# Patient Record
Sex: FEMALE | Race: BLACK OR AFRICAN AMERICAN | NOT HISPANIC OR LATINO
[De-identification: names, ages, dates, MRNs, and addresses within clinical notes are randomized per-mention and may not be internally consistent; named-entity substitution may affect disease eponyms.]

---

## 2016-12-15 NOTE — H&P ADULT. - PROBLEM SELECTOR PLAN 2
Likely sec to sepsis, hypotension.  s/p 2.5 liter in ED. hold BP meds/diuretic until HD stable.  Cont with fluid for another 10 hours and reassess for fluid status. (EF of 26%) Likely sec to sepsis, hypotension.  s/p 2.5 liter in ED. hold BP meds/diuretic until HD stable.  Cont with fluid for another 10 hours and reassess for fluid status. (EF of 26%)  Strict I+Os,

## 2016-12-15 NOTE — H&P ADULT. - ASSESSMENT
55 yo f with multiple medical issues s/p recent hospitalizations with fever, gout, strep bacteremia, decub presented from rehab today with sepsis, hypotension, ILANA without clear source.

## 2016-12-15 NOTE — ED ADULT NURSE NOTE - PSH
History of Cholecystectomy    History of Tubal Ligation    Hx of aorta-iliac-femoral bypass    s/p AAA (Abdominal Aortic Aneurysm) Repair  2009 2010  S/P Dilatation and Curettage    S/P Partial Hysterectomy    TOP (Termination of Pregnancy)

## 2016-12-15 NOTE — ED PROVIDER NOTE - OBJECTIVE STATEMENT
56 y.o. female multiple medical problems bibems from nursing home pw fever, productive cough, nausea and abdominal pain x 2 days. Given tylenol prior to presentation. Abdominal pain is diffuse, +flatulence, +BM normal this AM. No chest pain or shortness of breath.     ROS: denies HA, weakness, dizziness, chest pain, SOB, diaphoresis, back/neck pain, dysuria/hematuria, or rash

## 2016-12-15 NOTE — ED ADULT NURSE NOTE - OBJECTIVE STATEMENT
Pt BIBA from Rehab center, Pt presents w/complaints of N/V/Fever. Pt Lungs b/l crackles at bases, productive cough, green/brown emesis. normal s1s2, abd tender on palpation, +bs 4 quads Skin warm and dry. PT hx of PNA, in rehab since hospitalization. Pt BIBA from Rehab center, Pt presents w/complaints of N/V/Fever. Pt Lungs b/l crackles at bases, productive cough, green/brown emesis. normal s1s2, abd tender on palpation, +bs 4 quads Skin warm and dry. PT hx of PNA, in rehab since hospitalization. pt has indwelling cath placed, changed 2 weeks ago Pt BIBA from Rehab center, Pt presents w/complaints of N/V/Fever. Pt Lungs b/l crackles at bases, productive cough, green/brown emesis. normal s1s2, abd tender on palpation, +bs 4 quads Skin warm and dry. PT hx of PNA, in rehab since hospitalization. pt has indwelling cath placed, changed 2 weeks ago. AIDC. Stage  ulcer on Rt buttock

## 2016-12-15 NOTE — H&P ADULT. - PROBLEM SELECTOR PLAN 1
Similar to previous presentations no obvious source at this time.   UA + with chronic fox at risk for UTI but given h/o bacteremia concerning for another bacteremia.   Decub wound does not look infected.   Will start on broad spectrum abx on vanco and zosyn   Vanco 1 gm x1 order and will follow up trough. (CrCl:22)  Follow up urine and blood cultures Similar to previous presentations no obvious source at this time.   UA + with chronic fox at risk for UTI but given h/o bacteremia concerning for another bacteremia.  CT A/p and CXR unremarkable .   Decub wound does not look infected.   Will start on broad spectrum abx on vanco and zosyn   Vanco 1 gm x1 order and will follow up trough. (CrCl:22)  Follow up urine and blood cultures RVP neg

## 2016-12-15 NOTE — ED PROVIDER NOTE - SHIFT CHANGE DETAILS
pending ct, cxr - ua - may need to broaden abx - admit for febrile illnes and acute on chronic renal failure

## 2016-12-15 NOTE — H&P ADULT. - PROBLEM SELECTOR PLAN 5
Decrease allopurinol to 200mg daily given CrCl  recent gout flare in sept s/p steroid taper  currently no evidence of acute flare

## 2016-12-15 NOTE — ED PROVIDER NOTE - PHYSICAL EXAMINATION
Gen: Well appearing, NAD, AOx3  Head: NCAT  HEENT:  MM dry, normal conjunctiva  Lung: rales at bases, no wheezes or rhonchi  CV: S1/S2, no murmurs, rubs or gallops  Abd: soft, diffuse tenderness, no rebound or guarding  MSK: No CVA tenderness.   Neuro: No focal neurologic deficits.  Skin: Warm and dry, no evidence of rash  Psych: normal mood and affect

## 2016-12-15 NOTE — ED ADULT NURSE NOTE - PMH
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

## 2016-12-15 NOTE — H&P ADULT. - HISTORY OF PRESENT ILLNESS
55 yo f with h/o obesity, chronic sacral decub, chronic pericardial effusion s/p tap 8/2016, chronic systolic HF (EF26%), AICD, h/o LLE DVT no longer on AC, aortoiliac stent, graft, CKD 3-4, gout, HIT, HTN, chronic fox sec to retention presented from rehab with fever and vomiting. Symptoms started yesteraday when she felt nauseous suddenly and threw up few times followed by fever and chills. patient denies any other symptoms. no headache, neck stiffness, photophobia, CP, SOB, cough, palpitations, abd pain, diarrhea, urinary symptoms. patient was hospitalized few time this year with similar complaint aug, sept 2016. Aug admission she was found to have strep bacteremia/?source , decub. Subsequently in Sept with similar presentation but abx was stopped after 3 days. As per rehab records patient was put on levaquin for 10 days since 11/29 for pneumonia.     In the ED, patient is seen having chills. febrile to 100.3 (Per patient she had fevers of 102-103 at rehab). SBP on arrival was 80s s/p 2.5 liters improved to 130s now. Patient was given 1 dose of Ceftriaxone.

## 2016-12-15 NOTE — ED PROVIDER NOTE - MEDICAL DECISION MAKING DETAILS
56 y.o. female pw fever, cough, abd pain, nausea/vomiting. Hypotensive and borderline tachy here, febrile here. Otherwise well appearing. Will obtain sepsis workup, ct ab/pel, xray chest, ekg, likely admit. 56 y.o. female pw fever, cough, abd pain, nausea/vomiting. Hypotensive and borderline tachy here, febrile here. Otherwise well appearing. Will obtain sepsis workup, ct ab/pel, xray chest, ekg, likely admit.  manpreet - 56 multiple med problem chronic indwelling cath - fever cough vomiting - r/o pna - r/o urosepsis - iv abx iv fluids lactate - admit - consider ct r/o abd path -

## 2016-12-15 NOTE — ED PROVIDER NOTE - CARE PLAN
Principal Discharge DX:	Febrile illness, acute Principal Discharge DX:	Febrile illness, acute  Secondary Diagnosis:	Renal failure (ARF), acute on chronic

## 2016-12-15 NOTE — ED ADULT NURSE NOTE - EENT WDL
Eyes with no visual disturbances.  Ears clean and dry and no hearing difficulties. Nose with pink mucosa and no drainage.  Mouth mucous membranes moist and pink.  No tenderness or swelling to throat or neck.

## 2016-12-15 NOTE — H&P ADULT. - PROBLEM SELECTOR PLAN 4
Hgb8.4. baseline 10s  Suspect sepsis related. no report of bleeding.   CT a/p showing previous vasc intervention but no acute finding ie bleed.  Monitor serial CBC if H/H cont to downtrending call GI/Vasc

## 2016-12-15 NOTE — ED PROVIDER NOTE - ATTENDING CONTRIBUTION TO CARE
I performed a history and physical exam of the patient and discussed their management with the resident. I reviewed the resident's note and agree with the documented findings and plan of care. My medical decison making and observations are found above.

## 2016-12-16 NOTE — DIETITIAN INITIAL EVALUATION ADULT. - OTHER INFO
nutrition consult for skin breakdown, sacral decubiti st III. pt admtted fto rehab with fever. Recent admission in August for similar condition. Pt states she dislikes ensure refuses supplements. Pt  completes her own menu and "decides" what she wants to eat" Weight is stable. Pt is bedbound with chronic sacral ulcer a noted. pt was receiving MVI supplements in rehab PTA.  Pt agreed to take zen 2x/day for increased protein.

## 2016-12-16 NOTE — DIETITIAN INITIAL EVALUATION ADULT. - PROBLEM SELECTOR PLAN 2
Likely sec to sepsis, hypotension.  s/p 2.5 liter in ED. hold BP meds/diuretic until HD stable.  Cont with fluid for another 10 hours and reassess for fluid status. (EF of 26%)  Strict I+Os,

## 2016-12-16 NOTE — DIETITIAN INITIAL EVALUATION ADULT. - PROBLEM SELECTOR PLAN 1
Similar to previous presentations no obvious source at this time.   UA + with chronic fox at risk for UTI but given h/o bacteremia concerning for another bacteremia.  CT A/p and CXR unremarkable .   Decub wound does not look infected.   Will start on broad spectrum abx on vanco and zosyn   Vanco 1 gm x1 order and will follow up trough. (CrCl:22)  Follow up urine and blood cultures RVP neg

## 2016-12-16 NOTE — DIETITIAN INITIAL EVALUATION ADULT. - ENERGY NEEDS
IBW=  130 lbs +/- 10%  upper range used for protein estimation  Chart review: pt admitted with fever, ILANA on CKD noted without clear  source. Recent admission in September for bacteremia.

## 2016-12-17 NOTE — PHYSICAL THERAPY INITIAL EVALUATION ADULT - ADDITIONAL COMMENTS
Pt states she has been In and out of hospitals for many months, prior to rehab pt lived at home with daughter, +stairs with hand rail, ambulated independently and performed ADL's independently at home, however at rehab pt only standing performing therex activity.

## 2016-12-17 NOTE — PHYSICAL THERAPY INITIAL EVALUATION ADULT - PERTINENT HX OF CURRENT PROBLEM, REHAB EVAL
Pt is a 56 year old female admitted to [facility] on 12/15/16 for fever and vomiting, Hospital Course: 12/17/16 WBC 13.55, hemoglobin 7.7, hematocrit 24.2, Pt with chronic sacral decub, Chronic pericardial effusion, chronic systolic HF (EF 25%), AICD h/o L LE DVT

## 2016-12-17 NOTE — PHYSICAL THERAPY INITIAL EVALUATION ADULT - BED MOBILITY LIMITATIONS, REHAB EVAL
decreased ability to use arms for pushing/pulling/decreased ability to use legs for bridging/pushing/impaired ability to control trunk for mobility

## 2016-12-17 NOTE — PHYSICAL THERAPY INITIAL EVALUATION ADULT - PLANNED THERAPY INTERVENTIONS, PT EVAL
balance training/PT wound care 1-2xaweek/bed mobility training/gait training/transfer training/strengthening

## 2016-12-19 NOTE — DISCHARGE NOTE ADULT - PLAN OF CARE
Weigh yourself daily.  If you gain 3lbs in 3 days, or 5lbs in a week call your Health Care Provider.  Do not eat or drink foods containing more than 2000mg of salt (sodium) in your diet every day.  Call your Health Care Provider if you have any swelling or increased swelling in your feet, ankles, and/or stomach.  Take all of your medication as directed.  If you become dizzy call your Health Care Provider. Take all antibiotics as ordered.  Call you Health care provider upon arrival home to make a one week follow up appointment.  If you develop fever, chills, malaise, or change in mental status call your Health Care Provider or go to the Emergency Department.  Nutrition is important, eat small frequent meals to help ensure you get adequate calories.  Do not stay in bed all day!  Increase your activity daily as tolerated. resolution of symptoms Low salt diet  Activity as tolerated.  Take all medication as prescribed.  Follow up with your medical doctor for routine blood pressure monitoring at your next visit.  Notify your doctor if you have any of the following symptoms:   Dizziness, Lightheadedness, Blurry vision, Headache, Chest pain, Shortness of breath Avoid taking (NSAIDs) - (ex: Ibuprofen, Advil, Celebrex, Naprosyn)  Avoid taking any nephrotoxic agents (can harm kidneys) - Intravenous contrast for diagnostic testing, combination cold medications.  Have all medications adjusted for your renal function by your Health Care Provider.  Blood pressure control is important.  Take all medication as prescribed. Contact your PCP if you have;   •You have blood in your urine, bowel movement, or vomit.  •You have severe abdominal pain.  •You are confused or feel dizzy or faint.  •You have numbness or weakness of your face or limbs, or have trouble seeing or speaking.  •You have a seizure.  •You have new, sudden vision changes.  •You have trouble breathing.  •You have chest pain, pressure, or discomfort that may spread to your arms, jaw, or back.  •Your leg feels warm, tender, and painful. It may look swollen and red.  •You suddenly feel lightheaded and short of breath.  •You have chest pain when you take a deep breath or cough.  •You cough up blood. No need for further Antibiotics  Call you Health care provider upon arrival home to make a one week follow up appointment.  If you develop fever, chills, malaise, or change in mental status call your Health Care Provider or go to the Emergency Department.  Nutrition is important, eat small frequent meals to help ensure you get adequate calories.  Do not stay in bed all day!  Increase your activity daily as tolerated. ****please take lasix 40mg daily for 2 days for hyperkalemia (elevated K) as per Renal then lasix 20mg every other day as per Dr. Bennett.  You will discharge with Hook catheter at this time.  Please f/u with Dr. Bennett in 2 wks.  Avoid taking (NSAIDs) - (ex: Ibuprofen, Advil, Celebrex, Naprosyn)  Avoid taking any nephrotoxic agents (can harm kidneys) - Intravenous contrast for diagnostic testing, combination cold medications.  Have all medications adjusted for your renal function by your Health Care Provider.  Blood pressure control is important.  Take all medication as prescribed. Please make an appointment with Dr.Goldberg (Rheumatology) within 1 ~2 wks after discharge.  Continue prednisone 30mg daily until you see Dr. Goldberg  Contact your PCP if you have;   •You have blood in your urine, bowel movement, or vomit.  •You have severe abdominal pain.  •You are confused or feel dizzy or faint.  •You have numbness or weakness of your face or limbs, or have trouble seeing or speaking.  •You have a seizure.  •You have new, sudden vision changes.  •You have trouble breathing.  •You have chest pain, pressure, or discomfort that may spread to your arms, jaw, or back.  •Your leg feels warm, tender, and painful. It may look swollen and red.  •You suddenly feel lightheaded and short of breath.  •You have chest pain when you take a deep breath or cough.  •You cough up blood.

## 2016-12-19 NOTE — DISCHARGE NOTE ADULT - CARE PLAN
Principal Discharge DX:	Sepsis due to Escherichia coli with acute renal failure  Instructions for follow-up, activity and diet:	Take all antibiotics as ordered.  Call you Health care provider upon arrival home to make a one week follow up appointment.  If you develop fever, chills, malaise, or change in mental status call your Health Care Provider or go to the Emergency Department.  Nutrition is important, eat small frequent meals to help ensure you get adequate calories.  Do not stay in bed all day!  Increase your activity daily as tolerated.  Secondary Diagnosis:	Chronic systolic congestive heart failure  Instructions for follow-up, activity and diet:	Weigh yourself daily.  If you gain 3lbs in 3 days, or 5lbs in a week call your Health Care Provider.  Do not eat or drink foods containing more than 2000mg of salt (sodium) in your diet every day.  Call your Health Care Provider if you have any swelling or increased swelling in your feet, ankles, and/or stomach.  Take all of your medication as directed.  If you become dizzy call your Health Care Provider.  Secondary Diagnosis:	Anemia, unspecified type Principal Discharge DX:	Sepsis due to Escherichia coli with acute renal failure  Goal:	resolution of symptoms  Instructions for follow-up, activity and diet:	No need for further Antibiotics  Call you Health care provider upon arrival home to make a one week follow up appointment.  If you develop fever, chills, malaise, or change in mental status call your Health Care Provider or go to the Emergency Department.  Nutrition is important, eat small frequent meals to help ensure you get adequate calories.  Do not stay in bed all day!  Increase your activity daily as tolerated.  Secondary Diagnosis:	Chronic systolic congestive heart failure  Instructions for follow-up, activity and diet:	Weigh yourself daily.  If you gain 3lbs in 3 days, or 5lbs in a week call your Health Care Provider.  Do not eat or drink foods containing more than 2000mg of salt (sodium) in your diet every day.  Call your Health Care Provider if you have any swelling or increased swelling in your feet, ankles, and/or stomach.  Take all of your medication as directed.  If you become dizzy call your Health Care Provider.  Secondary Diagnosis:	Hypertension secondary to other renal disorders  Instructions for follow-up, activity and diet:	Low salt diet  Activity as tolerated.  Take all medication as prescribed.  Follow up with your medical doctor for routine blood pressure monitoring at your next visit.  Notify your doctor if you have any of the following symptoms:   Dizziness, Lightheadedness, Blurry vision, Headache, Chest pain, Shortness of breath  Secondary Diagnosis:	Stage 3 chronic kidney disease  Instructions for follow-up, activity and diet:	Avoid taking (NSAIDs) - (ex: Ibuprofen, Advil, Celebrex, Naprosyn)  Avoid taking any nephrotoxic agents (can harm kidneys) - Intravenous contrast for diagnostic testing, combination cold medications.  Have all medications adjusted for your renal function by your Health Care Provider.  Blood pressure control is important.  Take all medication as prescribed.  Secondary Diagnosis:	Systemic lupus erythematosus with lung involvement, unspecified SLE type  Instructions for follow-up, activity and diet:	Contact your PCP if you have;   •You have blood in your urine, bowel movement, or vomit.  •You have severe abdominal pain.  •You are confused or feel dizzy or faint.  •You have numbness or weakness of your face or limbs, or have trouble seeing or speaking.  •You have a seizure.  •You have new, sudden vision changes.  •You have trouble breathing.  •You have chest pain, pressure, or discomfort that may spread to your arms, jaw, or back.  •Your leg feels warm, tender, and painful. It may look swollen and red.  •You suddenly feel lightheaded and short of breath.  •You have chest pain when you take a deep breath or cough.  •You cough up blood. Principal Discharge DX:	Sepsis due to Escherichia coli with acute renal failure  Goal:	resolution of symptoms  Instructions for follow-up, activity and diet:	No need for further Antibiotics  Call you Health care provider upon arrival home to make a one week follow up appointment.  If you develop fever, chills, malaise, or change in mental status call your Health Care Provider or go to the Emergency Department.  Nutrition is important, eat small frequent meals to help ensure you get adequate calories.  Do not stay in bed all day!  Increase your activity daily as tolerated.  Secondary Diagnosis:	Chronic systolic congestive heart failure  Instructions for follow-up, activity and diet:	Weigh yourself daily.  If you gain 3lbs in 3 days, or 5lbs in a week call your Health Care Provider.  Do not eat or drink foods containing more than 2000mg of salt (sodium) in your diet every day.  Call your Health Care Provider if you have any swelling or increased swelling in your feet, ankles, and/or stomach.  Take all of your medication as directed.  If you become dizzy call your Health Care Provider.  Secondary Diagnosis:	Hypertension secondary to other renal disorders  Instructions for follow-up, activity and diet:	Low salt diet  Activity as tolerated.  Take all medication as prescribed.  Follow up with your medical doctor for routine blood pressure monitoring at your next visit.  Notify your doctor if you have any of the following symptoms:   Dizziness, Lightheadedness, Blurry vision, Headache, Chest pain, Shortness of breath  Secondary Diagnosis:	Stage 3 chronic kidney disease  Instructions for follow-up, activity and diet:	****please take lasix 40mg daily for 2 days for hyperkalemia (elevated K) as per Renal then lasix 20mg every other day as per Dr. Bennett.  You will discharge with Hook catheter at this time.  Please f/u with Dr. Bennett in 2 wks.  Avoid taking (NSAIDs) - (ex: Ibuprofen, Advil, Celebrex, Naprosyn)  Avoid taking any nephrotoxic agents (can harm kidneys) - Intravenous contrast for diagnostic testing, combination cold medications.  Have all medications adjusted for your renal function by your Health Care Provider.  Blood pressure control is important.  Take all medication as prescribed.  Secondary Diagnosis:	Systemic lupus erythematosus with lung involvement, unspecified SLE type  Instructions for follow-up, activity and diet:	Please make an appointment with Dr.Goldberg (Rheumatology) within 1 ~2 wks after discharge.  Continue prednisone 30mg daily until you see Dr. Goldberg  Contact your PCP if you have;   •You have blood in your urine, bowel movement, or vomit.  •You have severe abdominal pain.  •You are confused or feel dizzy or faint.  •You have numbness or weakness of your face or limbs, or have trouble seeing or speaking.  •You have a seizure.  •You have new, sudden vision changes.  •You have trouble breathing.  •You have chest pain, pressure, or discomfort that may spread to your arms, jaw, or back.  •Your leg feels warm, tender, and painful. It may look swollen and red.  •You suddenly feel lightheaded and short of breath.  •You have chest pain when you take a deep breath or cough.  •You cough up blood.

## 2016-12-19 NOTE — PROVIDER CONTACT NOTE (CRITICAL VALUE NOTIFICATION) - TEST AND RESULT REPORTED:
Hgb 6.6, Hct 21.5
Prelim result from Blood culture taken 12/15 gram negative rods in aerobic bottle
hemoglobin= 6.9
Abnormal blood cultures collected on 12/15/16

## 2016-12-19 NOTE — DISCHARGE NOTE ADULT - MEDICATION SUMMARY - MEDICATIONS TO TAKE
I will START or STAY ON the medications listed below when I get home from the hospital:    bedside-commode  -- Indication: For device    predniSONE 10 mg oral tablet  -- 3 tab(s) by mouth once a day  -- Indication: For Systemic lupus erythematosus with lung involvement, unspecified SLE type    aspirin 81 mg oral delayed release tablet  -- 1 tab(s) by mouth once a day  -- Indication: For Chronic systolic congestive heart failure    acetaminophen 325 mg oral tablet  -- 2 tab(s) by mouth every 6 hours, As needed, Mild Pain (1 - 3)  -- Indication: For Pain management    febuxostat 40 mg oral tablet  -- 1 tab(s) by mouth once a day  -- Indication: For Chronic gout of knee, unspecified cause, unspecified laterality    carvedilol 25 mg oral tablet  -- 1 tab(s) by mouth every 12 hours  -- Indication: For CAD    labetalol 200 mg oral tablet  -- 2 tab(s) by mouth 3 times a day  -- Indication: For Hypertension secondary to other renal disorders    amLODIPine 10 mg oral tablet  -- 1 tab(s) by mouth once a day  -- Indication: For Hypertension secondary to other renal disorders    nystatin 100,000 units/g topical powder  -- 1 application on skin 2 times a day  -- Indication: For Rash    furosemide 20 mg oral tablet  -- 1 tab(s) by mouth every other day  -- Indication: For Chf    Zantac 150 oral tablet  -- 1 tab(s) by mouth once a day  -- Indication: For gerd    ferrous sulfate 325 mg (65 mg elemental iron) oral tablet  -- 1 tab(s) by mouth once a day  -- Indication: For Anemia, unspecified type    Carafate 1 g/10 mL oral suspension  -- 10 milliliter(s) by mouth once a day  -- Indication: For gastritis    simethicone 80 mg oral tablet, chewable  -- 1 tab(s) by mouth every 8 hours, As Needed  -- Indication: For indigestion    Multi-Day Plus Minerals oral tablet  -- 1 tab(s) by mouth once a day  -- Indication: For Supplement    ascorbic acid 500 mg oral tablet  -- 1 tab(s) by mouth once a day  -- Indication: For Supplement

## 2016-12-19 NOTE — DISCHARGE NOTE ADULT - HOME CARE AGENCY
visiting nurse [facility]   [phone] Visiting Nurse Service of [phone] RN for daily wound care, PT, SW, RN to assess for HHA. Start of care confirmed for Sat 1/28 with CONSTANZA SCHAEFER.

## 2016-12-19 NOTE — DISCHARGE NOTE ADULT - MEDICATION SUMMARY - MEDICATIONS TO CHANGE
I will SWITCH the dose or number of times a day I take the medications listed below when I get home from the hospital:    furosemide 40 mg oral tablet  -- 1 tab(s) by mouth once a day

## 2016-12-19 NOTE — DISCHARGE NOTE ADULT - CARE PROVIDERS DIRECT ADDRESSES
,DirectAddress_Unknown,DirectAddress_Unknown,DirectAddress_Unknown ,DirectAddress_Unknown,DirectAddress_Unknown,DirectAddress_Unknown,DirectAddress_Unknown

## 2016-12-19 NOTE — DISCHARGE NOTE ADULT - HOSPITAL COURSE
md 55 yo f with h/o obesity, chronic sacral decub, chronic pericardial effusion s/p tap 8/2016, chronic systolic HF (EF26%), AICD, h/o LLE DVT no longer on AC, aortoiliac stent, graft, CKD 3-4, gout, HIT, HTN, chronic fox sec to retention presented from rehab with fever and vomiting.  Dx:  acute on chronic CKD 3         SLE on prednisone         CHF: Lasix on hold         pericardian effusion s/p drain.     1/17: CT chest ordered as per Dr. Yoder to evaluate pericardial drain.   1/18: Cards: labetolol increased to 400mg TID.           IR called by MD for pericardial drain removal. Repeat echo show decreased effusion.   1/18- night NP 14b WCT, asymptomatic, normotensive-continue mgt  1/19 pericardial effusion s/p removal of drain           elevated BP's persist. Labetalol increased yesterday. Cardio Dr Yoder added Norvasc  1/20 BP improved after adding Norvasc 10 mg , /78          discharge planning  1/22	Renal: add lasix 20mg QOD for now and tatrate labetalol   1/24; MD aware no IASHA days available to pt.Plan d/c to home with services  1/25; CM 2dsu aware d/c plan. Per Dr. AMBER Chicas keep Fox in place(stress incontinence &healing wound)

## 2016-12-19 NOTE — DISCHARGE NOTE ADULT - PATIENT PORTAL LINK FT
“You can access the [facility] Patient Portal, offered by [facility], by registering with the following website: [email or web address]”

## 2016-12-19 NOTE — DISCHARGE NOTE ADULT - NS MD DC FALL RISK RISK
For information on Fall & Injury Prevention, visit [email or web address]

## 2016-12-19 NOTE — PROVIDER CONTACT NOTE (CRITICAL VALUE NOTIFICATION) - SITUATION
Pt admit with fever; Abnormal blood cultures collected on 12/15/16; final: growth in aerobic bottle, e coli multi drug resistant
prelim results from core lab show gram negative rods in aerobic bottle
History of anemia. Occult sample negative

## 2016-12-19 NOTE — DISCHARGE NOTE ADULT - ADDITIONAL INSTRUCTIONS
please make an appointment with your PCP within 1 wk after discharge.  Please make an appointment with Cardiologist within 1~ 2 wks after discharge.  Please make an appointment with nephrologist within 2 ~4 wks after discharge. please make an appointment with your PCP within 1 wk after discharge.  Please make an appointment with Cardiologist within 1~ 2 wks after discharge.  Please make an appointment with nephrologist within 2  wks after discharge.  Please make an appointment with Rheumatologist within 2 ~4 wks after discharge.

## 2016-12-19 NOTE — DISCHARGE NOTE ADULT - MEDICATION SUMMARY - MEDICATIONS TO STOP TAKING
I will STOP taking the medications listed below when I get home from the hospital:    allopurinol 300 mg oral tablet  -- 1 tab(s) by mouth once a day    hydrALAZINE 25 mg oral tablet  -- 1 tab(s) by mouth every 8 hours

## 2016-12-19 NOTE — DISCHARGE NOTE ADULT - PROVIDER TOKENS
TOKEN:'[id number]:'[id number]' TOKEN:'[id number]:'[id number]'

## 2016-12-19 NOTE — DISCHARGE NOTE ADULT - SECONDARY DIAGNOSIS.
Chronic systolic congestive heart failure Anemia, unspecified type Stage 3 chronic kidney disease Systemic lupus erythematosus with lung involvement, unspecified SLE type Hypertension secondary to other renal disorders

## 2017-01-05 NOTE — PROVIDER CONTACT NOTE (CHANGE IN STATUS NOTIFICATION) - SITUATION
PT hypotensive with BP of 96/59 with HR 87. PT stated not "feeling right" & stated "feel chest tight or pressure"

## 2017-01-07 NOTE — PROVIDER CONTACT NOTE (OTHER) - ASSESSMENT
VSS, except for temperature. Pt denies any chills, or pain or discomfort feeling. Pt Tylenol treatment at this time. Pt states she will take tylenol later if fever persists.

## 2017-01-07 NOTE — PROVIDER CONTACT NOTE (OTHER) - BACKGROUND
Pt admitted for recurrent fevers, UTI, and PNA. Pt hx CHF, DVT, AAA, HTN, and chronic pericardial effusion. Pt has chronic sacral decub.

## 2017-01-10 ENCOUNTER — RESULT REVIEW (OUTPATIENT)
Age: 57
End: 2017-01-10

## 2017-01-14 NOTE — PROVIDER CONTACT NOTE (OTHER) - ASSESSMENT
Patient is A/Ox4 /84, HR83, 97.9, rr 16, 97% on RA. Patient stated she had a conversation with her doctor last night about discontinuing her hydralazine related to lupus. Educated the patient on the importance of her BP medication. Patient still uncomfortable with taking.

## 2017-01-18 NOTE — PROVIDER CONTACT NOTE (OTHER) - SITUATION
RN asking for fox renewal order
RN asking for fox renewal order
RN attempted to do RVP at this time--pt refusing. pt verbalized " my nose is too sensitive". RN explained need for RVP testing--pt continues to refuse.
pt c/o sore throat and bilat ear pain.
Diet orders.
Hydralazine ordered 50mg PO now, /64
Patient has c/o bilat ear and throat discomfort
Patient is refusing Hydralazine
Pt had 14 beats WCT
Pt temp 100
Pt temperature 100.4 F.
Pt. /93 Right upper are, electronic  Assessed again manually 150/80

## 2017-01-18 NOTE — PROVIDER CONTACT NOTE (OTHER) - NAME OF MD/NP/PA/DO NOTIFIED:
KRISTINE Andrea
KRISTINE Andrea
KRISTINE Woo
KRISTINE Woo
DEVEN Katz
KRISTINE Ibanez
KRISTINE Spencer
KRISTINE Woo
Miguelito Carbone NP
Nick Katz, NP
Ricardo ,NP
Madyson Guzman NP

## 2017-01-18 NOTE — PROVIDER CONTACT NOTE (OTHER) - ACTION/TREATMENT ORDERED:
KRISTINE Andrea aware of above, to renew ruddy
KRISTINE Andrea aware of above, to renew ruddy
KRISTINE Woo aware of above, to evaluate pt.
KRISTINE Woo aware of above, to speak with pt
I Spoke to and notified DEVEN Katz of patients complaint.Deven Katz will pass information on to his day team
Continue to monitor
Hold dose now, NP will reevaluate dose and enter parameters for future doses. Continue to monitor.
No further orderd given.Will continue to monitor.
NP aware. Check for bowel sounds, if present pt may eat. Will continue to monitor pt and maintain pt safety.
NP said to recheck blood pressure within an hour, if out of parameter.
OK to do rectal temp. Will order labs, blood cultures, Urine analysis. Will also order STAT cardiac enzyme labs.  Np came to assess and speak with pt. OK to give Tylenol w repeat temperature in 2hrs.
Patient has the right to refuse, educated on the medication importance. Will follow up in the morning with the primary team.

## 2017-01-18 NOTE — PROVIDER CONTACT NOTE (OTHER) - DATE AND TIME:
07-Jan-2017 20:59
02-Jan-2017 08:44
03-Jan-2017 09:15
04-Jan-2017 12:20
04-Jan-2017 13:50
03-Jan-2017 06:21
05-Jan-2017 05:55
06-Jan-2017 13:19
11-Jan-2017 21:33
13-Jan-2017 23:00
18-Jan-2017 00:35
31-Dec-2016 06:25

## 2017-01-18 NOTE — PROVIDER CONTACT NOTE (OTHER) - REASON
Diet orders.
Hydralazine ordered 50mg PO now, /64
Patient is refusing Hydralazine
Pt BP parameter
Pt had 14 beats WCT
Temp
pt with c/o bilat ear and throat pain.
Pt febrile 100.4F

## 2017-06-27 ENCOUNTER — APPOINTMENT (OUTPATIENT)
Dept: VASCULAR SURGERY | Facility: CLINIC | Age: 57
End: 2017-06-27

## 2017-06-27 VITALS
SYSTOLIC BLOOD PRESSURE: 156 MMHG | HEART RATE: 81 BPM | DIASTOLIC BLOOD PRESSURE: 78 MMHG | HEIGHT: 65 IN | TEMPERATURE: 98.2 F

## 2017-07-01 ENCOUNTER — TRANSCRIPTION ENCOUNTER (OUTPATIENT)
Age: 57
End: 2017-07-01

## 2017-10-11 ENCOUNTER — INPATIENT (INPATIENT)
Facility: HOSPITAL | Age: 57
LOS: 37 days | Discharge: HOME | DRG: 314 | End: 2017-11-18
Attending: INTERNAL MEDICINE | Admitting: INTERNAL MEDICINE
Payer: MEDICARE

## 2017-10-11 VITALS
SYSTOLIC BLOOD PRESSURE: 148 MMHG | RESPIRATION RATE: 20 BRPM | OXYGEN SATURATION: 96 % | HEART RATE: 80 BPM | TEMPERATURE: 98 F | DIASTOLIC BLOOD PRESSURE: 77 MMHG

## 2017-10-11 DIAGNOSIS — R07.89 OTHER CHEST PAIN: ICD-10-CM

## 2017-10-11 DIAGNOSIS — N17.9 ACUTE KIDNEY FAILURE, UNSPECIFIED: ICD-10-CM

## 2017-10-11 DIAGNOSIS — M32.19 OTHER ORGAN OR SYSTEM INVOLVEMENT IN SYSTEMIC LUPUS ERYTHEMATOSUS: ICD-10-CM

## 2017-10-11 DIAGNOSIS — Z29.9 ENCOUNTER FOR PROPHYLACTIC MEASURES, UNSPECIFIED: ICD-10-CM

## 2017-10-11 DIAGNOSIS — L98.429 NON-PRESSURE CHRONIC ULCER OF BACK WITH UNSPECIFIED SEVERITY: ICD-10-CM

## 2017-10-11 DIAGNOSIS — I50.22 CHRONIC SYSTOLIC (CONGESTIVE) HEART FAILURE: ICD-10-CM

## 2017-10-11 DIAGNOSIS — M79.89 OTHER SPECIFIED SOFT TISSUE DISORDERS: ICD-10-CM

## 2017-10-11 DIAGNOSIS — D64.9 ANEMIA, UNSPECIFIED: ICD-10-CM

## 2017-10-11 DIAGNOSIS — I30.9 ACUTE PERICARDITIS, UNSPECIFIED: ICD-10-CM

## 2017-10-11 DIAGNOSIS — R06.02 SHORTNESS OF BREATH: ICD-10-CM

## 2017-10-11 LAB
ALBUMIN SERPL ELPH-MCNC: 4 G/DL — SIGNIFICANT CHANGE UP (ref 3.3–5)
ALP SERPL-CCNC: 65 U/L — SIGNIFICANT CHANGE UP (ref 40–120)
ALT FLD-CCNC: 10 U/L RC — SIGNIFICANT CHANGE UP (ref 10–45)
ANION GAP SERPL CALC-SCNC: 14 MMOL/L — SIGNIFICANT CHANGE UP (ref 5–17)
APPEARANCE UR: ABNORMAL
APTT BLD: 30.6 SEC — SIGNIFICANT CHANGE UP (ref 27.5–37.4)
AST SERPL-CCNC: 16 U/L — SIGNIFICANT CHANGE UP (ref 10–40)
BASOPHILS # BLD AUTO: 0 K/UL — SIGNIFICANT CHANGE UP (ref 0–0.2)
BASOPHILS NFR BLD AUTO: 0.1 % — SIGNIFICANT CHANGE UP (ref 0–2)
BILIRUB SERPL-MCNC: 0.3 MG/DL — SIGNIFICANT CHANGE UP (ref 0.2–1.2)
BILIRUB UR-MCNC: NEGATIVE — SIGNIFICANT CHANGE UP
BUN SERPL-MCNC: 59 MG/DL — HIGH (ref 7–23)
CALCIUM SERPL-MCNC: 9.1 MG/DL — SIGNIFICANT CHANGE UP (ref 8.4–10.5)
CHLORIDE SERPL-SCNC: 106 MMOL/L — SIGNIFICANT CHANGE UP (ref 96–108)
CK MB CFR SERPL CALC: 4.8 NG/ML — HIGH (ref 0–3.8)
CO2 SERPL-SCNC: 26 MMOL/L — SIGNIFICANT CHANGE UP (ref 22–31)
COLOR SPEC: YELLOW — SIGNIFICANT CHANGE UP
CREAT SERPL-MCNC: 2.83 MG/DL — HIGH (ref 0.5–1.3)
DIFF PNL FLD: NEGATIVE — SIGNIFICANT CHANGE UP
EOSINOPHIL # BLD AUTO: 0.1 K/UL — SIGNIFICANT CHANGE UP (ref 0–0.5)
EOSINOPHIL NFR BLD AUTO: 1.5 % — SIGNIFICANT CHANGE UP (ref 0–6)
GLUCOSE SERPL-MCNC: 88 MG/DL — SIGNIFICANT CHANGE UP (ref 70–99)
GLUCOSE UR QL: NEGATIVE — SIGNIFICANT CHANGE UP
HCT VFR BLD CALC: 30.2 % — LOW (ref 34.5–45)
HGB BLD-MCNC: 9.8 G/DL — LOW (ref 11.5–15.5)
INR BLD: 1.08 RATIO — SIGNIFICANT CHANGE UP (ref 0.88–1.16)
KETONES UR-MCNC: NEGATIVE — SIGNIFICANT CHANGE UP
LEUKOCYTE ESTERASE UR-ACNC: ABNORMAL
LYMPHOCYTES # BLD AUTO: 0.6 K/UL — LOW (ref 1–3.3)
LYMPHOCYTES # BLD AUTO: 10 % — LOW (ref 13–44)
MCHC RBC-ENTMCNC: 29.1 PG — SIGNIFICANT CHANGE UP (ref 27–34)
MCHC RBC-ENTMCNC: 32.3 GM/DL — SIGNIFICANT CHANGE UP (ref 32–36)
MCV RBC AUTO: 90.1 FL — SIGNIFICANT CHANGE UP (ref 80–100)
MONOCYTES # BLD AUTO: 0.6 K/UL — SIGNIFICANT CHANGE UP (ref 0–0.9)
MONOCYTES NFR BLD AUTO: 9.7 % — SIGNIFICANT CHANGE UP (ref 2–14)
NEUTROPHILS # BLD AUTO: 4.6 K/UL — SIGNIFICANT CHANGE UP (ref 1.8–7.4)
NEUTROPHILS NFR BLD AUTO: 78.7 % — HIGH (ref 43–77)
NITRITE UR-MCNC: NEGATIVE — SIGNIFICANT CHANGE UP
NT-PROBNP SERPL-SCNC: 8654 PG/ML — HIGH (ref 0–300)
PH UR: 8 — SIGNIFICANT CHANGE UP (ref 5–8)
PLATELET # BLD AUTO: 131 K/UL — LOW (ref 150–400)
POTASSIUM SERPL-MCNC: 4.4 MMOL/L — SIGNIFICANT CHANGE UP (ref 3.5–5.3)
POTASSIUM SERPL-SCNC: 4.4 MMOL/L — SIGNIFICANT CHANGE UP (ref 3.5–5.3)
PROT SERPL-MCNC: 6.9 G/DL — SIGNIFICANT CHANGE UP (ref 6–8.3)
PROT UR-MCNC: >600 MG/DL
PROTHROM AB SERPL-ACNC: 11.7 SEC — SIGNIFICANT CHANGE UP (ref 9.8–12.7)
RBC # BLD: 3.36 M/UL — LOW (ref 3.8–5.2)
RBC # FLD: 13.4 % — SIGNIFICANT CHANGE UP (ref 10.3–14.5)
SODIUM SERPL-SCNC: 146 MMOL/L — HIGH (ref 135–145)
SP GR SPEC: 1.01 — SIGNIFICANT CHANGE UP (ref 1.01–1.02)
UROBILINOGEN FLD QL: NEGATIVE — SIGNIFICANT CHANGE UP
WBC # BLD: 5.8 K/UL — SIGNIFICANT CHANGE UP (ref 3.8–10.5)
WBC # FLD AUTO: 5.8 K/UL — SIGNIFICANT CHANGE UP (ref 3.8–10.5)
WBC UR QL: ABNORMAL /HPF (ref 0–5)

## 2017-10-11 PROCEDURE — 71010: CPT | Mod: 26

## 2017-10-11 PROCEDURE — 71250 CT THORAX DX C-: CPT | Mod: 26

## 2017-10-11 PROCEDURE — 93010 ELECTROCARDIOGRAM REPORT: CPT

## 2017-10-11 PROCEDURE — 93308 TTE F-UP OR LMTD: CPT | Mod: 26

## 2017-10-11 PROCEDURE — 99291 CRITICAL CARE FIRST HOUR: CPT

## 2017-10-11 PROCEDURE — 99285 EMERGENCY DEPT VISIT HI MDM: CPT | Mod: 25,GC

## 2017-10-11 RX ORDER — SODIUM CHLORIDE 0.65 %
1 AEROSOL, SPRAY (ML) NASAL
Qty: 0 | Refills: 0 | COMMUNITY

## 2017-10-11 RX ORDER — FERROUS SULFATE 325(65) MG
325 TABLET ORAL DAILY
Qty: 0 | Refills: 0 | Status: DISCONTINUED | OUTPATIENT
Start: 2017-10-11 | End: 2017-11-18

## 2017-10-11 RX ORDER — FERROUS SULFATE 325(65) MG
1 TABLET ORAL
Qty: 0 | Refills: 0 | COMMUNITY

## 2017-10-11 RX ORDER — AMLODIPINE BESYLATE 2.5 MG/1
10 TABLET ORAL DAILY
Qty: 0 | Refills: 0 | Status: DISCONTINUED | OUTPATIENT
Start: 2017-10-12 | End: 2017-11-18

## 2017-10-11 RX ORDER — HYDRALAZINE HCL 50 MG
25 TABLET ORAL EVERY 8 HOURS
Qty: 0 | Refills: 0 | Status: DISCONTINUED | OUTPATIENT
Start: 2017-10-12 | End: 2017-11-11

## 2017-10-11 RX ORDER — CARVEDILOL PHOSPHATE 80 MG/1
1 CAPSULE, EXTENDED RELEASE ORAL
Qty: 0 | Refills: 0 | COMMUNITY

## 2017-10-11 RX ORDER — CARVEDILOL PHOSPHATE 80 MG/1
25 CAPSULE, EXTENDED RELEASE ORAL EVERY 12 HOURS
Qty: 0 | Refills: 0 | Status: DISCONTINUED | OUTPATIENT
Start: 2017-10-11 | End: 2017-11-17

## 2017-10-11 RX ORDER — HYDRALAZINE HCL 50 MG
1 TABLET ORAL
Qty: 0 | Refills: 0 | COMMUNITY

## 2017-10-11 RX ORDER — AMLODIPINE BESYLATE 2.5 MG/1
1 TABLET ORAL
Qty: 0 | Refills: 0 | COMMUNITY

## 2017-10-11 RX ORDER — LABETALOL HCL 100 MG
2 TABLET ORAL
Qty: 0 | Refills: 0 | COMMUNITY

## 2017-10-11 RX ORDER — LABETALOL HCL 100 MG
200 TABLET ORAL EVERY 8 HOURS
Qty: 0 | Refills: 0 | Status: DISCONTINUED | OUTPATIENT
Start: 2017-10-11 | End: 2017-11-13

## 2017-10-11 RX ORDER — FEBUXOSTAT 40 MG/1
1 TABLET ORAL
Qty: 0 | Refills: 0 | COMMUNITY

## 2017-10-11 RX ORDER — ASCORBIC ACID 60 MG
500 TABLET,CHEWABLE ORAL DAILY
Qty: 0 | Refills: 0 | Status: DISCONTINUED | OUTPATIENT
Start: 2017-10-11 | End: 2017-11-18

## 2017-10-11 RX ORDER — FUROSEMIDE 40 MG
1 TABLET ORAL
Qty: 0 | Refills: 0 | COMMUNITY

## 2017-10-11 NOTE — ED PROVIDER NOTE - PROGRESS NOTE DETAILS
informed from lab pt trop 0.2 c/w baseline x1 year, ekg non-concerning for acs , on review, at baseline, pt also has cr 1.5-3.5, pt admitted, will ctm. v/q scan ordered secondary to elevated cr.  Nick Rios MD  PGY-1 Emergency Medicine

## 2017-10-11 NOTE — ED ADULT NURSE NOTE - OBJECTIVE STATEMENT
57 y.o. Female BIBA from home c/o SOB and possible infection as per home visiting MD/RN. Hx CHF, lupus, GERD, gout, AAA - "repaired." Pt reports feeling SOB x1 episode last week when it was "humid." Pt states as per "testing" at home from blood and urine, pt should come to ED for further evaluation d/t "infection." Pt arrived with fox, draining - placed in January - last changed within this month. Stage 4 on R buttock - covered in dressing; clean/dry. L lower extremity swelling +3. Pt reports the leg swelling has been swollen x1 month. Pt reports having L "drop foot" - no movement of foot; +sensation of b/l lower extremities. +ROM and +pulse of R foot. Denies CP, N/V/D, urinary/bowel complications, fever/chills. A&Ox3. Pt is in no current distress. Comfort and safety provided. Awaiting MD consult. Will continue to monitor.

## 2017-10-11 NOTE — H&P ADULT - PROBLEM SELECTOR PLAN 9
Avoid all heparin based products given prior hx of HIT. This includes avoiding heparin flushes. Discussed w/ RN.

## 2017-10-11 NOTE — ED PROVIDER NOTE - ATTENDING CONTRIBUTION TO CARE
Patient with chief complaint of shortness of breath, worsening and worsening lle swelling, moderate. Patient has worsening shortness of breath lying flat and at night, waking up shortness of breath as well.   lle with lymphedema, ncat, right nare with scaring and narrowing, mmm, trachea midline, patient shortness of breath with reclining of stretcher, +5 lle edema, soft, ntnd, no warmth to touch, NAD  will get iv, labs, screen CE, v/q scan/us lle as inpatient (inpatient team to follow) and admit to Dr. Chicas.

## 2017-10-11 NOTE — H&P ADULT - PROBLEM SELECTOR PLAN 4
-Patient w./ notable normocytic anemia. She denies GI bleeding and suspect this could be lupus related versus hemolysis versus occult blood loss. Will check type and screen, check reticulocyte/haptoglobin/direct coomb testing. Less likely hemolysis given normal bilirubin. -Patient w./ notable normocytic anemia. She denies GI bleeding and suspect this could be lupus related versus hemolysis versus occult blood loss. Will check type and screen, check reticulocyte/haptoglobin/direct coomb testing. Less likely hemolysis given normal bilirubin. Monitor H&H. Check FOBT -Patient w./ notable normocytic anemia. She denies GI bleeding and suspect this could be lupus related versus hemolysis versus occult blood loss versus anemia of chronic disease. Will check type and screen, check reticulocyte/haptoglobin/direct coomb testing. Less likely hemolysis given normal bilirubin. Monitor H&H. Check FOBT

## 2017-10-11 NOTE — H&P ADULT - PROBLEM SELECTOR PLAN 8
-Given pericardial effusion and renal failure would recommend rheumatology evaluation in AM. Complements and dsDNA ordered.   -May require renal bx; check spot protein/crt urine ratio

## 2017-10-11 NOTE — H&P ADULT - PROBLEM SELECTOR PLAN 1
-Patient presenting with progressive shortness of breath. She is hemodynamically stable. I asked ED provider to perform bedside FAST exam which revealed a 2.5-cm large pericardial effusion. No pulses paradoxus on my exam. CT surgery consulted by me and Cardiologist Dr. Yoedr was notified. Given the anemia and large effusion suspect this could be hemopercardium and patient has had multiple recurrent pericardial effusion requiring drainage. Will get CT chest as this was also protocoled by me. Will get type and screen and monitor hemodynamics carefully. -Patient presenting with progressive shortness of breath. She is hemodynamically stable. I asked ED provider to perform bedside FAST exam which revealed a 2.5-cm large pericardial effusion. No pulses paradoxus on my exam. CT surgery consulted by me and Cardiologist Dr. Yoder was notified. CT surgery recommends that patient gets IR drainage in the morning. Given the anemia and large effusion suspect this could be hemopercardium and patient has had multiple recurrent pericardial effusion requiring drainage. Will get CT chest as this was also protocoled by me. Will get type and screen and monitor hemodynamics carefully.

## 2017-10-11 NOTE — H&P ADULT - NSHPPHYSICALEXAM_GEN_ALL_CORE
PHYSICAL EXAM:  Vital Signs Last 24 Hrs  T(C): 36.7 (10-11-17 @ 20:40)  T(F): 98.1 (10-11-17 @ 20:40), Max: 98.1 (10-11-17 @ 20:40)  HR: 78 (10-11-17 @ 20:40) (73 - 80)  BP: 156/84 (10-11-17 @ 20:40)  BP(mean): --  RR: 20 (10-11-17 @ 20:40) (20 - 20)  SpO2: 95% (10-11-17 @ 20:40) (95% - 97%)  Wt(kg): --    Constitutional: NAD, awake and alert  EYES: EOMI  ENT:  Normal Hearing, no tonsillar exudates   Neck: Soft and supple  Respiratory: Breath sounds are clear bilaterally, No wheezing, rales or rhonchi  Cardiovascular: S1 and S2, regular rate and rhythm, no Murmurs, gallops or rubs; heart sounds are distant, +JVD, and no pericardial knock on my exam. Left leg swelling >>>right leg swelling  Gastrointestinal: Bowel Sounds present, soft, nontender, nondistended, no guarding, no rebound  Extremities: No cyanosis or clubbing; warm to touch  Vascular: 2+ peripheral pulses in right lower extremity. Left lower extremity pulses present on doppler.   Neurological: CN II-XII intact bilaterally, sensation to light touch intact in all extremities. no pronator drift.   Musculoskeletal: 4/5 strength b/l upper and 3/5 right lower extremity and 2/5 left lower extremity  Skin: sacral ulcer stage3-4; no signs of infection   Psych: no depression or anhedonia; AAOx3  HEME: no bruises, no nose bleeds

## 2017-10-11 NOTE — H&P ADULT - NSHPLABSRESULTS_GEN_ALL_CORE
9.8    5.8   )-----------( 131      ( 11 Oct 2017 18:05 )             30.2       10-11    146<H>  |  106  |  59<H>  ----------------------------<  88  4.4   |  26  |  2.83<H>    Ca    9.1      11 Oct 2017 18:05    TPro  6.9  /  Alb  4.0  /  TBili  0.3  /  DBili  x   /  AST  16  /  ALT  10  /  AlkPhos  65  10-11            PT/INR - ( 11 Oct 2017 18:09 )   PT: 11.7 sec;   INR: 1.08 ratio         PTT - ( 11 Oct 2017 18:09 )  PTT:30.6 sec    CARDIAC MARKERS ( 11 Oct 2017 18:05 )  x     / 0.20 ng/mL / x     / x     / 4.8 ng/mL      Urinalysis Basic - ( 11 Oct 2017 18:44 )    Color: Yellow / Appearance: SL Turbid / S.015 / pH: x  Gluc: x / Ketone: Negative  / Bili: Negative / Urobili: Negative   Blood: x / Protein: >600 mg/dL / Nitrite: Negative   Leuk Esterase: Large / RBC: x / WBC 5-10 /HPF   Sq Epi: x / Non Sq Epi: x / Bacteria: x    I personally reviewed & interpreted the lab findings above; normocytic anemia and thrombocytopenia with ILANA on CKD +prBNP and +CE  I personally reviewed & interpreted the radiographic findings; CXR shows remarkable cardiomegaly   I personally reviewed & interpreted the EKG findings; Saragossa criteria negative for MI; LBBB--old finding. No electrical alternans

## 2017-10-11 NOTE — H&P ADULT - PROBLEM SELECTOR PLAN 5
-Baseline creatinine is about 1.5 but now 2.83. Suspect this could be related to decreased forward-flow due to large pericardial effusion versus vascular disease in setting of prior AAA repair. Will first attempt to correct pericardial effusion and if ILANA worsening will get further studies to evaluate vascular status of aortic, renal, iliac artery. However, would recommend renal consultation for further management.  -Will send urine studies and random protein/crt ratio given significant proteinuria on UA. This could also be due to SLE flare and may require renal biopsy. Will check SLE labs including dsDNA and complements.  -Monitor urine output and electrolytes closely  -Renally dose all medications  -Avoid nephrotoxins.

## 2017-10-11 NOTE — H&P ADULT - ASSESSMENT
58 yo F w/ hx of SLE, systolic CHF (EF 32%) s/p AICD, HTN, CKD III- IV, PE / left leg DVT dx 2009, HIT, CAD (no stents), stage IV sacral ulcer, chronic fox, hx of recurrent pericardial effusion, AAA ~14cm in 12/2016 s/p repair in 2010 w/ aortoiliac stent c/b post-operatively with development of left lower extremity compartment syndrome presents with 1-wk hx of progressive shortness of breath 2/2 to large pericardial effusion.

## 2017-10-11 NOTE — H&P ADULT - HISTORY OF PRESENT ILLNESS
56 yo F w/ hx of SLE, systolic CHF (EF 32%) s/p AICD, AAA, HTN, PE dx 2009, 58 yo F w/ hx of SLE, systolic CHF (EF 32%) s/p AICD, AAA, HTN, PE dx 2009, HIT, CAD (no stents), recurrent pericardial effusion. 58 yo F w/ hx of SLE, systolic CHF (EF 32%) s/p AICD, HTN, PE / left leg DVT dx 2009, HIT, CAD (no stents), stage IV sacral ulcer, chronic fox, hx of recurrent pericardial effusion, AAA ~14cm in 12/2016 s/p repair in 2010 w/ aortoiliac stent c/b post-operatively with development of left lower extremity compartment syndrome presents with 1-day history of sudden onset of shortness of breath.    Patient was sleep this morning when she woke up with sudden onset of dyspnea. She had similar episode about 1-wk ago which resolved on its own. This time she felt more clammy and diaphoretic. She also had epigastric pain that was 6/10, non-radiating, described as burning quality, with no relief w/ positional change. She called her PCP who instructed her to come to ED. Patient denies any URI symptoms, fever, pleurisy, chills, coughs, nausea, vomiting. She reports a remote history of DVT/PE in 2009 and was treated initially w/ heparin and developed HIT and warfarin for anticoagulation. The warfarin was discontinued because of what patient reports was life threatening epistaxis. Unclear how long she was on anticoagulation. She also reports this morning her daughter noticed her left leg was much more swollen. Patient at baseline has chronic left leg edema in setting of her prior fasciotomy for compartment syndrome but this time it is more swollen.     Patient had hospital admission in December 2016 for pericardial effusion requiring drainage/pericardiocentesis. She had another similar admission in 1/2017 and at that time also had pericardial drain placed. During that time she was diagnosed with SLE. She been taking prednisone 5mg once daily and is not sure if this is similar to her SLE flare. She denies visual changes, aphthous ulcers, joint pains, and rashes.     Patient reports her last AICD interrogation was about 2-mo ago and reportedly normal. She denies any dysuria and urinary symptoms but has chronic fox. She normally uses wheelchair for transferring and has had no syncope or falls. She has chronic sacral ulcer stage IV but denies any drainage from that site.     In ED: VS: T98s, HR 70-80, -150/ 70-80, 16, 97% 2L NC. 58 yo F w/ hx of SLE, systolic CHF (EF 32%) s/p AICD, HTN, CKD III- IV, PE / left leg DVT dx 2009, HIT, CAD (no stents), stage IV sacral ulcer, chronic fox, hx of recurrent pericardial effusion, AAA ~14cm in 12/2016 s/p repair in 2010 w/ aortoiliac stent c/b post-operatively with development of left lower extremity compartment syndrome presents with 1-wk history of progressive shortness of breath.    Patient was sleep this morning when she woke up with sudden onset of dyspnea. She had similar episode about 1-wk ago which resolved on its own. This time she felt more clammy and diaphoretic. She also had epigastric pain that was 6/10, non-radiating, described as burning quality, with no relief w/ positional change. She called her PCP who instructed her to come to ED. Patient denies any URI symptoms, fever, pleurisy, chills, coughs, nausea, vomiting. She reports a remote history of DVT/PE in 2009 and was treated initially w/ heparin and developed HIT and warfarin for anticoagulation. The warfarin was discontinued because of what patient reports was life threatening epistaxis. Unclear how long she was on anticoagulation. She also reports this morning her daughter noticed her left leg was much more swollen. Patient at baseline has chronic left leg edema in setting of her prior fasciotomy for compartment syndrome but this time it is more swollen.     Patient had hospital admission in December 2016 for pericardial effusion requiring drainage/pericardiocentesis. She had another similar admission in 1/2017 and at that time also had pericardial drain placed. During that time she was diagnosed with SLE. She been taking prednisone 5mg once daily and is not sure if this is similar to her SLE flare. She denies visual changes, aphthous ulcers, joint pains, and rashes.     Patient reports her last AICD interrogation was about 2-mo ago and reportedly normal. She denies any dysuria and urinary symptoms but has chronic fox. She normally uses wheelchair for transferring and has had no syncope or falls. She has chronic sacral ulcer stage IV but denies any drainage from that site.     Of note patient had hemoglobin of 12 in 1/2017 and now is 9.8. She reports no melena or hematochezia. She reports taking no NSAIDs and reports no vaginal bleeding. She takes iron therapy. She does not follow up with primary prevention screening.     In ED: VS: T98s, HR 70-80, -150/ 70-80, 16, 97% 2L NC. 58 yo F w/ hx of SLE, systolic CHF (EF 32%) s/p AICD, HTN, CKD III- IV, PE / left leg DVT dx 2009, HIT, CAD (no stents), stage IV sacral ulcer, chronic fox, hx of recurrent pericardial effusion, AAA ~14cm in 12/2016 s/p repair in 2010 w/ aortoiliac stent c/b post-operatively with development of left lower extremity compartment syndrome presents with 1-wk history of progressive shortness of breath.    Patient was sleep this morning when she woke up with sudden onset of dyspnea. She had similar episode about 1-wk ago which resolved on its own. This time she felt more clammy and diaphoretic. She also had epigastric pain that was 6/10, non-radiating, described as burning quality, with no relief w/ positional change. She called her PCP who instructed her to come to ED. Patient denies any URI symptoms, fever, pleurisy, chills, coughs, nausea, vomiting. She reports a remote history of DVT/PE in 2009 and was treated initially w/ heparin and developed HIT and warfarin for anticoagulation. The warfarin was discontinued because of what patient reports was life threatening epistaxis. Unclear how long she was on anticoagulation. She also reports this morning her daughter noticed her left leg was much more swollen. Patient at baseline has chronic left leg edema in setting of her prior fasciotomy for compartment syndrome but this time it is more swollen.     Patient had hospital admission in December 2016 for pericardial effusion requiring drainage/pericardiocentesis. She had another similar admission in 1/2017 and at that time also had pericardial drain placed. During that time she was diagnosed with SLE. She been taking prednisone 5mg once daily and is not sure if this is similar to her SLE flare. She denies visual changes, aphthous ulcers, joint pains, and rashes.     Patient reports her last AICD interrogation was about 2-mo ago and reportedly normal. She denies any dysuria and urinary symptoms but has chronic fox. She normally uses wheelchair for transferring and has had no syncope or falls. She has chronic sacral ulcer stage IV but denies any drainage from that site.     Of note patient had hemoglobin of 12 in 1/2017 and now is 9.8. She reports no melena or hematochezia. She reports taking no NSAIDs and reports no vaginal bleeding. She takes iron therapy. She does not follow up with primary prevention screening. In terms of the AAA patient reports she last had visit with her vascular surgeon about 3mo ago and was told everything was stable. She does not know size of AAA but as of 12/2016 imaging it was 14cm    In ED: VS: T98s, HR 70-80, -150/ 70-80, 16, 97% 2L NC.

## 2017-10-11 NOTE — H&P ADULT - NSHPSOCIALHISTORY_GEN_ALL_CORE
Pt reports no prior or current use of tobacco products, illicit drugs, or alcohol abuse. Drinks 1-2 glass of wine every 5 days.

## 2017-10-11 NOTE — H&P ADULT - PROBLEM SELECTOR PLAN 3
-Patient presents with atypical chest pain that has resolved but given severe heart failure and AICD would recommend to trend x3 sets of cardiac enzymes. Initial EKG in ED did not meet Saragossa criteria. Suspect the positive cardio biomarkers likely related to stretch physiology from large pericardial effusion and ILANA on CKD.

## 2017-10-11 NOTE — ED PROVIDER NOTE - MEDICAL DECISION MAKING DETAILS
Pt is a 57F w/ a PMH of SLE, CHF, hx of PE/DVT presenting with a cc of SOB, cough worsening LLE swelling x2 days, c/f PE vs CHF exacerbation vs worsening of AAA causing impaired venous return, neruovasc intact, discussed with pmd, will admit for dieresis, will get LE duplex CTA c/f DVT/PE, cxr, labs, urine.

## 2017-10-11 NOTE — ED PROCEDURE NOTE - ATTENDING CONTRIBUTION TO CARE
***Gerry Snell MD*** Attending physician was available for the key components of the procedure, the patient tolerated well. There were no complications with the procedure.

## 2017-10-11 NOTE — ED PROCEDURE NOTE - PROCEDURE ADDITIONAL DETAILS
POCUS: Emergency Department Focused Ultrasound performed at patient's bedside.  The complete report will be available in PACS.    Study cancelled secondary to pt discomfort, body habitus. Follow up confirm study will be formal US Doppler LLE

## 2017-10-11 NOTE — H&P ADULT - PROBLEM SELECTOR PLAN 6
-Patient does not appear to be in overt pulmonary edema despite the elevated proBNP. Will continue w/ home cardiac medications and hold lasix for now. Cardio recommendations appreciated.

## 2017-10-11 NOTE — H&P ADULT - ATTENDING COMMENTS
I personally provided 70 minutes of critical care time for progressive shortness of breath which was evaluated with bedside ultrasound and CT chest scan which revealed large pericardial effusion managed largely with consultation services. Primary team in AM to arrange for IR guided drainage of large pericardial effusion and send workup for pericardial effusion including autoimmune causes and cytology. Would recommend renal and vascular consultation in AM. See HPI above for details.

## 2017-10-11 NOTE — H&P ADULT - NSHPREVIEWOFSYSTEMS_GEN_ALL_CORE
REVIEW OF SYSTEMS:    CONSTITUTIONAL: No fevers or chills  ENMT:  No ear pain, No vertigo or throat pain  EYES: No visual changes  or photophobia  NECK: No pain or stiffness  RESPIRATORY: see HPI; denies hemoptysis.   CARDIOVASCULAR: See HPI  GASTROINTESTINAL: No abdominal or epigastric pain. No nausea, vomiting, or hematemesis; No diarrhea or constipation. No melena or hematochezia.  MUSCULOSKELETAL: No joint swelling  or warmth.  GENITOURINARY: No dysuria, frequency or hematuria  NEUROLOGICAL: No numbness or syncope.  PSYCHIATRIC: No depression or anhedonia.  ENDOCRINE: No sx hypoglycemic episodes.  SKIN: No itching, burning, rashes, or lesions

## 2017-10-11 NOTE — ED PROCEDURE NOTE - NS ED ATTENDING STATEMENT MOD
Attending Only
I have personally seen and examined this patient.  I have fully participated in the care of this patient. I have reviewed all pertinent clinical information, including history, physical exam, plan and the Resident’s note and agree except as noted.

## 2017-10-11 NOTE — ED ADULT NURSE NOTE - PMH
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

## 2017-10-11 NOTE — ED PROVIDER NOTE - OBJECTIVE STATEMENT
Pt is a 57F with a PMH of SLE, CHF, AAA, HTN, PE presenting with a cc of SOB, cough x1 week, cough non productive, worse with exertion, also notes increased LLE swelling x1 week a/w SOB, reports reduction in ability to ambulate secondary to increased swelling, reports spoke to PMD today, was told to go to ED. Denies n/v/f/c/cp Denies headache, syncope, lightheadedness, dizziness. Denies chest palpitations, abdominal pain. Denies dysuria, hematuria, hematochezia, BRBPR, tarry stools, diarrhea, constipation.

## 2017-10-11 NOTE — H&P ADULT - PROBLEM SELECTOR PLAN 2
-Patient w/ progressive left leg swelling suspicious for vascular disease including DVT versus arterial insufficiency given prior aoroiliac stent and AAA repair. For now will check doppler to evaluate for DVT. If doppler is negative would recommend vascular consultation for further management to evaluate the patency of arterial flow.

## 2017-10-12 DIAGNOSIS — I31.3 PERICARDIAL EFFUSION (NONINFLAMMATORY): ICD-10-CM

## 2017-10-12 LAB
ANION GAP SERPL CALC-SCNC: 15 MMOL/L — SIGNIFICANT CHANGE UP (ref 5–17)
BLD GP AB SCN SERPL QL: NEGATIVE — SIGNIFICANT CHANGE UP
BUN SERPL-MCNC: 57 MG/DL — HIGH (ref 7–23)
C3 SERPL-MCNC: 101 MG/DL — SIGNIFICANT CHANGE UP (ref 80–180)
C4 SERPL-MCNC: 27 MG/DL — SIGNIFICANT CHANGE UP (ref 10–45)
CALCIUM SERPL-MCNC: 9 MG/DL — SIGNIFICANT CHANGE UP (ref 8.4–10.5)
CHLORIDE SERPL-SCNC: 107 MMOL/L — SIGNIFICANT CHANGE UP (ref 96–108)
CK MB BLD-MCNC: 3.3 % — SIGNIFICANT CHANGE UP (ref 0–3.5)
CK MB BLD-MCNC: 3.6 % — HIGH (ref 0–3.5)
CK MB CFR SERPL CALC: 4 NG/ML — HIGH (ref 0–3.8)
CK MB CFR SERPL CALC: 4.5 NG/ML — HIGH (ref 0–3.8)
CK SERPL-CCNC: 121 U/L — SIGNIFICANT CHANGE UP (ref 25–170)
CK SERPL-CCNC: 124 U/L — SIGNIFICANT CHANGE UP (ref 25–170)
CO2 SERPL-SCNC: 24 MMOL/L — SIGNIFICANT CHANGE UP (ref 22–31)
CREAT ?TM UR-MCNC: 58 MG/DL — SIGNIFICANT CHANGE UP
CREAT SERPL-MCNC: 2.64 MG/DL — HIGH (ref 0.5–1.3)
DAT POLY-SP REAG RBC QL: NEGATIVE — SIGNIFICANT CHANGE UP
DSDNA AB SER-ACNC: 52 IU/ML — HIGH
FERRITIN SERPL-MCNC: 847 NG/ML — HIGH (ref 15–150)
GLUCOSE SERPL-MCNC: 88 MG/DL — SIGNIFICANT CHANGE UP (ref 70–99)
HAPTOGLOB SERPL-MCNC: 114 MG/DL — SIGNIFICANT CHANGE UP (ref 34–200)
HCT VFR BLD CALC: 29.4 % — LOW (ref 34.5–45)
HGB BLD-MCNC: 9.5 G/DL — LOW (ref 11.5–15.5)
IRON SATN MFR SERPL: 48 UG/DL — SIGNIFICANT CHANGE UP (ref 30–160)
LDH SERPL L TO P-CCNC: 284 U/L — HIGH (ref 50–242)
MAGNESIUM SERPL-MCNC: 2.2 MG/DL — SIGNIFICANT CHANGE UP (ref 1.6–2.6)
MCHC RBC-ENTMCNC: 29.2 PG — SIGNIFICANT CHANGE UP (ref 27–34)
MCHC RBC-ENTMCNC: 32.4 GM/DL — SIGNIFICANT CHANGE UP (ref 32–36)
MCV RBC AUTO: 90 FL — SIGNIFICANT CHANGE UP (ref 80–100)
OSMOLALITY UR: 468 MOS/KG — SIGNIFICANT CHANGE UP (ref 300–900)
PHOSPHATE SERPL-MCNC: 4.3 MG/DL — SIGNIFICANT CHANGE UP (ref 2.5–4.5)
PLATELET # BLD AUTO: 122 K/UL — LOW (ref 150–400)
POTASSIUM SERPL-MCNC: 4.4 MMOL/L — SIGNIFICANT CHANGE UP (ref 3.5–5.3)
POTASSIUM SERPL-SCNC: 4.4 MMOL/L — SIGNIFICANT CHANGE UP (ref 3.5–5.3)
PROT ?TM UR-MCNC: 204 MG/DL — HIGH (ref 0–12)
PROT/CREAT UR-RTO: 3.5 RATIO — HIGH (ref 0–0.2)
RBC # BLD: 3.27 M/UL — LOW (ref 3.8–5.2)
RBC # BLD: 3.27 M/UL — LOW (ref 3.8–5.2)
RBC # FLD: 13.5 % — SIGNIFICANT CHANGE UP (ref 10.3–14.5)
RETICS #: 122 K/UL — SIGNIFICANT CHANGE UP (ref 25–125)
RETICS/RBC NFR: 3.7 % — HIGH (ref 0.5–2.5)
RH IG SCN BLD-IMP: POSITIVE — SIGNIFICANT CHANGE UP
SODIUM SERPL-SCNC: 146 MMOL/L — HIGH (ref 135–145)
SODIUM UR-SCNC: 84 MMOL/L — SIGNIFICANT CHANGE UP
TROPONIN T SERPL-MCNC: 0.16 NG/ML — HIGH (ref 0–0.06)
TROPONIN T SERPL-MCNC: 0.17 NG/ML — HIGH (ref 0–0.06)
UUN UR-MCNC: 583 MG/DL — SIGNIFICANT CHANGE UP
WBC # BLD: 5.2 K/UL — SIGNIFICANT CHANGE UP (ref 3.8–10.5)
WBC # FLD AUTO: 5.2 K/UL — SIGNIFICANT CHANGE UP (ref 3.8–10.5)

## 2017-10-12 PROCEDURE — 99223 1ST HOSP IP/OBS HIGH 75: CPT

## 2017-10-12 PROCEDURE — 93971 EXTREMITY STUDY: CPT | Mod: 26

## 2017-10-12 PROCEDURE — 93306 TTE W/DOPPLER COMPLETE: CPT | Mod: 26

## 2017-10-12 PROCEDURE — 93010 ELECTROCARDIOGRAM REPORT: CPT

## 2017-10-12 RX ORDER — PANTOPRAZOLE SODIUM 20 MG/1
40 TABLET, DELAYED RELEASE ORAL
Qty: 0 | Refills: 0 | Status: DISCONTINUED | OUTPATIENT
Start: 2017-10-12 | End: 2017-11-18

## 2017-10-12 RX ORDER — INFLUENZA VIRUS VACCINE 15; 15; 15; 15 UG/.5ML; UG/.5ML; UG/.5ML; UG/.5ML
0.5 SUSPENSION INTRAMUSCULAR ONCE
Qty: 0 | Refills: 0 | Status: DISCONTINUED | OUTPATIENT
Start: 2017-10-12 | End: 2017-11-18

## 2017-10-12 RX ADMIN — Medication 200 MILLIGRAM(S): at 21:38

## 2017-10-12 RX ADMIN — Medication 25 MILLIGRAM(S): at 06:12

## 2017-10-12 RX ADMIN — Medication 25 MILLIGRAM(S): at 21:38

## 2017-10-12 RX ADMIN — PANTOPRAZOLE SODIUM 40 MILLIGRAM(S): 20 TABLET, DELAYED RELEASE ORAL at 06:12

## 2017-10-12 RX ADMIN — CARVEDILOL PHOSPHATE 25 MILLIGRAM(S): 80 CAPSULE, EXTENDED RELEASE ORAL at 06:13

## 2017-10-12 NOTE — PHYSICAL THERAPY INITIAL EVALUATION ADULT - PERTINENT HX OF CURRENT PROBLEM, REHAB EVAL
56 yo F w/ hx of SLE, systolic CHF (EF 32%) s/p AICD, HTN, CKD III- IV, PE / left leg DVT dx 2009, HIT, CAD (no stents), stage IV Rt ischial pressure ulcer, chronic fox, hx of recurrent pericardial effusion, AAA ~14cm in 12/2016 s/p repair in 2010 w/ aortoiliac stent c/b post-operatively with development of left lower extremity compartment syndrome presents with 1-wk history of progressive shortness of breath. Hosp Course: CXR +LLL PNA vs atelectasis; TTE +pericardial effusion without tamponad

## 2017-10-12 NOTE — CONSULT NOTE ADULT - PROBLEM SELECTOR RECOMMENDATION 9
Case discussed with Dr. Sandoval. No surgical intervention is warranted at this time. Recommend to continue monitoring of patient's hemodynamics, CBC and to arrange for IR drainage of pericardial effusion.

## 2017-10-12 NOTE — CONSULT NOTE ADULT - ASSESSMENT
57F PMH CHF EF 32% s/p AICD, SLE, AAA 14cm as of 12/2016 s/p aortoiliac stent, DVT, PE (not currently anticoagulated 2/2 h/o HIT), h/o LLE compartment syndrome, multiple pericardial effusions s/p drainage most recently in Jan 2017 now presents with dyspnea x 1 day, lethargy x 1 week, increased LLE edema x 1 week.    1. Large pericardial effusion and left pleural effusion without tamponade.  2. CT surgery recommended no surgical intervention and drainage by interventional means  3. Venous LE Doppler positive for DVT. Pt would need A/C for this except for the contraindication with pericardial effusion and possible conversion to hemorrhage. Therefore, she likely will need IVC filter in addition to pericardial drainage. Will discuss with Interventional Cariology and Vascular Dr. EKVIN Rizvi.    Keep pt NPO until immediate plans for intervention are elucidated further.    Thank you for the courtesy of this consult. Will follow-up closely with you.

## 2017-10-12 NOTE — PROGRESS NOTE ADULT - SUBJECTIVE AND OBJECTIVE BOX
MEDICINE, PROGRESS NOTE [phone]    COSTEN, SANDRA 57y [id number]    Patient seen and examined.  Patient is a 57y old  Female who presents with a chief complaint of shortness of breath (11 Oct 2017 22:01)  pt feels a little better.    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)  Calf DVT (Deep Venous Thrombosis) (ICD9 453.42)  Adenomatous Goiter (ICD9 241.9)  Chronic Gout (ICD9 274.02)  LBBB (Left Bundle Branch Block) (ICD9 426.3)  CHF (Congestive Heart Failure) (ICD9 428.0)  Hx of aorta-iliac-femoral bypass  TOP (Termination of Pregnancy)  S/P Dilatation and Curettage  History of Tubal Ligation  s/p AAA (Abdominal Aortic Aneurysm) Repair: 2009  History of Cholecystectomy  S/P Partial Hysterectomy    MEDICATIONS  (STANDING):  amLODIPine   Tablet 10 milliGRAM(s) Oral daily  ascorbic acid 500 milliGRAM(s) Oral daily  carvedilol 25 milliGRAM(s) Oral every 12 hours  ferrous    sulfate 325 milliGRAM(s) Oral daily  hydrALAZINE 25 milliGRAM(s) Oral every 8 hours  influenza   Vaccine 0.5 milliLiter(s) IntraMuscular once  labetalol 200 milliGRAM(s) Oral every 8 hours  pantoprazole    Tablet 40 milliGRAM(s) Oral before breakfast  predniSONE   Tablet 5 milliGRAM(s) Oral daily    MEDICATIONS  (PRN):    Allergies    heparin (Unknown)  losartan (Anaphylaxis)    Intolerances        PHYSICAL EXAM:  Constitutional: NAD  HEENT: Normocephalic, EOMI  Neck:  No JVD  Respiratory: CTA B/L, No wheezes  Cardiovascular: S1, S2, RRR, + systolic murmur  Gastrointestinal: BS+, soft, NT/ND  Extremities: + peripheral edema right le  Neurological: AAOX3, no focal deficits  Psychiatric: Normal mood, normal affect  : No Hook    Vital Signs Last 24 Hrs  T(C): 36.4 (12 Oct 2017 20:39), Max: 36.8 (11 Oct 2017 23:23)  T(F): 97.6 (12 Oct 2017 20:39), Max: 98.3 (12 Oct 2017 13:56)  HR: 82 (12 Oct 2017 20:39) (82 - 88)  BP: 149/76 (12 Oct 2017 20:39) (143/83 - 166/79)  BP(mean): --  RR: 19 (12 Oct 2017 20:39) (19 - 20)  SpO2: 96% (12 Oct 2017 20:39) (93% - 96%)  I&O's Summary    11 Oct 2017 07:01  -  12 Oct 2017 07:00  --------------------------------------------------------  IN: 140 mL / OUT: 900 mL / NET: -760 mL    12 Oct 2017 07:01  -  12 Oct 2017 23:18  --------------------------------------------------------  IN: 300 mL / OUT: 1250 mL / NET: -950 mL        LABS:                        9.5    5.2   )-----------( 122      ( 12 Oct 2017 05:54 )             29.4     10-12    146<H>  |  107  |  57<H>  ----------------------------<  88  4.4   |  24  |  2.64<H>    Ca    9.0      12 Oct 2017 05:54  Phos  4.3     10-12  Mg     2.2     10-12    TPro  6.9  /  Alb  4.0  /  TBili  0.3  /  DBili  x   /  AST  16  /  ALT  10  /  AlkPhos  65  10-11    CARDIAC MARKERS ( 12 Oct 2017 05:54 )  x     / 0.16 ng/mL / 121 U/L / x     / 4.0 ng/mL  CARDIAC MARKERS ( 12 Oct 2017 00:51 )  x     / 0.17 ng/mL / 124 U/L / x     / 4.5 ng/mL  CARDIAC MARKERS ( 11 Oct 2017 18:05 )  x     / 0.20 ng/mL / x     / x     / 4.8 ng/mL      Magnesium, Serum: 2.2 mg/dL (10-12 @ 05:54)    Urinalysis Basic - ( 11 Oct 2017 18:44 )    Color: Yellow / Appearance: SL Turbid / S.015 / pH: x  Gluc: x / Ketone: Negative  / Bili: Negative / Urobili: Negative   Blood: x / Protein: >600 mg/dL / Nitrite: Negative   Leuk Esterase: Large / RBC: x / WBC 5-10 /HPF   Sq Epi: x / Non Sq Epi: x / Bacteria: x

## 2017-10-12 NOTE — PHYSICAL THERAPY INITIAL EVALUATION ADULT - IMPAIRMENTS FOUND, PT EVAL
gait, locomotion, and balance/anthropometric characteristics/integumentary integrity/muscle strength

## 2017-10-12 NOTE — PHYSICAL THERAPY INITIAL EVALUATION ADULT - PLANNED THERAPY INTERVENTIONS, PT EVAL
local wound care/bed mobility training/gait training/strengthening/transfer training/balance training

## 2017-10-12 NOTE — CONSULT NOTE ADULT - SUBJECTIVE AND OBJECTIVE BOX
57F PMH CHF EF 32% s/p AICD, SLE, AAA 14cm as of 12/2016 s/p aortoiliac stent, DVT, PE, HIT, LLE compartment syndrome, multiple pericardial effusions s/p drainage most recently in Jan 2017 now presents with dyspnea x 1 day, lethargy x 1 week, increased LLE edema x 1 week. Denies chest pain, fevers, chills, n/v/d, orthopnea, PND, syncope. Reports she spoke to her PCP on the phone who told her to go to the ER.    Objective:  General: In NAD, sitting comfortably upright in bed  Neuro: A&Ox3, no focal deficits  Card: + S1, S2. Distant heart sounds.  Pulm: CTA b/l  Abd: soft, nontender. + BS. + BM today.  Ext: LLE much larger in circumference than RLE. Tender to touch, warm, well perfused. + b/l DP pulses. 57F PMH CHF EF 32% s/p AICD, SLE, AAA 14cm as of 12/2016 s/p aortoiliac stent, DVT, PE, HIT, LLE compartment syndrome, multiple pericardial effusions s/p drainage most recently in Jan 2017 by IR now presents with dyspnea x 1 day, lethargy x 1 week, increased LLE edema x 1 week. Denies chest pain, fevers, chills, n/v/d, orthopnea, PND, syncope. Reports she spoke to her PCP on the phone who told her to go to the ER.    Objective:  General: In NAD, sitting comfortably upright in bed  Neuro: A&Ox3, no focal deficits  Card: + S1, S2. Distant heart sounds.  Pulm: CTA b/l  Abd: soft, nontender. + BS. + BM today.  Ext: LLE much larger in circumference than RLE. Tender to touch, warm, well perfused. + b/l DP pulses.

## 2017-10-12 NOTE — PROGRESS NOTE ADULT - ASSESSMENT
pericardial effusion  left leg edema - chronic  left le dvt    vasc eval  appreciate cardio input  renal eval  attempt diuresis

## 2017-10-12 NOTE — CONSULT NOTE ADULT - ASSESSMENT
57F PMH CHF EF 32% s/p AICD, SLE, AAA 14cm as of 12/2016 s/p aortoiliac stent, DVT, PE (not currently anticoagulated 2/2 h/o HIT), LLE compartment syndrome, multiple pericardial effusions s/p drainage most recently in Jan 2017 now presents with dyspnea x 1 day, lethargy x 1 week, increased LLE edema x 1 week. ED staff performed bedside sono which revealed ~2.5cm pericardial effusion with associated decrease in Hgb 14 (baseline) --> 9.8, increase in Cr from 1.5 (baseline) --> 2.8. Hemodynamically stable at this time.

## 2017-10-12 NOTE — CONSULT NOTE ADULT - SUBJECTIVE AND OBJECTIVE BOX
Pt seen and examined in [facility].  Full consult note to follow. CHIEF COMPLAINT: SOB/dyspnea    HISTORY OF PRESENT ILLNESS:   58 yo F w/ hx of SLE, systolic CHF (EF 32%) s/p AICD, HTN, CKD III- IV, PE / left leg DVT dx , HIT, CAD (no stents), stage IV sacral ulcer, chronic fox, hx of recurrent pericardial effusion, AAA ~14cm in 2016 s/p repair in  w/ aortoiliac stent c/b post-operatively with development of left lower extremity compartment syndrome presents with 1-wk history of progressive shortness of breath.    Patient was sleep this morning when she woke up with sudden onset of dyspnea. She had similar episode about 1-wk ago which resolved on its own. This time she felt more clammy and diaphoretic. She also had epigastric pain that was 6/10, non-radiating, described as burning quality, with no relief w/ positional change. She called her PCP who instructed her to come to ED.     She also reports this morning her daughter noticed her left leg was much more swollen. Patient at baseline has chronic left leg edema in setting of her prior fasciotomy for compartment syndrome but is more swollen.     Patient had hospital admission in 2016 for pericardial effusion requiring drainage/pericardiocentesis. She had another similar admission in 2017 and at that time also had pericardial drain placed. During that time she was diagnosed with SLE. She been taking prednisone 5mg once daily    TTE (limited in ED) recognized pericardial effusion of moderate size, at least. Findings confirmed by TTE in echo lab.  CT surgery called for evaluation, recommended IR drainage of stable ffusion (pt not clinically in tamponade) - I discussed this with surgeon Dr. Sandoval    Of note, pt returned from TTE and LE venous Doppler test with preliminary report of LLE DVT.    Pt denies significant SOB or dyspnea at rest, but has been occurring intermittently at home with certain position changes.    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)  Calf DVT (Deep Venous Thrombosis) (ICD9 453.42)  Adenomatous Goiter (ICD9 241.9)  Chronic Gout (ICD9 274.02)  LBBB (Left Bundle Branch Block) (ICD9 426.3)  CHF (Congestive Heart Failure) (ICD9 428.0)  Hx of aorta-iliac-femoral bypass  TOP (Termination of Pregnancy)  S/P Dilatation and Curettage  History of Tubal Ligation  s/p AAA (Abdominal Aortic Aneurysm) Repair: 2009  History of Cholecystectomy  S/P Partial Hysterectomy          MEDICATIONS:  amLODIPine   Tablet 10 milliGRAM(s) Oral daily  carvedilol 25 milliGRAM(s) Oral every 12 hours  hydrALAZINE 25 milliGRAM(s) Oral every 8 hours  labetalol 200 milliGRAM(s) Oral every 8 hours          pantoprazole    Tablet 40 milliGRAM(s) Oral before breakfast    predniSONE   Tablet 5 milliGRAM(s) Oral daily    ascorbic acid 500 milliGRAM(s) Oral daily  ferrous    sulfate 325 milliGRAM(s) Oral daily  influenza   Vaccine 0.5 milliLiter(s) IntraMuscular once      FAMILY HISTORY:  No pertinent family history in first degree relatives      SOCIAL HISTORY:    [ x ] Non-smoker  [ ] Smoker  [ ] Alcohol    Allergies    heparin (Unknown)  losartan (Anaphylaxis)    Intolerances    	    REVIEW OF SYSTEMS:  CONSTITUTIONAL: No fever, weight loss, +fatigue  EYES: No eye pain, visual disturbances, or discharge  ENMT:  No difficulty hearing, tinnitus, vertigo; No sinus or throat pain  NECK: No pain or stiffness  RESPIRATORY: No cough, wheezing, chills or hemoptysis; + Shortness of Breath  CARDIOVASCULAR: No chest pain, palpitations, passing out, dizziness, + leg swelling (L)  GASTROINTESTINAL: No abdominal or epigastric pain. No nausea, vomiting, or hematemesis; No diarrhea or constipation. No melena or hematochezia.  GENITOURINARY: No dysuria, frequency, hematuria, or incontinence  NEUROLOGICAL: No headaches, memory loss, loss of strength, numbness, or tremors  SKIN: No itching, burning, rashes, or lesions   LYMPH Nodes: No enlarged glands  ENDOCRINE: No heat or cold intolerance; No hair loss  MUSCULOSKELETAL: No joint pain or swelling; No muscle, back, or extremity pain  PSYCHIATRIC: No depression, anxiety, mood swings, or difficulty sleeping  HEME/LYMPH: No easy bruising, or bleeding gums  ALLERGY AND IMMUNOLOGIC: No hives or eczema	    [ ] All others negative	  [ ] Unable to obtain    PHYSICAL EXAM:  T(F): 98.3 (10-12-17 @ 13:56), Max: 98.3 (10-12-17 @ 13:56)  HR: 87 (10-12-17 @ 13:56) (73 - 88)  BP: 166/79 (10-12-17 @ 13:56) (143/83 - 166/79)  RR: 20 (10-12-17 @ 13:56) (20 - 20)  SpO2: 93% (10-12-17 @ 13:56) (93% - 97%)  I&O's Summary    11 Oct 2017 07:  -  12 Oct 2017 07:00  --------------------------------------------------------  IN: 140 mL / OUT: 900 mL / NET: -760 mL    12 Oct 2017 07:  -  12 Oct 2017 14:19  --------------------------------------------------------  IN: 0 mL / OUT: 0 mL / NET: 0 mL        Appearance: Obese female in NAD and in good spirits	  HEENT:   Normal oral mucosa, PERRL, EOMI	  Lymphatic: No lymphadenopathy  Cardiovascular: Normal S1 S2, No JVD, No murmurs, + edema (LLE)  Respiratory: Lungs clear to auscultation	  Psychiatry: A & O x 3, Mood & affect appropriate  Gastrointestinal:  Soft, Non-tender, + BS	  Skin: No rashes, No ecchymoses, No cyanosis	  Neurologic: Non-focal  Vascular: Peripheral pulses palpable 2+ bilaterally    TELEMETRY: SR, occ V-pace, 60-90, no ectopy	    EC Lead ECG (10.11.17 @ 17:57) >  ELECTRONIC VENTRICULAR PACEMAKER    RADIOLOGY:  OTHER:   Transthoracic Echocardiogram (10.12.17 @ 12:00) >    EF (Visual Estimate): 25 %    Conclusions:  1. Tethered mitral valve leaflets with normal opening.  Moderate mitral regurgitation.  2. Severe left ventricular systolic dysfunction.  Paradoxical septal motion noted.  3. Normal right ventricular size and function. TAPSE 1.8cm  4. Normal tricuspid valve. Mild-moderate tricuspid  regurgitation.  5. Large pericardial effusion seen posterior to the LV and  at RA. The effusion is moderate anterior to the RV free  wall  1cm. The IVC is collapsible. HR 70 /89. No  tamponade seen.  6. Left pleural effusion.  *** Compared with echocardiogram of 2017, the  pericardial effusion is larger.  	  	  LABS:	 	                            9.5    5.2   )-----------( 122      ( 12 Oct 2017 05:54 )             29.4     10-    146<H>  |  107  |  57<H>  ----------------------------<  88  4.4   |  24  |  2.64<H>    Ca    9.0      12 Oct 2017 05:54  Phos  4.3     10-  Mg     2.2     10-    TPro  6.9  /  Alb  4.0  /  TBili  0.3  /  DBili  x   /  AST  16  /  ALT  10  /  AlkPhos  65  10-11    proBNP: Serum Pro-Brain Natriuretic Peptide: 8654 pg/mL (10-11 @ 18:05)

## 2017-10-12 NOTE — CONSULT NOTE ADULT - SUBJECTIVE AND OBJECTIVE BOX
PAGER:  [phone]               [facility] [id number]              EMAIL [email or web address]   OFFICE [phone]                              ********VASCULAR MEDICINE & CARDIOLOGY CONSULT NOTE********                            CC:    HISTORY OF PRESENT ILLNESS:  HPI:  56 yo F w/ hx of SLE, systolic CHF (EF 32%) s/p AICD, HTN, CKD III- IV, PE / left leg DVT dx 2009, HIT, CAD (no stents), stage IV sacral ulcer, chronic fox, hx of recurrent pericardial effusion, AAA ~14cm in 12/2016 s/p repair in 2010 w/ aortoiliac bypass and fem-fem bypass c/b post-operatively with development of left lower extremity compartment syndrome presents with 1-wk history of progressive shortness of breath.    Patient was sleep this morning when she woke up with sudden onset of dyspnea. She had similar episode about 1-wk ago which resolved on its own. This time she felt more clammy and diaphoretic. She also had epigastric pain that was 6/10, non-radiating, described as burning quality, with no relief w/ positional change. She called her PCP who instructed her to come to ED. Patient denies any URI symptoms, fever, pleurisy, chills, coughs, nausea, vomiting. She reports a remote history of DVT/PE in 2009 and was treated initially w/ heparin and developed HIT and warfarin for anticoagulation. The warfarin was discontinued because of what patient reports was life threatening epistaxis. Unclear how long she was on anticoagulation. She also reports this morning her daughter noticed her left leg was much more swollen. Patient at baseline has chronic left leg edema in setting of her prior fasciotomy for compartment syndrome but this time it is more swollen.     Patient had hospital admission in December 2016 for pericardial effusion requiring drainage/pericardiocentesis. She had another similar admission in 1/2017 and at that time also had pericardial drain placed. During that time she was diagnosed with SLE. She been taking prednisone 5mg once daily and is not sure if this is similar to her SLE flare. She denies visual changes, aphthous ulcers, joint pains, and rashes.     Patient reports her last AICD interrogation was about 2-mo ago and reportedly normal. She denies any dysuria and urinary symptoms but has chronic fox. She normally uses wheelchair for transferring and has had no syncope or falls. She has chronic sacral ulcer stage IV but denies any drainage from that site.     Of note patient had hemoglobin of 12 in 1/2017 and now is 9.8. She reports no melena or hematochezia. She reports taking no NSAIDs and reports no vaginal bleeding. She takes iron therapy. She does not follow up with primary prevention screening. In terms of the AAA patient reports she last had visit with her vascular surgeon about 3mo ago and was told everything was stable. She does not know size of AAA but as of 12/2016 imaging it was 14cm. Pt found to have left common femoral and femoral DVT. The femoral vein at the mid and distal thigh levels as well as the popliteal vein and calf veins were not visualized due to limb size and swelling.      In ED: VS: T98s, HR 70-80, -150/ 70-80, 16, 97% 2L NC. (11 Oct 2017 22:01)          Allergies    heparin (Unknown)  losartan (Anaphylaxis)    Intolerances    	    MEDICATIONS:  amLODIPine   Tablet 10 milliGRAM(s) Oral daily  carvedilol 25 milliGRAM(s) Oral every 12 hours  hydrALAZINE 25 milliGRAM(s) Oral every 8 hours  labetalol 200 milliGRAM(s) Oral every 8 hours          pantoprazole    Tablet 40 milliGRAM(s) Oral before breakfast    predniSONE   Tablet 5 milliGRAM(s) Oral daily    ascorbic acid 500 milliGRAM(s) Oral daily  ferrous    sulfate 325 milliGRAM(s) Oral daily  influenza   Vaccine 0.5 milliLiter(s) IntraMuscular once      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)  Calf DVT (Deep Venous Thrombosis) (ICD9 453.42)  Adenomatous Goiter (ICD9 241.9)  Chronic Gout (ICD9 274.02)  LBBB (Left Bundle Branch Block) (ICD9 426.3)  CHF (Congestive Heart Failure) (ICD9 428.0)  Hx of aorta-iliac-femoral bypass  TOP (Termination of Pregnancy)  S/P Dilatation and Curettage  History of Tubal Ligation  s/p AAA (Abdominal Aortic Aneurysm) Repair: 2009 2010  History of Cholecystectomy  S/P Partial Hysterectomy      FAMILY HISTORY:  No pertinent family history in first degree relatives      SOCIAL HISTORY:  unchanged    REVIEW OF SYSTEMS:  CONSTITUTIONAL: No fever, weight loss, or fatigue  EYES: No eye pain, visual disturbances, or discharge  ENMT:  No difficulty hearing, tinnitus, vertigo; No sinus or throat pain  NECK: No pain or stiffness  RESPIRATORY: No cough, wheezing, chills or hemoptysis; No Shortness of Breath  CARDIOVASCULAR: No chest pain, palpitations, passing out, dizziness, or leg swelling  GASTROINTESTINAL: No abdominal or epigastric pain. No nausea, vomiting, or hematemesis; No diarrhea or constipation. No melena or hematochezia.  GENITOURINARY: No dysuria, frequency, hematuria, or incontinence  NEUROLOGICAL: No headaches, memory loss, loss of strength, numbness, or tremors  SKIN: No itching, burning, rashes, or lesions   LYMPH Nodes: No enlarged glands  ENDOCRINE: No heat or cold intolerance; No hair loss  MUSCULOSKELETAL: No joint pain or swelling; No muscle, back, or extremity pain  PSYCHIATRIC: No depression, anxiety, mood swings, or difficulty sleeping  HEME/LYMPH: No easy bruising, or bleeding gums  ALLERY AND IMMUNOLOGIC: No hives or eczema	    [ ] All others negative	  [ ] Unable to obtain    PHYSICAL EXAM:  T(C): 36.8 (10-12-17 @ 13:56), Max: 36.8 (10-11-17 @ 23:23)  HR: 87 (10-12-17 @ 13:56) (78 - 88)  BP: 166/79 (10-12-17 @ 13:56) (143/83 - 166/79)  RR: 20 (10-12-17 @ 13:56) (20 - 20)  SpO2: 93% (10-12-17 @ 13:56) (93% - 96%)  Wt(kg): --  I&O's Summary    11 Oct 2017 07:01  -  12 Oct 2017 07:00  --------------------------------------------------------  IN: 140 mL / OUT: 900 mL / NET: -760 mL    12 Oct 2017 07:01  -  12 Oct 2017 18:02  --------------------------------------------------------  IN: 0 mL / OUT: 650 mL / NET: -650 mL        Appearance: Normal, NAD	  HEENT:   Normal oral mucosa, PERRL, EOMI	  Cardiovascular: Normal S1 S2, JVP of 10cm H2O No murmurs, LLE enlargement approximately twice the size of RLE with fasciotomy scar  Respiratory: Lungs clear to auscultation	  Psychiatry: A & O x 3, Mood & affect appropriate  Gastrointestinal:  Soft, Non-tender, + BS	  Skin: abdominal surgical scar from open aortic repait, No ecchymoses, No cyanosis	  Neurologic: Non-focal  Extremities: Normal range of motion, No clubbing, cyanosis or edema  Vascular: Peripheral pulses palpable 2+ bilaterally      LABS:	 	    CBC Full  -  ( 12 Oct 2017 05:54 )  WBC Count : 5.2 K/uL  Hemoglobin : 9.5 g/dL  Hematocrit : 29.4 %  Platelet Count - Automated : 122 K/uL  Mean Cell Volume : 90.0 fl  Mean Cell Hemoglobin : 29.2 pg  Mean Cell Hemoglobin Concentration : 32.4 gm/dL  Auto Neutrophil # : x  Auto Lymphocyte # : x  Auto Monocyte # : x  Auto Eosinophil # : x  Auto Basophil # : x  Auto Neutrophil % : x  Auto Lymphocyte % : x  Auto Monocyte % : x  Auto Eosinophil % : x  Auto Basophil % : x    10-12    146<H>  |  107  |  57<H>  ----------------------------<  88  4.4   |  24  |  2.64<H>  10-11    146<H>  |  106  |  59<H>  ----------------------------<  88  4.4   |  26  |  2.83<H>    Ca    9.0      12 Oct 2017 05:54  Ca    9.1      11 Oct 2017 18:05  Phos  4.3     10-12  Mg     2.2     10-12    TPro  6.9  /  Alb  4.0  /  TBili  0.3  /  DBili  x   /  AST  16  /  ALT  10  /  AlkPhos  65  10-11      56 yo F w/ hx of SLE, systolic CHF (EF 32%) s/p AICD, HTN, CKD III- IV, PE / left leg DVT dx 2009, HIT, CAD (no stents), stage IV sacral ulcer, chronic fox, hx of recurrent pericardial effusion, AAA ~14cm in 12/2016 s/p repair in 2010 w/ aortoiliac stent c/b post-operatively with development of left lower extremity compartment syndrome presents with 1-wk hx of progressive shortness of breath 2/2 to large pericardial effusion found to have LLE common femoral DVT. Pt with history of HIT requiring argatroban with recurrent pericardial effusions with multiple intra-abdominal aortic surgeries causing likely venous compression as contributing to causing DVT    1. LE DVT  would not offer IVC given complex anatomy and Jose Manuel-Cava of 4cm IVC size which is larger than upper limit of IVC filter which could result in distal embolization of device.   agree w CTS consult for possible pericardial window  -t/c V/Q to assess for PE since ILANA  would hold a/c while awaiting pericardial effusion plan, but if needed would use argatroban    Nick Landa MD PAGER:  [phone]               [facility] [id number]              EMAIL [email or web address]   OFFICE [phone]                              ********VASCULAR MEDICINE & CARDIOLOGY CONSULT NOTE********                            CC:    HISTORY OF PRESENT ILLNESS:  HPI:  56 yo F w/ hx of SLE, systolic CHF (EF 32%) s/p AICD, HTN, CKD III- IV, PE / left leg DVT dx 2009, HIT, CAD (no stents), stage IV sacral ulcer, chronic fox, hx of recurrent pericardial effusion, AAA ~14cm in 12/2016 s/p repair in 2010 w/ aortoiliac bypass and fem-fem bypass c/b post-operatively with development of left lower extremity compartment syndrome presents with 1-wk history of progressive shortness of breath.    Patient was sleep this morning when she woke up with sudden onset of dyspnea. She had similar episode about 1-wk ago which resolved on its own. This time she felt more clammy and diaphoretic. She also had epigastric pain that was 6/10, non-radiating, described as burning quality, with no relief w/ positional change. She called her PCP who instructed her to come to ED. Patient denies any URI symptoms, fever, pleurisy, chills, coughs, nausea, vomiting. She reports a remote history of DVT/PE in 2009 and was treated initially w/ heparin and developed HIT and warfarin for anticoagulation. The warfarin was discontinued because of what patient reports was life threatening epistaxis. Unclear how long she was on anticoagulation. She also reports this morning her daughter noticed her left leg was much more swollen. Patient at baseline has chronic left leg edema in setting of her prior fasciotomy for compartment syndrome but this time it is more swollen.     Patient had hospital admission in December 2016 for pericardial effusion requiring drainage/pericardiocentesis. She had another similar admission in 1/2017 and at that time also had pericardial drain placed. During that time she was diagnosed with SLE. She been taking prednisone 5mg once daily and is not sure if this is similar to her SLE flare. She denies visual changes, aphthous ulcers, joint pains, and rashes.     Patient reports her last AICD interrogation was about 2-mo ago and reportedly normal. She denies any dysuria and urinary symptoms but has chronic fox. She normally uses wheelchair for transferring and has had no syncope or falls. She has chronic sacral ulcer stage IV but denies any drainage from that site.     Of note patient had hemoglobin of 12 in 1/2017 and now is 9.8. She reports no melena or hematochezia. She reports taking no NSAIDs and reports no vaginal bleeding. She takes iron therapy. She does not follow up with primary prevention screening. In terms of the AAA patient reports she last had visit with her vascular surgeon about 3mo ago and was told everything was stable. She does not know size of AAA but as of 12/2016 imaging it was 14cm. Pt found to have left common femoral and femoral DVT. The femoral vein at the mid and distal thigh levels as well as the popliteal vein and calf veins were not visualized due to limb size and swelling.      In ED: VS: T98s, HR 70-80, -150/ 70-80, 16, 97% 2L NC. (11 Oct 2017 22:01)          Allergies    heparin (Unknown)  losartan (Anaphylaxis)    Intolerances    	    MEDICATIONS:  amLODIPine   Tablet 10 milliGRAM(s) Oral daily  carvedilol 25 milliGRAM(s) Oral every 12 hours  hydrALAZINE 25 milliGRAM(s) Oral every 8 hours  labetalol 200 milliGRAM(s) Oral every 8 hours          pantoprazole    Tablet 40 milliGRAM(s) Oral before breakfast    predniSONE   Tablet 5 milliGRAM(s) Oral daily    ascorbic acid 500 milliGRAM(s) Oral daily  ferrous    sulfate 325 milliGRAM(s) Oral daily  influenza   Vaccine 0.5 milliLiter(s) IntraMuscular once      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)  Calf DVT (Deep Venous Thrombosis) (ICD9 453.42)  Adenomatous Goiter (ICD9 241.9)  Chronic Gout (ICD9 274.02)  LBBB (Left Bundle Branch Block) (ICD9 426.3)  CHF (Congestive Heart Failure) (ICD9 428.0)  Hx of aorta-iliac-femoral bypass  TOP (Termination of Pregnancy)  S/P Dilatation and Curettage  History of Tubal Ligation  s/p AAA (Abdominal Aortic Aneurysm) Repair: 2009 2010  History of Cholecystectomy  S/P Partial Hysterectomy      FAMILY HISTORY:  No pertinent family history in first degree relatives      SOCIAL HISTORY:  unchanged    REVIEW OF SYSTEMS:  CONSTITUTIONAL: No fever, weight loss, or fatigue  EYES: No eye pain, visual disturbances, or discharge  ENMT:  No difficulty hearing, tinnitus, vertigo; No sinus or throat pain  NECK: No pain or stiffness  RESPIRATORY: No cough, wheezing, chills or hemoptysis;SOB  CARDIOVASCULAR: No chest pain, palpitations, passing out, dizziness, or leg swelling  GASTROINTESTINAL: No abdominal or epigastric pain. No nausea, vomiting, or hematemesis; No diarrhea or constipation. No melena or hematochezia.  GENITOURINARY: No dysuria, frequency, hematuria, or incontinence  NEUROLOGICAL: No headaches, memory loss, loss of strength, numbness, or tremors  SKIN: No itching, burning, rashes, or lesions   ENDOCRINE: No heat or cold intolerance; No hair loss  MUSCULOSKELETAL: LLE SWELLING  PSYCHIATRIC: No depression, anxiety, mood swings, or difficulty sleeping  HEME/LYMPH: No easy bruising, or bleeding gums  ALLERY AND IMMUNOLOGIC: No hives or eczema	    [x ] All others negative	  [ ] Unable to obtain    PHYSICAL EXAM:  T(C): 36.8 (10-12-17 @ 13:56), Max: 36.8 (10-11-17 @ 23:23)  HR: 87 (10-12-17 @ 13:56) (78 - 88)  BP: 166/79 (10-12-17 @ 13:56) (143/83 - 166/79)  RR: 20 (10-12-17 @ 13:56) (20 - 20)  SpO2: 93% (10-12-17 @ 13:56) (93% - 96%)  Wt(kg): --  I&O's Summary    11 Oct 2017 07:01  -  12 Oct 2017 07:00  --------------------------------------------------------  IN: 140 mL / OUT: 900 mL / NET: -760 mL    12 Oct 2017 07:01  -  12 Oct 2017 18:02  --------------------------------------------------------  IN: 0 mL / OUT: 650 mL / NET: -650 mL        Appearance: Normal, NAD	  HEENT:   Normal oral mucosa, PERRL, EOMI	  Cardiovascular: Normal S1 S2, JVP of 10cm H2O No murmurs, LLE enlargement approximately twice the size of RLE with fasciotomy scar  Respiratory: Lungs clear to auscultation	  Psychiatry: A & O x 3, Mood & affect appropriate  Gastrointestinal:  Soft, Non-tender, + BS	  Skin: abdominal surgical scar from open aortic repait, No ecchymoses, No cyanosis	  Neurologic: Non-focal  Extremities: LLE + stemmers sign.       LABS:	 	    CBC Full  -  ( 12 Oct 2017 05:54 )  WBC Count : 5.2 K/uL  Hemoglobin : 9.5 g/dL  Hematocrit : 29.4 %  Platelet Count - Automated : 122 K/uL  Mean Cell Volume : 90.0 fl  Mean Cell Hemoglobin : 29.2 pg  Mean Cell Hemoglobin Concentration : 32.4 gm/dL  Auto Neutrophil # : x  Auto Lymphocyte # : x  Auto Monocyte # : x  Auto Eosinophil # : x  Auto Basophil # : x  Auto Neutrophil % : x  Auto Lymphocyte % : x  Auto Monocyte % : x  Auto Eosinophil % : x  Auto Basophil % : x    10-12    146<H>  |  107  |  57<H>  ----------------------------<  88  4.4   |  24  |  2.64<H>  10-11    146<H>  |  106  |  59<H>  ----------------------------<  88  4.4   |  26  |  2.83<H>    Ca    9.0      12 Oct 2017 05:54  Ca    9.1      11 Oct 2017 18:05  Phos  4.3     10-12  Mg     2.2     10-12    TPro  6.9  /  Alb  4.0  /  TBili  0.3  /  DBili  x   /  AST  16  /  ALT  10  /  AlkPhos  65  10-11      56 yo F w/ hx of SLE, systolic CHF (EF 32%) s/p AICD, HTN, CKD III- IV, PE / left leg DVT dx 2009, HIT, CAD (no stents), stage IV sacral ulcer, chronic fox, hx of recurrent pericardial effusion, AAA ~14cm in 12/2016 s/p repair in 2010 w/ aortoiliac stent c/b post-operatively with development of left lower extremity compartment syndrome presents with 1-wk hx of progressive shortness of breath 2/2 to large pericardial effusion found to have LLE common femoral DVT. Pt with history of HIT requiring argatroban with recurrent pericardial effusions with multiple intra-abdominal aortic surgeries causing likely venous compression as contributing to causing DVT    1. LE DVT  would not offer IVC given complex anatomy and Jose Manuel-Cava of 4cm IVC size which is larger than upper limit of IVC filter which could result in distal embolization of device.   agree w CTS consult for possible pericardial window  -t/c V/Q to assess for PE since ILANA  would hold a/c while awaiting pericardial effusion plan, but if needed would use argatroban    Nick Landa MD

## 2017-10-12 NOTE — PHYSICAL THERAPY INITIAL EVALUATION ADULT - ADDITIONAL COMMENTS
Lives in private home with daughter, +stairs with hand rail, ambulated short household distances and performed ADL's independently at home

## 2017-10-13 LAB
-  AMIKACIN: SIGNIFICANT CHANGE UP
-  AMIKACIN: SIGNIFICANT CHANGE UP
-  AMPICILLIN/SULBACTAM: SIGNIFICANT CHANGE UP
-  AMPICILLIN/SULBACTAM: SIGNIFICANT CHANGE UP
-  AMPICILLIN: SIGNIFICANT CHANGE UP
-  AMPICILLIN: SIGNIFICANT CHANGE UP
-  AZTREONAM: SIGNIFICANT CHANGE UP
-  AZTREONAM: SIGNIFICANT CHANGE UP
-  CEFAZOLIN: SIGNIFICANT CHANGE UP
-  CEFAZOLIN: SIGNIFICANT CHANGE UP
-  CEFEPIME: SIGNIFICANT CHANGE UP
-  CEFEPIME: SIGNIFICANT CHANGE UP
-  CEFOXITIN: SIGNIFICANT CHANGE UP
-  CEFOXITIN: SIGNIFICANT CHANGE UP
-  CEFTAZIDIME: SIGNIFICANT CHANGE UP
-  CEFTAZIDIME: SIGNIFICANT CHANGE UP
-  CEFTRIAXONE: SIGNIFICANT CHANGE UP
-  CEFTRIAXONE: SIGNIFICANT CHANGE UP
-  CIPROFLOXACIN: SIGNIFICANT CHANGE UP
-  CIPROFLOXACIN: SIGNIFICANT CHANGE UP
-  ERTAPENEM: SIGNIFICANT CHANGE UP
-  ERTAPENEM: SIGNIFICANT CHANGE UP
-  GENTAMICIN: SIGNIFICANT CHANGE UP
-  GENTAMICIN: SIGNIFICANT CHANGE UP
-  IMIPENEM: SIGNIFICANT CHANGE UP
-  LEVOFLOXACIN: SIGNIFICANT CHANGE UP
-  LEVOFLOXACIN: SIGNIFICANT CHANGE UP
-  MEROPENEM: SIGNIFICANT CHANGE UP
-  MEROPENEM: SIGNIFICANT CHANGE UP
-  NITROFURANTOIN: SIGNIFICANT CHANGE UP
-  NITROFURANTOIN: SIGNIFICANT CHANGE UP
-  PIPERACILLIN/TAZOBACTAM: SIGNIFICANT CHANGE UP
-  PIPERACILLIN/TAZOBACTAM: SIGNIFICANT CHANGE UP
-  TOBRAMYCIN: SIGNIFICANT CHANGE UP
-  TOBRAMYCIN: SIGNIFICANT CHANGE UP
-  TRIMETHOPRIM/SULFAMETHOXAZOLE: SIGNIFICANT CHANGE UP
-  TRIMETHOPRIM/SULFAMETHOXAZOLE: SIGNIFICANT CHANGE UP
ANION GAP SERPL CALC-SCNC: 16 MMOL/L — SIGNIFICANT CHANGE UP (ref 5–17)
APTT BLD: 32.1 SEC — SIGNIFICANT CHANGE UP (ref 27.5–37.4)
BUN SERPL-MCNC: 54 MG/DL — HIGH (ref 7–23)
CALCIUM SERPL-MCNC: 8.9 MG/DL — SIGNIFICANT CHANGE UP (ref 8.4–10.5)
CHLORIDE SERPL-SCNC: 107 MMOL/L — SIGNIFICANT CHANGE UP (ref 96–108)
CO2 SERPL-SCNC: 23 MMOL/L — SIGNIFICANT CHANGE UP (ref 22–31)
CREAT SERPL-MCNC: 2.72 MG/DL — HIGH (ref 0.5–1.3)
CULTURE RESULTS: SIGNIFICANT CHANGE UP
GLUCOSE SERPL-MCNC: 99 MG/DL — SIGNIFICANT CHANGE UP (ref 70–99)
HCT VFR BLD CALC: 28.9 % — LOW (ref 34.5–45)
HGB BLD-MCNC: 9.8 G/DL — LOW (ref 11.5–15.5)
INR BLD: 1.09 RATIO — SIGNIFICANT CHANGE UP (ref 0.88–1.16)
MCHC RBC-ENTMCNC: 30.2 PG — SIGNIFICANT CHANGE UP (ref 27–34)
MCHC RBC-ENTMCNC: 33.8 GM/DL — SIGNIFICANT CHANGE UP (ref 32–36)
MCV RBC AUTO: 89.4 FL — SIGNIFICANT CHANGE UP (ref 80–100)
METHOD TYPE: SIGNIFICANT CHANGE UP
METHOD TYPE: SIGNIFICANT CHANGE UP
ORGANISM # SPEC MICROSCOPIC CNT: SIGNIFICANT CHANGE UP
PLATELET # BLD AUTO: 127 K/UL — LOW (ref 150–400)
POTASSIUM SERPL-MCNC: 4.1 MMOL/L — SIGNIFICANT CHANGE UP (ref 3.5–5.3)
POTASSIUM SERPL-SCNC: 4.1 MMOL/L — SIGNIFICANT CHANGE UP (ref 3.5–5.3)
PROTHROM AB SERPL-ACNC: 11.9 SEC — SIGNIFICANT CHANGE UP (ref 9.8–12.7)
RBC # BLD: 3.24 M/UL — LOW (ref 3.8–5.2)
RBC # FLD: 13.5 % — SIGNIFICANT CHANGE UP (ref 10.3–14.5)
SODIUM SERPL-SCNC: 146 MMOL/L — HIGH (ref 135–145)
SPECIMEN SOURCE: SIGNIFICANT CHANGE UP
WBC # BLD: 5.8 K/UL — SIGNIFICANT CHANGE UP (ref 3.8–10.5)
WBC # FLD AUTO: 5.8 K/UL — SIGNIFICANT CHANGE UP (ref 3.8–10.5)

## 2017-10-13 RX ADMIN — Medication 25 MILLIGRAM(S): at 05:22

## 2017-10-13 RX ADMIN — Medication 325 MILLIGRAM(S): at 11:47

## 2017-10-13 RX ADMIN — Medication 200 MILLIGRAM(S): at 17:09

## 2017-10-13 RX ADMIN — Medication 5 MILLIGRAM(S): at 11:47

## 2017-10-13 RX ADMIN — Medication 500 MILLIGRAM(S): at 11:47

## 2017-10-13 RX ADMIN — AMLODIPINE BESYLATE 10 MILLIGRAM(S): 2.5 TABLET ORAL at 11:47

## 2017-10-13 RX ADMIN — CARVEDILOL PHOSPHATE 25 MILLIGRAM(S): 80 CAPSULE, EXTENDED RELEASE ORAL at 17:09

## 2017-10-13 RX ADMIN — PANTOPRAZOLE SODIUM 40 MILLIGRAM(S): 20 TABLET, DELAYED RELEASE ORAL at 05:22

## 2017-10-13 RX ADMIN — CARVEDILOL PHOSPHATE 25 MILLIGRAM(S): 80 CAPSULE, EXTENDED RELEASE ORAL at 05:22

## 2017-10-13 RX ADMIN — Medication 25 MILLIGRAM(S): at 14:21

## 2017-10-13 RX ADMIN — Medication 25 MILLIGRAM(S): at 22:44

## 2017-10-13 RX ADMIN — Medication 200 MILLIGRAM(S): at 23:55

## 2017-10-13 RX ADMIN — Medication 200 MILLIGRAM(S): at 11:47

## 2017-10-13 NOTE — PROGRESS NOTE ADULT - SUBJECTIVE AND OBJECTIVE BOX
Subjective: Patient seen and examined. No new events except as noted.      intermittent SOB with movement, not worse than her presenting symptoms    MEDICATIONS:  MEDICATIONS  (STANDING):  amLODIPine   Tablet 10 milliGRAM(s) Oral daily  ascorbic acid 500 milliGRAM(s) Oral daily  carvedilol 25 milliGRAM(s) Oral every 12 hours  ferrous    sulfate 325 milliGRAM(s) Oral daily  hydrALAZINE 25 milliGRAM(s) Oral every 8 hours  influenza   Vaccine 0.5 milliLiter(s) IntraMuscular once  labetalol 200 milliGRAM(s) Oral every 8 hours  pantoprazole    Tablet 40 milliGRAM(s) Oral before breakfast  predniSONE   Tablet 5 milliGRAM(s) Oral daily      PHYSICAL EXAM:  T(F): 98.7 (10-13-17 @ 15:24), Max: 98.7 (10-13-17 @ 15:24)  HR: 84 (10-13-17 @ 15:24) (82 - 92)  BP: 155/81 (10-13-17 @ 15:24) (149/76 - 172/88)  RR: 16 (10-13-17 @ 15:24) (16 - 19)  SpO2: 94% (10-13-17 @ 15:24) (94% - 96%)  I&O's Summary    12 Oct 2017 07:01  -  13 Oct 2017 07:00  --------------------------------------------------------  IN: 440 mL / OUT: 1650 mL / NET: -1210 mL    13 Oct 2017 07:01  -  13 Oct 2017 16:17  --------------------------------------------------------  IN: 560 mL / OUT: 800 mL / NET: -240 mL        TELEMETRY: NSR/, 	    Appearance: Obese female in NAD and in good spirits	  HEENT:   Normal oral mucosa, PERRL, EOMI	  Lymphatic: No lymphadenopathy  Cardiovascular: Normal S1 S2, No JVD, No murmurs, + edema (LLE) - unchanged  Respiratory: Lungs clear to auscultation	  Psychiatry: A & O x 3, Mood & affect appropriate  Gastrointestinal:  Soft, Non-tender, + BS	  Skin: No rashes, No ecchymoses, No cyanosis	  Neurologic: Non-focal  Vascular: Peripheral pulses palpable 2+ bilaterally    LABS:    CARDIAC MARKERS:  CARDIAC MARKERS ( 12 Oct 2017 05:54 )  x     / 0.16 ng/mL / 121 U/L / x     / 4.0 ng/mL  CARDIAC MARKERS ( 12 Oct 2017 00:51 )  x     / 0.17 ng/mL / 124 U/L / x     / 4.5 ng/mL  CARDIAC MARKERS ( 11 Oct 2017 18:05 )  x     / 0.20 ng/mL / x     / x     / 4.8 ng/mL                                9.8    5.8   )-----------( 127      ( 13 Oct 2017 05:26 )             28.9     10-13    146<H>  |  107  |  54<H>  ----------------------------<  99  4.1   |  23  |  2.72<H>    Ca    8.9      13 Oct 2017 05:26  Phos  4.3     10-12  Mg     2.2     10-12    TPro  6.9  /  Alb  4.0  /  TBili  0.3  /  DBili  x   /  AST  16  /  ALT  10  /  AlkPhos  65  10-11 Subjective: Patient seen and examined. No new events except as noted.      intermittent SOB with movement, not worse than her presenting symptoms    MEDICATIONS:  MEDICATIONS  (STANDING):  amLODIPine   Tablet 10 milliGRAM(s) Oral daily  ascorbic acid 500 milliGRAM(s) Oral daily  carvedilol 25 milliGRAM(s) Oral every 12 hours  ferrous    sulfate 325 milliGRAM(s) Oral daily  hydrALAZINE 25 milliGRAM(s) Oral every 8 hours  influenza   Vaccine 0.5 milliLiter(s) IntraMuscular once  labetalol 200 milliGRAM(s) Oral every 8 hours  pantoprazole    Tablet 40 milliGRAM(s) Oral before breakfast  predniSONE   Tablet 5 milliGRAM(s) Oral daily      PHYSICAL EXAM:  T(F): 98.7 (10-13-17 @ 15:24), Max: 98.7 (10-13-17 @ 15:24)  HR: 84 (10-13-17 @ 15:24) (82 - 92)  BP: 155/81 (10-13-17 @ 15:24) (149/76 - 172/88)  RR: 16 (10-13-17 @ 15:24) (16 - 19)  SpO2: 94% (10-13-17 @ 15:24) (94% - 96%)  I&O's Summary    12 Oct 2017 07:01  -  13 Oct 2017 07:00  --------------------------------------------------------  IN: 440 mL / OUT: 1650 mL / NET: -1210 mL    13 Oct 2017 07:01  -  13 Oct 2017 16:17  --------------------------------------------------------  IN: 560 mL / OUT: 800 mL / NET: -240 mL        TELEMETRY: NSR/, 	    Appearance: Obese female in NAD and in good spirits	  HEENT:   Normal oral mucosa, PERRL, EOMI	  Lymphatic: No lymphadenopathy  Cardiovascular: Normal S1 S2, No JVD, No murmurs, + edema (LLE) - unchanged  Respiratory: Lungs clear to auscultation	  Psychiatry: A & O x 3, Mood & affect appropriate  Gastrointestinal:  Soft, Non-tender, + BS	  Skin: No rashes, No ecchymoses, No cyanosis	  Neurologic: Non-focal  Vascular: Peripheral pulses palpable 2+ bilaterally    LABS:    CARDIAC MARKERS:  CARDIAC MARKERS ( 12 Oct 2017 05:54 )  x     / 0.16 ng/mL / 121 U/L / x     / 4.0 ng/mL  CARDIAC MARKERS ( 12 Oct 2017 00:51 )  x     / 0.17 ng/mL / 124 U/L / x     / 4.5 ng/mL  CARDIAC MARKERS ( 11 Oct 2017 18:05 )  x     / 0.20 ng/mL / x     / x     / 4.8 ng/mL                                9.8    5.8   )-----------( 127      ( 13 Oct 2017 05:26 )             28.9     10-13    146<H>  |  107  |  54<H>  ----------------------------<  99  4.1   |  23  |  2.72<H>    Ca    8.9      13 Oct 2017 05:26  Phos  4.3     10-12  Mg     2.2     10-12    TPro  6.9  /  Alb  4.0  /  TBili  0.3  /  DBili  x   /  AST  16  /  ALT  10  /  AlkPhos  65  10-11    Transthoracic Echocardiogram (10.12.17 @ 12:00) >  EF (Visual Estimate): 25 %  Conclusions:  1. Tethered mitral valve leaflets with normal opening.  Moderate mitral regurgitation.  2. Severe left ventricular systolic dysfunction.  Paradoxical septal motion noted.  3. Normal right ventricular size and function. TAPSE 1.8cm  4. Normal tricuspid valve. Mild-moderate tricuspid  regurgitation.  5. Large pericardial effusion seen posterior to the LV and  at RA. The effusion is moderate anterior to the RV free  wall  1cm. The IVC is collapsible. HR 70 /89. No  tamponade seen.  6. Left pleural effusion.  *** Compared with echocardiogram of 1/17/2017, the  pericardial effusion is larger.       VA Duplex Lower Ext Vein Scan, Left (10.12.17 @ 13:29) >    Impression: Limited study. Acute, above the knee DVT in the left common   femoral and femoral veins. The femoral vein at the mid and distal thigh   levels as well as the popliteal vein and calf veins were not visualized   due to limb size and swelling.    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 Nurse   practitioner Murali was informed of the findings following the study on   10/12/2017.

## 2017-10-13 NOTE — CONSULT NOTE ADULT - SUBJECTIVE AND OBJECTIVE BOX
VASCULAR SURGERY CONSULT NOTE  --------------------------------------------------------------------------------------------    Patient is a 57y old  Female who presents with a chief complaint of shortness of breath (11 Oct 2017 22:01)      HPI: Pt presented to [facility] ED on 10/11/2017 c/o worsening SoB. She was found to have a large pericardial effusion w/o evidence of tamponade. Pt has a known h/o prior pericardial effusions. She has a surgical history significant for open AAA repair , Aorto-Fem-Fem bypass, L iliac aneurysm repair  w/ AVF to L iliac vein and IVC. Pt has an extremely complicated medical history w/ multiple comorbidities. She endorses chronic BLE edema w/ L>R. Pt states that her LLE has become significantly more swollen over the past month. She denies numbness, tingling, or coolness of LLE.    ROS: 10-system review is otherwise negative except HPI above.      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)  Calf DVT (Deep Venous Thrombosis) (ICD9 453.42)  Adenomatous Goiter (ICD9 241.9)  Chronic Gout (ICD9 274.02)  LBBB (Left Bundle Branch Block) (ICD9 426.3)  CHF (Congestive Heart Failure) (ICD9 428.0)  Hx of aorta-iliac-femoral bypass  TOP (Termination of Pregnancy)  S/P Dilatation and Curettage  History of Tubal Ligation  s/p AAA (Abdominal Aortic Aneurysm) Repair: 2009  History of Cholecystectomy  S/P Partial Hysterectomy    FAMILY HISTORY:  No pertinent family history in first degree relatives    ALLERGIES: heparin (Unknown)  losartan (Anaphylaxis)      CURRENT MEDICATIONS  MEDICATIONS (STANDING): amLODIPine   Tablet 10 milliGRAM(s) Oral daily  ascorbic acid 500 milliGRAM(s) Oral daily  carvedilol 25 milliGRAM(s) Oral every 12 hours  ferrous    sulfate 325 milliGRAM(s) Oral daily  hydrALAZINE 25 milliGRAM(s) Oral every 8 hours  influenza   Vaccine 0.5 milliLiter(s) IntraMuscular once  labetalol 200 milliGRAM(s) Oral every 8 hours  pantoprazole    Tablet 40 milliGRAM(s) Oral before breakfast  predniSONE   Tablet 5 milliGRAM(s) Oral daily    MEDICATIONS (PRN):  --------------------------------------------------------------------------------------------    Vitals:   T(C): 37.1 (10-13-17 @ 15:24), Max: 37.1 (10-13-17 @ 15:24)  HR: 84 (10-13-17 @ 15:24) (82 - 92)  BP: 155/81 (10-13-17 @ 15:24) (149/76 - 172/88)  BP(mean): --  RR: 16 (10-13-17 @ 15:24) (16 - 19)  SpO2: 94% (10-13-17 @ 15:24) (94% - 96%)  Wt(kg): --  CAPILLARY BLOOD GLUCOSE        CAPILLARY BLOOD GLUCOSE          10-12 @ 07:01  -  10-13 @ 07:00  --------------------------------------------------------  IN:    Oral Fluid: 440 mL  Total IN: 440 mL    OUT:    Indwelling Catheter - Urethral: 1650 mL  Total OUT: 1650 mL    Total NET: -1210 mL      10-13 @ 07:01  -  10-13 @ 17:22  --------------------------------------------------------  IN:    Oral Fluid: 560 mL  Total IN: 560 mL    OUT:    Indwelling Catheter - Urethral: 800 mL  Total OUT: 800 mL    Total NET: -240 mL        Height (cm): 167.64 (10-11 @ 23:23)  Weight (kg): 126.6 (10-11 @ 23:23)  BMI (kg/m2): 45 (10-11 @ 23:)  BSA (m2): 2.3 (10-11 @ 23:)    PHYSICAL EXAM:  General: NAD, Lying in bed comfortably  Neuro: A+Ox3  HEENT: NC/AT, EOMI  Cardio: RRR  Resp: no increased WoB  GI/Abd: Soft, Obese, NT, no rebound/guarding, no masses palpated  Vascular: 3+ pitting edema to mid thigh of LLE, DP signal  --------------------------------------------------------------------------------------------    LABS  CBC (10-13 @ 05:26)                              9.8<L>                         5.8     )----------------(  127<L>     --    % Neutrophils, --    % Lymphocytes, ANC: --                                  28.9<L>  CBC (10-12 @ 05:54)                              9.5<L>                         5.2     )----------------(  122<L>     --    % Neutrophils, --    % Lymphocytes, ANC: --                                  29.4<L>    BMP (10-13 @ 05:26)             146<H>  |  107     |  54<H> 		Ca++ --      Ca 8.9                ---------------------------------( 99    		Mg --                 4.1     |  23      |  2.72<H>			Ph --      BMP (10-12 @ 05:54)             146<H>  |  107     |  57<H> 		Ca++ --      Ca 9.0                ---------------------------------( 88    		Mg 2.2                4.4     |  24      |  2.64<H>			Ph 4.3         Coags (10-13 @ 05:26)  aPTT 32.1 / INR 1.09 / PT 11.9    Cardiac Markers (10-12 @ 05:54)     Trop: 0.16 -- / CKMB: -- / CK: 121        --------------------------------------------------------------------------------------------    MICROBIOLOGY  Urinalysis (10-11 @ 18:44):     Color: Yellow / Appearance: SL Turbid<!> / S.015 / pH: 8.0 / Gluc: Negative / Ketones: Negative / Bili: Negative / Urobili: Negative / Protein :>600<!> / Nitrites: Negative / Leuk.Est: Large<!> / RBC:  / WBC: 5-10<!> / Sq Epi:  / Non Sq Epi:  / Bacteria        -> .Urine Catheterized Culture (10-11 @ 21:09)     NG    NG    10,000 - 49,000 CFU/mL Proteus mirabilis      --------------------------------------------------------------------------------------------    IMAGING  < from: [facility] Duplex Lower Ext Vein Scan, Left (10.12.17 @ 13:29) >  Impression: Limited study. Acute, above the knee DVT in the left common   femoral and femoral veins. The femoral vein at the mid and distal thigh   levels as well as the popliteal vein and calf veins were not visualized   due to limb size and swelling.    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 Nurse   practitioner Murali was informed of the findings following the study on   10/12/2017.    < end of copied text >        ASSESSMENT: Patient is a 57y old f with ****    PLAN:   -   -   -   -   - Patient seen/examined or Plan Discussed with Fellow,    - Plan to be discussed with Attending,  VASCULAR SURGERY CONSULT NOTE  --------------------------------------------------------------------------------------------    Patient is a 57y old  Female who presents with a chief complaint of shortness of breath (11 Oct 2017 22:01)      HPI: Pt presented to [facility] ED on 10/11/2017 c/o worsening SoB. She was found to have a large pericardial effusion w/o evidence of tamponade. Pt has a known h/o prior pericardial effusions. She has a surgical history significant for open AAA repair , Aorto-Fem-Fem bypass, L iliac aneurysm repair  w/ AVF to L iliac vein and IVC. Pt has an extremely complicated medical history w/ multiple comorbidities. She endorses chronic BLE edema w/ L>R. Pt states that her LLE has become significantly more swollen over the past month. She denies numbness, tingling, or coolness of LLE.    ROS: 10-system review is otherwise negative except HPI above.      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)  Calf DVT (Deep Venous Thrombosis) (ICD9 453.42)  Adenomatous Goiter (ICD9 241.9)  Chronic Gout (ICD9 274.02)  LBBB (Left Bundle Branch Block) (ICD9 426.3)  CHF (Congestive Heart Failure) (ICD9 428.0)  Hx of aorta-iliac-femoral bypass  TOP (Termination of Pregnancy)  S/P Dilatation and Curettage  History of Tubal Ligation  s/p AAA (Abdominal Aortic Aneurysm) Repair: 2009  History of Cholecystectomy  S/P Partial Hysterectomy    FAMILY HISTORY:  No pertinent family history in first degree relatives    ALLERGIES: heparin (Unknown)  losartan (Anaphylaxis)      CURRENT MEDICATIONS  MEDICATIONS (STANDING): amLODIPine   Tablet 10 milliGRAM(s) Oral daily  ascorbic acid 500 milliGRAM(s) Oral daily  carvedilol 25 milliGRAM(s) Oral every 12 hours  ferrous    sulfate 325 milliGRAM(s) Oral daily  hydrALAZINE 25 milliGRAM(s) Oral every 8 hours  influenza   Vaccine 0.5 milliLiter(s) IntraMuscular once  labetalol 200 milliGRAM(s) Oral every 8 hours  pantoprazole    Tablet 40 milliGRAM(s) Oral before breakfast  predniSONE   Tablet 5 milliGRAM(s) Oral daily    MEDICATIONS (PRN):  --------------------------------------------------------------------------------------------    Vitals:   T(C): 37.1 (10-13-17 @ 15:24), Max: 37.1 (10-13-17 @ 15:24)  HR: 84 (10-13-17 @ 15:24) (82 - 92)  BP: 155/81 (10-13-17 @ 15:24) (149/76 - 172/88)  BP(mean): --  RR: 16 (10-13-17 @ 15:24) (16 - 19)  SpO2: 94% (10-13-17 @ 15:24) (94% - 96%)  Wt(kg): --  CAPILLARY BLOOD GLUCOSE        CAPILLARY BLOOD GLUCOSE          10-12 @ 07:01  -  10-13 @ 07:00  --------------------------------------------------------  IN:    Oral Fluid: 440 mL  Total IN: 440 mL    OUT:    Indwelling Catheter - Urethral: 1650 mL  Total OUT: 1650 mL    Total NET: -1210 mL      10-13 @ 07:01  -  10-13 @ 17:22  --------------------------------------------------------  IN:    Oral Fluid: 560 mL  Total IN: 560 mL    OUT:    Indwelling Catheter - Urethral: 800 mL  Total OUT: 800 mL    Total NET: -240 mL        Height (cm): 167.64 (10-11 @ 23:23)  Weight (kg): 126.6 (10-11 @ 23:23)  BMI (kg/m2): 45 (10-11 @ 23:)  BSA (m2): 2.3 (10-11 @ 23:)    PHYSICAL EXAM:  General: NAD, Lying in bed comfortably  Neuro: A+Ox3  HEENT: NC/AT, EOMI  Cardio: RRR  Resp: no increased WoB  GI/Abd: Soft, Obese, NT, no rebound/guarding, no masses palpated  Vascular: 3+ pitting edema to mid thigh of LLE, DP signal  --------------------------------------------------------------------------------------------    LABS  CBC (10-13 @ 05:26)                              9.8<L>                         5.8     )----------------(  127<L>     --    % Neutrophils, --    % Lymphocytes, ANC: --                                  28.9<L>  CBC (10-12 @ 05:54)                              9.5<L>                         5.2     )----------------(  122<L>     --    % Neutrophils, --    % Lymphocytes, ANC: --                                  29.4<L>    BMP (10-13 @ 05:26)             146<H>  |  107     |  54<H> 		Ca++ --      Ca 8.9                ---------------------------------( 99    		Mg --                 4.1     |  23      |  2.72<H>			Ph --      BMP (10-12 @ 05:54)             146<H>  |  107     |  57<H> 		Ca++ --      Ca 9.0                ---------------------------------( 88    		Mg 2.2                4.4     |  24      |  2.64<H>			Ph 4.3         Coags (10-13 @ 05:26)  aPTT 32.1 / INR 1.09 / PT 11.9    Cardiac Markers (10-12 @ 05:54)     Trop: 0.16 -- / CKMB: -- / CK: 121        --------------------------------------------------------------------------------------------    MICROBIOLOGY  Urinalysis (10-11 @ 18:44):     Color: Yellow / Appearance: SL Turbid<!> / S.015 / pH: 8.0 / Gluc: Negative / Ketones: Negative / Bili: Negative / Urobili: Negative / Protein :>600<!> / Nitrites: Negative / Leuk.Est: Large<!> / RBC:  / WBC: 5-10<!> / Sq Epi:  / Non Sq Epi:  / Bacteria        -> .Urine Catheterized Culture (10-11 @ 21:09)     NG    NG    10,000 - 49,000 CFU/mL Proteus mirabilis      --------------------------------------------------------------------------------------------    IMAGING  < from: [facility] Duplex Lower Ext Vein Scan, Left (10.12.17 @ 13:29) >  Impression: Limited study. Acute, above the knee DVT in the left common   femoral and femoral veins. The femoral vein at the mid and distal thigh   levels as well as the popliteal vein and calf veins were not visualized   due to limb size and swelling.    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 Nurse   practitioner Murali was informed of the findings following the study on   10/12/2017.    < end of copied text >

## 2017-10-13 NOTE — DIETITIAN INITIAL EVALUATION ADULT. - ADHERENCE
Pt reports following a generally healthful diet low in salt. Pt states daughter does the cooking and food shopping, but when they eat out she tries to make smart choices when eating out.

## 2017-10-13 NOTE — DIETITIAN INITIAL EVALUATION ADULT. - NS AS NUTRI INTERV ED CONTENT
Reviewed/reinforced heart healthy diet guidelines./Purpose of the nutrition education/Nutrition relationship to health/disease

## 2017-10-13 NOTE — DIETITIAN INITIAL EVALUATION ADULT. - ENERGY NEEDS
Ht:5ft6in, Wt:279.1pounds (adm),   BMI:45.2- likely inaccurate as pt with edema.  IBW:130pounds (+/-10%), 214%IBW  Pt with 2+right ankle/leg edema and 4+ left leg/ankle/foot edema; stage IV pressure ulcer to right ischium.  Pertinent Information: Pt admitted with progressive SOB secondary to large pericardial effusion- no surgical intervention at this time, LLE DVT.

## 2017-10-13 NOTE — PROGRESS NOTE ADULT - ASSESSMENT
58 y/o female PMHx CHF EF=32% s/p AICD, SLE, AAA approx 14cm as of 12/2016 s/p aortoiliac stent, DVT/PE (not currently anticoagulated 2/2 HIT), h/o LLE compartment syndrome, multiple pericardial effusions s/p drainage most recently in Jan 2017 now presents with dyspnea x 1 day, lethargy x 1 week, increased LLE edema x 1 week and found to have recurrent large pericardial effusion     1. Large pericardial effusion and left pleural effusion without tamponade.  	Likely chronic effusion, h/o SLE              Rheumatology consult pending  2. CT surgery recommended no surgical intervention and drainage by interventional means (Interventional Cardiology recommends radiologic guidance given the nature of the effusion - appears loculated)  3. Venous LE Doppler positive for DVT. Pt would need A/C for this except for the contraindication with pericardial effusion and possible conversion to hemorrhage. Therefore, she likely will need IVC filter in addition to pericardial drainage. Will discuss with Interventional Cariology and Vascular Dr. KEVIN Rizvi. 58 y/o female PMHx CHF EF=32% s/p AICD, SLE, AAA approx 14cm as of 12/2016 s/p aortoiliac stent, DVT/PE (not currently anticoagulated 2/2 HIT), h/o LLE compartment syndrome, multiple pericardial effusions s/p drainage most recently in Jan 2017 now presents with dyspnea x 1 day, lethargy x 1 week, increased LLE edema x 1 week and found to have recurrent large pericardial effusion     1. Large pericardial effusion and left pleural effusion without tamponade.  	Likely chronic effusion, h/o SLE              Rheumatology consult pending    2. CT surgery recommended no surgical intervention and drainage by interventional means (Interventional Cardiology recommends radiologic guidance given the nature of the effusion - appears loculated)    3. Venous LE Doppler positive for DVT but likely of chronic nature. Pt would need A/C for this except for the contraindication with pericardial effusion and possible conversion to hemorrhage. In addition, an IVC filter is contraindicated in this patient due to the anatomy and dilatation of the IVC (per my discussion with Vasc. Surg. Dr. Tavares)     Therefore, at this point, a less invasive approach will be taken, possibly medically treating her without intervention (at least not in the short-term).  A bed will be arranged for her on the medical/telemetry unit and she will be observed prior to making any determinations as to the appropriate approach to this effusion, if at all.

## 2017-10-13 NOTE — CONSULT NOTE ADULT - ASSESSMENT
57yF w/ L Fem DVT and chronic L popliteal pseudoaneurysm    - Recommend anticoagulation for DVT  - Compression of LLE w/ ACE wrap  - No acfute surgical intervention  - Will continue to follow  - Plan discussed w/ Fellow, Dr. Cox  - Please page [phone] w/ questions 57yF w/ L Fem DVT and known L popliteal aneurysm    - Recommend anticoagulation for DVT  - Compression of LLE w/ ACE wrap  - No acfute surgical intervention  - Will continue to follow  - Plan discussed w/ Fellow, Dr. Cox  - Please page [phone] w/ questions 57yF w/ L Fem DVT and known L popliteal aneurysm    - Recommend anticoagulation for DVT  - Compression of LLE w/ ACE wrap  - No acute surgical intervention  - Will continue to follow  - Plan discussed w/ Fellow, Dr. Cox  - Please page [phone] w/ questions

## 2017-10-13 NOTE — DIETITIAN INITIAL EVALUATION ADULT. - PROBLEM SELECTOR PLAN 4
-Patient w./ notable normocytic anemia. She denies GI bleeding and suspect this could be lupus related versus hemolysis versus occult blood loss versus anemia of chronic disease. Will check type and screen, check reticulocyte/haptoglobin/direct coomb testing. Less likely hemolysis given normal bilirubin. Monitor H&H. Check FOBT

## 2017-10-13 NOTE — PROGRESS NOTE ADULT - SUBJECTIVE AND OBJECTIVE BOX
MEDICINE, PROGRESS NOTE [phone]    COSTEN, SANDRA 57y [id number]    Patient seen and examined.  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)  Calf DVT (Deep Venous Thrombosis) (ICD9 453.42)  Adenomatous Goiter (ICD9 241.9)  Chronic Gout (ICD9 274.02)  LBBB (Left Bundle Branch Block) (ICD9 426.3)  CHF (Congestive Heart Failure) (ICD9 428.0)  Hx of aorta-iliac-femoral bypass  TOP (Termination of Pregnancy)  S/P Dilatation and Curettage  History of Tubal Ligation  s/p AAA (Abdominal Aortic Aneurysm) Repair: 2009 2010  History of Cholecystectomy  S/P Partial Hysterectomy    MEDICATIONS  (STANDING):  amLODIPine   Tablet 10 milliGRAM(s) Oral daily  ascorbic acid 500 milliGRAM(s) Oral daily  carvedilol 25 milliGRAM(s) Oral every 12 hours  ferrous    sulfate 325 milliGRAM(s) Oral daily  hydrALAZINE 25 milliGRAM(s) Oral every 8 hours  influenza   Vaccine 0.5 milliLiter(s) IntraMuscular once  labetalol 200 milliGRAM(s) Oral every 8 hours  pantoprazole    Tablet 40 milliGRAM(s) Oral before breakfast  predniSONE   Tablet 5 milliGRAM(s) Oral daily    MEDICATIONS  (PRN):    Allergies    heparin (Unknown)  losartan (Anaphylaxis)    Intolerances        PHYSICAL EXAM:  Constitutional: NAD  HEENT: Normocephalic, EOMI  Neck:  No JVD  Respiratory: CTA B/L, No wheezes  Cardiovascular: S1, S2, RRR, + systolic murmur  Gastrointestinal: BS+, soft, NT/ND  Extremities: No peripheral edema  Neurological: AAOX3, no focal deficits  Psychiatric: Normal mood, normal affect  : No Hook    Vital Signs Last 24 Hrs  T(C): 37.1 (13 Oct 2017 15:24), Max: 37.1 (13 Oct 2017 15:24)  T(F): 98.7 (13 Oct 2017 15:24), Max: 98.7 (13 Oct 2017 15:24)  HR: 84 (13 Oct 2017 15:24) (82 - 92)  BP: 155/81 (13 Oct 2017 15:24) (149/76 - 172/88)  BP(mean): --  RR: 16 (13 Oct 2017 15:24) (16 - 19)  SpO2: 94% (13 Oct 2017 15:24) (94% - 96%)  I&O's Summary    12 Oct 2017 07:01  -  13 Oct 2017 07:00  --------------------------------------------------------  IN: 440 mL / OUT: 1650 mL / NET: -1210 mL    13 Oct 2017 07:01  -  13 Oct 2017 19:20  --------------------------------------------------------  IN: 560 mL / OUT: 800 mL / NET: -240 mL        LABS:                        9.8    5.8   )-----------( 127      ( 13 Oct 2017 05:26 )             28.9     10-13    146<H>  |  107  |  54<H>  ----------------------------<  99  4.1   |  23  |  2.72<H>    Ca    8.9      13 Oct 2017 05:26  Phos  4.3     10-12  Mg     2.2     10-12      CARDIAC MARKERS ( 12 Oct 2017 05:54 )  x     / 0.16 ng/mL / 121 U/L / x     / 4.0 ng/mL  CARDIAC MARKERS ( 12 Oct 2017 00:51 )  x     / 0.17 ng/mL / 124 U/L / x     / 4.5 ng/mL            REVIEW OF SYSTEMS:      RADIOLOGY & ADDITIONAL STUDIES:      ASSESSMENT/PLAN: MEDICINE, PROGRESS NOTE [phone]    COSTEN, SANDRA 57y [id number]    Patient seen and examined.  Patient is a 57y old  Female who presents with a chief complaint of shortness of breath (11 Oct 2017 22:01)  Pt still getting sob with activity.    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)  Calf DVT (Deep Venous Thrombosis) (ICD9 453.42)  Adenomatous Goiter (ICD9 241.9)  Chronic Gout (ICD9 274.02)  LBBB (Left Bundle Branch Block) (ICD9 426.3)  CHF (Congestive Heart Failure) (ICD9 428.0)  Hx of aorta-iliac-femoral bypass  TOP (Termination of Pregnancy)  S/P Dilatation and Curettage  History of Tubal Ligation  s/p AAA (Abdominal Aortic Aneurysm) Repair: 2009 2010  History of Cholecystectomy  S/P Partial Hysterectomy    MEDICATIONS  (STANDING):  amLODIPine   Tablet 10 milliGRAM(s) Oral daily  ascorbic acid 500 milliGRAM(s) Oral daily  carvedilol 25 milliGRAM(s) Oral every 12 hours  ferrous    sulfate 325 milliGRAM(s) Oral daily  hydrALAZINE 25 milliGRAM(s) Oral every 8 hours  influenza   Vaccine 0.5 milliLiter(s) IntraMuscular once  labetalol 200 milliGRAM(s) Oral every 8 hours  pantoprazole    Tablet 40 milliGRAM(s) Oral before breakfast  predniSONE   Tablet 5 milliGRAM(s) Oral daily    MEDICATIONS  (PRN):    Allergies    heparin (Unknown)  losartan (Anaphylaxis)    Intolerances        PHYSICAL EXAM:  Constitutional: NAD  HEENT: Normocephalic, EOMI  Neck:  No JVD  Respiratory: CTA B/L, No wheezes  Cardiovascular: S1, S2, RRR, + systolic murmur  Gastrointestinal: BS+, soft, NT/ND  Extremities: + peripheral edema le b/l  L>R  Neurological: AAOX3, no focal deficits  Psychiatric: Normal mood, normal affect  : No Hook    Vital Signs Last 24 Hrs  T(C): 37.1 (13 Oct 2017 15:24), Max: 37.1 (13 Oct 2017 15:24)  T(F): 98.7 (13 Oct 2017 15:24), Max: 98.7 (13 Oct 2017 15:24)  HR: 84 (13 Oct 2017 15:24) (82 - 92)  BP: 155/81 (13 Oct 2017 15:24) (149/76 - 172/88)  BP(mean): --  RR: 16 (13 Oct 2017 15:24) (16 - 19)  SpO2: 94% (13 Oct 2017 15:24) (94% - 96%)  I&O's Summary    12 Oct 2017 07:01  -  13 Oct 2017 07:00  --------------------------------------------------------  IN: 440 mL / OUT: 1650 mL / NET: -1210 mL    13 Oct 2017 07:01  -  13 Oct 2017 19:20  --------------------------------------------------------  IN: 560 mL / OUT: 800 mL / NET: -240 mL        LABS:                        9.8    5.8   )-----------( 127      ( 13 Oct 2017 05:26 )             28.9     10-13    146<H>  |  107  |  54<H>  ----------------------------<  99  4.1   |  23  |  2.72<H>    Ca    8.9      13 Oct 2017 05:26  Phos  4.3     10-12  Mg     2.2     10-12      CARDIAC MARKERS ( 12 Oct 2017 05:54 )  x     / 0.16 ng/mL / 121 U/L / x     / 4.0 ng/mL  CARDIAC MARKERS ( 12 Oct 2017 00:51 )  x     / 0.17 ng/mL / 124 U/L / x     / 4.5 ng/mL

## 2017-10-13 NOTE — DIETITIAN INITIAL EVALUATION ADULT. - NUTRITION INTERVENTION
Medical Food Supplements/Meals and Snack/Nutrition Education/Collaboration and Referral of Nutrition Care

## 2017-10-13 NOTE — PROGRESS NOTE ADULT - ASSESSMENT
HERNANDEZ  pericardial effsuion  small left pleuaral effusion  acute dvt Left common and fem veins  compensated syst hf  anemia  thrombocytopenia  hypernatremia  ckd 3    vasc consult seen, will wait to hear from attending  pt hesitant to start anticoagulation at this time  will hold off on wrap until i can discuss with vasc attending  monitor labs closely  appreciate cardio input - await cts input  await rheum and renal input

## 2017-10-13 NOTE — DIETITIAN INITIAL EVALUATION ADULT. - NS AS NUTRI INTERV MEALS SNACK
Encourage PO intake with all meals. Encourage intake of high biological value proteins. Provide food preferences within therapeutic diet when requested.

## 2017-10-14 RX ADMIN — Medication 200 MILLIGRAM(S): at 23:32

## 2017-10-14 RX ADMIN — Medication 25 MILLIGRAM(S): at 05:42

## 2017-10-14 RX ADMIN — Medication 500 MILLIGRAM(S): at 11:01

## 2017-10-14 RX ADMIN — CARVEDILOL PHOSPHATE 25 MILLIGRAM(S): 80 CAPSULE, EXTENDED RELEASE ORAL at 17:17

## 2017-10-14 RX ADMIN — CARVEDILOL PHOSPHATE 25 MILLIGRAM(S): 80 CAPSULE, EXTENDED RELEASE ORAL at 05:42

## 2017-10-14 RX ADMIN — Medication 200 MILLIGRAM(S): at 18:36

## 2017-10-14 RX ADMIN — AMLODIPINE BESYLATE 10 MILLIGRAM(S): 2.5 TABLET ORAL at 11:01

## 2017-10-14 RX ADMIN — Medication 25 MILLIGRAM(S): at 13:09

## 2017-10-14 RX ADMIN — Medication 5 MILLIGRAM(S): at 11:01

## 2017-10-14 RX ADMIN — PANTOPRAZOLE SODIUM 40 MILLIGRAM(S): 20 TABLET, DELAYED RELEASE ORAL at 05:43

## 2017-10-14 RX ADMIN — Medication 200 MILLIGRAM(S): at 11:01

## 2017-10-14 RX ADMIN — Medication 25 MILLIGRAM(S): at 21:06

## 2017-10-14 RX ADMIN — Medication 325 MILLIGRAM(S): at 11:01

## 2017-10-14 NOTE — PROGRESS NOTE ADULT - ASSESSMENT
58 y/o female PMHx chronic systolic CHF (last known EF=32%) s/p AICD, SLE, AAA approx 14cm as of 12/2016 s/p aortoiliac stent, DVT/PE (not currently anticoagulated 2/2 HIT), h/o LLE compartment syndrome, multiple pericardial effusions s/p drainage most recently in Jan 2017 now presents with recurrent large pericardial effusion     1. Large pericardial effusion without tamponade.  	Likely chronic effusion, h/o SLE              Rheumatology consult pending    2. CT surgery recommended no surgical intervention and drainage by interventional means   Interventional Cardiology  and Vascular recommending radiologic guidance given the nature of the effusion - appears loculated)    3. Venous LE Doppler positive for DVT but likely of chronic nature. Pt would need A/C for this except for the contraindication with pericardial effusion and possible conversion to hemorrhage. In addition, an IVC filter is contraindicated in this patient due to the anatomy and dilatation of the IVC (per my discussion with Vasc. Surg. Dr. Tavares)     Therefore, at this point, a less invasive approach will be taken, possibly medically treating her without intervention (at least not in the short-term).  She will continue to be observed prior to making any determinations as to the appropriate approach to the pericardial effusion, if at all.    CT surgery re-consulted by primary, Dr. Chicas.

## 2017-10-14 NOTE — PROGRESS NOTE ADULT - ASSESSMENT
HERNANDEZ  pericardial effsuion  small left pleuaral effusion  acute dvt Left common and fem veins  compensated syst hf  anemia  thrombocytopenia  hypernatremia  ckd 3    discussed with dr connors  continue current care  pt refusing anticoagulation.

## 2017-10-14 NOTE — PROGRESS NOTE ADULT - SUBJECTIVE AND OBJECTIVE BOX
Subjective: Patient seen and examined. No new events except as noted.     Pt reported feeling SOB when trying to stand from seated position or changing position in chair (unusual for her)    MEDICATIONS:  MEDICATIONS  (STANDING):  amLODIPine   Tablet 10 milliGRAM(s) Oral daily  ascorbic acid 500 milliGRAM(s) Oral daily  carvedilol 25 milliGRAM(s) Oral every 12 hours  ferrous    sulfate 325 milliGRAM(s) Oral daily  hydrALAZINE 25 milliGRAM(s) Oral every 8 hours  influenza   Vaccine 0.5 milliLiter(s) IntraMuscular once  labetalol 200 milliGRAM(s) Oral every 8 hours  pantoprazole    Tablet 40 milliGRAM(s) Oral before breakfast  predniSONE   Tablet 5 milliGRAM(s) Oral daily      PHYSICAL EXAM:  T(F): 98.1 (10-14-17 @ 12:33), Max: 99 (10-14-17 @ 10:59)  HR: 82 (10-14-17 @ 17:12) (82 - 89)  BP: 145/82 (10-14-17 @ 17:12) (136/81 - 167/90)  RR: 17 (10-14-17 @ 12:33) (16 - 18)  SpO2: 95% (10-14-17 @ 12:33) (92% - 95%)  I&O's Summary    13 Oct 2017 07:01  -  14 Oct 2017 07:00  --------------------------------------------------------  IN: 560 mL / OUT: 1525 mL / NET: -965 mL    14 Oct 2017 07:01  -  14 Oct 2017 17:57  --------------------------------------------------------  IN: 600 mL / OUT: 450 mL / NET: 150 mL      TELEMETRY: NSR/, 70-80	    Appearance: Obese female in NAD and in good spirits	  HEENT:   Normal oral mucosa, PERRL, EOMI	  Lymphatic: No lymphadenopathy  Cardiovascular: Normal S1 S2, No JVD, No murmurs, + edema (LLE) - unchanged  Respiratory: Lungs clear to auscultation	  Psychiatry: A & O x 3, Mood & affect appropriate  Gastrointestinal:  Soft, Non-tender, + BS	  Skin: No rashes, No ecchymoses, No cyanosis	  Neurologic: Non-focal  Vascular: Peripheral pulses palpable 2+ bilaterally      LABS:    CARDIAC MARKERS:  CARDIAC MARKERS ( 12 Oct 2017 05:54 )  x     / 0.16 ng/mL / 121 U/L / x     / 4.0 ng/mL  CARDIAC MARKERS ( 12 Oct 2017 00:51 )  x     / 0.17 ng/mL / 124 U/L / x     / 4.5 ng/mL  CARDIAC MARKERS ( 11 Oct 2017 18:05 )  x     / 0.20 ng/mL / x     / x     / 4.8 ng/mL                                9.8    5.8   )-----------( 127      ( 13 Oct 2017 05:26 )             28.9     10-13    146<H>  |  107  |  54<H>  ----------------------------<  99  4.1   |  23  |  2.72<H>    Ca    8.9      13 Oct 2017 05:26

## 2017-10-14 NOTE — PROGRESS NOTE ADULT - SUBJECTIVE AND OBJECTIVE BOX
MEDICINE, PROGRESS NOTE [phone]    COSTEN, SANDRA 57y [id number]    Patient seen and examined.  Patient is a 57y old  Female who presents with a chief complaint of shortness of breath (11 Oct 2017 22:01)  Pt feels breathing is better.    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)  Calf DVT (Deep Venous Thrombosis) (ICD9 453.42)  Adenomatous Goiter (ICD9 241.9)  Chronic Gout (ICD9 274.02)  LBBB (Left Bundle Branch Block) (ICD9 426.3)  CHF (Congestive Heart Failure) (ICD9 428.0)  Hx of aorta-iliac-femoral bypass  TOP (Termination of Pregnancy)  S/P Dilatation and Curettage  History of Tubal Ligation  s/p AAA (Abdominal Aortic Aneurysm) Repair: 2009 2010  History of Cholecystectomy  S/P Partial Hysterectomy    MEDICATIONS  (STANDING):  amLODIPine   Tablet 10 milliGRAM(s) Oral daily  ascorbic acid 500 milliGRAM(s) Oral daily  carvedilol 25 milliGRAM(s) Oral every 12 hours  ferrous    sulfate 325 milliGRAM(s) Oral daily  hydrALAZINE 25 milliGRAM(s) Oral every 8 hours  influenza   Vaccine 0.5 milliLiter(s) IntraMuscular once  labetalol 200 milliGRAM(s) Oral every 8 hours  pantoprazole    Tablet 40 milliGRAM(s) Oral before breakfast  predniSONE   Tablet 5 milliGRAM(s) Oral daily    MEDICATIONS  (PRN):    Allergies    heparin (Unknown)  losartan (Anaphylaxis)    Intolerances        PHYSICAL EXAM:  Constitutional: NAD  HEENT: Normocephalic, EOMI  Neck:  No JVD  Respiratory: CTA B/L, No wheezes  Cardiovascular: S1, S2, RRR, + systolic murmur  Gastrointestinal: BS+, soft, NT/ND  Extremities: + left lower extrem peripheral edema  Neurological: AAOX3, no focal deficits  Psychiatric: Normal mood, normal affect  : + Hook    Vital Signs Last 24 Hrs  T(C): 36.7 (14 Oct 2017 12:33), Max: 37.2 (14 Oct 2017 10:59)  T(F): 98.1 (14 Oct 2017 12:33), Max: 99 (14 Oct 2017 10:59)  HR: 82 (14 Oct 2017 12:33) (82 - 89)  BP: 164/80 (14 Oct 2017 12:33) (136/81 - 167/90)  BP(mean): --  RR: 17 (14 Oct 2017 12:33) (16 - 18)  SpO2: 95% (14 Oct 2017 12:33) (92% - 95%)  I&O's Summary    13 Oct 2017 07:01  -  14 Oct 2017 07:00  --------------------------------------------------------  IN: 560 mL / OUT: 1525 mL / NET: -965 mL    14 Oct 2017 07:01  -  14 Oct 2017 17:02  --------------------------------------------------------  IN: 600 mL / OUT: 450 mL / NET: 150 mL        LABS:                        9.8    5.8   )-----------( 127      ( 13 Oct 2017 05:26 )             28.9     10-13    146<H>  |  107  |  54<H>  ----------------------------<  99  4.1   |  23  |  2.72<H>    Ca    8.9      13 Oct 2017 05:26

## 2017-10-15 LAB
ANION GAP SERPL CALC-SCNC: 13 MMOL/L — SIGNIFICANT CHANGE UP (ref 5–17)
BUN SERPL-MCNC: 56 MG/DL — HIGH (ref 7–23)
CALCIUM SERPL-MCNC: 8.9 MG/DL — SIGNIFICANT CHANGE UP (ref 8.4–10.5)
CHLORIDE SERPL-SCNC: 108 MMOL/L — SIGNIFICANT CHANGE UP (ref 96–108)
CO2 SERPL-SCNC: 23 MMOL/L — SIGNIFICANT CHANGE UP (ref 22–31)
CREAT SERPL-MCNC: 2.76 MG/DL — HIGH (ref 0.5–1.3)
GLUCOSE SERPL-MCNC: 91 MG/DL — SIGNIFICANT CHANGE UP (ref 70–99)
HCT VFR BLD CALC: 29.2 % — LOW (ref 34.5–45)
HGB BLD-MCNC: 9.6 G/DL — LOW (ref 11.5–15.5)
MCHC RBC-ENTMCNC: 29.5 PG — SIGNIFICANT CHANGE UP (ref 27–34)
MCHC RBC-ENTMCNC: 32.8 GM/DL — SIGNIFICANT CHANGE UP (ref 32–36)
MCV RBC AUTO: 89.9 FL — SIGNIFICANT CHANGE UP (ref 80–100)
PLATELET # BLD AUTO: 122 K/UL — LOW (ref 150–400)
POTASSIUM SERPL-MCNC: 4.2 MMOL/L — SIGNIFICANT CHANGE UP (ref 3.5–5.3)
POTASSIUM SERPL-SCNC: 4.2 MMOL/L — SIGNIFICANT CHANGE UP (ref 3.5–5.3)
RBC # BLD: 3.25 M/UL — LOW (ref 3.8–5.2)
RBC # FLD: 13.6 % — SIGNIFICANT CHANGE UP (ref 10.3–14.5)
SODIUM SERPL-SCNC: 144 MMOL/L — SIGNIFICANT CHANGE UP (ref 135–145)
WBC # BLD: 6.2 K/UL — SIGNIFICANT CHANGE UP (ref 3.8–10.5)
WBC # FLD AUTO: 6.2 K/UL — SIGNIFICANT CHANGE UP (ref 3.8–10.5)

## 2017-10-15 RX ORDER — CALCIUM CARBONATE 500(1250)
1 TABLET ORAL ONCE
Qty: 0 | Refills: 0 | Status: COMPLETED | OUTPATIENT
Start: 2017-10-15 | End: 2017-10-15

## 2017-10-15 RX ADMIN — Medication 1 TABLET(S): at 23:46

## 2017-10-15 RX ADMIN — Medication 200 MILLIGRAM(S): at 23:22

## 2017-10-15 RX ADMIN — PANTOPRAZOLE SODIUM 40 MILLIGRAM(S): 20 TABLET, DELAYED RELEASE ORAL at 05:45

## 2017-10-15 RX ADMIN — Medication 325 MILLIGRAM(S): at 09:27

## 2017-10-15 RX ADMIN — CARVEDILOL PHOSPHATE 25 MILLIGRAM(S): 80 CAPSULE, EXTENDED RELEASE ORAL at 05:45

## 2017-10-15 RX ADMIN — Medication 200 MILLIGRAM(S): at 17:37

## 2017-10-15 RX ADMIN — Medication 25 MILLIGRAM(S): at 05:45

## 2017-10-15 RX ADMIN — Medication 500 MILLIGRAM(S): at 09:27

## 2017-10-15 RX ADMIN — AMLODIPINE BESYLATE 10 MILLIGRAM(S): 2.5 TABLET ORAL at 09:27

## 2017-10-15 RX ADMIN — Medication 25 MILLIGRAM(S): at 21:34

## 2017-10-15 RX ADMIN — Medication 5 MILLIGRAM(S): at 09:27

## 2017-10-15 RX ADMIN — CARVEDILOL PHOSPHATE 25 MILLIGRAM(S): 80 CAPSULE, EXTENDED RELEASE ORAL at 21:34

## 2017-10-15 RX ADMIN — Medication 200 MILLIGRAM(S): at 09:27

## 2017-10-15 RX ADMIN — Medication 25 MILLIGRAM(S): at 13:34

## 2017-10-15 NOTE — PROGRESS NOTE ADULT - SUBJECTIVE AND OBJECTIVE BOX
Subjective: Patient seen and examined. No new events except as noted.   Feeling ok  no cp or sob     REVIEW OF SYSTEMS:    CONSTITUTIONAL: + weakness, no fevers or chills  EYES/ENT: No visual changes;  No vertigo or throat pain   NECK: No pain or stiffness  RESPIRATORY: No cough, wheezing, hemoptysis; No shortness of breath  CARDIOVASCULAR: No chest pain or palpitations  GASTROINTESTINAL: No abdominal or epigastric pain. No nausea, vomiting, or hematemesis; No diarrhea or constipation. No melena or hematochezia.  GENITOURINARY: No dysuria, frequency or hematuria  NEUROLOGICAL: No numbness or weakness  SKIN: No itching, burning, rashes, or lesions   All other review of systems is negative unless indicated above.    MEDICATIONS:  MEDICATIONS  (STANDING):  amLODIPine   Tablet 10 milliGRAM(s) Oral daily  ascorbic acid 500 milliGRAM(s) Oral daily  carvedilol 25 milliGRAM(s) Oral every 12 hours  ferrous    sulfate 325 milliGRAM(s) Oral daily  hydrALAZINE 25 milliGRAM(s) Oral every 8 hours  influenza   Vaccine 0.5 milliLiter(s) IntraMuscular once  labetalol 200 milliGRAM(s) Oral every 8 hours  pantoprazole    Tablet 40 milliGRAM(s) Oral before breakfast  predniSONE   Tablet 5 milliGRAM(s) Oral daily      PHYSICAL EXAM:  T(C): 36.6 (10-15-17 @ 04:15), Max: 37.2 (10-14-17 @ 10:59)  HR: 85 (10-15-17 @ 09:29) (82 - 90)  BP: 154/84 (10-15-17 @ 09:29) (136/81 - 164/80)  RR: 18 (10-15-17 @ 04:15) (16 - 18)  SpO2: 96% (10-15-17 @ 04:15) (95% - 96%)  Wt(kg): --  I&O's Summary    14 Oct 2017 07:01  -  15 Oct 2017 07:00  --------------------------------------------------------  IN: 800 mL / OUT: 1275 mL / NET: -475 mL          Appearance: Normal	  HEENT:   Normal oral mucosa, PERRL, EOMI	  Lymphatic: No lymphadenopathy , no edema  Cardiovascular: Normal S1 S2, No JVD, No murmurs , Peripheral pulses palpable 2+ bilaterally  Respiratory: Lungs clear to auscultation, normal effort 	  Gastrointestinal:  Soft, Non-tender, + BS	  Skin: No rashes, No ecchymoses, No cyanosis, warm to touch, +sacral decubitus ulcer   Musculoskeletal: decreased range of motion, decreased strength  Psychiatry:  Mood & affect appropriate  Ext: chronic left lower extremity edema/lymphedema       LABS:    CARDIAC MARKERS:                                9.6    6.2   )-----------( 122      ( 15 Oct 2017 06:38 )             29.2     10-15    144  |  108  |  56<H>  ----------------------------<  91  4.2   |  23  |  2.76<H>    Ca    8.9      15 Oct 2017 06:38      proBNP:   Lipid Profile:   HgA1c:   TSH:             TELEMETRY: Sr/Vpaced 	    ECG:  	  RADIOLOGY:   DIAGNOSTIC TESTING:  [ ] Echocardiogram:  [ ]  Catheterization:  [ ] Stress Test:    OTHER:

## 2017-10-15 NOTE — PROGRESS NOTE ADULT - SUBJECTIVE AND OBJECTIVE BOX
MEDICINE, PROGRESS NOTE [phone]    COSTEN, SANDRA 57y [id number]    Patient seen and examined.  Patient is a 57y old  Female who presents with a chief complaint of shortness of breath (11 Oct 2017 22:01)  Pt has no new complaints.    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)  Calf DVT (Deep Venous Thrombosis) (ICD9 453.42)  Adenomatous Goiter (ICD9 241.9)  Chronic Gout (ICD9 274.02)  LBBB (Left Bundle Branch Block) (ICD9 426.3)  CHF (Congestive Heart Failure) (ICD9 428.0)  Hx of aorta-iliac-femoral bypass  TOP (Termination of Pregnancy)  S/P Dilatation and Curettage  History of Tubal Ligation  s/p AAA (Abdominal Aortic Aneurysm) Repair: 2009 2010  History of Cholecystectomy  S/P Partial Hysterectomy    MEDICATIONS  (STANDING):  amLODIPine   Tablet 10 milliGRAM(s) Oral daily  ascorbic acid 500 milliGRAM(s) Oral daily  carvedilol 25 milliGRAM(s) Oral every 12 hours  ferrous    sulfate 325 milliGRAM(s) Oral daily  hydrALAZINE 25 milliGRAM(s) Oral every 8 hours  influenza   Vaccine 0.5 milliLiter(s) IntraMuscular once  labetalol 200 milliGRAM(s) Oral every 8 hours  pantoprazole    Tablet 40 milliGRAM(s) Oral before breakfast  predniSONE   Tablet 5 milliGRAM(s) Oral daily    MEDICATIONS  (PRN):    Allergies    heparin (Unknown)  losartan (Anaphylaxis)    Intolerances        PHYSICAL EXAM:  Constitutional: NAD  HEENT: Normocephalic, EOMI  Neck:  No JVD  Respiratory: CTA B/L, No wheezes  Cardiovascular: S1, S2, RRR, + systolic murmur  Gastrointestinal: BS+, soft, NT/ND  Extremities: No peripheral edema  Neurological: AAOX3, no focal deficits  Psychiatric: Normal mood, normal affect  : No Hook    Vital Signs Last 24 Hrs  T(C): 37.1 (15 Oct 2017 13:09), Max: 37.1 (15 Oct 2017 13:09)  T(F): 98.7 (15 Oct 2017 13:09), Max: 98.7 (15 Oct 2017 13:09)  HR: 78 (15 Oct 2017 13:09) (78 - 90)  BP: 137/78 (15 Oct 2017 13:09) (137/78 - 154/84)  BP(mean): --  RR: 18 (15 Oct 2017 13:09) (18 - 18)  SpO2: 94% (15 Oct 2017 13:09) (94% - 96%)  I&O's Summary    14 Oct 2017 07:01  -  15 Oct 2017 07:00  --------------------------------------------------------  IN: 800 mL / OUT: 1275 mL / NET: -475 mL    15 Oct 2017 07:01  -  15 Oct 2017 18:23  --------------------------------------------------------  IN: 720 mL / OUT: 400 mL / NET: 320 mL        LABS:                        9.6    6.2   )-----------( 122      ( 15 Oct 2017 06:38 )             29.2     10-15    144  |  108  |  56<H>  ----------------------------<  91  4.2   |  23  |  2.76<H>    Ca    8.9      15 Oct 2017 06:38                REVIEW OF SYSTEMS:      RADIOLOGY & ADDITIONAL STUDIES:      ASSESSMENT/PLAN:

## 2017-10-15 NOTE — PROVIDER CONTACT NOTE (OTHER) - ACTION/TREATMENT ORDERED:
NP aware. One time order for TUMS. Continue to monitor. NP aware. Will place One time order for TUMS. Continue to monitor.

## 2017-10-15 NOTE — PROGRESS NOTE ADULT - ASSESSMENT
56 y/o female PMHx chronic systolic CHF (last known EF=32%) s/p AICD, SLE, AAA approx 14cm as of 12/2016 s/p aortoiliac stent, DVT/PE (not currently anticoagulated 2/2 HIT), h/o LLE compartment syndrome, multiple pericardial effusions s/p drainage most recently in Jan 2017 now presents with recurrent large pericardial effusion     1. Large pericardial effusion without tamponade.  	Likely chronic effusion, h/o SLE              Rheumatology consult pending    2. CT surgery recommended no surgical intervention and drainage by interventional means   Interventional Cardiology  and Vascular recommending radiologic guidance given the nature of the effusion - appears loculated)    3. Venous LE Doppler positive for DVT but likely of chronic nature. Pt would need A/C for this except for the contraindication with pericardial effusion and possible conversion to hemorrhage. In addition, an IVC filter is contraindicated in this patient due to the anatomy and dilatation of the IVC (per my discussion with Vasc. Surg. Dr. Tavares)     Therefore, at this point, a less invasive approach will be taken, possibly medically treating her without intervention (at least not in the short-term).  She will continue to be observed prior to making any determinations as to the appropriate approach to the pericardial effusion, if at all.    CT surgery re-consulted by primary, Dr. Chicas.

## 2017-10-15 NOTE — PROGRESS NOTE ADULT - ASSESSMENT
HERNANDEZ  pericardial effusion  chronic left leg edema worse  L leg dvt    pt wishes to discuss a/c with her hcp, at this time she wishes to hold off  continue current care  appreciate cardio input  await rheum input

## 2017-10-16 LAB
ANION GAP SERPL CALC-SCNC: 13 MMOL/L — SIGNIFICANT CHANGE UP (ref 5–17)
BUN SERPL-MCNC: 55 MG/DL — HIGH (ref 7–23)
CALCIUM SERPL-MCNC: 8.9 MG/DL — SIGNIFICANT CHANGE UP (ref 8.4–10.5)
CHLORIDE SERPL-SCNC: 108 MMOL/L — SIGNIFICANT CHANGE UP (ref 96–108)
CO2 SERPL-SCNC: 24 MMOL/L — SIGNIFICANT CHANGE UP (ref 22–31)
CREAT SERPL-MCNC: 2.78 MG/DL — HIGH (ref 0.5–1.3)
GLUCOSE SERPL-MCNC: 81 MG/DL — SIGNIFICANT CHANGE UP (ref 70–99)
HCT VFR BLD CALC: 29 % — LOW (ref 34.5–45)
HGB BLD-MCNC: 9 G/DL — LOW (ref 11.5–15.5)
MCHC RBC-ENTMCNC: 27.9 PG — SIGNIFICANT CHANGE UP (ref 27–34)
MCHC RBC-ENTMCNC: 30.9 GM/DL — LOW (ref 32–36)
MCV RBC AUTO: 90.4 FL — SIGNIFICANT CHANGE UP (ref 80–100)
PLATELET # BLD AUTO: 152 K/UL — SIGNIFICANT CHANGE UP (ref 150–400)
POTASSIUM SERPL-MCNC: 4.2 MMOL/L — SIGNIFICANT CHANGE UP (ref 3.5–5.3)
POTASSIUM SERPL-SCNC: 4.2 MMOL/L — SIGNIFICANT CHANGE UP (ref 3.5–5.3)
RBC # BLD: 3.21 M/UL — LOW (ref 3.8–5.2)
RBC # FLD: 13.5 % — SIGNIFICANT CHANGE UP (ref 10.3–14.5)
SODIUM SERPL-SCNC: 145 MMOL/L — SIGNIFICANT CHANGE UP (ref 135–145)
WBC # BLD: 6.1 K/UL — SIGNIFICANT CHANGE UP (ref 3.8–10.5)
WBC # FLD AUTO: 6.1 K/UL — SIGNIFICANT CHANGE UP (ref 3.8–10.5)

## 2017-10-16 PROCEDURE — 99222 1ST HOSP IP/OBS MODERATE 55: CPT

## 2017-10-16 RX ADMIN — CARVEDILOL PHOSPHATE 25 MILLIGRAM(S): 80 CAPSULE, EXTENDED RELEASE ORAL at 05:24

## 2017-10-16 RX ADMIN — PANTOPRAZOLE SODIUM 40 MILLIGRAM(S): 20 TABLET, DELAYED RELEASE ORAL at 05:24

## 2017-10-16 RX ADMIN — Medication 500 MILLIGRAM(S): at 12:07

## 2017-10-16 RX ADMIN — Medication 200 MILLIGRAM(S): at 07:59

## 2017-10-16 RX ADMIN — Medication 25 MILLIGRAM(S): at 13:31

## 2017-10-16 RX ADMIN — Medication 25 MILLIGRAM(S): at 21:27

## 2017-10-16 RX ADMIN — Medication 5 MILLIGRAM(S): at 07:59

## 2017-10-16 RX ADMIN — CARVEDILOL PHOSPHATE 25 MILLIGRAM(S): 80 CAPSULE, EXTENDED RELEASE ORAL at 18:17

## 2017-10-16 RX ADMIN — Medication 25 MILLIGRAM(S): at 05:24

## 2017-10-16 RX ADMIN — Medication 325 MILLIGRAM(S): at 12:07

## 2017-10-16 RX ADMIN — Medication 200 MILLIGRAM(S): at 23:12

## 2017-10-16 RX ADMIN — AMLODIPINE BESYLATE 10 MILLIGRAM(S): 2.5 TABLET ORAL at 07:59

## 2017-10-16 RX ADMIN — Medication 200 MILLIGRAM(S): at 17:08

## 2017-10-16 NOTE — CONSULT NOTE ADULT - SUBJECTIVE AND OBJECTIVE BOX
[facility]  [phone]  NEPHROLOGY      INITIAL CONSULT NOTE  --------------------------------------------------------------------------------  HPI:    58 yo F w/ hx of SLE, systolic CHF (EF 32%) s/p AICD, HTN, CKD III- IV, PE / left leg DVT dx 2009, HIT, CAD (no stents), stage IV sacral ulcer, chronic fox, hx of recurrent pericardial effusion, AAA ~14cm in 12/2016 s/p repair in 2010 w/ aortoiliac stent c/b post-operatively with development of left lower extremity   lymphedema December 2016  pericardial effusion requiring drainage/pericardiocentesis. She had another similar admission in 1/2017 and at that time also had pericardial drain placed. diagnosed with SLE. AAA as of 12/2016 was 14cm. has been following vascular surgeon. she was noticed with not feeling well   pt called and had sob. sent to er.       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)  Calf DVT (Deep Venous Thrombosis) (ICD9 453.42)  Adenomatous Goiter (ICD9 241.9)  Chronic Gout (ICD9 274.02)  LBBB (Left Bundle Branch Block) (ICD9 426.3)  CHF (Congestive Heart Failure) (ICD9 428.0)  Hx of aorta-iliac-femoral bypass  TOP (Termination of Pregnancy)  S/P Dilatation and Curettage  History of Tubal Ligation  s/p AAA (Abdominal Aortic Aneurysm) Repair: 2009 2010  History of Cholecystectomy  S/P Partial Hysterectomy    FAMILY HISTORY:  sister autoimmune     PAST SOCIAL HISTORY: no tobacco     ALLERGIES & MEDICATIONS  --------------------------------------------------------------------------------  Allergies    heparin (Unknown)  losartan (Anaphylaxis)    Intolerances      Standing Inpatient Medications  amLODIPine   Tablet 10 milliGRAM(s) Oral daily  ascorbic acid 500 milliGRAM(s) Oral daily  carvedilol 25 milliGRAM(s) Oral every 12 hours  ferrous    sulfate 325 milliGRAM(s) Oral daily  hydrALAZINE 25 milliGRAM(s) Oral every 8 hours  influenza   Vaccine 0.5 milliLiter(s) IntraMuscular once  labetalol 200 milliGRAM(s) Oral every 8 hours  pantoprazole    Tablet 40 milliGRAM(s) Oral before breakfast  predniSONE   Tablet 5 milliGRAM(s) Oral daily    PRN Inpatient Medications      REVIEW OF SYSTEMS  --------------------------------------------------------------------------------  Gen: + increase  weight changes,+ /chills,   Skin: No rashes  Head/Eyes/Ears/Mouth: No headache; Normal hearing;No sinus pain/discomfort, sore throat  Respiratory: + dyspnea, +cough,- wheezing, -hemoptysis  CV: No chest pain, or palp   GI: No abdominal pain, diarrhea, constipation, nausea, vomiting,   : No increased frequency, dysuria, hematuria, nocturia [ has fox]  MSK: left leg pain left leg edema  Neuro: No dizziness/lightheadedness,  All other systems were reviewed and are negative, except as noted.    VITALS/PHYSICAL EXAM  --------------------------------------------------------------------------------  T(C): 36.8 (10-16-17 @ 12:00), Max: 37.4 (10-15-17 @ 21:29)  HR: 80 (10-16-17 @ 17:05) (68 - 93)  BP: 166/83 (10-16-17 @ 17:05) (148/92 - 168/80)  RR: 18 (10-16-17 @ 12:00) (16 - 20)  SpO2: 96% (10-16-17 @ 12:00) (93% - 96%)  Wt(kg): --        10-15-17 @ 07:01  -  10-16-17 @ 07:00  --------------------------------------------------------  IN: 1320 mL / OUT: 1150 mL / NET: 170 mL    10-16-17 @ 07:01  -  10-16-17 @ 19:31  --------------------------------------------------------  IN: 520 mL / OUT: 450 mL / NET: 70 mL      Physical Exam:  	Gen: alert and oriented. speech coherent   	no jvd supple neck,   	Pulm: decrease bs no rales or ronchi  	CV: RRR, S1S2; no rub  	Abd: +BS, soft, nontender/nondistended  	: No suprapubic tenderness  	LE: Warm, FROM, no clubbing, LLE edema 4+ lymphedema   	    LABS/STUDIES  --------------------------------------------------------------------------------              9.0    6.1   >-----------<  152      [10-16-17 @ 06:13]              29.0     145  |  108  |  55  ----------------------------<  81      [10-16-17 @ 06:13]  4.2   |  24  |  2.78        Ca     8.9     [10-16-17 @ 06:13]      Creatinine Trend:  SCr 2.78 [10-16 @ 06:13]  SCr 2.76 [10-15 @ 06:38]  SCr 2.72 [10-13 @ 05:26]  SCr 2.64 [10-12 @ 05:54]  SCr 2.83 [10-11 @ 18:05]    Urinalysis - [10-11-17 @ 18:44]      Color Yellow / Appearance SL Turbid / SG 1.015 / pH 8.0      Gluc Negative / Ketone Negative  / Bili Negative / Urobili Negative       Blood Negative / Protein >600 / Leuk Est Large / Nitrite Negative      RBC  / WBC 5-10 / Hyaline  / Gran  / Sq Epi  / Non Sq Epi  / Bacteria     Urine Creatinine 58      [10-12-17 @ 07:19]  Urine Protein 204      [10-12-17 @ 07:19]  Urine Sodium 84      [10-12-17 @ 06:31]  Urine Urea Nitrogen 583      [10-12-17 @ 07:19]  Urine Osmolality 468      [10-12-17 @ 06:31]    Iron 48, TIBC --, %sat --      [10-12-17 @ 07:14]  Ferritin 847.0      [10-12-17 @ 07:14]  HbA1c 4.5      [08-09-16 @ 06:49]      KRISTOPHER: titer 1:320, pattern Homogeneous      [01-10-17 @ 12:14]  dsDNA 52      [10-12-17 @ 05:10]  C3 Complement 101      [10-12-17 @ 05:10]  C4 Complement 27      [10-12-17 @ 05:10]

## 2017-10-16 NOTE — CONSULT NOTE ADULT - ASSESSMENT
Impression:  58 yo female with h/o SLE and multiple medical issues as above.  Admitted with SOB and found to have new DVT.  Also with large pericardial effusion.  She is so far refusing anticoagulation.    Rec:   Check CRP  She should probably be on a higher dose of steroids, though it seems the current episode might be related to DVT and not a lupus flare.    I would follow her progress over the next few days, and depending partly on her decision regarding anti coagulation, would consider increasing the dose of steroids.

## 2017-10-16 NOTE — PROGRESS NOTE ADULT - SUBJECTIVE AND OBJECTIVE BOX
MEDICINE, PROGRESS NOTE [phone]    COSTEN, SANDRA 57y [id number]    Patient seen and examined.  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)  Calf DVT (Deep Venous Thrombosis) (ICD9 453.42)  Adenomatous Goiter (ICD9 241.9)  Chronic Gout (ICD9 274.02)  LBBB (Left Bundle Branch Block) (ICD9 426.3)  CHF (Congestive Heart Failure) (ICD9 428.0)  Hx of aorta-iliac-femoral bypass  TOP (Termination of Pregnancy)  S/P Dilatation and Curettage  History of Tubal Ligation  s/p AAA (Abdominal Aortic Aneurysm) Repair: 2009 2010  History of Cholecystectomy  S/P Partial Hysterectomy    MEDICATIONS  (STANDING):  amLODIPine   Tablet 10 milliGRAM(s) Oral daily  ascorbic acid 500 milliGRAM(s) Oral daily  carvedilol 25 milliGRAM(s) Oral every 12 hours  ferrous    sulfate 325 milliGRAM(s) Oral daily  hydrALAZINE 25 milliGRAM(s) Oral every 8 hours  influenza   Vaccine 0.5 milliLiter(s) IntraMuscular once  labetalol 200 milliGRAM(s) Oral every 8 hours  pantoprazole    Tablet 40 milliGRAM(s) Oral before breakfast  predniSONE   Tablet 5 milliGRAM(s) Oral daily    MEDICATIONS  (PRN):    Allergies    heparin (Unknown)  losartan (Anaphylaxis)    Intolerances        PHYSICAL EXAM:  Constitutional: NAD  HEENT: Normocephalic, EOMI  Neck:  No JVD  Respiratory: CTA B/L, No wheezes  Cardiovascular: S1, S2, RRR, + systolic murmur  Gastrointestinal: BS+, soft, NT/ND  Extremities: + left le edema  Neurological: AAOX3, no focal deficits  Psychiatric: Normal mood, normal affect  : No Hook    Vital Signs Last 24 Hrs  T(C): 36.8 (16 Oct 2017 12:00), Max: 37.4 (15 Oct 2017 21:29)  T(F): 98.2 (16 Oct 2017 12:00), Max: 99.3 (15 Oct 2017 21:29)  HR: 93 (16 Oct 2017 12:00) (68 - 93)  BP: 164/85 (16 Oct 2017 12:00) (148/92 - 168/80)  BP(mean): --  RR: 18 (16 Oct 2017 12:00) (16 - 20)  SpO2: 96% (16 Oct 2017 12:00) (93% - 96%)  I&O's Summary    15 Oct 2017 07:01  -  16 Oct 2017 07:00  --------------------------------------------------------  IN: 1320 mL / OUT: 1150 mL / NET: 170 mL    16 Oct 2017 07:01  -  16 Oct 2017 15:02  --------------------------------------------------------  IN: 320 mL / OUT: 450 mL / NET: -130 mL        LABS:                        9.0    6.1   )-----------( 152      ( 16 Oct 2017 06:13 )             29.0     10-16    145  |  108  |  55<H>  ----------------------------<  81  4.2   |  24  |  2.78<H>    Ca    8.9      16 Oct 2017 06:13                REVIEW OF SYSTEMS:      RADIOLOGY & ADDITIONAL STUDIES:      ASSESSMENT/PLAN:

## 2017-10-16 NOTE — CONSULT NOTE ADULT - SUBJECTIVE AND OBJECTIVE BOX
HISTORY OF PRESENT ILLNESS:  58 yo female with a h/o SLE-+dsDNA and serositis have been main manifestations.  She also has multiple medical issues: CHF, Lymphedema of L leg, CKD, DVT/PE.  She presents after developing SOB acutely one week prior to admission; the event resolved somewhat, but recurred the following week and pt came to ED.  She also notes additional leg swelling on L.  +joint pains; wrist and knees.   She has been taking prednisone 5mg/day in the last few months.      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)  Calf DVT (Deep Venous Thrombosis) (ICD9 453.42)  Adenomatous Goiter (ICD9 241.9)  Chronic Gout (ICD9 274.02)  LBBB (Left Bundle Branch Block) (ICD9 426.3)  CHF (Congestive Heart Failure) (ICD9 428.0)  Hx of aorta-iliac-femoral bypass  TOP (Termination of Pregnancy)  S/P Dilatation and Curettage  History of Tubal Ligation  s/p AAA (Abdominal Aortic Aneurysm) Repair: 2009  History of Cholecystectomy  S/P Partial Hysterectomy        MEDICATIONS  (STANDING):  amLODIPine   Tablet 10 milliGRAM(s) Oral daily  ascorbic acid 500 milliGRAM(s) Oral daily  carvedilol 25 milliGRAM(s) Oral every 12 hours  ferrous    sulfate 325 milliGRAM(s) Oral daily  hydrALAZINE 25 milliGRAM(s) Oral every 8 hours  influenza   Vaccine 0.5 milliLiter(s) IntraMuscular once  labetalol 200 milliGRAM(s) Oral every 8 hours  pantoprazole    Tablet 40 milliGRAM(s) Oral before breakfast  predniSONE   Tablet 5 milliGRAM(s) Oral daily    MEDICATIONS  (PRN):      Allergies    heparin (Unknown)  losartan (Anaphylaxis)    Intolerances        PERTINENT MEDICATION HISTORY:    SOCIAL HISTORY:    FAMILY HISTORY:  No pertinent family history in first degree relatives      Vital Signs Last 24 Hrs  T(C): 36.8 (16 Oct 2017 12:00), Max: 37.4 (15 Oct 2017 21:29)  T(F): 98.2 (16 Oct 2017 12:00), Max: 99.3 (15 Oct 2017 21:29)  HR: 80 (16 Oct 2017 17:05) (68 - 93)  BP: 166/83 (16 Oct 2017 17:05) (148/92 - 168/80)  BP(mean): --  RR: 18 (16 Oct 2017 12:00) (16 - 20)  SpO2: 96% (16 Oct 2017 12:00) (93% - 96%)    Daily     Daily Weight in k.5 (16 Oct 2017 04:16)    PHYSICAL EXAM:      Constitutional:  Fair Appearing    Eyes:  EOMI, no redness    ENMT:  Throat clear, no oral ulcers    Neck:  supple, no THU, no masses    Breasts:    Back:    Respiratory:    Cardiovascular:    Gastrointestinal:  Abd soft nt, nd, no masses    Genitourinary:    Rectal:    Extremities:  +severe LLE edema, pitting; t/o the leg    Vascular:    Neurological:  grossly intact    Skin:    Lymph Nodes:    Musculoskeletal:  Decreased ROm of R shoulder, some pain with R knee ROm, no active synovitis    Psychiatric:  appropriate      LABS:                        9.0    6.1   )-----------( 152      ( 16 Oct 2017 06:13 )             29.0     10-16    145  |  108  |  55<H>  ----------------------------<  81  4.2   |  24  |  2.78<H>    Ca    8.9      16 Oct 2017 06:13    dsDNA 52  c3 c4; 101, 27 (normal)          RADIOLOGY & ADDITIONAL STUDIES:    < from: [facility] Duplex Lower Ext Vein Scan, Left (10.12.17 @ 13:29) >    Impression: Limited study. Acute, above the knee DVT in the left common   femoral and femoral veins. The femoral vein at the mid and distal thigh   levels as well as the popliteal vein and calf veins were not visualized   due to limb size and swelling.    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 Nurse   practitioner Murali was informed of the findings following the study on   10/12/2017.    < from: Transthoracic Echocardiogram (10.12.17 @ 12:00) >  Conclusions:  1. Tethered mitral valve leaflets with normal opening.  Moderate mitral regurgitation.  2. Severe left ventricular systolic dysfunction.  Paradoxical septal motion noted.  3. Normal right ventricular size and function. TAPSE 1.8cm  4. Normal tricuspid valve. Mild-moderate tricuspid  regurgitation.  5. Large pericardial effusion seen posterior to the LV and  at RA. The effusion is moderate anterior to the RV free  wall  1cm. The IVC is collapsible. HR 70 /89. No  tamponade seen.  6. Left pleural effusion.  *** Compared with echocardiogram of 2017, the  pericardial effusion is larger.    < end of copied text >    < end of copied text >

## 2017-10-16 NOTE — CONSULT NOTE ADULT - ASSESSMENT
56 yo F w/ hx of SLE, systolic CHF (EF 32%) s/p AICD, HTN, CKD III- IV, PE / left leg DVT dx 2009, HIT, CAD (no stents), stage IV sacral ulcer, chronic fox, hx of recurrent pericardial effusion, AAA ~14cm in 12/2016 s/p repair in 2010 w/ aortoiliac stent c/b post-operatively with development of left lower extremity   lymphedema December 2016;  pericardial effusion pericardial drain/SLE. now with ilana on ckd with baseline creatinine approx 1.8-2.     1- ILANA on ckd III  2- chf hx  3- cardiomyopathy  4- HTN   5- SLE  6- proteinuria    creatinine over time slowly rising.   ? can we dc PPI as they have been implicated in ckd as well however, likely cause is her pericardial effusion   change coreg to increase doses of labetalol perhaps 600mg tid  hold lasix temporarily  to have urine protein/creatinine ratio done.   certainly lupus nephritis can also play a role here however, urinalysis for rbc may be falsely + due to chronic fox.   to have c3/c4 and dsdna

## 2017-10-16 NOTE — PROGRESS NOTE ADULT - SUBJECTIVE AND OBJECTIVE BOX
Subjective: Patient seen and examined. No new events except as noted.   no cp or sob     REVIEW OF SYSTEMS:    CONSTITUTIONAL: + weakness, no fevers or chills  EYES/ENT: No visual changes;  No vertigo or throat pain   NECK: No pain or stiffness  RESPIRATORY: No cough, wheezing, hemoptysis; No shortness of breath  CARDIOVASCULAR: No chest pain or palpitations  GASTROINTESTINAL: No abdominal or epigastric pain. No nausea, vomiting, or hematemesis; No diarrhea or constipation. No melena or hematochezia.  GENITOURINARY: No dysuria, frequency or hematuria  NEUROLOGICAL: No numbness or weakness  SKIN: No itching, burning, rashes, or lesions   All other review of systems is negative unless indicated above.    MEDICATIONS:  MEDICATIONS  (STANDING):  amLODIPine   Tablet 10 milliGRAM(s) Oral daily  ascorbic acid 500 milliGRAM(s) Oral daily  carvedilol 25 milliGRAM(s) Oral every 12 hours  ferrous    sulfate 325 milliGRAM(s) Oral daily  hydrALAZINE 25 milliGRAM(s) Oral every 8 hours  influenza   Vaccine 0.5 milliLiter(s) IntraMuscular once  labetalol 200 milliGRAM(s) Oral every 8 hours  pantoprazole    Tablet 40 milliGRAM(s) Oral before breakfast  predniSONE   Tablet 5 milliGRAM(s) Oral daily      PHYSICAL EXAM:  T(C): 36.7 (10-16-17 @ 04:16), Max: 37.4 (10-15-17 @ 21:29)  HR: 80 (10-16-17 @ 07:55) (68 - 91)  BP: 163/82 (10-16-17 @ 07:55) (137/78 - 168/80)  RR: 16 (10-16-17 @ 07:55) (16 - 20)  SpO2: 93% (10-16-17 @ 07:55) (93% - 95%)  Wt(kg): --  I&O's Summary    15 Oct 2017 07:01  -  16 Oct 2017 07:00  --------------------------------------------------------  IN: 1320 mL / OUT: 1150 mL / NET: 170 mL    16 Oct 2017 07:01  -  16 Oct 2017 12:17  --------------------------------------------------------  IN: 120 mL / OUT: 0 mL / NET: 120 mL          Appearance: Normal	  HEENT:   Normal oral mucosa, PERRL, EOMI	  Lymphatic: No lymphadenopathy , no edema  Cardiovascular: Normal S1 S2, No JVD, No murmurs , Peripheral pulses palpable 2+ bilaterally  Respiratory: Lungs clear to auscultation, normal effort 	  Gastrointestinal:  Soft, Non-tender, + BS	  Skin: No rashes, No ecchymoses, No cyanosis, warm to touch  Musculoskeletal: Normal range of motion, normal strength  Psychiatry:  Mood & affect appropriate  Ext: chronic LLE edema  +fox   +sacral pressure ulcer   LABS:    CARDIAC MARKERS:                                9.0    6.1   )-----------( 152      ( 16 Oct 2017 06:13 )             29.0     10-16    145  |  108  |  55<H>  ----------------------------<  81  4.2   |  24  |  2.78<H>    Ca    8.9      16 Oct 2017 06:13      proBNP:   Lipid Profile:   HgA1c:   TSH:             TELEMETRY: Vpaced 	    ECG:  	  RADIOLOGY:   DIAGNOSTIC TESTING:  [ ] Echocardiogram:  [ ]  Catheterization:  [ ] Stress Test:    OTHER:
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
## 2017-10-16 NOTE — CONSULT NOTE ADULT - ASSESSMENT
A/P:  Rt Ischial Healing chronic stage iv pressure ucler- Aquacel packing, may add prizma if brought from home  Lt buttock possible resolving hematoma= Consider US and warm compresses prn    con't Nutrition (as tolerated)  con't Offloading   con't Pericare  Care as per medicine will follow w/ you  Upon discharge f/[facility] as [facility] at [facility] [address] [place] [address] [phone]  Seen w/ Medical attng and D/w team attng  Thank you for this consult  Tanisha Weathers PA-C [facility] [id number]

## 2017-10-16 NOTE — PROGRESS NOTE ADULT - ASSESSMENT
acute dvt in Left LE  pericardial effusion  sle  rios    continue current care  pt doesn't wish for anticoagulation at present  discussed with Dr. Sandoval, no surgical intervention at this time.  discussed with IR effusion would be difficult for them to drain  await rheum input  await renal input  check us of left gluteus to assess for possible hematoma

## 2017-10-17 LAB
ANION GAP SERPL CALC-SCNC: 15 MMOL/L — SIGNIFICANT CHANGE UP (ref 5–17)
BUN SERPL-MCNC: 56 MG/DL — HIGH (ref 7–23)
C3 SERPL-MCNC: 105 MG/DL — SIGNIFICANT CHANGE UP (ref 80–180)
C4 SERPL-MCNC: 31 MG/DL — SIGNIFICANT CHANGE UP (ref 10–45)
CALCIUM SERPL-MCNC: 8.5 MG/DL — SIGNIFICANT CHANGE UP (ref 8.4–10.5)
CHLORIDE SERPL-SCNC: 106 MMOL/L — SIGNIFICANT CHANGE UP (ref 96–108)
CO2 SERPL-SCNC: 22 MMOL/L — SIGNIFICANT CHANGE UP (ref 22–31)
CREAT SERPL-MCNC: 2.76 MG/DL — HIGH (ref 0.5–1.3)
GLUCOSE SERPL-MCNC: 79 MG/DL — SIGNIFICANT CHANGE UP (ref 70–99)
HCT VFR BLD CALC: 27.4 % — LOW (ref 34.5–45)
HGB BLD-MCNC: 9.1 G/DL — LOW (ref 11.5–15.5)
MAGNESIUM SERPL-MCNC: 2.1 MG/DL — SIGNIFICANT CHANGE UP (ref 1.6–2.6)
MCHC RBC-ENTMCNC: 29.7 PG — SIGNIFICANT CHANGE UP (ref 27–34)
MCHC RBC-ENTMCNC: 33.1 GM/DL — SIGNIFICANT CHANGE UP (ref 32–36)
MCV RBC AUTO: 89.7 FL — SIGNIFICANT CHANGE UP (ref 80–100)
PHOSPHATE SERPL-MCNC: 3.4 MG/DL — SIGNIFICANT CHANGE UP (ref 2.5–4.5)
PLATELET # BLD AUTO: 142 K/UL — LOW (ref 150–400)
POTASSIUM SERPL-MCNC: 5.1 MMOL/L — SIGNIFICANT CHANGE UP (ref 3.5–5.3)
POTASSIUM SERPL-SCNC: 5.1 MMOL/L — SIGNIFICANT CHANGE UP (ref 3.5–5.3)
RBC # BLD: 3.06 M/UL — LOW (ref 3.8–5.2)
RBC # FLD: 13.5 % — SIGNIFICANT CHANGE UP (ref 10.3–14.5)
SODIUM SERPL-SCNC: 143 MMOL/L — SIGNIFICANT CHANGE UP (ref 135–145)
WBC # BLD: 5.8 K/UL — SIGNIFICANT CHANGE UP (ref 3.8–10.5)
WBC # FLD AUTO: 5.8 K/UL — SIGNIFICANT CHANGE UP (ref 3.8–10.5)

## 2017-10-17 RX ADMIN — CARVEDILOL PHOSPHATE 25 MILLIGRAM(S): 80 CAPSULE, EXTENDED RELEASE ORAL at 05:32

## 2017-10-17 RX ADMIN — Medication 25 MILLIGRAM(S): at 05:32

## 2017-10-17 RX ADMIN — Medication 200 MILLIGRAM(S): at 10:24

## 2017-10-17 RX ADMIN — Medication 25 MILLIGRAM(S): at 21:54

## 2017-10-17 RX ADMIN — PANTOPRAZOLE SODIUM 40 MILLIGRAM(S): 20 TABLET, DELAYED RELEASE ORAL at 05:32

## 2017-10-17 RX ADMIN — Medication 5 MILLIGRAM(S): at 10:24

## 2017-10-17 RX ADMIN — Medication 200 MILLIGRAM(S): at 18:42

## 2017-10-17 RX ADMIN — Medication 325 MILLIGRAM(S): at 12:36

## 2017-10-17 RX ADMIN — Medication 500 MILLIGRAM(S): at 12:36

## 2017-10-17 RX ADMIN — CARVEDILOL PHOSPHATE 25 MILLIGRAM(S): 80 CAPSULE, EXTENDED RELEASE ORAL at 19:48

## 2017-10-17 RX ADMIN — Medication 25 MILLIGRAM(S): at 13:17

## 2017-10-17 RX ADMIN — AMLODIPINE BESYLATE 10 MILLIGRAM(S): 2.5 TABLET ORAL at 10:24

## 2017-10-17 NOTE — PROGRESS NOTE ADULT - SUBJECTIVE AND OBJECTIVE BOX
[facility]   [phone]  RENAL FOLLOW UP NOTE  --------------------------------------------------------------------------------  Chief Complaint:    24 hour events/subjective:    c/o cramps and inability to chronically move LLE due to edema     PAST HISTORY  --------------------------------------------------------------------------------  No significant changes to PMH, PSH, FHx, SHx, unless otherwise noted    ALLERGIES & MEDICATIONS  --------------------------------------------------------------------------------  Allergies    heparin (Unknown)  losartan (Anaphylaxis)    Intolerances      Standing Inpatient Medications  amLODIPine   Tablet 10 milliGRAM(s) Oral daily  ascorbic acid 500 milliGRAM(s) Oral daily  carvedilol 25 milliGRAM(s) Oral every 12 hours  ferrous    sulfate 325 milliGRAM(s) Oral daily  hydrALAZINE 25 milliGRAM(s) Oral every 8 hours  influenza   Vaccine 0.5 milliLiter(s) IntraMuscular once  labetalol 200 milliGRAM(s) Oral every 8 hours  pantoprazole    Tablet 40 milliGRAM(s) Oral before breakfast  predniSONE   Tablet 5 milliGRAM(s) Oral daily    PRN Inpatient Medications      REVIEW OF SYSTEMS  --------------------------------------------------------------------------------    Gen: denies  fevers/chills,  CVS: denies chest pain/palpitations  Resp: denies SOB/Cough  GI: Denies N/V/Abd pain but has abd cramps  : Denies dysuria  All other systems were reviewed and are negative, except as noted.    VITALS/PHYSICAL EXAM  --------------------------------------------------------------------------------  T(C): 36.7 (10-17-17 @ 13:07), Max: 36.8 (10-16-17 @ 21:25)  HR: 98 (10-17-17 @ 18:41) (76 - 98)  BP: 153/79 (10-17-17 @ 18:41) (134/80 - 166/93)  RR: 18 (10-17-17 @ 13:07) (18 - 20)  SpO2: 97% (10-17-17 @ 13:07) (94% - 97%)  Wt(kg): --        10-16-17 @ 07:01  -  10-17-17 @ 07:00  --------------------------------------------------------  IN: 810 mL / OUT: 1500 mL / NET: -690 mL    10-17-17 @ 07:01  -  10-17-17 @ 20:25  --------------------------------------------------------  IN: 480 mL / OUT: 400 mL / NET: 80 mL        Physical Exam:  	Gen: alert oriented place person and date   	Pulm: Decreased breath sounds b/l bases. no rales or ronchi or wheezing  	CV: RRR, S1/S2. no rub  	Back: No CVA tenderness; no sacral edema  	Abd: +BS, soft, nontender/nondistended  	: No suprapubic tenderness.               Extremity: No cyanosis, LLE 4+ edema no clubbing  	    LABS/STUDIES  --------------------------------------------------------------------------------              9.1    5.8   >-----------<  142      [10-17-17 @ 06:16]              27.4     143  |  106  |  56  ----------------------------<  79      [10-17-17 @ 06:16]  5.1   |  22  |  2.76        Ca     8.5     [10-17-17 @ 06:16]      Mg     2.1     [10-17-17 @ 06:16]      Phos  3.4     [10-17-17 @ 06:16]            Creatinine Trend:  SCr 2.76 [10-17 @ 06:16]  SCr 2.78 [10-16 @ 06:13]  SCr 2.76 [10-15 @ 06:38]  SCr 2.72 [10-13 @ 05:26]  SCr 2.64 [10-12 @ 05:54]              Urinalysis - [10-11-17 @ 18:44]      Color Yellow / Appearance SL Turbid / SG 1.015 / pH 8.0      Gluc Negative / Ketone Negative  / Bili Negative / Urobili Negative       Blood Negative / Protein >600 / Leuk Est Large / Nitrite Negative      RBC  / WBC 5-10 / Hyaline  / Gran  / Sq Epi  / Non Sq Epi  / Bacteria     Urine Creatinine 58      [10-12-17 @ 07:19]  Urine Protein 204      [10-12-17 @ 07:19]  Urine Sodium 84      [10-12-17 @ 06:31]  Urine Urea Nitrogen 583      [10-12-17 @ 07:19]  Urine Osmolality 468      [10-12-17 @ 06:31]    Iron 48, TIBC --, %sat --      [10-12-17 @ 07:14]  Ferritin 847.0      [10-12-17 @ 07:14]  HbA1c 4.5      [08-09-16 @ 06:49]    dsDNA 52      [10-12-17 @ 05:10]  C3 Complement 105      [10-17-17 @ 08:26]  C4 Complement 31      [10-17-17 @ 08:26]

## 2017-10-17 NOTE — PROGRESS NOTE ADULT - ASSESSMENT
acute dvt in Left LE  pericardial effusion  sle  rios      await us gluteal region  pt refusing a/c still  continue current care

## 2017-10-17 NOTE — PROGRESS NOTE ADULT - ASSESSMENT
56 yo F w/ hx of SLE, systolic CHF (EF 32%) s/p AICD, HTN, CKD III- IV, PE / left leg DVT dx 2009, HIT, CAD (no stents), stage IV sacral ulcer, chronic fox, hx of recurrent pericardial effusion, AAA ~14cm in 12/2016 s/p repair in 2010 w/ aortoiliac stent c/b post-operatively with development of left lower extremity   lymphedema December 2016;  pericardial effusion pericardial drain/SLE. now with ilana on ckd with baseline creatinine approx 1.8-2.     1- ILANA on ckd III  2- chf hx  3- cardiomyopathy  4- HTN   5- SLE  6- proteinuria    creatinine still elevated. has pericardial effusion as well.   cont with B-B   hyrdalazine to cont.   prednisone 5mg daily   nephrotic syndrome. pt has not tolerated ARB in past will attempt again if pt agrees.

## 2017-10-17 NOTE — PROGRESS NOTE ADULT - SUBJECTIVE AND OBJECTIVE BOX
MEDICINE, PROGRESS NOTE [phone]    COSTEN, SANDRA 57y [id number]    Patient seen and examined.  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)  Calf DVT (Deep Venous Thrombosis) (ICD9 453.42)  Adenomatous Goiter (ICD9 241.9)  Chronic Gout (ICD9 274.02)  LBBB (Left Bundle Branch Block) (ICD9 426.3)  CHF (Congestive Heart Failure) (ICD9 428.0)  Hx of aorta-iliac-femoral bypass  TOP (Termination of Pregnancy)  S/P Dilatation and Curettage  History of Tubal Ligation  s/p AAA (Abdominal Aortic Aneurysm) Repair: 2009 2010  History of Cholecystectomy  S/P Partial Hysterectomy    MEDICATIONS  (STANDING):  amLODIPine   Tablet 10 milliGRAM(s) Oral daily  ascorbic acid 500 milliGRAM(s) Oral daily  carvedilol 25 milliGRAM(s) Oral every 12 hours  ferrous    sulfate 325 milliGRAM(s) Oral daily  hydrALAZINE 25 milliGRAM(s) Oral every 8 hours  influenza   Vaccine 0.5 milliLiter(s) IntraMuscular once  labetalol 200 milliGRAM(s) Oral every 8 hours  pantoprazole    Tablet 40 milliGRAM(s) Oral before breakfast  predniSONE   Tablet 5 milliGRAM(s) Oral daily    MEDICATIONS  (PRN):    Allergies    heparin (Unknown)  losartan (Anaphylaxis)    Intolerances        PHYSICAL EXAM:  Constitutional: NAD  HEENT: Normocephalic, EOMI  Neck:  No JVD  Respiratory: CTA B/L, No wheezes  Cardiovascular: S1, S2, RRR, + systolic murmur  Gastrointestinal: BS+, soft, NT/ND  Extremities: + peripheral edema b/l le  Neurological: AAOX3, no focal deficits  Psychiatric: Normal mood, normal affect  : No Hook    Vital Signs Last 24 Hrs  T(C): 36.7 (17 Oct 2017 13:07), Max: 36.8 (16 Oct 2017 21:25)  T(F): 98 (17 Oct 2017 13:07), Max: 98.2 (16 Oct 2017 21:25)  HR: 98 (17 Oct 2017 18:41) (76 - 98)  BP: 153/79 (17 Oct 2017 18:41) (134/80 - 166/93)  BP(mean): --  RR: 18 (17 Oct 2017 13:07) (18 - 20)  SpO2: 97% (17 Oct 2017 13:07) (94% - 97%)  I&O's Summary    16 Oct 2017 07:01  -  17 Oct 2017 07:00  --------------------------------------------------------  IN: 810 mL / OUT: 1500 mL / NET: -690 mL    17 Oct 2017 07:01  -  17 Oct 2017 21:12  --------------------------------------------------------  IN: 480 mL / OUT: 400 mL / NET: 80 mL        LABS:                        9.1    5.8   )-----------( 142      ( 17 Oct 2017 06:16 )             27.4     10-17    143  |  106  |  56<H>  ----------------------------<  79  5.1   |  22  |  2.76<H>    Ca    8.5      17 Oct 2017 06:16  Phos  3.4     10-17  Mg     2.1     10-17          Magnesium, Serum: 2.1 mg/dL (10-17 @ 06:16)

## 2017-10-17 NOTE — CHART NOTE - NSCHARTNOTEFT_GEN_A_CORE
COSTEN, SANDRA    Notified by RN patient with refused ultrasound to pelvic area to rule out hematoma   patient informed the important of ultrasound and still refused     58 yo F w/ hx of SLE, systolic CHF (EF 32%) s/p AICD, HTN, CKD III- IV, PE / left leg DVT dx 2009, HIT, CAD (no stents), stage IV sacral ulcer, chronic fox, hx of recurrent pericardial effusion, AAA ~14cm in 12/2016 s/p repair in 2010 w/ aortoiliac stent c/b post-operatively with development of left lower extremity compartment syndrome presents with 1-wk history of progressive shortness of breath.  Patient was sleep this morning when she woke up with sudden onset of dyspnea. She had similar episode about 1-wk ago which resolved on its own. This time she felt more clammy and diaphoretic. She also had epigastric pain that was 6/10, non-radiating, described as burning quality, with no relief w/ positional change. She called her PCP who instructed her to come to ED. Patient denies any URI symptoms, fever, pleurisy, chills, coughs, nausea, vomiting. She reports a remote history of DVT/PE in 2009 and was treated initially w/ heparin and developed HIT and warfarin for anticoagulation. The warfarin was discontinued because of what patient reports was life threatening epistaxis. Unclear how long she was on anticoagulation. She also reports this morning her daughter noticed her left leg was much more swollen. Patient at baseline has chronic left leg edema in setting of her prior fasciotomy for compartment syndrome but this time it is more swollen.   Patient had hospital admission in December 2016 for pericardial effusion requiring drainage/pericardiocentesis. She had another similar admission in 1/2017 and at that time also had pericardial drain placed. During that time she was diagnosed with SLE. She been taking prednisone 5mg once daily and is not sure if this is similar to her SLE flare. She denies visual changes, aphthous ulcers, joint pains, and rashes.   Patient reports her last AICD interrogation was about 2-mo ago and reportedly normal. She denies any dysuria and urinary symptoms but has chronic fox. She normally uses wheelchair for transferring and has had no syncope or falls. She has chronic sacral ulcer stage IV but denies any drainage from that site.   Of note patient had hemoglobin of 12 in 1/2017 and now is 9.8. She reports no melena or hematochezia. She reports taking no NSAIDs and reports no vaginal bleeding. She takes iron therapy. She does not follow up with primary prevention screening. In terms of the AAA patient reports she last had visit with her vascular surgeon about 3mo ago and was told everything was stable. She does not know size of AAA but as of 12/2016 imaging it was 14cm    In ED: VS: T98s, HR 70-80, -150/ 70-80, 16, 97% 2L NC. (11 Oct 2017 22:01)  Vital Signs Last 24 Hrs  T(C): 36.7 (10-17-17 @ 13:07), Max: 36.8 (10-16-17 @ 21:25)  T(F): 98 (10-17-17 @ 13:07), Max: 98.2 (10-16-17 @ 21:25)  HR: 77 (10-17-17 @ 13:07) (76 - 93)  BP: 134/80 (10-17-17 @ 13:07) (134/80 - 166/93)  BP(mean): --  RR: 18 (10-17-17 @ 13:07) (18 - 20)  SpO2: 97% (10-17-17 @ 13:07) (94% - 97%)                        9.1    5.8   )-----------( 142      ( 17 Oct 2017 06:16 )             27.4   10-17    143  |  106  |  56<H>  ----------------------------<  79  5.1   |  22  |  2.76<H>    Ca    8.5      17 Oct 2017 06:16  Phos  3.4     10-17  Mg     2.1     10-17        Interventions taken   Will Reschedule test   discussed with Dr Chicas   will continue to monitor                       Kristen QUINTERO

## 2017-10-17 NOTE — PROGRESS NOTE ADULT - SUBJECTIVE AND OBJECTIVE BOX
Subjective: Patient seen and examined. No new events except as noted.   sat in chair yesterday  no cp or sob       REVIEW OF SYSTEMS:    CONSTITUTIONAL: + weakness, no fevers or chills  EYES/ENT: No visual changes;  No vertigo or throat pain   NECK: No pain or stiffness  RESPIRATORY: No cough, wheezing, hemoptysis; No shortness of breath  CARDIOVASCULAR: No chest pain or palpitations  GASTROINTESTINAL: No abdominal or epigastric pain. No nausea, vomiting, or hematemesis; No diarrhea or constipation. No melena or hematochezia.  GENITOURINARY: No dysuria, frequency or hematuria  NEUROLOGICAL: No numbness or weakness  SKIN: No itching, burning, rashes, + sacral pressure ulcer  All other review of systems is negative unless indicated above.    MEDICATIONS:  MEDICATIONS  (STANDING):  amLODIPine   Tablet 10 milliGRAM(s) Oral daily  ascorbic acid 500 milliGRAM(s) Oral daily  carvedilol 25 milliGRAM(s) Oral every 12 hours  ferrous    sulfate 325 milliGRAM(s) Oral daily  hydrALAZINE 25 milliGRAM(s) Oral every 8 hours  influenza   Vaccine 0.5 milliLiter(s) IntraMuscular once  labetalol 200 milliGRAM(s) Oral every 8 hours  pantoprazole    Tablet 40 milliGRAM(s) Oral before breakfast  predniSONE   Tablet 5 milliGRAM(s) Oral daily      PHYSICAL EXAM:  T(C): 36.7 (10-17-17 @ 05:07), Max: 36.8 (10-16-17 @ 12:00)  HR: 81 (10-17-17 @ 09:03) (76 - 93)  BP: 152/83 (10-17-17 @ 09:03) (152/83 - 166/93)  RR: 18 (10-17-17 @ 05:07) (18 - 20)  SpO2: 94% (10-17-17 @ 05:07) (94% - 96%)  Wt(kg): --  I&O's Summary    16 Oct 2017 07:01  -  17 Oct 2017 07:00  --------------------------------------------------------  IN: 810 mL / OUT: 1500 mL / NET: -690 mL          Appearance: Normal	  HEENT:   Normal oral mucosa, PERRL, EOMI	  Lymphatic: No lymphadenopathy , no edema  Cardiovascular: Normal S1 S2, No JVD, No murmurs  Respiratory: Lungs clear to auscultation, normal effort 	  Gastrointestinal:  Soft, Non-tender, + BS	  Skin: +sacral pressure ulcer  Musculoskeletal: decreased range of motion, decreased  strength  Psychiatry:  Mood & affect appropriate  Ext: chronic LLE edema  +fox     LABS:    CARDIAC MARKERS:                                9.1    5.8   )-----------( 142      ( 17 Oct 2017 06:16 )             27.4     10-17    143  |  106  |  56<H>  ----------------------------<  79  5.1   |  22  |  2.76<H>    Ca    8.5      17 Oct 2017 06:16  Phos  3.4     10-17  Mg     2.1     10-17      proBNP:   Lipid Profile:   HgA1c:   TSH:             TELEMETRY: 	Vpaced     ECG:  	  RADIOLOGY:   DIAGNOSTIC TESTING:  [ ] Echocardiogram:  [ ]  Catheterization:  [ ] Stress Test:    OTHER:

## 2017-10-17 NOTE — PROGRESS NOTE ADULT - ASSESSMENT
58 y/o female PMHx chronic systolic CHF (last known EF=32%) s/p AICD, SLE, AAA approx 14cm as of 12/2016 s/p aortoiliac stent, DVT/PE (not currently anticoagulated 2/2 HIT), h/o LLE compartment syndrome, multiple pericardial effusions s/p drainage most recently in Jan 2017 now presents with recurrent large pericardial effusion     1. Large pericardial effusion without tamponade.  	Likely chronic effusion, h/o SLE              Rheumatology consult reviewed    2. CT surgery recommended no surgical intervention and drainage by interventional means   Interventional Cardiology  and Vascular recommending radiologic guidance given the nature of the effusion - appears loculated). Will again discuss the possibility and feasibility of interventional approach with interventional radiolology    3. Venous LE Doppler positive for DVT but likely of chronic nature. Pt would need A/C for this except for the contraindication with pericardial effusion and possible conversion to hemorrhage. In addition, an IVC filter is contraindicated in this patient due to the anatomy and dilatation of the IVC (per my discussion with Vasc. Surg. Dr. Tavares)     Therefore, at this point, a less invasive approach will be taken, possibly medically treating her without intervention (at least not in the short-term).  She will continue to be observed prior to making any determinations as to the appropriate approach to the pericardial effusion, if at all.

## 2017-10-18 LAB
ANION GAP SERPL CALC-SCNC: 12 MMOL/L — SIGNIFICANT CHANGE UP (ref 5–17)
BUN SERPL-MCNC: 59 MG/DL — HIGH (ref 7–23)
CALCIUM SERPL-MCNC: 8.8 MG/DL — SIGNIFICANT CHANGE UP (ref 8.4–10.5)
CHLORIDE SERPL-SCNC: 107 MMOL/L — SIGNIFICANT CHANGE UP (ref 96–108)
CO2 SERPL-SCNC: 25 MMOL/L — SIGNIFICANT CHANGE UP (ref 22–31)
CREAT ?TM UR-MCNC: 81 MG/DL — SIGNIFICANT CHANGE UP
CREAT SERPL-MCNC: 2.81 MG/DL — HIGH (ref 0.5–1.3)
DSDNA AB SER-ACNC: 44 IU/ML — HIGH
GLUCOSE SERPL-MCNC: 82 MG/DL — SIGNIFICANT CHANGE UP (ref 70–99)
HCT VFR BLD CALC: 28.1 % — LOW (ref 34.5–45)
HGB BLD-MCNC: 9.4 G/DL — LOW (ref 11.5–15.5)
MCHC RBC-ENTMCNC: 30 PG — SIGNIFICANT CHANGE UP (ref 27–34)
MCHC RBC-ENTMCNC: 33.3 GM/DL — SIGNIFICANT CHANGE UP (ref 32–36)
MCV RBC AUTO: 89.9 FL — SIGNIFICANT CHANGE UP (ref 80–100)
PLATELET # BLD AUTO: 150 K/UL — SIGNIFICANT CHANGE UP (ref 150–400)
POTASSIUM SERPL-MCNC: 4.5 MMOL/L — SIGNIFICANT CHANGE UP (ref 3.5–5.3)
POTASSIUM SERPL-SCNC: 4.5 MMOL/L — SIGNIFICANT CHANGE UP (ref 3.5–5.3)
PROT ?TM UR-MCNC: 254 MG/DL — HIGH (ref 0–12)
PROT/CREAT UR-RTO: 3.1 RATIO — HIGH (ref 0–0.2)
RBC # BLD: 3.13 M/UL — LOW (ref 3.8–5.2)
RBC # FLD: 13.6 % — SIGNIFICANT CHANGE UP (ref 10.3–14.5)
SODIUM SERPL-SCNC: 144 MMOL/L — SIGNIFICANT CHANGE UP (ref 135–145)
WBC # BLD: 5.6 K/UL — SIGNIFICANT CHANGE UP (ref 3.8–10.5)
WBC # FLD AUTO: 5.6 K/UL — SIGNIFICANT CHANGE UP (ref 3.8–10.5)

## 2017-10-18 PROCEDURE — 71250 CT THORAX DX C-: CPT | Mod: 26

## 2017-10-18 PROCEDURE — 76881 US COMPL JOINT R-T W/IMG: CPT | Mod: 26,LT

## 2017-10-18 RX ADMIN — CARVEDILOL PHOSPHATE 25 MILLIGRAM(S): 80 CAPSULE, EXTENDED RELEASE ORAL at 09:57

## 2017-10-18 RX ADMIN — CARVEDILOL PHOSPHATE 25 MILLIGRAM(S): 80 CAPSULE, EXTENDED RELEASE ORAL at 20:03

## 2017-10-18 RX ADMIN — Medication 200 MILLIGRAM(S): at 10:44

## 2017-10-18 RX ADMIN — Medication 500 MILLIGRAM(S): at 12:31

## 2017-10-18 RX ADMIN — Medication 25 MILLIGRAM(S): at 14:58

## 2017-10-18 RX ADMIN — Medication 10 MILLIGRAM(S): at 09:57

## 2017-10-18 RX ADMIN — Medication 325 MILLIGRAM(S): at 12:31

## 2017-10-18 RX ADMIN — PANTOPRAZOLE SODIUM 40 MILLIGRAM(S): 20 TABLET, DELAYED RELEASE ORAL at 06:15

## 2017-10-18 RX ADMIN — Medication 25 MILLIGRAM(S): at 06:16

## 2017-10-18 RX ADMIN — Medication 200 MILLIGRAM(S): at 00:35

## 2017-10-18 RX ADMIN — Medication 200 MILLIGRAM(S): at 17:07

## 2017-10-18 RX ADMIN — AMLODIPINE BESYLATE 10 MILLIGRAM(S): 2.5 TABLET ORAL at 09:57

## 2017-10-18 RX ADMIN — Medication 25 MILLIGRAM(S): at 22:03

## 2017-10-18 RX ADMIN — Medication 10 MILLIGRAM(S): at 17:07

## 2017-10-18 RX ADMIN — Medication 200 MILLIGRAM(S): at 23:29

## 2017-10-18 NOTE — CHART NOTE - NSCHARTNOTEFT_GEN_A_CORE
Notify by RN patient with 2 beats of Wide complex   Seen and Examined patient Asymptomatic  Denies chest pain, Sob, palpitation, dizziness and nausea     Vital Signs Last 24 Hrs  T(C): 36.7 (10-18-17 @ 12:20), Max: 36.9 (10-18-17 @ 09:30)  T(F): 98.1 (10-18-17 @ 12:20), Max: 98.4 (10-18-17 @ 09:30)  HR: 82 (10-18-17 @ 16:59) (80 - 98)  BP: 135/73 (10-18-17 @ 16:59) (135/73 - 160/76)  BP(mean): --  RR: 18 (10-18-17 @ 14:56) (18 - 18)  SpO2: 96% (10-18-17 @ 14:56) (94% - 97%)                          9.4    5.6   )-----------( 150      ( 18 Oct 2017 06:57 )             28.1   10-18    144  |  107  |  59<H>  ----------------------------<  82  4.5   |  25  |  2.81<H>    Ca    8.8      18 Oct 2017 06:57  Phos  3.4     10-17  Mg     2.1     10-17    Intervention  Continue to monitor magnesium, phosphate, Bmp   monitor Vital sign   continue to monitor

## 2017-10-18 NOTE — CHART NOTE - NSCHARTNOTEFT_GEN_A_CORE
56 Y/O female large pericardial effusion, left leg edema chronic, ILANA on CKD, volume overload, SLE, with new DVT.      Source: Patient [X]    Family [ ]     other [X]: RN, NP, Medical Chart    Diet : DASH/TLC       Patient reports [ ] nausea  [ ] vomiting [ ] diarrhea [ ] constipation  [ ]chewing problems [ ] swallowing issues  [ ] other: Pt denies GI distress at this time. Last BM 10/18 per pt.     PO intake:  < 50% [ ] 50-75% [ ]   % [ ]  other :Pt with 100% po intake of meals. RD observed 100% of lunched consumed.     Source for PO intake [X] Patient [ ] family [ ] chart [ ] staff [ ] other     Enteral /Parenteral Nutrition: N/A      Current Weight: Weight (kg): (10/11) 126.6kg, (10/17) 123.2kg; pt noted with 1+ edema right leg and right food, 2+ edema left leg and left arm; monitor wt trends  % Weight Change    Pertinent Medications: MEDICATIONS  (STANDING):  amLODIPine   Tablet 10 milliGRAM(s) Oral daily  ascorbic acid 500 milliGRAM(s) Oral daily  carvedilol 25 milliGRAM(s) Oral every 12 hours  ferrous    sulfate 325 milliGRAM(s) Oral daily  hydrALAZINE 25 milliGRAM(s) Oral every 8 hours  influenza   Vaccine 0.5 milliLiter(s) IntraMuscular once  labetalol 200 milliGRAM(s) Oral every 8 hours  pantoprazole    Tablet 40 milliGRAM(s) Oral before breakfast  predniSONE   Tablet 10 milliGRAM(s) Oral two times a day    MEDICATIONS  (PRN):    Pertinent Labs:  10-18 Na144 mmol/L Glu 82 mg/dL K+ 4.5 mmol/L Cr  2.81 mg/dL<H> BUN 59 mg/dL<H> Phos n/a   Alb n/a   PAB n/a         Skin: stage 4 pressure injury of ischium    Estimated Needs:   [X] no change since previous assessment  [ ] recalculated:       Previous Nutrition Diagnosis:    [ ] Inadequate Energy Intake [ ]Inadequate Oral Intake [ ] Excessive Energy Intake     [ ] Underweight [X] Increased Nutrient Needs (protein) [ ] Overweight/Obesity     [ ] Altered GI Function [ ] Unintended Weight Loss [ ] Food & Nutrition Related Knowledge Deficit [ ] Malnutrition          Nutrition Diagnosis is [X] ongoing          New Nutrition Diagnosis: [X] not applicable    [ ] Inadequate Protein Energy Intake [ ]Inadequate Oral Intake [ ] Excessive Energy Intake     [ ] Underweight [ ] Increased Nutrient Needs [ ] Overweight/Obesity     [ ] Altered GI Function [ ] Unintended Weight Loss [ ] Food & Nutrition Related Knowledge Deficit[ ] Limited Adherence to nutrition related recommendations [ ] Malnutrition  [ ] other: Free text       Related to:      As evidenced by:      Interventions: Discussed with pt the importance of po intake, consumption of meals to meet increased nutrient needs, optimization of intake during periods of increased appetite, and consuming small frequent healthy balanced meals.     Recommend:     [X] Change Diet To: Continue DASH/TLC. Discussed with NP.    [ ] Nutrition Supplement    [ ] Nutrition Support    [ ] Other:        Monitoring and Evaluation:     [X] PO intake [X] Tolerance to diet prescription [X] weights [X] follow up per protocol    [X] other: RD remains available as needed and per follow-up protocol. Kristen Lora MS, RDN, CDN Pager # [phone]

## 2017-10-18 NOTE — PROGRESS NOTE ADULT - ASSESSMENT
Imp: 56 yo female with h/o SLE; serositis and +serology.  A/W SOB and found to have new DVT.  She has persistent pericardial effusion and now c/o joint pains.  Probably some degree of SLE activity.    Rec:  Increase prednisone to 10mg bid; monitor response and titrate dose depending on response.  Discussed a/c with pt; she is very hesitant due to h/o nose bleeds and other bleeding issues in the past.

## 2017-10-18 NOTE — PROGRESS NOTE ADULT - SUBJECTIVE AND OBJECTIVE BOX
MEDICINE, PROGRESS NOTE [phone]    COSTEN, SANDRA 57y [id number]    Patient seen and examined.  Patient is a 57y old  Female who presents with a chief complaint of shortness of breath (11 Oct 2017 22:01)  Pt has no new complaints. Her leg is still causing pain.    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)  Calf DVT (Deep Venous Thrombosis) (ICD9 453.42)  Adenomatous Goiter (ICD9 241.9)  Chronic Gout (ICD9 274.02)  LBBB (Left Bundle Branch Block) (ICD9 426.3)  CHF (Congestive Heart Failure) (ICD9 428.0)  Hx of aorta-iliac-femoral bypass  TOP (Termination of Pregnancy)  S/P Dilatation and Curettage  History of Tubal Ligation  s/p AAA (Abdominal Aortic Aneurysm) Repair: 2009 2010  History of Cholecystectomy  S/P Partial Hysterectomy    MEDICATIONS  (STANDING):  amLODIPine   Tablet 10 milliGRAM(s) Oral daily  ascorbic acid 500 milliGRAM(s) Oral daily  carvedilol 25 milliGRAM(s) Oral every 12 hours  ferrous    sulfate 325 milliGRAM(s) Oral daily  hydrALAZINE 25 milliGRAM(s) Oral every 8 hours  influenza   Vaccine 0.5 milliLiter(s) IntraMuscular once  labetalol 200 milliGRAM(s) Oral every 8 hours  pantoprazole    Tablet 40 milliGRAM(s) Oral before breakfast  predniSONE   Tablet 10 milliGRAM(s) Oral two times a day    MEDICATIONS  (PRN):    Allergies    heparin (Unknown)  losartan (Anaphylaxis)    Intolerances        PHYSICAL EXAM:  Constitutional: NAD  HEENT: Normocephalic, EOMI  Neck:  No JVD  Respiratory: CTA B/L, No wheezes  Cardiovascular: S1, S2, RRR, + systolic murmur  Gastrointestinal: BS+, soft, NT/ND  Extremities: + peripheral edema le b/l Left significantly larger than right  Neurological: AAOX3, no focal deficits  Psychiatric: Normal mood, normal affect  :  Hook    Vital Signs Last 24 Hrs  T(C): 36.7 (18 Oct 2017 12:20), Max: 36.9 (18 Oct 2017 09:30)  T(F): 98.1 (18 Oct 2017 12:20), Max: 98.4 (18 Oct 2017 09:30)  HR: 85 (18 Oct 2017 12:20) (81 - 98)  BP: 153/82 (18 Oct 2017 12:20) (142/88 - 159/85)  BP(mean): --  RR: 18 (18 Oct 2017 12:20) (18 - 18)  SpO2: 94% (18 Oct 2017 12:20) (94% - 97%)  I&O's Summary    17 Oct 2017 07:01  -  18 Oct 2017 07:00  --------------------------------------------------------  IN: 850 mL / OUT: 400 mL / NET: 450 mL    18 Oct 2017 07:01  -  18 Oct 2017 14:39  --------------------------------------------------------  IN: 300 mL / OUT: 850 mL / NET: -550 mL        LABS:                        9.4    5.6   )-----------( 150      ( 18 Oct 2017 06:57 )             28.1     10-18    144  |  107  |  59<H>  ----------------------------<  82  4.5   |  25  |  2.81<H>    Ca    8.8      18 Oct 2017 06:57  Phos  3.4     10-17  Mg     2.1     10-17

## 2017-10-18 NOTE — PROGRESS NOTE ADULT - ASSESSMENT
Large pericardial effusion  LEFT leg edema chronic but now with dvt  abbi on ckd3  volume overload  sle    appreciate rheum/cardio/renal input  discussed with IR will recheck ct scan of chest to assess effusion  pt is refusing anticoagulation still given traumatic events ion past on a/c  pt not a candidate for ivcf per vascular  titrate up steroids per rheum  monitor labs and h/h  pts overall prognosis appears poor given all her comorbid conditions, pt doesn't want to speak with palliative care at this time

## 2017-10-18 NOTE — PROGRESS NOTE ADULT - SUBJECTIVE AND OBJECTIVE BOX
Subjective: Patient seen and examined. No new events except as noted.   lethargic and sleepy   REVIEW OF SYSTEMS:    CONSTITUTIONAL: + weakness, fevers or chills  EYES/ENT: No visual changes;  No vertigo or throat pain   NECK: No pain or stiffness  RESPIRATORY: No cough, wheezing, hemoptysis; No shortness of breath  CARDIOVASCULAR: No chest pain or palpitations  GASTROINTESTINAL: No abdominal or epigastric pain. No nausea, vomiting, or hematemesis; No diarrhea or constipation. No melena or hematochezia.  GENITOURINARY: No dysuria, frequency or hematuria  NEUROLOGICAL: No numbness or weakness  SKIN: +sacral pressure ulcer   All other review of systems is negative unless indicated above.    MEDICATIONS:  MEDICATIONS  (STANDING):  amLODIPine   Tablet 10 milliGRAM(s) Oral daily  ascorbic acid 500 milliGRAM(s) Oral daily  carvedilol 25 milliGRAM(s) Oral every 12 hours  ferrous    sulfate 325 milliGRAM(s) Oral daily  hydrALAZINE 25 milliGRAM(s) Oral every 8 hours  influenza   Vaccine 0.5 milliLiter(s) IntraMuscular once  labetalol 200 milliGRAM(s) Oral every 8 hours  pantoprazole    Tablet 40 milliGRAM(s) Oral before breakfast  predniSONE   Tablet 5 milliGRAM(s) Oral daily      PHYSICAL EXAM:  T(C): 36.8 (10-18-17 @ 00:06), Max: 36.8 (10-17-17 @ 21:39)  HR: 81 (10-18-17 @ 00:06) (77 - 98)  BP: 150/85 (10-18-17 @ 00:06) (134/80 - 159/82)  RR: 18 (10-18-17 @ 00:06) (18 - 18)  SpO2: 96% (10-18-17 @ 00:06) (96% - 97%)  Wt(kg): --  I&O's Summary    16 Oct 2017 07:01  -  17 Oct 2017 07:00  --------------------------------------------------------  IN: 810 mL / OUT: 1500 mL / NET: -690 mL    17 Oct 2017 07:01  -  18 Oct 2017 05:47  --------------------------------------------------------  IN: 700 mL / OUT: 400 mL / NET: 300 mL          Appearance: Normal	  HEENT:   Normal oral mucosa, PERRL, EOMI	  Lymphatic: No lymphadenopathy , no edema  Cardiovascular: Normal S1 S2, No JVD, No murmurs ,   Respiratory: Lungs clear to auscultation, normal effort 	  Gastrointestinal:  Soft, Non-tender, + BS	  Skin+ sacral pressure ulcer, +gluteal fluctuant mass  Musculoskeletal: Normal range of motion, normal strength  Psychiatry:  Mood & affect appropriate  Ext: chronic LLE edema      LABS:    CARDIAC MARKERS:                                9.1    5.8   )-----------( 142      ( 17 Oct 2017 06:16 )             27.4     10-17    143  |  106  |  56<H>  ----------------------------<  79  5.1   |  22  |  2.76<H>    Ca    8.5      17 Oct 2017 06:16  Phos  3.4     10-17  Mg     2.1     10-17      proBNP:   Lipid Profile:   HgA1c:   TSH:             TELEMETRY: Vpaced    ECG:  	  RADIOLOGY:   DIAGNOSTIC TESTING:  [ ] Echocardiogram:  [ ]  Catheterization:  [ ] Stress Test:    OTHER: 	    < from: [facility] Duplex Lower Ext Vein Scan, Left (10.12.17 @ 13:29) >    INTERPRETATION:  Clinical information: 57-year-old with left lower   extremity swelling and pain for one week with shortness of breath.   History of pulmonary embolism. Assess for DVT.    Extremely limited examination of the left lower extremity was obtained   due to large body habitus and severe leg swelling.    Free floating thrombus is identified in the left common femoral vein   which is not compressible. Occlusive thrombus is seen in the left femoral   vein at the proximal thigh level. The femoral vein at the mid and distal   thigh levels as well as the popliteal vein and the calf veins were not   visualized.    Again noted is a left popliteal artery aneurysm, measuring 2.6 cm in   greatest diameter. A moderate amount of intraluminal thrombus is seen. A   patent lumen is noted.    Impression: Limited study. Acute, above the knee DVT in the left common   femoral and femoral veins. The femoral vein at the mid and distal thigh   levels as well as the popliteal vein and calf veins were not visualized   due to limb size and swelling.    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 Nurse   practitioner Murali was informed of the findings following the study on   10/12/2017.

## 2017-10-18 NOTE — CHART NOTE - NSCHARTNOTEFT_GEN_A_CORE
Note Ultrasound with left gluteal hematoma   Discussed with Dr Chicas CBC in am   continue to monitor   endorse to night shift   58 yo F w/ hx of SLE, systolic CHF (EF 32%) s/p AICD, HTN, CKD III- IV, PE / left leg DVT dx 2009, HIT, CAD (no stents), stage IV sacral ulcer, chronic fox, hx of recurrent pericardial effusion, AAA ~14cm in 12/2016 s/p repair in 2010 w/ aortoiliac stent c/b post-operatively with development of left lower extremity compartment syndrome presents with 1-wk history of progressive shortness of breath.  Patient was sleep this morning when she woke up with sudden onset of dyspnea. She had similar episode about 1-wk ago which resolved on its own. This time she felt more clammy and diaphoretic. She also had epigastric pain that was 6/10, non-radiating, described as burning quality, with no relief w/ positional change. She called her PCP who instructed her to come to ED. Patient denies any URI symptoms, fever, pleurisy, chills, coughs, nausea, vomiting. She reports a remote history of DVT/PE in 2009 and was treated initially w/ heparin and developed HIT and warfarin for anticoagulation. The warfarin was discontinued because of what patient reports was life threatening epistaxis. Unclear how long she was on anticoagulation. She also reports this morning her daughter noticed her left leg was much more swollen. Patient at baseline has chronic left leg edema in setting of her prior fasciotomy for compartment syndrome but this time it is more swollen.   Patient had hospital admission in December 2016 for pericardial effusion requiring drainage/pericardiocentesis. She had another similar admission in 1/2017 and at that time also had pericardial drain placed. During that time she was diagnosed with SLE. She been taking prednisone 5mg once daily and is not sure if this is similar to her SLE flare. She denies visual changes, aphthous ulcers, joint pains, and rashes.   Patient reports her last AICD interrogation was about 2-mo ago and reportedly normal. She denies any dysuria and urinary symptoms but has chronic fox. She normally uses wheelchair for transferring and has had no syncope or falls. She has chronic sacral ulcer stage IV but denies any drainage from that site.   Of note patient had hemoglobin of 12 in 1/2017 and now is 9.8. She reports no melena or hematochezia. She reports taking no NSAIDs and reports no vaginal bleeding. She takes iron therapy. She does not follow up with primary prevention screening. In terms of the AAA patient reports she last had visit with her vascular surgeon about 3mo ago and was told everything was stable. She does not know size of AAA but as of 12/2016 imaging it was 14cm

## 2017-10-18 NOTE — PROGRESS NOTE ADULT - SUBJECTIVE AND OBJECTIVE BOX
INTERVAL HPI/OVERNIGHT EVENTS:  Pt denies CP, SOB improving.  She is c/o joint pains; shivani hands, +cramping.    MEDICATIONS  (STANDING):  amLODIPine   Tablet 10 milliGRAM(s) Oral daily  ascorbic acid 500 milliGRAM(s) Oral daily  carvedilol 25 milliGRAM(s) Oral every 12 hours  ferrous    sulfate 325 milliGRAM(s) Oral daily  hydrALAZINE 25 milliGRAM(s) Oral every 8 hours  influenza   Vaccine 0.5 milliLiter(s) IntraMuscular once  labetalol 200 milliGRAM(s) Oral every 8 hours  pantoprazole    Tablet 40 milliGRAM(s) Oral before breakfast  predniSONE   Tablet 5 milliGRAM(s) Oral daily    MEDICATIONS  (PRN):      Allergies    heparin (Unknown)  losartan (Anaphylaxis)    Intolerances        REVIEW OF SYSTEMS    General:	    Skin/Breast:  	  Ophthalmologic:  	  ENMT:	    Respiratory and Thorax:  	  Cardiovascular:	  Breathing comfortable    Gastrointestinal:	    Genitourinary:	    Musculoskeletal:	  +Joint pains    Neurological:	    Psychiatric:	    Hematology/Lymphatics:  Swelling of LLE    Vital Signs Last 24 Hrs  T(C): 36.6 (18 Oct 2017 06:01), Max: 36.8 (17 Oct 2017 21:39)  T(F): 97.8 (18 Oct 2017 06:01), Max: 98.2 (17 Oct 2017 21:39)  HR: 83 (18 Oct 2017 06:01) (77 - 98)  BP: 153/82 (18 Oct 2017 06:01) (134/80 - 159/82)  BP(mean): --  RR: 18 (18 Oct 2017 06:01) (18 - 18)  SpO2: 94% (18 Oct 2017 06:01) (94% - 97%)      PHYSICAL EXAM:    Constitutional:  Fair Appearing    Eyes:    ENMT:    Neck:  supple, no adenopathy    Back:    Respiratory:  Good AE, scattered rales    Cardiovascular:  s1s2 regular, no rubs or murmur heard    Gastrointestinal:  abd soft nt, no masses    Extremities:  4+ LLE edema, unable to move L leg much    Vascular:    Neurological:    Skin:    Lymph Nodes:    Musculoskeletal:  Pain with ROM of R knee, small effusion, no redness or warmth  Decreased ROm of R shoulder.   Hands and wrists w/o objective synovitis    Psychiatric:  appropriate    LABS:                        9.4    5.6   )-----------( 150      ( 18 Oct 2017 06:57 )             28.1     10-18    144  |  107  |  59<H>  ----------------------------<  82  4.5   |  25  |  2.81<H>    Ca    8.8      18 Oct 2017 06:57  Phos  3.4     10-17  Mg     2.1     10-17            RADIOLOGY & ADDITIONAL TESTS:

## 2017-10-18 NOTE — PROGRESS NOTE ADULT - ASSESSMENT
58 y/o female PMHx chronic systolic CHF (last known EF=32%) s/p AICD, SLE, AAA approx 14cm as of 12/2016 s/p aortoiliac stent, DVT/PE (not currently anticoagulated 2/2 HIT), h/o LLE compartment syndrome, multiple pericardial effusions s/p drainage most recently in Jan 2017 now presents with recurrent large pericardial effusion     1. Large pericardial effusion without tamponade.  	Likely chronic effusion, h/o SLE              Rheumatology consult reviewed    2. CT surgery recommended no surgical intervention and drainage by interventional means   Interventional Cardiology  and Vascular recommending radiologic guidance given the nature of the effusion - appears loculated). Will again discuss the possibility and feasibility of interventional approach with interventional radiolology    3. Venous LE Doppler positive for DVT.  Pt would need A/C for this except for the contraindication with pericardial effusion and possible conversion to hemorrhage. Regardless, patient refusing anticoagulation.In addition, an IVC filter is contraindicated in this patient due to the anatomy and dilatation of the IVC (per my discussion with Vasc. Surg. Dr. Tavares)     Therefore, at this point, a less invasive approach will be taken, possibly medically treating her without intervention (at least not in the short-term).  She will continue to be observed prior to making any determinations as to the appropriate approach to the pericardial effusion, if at all.

## 2017-10-19 LAB
ANION GAP SERPL CALC-SCNC: 13 MMOL/L — SIGNIFICANT CHANGE UP (ref 5–17)
BUN SERPL-MCNC: 58 MG/DL — HIGH (ref 7–23)
CALCIUM SERPL-MCNC: 8.9 MG/DL — SIGNIFICANT CHANGE UP (ref 8.4–10.5)
CHLORIDE SERPL-SCNC: 108 MMOL/L — SIGNIFICANT CHANGE UP (ref 96–108)
CO2 SERPL-SCNC: 25 MMOL/L — SIGNIFICANT CHANGE UP (ref 22–31)
CREAT SERPL-MCNC: 2.8 MG/DL — HIGH (ref 0.5–1.3)
GLUCOSE SERPL-MCNC: 103 MG/DL — HIGH (ref 70–99)
HCT VFR BLD CALC: 26.8 % — LOW (ref 34.5–45)
HGB BLD-MCNC: 9.1 G/DL — LOW (ref 11.5–15.5)
MAGNESIUM SERPL-MCNC: 2.2 MG/DL — SIGNIFICANT CHANGE UP (ref 1.6–2.6)
MCHC RBC-ENTMCNC: 30.3 PG — SIGNIFICANT CHANGE UP (ref 27–34)
MCHC RBC-ENTMCNC: 33.9 GM/DL — SIGNIFICANT CHANGE UP (ref 32–36)
MCV RBC AUTO: 89.4 FL — SIGNIFICANT CHANGE UP (ref 80–100)
PHOSPHATE SERPL-MCNC: 3.6 MG/DL — SIGNIFICANT CHANGE UP (ref 2.5–4.5)
PLATELET # BLD AUTO: 148 K/UL — LOW (ref 150–400)
POTASSIUM SERPL-MCNC: 4.7 MMOL/L — SIGNIFICANT CHANGE UP (ref 3.5–5.3)
POTASSIUM SERPL-SCNC: 4.7 MMOL/L — SIGNIFICANT CHANGE UP (ref 3.5–5.3)
RBC # BLD: 2.99 M/UL — LOW (ref 3.8–5.2)
RBC # FLD: 13.6 % — SIGNIFICANT CHANGE UP (ref 10.3–14.5)
SODIUM SERPL-SCNC: 146 MMOL/L — HIGH (ref 135–145)
WBC # BLD: 6.8 K/UL — SIGNIFICANT CHANGE UP (ref 3.8–10.5)
WBC # FLD AUTO: 6.8 K/UL — SIGNIFICANT CHANGE UP (ref 3.8–10.5)

## 2017-10-19 RX ADMIN — Medication 325 MILLIGRAM(S): at 12:33

## 2017-10-19 RX ADMIN — Medication 25 MILLIGRAM(S): at 06:39

## 2017-10-19 RX ADMIN — AMLODIPINE BESYLATE 10 MILLIGRAM(S): 2.5 TABLET ORAL at 09:55

## 2017-10-19 RX ADMIN — Medication 10 MILLIGRAM(S): at 17:20

## 2017-10-19 RX ADMIN — CARVEDILOL PHOSPHATE 25 MILLIGRAM(S): 80 CAPSULE, EXTENDED RELEASE ORAL at 22:23

## 2017-10-19 RX ADMIN — Medication 25 MILLIGRAM(S): at 22:23

## 2017-10-19 RX ADMIN — Medication 500 MILLIGRAM(S): at 12:33

## 2017-10-19 RX ADMIN — PANTOPRAZOLE SODIUM 40 MILLIGRAM(S): 20 TABLET, DELAYED RELEASE ORAL at 06:39

## 2017-10-19 RX ADMIN — Medication 25 MILLIGRAM(S): at 12:33

## 2017-10-19 RX ADMIN — CARVEDILOL PHOSPHATE 25 MILLIGRAM(S): 80 CAPSULE, EXTENDED RELEASE ORAL at 09:56

## 2017-10-19 RX ADMIN — Medication 200 MILLIGRAM(S): at 17:20

## 2017-10-19 RX ADMIN — Medication 10 MILLIGRAM(S): at 09:55

## 2017-10-19 RX ADMIN — Medication 200 MILLIGRAM(S): at 09:55

## 2017-10-19 NOTE — PROGRESS NOTE ADULT - SUBJECTIVE AND OBJECTIVE BOX
Subjective: Patient seen and examined. No new events except as noted.   no cp or sob   REVIEW OF SYSTEMS:    CONSTITUTIONAL: + weakness, no fevers or chills  EYES/ENT: No visual changes;  No vertigo or throat pain   NECK: No pain or stiffness  RESPIRATORY: No cough, wheezing, hemoptysis; No shortness of breath  CARDIOVASCULAR: No chest pain or palpitations  GASTROINTESTINAL: No abdominal or epigastric pain. No nausea, vomiting, or hematemesis; No diarrhea or constipation. No melena or hematochezia.  GENITOURINARY: No dysuria, frequency or hematuria  NEUROLOGICAL: No numbness or weakness  SKIN: No itching, burning, rashes, or lesions   All other review of systems is negative unless indicated above.    MEDICATIONS:  MEDICATIONS  (STANDING):  amLODIPine   Tablet 10 milliGRAM(s) Oral daily  ascorbic acid 500 milliGRAM(s) Oral daily  carvedilol 25 milliGRAM(s) Oral every 12 hours  ferrous    sulfate 325 milliGRAM(s) Oral daily  hydrALAZINE 25 milliGRAM(s) Oral every 8 hours  influenza   Vaccine 0.5 milliLiter(s) IntraMuscular once  labetalol 200 milliGRAM(s) Oral every 8 hours  pantoprazole    Tablet 40 milliGRAM(s) Oral before breakfast  predniSONE   Tablet 10 milliGRAM(s) Oral two times a day      PHYSICAL EXAM:  T(C): 36.7 (10-19-17 @ 06:00), Max: 36.7 (10-18-17 @ 12:20)  HR: 88 (10-19-17 @ 06:00) (79 - 88)  BP: 160/85 (10-19-17 @ 06:00) (135/73 - 161/78)  RR: 18 (10-19-17 @ 06:00) (18 - 18)  SpO2: 95% (10-19-17 @ 06:00) (94% - 96%)  Wt(kg): --  I&O's Summary    18 Oct 2017 07:01  -  19 Oct 2017 07:00  --------------------------------------------------------  IN: 930 mL / OUT: 1850 mL / NET: -920 mL          Appearance: Normal	  HEENT:   Normal oral mucosa, PERRL, EOMI	  Lymphatic: No lymphadenopathy , no edema  Cardiovascular: Normal S1 S2, No JVD, No murmurs , Peripheral pulses palpable 2+ bilaterally  Respiratory: Lungs clear to auscultation, normal effort 	  Gastrointestinal:  Soft, Non-tender, + BS	  Skin: +sacral ulcer   Musculoskeletal: Normal range of motion, normal strength  Psychiatry:  Mood & affect appropriate  Ext: chronic LLE edema  +fox         LABS:    CARDIAC MARKERS:                                9.1    6.8   )-----------( 148      ( 19 Oct 2017 06:57 )             26.8     10-19    146<H>  |  108  |  58<H>  ----------------------------<  103<H>  4.7   |  25  |  2.80<H>    Ca    8.9      19 Oct 2017 06:57  Phos  3.6     10-19  Mg     2.2     10-19      proBNP:   Lipid Profile:   HgA1c:   TSH:             TELEMETRY: Paced 	    ECG:  	  RADIOLOGY:   DIAGNOSTIC TESTING:  [ ] Echocardiogram:  [ ]  Catheterization:  [ ] Stress Test:    OTHER: 	    < from: US Extremity Nonvasc Complete, Left (10.18.17 @ 13:29) >  INTERPRETATION:  Clinical information: Left gluteal hematoma.    Comparison: None available.    Findings: Targeted sonogram of the left gluteal region demonstrates a   complex collection in the subcutaneous fat measuring greater than 5 cm in   diameter. Edematous changes are present in the overlying fat.    Impression: Left gluteal hematoma.

## 2017-10-19 NOTE — PROGRESS NOTE ADULT - SUBJECTIVE AND OBJECTIVE BOX
MEDICINE, PROGRESS NOTE [phone]    COSTEN, SANDRA 57y [id number]    Patient seen and examined.  Patient is a 57y old  Female who presents with a chief complaint of shortness of breath (11 Oct 2017 22:01)  Pt states leg still hurts.    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)  Calf DVT (Deep Venous Thrombosis) (ICD9 453.42)  Adenomatous Goiter (ICD9 241.9)  Chronic Gout (ICD9 274.02)  LBBB (Left Bundle Branch Block) (ICD9 426.3)  CHF (Congestive Heart Failure) (ICD9 428.0)  Hx of aorta-iliac-femoral bypass  TOP (Termination of Pregnancy)  S/P Dilatation and Curettage  History of Tubal Ligation  s/p AAA (Abdominal Aortic Aneurysm) Repair: 2009 2010  History of Cholecystectomy  S/P Partial Hysterectomy    MEDICATIONS  (STANDING):  amLODIPine   Tablet 10 milliGRAM(s) Oral daily  ascorbic acid 500 milliGRAM(s) Oral daily  carvedilol 25 milliGRAM(s) Oral every 12 hours  ferrous    sulfate 325 milliGRAM(s) Oral daily  hydrALAZINE 25 milliGRAM(s) Oral every 8 hours  influenza   Vaccine 0.5 milliLiter(s) IntraMuscular once  labetalol 200 milliGRAM(s) Oral every 8 hours  pantoprazole    Tablet 40 milliGRAM(s) Oral before breakfast  predniSONE   Tablet 10 milliGRAM(s) Oral two times a day    MEDICATIONS  (PRN):    Allergies    heparin (Unknown)  losartan (Anaphylaxis)    Intolerances        PHYSICAL EXAM:  Constitutional: NAD  HEENT: Normocephalic, EOMI  Neck:  No JVD  Respiratory: CTA B/L, No wheezes  Cardiovascular: S1, S2, RRR, + systolic murmur  Gastrointestinal: BS+, soft, NT/ND  Extremities: + edema le b/l L>R   Neurological: AAOX3, no focal deficits  Psychiatric: Normal mood, normal affect  : + Hook    Vital Signs Last 24 Hrs  T(C): 36.7 (19 Oct 2017 06:00), Max: 36.7 (18 Oct 2017 20:56)  T(F): 98 (19 Oct 2017 06:00), Max: 98.1 (18 Oct 2017 20:56)  HR: 81 (19 Oct 2017 12:19) (79 - 88)  BP: 142/87 (19 Oct 2017 12:19) (142/87 - 161/78)  BP(mean): --  RR: 18 (19 Oct 2017 12:19) (18 - 18)  SpO2: 95% (19 Oct 2017 12:19) (95% - 95%)  I&O's Summary    18 Oct 2017 07:01  -  19 Oct 2017 07:00  --------------------------------------------------------  IN: 930 mL / OUT: 1850 mL / NET: -920 mL    19 Oct 2017 07:01  -  19 Oct 2017 20:16  --------------------------------------------------------  IN: 960 mL / OUT: 150 mL / NET: 810 mL        LABS:                        9.1    6.8   )-----------( 148      ( 19 Oct 2017 06:57 )             26.8     10-19    146<H>  |  108  |  58<H>  ----------------------------<  103<H>  4.7   |  25  |  2.80<H>    Ca    8.9      19 Oct 2017 06:57  Phos  3.6     10-19  Mg     2.2     10-19          Magnesium, Serum: 2.2 mg/dL (10-19 @ 06:57)        RADIOLOGY & ADDITIONAL STUDIES:  < from: CT Chest No Cont (10.18.17 @ 23:51) >  EXAM:  CT CHEST                            PROCEDURE DATE:  10/18/2017            INTERPRETATION:  CLINICAL INFORMATION: Shortness of breath.    COMPARISON: CT chest dated October 11, 2017.    PROCEDURE:   CT of the Chest was performed without intravenous contrast.  Sagittal and coronal reformats were performed.      FINDINGS:    CHEST:     LUNGS AND LARGE AIRWAYS: Right upper lobe peripheral groundglass patchy   opacity, increased since October 11, 2017. Findings likely represent   infection. Partial compressive atelectasis of the left lower lobe.   Minimal compressive atelectasis of the right lower lobe. Patent central   airways.  No pulmonary nodules.  PLEURA: Small left and trace right pleural effusions.  VESSELS: Atherosclerotic disease of aorta.  HEART: Cardiomegaly. Small to moderate pericardial effusion, similar in   size to 10/11/2017.  MEDIASTINUM AND ANDRZEJ: No lymphadenopathy.  CHEST WALL AND LOWER NECK: Left upper chest wall cardiac device. Markedly   enlarged thyroid.   VISUALIZED UPPER ABDOMEN: Hyperdense liver.  BONES: Mild multilevel spinal degenerative changes.    IMPRESSION:        Small to moderate pericardial effusion, similar in size to 10/11/2017.    Right upper lobe groundglass opacity increased in density since October 11, 2017, likely representing infection.                        YOHANA RAYGOZA M.D., RADIOLOGY RESIDENT  This document has been electronically signed.  SOURAV MOBLEY M.D., ATTENDING RADIOLOGIST  This document has been electronically signed. Oct 19 2017 11:30AM        < end of copied text >

## 2017-10-19 NOTE — PROGRESS NOTE ADULT - ASSESSMENT
Pericardial effusion  SLE  LLE DVT  acute on chronic LLE with pain  Poss PNA    continue current care  pt still considering anticoagulation  will call [facility] jailyn with Dr. Anna  will call pulm jailyn  appreciate cardio input  await IR review of CT  case discussed with pt at bedside with daughter on phone (per pts request), answered all of their questions to the best of my ability.

## 2017-10-19 NOTE — PROGRESS NOTE ADULT - ASSESSMENT
58 y/o female PMHx chronic systolic CHF (last known EF=32%) s/p AICD, SLE, AAA approx 14cm as of 12/2016 s/p aortoiliac stent, DVT/PE (not currently anticoagulated 2/2 HIT), h/o LLE compartment syndrome, multiple pericardial effusions s/p drainage most recently in Jan 2017 now presents with recurrent large pericardial effusion     1. Large pericardial effusion without tamponade.  	Likely chronic effusion, h/o SLE              Rheumatology consult reviewed    2. CT surgery recommended no surgical intervention and drainage by interventional means   Interventional Cardiology  and Vascular recommending radiologic guidance given the nature of the effusion - appears loculated). Will again discuss the possibility and feasibility of interventional approach with interventional radiolology    3. Venous LE Doppler positive for DVT.  Pt would need A/C for this except for the contraindication with pericardial effusion and possible conversion to hemorrhage. Regardless, patient refusing anticoagulation.In addition, an IVC filter is contraindicated in this patient due to the anatomy and dilatation of the IVC (per my discussion with Vasc. Surg. Dr. Tavares)   Once again, i discussed all pros and cons of anticoagulation as it pertains to her particular case. She understands both sides and wants to continue to think about her final decision.     Therefore, at this point, a less invasive approach will be taken, possibly medically treating her without intervention (at least not in the short-term).  She will continue to be observed prior to making any determinations as to the appropriate approach to the pericardial effusion, if at all.

## 2017-10-20 LAB
ANION GAP SERPL CALC-SCNC: 13 MMOL/L — SIGNIFICANT CHANGE UP (ref 5–17)
BUN SERPL-MCNC: 59 MG/DL — HIGH (ref 7–23)
CALCIUM SERPL-MCNC: 8.8 MG/DL — SIGNIFICANT CHANGE UP (ref 8.4–10.5)
CHLORIDE SERPL-SCNC: 108 MMOL/L — SIGNIFICANT CHANGE UP (ref 96–108)
CO2 SERPL-SCNC: 23 MMOL/L — SIGNIFICANT CHANGE UP (ref 22–31)
CREAT SERPL-MCNC: 2.74 MG/DL — HIGH (ref 0.5–1.3)
GLUCOSE SERPL-MCNC: 109 MG/DL — HIGH (ref 70–99)
HCT VFR BLD CALC: 29.9 % — LOW (ref 34.5–45)
HGB BLD-MCNC: 9.4 G/DL — LOW (ref 11.5–15.5)
MCHC RBC-ENTMCNC: 28.5 PG — SIGNIFICANT CHANGE UP (ref 27–34)
MCHC RBC-ENTMCNC: 31.5 GM/DL — LOW (ref 32–36)
MCV RBC AUTO: 90.4 FL — SIGNIFICANT CHANGE UP (ref 80–100)
PLATELET # BLD AUTO: 152 K/UL — SIGNIFICANT CHANGE UP (ref 150–400)
POTASSIUM SERPL-MCNC: 4.9 MMOL/L — SIGNIFICANT CHANGE UP (ref 3.5–5.3)
POTASSIUM SERPL-SCNC: 4.9 MMOL/L — SIGNIFICANT CHANGE UP (ref 3.5–5.3)
RBC # BLD: 3.31 M/UL — LOW (ref 3.8–5.2)
RBC # FLD: 13.9 % — SIGNIFICANT CHANGE UP (ref 10.3–14.5)
SODIUM SERPL-SCNC: 144 MMOL/L — SIGNIFICANT CHANGE UP (ref 135–145)
WBC # BLD: 7.3 K/UL — SIGNIFICANT CHANGE UP (ref 3.8–10.5)
WBC # FLD AUTO: 7.3 K/UL — SIGNIFICANT CHANGE UP (ref 3.8–10.5)

## 2017-10-20 RX ADMIN — Medication 25 MILLIGRAM(S): at 13:08

## 2017-10-20 RX ADMIN — Medication 200 MILLIGRAM(S): at 18:07

## 2017-10-20 RX ADMIN — Medication 10 MILLIGRAM(S): at 08:42

## 2017-10-20 RX ADMIN — Medication 500 MILLIGRAM(S): at 13:08

## 2017-10-20 RX ADMIN — Medication 25 MILLIGRAM(S): at 22:00

## 2017-10-20 RX ADMIN — Medication 200 MILLIGRAM(S): at 10:24

## 2017-10-20 RX ADMIN — CARVEDILOL PHOSPHATE 25 MILLIGRAM(S): 80 CAPSULE, EXTENDED RELEASE ORAL at 08:41

## 2017-10-20 RX ADMIN — PANTOPRAZOLE SODIUM 40 MILLIGRAM(S): 20 TABLET, DELAYED RELEASE ORAL at 05:31

## 2017-10-20 RX ADMIN — AMLODIPINE BESYLATE 10 MILLIGRAM(S): 2.5 TABLET ORAL at 08:41

## 2017-10-20 RX ADMIN — Medication 325 MILLIGRAM(S): at 13:08

## 2017-10-20 RX ADMIN — Medication 10 MILLIGRAM(S): at 18:07

## 2017-10-20 RX ADMIN — CARVEDILOL PHOSPHATE 25 MILLIGRAM(S): 80 CAPSULE, EXTENDED RELEASE ORAL at 22:00

## 2017-10-20 RX ADMIN — Medication 25 MILLIGRAM(S): at 05:31

## 2017-10-20 RX ADMIN — Medication 200 MILLIGRAM(S): at 00:10

## 2017-10-20 NOTE — PROGRESS NOTE ADULT - ASSESSMENT
56 yo F w/ hx of SLE, systolic CHF (EF 32%) s/p AICD, HTN, CKD III- IV, PE / left leg DVT dx 2009, HIT, CAD (no stents), stage IV sacral ulcer, chronic fox, hx of recurrent pericardial effusion, AAA ~14cm in 12/2016 s/p repair in 2010 w/ aortoiliac stent c/b post-operatively with development of left lower extremity   lymphedema December 2016;  pericardial effusion pericardial drain/SLE. now with ilana on ckd with baseline creatinine approx 1.8-2.     1- ILANA on ckd III  2- chf hx  3- cardiomyopathy  4- HTN   5- SLE  6- proteinuria    creatinine still elevated. has pericardial effusion as well.   cont with B-B .  Norvasc 10 mg daily  hyrdalazine to cont.   prednisone 5mg daily   nephrotic syndrome.   continue with Fox catheter for chronic obstructive uropathy.  Start Lasix 40 mg daily if cardiology agrees

## 2017-10-20 NOTE — PROGRESS NOTE ADULT - ASSESSMENT
Pericardial effusion  SLE  LLE DVT  acute on chronic LLE with pain  Poss PNA    continue current care  pulm eval  await IR input  pt still has not made decision on a/c  heme eval.

## 2017-10-20 NOTE — PROGRESS NOTE ADULT - SUBJECTIVE AND OBJECTIVE BOX
[facility]   [phone]  RENAL FOLLOW UP NOTE  --------------------------------------------------------------------------------  Chief Complaint:    24 hour events/subjective:    complain of edema.  States had wheezing this morning    PAST HISTORY  --------------------------------------------------------------------------------  No significant changes to PMH, PSH, FHx, SHx, unless otherwise noted    ALLERGIES & MEDICATIONS  --------------------------------------------------------------------------------  Allergies    heparin (Unknown)  losartan (Anaphylaxis)    Intolerances      Standing Inpatient Medications  amLODIPine   Tablet 10 milliGRAM(s) Oral daily  ascorbic acid 500 milliGRAM(s) Oral daily  carvedilol 25 milliGRAM(s) Oral every 12 hours  ferrous    sulfate 325 milliGRAM(s) Oral daily  hydrALAZINE 25 milliGRAM(s) Oral every 8 hours  influenza   Vaccine 0.5 milliLiter(s) IntraMuscular once  labetalol 200 milliGRAM(s) Oral every 8 hours  pantoprazole    Tablet 40 milliGRAM(s) Oral before breakfast  predniSONE   Tablet 10 milliGRAM(s) Oral two times a day    PRN Inpatient Medications      REVIEW OF SYSTEMS  --------------------------------------------------------------------------------    Gen: denies fevers/chills,  CVS: denies chest pain/palpitations  Resp: denies SOB/Cough did have wheezing  GI: Denies N/V/Abd pain  : Denies dysuria/oliguria/hematuria        VITALS/PHYSICAL EXAM  --------------------------------------------------------------------------------  T(C): 36.8 (10-20-17 @ 12:30), Max: 37.2 (10-19-17 @ 21:08)  HR: 82 (10-20-17 @ 12:30) (81 - 88)  BP: 144/87 (10-20-17 @ 12:30) (144/87 - 155/81)  RR: 18 (10-20-17 @ 12:30) (18 - 18)  SpO2: 96% (10-20-17 @ 12:30) (94% - 96%)  Wt(kg): --        10-19-17 @ 07:01  -  10-20-17 @ 07:00  --------------------------------------------------------  IN: 1300 mL / OUT: 900 mL / NET: 400 mL    10-20-17 @ 07:01  -  10-20-17 @ 14:45  --------------------------------------------------------  IN: 720 mL / OUT: 0 mL / NET: 720 mL        	    Physical Exam:  	Gen: alert oriented place person and date   	Pulm: Decreased breath sounds left base. no rales or ronchi or wheezing  	CV: RRR, S1/S2. no rub  	Back: No CVA tenderness; no sacral edema  	Abd: +BS, soft, nontender/nondistended  	: No suprapubic tenderness.               Extremity: No cyanosis, right lower extremity edema to mid calf.  Left lower extremity 4+ edema no clubbing  	  LABS/STUDIES  --------------------------------------------------------------------------------              9.4    7.3   >-----------<  152      [10-20-17 @ 06:09]              29.9     144  |  108  |  59  ----------------------------<  109      [10-20-17 @ 06:09]  4.9   |  23  |  2.74        Ca     8.8     [10-20-17 @ 06:09]      Mg     2.2     [10-19-17 @ 06:57]      Phos  3.6     [10-19-17 @ 06:57]            Creatinine Trend:  SCr 2.74 [10-20 @ 06:09]  SCr 2.80 [10-19 @ 06:57]  SCr 2.81 [10-18 @ 06:57]  SCr 2.76 [10-17 @ 06:16]  SCr 2.78 [10-16 @ 06:13]              Urinalysis - [10-11-17 @ 18:44]      Color Yellow / Appearance SL Turbid / SG 1.015 / pH 8.0      Gluc Negative / Ketone Negative  / Bili Negative / Urobili Negative       Blood Negative / Protein >600 / Leuk Est Large / Nitrite Negative      RBC  / WBC 5-10 / Hyaline  / Gran  / Sq Epi  / Non Sq Epi  / Bacteria     Urine Creatinine 81      [10-18-17 @ 07:39]  Urine Protein 254      [10-18-17 @ 07:39]    Iron 48, TIBC --, %sat --      [10-12-17 @ 07:14]  Ferritin 847.0      [10-12-17 @ 07:14]  HbA1c 4.5      [08-09-16 @ 06:49]    dsDNA 44      [10-17-17 @ 08:26]  C3 Complement 105      [10-17-17 @ 08:26]  C4 Complement 31      [10-17-17 @ 08:26]

## 2017-10-20 NOTE — PROGRESS NOTE ADULT - ASSESSMENT
58 y/o female PMHx chronic systolic CHF (last known EF=32%) s/p AICD, SLE, AAA approx 14cm as of 12/2016 s/p aortoiliac stent, DVT/PE (not currently anticoagulated 2/2 HIT), h/o LLE compartment syndrome, multiple pericardial effusions s/p drainage most recently in Jan 2017 now presents with recurrent large pericardial effusion     1. Large pericardial effusion without tamponade.  	Likely chronic effusion, h/o SLE              Rheumatology consult reviewed    2. CT surgery recommended no surgical intervention and drainage by interventional means   Interventional Cardiology  and Vascular recommending radiologic guidance given the nature of the effusion - appears loculated). Will again discuss the possibility and feasibility of interventional approach with interventional radiolology    3. Venous LE Doppler positive for DVT.  Pt would need A/C for this except for the contraindication with pericardial effusion and possible conversion to hemorrhage. Regardless, patient refusing anticoagulation.In addition, an IVC filter is contraindicated in this patient due to the anatomy and dilatation of the IVC (per my discussion with Vasc. Surg. Dr. Tavares)   Once again, i discussed all pros and cons of anticoagulation as it pertains to her particular case. She understands both sides and wants to continue to think about her final decision. Still remains undecided at this time     Therefore, at this point, a less invasive approach will be taken, possibly medically treating her without intervention (at least not in the short-term).  She will continue to be observed prior to making any determinations as to the appropriate approach to the pericardial effusion, if at all.

## 2017-10-20 NOTE — PROGRESS NOTE ADULT - SUBJECTIVE AND OBJECTIVE BOX
MEDICINE, PROGRESS NOTE [phone]    COSTEN, SANDRA 57y [id number]    Patient seen and examined.  Patient is a 57y old  Female who presents with a chief complaint of shortness of breath (11 Oct 2017 22:01)  Pt still has pain in leg.    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)  Calf DVT (Deep Venous Thrombosis) (ICD9 453.42)  Adenomatous Goiter (ICD9 241.9)  Chronic Gout (ICD9 274.02)  LBBB (Left Bundle Branch Block) (ICD9 426.3)  CHF (Congestive Heart Failure) (ICD9 428.0)  Hx of aorta-iliac-femoral bypass  TOP (Termination of Pregnancy)  S/P Dilatation and Curettage  History of Tubal Ligation  s/p AAA (Abdominal Aortic Aneurysm) Repair: 2009 2010  History of Cholecystectomy  S/P Partial Hysterectomy    MEDICATIONS  (STANDING):  amLODIPine   Tablet 10 milliGRAM(s) Oral daily  ascorbic acid 500 milliGRAM(s) Oral daily  carvedilol 25 milliGRAM(s) Oral every 12 hours  ferrous    sulfate 325 milliGRAM(s) Oral daily  hydrALAZINE 25 milliGRAM(s) Oral every 8 hours  influenza   Vaccine 0.5 milliLiter(s) IntraMuscular once  labetalol 200 milliGRAM(s) Oral every 8 hours  pantoprazole    Tablet 40 milliGRAM(s) Oral before breakfast  predniSONE   Tablet 10 milliGRAM(s) Oral two times a day    MEDICATIONS  (PRN):    Allergies    heparin (Unknown)  losartan (Anaphylaxis)    Intolerances        PHYSICAL EXAM:  Constitutional: NAD  HEENT: Normocephalic, EOMI  Neck:  No JVD  Respiratory: CTA B/L, No wheezes  Cardiovascular: S1, S2, RRR, + systolic murmur  Gastrointestinal: BS+, soft, NT/ND  Extremities: +peripheral edemale b/l l>R  Neurological: AAOX3, no focal deficits  Psychiatric: Normal mood, normal affect  : + Hook    Vital Signs Last 24 Hrs  T(C): 36.8 (20 Oct 2017 12:30), Max: 37.2 (19 Oct 2017 21:08)  T(F): 98.2 (20 Oct 2017 12:30), Max: 98.9 (19 Oct 2017 21:08)  HR: 82 (20 Oct 2017 12:30) (81 - 88)  BP: 144/87 (20 Oct 2017 12:30) (144/87 - 155/81)  BP(mean): --  RR: 18 (20 Oct 2017 12:30) (18 - 18)  SpO2: 96% (20 Oct 2017 12:30) (94% - 96%)  I&O's Summary    19 Oct 2017 07:01  -  20 Oct 2017 07:00  --------------------------------------------------------  IN: 1300 mL / OUT: 900 mL / NET: 400 mL    20 Oct 2017 07:01  -  20 Oct 2017 17:16  --------------------------------------------------------  IN: 720 mL / OUT: 700 mL / NET: 20 mL        LABS:                        9.4    7.3   )-----------( 152      ( 20 Oct 2017 06:09 )             29.9     10-20    144  |  108  |  59<H>  ----------------------------<  109<H>  4.9   |  23  |  2.74<H>    Ca    8.8      20 Oct 2017 06:09  Phos  3.6     10-19  Mg     2.2     10-19

## 2017-10-20 NOTE — PROGRESS NOTE ADULT - SUBJECTIVE AND OBJECTIVE BOX
Subjective: Patient seen and examined. No new events except as noted.     REVIEW OF SYSTEMS:    CONSTITUTIONAL: + weakness, no fevers or chills  EYES/ENT: No visual changes;  No vertigo or throat pain   NECK: No pain or stiffness  RESPIRATORY: No cough, wheezing, hemoptysis; No shortness of breath  CARDIOVASCULAR: No chest pain or palpitations  GASTROINTESTINAL: No abdominal or epigastric pain. No nausea, vomiting, or hematemesis; No diarrhea or constipation. No melena or hematochezia.  GENITOURINARY: No dysuria, frequency or hematuria  NEUROLOGICAL: No numbness or weakness  SKIN: No itching, burning, rashes, or lesions   All other review of systems is negative unless indicated above.    MEDICATIONS:  MEDICATIONS  (STANDING):  amLODIPine   Tablet 10 milliGRAM(s) Oral daily  ascorbic acid 500 milliGRAM(s) Oral daily  carvedilol 25 milliGRAM(s) Oral every 12 hours  ferrous    sulfate 325 milliGRAM(s) Oral daily  hydrALAZINE 25 milliGRAM(s) Oral every 8 hours  influenza   Vaccine 0.5 milliLiter(s) IntraMuscular once  labetalol 200 milliGRAM(s) Oral every 8 hours  pantoprazole    Tablet 40 milliGRAM(s) Oral before breakfast  predniSONE   Tablet 10 milliGRAM(s) Oral two times a day      PHYSICAL EXAM:  T(C): 36.9 (10-20-17 @ 04:13), Max: 37.2 (10-19-17 @ 21:08)  HR: 86 (10-20-17 @ 04:13) (81 - 88)  BP: 151/80 (10-20-17 @ 04:13) (142/87 - 155/81)  RR: 18 (10-20-17 @ 04:13) (18 - 18)  SpO2: 94% (10-20-17 @ 04:13) (94% - 95%)  Wt(kg): --  I&O's Summary    19 Oct 2017 07:01  -  20 Oct 2017 07:00  --------------------------------------------------------  IN: 1300 mL / OUT: 900 mL / NET: 400 mL          Appearance: Normal	  HEENT:   Normal oral mucosa, PERRL, EOMI	  Lymphatic: No lymphadenopathy , no edema  Cardiovascular: Normal S1 S2, No JVD, No murmurs , Peripheral pulses palpable 2+ bilaterally  Respiratory: Lungs clear to auscultation, normal effort 	  Gastrointestinal:  Soft, Non-tender, + BS	  Skin: +sacral decubitus ulcer   Musculoskeletal: Normal range of motion, normal strength  Psychiatry:  Mood & affect appropriate  Ext: chronic LLE edema   +fox       LABS:    CARDIAC MARKERS:                                9.4    7.3   )-----------( 152      ( 20 Oct 2017 06:09 )             29.9     10-20    144  |  108  |  59<H>  ----------------------------<  109<H>  4.9   |  23  |  2.74<H>    Ca    8.8      20 Oct 2017 06:09  Phos  3.6     10-19  Mg     2.2     10-19      proBNP:   Lipid Profile:   HgA1c:   TSH:             TELEMETRY: VPaced 	    ECG:  	  RADIOLOGY:   DIAGNOSTIC TESTING:  [ ] Echocardiogram:  [ ]  Catheterization:  [ ] Stress Test:    OTHER:

## 2017-10-21 DIAGNOSIS — R06.00 DYSPNEA, UNSPECIFIED: ICD-10-CM

## 2017-10-21 DIAGNOSIS — M32.9 SYSTEMIC LUPUS ERYTHEMATOSUS, UNSPECIFIED: ICD-10-CM

## 2017-10-21 DIAGNOSIS — I82.409 ACUTE EMBOLISM AND THROMBOSIS OF UNSPECIFIED DEEP VEINS OF UNSPECIFIED LOWER EXTREMITY: ICD-10-CM

## 2017-10-21 LAB
ANION GAP SERPL CALC-SCNC: 13 MMOL/L — SIGNIFICANT CHANGE UP (ref 5–17)
ANION GAP SERPL CALC-SCNC: 15 MMOL/L — SIGNIFICANT CHANGE UP (ref 5–17)
APTT BLD: 28.8 SEC — SIGNIFICANT CHANGE UP (ref 27.5–37.4)
APTT BLD: 63.7 SEC — HIGH (ref 27.5–37.4)
BUN SERPL-MCNC: 60 MG/DL — HIGH (ref 7–23)
BUN SERPL-MCNC: 62 MG/DL — HIGH (ref 7–23)
CALCIUM SERPL-MCNC: 8.7 MG/DL — SIGNIFICANT CHANGE UP (ref 8.4–10.5)
CALCIUM SERPL-MCNC: 8.8 MG/DL — SIGNIFICANT CHANGE UP (ref 8.4–10.5)
CHLORIDE SERPL-SCNC: 107 MMOL/L — SIGNIFICANT CHANGE UP (ref 96–108)
CHLORIDE SERPL-SCNC: 108 MMOL/L — SIGNIFICANT CHANGE UP (ref 96–108)
CO2 SERPL-SCNC: 23 MMOL/L — SIGNIFICANT CHANGE UP (ref 22–31)
CO2 SERPL-SCNC: 23 MMOL/L — SIGNIFICANT CHANGE UP (ref 22–31)
CREAT SERPL-MCNC: 2.7 MG/DL — HIGH (ref 0.5–1.3)
CREAT SERPL-MCNC: 2.86 MG/DL — HIGH (ref 0.5–1.3)
GLUCOSE SERPL-MCNC: 100 MG/DL — HIGH (ref 70–99)
GLUCOSE SERPL-MCNC: 133 MG/DL — HIGH (ref 70–99)
HCT VFR BLD CALC: 29.9 % — LOW (ref 34.5–45)
HGB BLD-MCNC: 9.7 G/DL — LOW (ref 11.5–15.5)
MAGNESIUM SERPL-MCNC: 2.2 MG/DL — SIGNIFICANT CHANGE UP (ref 1.6–2.6)
MCHC RBC-ENTMCNC: 29.2 PG — SIGNIFICANT CHANGE UP (ref 27–34)
MCHC RBC-ENTMCNC: 32.3 GM/DL — SIGNIFICANT CHANGE UP (ref 32–36)
MCV RBC AUTO: 90.4 FL — SIGNIFICANT CHANGE UP (ref 80–100)
NT-PROBNP SERPL-SCNC: 9091 PG/ML — HIGH (ref 0–300)
PLATELET # BLD AUTO: 161 K/UL — SIGNIFICANT CHANGE UP (ref 150–400)
POTASSIUM SERPL-MCNC: 4.8 MMOL/L — SIGNIFICANT CHANGE UP (ref 3.5–5.3)
POTASSIUM SERPL-MCNC: 4.8 MMOL/L — SIGNIFICANT CHANGE UP (ref 3.5–5.3)
POTASSIUM SERPL-SCNC: 4.8 MMOL/L — SIGNIFICANT CHANGE UP (ref 3.5–5.3)
POTASSIUM SERPL-SCNC: 4.8 MMOL/L — SIGNIFICANT CHANGE UP (ref 3.5–5.3)
RBC # BLD: 3.31 M/UL — LOW (ref 3.8–5.2)
RBC # FLD: 13.9 % — SIGNIFICANT CHANGE UP (ref 10.3–14.5)
SODIUM SERPL-SCNC: 144 MMOL/L — SIGNIFICANT CHANGE UP (ref 135–145)
SODIUM SERPL-SCNC: 145 MMOL/L — SIGNIFICANT CHANGE UP (ref 135–145)
WBC # BLD: 7.2 K/UL — SIGNIFICANT CHANGE UP (ref 3.8–10.5)
WBC # FLD AUTO: 7.2 K/UL — SIGNIFICANT CHANGE UP (ref 3.8–10.5)

## 2017-10-21 RX ORDER — ARGATROBAN 50 MG/50ML
2 INJECTION, SOLUTION INTRAVENOUS
Qty: 250 | Refills: 0 | Status: DISCONTINUED | OUTPATIENT
Start: 2017-10-21 | End: 2017-10-25

## 2017-10-21 RX ADMIN — ARGATROBAN 15.19 MICROGRAM(S)/KG/MIN: 50 INJECTION, SOLUTION INTRAVENOUS at 23:49

## 2017-10-21 RX ADMIN — Medication 25 MILLIGRAM(S): at 21:10

## 2017-10-21 RX ADMIN — Medication 25 MILLIGRAM(S): at 13:09

## 2017-10-21 RX ADMIN — Medication 25 MILLIGRAM(S): at 05:41

## 2017-10-21 RX ADMIN — Medication 500 MILLIGRAM(S): at 13:09

## 2017-10-21 RX ADMIN — ARGATROBAN 15.19 MICROGRAM(S)/KG/MIN: 50 INJECTION, SOLUTION INTRAVENOUS at 20:52

## 2017-10-21 RX ADMIN — Medication 10 MILLIGRAM(S): at 17:50

## 2017-10-21 RX ADMIN — Medication 325 MILLIGRAM(S): at 13:09

## 2017-10-21 RX ADMIN — PANTOPRAZOLE SODIUM 40 MILLIGRAM(S): 20 TABLET, DELAYED RELEASE ORAL at 05:42

## 2017-10-21 RX ADMIN — CARVEDILOL PHOSPHATE 25 MILLIGRAM(S): 80 CAPSULE, EXTENDED RELEASE ORAL at 07:37

## 2017-10-21 RX ADMIN — Medication 10 MILLIGRAM(S): at 09:21

## 2017-10-21 RX ADMIN — Medication 200 MILLIGRAM(S): at 00:26

## 2017-10-21 RX ADMIN — Medication 200 MILLIGRAM(S): at 09:21

## 2017-10-21 RX ADMIN — CARVEDILOL PHOSPHATE 25 MILLIGRAM(S): 80 CAPSULE, EXTENDED RELEASE ORAL at 20:59

## 2017-10-21 RX ADMIN — Medication 200 MILLIGRAM(S): at 17:50

## 2017-10-21 RX ADMIN — AMLODIPINE BESYLATE 10 MILLIGRAM(S): 2.5 TABLET ORAL at 07:37

## 2017-10-21 NOTE — CONSULT NOTE ADULT - ASSESSMENT
56 yo F w/ hx of SLE, systolic CHF (EF 25%) s/p AICD, HTN, CKD III- IV, DVT/PE (2009) no AC 2nd epistaxis, HIT, CAD (no stents), stage IV sacral ulcer, urinary retention with chronic fox, recurrent pericardial effusion, AAA s/p repair in 2010, L Iliac  aneurysm s/p repair x2, Left iliac AV Fistula, LLE compartment syndrome s/p fasciotomy. Presents 10/11 with shortness of breath with exertion, increased LLE edema, increased peripheral edema, ILANA on CKD

## 2017-10-21 NOTE — CONSULT NOTE ADULT - PROBLEM SELECTOR RECOMMENDATION 9
Multifactorial-restrictive disease 2nd obesity and cardiomegaly, pericardial effusion, debility and volume overloaded, with systolic HF (EF 25%) and ?PE  -Per renal, ok to restart lasix 40mg qd   -VQ scan r/o PE  -Pericardial effusion is stable, no plan for pericardiocentesis at this time per cards  -Check pro-BNP  -Peripheral GGO in RUL new from Oct 11 2017 CT. Patient appears non-toxic Multifactorial-restrictive disease 2nd obesity and cardiomegaly, pericardial effusion, debility and volume overloaded, with systolic HF (EF 25%) and ?PE  -Per renal, ok to restart lasix 40mg qd   -Pericardial effusion is stable, no plan for pericardiocentesis at this time per cards  -Check pro-BNP  -Peripheral GGO in RUL new from Oct 11 2017 CT. Patient appears non-toxic

## 2017-10-21 NOTE — CONSULT NOTE ADULT - SUBJECTIVE AND OBJECTIVE BOX
PULMONARY CONSULT      HPI: 58 yo F w/ hx of SLE, systolic CHF (EF 32%) s/p AICD, HTN, CKD III- IV, DVT/PE (2009) no AC 2nd epistaxis, HIT, CAD (no stents), stage IV sacral ulcer, chronic fox, hx of recurrent pericardial effusion, AAA s/p repair in 2010, L Iliac  aneurysm s/p repair x2, Left iliac AV Fistula, LLE compartment syndrome s/p fasciotomy. Presents 10/11 with shortness of breath with exertion, increased LLE edema, increased peripheral edema.      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)  Calf DVT (Deep Venous Thrombosis) (ICD9 453.42)  Adenomatous Goiter (ICD9 241.9)  Chronic Gout (ICD9 274.02)  LBBB (Left Bundle Branch Block) (ICD9 426.3)  CHF (Congestive Heart Failure) (ICD9 428.0)  Hx of aorta-iliac-femoral bypass  TOP (Termination of Pregnancy)  S/P Dilatation and Curettage  History of Tubal Ligation  s/p AAA (Abdominal Aortic Aneurysm) Repair: 2009 2010  History of Cholecystectomy  S/P Partial Hysterectomy    Allergies    heparin (Unknown)  losartan (Anaphylaxis)    Intolerances      FAMILY HISTORY:  No pertinent family history in first degree relatives    Social history: Never Smoker    Review of Systems:  CONSTITUTIONAL: No fever, chills, or fatigue  EYES: No eye pain, visual disturbances, or discharge  ENMT:  No difficulty hearing, tinnitus, vertigo; No sinus or throat pain  NECK: No pain or stiffness  RESPIRATORY: Per above  CARDIOVASCULAR: No chest pain, palpitations, dizziness, or leg swelling  GASTROINTESTINAL: No abdominal or epigastric pain. No nausea, vomiting, or hematemesis; No diarrhea or constipation. No melena or hematochezia.  GENITOURINARY: No dysuria, frequency, hematuria, or incontinence  NEUROLOGICAL: No headaches, memory loss, loss of strength, numbness, or tremors  SKIN: No itching, burning, rashes, or lesions   MUSCULOSKELETAL: No joint pain or swelling; No muscle, back, or extremity pain  PSYCHIATRIC: No depression, anxiety, mood swings, or difficulty sleeping      Medications:  MEDICATIONS  (STANDING):  amLODIPine   Tablet 10 milliGRAM(s) Oral daily  ascorbic acid 500 milliGRAM(s) Oral daily  carvedilol 25 milliGRAM(s) Oral every 12 hours  ferrous    sulfate 325 milliGRAM(s) Oral daily  hydrALAZINE 25 milliGRAM(s) Oral every 8 hours  influenza   Vaccine 0.5 milliLiter(s) IntraMuscular once  labetalol 200 milliGRAM(s) Oral every 8 hours  pantoprazole    Tablet 40 milliGRAM(s) Oral before breakfast  predniSONE   Tablet 10 milliGRAM(s) Oral two times a day    MEDICATIONS  (PRN):            Vital Signs Last 24 Hrs  T(C): 36.8 (21 Oct 2017 10:06), Max: 36.9 (20 Oct 2017 21:18)  T(F): 98.2 (21 Oct 2017 10:06), Max: 98.5 (20 Oct 2017 21:18)  HR: 85 (21 Oct 2017 10:06) (79 - 87)  BP: 162/82 (21 Oct 2017 10:06) (144/87 - 167/89)  BP(mean): --  RR: 16 (21 Oct 2017 10:06) (16 - 18)  SpO2: 96% (21 Oct 2017 10:06) (95% - 96%) on RA            10-20 @ 07:01  -  10-21 @ 07:00  --------------------------------------------------------  IN: 1040 mL / OUT: 1550 mL / NET: -510 mL          LABS:                        9.7    7.2   )-----------( 161      ( 21 Oct 2017 06:50 )             29.9     10-21    144  |  108  |  60<H>  ----------------------------<  100<H>  4.8   |  23  |  2.70<H>    Ca    8.7      21 Oct 2017 06:50          Physical Examination:    General: No acute distress.      HEENT: Pupils equal, reactive to light.  Symmetric.    PULM: Decreased BS at bases, no wheeze    CVS: S1, S2 RRR    ABD: Soft, nondistended, nontender, normoactive bowel sounds, no masses    EXT: LLE lymphedema     SKIN: Warm and well perfused, no rashes noted.    NEURO: Alert, oriented, interactive, nonfocal    RADIOLOGY REVIEWED  CT chest: < from: CT Chest No Cont (10.18.17 @ 23:51) >    LUNGS AND LARGE AIRWAYS: Right upper lobe peripheral groundglass patchy   opacity, increased since October 11, 2017. Findings likely represent   infection. Partial compressive atelectasis of the left lower lobe.   Minimal compressive atelectasis of the right lower lobe. Patent central   airways.  No pulmonary nodules.  PLEURA: Small left and trace right pleural effusions.  VESSELS: Atherosclerotic disease of aorta.  HEART: Cardiomegaly. Small to moderate pericardial effusion, similar in   size to 10/11/2017.  MEDIASTINUM AND ANDRZEJ: No lymphadenopathy.  CHEST WALL AND LOWER NECK: Left upper chest wall cardiac device. Markedly   enlarged thyroid.   VISUALIZED UPPER ABDOMEN: Hyperdense liver.  BONES: Mild multilevel spinal degenerative changes.    < end of copied text >

## 2017-10-21 NOTE — CHART NOTE - NSCHARTNOTEFT_GEN_A_CORE
Patient has agreed to anticoagulation, though she refused Arixtra due to lack of reversal agent and prior bleeding history - patient also has a history of HIT.  Discussed with Dr. Beltran and Dr. Chicas.  Will send HIT antibody to see if still positive and start argatroban.  Patient to be seen by HemOnc for evaluation and additional reccs.

## 2017-10-21 NOTE — PROGRESS NOTE ADULT - SUBJECTIVE AND OBJECTIVE BOX
Subjective: Patient seen and examined. No new events except as noted.     REVIEW OF SYSTEMS:    CONSTITUTIONAL: + weakness, no fevers or chills  EYES/ENT: No visual changes;  No vertigo or throat pain   NECK: No pain or stiffness  RESPIRATORY: No cough, wheezing, hemoptysis; No shortness of breath  CARDIOVASCULAR: No chest pain or palpitations  GASTROINTESTINAL: No abdominal or epigastric pain. No nausea, vomiting, or hematemesis; No diarrhea or constipation. No melena or hematochezia.  GENITOURINARY: No dysuria, frequency or hematuria  NEUROLOGICAL: No numbness or weakness  SKIN: +sacral decubitus ulcer   All other review of systems is negative unless indicated above.    MEDICATIONS:  MEDICATIONS  (STANDING):  amLODIPine   Tablet 10 milliGRAM(s) Oral daily  argatroban Infusion 2 MICROgram(s)/kG/Min (15.192 mL/Hr) IV Continuous <Continuous>  ascorbic acid 500 milliGRAM(s) Oral daily  carvedilol 25 milliGRAM(s) Oral every 12 hours  ferrous    sulfate 325 milliGRAM(s) Oral daily  hydrALAZINE 25 milliGRAM(s) Oral every 8 hours  influenza   Vaccine 0.5 milliLiter(s) IntraMuscular once  labetalol 200 milliGRAM(s) Oral every 8 hours  pantoprazole    Tablet 40 milliGRAM(s) Oral before breakfast  predniSONE   Tablet 10 milliGRAM(s) Oral two times a day      PHYSICAL EXAM:  T(C): 36.9 (10-21-17 @ 15:31), Max: 36.9 (10-20-17 @ 21:18)  HR: 82 (10-21-17 @ 17:49) (79 - 87)  BP: 161/82 (10-21-17 @ 17:49) (145/83 - 167/89)  RR: 17 (10-21-17 @ 17:49) (16 - 18)  SpO2: 96% (10-21-17 @ 17:49) (95% - 96%)  Wt(kg): --  I&O's Summary    20 Oct 2017 07:01  -  21 Oct 2017 07:00  --------------------------------------------------------  IN: 1040 mL / OUT: 1550 mL / NET: -510 mL    21 Oct 2017 07:01  -  21 Oct 2017 20:36  --------------------------------------------------------  IN: 720 mL / OUT: 550 mL / NET: 170 mL          Appearance: Normal	  HEENT:   Normal oral mucosa, PERRL, EOMI	  Lymphatic: No lymphadenopathy , no edema  Cardiovascular: Normal S1 S2, No JVD, No murmurs , Peripheral pulses palpable 2+ bilaterally  Respiratory: Lungs clear to auscultation, normal effort 	  Gastrointestinal:  Soft, Non-tender, + BS	  Skin: No rashes, No ecchymoses, No cyanosis, warm to touch  Musculoskeletal: Normal range of motion, normal strength  Psychiatry:  Mood & affect appropriate  Ext: chronic massive LLE       LABS:    CARDIAC MARKERS:                                9.7    7.2   )-----------( 161      ( 21 Oct 2017 06:50 )             29.9     10-21    144  |  108  |  60<H>  ----------------------------<  100<H>  4.8   |  23  |  2.70<H>    Ca    8.7      21 Oct 2017 06:50      proBNP: Serum Pro-Brain Natriuretic Peptide: 9091 pg/mL (10-21 @ 06:50)    Lipid Profile:   HgA1c:   TSH:             TELEMETRY: VPaced 	    ECG:  	  RADIOLOGY:   DIAGNOSTIC TESTING:  [ ] Echocardiogram:  [ ]  Catheterization:  [ ] Stress Test:    OTHER:

## 2017-10-21 NOTE — PROGRESS NOTE ADULT - SUBJECTIVE AND OBJECTIVE BOX
Patient is  seen She is  well known to me  with  a complicated history of  left Iliac  aneurysm , Left iliac  and  vein  AV Fistula,  Post  repair  twice of the  aneurysm  , persistent  AV  communications in the pelvis   Post   coiling  an  embolisation of the  artyerial  feeders  . She has left  Leg DVT  I spoke to her She is  agreeable  to the  anticoagulation  regimen Left leg  can be  wrapped  with Ace bandage  once  she is on anticoagulation

## 2017-10-21 NOTE — PROGRESS NOTE ADULT - SUBJECTIVE AND OBJECTIVE BOX
MEDICINE, PROGRESS NOTE [phone]    COSTEN, SANDRA 57y [id number]    Patient seen and examined.  Patient is a 57y old  Female who presents with a chief complaint of shortness of breath (11 Oct 2017 22:01)  Pt states leg is hurting. She is also stating that she is willing to try anticoagulation at this time.    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)  Calf DVT (Deep Venous Thrombosis) (ICD9 453.42)  Adenomatous Goiter (ICD9 241.9)  Chronic Gout (ICD9 274.02)  LBBB (Left Bundle Branch Block) (ICD9 426.3)  CHF (Congestive Heart Failure) (ICD9 428.0)  Hx of aorta-iliac-femoral bypass  TOP (Termination of Pregnancy)  S/P Dilatation and Curettage  History of Tubal Ligation  s/p AAA (Abdominal Aortic Aneurysm) Repair: 2009 2010  History of Cholecystectomy  S/P Partial Hysterectomy    MEDICATIONS  (STANDING):  amLODIPine   Tablet 10 milliGRAM(s) Oral daily  ascorbic acid 500 milliGRAM(s) Oral daily  carvedilol 25 milliGRAM(s) Oral every 12 hours  ferrous    sulfate 325 milliGRAM(s) Oral daily  hydrALAZINE 25 milliGRAM(s) Oral every 8 hours  influenza   Vaccine 0.5 milliLiter(s) IntraMuscular once  labetalol 200 milliGRAM(s) Oral every 8 hours  pantoprazole    Tablet 40 milliGRAM(s) Oral before breakfast  predniSONE   Tablet 10 milliGRAM(s) Oral two times a day    MEDICATIONS  (PRN):    Allergies    heparin (Unknown)  losartan (Anaphylaxis)    Intolerances        PHYSICAL EXAM:  Constitutional: NAD  HEENT: Normocephalic, EOMI  Neck:  No JVD  Respiratory: CTA B/L, No wheezes  Cardiovascular: S1, S2, RRR, + systolic murmur  Gastrointestinal: BS+, soft, NT/ND  Extremities: No peripheral edema  Neurological: AAOX3, no focal deficits  Psychiatric: Normal mood, normal affect  : No Hook    Vital Signs Last 24 Hrs  T(C): 36.9 (21 Oct 2017 15:31), Max: 36.9 (20 Oct 2017 21:18)  T(F): 98.4 (21 Oct 2017 15:31), Max: 98.5 (20 Oct 2017 21:18)  HR: 80 (21 Oct 2017 15:31) (79 - 87)  BP: 163/81 (21 Oct 2017 15:31) (145/83 - 167/89)  BP(mean): --  RR: 16 (21 Oct 2017 15:31) (16 - 18)  SpO2: 96% (21 Oct 2017 15:31) (95% - 96%)  I&O's Summary    20 Oct 2017 07:01  -  21 Oct 2017 07:00  --------------------------------------------------------  IN: 1040 mL / OUT: 1550 mL / NET: -510 mL    21 Oct 2017 07:01  -  21 Oct 2017 17:15  --------------------------------------------------------  IN: 720 mL / OUT: 550 mL / NET: 170 mL        LABS:                        9.7    7.2   )-----------( 161      ( 21 Oct 2017 06:50 )             29.9     10-21    144  |  108  |  60<H>  ----------------------------<  100<H>  4.8   |  23  |  2.70<H>    Ca    8.7      21 Oct 2017 06:50 MEDICINE, PROGRESS NOTE [phone]    COSTEN, SANDRA 57y [id number]    Patient seen and examined.  Patient is a 57y old  Female who presents with a chief complaint of shortness of breath (11 Oct 2017 22:01)  Pt states leg is hurting. She is also stating that she is willing to try anticoagulation at this time.    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)  Calf DVT (Deep Venous Thrombosis) (ICD9 453.42)  Adenomatous Goiter (ICD9 241.9)  Chronic Gout (ICD9 274.02)  LBBB (Left Bundle Branch Block) (ICD9 426.3)  CHF (Congestive Heart Failure) (ICD9 428.0)  Hx of aorta-iliac-femoral bypass  TOP (Termination of Pregnancy)  S/P Dilatation and Curettage  History of Tubal Ligation  s/p AAA (Abdominal Aortic Aneurysm) Repair: 2009 2010  History of Cholecystectomy  S/P Partial Hysterectomy    MEDICATIONS  (STANDING):  amLODIPine   Tablet 10 milliGRAM(s) Oral daily  ascorbic acid 500 milliGRAM(s) Oral daily  carvedilol 25 milliGRAM(s) Oral every 12 hours  ferrous    sulfate 325 milliGRAM(s) Oral daily  hydrALAZINE 25 milliGRAM(s) Oral every 8 hours  influenza   Vaccine 0.5 milliLiter(s) IntraMuscular once  labetalol 200 milliGRAM(s) Oral every 8 hours  pantoprazole    Tablet 40 milliGRAM(s) Oral before breakfast  predniSONE   Tablet 10 milliGRAM(s) Oral two times a day    MEDICATIONS  (PRN):    Allergies    heparin (Unknown)  losartan (Anaphylaxis)    Intolerances        PHYSICAL EXAM:  Constitutional: NAD  HEENT: Normocephalic, EOMI  Neck:  No JVD  Respiratory: CTA B/L, No wheezes  Cardiovascular: S1, S2, RRR, + systolic murmur  Gastrointestinal: BS+, soft, NT/ND  Extremities: + edema le b/l Left >  right, left leg seems larger than yesterday.  Neurological: AAOX3, no focal deficits  Psychiatric: Normal mood, normal affect  : + Hook    Vital Signs Last 24 Hrs  T(C): 36.9 (21 Oct 2017 15:31), Max: 36.9 (20 Oct 2017 21:18)  T(F): 98.4 (21 Oct 2017 15:31), Max: 98.5 (20 Oct 2017 21:18)  HR: 80 (21 Oct 2017 15:31) (79 - 87)  BP: 163/81 (21 Oct 2017 15:31) (145/83 - 167/89)  BP(mean): --  RR: 16 (21 Oct 2017 15:31) (16 - 18)  SpO2: 96% (21 Oct 2017 15:31) (95% - 96%)  I&O's Summary    20 Oct 2017 07:01  -  21 Oct 2017 07:00  --------------------------------------------------------  IN: 1040 mL / OUT: 1550 mL / NET: -510 mL    21 Oct 2017 07:01  -  21 Oct 2017 17:15  --------------------------------------------------------  IN: 720 mL / OUT: 550 mL / NET: 170 mL        LABS:                        9.7    7.2   )-----------( 161      ( 21 Oct 2017 06:50 )             29.9     10-21    144  |  108  |  60<H>  ----------------------------<  100<H>  4.8   |  23  |  2.70<H>    Ca    8.7      21 Oct 2017 06:50

## 2017-10-21 NOTE — PROGRESS NOTE ADULT - ASSESSMENT
Left leg dvt  Pericardial effusion  SLE  acute on chronic LLE edema with pain  RUL ground glass opacity    appreciate pulm input Left leg dvt  Pericardial effusion  SLE  acute on chronic LLE edema with pain  RUL ground glass opacity  Hand cramps    appreciate pulm input, continue to monitor pt  f/u with cardio  await IR eval  check electrolytes  Heme eval for anticoagulation in the setting of pericardial effusion

## 2017-10-21 NOTE — CONSULT NOTE ADULT - PROBLEM SELECTOR RECOMMENDATION 2
LLE DVT with history of HIT and epistaxis with coumadin. L gluteal hematoma noted  -AC per vascular LLE DVT with history of HIT and epistaxis with coumadin. L gluteal hematoma noted  -Pt should be anticoagulated or if contraindicated then receive an IVC Filter

## 2017-10-22 LAB
ANION GAP SERPL CALC-SCNC: 14 MMOL/L — SIGNIFICANT CHANGE UP (ref 5–17)
APTT BLD: 77.5 SEC — HIGH (ref 27.5–37.4)
APTT BLD: 80.9 SEC — HIGH (ref 27.5–37.4)
BUN SERPL-MCNC: 61 MG/DL — HIGH (ref 7–23)
CALCIUM SERPL-MCNC: 8.7 MG/DL — SIGNIFICANT CHANGE UP (ref 8.4–10.5)
CHLORIDE SERPL-SCNC: 107 MMOL/L — SIGNIFICANT CHANGE UP (ref 96–108)
CK MB CFR SERPL CALC: 4.3 NG/ML — HIGH (ref 0–3.8)
CK SERPL-CCNC: 82 U/L — SIGNIFICANT CHANGE UP (ref 25–170)
CO2 SERPL-SCNC: 23 MMOL/L — SIGNIFICANT CHANGE UP (ref 22–31)
CREAT SERPL-MCNC: 2.76 MG/DL — HIGH (ref 0.5–1.3)
GLUCOSE SERPL-MCNC: 101 MG/DL — HIGH (ref 70–99)
HCT VFR BLD CALC: 30.6 % — LOW (ref 34.5–45)
HCT VFR BLD CALC: 34.1 % — LOW (ref 34.5–45)
HGB BLD-MCNC: 10.6 G/DL — LOW (ref 11.5–15.5)
HGB BLD-MCNC: 9.4 G/DL — LOW (ref 11.5–15.5)
MCHC RBC-ENTMCNC: 27.8 PG — SIGNIFICANT CHANGE UP (ref 27–34)
MCHC RBC-ENTMCNC: 28.5 PG — SIGNIFICANT CHANGE UP (ref 27–34)
MCHC RBC-ENTMCNC: 30.6 GM/DL — LOW (ref 32–36)
MCHC RBC-ENTMCNC: 31.2 GM/DL — LOW (ref 32–36)
MCV RBC AUTO: 90.9 FL — SIGNIFICANT CHANGE UP (ref 80–100)
MCV RBC AUTO: 91.2 FL — SIGNIFICANT CHANGE UP (ref 80–100)
PF4 HEPARIN CMPLX AB SER-ACNC: NEGATIVE — SIGNIFICANT CHANGE UP
PF4 HEPARIN CMPLX AB SERPL QL IA: 0.17 ABS — SIGNIFICANT CHANGE UP
PLATELET # BLD AUTO: 167 K/UL — SIGNIFICANT CHANGE UP (ref 150–400)
PLATELET # BLD AUTO: 172 K/UL — SIGNIFICANT CHANGE UP (ref 150–400)
POTASSIUM SERPL-MCNC: 5 MMOL/L — SIGNIFICANT CHANGE UP (ref 3.5–5.3)
POTASSIUM SERPL-SCNC: 5 MMOL/L — SIGNIFICANT CHANGE UP (ref 3.5–5.3)
RBC # BLD: 3.37 M/UL — LOW (ref 3.8–5.2)
RBC # BLD: 3.74 M/UL — LOW (ref 3.8–5.2)
RBC # FLD: 14 % — SIGNIFICANT CHANGE UP (ref 10.3–14.5)
RBC # FLD: 14.3 % — SIGNIFICANT CHANGE UP (ref 10.3–14.5)
SODIUM SERPL-SCNC: 144 MMOL/L — SIGNIFICANT CHANGE UP (ref 135–145)
T4 FREE SERPL-MCNC: 1.1 NG/DL — SIGNIFICANT CHANGE UP (ref 0.9–1.8)
TROPONIN T SERPL-MCNC: 0.19 NG/ML — HIGH (ref 0–0.06)
TSH SERPL-MCNC: 0.33 UIU/ML — SIGNIFICANT CHANGE UP (ref 0.27–4.2)
WBC # BLD: 8.2 K/UL — SIGNIFICANT CHANGE UP (ref 3.8–10.5)
WBC # BLD: 8.2 K/UL — SIGNIFICANT CHANGE UP (ref 3.8–10.5)
WBC # FLD AUTO: 8.2 K/UL — SIGNIFICANT CHANGE UP (ref 3.8–10.5)
WBC # FLD AUTO: 8.2 K/UL — SIGNIFICANT CHANGE UP (ref 3.8–10.5)

## 2017-10-22 PROCEDURE — 93010 ELECTROCARDIOGRAM REPORT: CPT

## 2017-10-22 RX ADMIN — Medication 200 MILLIGRAM(S): at 00:53

## 2017-10-22 RX ADMIN — CARVEDILOL PHOSPHATE 25 MILLIGRAM(S): 80 CAPSULE, EXTENDED RELEASE ORAL at 20:38

## 2017-10-22 RX ADMIN — Medication 25 MILLIGRAM(S): at 05:34

## 2017-10-22 RX ADMIN — ARGATROBAN 15.19 MICROGRAM(S)/KG/MIN: 50 INJECTION, SOLUTION INTRAVENOUS at 18:05

## 2017-10-22 RX ADMIN — Medication 25 MILLIGRAM(S): at 13:43

## 2017-10-22 RX ADMIN — Medication 10 MILLIGRAM(S): at 09:42

## 2017-10-22 RX ADMIN — PANTOPRAZOLE SODIUM 40 MILLIGRAM(S): 20 TABLET, DELAYED RELEASE ORAL at 06:06

## 2017-10-22 RX ADMIN — Medication 500 MILLIGRAM(S): at 13:42

## 2017-10-22 RX ADMIN — Medication 325 MILLIGRAM(S): at 13:42

## 2017-10-22 RX ADMIN — Medication 25 MILLIGRAM(S): at 22:41

## 2017-10-22 RX ADMIN — AMLODIPINE BESYLATE 10 MILLIGRAM(S): 2.5 TABLET ORAL at 09:42

## 2017-10-22 RX ADMIN — Medication 200 MILLIGRAM(S): at 18:03

## 2017-10-22 RX ADMIN — CARVEDILOL PHOSPHATE 25 MILLIGRAM(S): 80 CAPSULE, EXTENDED RELEASE ORAL at 11:39

## 2017-10-22 RX ADMIN — Medication 10 MILLIGRAM(S): at 20:37

## 2017-10-22 RX ADMIN — Medication 200 MILLIGRAM(S): at 09:42

## 2017-10-22 RX ADMIN — ARGATROBAN 15.19 MICROGRAM(S)/KG/MIN: 50 INJECTION, SOLUTION INTRAVENOUS at 04:57

## 2017-10-22 NOTE — PROGRESS NOTE ADULT - SUBJECTIVE AND OBJECTIVE BOX
Follow-up Pulm Progress Note    No new respiratory events overnight.  Denies SOB/CP.   96% on RA    Medications:  MEDICATIONS  (STANDING):  amLODIPine   Tablet 10 milliGRAM(s) Oral daily  argatroban Infusion 2 MICROgram(s)/kG/Min (15.192 mL/Hr) IV Continuous <Continuous>  ascorbic acid 500 milliGRAM(s) Oral daily  carvedilol 25 milliGRAM(s) Oral every 12 hours  ferrous    sulfate 325 milliGRAM(s) Oral daily  hydrALAZINE 25 milliGRAM(s) Oral every 8 hours  influenza   Vaccine 0.5 milliLiter(s) IntraMuscular once  labetalol 200 milliGRAM(s) Oral every 8 hours  pantoprazole    Tablet 40 milliGRAM(s) Oral before breakfast  predniSONE   Tablet 10 milliGRAM(s) Oral two times a day    MEDICATIONS  (PRN):          Vital Signs Last 24 Hrs  T(C): 36.9 (22 Oct 2017 12:00), Max: 37.1 (21 Oct 2017 23:55)  T(F): 98.5 (22 Oct 2017 12:00), Max: 98.7 (21 Oct 2017 23:55)  HR: 80 (22 Oct 2017 12:00) (78 - 82)  BP: 140/83 (22 Oct 2017 12:00) (140/83 - 164/82)  BP(mean): --  RR: 18 (22 Oct 2017 12:00) (17 - 18)  SpO2: 96% (22 Oct 2017 12:00) (95% - 97%) on RA          10-21 @ 07:01  -  10-22 @ 07:00  --------------------------------------------------------  IN: 1371 mL / OUT: 1400 mL / NET: -29 mL          LABS:                        9.4    8.2   )-----------( 172      ( 22 Oct 2017 05:27 )             30.6     10-22    144  |  107  |  61<H>  ----------------------------<  101<H>  5.0   |  23  |  2.76<H>    Ca    8.7      22 Oct 2017 05:27  Mg     2.2     10-21            CAPILLARY BLOOD GLUCOSE        PTT - ( 22 Oct 2017 11:45 )  PTT:80.9 sec      Serum Pro-Brain Natriuretic Peptide: 9091 pg/mL (10-21-17 @ 06:50)                CULTURES: (if applicable)          Physical Examination:  PULM: Decreased BS throughout   CVS: S1, S2 heard    RADIOLOGY REVIEWED  CT Chest: < from: CT Chest No Cont (10.18.17 @ 23:51) >  LUNGS AND LARGE AIRWAYS: Right upper lobe peripheral groundglass patchy   opacity, increased since October 11, 2017. Findings likely represent   infection. Partial compressive atelectasis of the left lower lobe.   Minimal compressive atelectasis of the right lower lobe. Patent central   airways.  No pulmonary nodules.  PLEURA: Small left and trace right pleural effusions.  VESSELS: Atherosclerotic disease of aorta.  HEART: Cardiomegaly. Small to moderate pericardial effusion, similar in   size to 10/11/2017.  MEDIASTINUM AND ANDRZEJ: No lymphadenopathy.  CHEST WALL AND LOWER NECK: Left upper chest wall cardiac device. Markedly   enlarged thyroid.   VISUALIZED UPPER ABDOMEN: Hyperdense liver.  BONES: Mild multilevel spinal degenerative changes.    < end of copied text >

## 2017-10-22 NOTE — CONSULT NOTE ADULT - ASSESSMENT
Patient is a 58 yo F w/ hx of SLE, systolic CHF (EF 32%) s/p AICD, HTN, CKD III- IV, PE / left leg DVT/ PE in  2009 followed by  HIT, CAD (no stents), stage IV sacral ulcer, chronic fox, hx of recurrent pericardial effusion from SLE, AAA ~14cm in 12/2016 s/p repair in 2010 w/ aortoiliac stent c/b post-operatively with development of left lower extremity compartment syndrome admitted with  progressive shortness of breath.   She has acute LLE EVT but did not take AC at dx of DVT because of h/o bleeding. She was not a candidate for IVC filter and did not want it anyway. She agreed for now but she is  afraid of heparin because of h/o HIT and she does not not Arixtra etc which does not have antidote or long half life.   I told patient that getting once HIT does not mean that she will get HIT again.   She can be on heparin infusion rather than argatroban with close monitoring of CBC, PTT and clinically specially if HIT antibody is negative.   Problem will be with anticoagulation at D/C as she does not want coumadin or any oral drug which does not have antidote.   She give h/o bleeding including epistaxis and vaginal bleeding/menorrhagia without AC as well. Apparently she was w/u in past and no etiology was found.   She also had w/u for DVT and states does not remember it any abnormality was found. LA w/u was negative.   I will try to find records regarding previous w/u and patient will get any records she has. Patient is a 56 yo F w/ hx of SLE, systolic CHF (EF 32%) s/p AICD, HTN, CKD III- IV, PE / left leg DVT/ PE in  2009 followed by  HIT, CAD (no stents), stage IV sacral ulcer, chronic fox, hx of recurrent pericardial effusion from SLE, AAA ~14cm in 12/2016 s/p repair in 2010 w/ aortoiliac stent c/b post-operatively with development of left lower extremity compartment syndrome admitted with  progressive shortness of breath.   She has acute LLE EVT but did not take AC at dx of DVT because of h/o bleeding. She was not a candidate for IVC filter and did not want it anyway. She agreed for now but she is  afraid of heparin because of h/o HIT and she does not not Arixtra etc which does not have antidote or long half life.   I told patient that getting once HIT does not mean that she will get HIT again.   She can be on heparin infusion rather than argatroban with close monitoring of CBC, PTT and clinically specially if HIT antibody is negative.   Problem will be with anticoagulation at D/C as she does not want coumadin or any oral drug which does not have antidote.   She give h/o bleeding including epistaxis and vaginal bleeding/menorrhagia without AC as well. Apparently she was w/u in past and no etiology was found.   She also had w/u for DVT and states does not remember it any abnormality was found. LA w/u was negative.   I will try to find records regarding previous w/u and patient will get any records she has.   Anemia is related to CKD and inflammation as long as there is no bleeding. Ferritin was high

## 2017-10-22 NOTE — PROGRESS NOTE ADULT - ASSESSMENT
56 y/o female PMHx chronic systolic CHF (last known EF=32%) s/p AICD, SLE, AAA approx 14cm as of 12/2016 s/p aortoiliac stent, DVT/PE (not currently anticoagulated 2/2 HIT), h/o LLE compartment syndrome, multiple pericardial effusions s/p drainage most recently in Jan 2017 now presents with recurrent large pericardial effusion     1. Large pericardial effusion without tamponade.  	Likely chronic effusion, h/o SLE              Rheumatology consult reviewed    2. CT surgery recommended no surgical intervention and drainage by interventional means   Interventional Cardiology  and Vascular recommending radiologic guidance given the nature of the effusion - appears loculated). Will again discuss the possibility and feasibility of interventional approach with interventional radiolology    3. Venous LE Doppler positive for DVT. Started on Argatroban (History Of HIT). Monitor CBC

## 2017-10-22 NOTE — CONSULT NOTE ADULT - SUBJECTIVE AND OBJECTIVE BOX
HPI:  58 yo F w/ hx of SLE, systolic CHF (EF 32%) s/p AICD, HTN, CKD III- IV, PE / left leg DVT dx 2009, HIT, CAD (no stents), stage IV sacral ulcer, chronic fox, hx of recurrent pericardial effusion, AAA ~14cm in 12/2016 s/p repair in 2010 w/ aortoiliac stent c/b post-operatively with development of left lower extremity compartment syndrome admitted with  progressive shortness of breath. She history history of DVT/PE in 2009 and was treated  w/ heparin and developed HIT and warfarin for anticoagulation. The warfarin was discontinued because of what patient reports was life threatening epistaxis. Patient at baseline has chronic left leg edema in setting of her prior fasciotomy for compartment syndrome but it was more swollen and has acute DVT.    Patient had hospital admission in December 2016 for pericardial effusion requiring drainage/pericardiocentesis and another similar admission in 1/2017 and at that time also had pericardial drain placed. She was diagnosed with SLE. She been taking prednisone 5mg once daily.   Patient was also more anemic without obvious bleeding.  She did not take AC at dx of DVT because of h/o bleeding     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)  Calf DVT (Deep Venous Thrombosis) (ICD9 453.42)  Adenomatous Goiter (ICD9 241.9)  Chronic Gout (ICD9 274.02)  LBBB (Left Bundle Branch Block) (ICD9 426.3)  CHF (Congestive Heart Failure) (ICD9 428.0)  Hx of aorta-iliac-femoral bypass  TOP (Termination of Pregnancy)  S/P Dilatation and Curettage  History of Tubal Ligation  s/p AAA (Abdominal Aortic Aneurysm) Repair: 2009 2010  History of Cholecystectomy  S/P Partial Hysterectomy    Meds:  amLODIPine   Tablet 10 milliGRAM(s) Oral daily  argatroban Infusion 2 MICROgram(s)/kG/Min IV Continuous <Continuous>  ascorbic acid 500 milliGRAM(s) Oral daily  carvedilol 25 milliGRAM(s) Oral every 12 hours  ferrous    sulfate 325 milliGRAM(s) Oral daily  hydrALAZINE 25 milliGRAM(s) Oral every 8 hours  influenza   Vaccine 0.5 milliLiter(s) IntraMuscular once  labetalol 200 milliGRAM(s) Oral every 8 hours  pantoprazole    Tablet 40 milliGRAM(s) Oral before breakfast  predniSONE   Tablet 10 milliGRAM(s) Oral two times a day    Labs:  CBC Full  -  ( 22 Oct 2017 05:27 )  WBC Count : 8.2 K/uL  Hemoglobin : 9.4 g/dL  Hematocrit : 30.6 %  Platelet Count - Automated : 172 K/uL  Mean Cell Volume : 90.9 fl  Mean Cell Hemoglobin : 27.8 pg  Mean Cell Hemoglobin Concentration : 30.6 gm/dL  Auto Neutrophil # : x  Auto Lymphocyte # : x  Auto Monocyte # : x  Auto Eosinophil # : x  Auto Basophil # : x  Auto Neutrophil % : x  Auto Lymphocyte % : x  Auto Monocyte % : x  Auto Eosinophil % : x  Auto Basophil % : x    10-22    144  |  107  |  61<H>  ----------------------------<  101<H>  5.0   |  23  |  2.76<H>    Ca    8.7      22 Oct 2017 05:27  Mg     2.2     10-21        Radiology:     < from: [facility] Duplex Lower Ext Vein Scan, Left (10.12.17 @ 13:29) >  Acute, above the knee DVT in the left common   femoral and femoral veins. The femoral vein at the mid and distal thigh   levels as well as the popliteal vein and calf veins were not visualized   due to limb size and swelling.    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    < end of copied text >          ROS:  No pain, no fever  No lumps in neck or pain  No Sob or chest pain  No palpitations   No abdominal pain. No change in bowel habit. No blood in stools  left leg swelling    Vital Signs Last 24 Hrs  T(C): 36.8 (22 Oct 2017 04:32), Max: 37.1 (21 Oct 2017 23:55)  T(F): 98.3 (22 Oct 2017 04:32), Max: 98.7 (21 Oct 2017 23:55)  HR: 81 (22 Oct 2017 04:32) (78 - 82)  BP: 145/81 (22 Oct 2017 04:32) (145/81 - 164/82)  BP(mean): --  RR: 18 (22 Oct 2017 04:32) (16 - 18)  SpO2: 96% (22 Oct 2017 04:32) (95% - 97%)    Physical Exam:  Patient comfortable  AXOX3  Neck supple, no LN's  Chest: bilateral breath sounds, no wheeze or rales  CVS: regular heart rate without murmur  Abdomen: soft, BS+, no massess or organomegaly  CNS: no gross deficit  Ext: LLE swelling

## 2017-10-22 NOTE — PROGRESS NOTE ADULT - ASSESSMENT
58 yo F w/ hx of SLE, systolic CHF (EF 25%) s/p AICD, HTN, CKD III- IV, DVT/PE (2009) no AC 2nd epistaxis, HIT, CAD (no stents), stage IV sacral ulcer, urinary retention with chronic fox, recurrent pericardial effusion, AAA s/p repair in 2010, L Iliac  aneurysm s/p repair x2, Left iliac AV Fistula, LLE compartment syndrome s/p fasciotomy. Presents 10/11 with shortness of breath with exertion, increased LLE edema, increased peripheral edema, ILANA on CKD

## 2017-10-22 NOTE — PROGRESS NOTE ADULT - SUBJECTIVE AND OBJECTIVE BOX
Subjective: Patient seen and examined. No new events except as noted.   no chest pain or sob   REVIEW OF SYSTEMS:    CONSTITUTIONAL: No weakness, fevers or chills  EYES/ENT: No visual changes;  No vertigo or throat pain   NECK: No pain or stiffness  RESPIRATORY: No cough, wheezing, hemoptysis; No shortness of breath  CARDIOVASCULAR: No chest pain or palpitations  GASTROINTESTINAL: No abdominal or epigastric pain. No nausea, vomiting, or hematemesis; No diarrhea or constipation. No melena or hematochezia.  GENITOURINARY: No dysuria, frequency or hematuria  NEUROLOGICAL: No numbness or weakness  SKIN: No itching, burning, rashes, or lesions   All other review of systems is negative unless indicated above.    MEDICATIONS:  MEDICATIONS  (STANDING):  amLODIPine   Tablet 10 milliGRAM(s) Oral daily  argatroban Infusion 2 MICROgram(s)/kG/Min (15.192 mL/Hr) IV Continuous <Continuous>  ascorbic acid 500 milliGRAM(s) Oral daily  carvedilol 25 milliGRAM(s) Oral every 12 hours  ferrous    sulfate 325 milliGRAM(s) Oral daily  hydrALAZINE 25 milliGRAM(s) Oral every 8 hours  influenza   Vaccine 0.5 milliLiter(s) IntraMuscular once  labetalol 200 milliGRAM(s) Oral every 8 hours  pantoprazole    Tablet 40 milliGRAM(s) Oral before breakfast  predniSONE   Tablet 10 milliGRAM(s) Oral two times a day      PHYSICAL EXAM:  T(C): 36.8 (10-22-17 @ 04:32), Max: 37.1 (10-21-17 @ 23:55)  HR: 81 (10-22-17 @ 04:32) (78 - 82)  BP: 145/81 (10-22-17 @ 04:32) (145/81 - 164/82)  RR: 18 (10-22-17 @ 04:32) (16 - 18)  SpO2: 96% (10-22-17 @ 04:32) (95% - 97%)  Wt(kg): --  I&O's Summary    21 Oct 2017 07:01  -  22 Oct 2017 07:00  --------------------------------------------------------  IN: 1371 mL / OUT: 1400 mL / NET: -29 mL          Appearance: Normal	  HEENT:   Normal oral mucosa, PERRL, EOMI	  Lymphatic: No lymphadenopathy , no edema  Cardiovascular: Normal S1 S2, No JVD, No murmurs , Peripheral pulses palpable 2+ bilaterally  Respiratory: Lungs clear to auscultation, normal effort 	  Gastrointestinal:  Soft, Non-tender, + BS	  Skin:+Sacral decubitus ulcer   skeletal: Normal range of motion, normal strength  Psychiatry:  Mood & affect appropriate  Ext: chronic LLE edema   +Hook     LABS:    CARDIAC MARKERS:                                9.4    8.2   )-----------( 172      ( 22 Oct 2017 05:27 )             30.6     10-22    144  |  107  |  61<H>  ----------------------------<  101<H>  5.0   |  23  |  2.76<H>    Ca    8.7      22 Oct 2017 05:27  Mg     2.2     10-21      proBNP:   Lipid Profile:   HgA1c:   TSH: Thyroid Stimulating Hormone, Serum: 0.33 uIU/mL (10-22 @ 08:40)              TELEMETRY: Vpaced   ECG:  	  RADIOLOGY:   DIAGNOSTIC TESTING:  [ ] Echocardiogram:  [ ]  Catheterization:  [ ] Stress Test:    OTHER:

## 2017-10-22 NOTE — CHART NOTE - NSCHARTNOTEFT_GEN_A_CORE
COSTEN, SANDRA    Notified by RN patient pt experiencing midsternal chest pain.  Pt was sitting in her chair and felt a pain on the left side of her face. Pain is described as a pressure.       Interventions taken:  12 lead ekg done.   Discussed with Attending.   Obtain Cardiac enzymes along with CBC.    Continue Telemetry monitoring.          [facility] #[id number]

## 2017-10-22 NOTE — PROGRESS NOTE ADULT - ASSESSMENT
Atypical chest pain  large pericardial effusion without tamponade  lupus  left leg dvt  chronic left leg edema which appears to be worse  Ground glass opacity RUL  ckd 3    check ekg  cardiac enzymes X 3  check stat cbc  diuretics for volume overload  appreciate heme/onc input, pt agreed to argatroban  monitor plt counts closely

## 2017-10-22 NOTE — PROGRESS NOTE ADULT - SUBJECTIVE AND OBJECTIVE BOX
MEDICINE, PROGRESS NOTE [phone]    COSTEN, SANDRA 57y [id number]    Patient seen and examined.  Patient is a 57y old  Female who presents with a chief complaint of shortness of breath (11 Oct 2017 22:01)  Pt c/o chest pain. Pt c/o left leg pain.    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)  Calf DVT (Deep Venous Thrombosis) (ICD9 453.42)  Adenomatous Goiter (ICD9 241.9)  Chronic Gout (ICD9 274.02)  LBBB (Left Bundle Branch Block) (ICD9 426.3)  CHF (Congestive Heart Failure) (ICD9 428.0)  Hx of aorta-iliac-femoral bypass  TOP (Termination of Pregnancy)  S/P Dilatation and Curettage  History of Tubal Ligation  s/p AAA (Abdominal Aortic Aneurysm) Repair: 2009 2010  History of Cholecystectomy  S/P Partial Hysterectomy    MEDICATIONS  (STANDING):  amLODIPine   Tablet 10 milliGRAM(s) Oral daily  argatroban Infusion 2 MICROgram(s)/kG/Min (15.192 mL/Hr) IV Continuous <Continuous>  ascorbic acid 500 milliGRAM(s) Oral daily  carvedilol 25 milliGRAM(s) Oral every 12 hours  ferrous    sulfate 325 milliGRAM(s) Oral daily  hydrALAZINE 25 milliGRAM(s) Oral every 8 hours  influenza   Vaccine 0.5 milliLiter(s) IntraMuscular once  labetalol 200 milliGRAM(s) Oral every 8 hours  pantoprazole    Tablet 40 milliGRAM(s) Oral before breakfast  predniSONE   Tablet 10 milliGRAM(s) Oral two times a day    MEDICATIONS  (PRN):    Allergies    heparin (Unknown)  losartan (Anaphylaxis)    Intolerances        PHYSICAL EXAM:  Constitutional: NAD  HEENT: Normocephalic, EOMI  Neck:  No JVD  Respiratory: CTA B/L, No wheezes  Cardiovascular: S1, S2, RRR, + systolic murmur  Gastrointestinal: BS+, soft, NT/ND  Extremities: + peripheral edema le b/l left > right  Neurological: AAOX3, no focal deficits  Psychiatric: Normal mood, normal affect  : + Hook    Vital Signs Last 24 Hrs  T(C): 36.9 (22 Oct 2017 12:00), Max: 37.1 (21 Oct 2017 23:55)  T(F): 98.5 (22 Oct 2017 12:00), Max: 98.7 (21 Oct 2017 23:55)  HR: 80 (22 Oct 2017 12:00) (78 - 82)  BP: 140/83 (22 Oct 2017 12:00) (140/83 - 164/82)  BP(mean): --  RR: 18 (22 Oct 2017 12:00) (17 - 18)  SpO2: 96% (22 Oct 2017 12:00) (95% - 97%)  I&O's Summary    21 Oct 2017 07:01  -  22 Oct 2017 07:00  --------------------------------------------------------  IN: 1371 mL / OUT: 1400 mL / NET: -29 mL    22 Oct 2017 07:01  -  22 Oct 2017 17:26  --------------------------------------------------------  IN: 960 mL / OUT: 650 mL / NET: 310 mL        LABS:                        9.4    8.2   )-----------( 172      ( 22 Oct 2017 05:27 )             30.6     10-22    144  |  107  |  61<H>  ----------------------------<  101<H>  5.0   |  23  |  2.76<H>    Ca    8.7      22 Oct 2017 05:27  Mg     2.2     10-21          Magnesium, Serum: 2.2 mg/dL (10-21 @ 20:49)

## 2017-10-22 NOTE — PROGRESS NOTE ADULT - PROBLEM SELECTOR PLAN 1
Multifactorial-restrictive disease 2nd obesity and cardiomegaly, pericardial effusion, debility and volume overloaded, with systolic HF (EF 25%)   -Would recommend Lasix as able  -Pericardial effusion is stable, no plan for pericardiocentesis at this time per cards  -Peripheral GGO in RUL new from Oct 11 2017 CT. Patient appears non-toxic. Recommend repeat CT chest in 6 weeks to further eval

## 2017-10-23 LAB
ANION GAP SERPL CALC-SCNC: 12 MMOL/L — SIGNIFICANT CHANGE UP (ref 5–17)
APTT BLD: 41.2 SEC — HIGH (ref 27.5–37.4)
APTT BLD: 72.1 SEC — HIGH (ref 27.5–37.4)
APTT BLD: > 200 SEC (ref 27.5–37.4)
BUN SERPL-MCNC: 61 MG/DL — HIGH (ref 7–23)
CALCIUM SERPL-MCNC: 8.6 MG/DL — SIGNIFICANT CHANGE UP (ref 8.4–10.5)
CHLORIDE SERPL-SCNC: 104 MMOL/L — SIGNIFICANT CHANGE UP (ref 96–108)
CO2 SERPL-SCNC: 24 MMOL/L — SIGNIFICANT CHANGE UP (ref 22–31)
CREAT SERPL-MCNC: 2.55 MG/DL — HIGH (ref 0.5–1.3)
GLUCOSE SERPL-MCNC: 146 MG/DL — HIGH (ref 70–99)
INR BLD: 1.42 RATIO — HIGH (ref 0.88–1.16)
INR BLD: ABNORMAL RATIO (ref 0.88–1.16)
POTASSIUM SERPL-MCNC: 5 MMOL/L — SIGNIFICANT CHANGE UP (ref 3.5–5.3)
POTASSIUM SERPL-SCNC: 5 MMOL/L — SIGNIFICANT CHANGE UP (ref 3.5–5.3)
PROTHROM AB SERPL-ACNC: 125.4 SEC — HIGH (ref 9.8–12.7)
PROTHROM AB SERPL-ACNC: 15.4 SEC — HIGH (ref 9.8–12.7)
SODIUM SERPL-SCNC: 140 MMOL/L — SIGNIFICANT CHANGE UP (ref 135–145)
TROPONIN T SERPL-MCNC: 0.19 NG/ML — HIGH (ref 0–0.06)

## 2017-10-23 RX ORDER — FUROSEMIDE 40 MG
20 TABLET ORAL DAILY
Qty: 0 | Refills: 0 | Status: DISCONTINUED | OUTPATIENT
Start: 2017-10-23 | End: 2017-10-25

## 2017-10-23 RX ORDER — SUCRALFATE 1 G
1 TABLET ORAL THREE TIMES A DAY
Qty: 0 | Refills: 0 | Status: DISCONTINUED | OUTPATIENT
Start: 2017-10-23 | End: 2017-11-09

## 2017-10-23 RX ADMIN — Medication 200 MILLIGRAM(S): at 18:35

## 2017-10-23 RX ADMIN — Medication 25 MILLIGRAM(S): at 13:51

## 2017-10-23 RX ADMIN — Medication 25 MILLIGRAM(S): at 22:16

## 2017-10-23 RX ADMIN — Medication 10 MILLIGRAM(S): at 09:25

## 2017-10-23 RX ADMIN — Medication 500 MILLIGRAM(S): at 13:51

## 2017-10-23 RX ADMIN — Medication 200 MILLIGRAM(S): at 00:24

## 2017-10-23 RX ADMIN — CARVEDILOL PHOSPHATE 25 MILLIGRAM(S): 80 CAPSULE, EXTENDED RELEASE ORAL at 09:23

## 2017-10-23 RX ADMIN — CARVEDILOL PHOSPHATE 25 MILLIGRAM(S): 80 CAPSULE, EXTENDED RELEASE ORAL at 20:53

## 2017-10-23 RX ADMIN — PANTOPRAZOLE SODIUM 40 MILLIGRAM(S): 20 TABLET, DELAYED RELEASE ORAL at 06:02

## 2017-10-23 RX ADMIN — ARGATROBAN 15.19 MICROGRAM(S)/KG/MIN: 50 INJECTION, SOLUTION INTRAVENOUS at 18:45

## 2017-10-23 RX ADMIN — Medication 200 MILLIGRAM(S): at 09:27

## 2017-10-23 RX ADMIN — AMLODIPINE BESYLATE 10 MILLIGRAM(S): 2.5 TABLET ORAL at 09:23

## 2017-10-23 RX ADMIN — Medication 10 MILLIGRAM(S): at 18:32

## 2017-10-23 RX ADMIN — Medication 25 MILLIGRAM(S): at 05:55

## 2017-10-23 RX ADMIN — Medication 1 GRAM(S): at 22:17

## 2017-10-23 RX ADMIN — Medication 325 MILLIGRAM(S): at 13:51

## 2017-10-23 NOTE — PROGRESS NOTE ADULT - SUBJECTIVE AND OBJECTIVE BOX
Subjective: Patient seen and examined. No new events except as noted.   chest pain yesterday relieved with MAalox     REVIEW OF SYSTEMS:    CONSTITUTIONAL: No weakness, fevers or chills  EYES/ENT: No visual changes;  No vertigo or throat pain   NECK: No pain or stiffness  RESPIRATORY: No cough, wheezing, hemoptysis; No shortness of breath  CARDIOVASCULAR: No chest pain or palpitations  GASTROINTESTINAL: No abdominal or epigastric pain. No nausea, vomiting, or hematemesis; No diarrhea or constipation. No melena or hematochezia.  GENITOURINARY: No dysuria, frequency or hematuria  NEUROLOGICAL: No numbness or weakness  SKIN: No itching, burning, rashes, or lesions   All other review of systems is negative unless indicated above.    MEDICATIONS:  MEDICATIONS  (STANDING):  amLODIPine   Tablet 10 milliGRAM(s) Oral daily  argatroban Infusion 2 MICROgram(s)/kG/Min (15.192 mL/Hr) IV Continuous <Continuous>  ascorbic acid 500 milliGRAM(s) Oral daily  carvedilol 25 milliGRAM(s) Oral every 12 hours  ferrous    sulfate 325 milliGRAM(s) Oral daily  hydrALAZINE 25 milliGRAM(s) Oral every 8 hours  influenza   Vaccine 0.5 milliLiter(s) IntraMuscular once  labetalol 200 milliGRAM(s) Oral every 8 hours  pantoprazole    Tablet 40 milliGRAM(s) Oral before breakfast  predniSONE   Tablet 10 milliGRAM(s) Oral two times a day      PHYSICAL EXAM:  T(C): 36.7 (10-23-17 @ 04:41), Max: 37.3 (10-22-17 @ 22:29)  HR: 81 (10-23-17 @ 04:41) (76 - 82)  BP: 151/83 (10-23-17 @ 04:41) (140/83 - 164/84)  RR: 18 (10-23-17 @ 04:41) (18 - 19)  SpO2: 96% (10-23-17 @ 04:41) (96% - 96%)  Wt(kg): --  I&O's Summary    22 Oct 2017 07:01  -  23 Oct 2017 07:00  --------------------------------------------------------  IN: 1565.7 mL / OUT: 1750 mL / NET: -184.3 mL          Appearance: Normal	  HEENT:   Normal oral mucosa, PERRL, EOMI	  Lymphatic: No lymphadenopathy , no edema  Cardiovascular: Normal S1 S2, No JVD, No murmurs , Peripheral pulses palpable 2+ bilaterally  Respiratory: Lungs clear to auscultation, normal effort 	  Gastrointestinal:  Soft, Non-tender, + BS	  Skin: No rashes, No ecchymoses, No cyanosis, warm to touch  Musculoskeletal: Normal range of motion, normal strength  Psychiatry:  Mood & affect appropriate  Ext: Chronic LLE edema   +fox       LABS:    CARDIAC MARKERS:  CARDIAC MARKERS ( 23 Oct 2017 00:48 )  x     / 0.19 ng/mL / x     / x     / x      CARDIAC MARKERS ( 22 Oct 2017 19:34 )  x     / 0.19 ng/mL / 82 U/L / x     / 4.3 ng/mL                                10.6   8.2   )-----------( 167      ( 22 Oct 2017 19:34 )             34.1     10-23    140  |  104  |  61<H>  ----------------------------<  146<H>  5.0   |  24  |  2.55<H>    Ca    8.6      23 Oct 2017 09:52  Mg     2.2     10-21      proBNP:   Lipid Profile:   HgA1c:   TSH:             TELEMETRY: 	    ECG:  	  RADIOLOGY:   DIAGNOSTIC TESTING:  [ ] Echocardiogram:  [ ]  Catheterization:  [ ] Stress Test:    OTHER:

## 2017-10-23 NOTE — PROGRESS NOTE ADULT - ASSESSMENT
Chronic left leg edema with dvt  h/o hit on argatroban  ckd 3  pericardial effsuion- no intervention at present per cts and IR  lupus    continue current care  wrap leg per vascular and consider low dose diuretics  steroids per rheum.  monitor i/o closely  appreciate cardio/heme/pulm input

## 2017-10-23 NOTE — PROGRESS NOTE ADULT - SUBJECTIVE AND OBJECTIVE BOX
MEDICINE, PROGRESS NOTE [phone]    COSTEN, SANDRA 57y [id number]    Patient seen and examined.  Patient is a 57y old  Female who presents with a chief complaint of shortness of breath (11 Oct 2017 22:01)  Pt c/o burning chest pain all day and discomfort in left leg.. Pt is adamant that she doesn't want to try heparin.     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)  Calf DVT (Deep Venous Thrombosis) (ICD9 453.42)  Adenomatous Goiter (ICD9 241.9)  Chronic Gout (ICD9 274.02)  LBBB (Left Bundle Branch Block) (ICD9 426.3)  CHF (Congestive Heart Failure) (ICD9 428.0)  Hx of aorta-iliac-femoral bypass  TOP (Termination of Pregnancy)  S/P Dilatation and Curettage  History of Tubal Ligation  s/p AAA (Abdominal Aortic Aneurysm) Repair: 2009 2010  History of Cholecystectomy  S/P Partial Hysterectomy    MEDICATIONS  (STANDING):  amLODIPine   Tablet 10 milliGRAM(s) Oral daily  argatroban Infusion 2 MICROgram(s)/kG/Min (15.192 mL/Hr) IV Continuous <Continuous>  ascorbic acid 500 milliGRAM(s) Oral daily  carvedilol 25 milliGRAM(s) Oral every 12 hours  ferrous    sulfate 325 milliGRAM(s) Oral daily  hydrALAZINE 25 milliGRAM(s) Oral every 8 hours  influenza   Vaccine 0.5 milliLiter(s) IntraMuscular once  labetalol 200 milliGRAM(s) Oral every 8 hours  pantoprazole    Tablet 40 milliGRAM(s) Oral before breakfast  predniSONE   Tablet 10 milliGRAM(s) Oral two times a day  sucralfate suspension 1 Gram(s) Oral three times a day    MEDICATIONS  (PRN):    Allergies    heparin (Unknown)  losartan (Anaphylaxis)  nitroglycerin (Other)    Intolerances        PHYSICAL EXAM:  Constitutional: NAD  HEENT: Normocephalic, EOMI  Neck:  No JVD  Respiratory: CTA B/L, No wheezes  Cardiovascular: S1, S2, RRR, + systolic murmur  Gastrointestinal: BS+, soft, NT/ND  Extremities: + peripheral edema LE b/l Left > right, Left leg is larger today  Neurological: AAOX3, no focal deficits  Psychiatric: Normal mood, normal affect  : + Hook    Vital Signs Last 24 Hrs  T(C): 36.9 (23 Oct 2017 12:56), Max: 37.3 (22 Oct 2017 22:29)  T(F): 98.4 (23 Oct 2017 12:56), Max: 99.1 (22 Oct 2017 22:29)  HR: 71 (23 Oct 2017 12:56) (71 - 81)  BP: 143/82 (23 Oct 2017 12:56) (143/82 - 164/84)  BP(mean): --  RR: 18 (23 Oct 2017 12:56) (18 - 18)  SpO2: 97% (23 Oct 2017 12:56) (96% - 97%)  I&O's Summary    22 Oct 2017 07:01  -  23 Oct 2017 07:00  --------------------------------------------------------  IN: 1565.7 mL / OUT: 1750 mL / NET: -184.3 mL    23 Oct 2017 07:01  -  23 Oct 2017 19:41  --------------------------------------------------------  IN: 460 mL / OUT: 500 mL / NET: -40 mL        LABS:                        10.6   8.2   )-----------( 167      ( 22 Oct 2017 19:34 )             34.1     10-23    140  |  104  |  61<H>  ----------------------------<  146<H>  5.0   |  24  |  2.55<H>    Ca    8.6      23 Oct 2017 09:52  Mg     2.2     10-21      CARDIAC MARKERS ( 23 Oct 2017 00:48 )  x     / 0.19 ng/mL / x     / x     / x      CARDIAC MARKERS ( 22 Oct 2017 19:34 )  x     / 0.19 ng/mL / 82 U/L / x     / 4.3 ng/mL

## 2017-10-23 NOTE — PROGRESS NOTE ADULT - SUBJECTIVE AND OBJECTIVE BOX
Follow-up Pulm Progress Note    No new respiratory events overnight.  Denies SOB/CP.     Medications:  MEDICATIONS  (STANDING):  amLODIPine   Tablet 10 milliGRAM(s) Oral daily  argatroban Infusion 2 MICROgram(s)/kG/Min (15.192 mL/Hr) IV Continuous <Continuous>  ascorbic acid 500 milliGRAM(s) Oral daily  carvedilol 25 milliGRAM(s) Oral every 12 hours  ferrous    sulfate 325 milliGRAM(s) Oral daily  hydrALAZINE 25 milliGRAM(s) Oral every 8 hours  influenza   Vaccine 0.5 milliLiter(s) IntraMuscular once  labetalol 200 milliGRAM(s) Oral every 8 hours  pantoprazole    Tablet 40 milliGRAM(s) Oral before breakfast  predniSONE   Tablet 10 milliGRAM(s) Oral two times a day    MEDICATIONS  (PRN):    Vital Signs Last 24 Hrs  T(C): 36.9 (23 Oct 2017 12:56), Max: 37.3 (22 Oct 2017 22:29)  T(F): 98.4 (23 Oct 2017 12:56), Max: 99.1 (22 Oct 2017 22:29)  HR: 71 (23 Oct 2017 12:56) (71 - 82)  BP: 143/82 (23 Oct 2017 12:56) (143/82 - 164/84)  BP(mean): --  RR: 18 (23 Oct 2017 12:56) (18 - 19)  SpO2: 97% (23 Oct 2017 12:56) (96% - 97%)          10-22 @ 07:01  -  10-23 @ 07:00  --------------------------------------------------------  IN: 1565.7 mL / OUT: 1750 mL / NET: -184.3 mL    LABS:                        10.6   8.2   )-----------( 167      ( 22 Oct 2017 19:34 )             34.1     10-23    140  |  104  |  61<H>  ----------------------------<  146<H>  5.0   |  24  |  2.55<H>    Ca    8.6      23 Oct 2017 09:52  Mg     2.2     10-21        CARDIAC MARKERS ( 23 Oct 2017 00:48 )  x     / 0.19 ng/mL / x     / x     / x      CARDIAC MARKERS ( 22 Oct 2017 19:34 )  x     / 0.19 ng/mL / 82 U/L / x     / 4.3 ng/mL      CAPILLARY BLOOD GLUCOSE        PT/INR - ( 23 Oct 2017 14:32 )   PT: 15.4 sec;   INR: 1.42 ratio         PTT - ( 23 Oct 2017 09:52 )  PTT:> 200 sec      Serum Pro-Brain Natriuretic Peptide: 9091 pg/mL (10-21-17 @ 06:50)      HIT ab Negative 10-21 @ 23:02  HIT ab EIA 0.170    CULTURES: (if applicable)          Physical Examination:  PULM: decreased BS at bases  CVS: S1, S2 heard    RADIOLOGY REVIEWED  CXR:     CT chest:    TTE:

## 2017-10-23 NOTE — PROGRESS NOTE ADULT - ASSESSMENT
58 yo F w/ hx of SLE, systolic CHF (EF 32%) s/p AICD, HTN, CKD III- IV, PE / left leg DVT dx 2009, HIT, CAD (no stents), stage IV sacral ulcer, chronic fox, hx of recurrent pericardial effusion, AAA ~14cm in 12/2016 s/p repair in 2010 w/ aortoiliac stent c/b post-operatively with development of left lower extremity   lymphedema December 2016;  pericardial effusion pericardial drain/SLE. now with ilana on ckd with baseline creatinine approx 1.8-2.     1- ILANA on ckd III  2- chf hx  3- cardiomyopathy  4- HTN   5- SLE  6- proteinuria    creatinine still elevated. has pericardial effusion as well.   cont with B-B .  Norvasc 10 mg daily  hyrdalazine to cont.   prednisone 5mg daily   nephrotic syndrome.   continue with Fox catheter for chronic obstructive uropathy.  lasix 20mg iv qd and increase dose as tolerated. I d/w cards today   d/w Dr. Chicas

## 2017-10-23 NOTE — PROGRESS NOTE ADULT - SUBJECTIVE AND OBJECTIVE BOX
[facility]   [phone]  RENAL FOLLOW UP NOTE  --------------------------------------------------------------------------------  Chief Complaint:    24 hour events/subjective:    c/o edema     PAST HISTORY  --------------------------------------------------------------------------------  No significant changes to PMH, PSH, FHx, SHx, unless otherwise noted    ALLERGIES & MEDICATIONS  --------------------------------------------------------------------------------  Allergies    heparin (Unknown)  losartan (Anaphylaxis)  nitroglycerin (Other)    Intolerances      Standing Inpatient Medications  amLODIPine   Tablet 10 milliGRAM(s) Oral daily  argatroban Infusion 2 MICROgram(s)/kG/Min IV Continuous <Continuous>  ascorbic acid 500 milliGRAM(s) Oral daily  carvedilol 25 milliGRAM(s) Oral every 12 hours  ferrous    sulfate 325 milliGRAM(s) Oral daily  furosemide   Injectable 20 milliGRAM(s) IV Push daily  hydrALAZINE 25 milliGRAM(s) Oral every 8 hours  influenza   Vaccine 0.5 milliLiter(s) IntraMuscular once  labetalol 200 milliGRAM(s) Oral every 8 hours  pantoprazole    Tablet 40 milliGRAM(s) Oral before breakfast  predniSONE   Tablet 10 milliGRAM(s) Oral two times a day  sucralfate suspension 1 Gram(s) Oral three times a day    PRN Inpatient Medications      REVIEW OF SYSTEMS  --------------------------------------------------------------------------------    Gen: deniesfevers/chills,  CVS: denies chest pain/palpitations  Resp: denies SOB/Cough  GI: Denies N/V/Abd pain  : Denies dysuria/  + c/o edema left leg  All other systems were reviewed and are negative, except as noted.    VITALS/PHYSICAL EXAM  --------------------------------------------------------------------------------  T(C): 36.9 (10-23-17 @ 12:56), Max: 37.3 (10-22-17 @ 22:29)  HR: 71 (10-23-17 @ 12:56) (71 - 81)  BP: 143/82 (10-23-17 @ 12:56) (143/82 - 164/84)  RR: 18 (10-23-17 @ 12:56) (18 - 18)  SpO2: 97% (10-23-17 @ 12:56) (96% - 97%)  Wt(kg): --        10-22-17 @ 07:01  -  10-23-17 @ 07:00  --------------------------------------------------------  IN: 1565.7 mL / OUT: 1750 mL / NET: -184.3 mL    10-23-17 @ 07:01  -  10-23-17 @ 20:36  --------------------------------------------------------  IN: 460 mL / OUT: 500 mL / NET: -40 mL      Physical Exam:  	    Physical Exam:  	Gen: alert oriented place person and date   	Pulm: Decreased breath sounds b/l bases. no rales or ronchi or wheezing  	CV: RRR, S1/S2. no rub  	Back: No CVA tenderness; no sacral edema  	Abd: +BS, soft, nontender/nondistended  	: No suprapubic tenderness.               Extremity: No cyanosis, LLE 4+ pitting RLE 2- edema   	  LABS/STUDIES  --------------------------------------------------------------------------------              10.6   8.2   >-----------<  167      [10-22-17 @ 19:34]              34.1     140  |  104  |  61  ----------------------------<  146      [10-23-17 @ 09:52]  5.0   |  24  |  2.55        Ca     8.6     [10-23-17 @ 09:52]      Mg     2.2     [10-21-17 @ 20:49]      PT/INR: PT 15.4 , INR 1.42       [10-23-17 @ 14:32]  PTT: 41.2       [10-23-17 @ 14:32]    Troponin 0.19      [10-23-17 @ 00:48]  CK 82      [10-22-17 @ 19:34]    Creatinine Trend:  SCr 2.55 [10-23 @ 09:52]  SCr 2.76 [10-22 @ 05:27]  SCr 2.86 [10-21 @ 20:49]  SCr 2.70 [10-21 @ 06:50]  SCr 2.74 [10-20 @ 06:09]              Urinalysis - [10-11-17 @ 18:44]      Color Yellow / Appearance SL Turbid / SG 1.015 / pH 8.0      Gluc Negative / Ketone Negative  / Bili Negative / Urobili Negative       Blood Negative / Protein >600 / Leuk Est Large / Nitrite Negative      RBC  / WBC 5-10 / Hyaline  / Gran  / Sq Epi  / Non Sq Epi  / Bacteria     Urine Creatinine 81      [10-18-17 @ 07:39]  Urine Protein 254      [10-18-17 @ 07:39]    Iron 48, TIBC --, %sat --      [10-12-17 @ 07:14]  Ferritin 847.0      [10-12-17 @ 07:14]  HbA1c 4.5      [08-09-16 @ 06:49]  TSH 0.33      [10-22-17 @ 08:40]    dsDNA 44      [10-17-17 @ 08:26]  C3 Complement 105      [10-17-17 @ 08:26]  C4 Complement 31      [10-17-17 @ 08:26]

## 2017-10-23 NOTE — PROGRESS NOTE ADULT - SUBJECTIVE AND OBJECTIVE BOX
HPI:  58 yo F w/ hx of SLE, systolic CHF (EF 32%) s/p AICD, HTN, CKD III- IV, PE / left leg DVT dx 2009, HIT, CAD (no stents), stage IV sacral ulcer, chronic fox, hx of recurrent pericardial effusion, AAA ~14cm in 12/2016 s/p repair in 2010 w/ aortoiliac stent c/b post-operatively with development of left lower extremity compartment syndrome admitted with  progressive shortness of breath. She history history of DVT/PE in 2009 and was treated  w/ heparin and developed HIT and warfarin for anticoagulation. The warfarin was discontinued because of what patient reports was life threatening epistaxis. Patient at baseline has chronic left leg edema in setting of her prior fasciotomy for compartment syndrome but it was more swollen and has acute DVT.    Patient had hospital admission in December 2016 for pericardial effusion requiring drainage/pericardiocentesis and another similar admission in 1/2017 and at that time also had pericardial drain placed. She was diagnosed with SLE. She been taking prednisone 5mg once daily.   Patient was also more anemic without obvious bleeding.  She did not take AC at dx of DVT because of h/o bleeding   She is on argatroban as she did not want any other AC     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)  Calf DVT (Deep Venous Thrombosis) (ICD9 453.42)  Adenomatous Goiter (ICD9 241.9)  Chronic Gout (ICD9 274.02)  LBBB (Left Bundle Branch Block) (ICD9 426.3)  CHF (Congestive Heart Failure) (ICD9 428.0)  Hx of aorta-iliac-femoral bypass  TOP (Termination of Pregnancy)  S/P Dilatation and Curettage  History of Tubal Ligation  s/p AAA (Abdominal Aortic Aneurysm) Repair: 2009 2010  History of Cholecystectomy  S/P Partial Hysterectomy    Medications:  amLODIPine   Tablet 10 milliGRAM(s) Oral daily  argatroban Infusion 2 MICROgram(s)/kG/Min IV Continuous <Continuous>  ascorbic acid 500 milliGRAM(s) Oral daily  carvedilol 25 milliGRAM(s) Oral every 12 hours  ferrous    sulfate 325 milliGRAM(s) Oral daily  hydrALAZINE 25 milliGRAM(s) Oral every 8 hours  influenza   Vaccine 0.5 milliLiter(s) IntraMuscular once  labetalol 200 milliGRAM(s) Oral every 8 hours  pantoprazole    Tablet 40 milliGRAM(s) Oral before breakfast  predniSONE   Tablet 10 milliGRAM(s) Oral two times a day    Labs:  CBC Full  -  ( 22 Oct 2017 19:34 )  WBC Count : 8.2 K/uL  Hemoglobin : 10.6 g/dL  Hematocrit : 34.1 %  Platelet Count - Automated : 167 K/uL  Mean Cell Volume : 91.2 fl  Mean Cell Hemoglobin : 28.5 pg  Mean Cell Hemoglobin Concentration : 31.2 gm/dL  Auto Neutrophil # : x  Auto Lymphocyte # : x  Auto Monocyte # : x  Auto Eosinophil # : x  Auto Basophil # : x  Auto Neutrophil % : x  Auto Lymphocyte % : x  Auto Monocyte % : x  Auto Eosinophil % : x  Auto Basophil % : x    10-23    140  |  104  |  61<H>  ----------------------------<  146<H>  5.0   |  24  |  2.55<H>    Ca    8.6      23 Oct 2017 09:52  Mg     2.2     10-21                    ROS:  Patient comfortable without distress  No SOB or chest pain  No palpitation  No abdominal pain, diarrhaea or constipation  No weakness of extremities  Left leg swelling as before    Vital Signs Last 24 Hrs  T(C): 36.7 (23 Oct 2017 04:41), Max: 37.3 (22 Oct 2017 22:29)  T(F): 98 (23 Oct 2017 04:41), Max: 99.1 (22 Oct 2017 22:29)  HR: 81 (23 Oct 2017 04:41) (76 - 82)  BP: 151/83 (23 Oct 2017 04:41) (140/83 - 164/84)  BP(mean): --  RR: 18 (23 Oct 2017 04:41) (18 - 19)  SpO2: 96% (23 Oct 2017 04:41) (96% - 96%)    Physical exam:  Patient alert and oriented  No distress  CVS: S1, S2 regular or murmur  Chest: bilateral breath sound without rales  Abdomen: soft, not tender, no organomegaly or masses  No focal neuro deficit  Left leg as before      Assessment and Plan:

## 2017-10-23 NOTE — PROGRESS NOTE ADULT - ASSESSMENT
Patient is a 58 yo F w/ hx of SLE, systolic CHF (EF 32%) s/p AICD, HTN, CKD III- IV, PE / left leg DVT/ PE in  2009 followed by  HIT, CAD (no stents), stage IV sacral ulcer, chronic fox, hx of recurrent pericardial effusion from SLE, AAA ~14cm in 12/2016 s/p repair in 2010 w/ aortoiliac stent c/b post-operatively with development of left lower extremity compartment syndrome admitted with  progressive shortness of breath.   She has acute LLE EVT but did not take AC at dx of DVT because of h/o bleeding. She was not a candidate for IVC filter and did not want it anyway. She agreed on 10/21/17 but was  afraid of heparin because of h/o HIT and she did not want any drug which does not have antidote or long half life.   I told patient that getting once HIT does not mean that she will get HIT again.   Now her HIT ab is negative and thus she does not need argatroban, I explained this at length to patient, she can be on heparin infusion with   close monitoring of CBC, PTT and clinically.   Problem will be with anticoagulation at D/C as she does not want coumadin or any oral drug which does not have antidote. In fact she states that she does not want to be on any AC and will take her risk  She give h/o bleeding including epistaxis and vaginal bleeding/menorrhagia without AC as well. Apparently she was w/u in past and no etiology was found.   She also had w/u for DVT and states does not remember it any abnormality was found. LA w/u was negative.   I will try to find records regarding previous w/u and patient will get any records she has.   Anemia is related to CKD and inflammation as long as there is no bleeding. Ferritin was high

## 2017-10-23 NOTE — PROGRESS NOTE ADULT - ASSESSMENT
58 y/o female PMHx chronic systolic CHF (last known EF=32%) s/p AICD, SLE, AAA approx 14cm as of 12/2016 s/p aortoiliac stent, DVT/PE (not currently anticoagulated 2/2 HIT), h/o LLE compartment syndrome, multiple pericardial effusions s/p drainage most recently in Jan 2017 now presents with recurrent large pericardial effusion     1. Large pericardial effusion without tamponade.  	Likely chronic effusion, h/o SLE              Rheumatology consult reviewed    2. CT surgery recommended no surgical intervention and drainage by interventional means   Interventional Cardiology  and Vascular recommending radiologic guidance given the nature of the effusion - appears loculated). Will again discuss the possibility and feasibility of interventional approach with interventional radiolology    3. Venous LE Doppler positive for DVT. Started on Argatroban (History Of HIT). Monitor CBC

## 2017-10-24 LAB
ANION GAP SERPL CALC-SCNC: 15 MMOL/L — SIGNIFICANT CHANGE UP (ref 5–17)
APTT BLD: 70.3 SEC — HIGH (ref 27.5–37.4)
APTT BLD: 74 SEC — HIGH (ref 27.5–37.4)
APTT BLD: 75.3 SEC — HIGH (ref 27.5–37.4)
APTT BLD: 76.3 SEC — HIGH (ref 27.5–37.4)
APTT BLD: 78.1 SEC — HIGH (ref 27.5–37.4)
APTT BLD: 79.1 SEC — HIGH (ref 27.5–37.4)
BUN SERPL-MCNC: 64 MG/DL — HIGH (ref 7–23)
CALCIUM SERPL-MCNC: 8.6 MG/DL — SIGNIFICANT CHANGE UP (ref 8.4–10.5)
CHLORIDE SERPL-SCNC: 106 MMOL/L — SIGNIFICANT CHANGE UP (ref 96–108)
CO2 SERPL-SCNC: 22 MMOL/L — SIGNIFICANT CHANGE UP (ref 22–31)
CREAT SERPL-MCNC: 2.74 MG/DL — HIGH (ref 0.5–1.3)
GLUCOSE SERPL-MCNC: 98 MG/DL — SIGNIFICANT CHANGE UP (ref 70–99)
HCT VFR BLD CALC: 30.6 % — LOW (ref 34.5–45)
HGB BLD-MCNC: 9.8 G/DL — LOW (ref 11.5–15.5)
INR BLD: 2.13 RATIO — HIGH (ref 0.88–1.16)
MCHC RBC-ENTMCNC: 29 PG — SIGNIFICANT CHANGE UP (ref 27–34)
MCHC RBC-ENTMCNC: 32.1 GM/DL — SIGNIFICANT CHANGE UP (ref 32–36)
MCV RBC AUTO: 90.6 FL — SIGNIFICANT CHANGE UP (ref 80–100)
PLATELET # BLD AUTO: 164 K/UL — SIGNIFICANT CHANGE UP (ref 150–400)
POTASSIUM SERPL-MCNC: 5 MMOL/L — SIGNIFICANT CHANGE UP (ref 3.5–5.3)
POTASSIUM SERPL-SCNC: 5 MMOL/L — SIGNIFICANT CHANGE UP (ref 3.5–5.3)
PROTHROM AB SERPL-ACNC: 23.4 SEC — HIGH (ref 9.8–12.7)
RBC # BLD: 3.38 M/UL — LOW (ref 3.8–5.2)
RBC # FLD: 14.3 % — SIGNIFICANT CHANGE UP (ref 10.3–14.5)
SODIUM SERPL-SCNC: 143 MMOL/L — SIGNIFICANT CHANGE UP (ref 135–145)
WBC # BLD: 9.3 K/UL — SIGNIFICANT CHANGE UP (ref 3.8–10.5)
WBC # FLD AUTO: 9.3 K/UL — SIGNIFICANT CHANGE UP (ref 3.8–10.5)

## 2017-10-24 PROCEDURE — 99232 SBSQ HOSP IP/OBS MODERATE 35: CPT

## 2017-10-24 RX ADMIN — Medication 1 GRAM(S): at 06:11

## 2017-10-24 RX ADMIN — CARVEDILOL PHOSPHATE 25 MILLIGRAM(S): 80 CAPSULE, EXTENDED RELEASE ORAL at 09:30

## 2017-10-24 RX ADMIN — Medication 25 MILLIGRAM(S): at 13:54

## 2017-10-24 RX ADMIN — Medication 500 MILLIGRAM(S): at 13:54

## 2017-10-24 RX ADMIN — Medication 325 MILLIGRAM(S): at 13:54

## 2017-10-24 RX ADMIN — Medication 200 MILLIGRAM(S): at 10:56

## 2017-10-24 RX ADMIN — Medication 200 MILLIGRAM(S): at 00:13

## 2017-10-24 RX ADMIN — Medication 10 MILLIGRAM(S): at 19:00

## 2017-10-24 RX ADMIN — Medication 20 MILLIGRAM(S): at 06:11

## 2017-10-24 RX ADMIN — AMLODIPINE BESYLATE 10 MILLIGRAM(S): 2.5 TABLET ORAL at 09:30

## 2017-10-24 RX ADMIN — PANTOPRAZOLE SODIUM 40 MILLIGRAM(S): 20 TABLET, DELAYED RELEASE ORAL at 06:11

## 2017-10-24 RX ADMIN — Medication 25 MILLIGRAM(S): at 06:11

## 2017-10-24 RX ADMIN — Medication 200 MILLIGRAM(S): at 19:00

## 2017-10-24 RX ADMIN — Medication 10 MILLIGRAM(S): at 09:31

## 2017-10-24 RX ADMIN — Medication 25 MILLIGRAM(S): at 22:18

## 2017-10-24 RX ADMIN — ARGATROBAN 15.19 MICROGRAM(S)/KG/MIN: 50 INJECTION, SOLUTION INTRAVENOUS at 11:16

## 2017-10-24 RX ADMIN — CARVEDILOL PHOSPHATE 25 MILLIGRAM(S): 80 CAPSULE, EXTENDED RELEASE ORAL at 20:25

## 2017-10-24 NOTE — CHART NOTE - NSCHARTNOTEFT_GEN_A_CORE
Source: Patient [x]    Family [ ]     other [ x]chart    Diet : DASH    As per cardiology note:    58 y/o female PMHx chronic systolic CHF (last known EF=32%) s/p AICD, SLE, AAA approx 14cm as of 12/2016 s/p aortoiliac stent, DVT/PE (not currently anticoagulated 2/2 HIT), h/o LLE compartment syndrome, multiple pericardial effusions s/p drainage most recently in Jan 2017 now presents with recurrent large pericardial effusion     1. Large pericardial effusion without tamponade.  	Likely chronic effusion, h/o SLE              Rheumatology consult reviewed    2. CT surgery recommended no surgical intervention and drainage by interventional means   Interventional Cardiology  and Vascular recommending radiologic guidance given the nature of the effusion - appears loculated). Will again discuss the possibility and feasibility of interventional approach with interventional radiolology    3. Venous LE Doppler positive for DVT. Started on Argatroban (History Of HIT). Monitor CBC          Patient reports [ ] nausea  [ ] vomiting [ ] diarrhea [ ] constipation  [ ]chewing problems [ ] swallowing issues  [x ] other: no issues related to food/nutrition, refused ed     PO intake:  < 50% [ ] 50-75% [ ]   % [ x]  other :     Source for PO intake [x ] Patient [ ] family [ ] chart [ ] staff [ ] other     Enteral /Parenteral Nutrition:       Current Weight: 124.8kg/275.1pounds  % Weight Change 1% since 10/14    Pertinent Medications: MEDICATIONS  (STANDING):  amLODIPine   Tablet 10 milliGRAM(s) Oral daily  argatroban Infusion 2 MICROgram(s)/kG/Min (15.192 mL/Hr) IV Continuous <Continuous>  ascorbic acid 500 milliGRAM(s) Oral daily  carvedilol 25 milliGRAM(s) Oral every 12 hours  ferrous    sulfate 325 milliGRAM(s) Oral daily  furosemide   Injectable 20 milliGRAM(s) IV Push daily  hydrALAZINE 25 milliGRAM(s) Oral every 8 hours  influenza   Vaccine 0.5 milliLiter(s) IntraMuscular once  labetalol 200 milliGRAM(s) Oral every 8 hours  pantoprazole    Tablet 40 milliGRAM(s) Oral before breakfast  predniSONE   Tablet 10 milliGRAM(s) Oral two times a day  sucralfate suspension 1 Gram(s) Oral three times a day    MEDICATIONS  (PRN):    Pertinent Labs:  10-24 Na143 mmol/L Glu 98 mg/dL K+ 5.0 mmol/L Cr  2.74 mg/dL<H> BUN 64 mg/dL<H>                       Skin: +1 generalized, +4 left leg/ankle/foot, stage 4 right ischium    Estimated Needs:   [x ] no change since previous assessment  [ ] recalculated:       Previous Nutrition Diagnosis:     [ ] Inadequate Energy Intake [ ]Inadequate Oral Intake [ ] Excessive Energy Intake     [ ] Underweight [x] Increased Nutrient Needs(protein) [ ] Overweight/Obesity     [ ] Altered GI Function [ ] Unintended Weight Loss [ ] Food & Nutrition Related Knowledge Deficit [ ] Malnutrition          Nutrition Diagnosis is [ x] ongoing  [ ] resolved [ ] not applicable          New Nutrition Diagnosis: [x ] not applicable    [ ] Inadequate Protein Energy Intake [ ]Inadequate Oral Intake [ ] Excessive Energy Intake     [ ] Underweight [ ] Increased Nutrient Needs [ ] Overweight/Obesity     [ ] Altered GI Function [ ] Unintended Weight Loss [ ] Food & Nutrition Related Knowledge Deficit[ ] Limited Adherence to nutrition related recommendations [ ] Malnutrition  [ ] other: Free text       Related to:      As evidenced by:      Interventions:     Recommend    [ ] Change Diet To:    [x ] Nutrition Supplement: Miguel x2 daily    [ ] Nutrition Support    [x ] Other: Nepro-alyse daily       Monitoring and Evaluation:     [x ] PO intake [ x] Tolerance to diet prescription [ x] weights [ x] follow up per protocol    [ ] other:

## 2017-10-24 NOTE — PROGRESS NOTE ADULT - ASSESSMENT
Chronic left leg edema with dvt  h/o hit on argatroban  ckd 3  pericardial effsuion- no intervention at present per cts and IR  lupus  onychomycosis seen by podiatry    continue current care  contnue leg wraps until leg small enough for pt to ambulate  continue a/c  pt contemplating po anticoagulation  mointor I/O

## 2017-10-24 NOTE — PROGRESS NOTE ADULT - PROBLEM SELECTOR PLAN 1
Multifactorial-restrictive disease 2nd obesity and cardiomegaly, pericardial effusion, debility and volume overloaded, with systolic HF (EF 25%)   - Lasix as per renal  -Pericardial effusion is stable, no plan for pericardiocentesis at this time per cards  -Peripheral GGO in RUL new from Oct 11 2017 CT. Patient appears non-toxic. Recommend repeat CT chest in 6 weeks to further eval

## 2017-10-24 NOTE — PROGRESS NOTE ADULT - ASSESSMENT
Patient is a 56 yo F w/ hx of SLE, systolic CHF (EF 32%) s/p AICD, HTN, CKD III- IV, PE / left leg DVT/ PE in  2009 followed by  HIT, CAD (no stents), stage IV sacral ulcer, chronic ofx, hx of recurrent pericardial effusion from SLE, AAA ~14cm in 12/2016 s/p repair in 2010 w/ aortoiliac stent c/b post-operatively with development of left lower extremity compartment syndrome admitted with  progressive shortness of breath.   She has acute LLE EVT but did not take AC at dx of DVT because of h/o bleeding. She was not a candidate for IVC filter and did not want it anyway. She agreed on 10/21/17 but was  afraid of heparin because of h/o HIT and she did not want any drug which does not have antidote or long half life.   HIT ab is negative thus chance of getting recurrent HIT with short term exposure to unfractionated heparin is low. She can also be monitored closely in Hospital.  However, patient is on argatroban as she does not want to take a chance.  Our problem is clinically, she requires anticoagulation for at least 3 months unless acutely bleeding and ideally longer because a previous history of thrombosis and lupus anticoagulant.  Our options are limited because she does not want Coumadin, does not want anything with long half-life, not having an antidote and she has CKD.  In fact patient does not want to be on blood thinner, because of her history of bleeding.  Incidentally, in 2013  she had von Willebrand's disease workup and factor assays which were normal.  She may have platelet dysfunction from renal insufficiency as one of the factor causing epistaxis and past.

## 2017-10-24 NOTE — PROGRESS NOTE ADULT - SUBJECTIVE AND OBJECTIVE BOX
SUBJECTIVE: Pt seen, chart reviewed.  Pt w/o complaints or concerns  no pain, odor, f/c/s, redness, warmth, increased drainage    ROS- skin chronic wound see HPI  all other systems negative    argatroban Infusion 2 MICROgram(s)/kG/Min IV Continuous <Continuous>      Physical Exam:  Vital Signs Last 24 Hrs  T(C): 36.8 (24 Oct 2017 12:24), Max: 36.8 (24 Oct 2017 12:24)  T(F): 98.2 (24 Oct 2017 12:24), Max: 98.2 (24 Oct 2017 12:24)  HR: 88 (24 Oct 2017 12:24) (80 - 94)  BP: 167/91 (24 Oct 2017 12:24) (156/80 - 176/90)  BP(mean): --  RR: 18 (24 Oct 2017 12:24) (18 - 18)  SpO2: 97% (24 Oct 2017 12:24) (95% - 97%)    General Appearance:  NAD, A&Ox3, MO   Versa Care P500    Skin moist w/ good turgor  BUE w/ ecchymosis w/o hematoma in areas of failed IV sites w/o infection s/sx  Rt ischial stage iv pressure ulcer w/ macerated skin on wound edge  (+) moderate serosanguinous drainage  No odor, erythema, tender, induration, fluctuance, increased warmth  Lt medial buttock into gluteal cleft w/ stable firm mass c/w hematoma confirmed on US  resolving ecchymosis of surrounding skin- no open wounds or s/sx infection    LABS:                        9.8    9.3   )-----------( 164      ( 24 Oct 2017 07:15 )             30.6     PT/INR - ( 24 Oct 2017 07:15 )   PT: 23.4 sec;   INR: 2.13 ratio    PTT - ( 24 Oct 2017 11:02 )  PTT:79.1 sec    RADIOLOGY & ADDITIONAL STUDIES:  CULTURES:  < from: US Extremity Nonvasc Complete, Left (10.18.17 @ 13:29) >  Findings: Targeted sonogram of the left gluteal region demonstrates a   complex collection in the subcutaneous fat measuring greater than 5 cm in   diameter. Edematous changes are present in the overlying fat.    Impression: Left gluteal hematoma.

## 2017-10-24 NOTE — PROGRESS NOTE ADULT - SUBJECTIVE AND OBJECTIVE BOX
MEDICINE, PROGRESS NOTE [phone]    COSTEN, SANDRA 57y [id number]    Patient seen and examined.  Patient is a 57y old  Female who presents with a chief complaint of shortness of breath (11 Oct 2017 22:01)  Pt still with leg pain.    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)  Calf DVT (Deep Venous Thrombosis) (ICD9 453.42)  Adenomatous Goiter (ICD9 241.9)  Chronic Gout (ICD9 274.02)  LBBB (Left Bundle Branch Block) (ICD9 426.3)  CHF (Congestive Heart Failure) (ICD9 428.0)  Hx of aorta-iliac-femoral bypass  TOP (Termination of Pregnancy)  S/P Dilatation and Curettage  History of Tubal Ligation  s/p AAA (Abdominal Aortic Aneurysm) Repair: 2009 2010  History of Cholecystectomy  S/P Partial Hysterectomy    MEDICATIONS  (STANDING):  amLODIPine   Tablet 10 milliGRAM(s) Oral daily  argatroban Infusion 2 MICROgram(s)/kG/Min (15.192 mL/Hr) IV Continuous <Continuous>  ascorbic acid 500 milliGRAM(s) Oral daily  carvedilol 25 milliGRAM(s) Oral every 12 hours  ferrous    sulfate 325 milliGRAM(s) Oral daily  furosemide   Injectable 20 milliGRAM(s) IV Push daily  hydrALAZINE 25 milliGRAM(s) Oral every 8 hours  influenza   Vaccine 0.5 milliLiter(s) IntraMuscular once  labetalol 200 milliGRAM(s) Oral every 8 hours  pantoprazole    Tablet 40 milliGRAM(s) Oral before breakfast  predniSONE   Tablet 10 milliGRAM(s) Oral two times a day  sucralfate suspension 1 Gram(s) Oral three times a day    MEDICATIONS  (PRN):    Allergies    heparin (Unknown)  losartan (Anaphylaxis)  nitroglycerin (Other)    Intolerances        PHYSICAL EXAM:  Constitutional: NAD  HEENT: Normocephalic, EOMI  Neck:  No JVD  Respiratory: CTA B/L, No wheezes  Cardiovascular: S1, S2, RRR, + systolic murmur  Gastrointestinal: BS+, soft, NT/ND  Extremities: + peripheral edema le b/l -- left > right, left leg wrapped  Neurological: AAOX3, no focal deficits  Psychiatric: Normal mood, normal affect  : + Hook    Vital Signs Last 24 Hrs  T(C): 36.8 (24 Oct 2017 12:24), Max: 36.8 (24 Oct 2017 12:24)  T(F): 98.2 (24 Oct 2017 12:24), Max: 98.2 (24 Oct 2017 12:24)  HR: 88 (24 Oct 2017 12:24) (80 - 94)  BP: 167/91 (24 Oct 2017 12:24) (156/80 - 176/90)  BP(mean): --  RR: 18 (24 Oct 2017 12:24) (18 - 18)  SpO2: 97% (24 Oct 2017 12:24) (95% - 97%)  I&O's Summary    23 Oct 2017 07:01  -  24 Oct 2017 07:00  --------------------------------------------------------  IN: 1122.4 mL / OUT: 1700 mL / NET: -577.6 mL    24 Oct 2017 07:01  -  24 Oct 2017 20:39  --------------------------------------------------------  IN: 662.4 mL / OUT: 1700 mL / NET: -1037.6 mL        LABS:                        9.8    9.3   )-----------( 164      ( 24 Oct 2017 07:15 )             30.6     10-24    143  |  106  |  64<H>  ----------------------------<  98  5.0   |  22  |  2.74<H>    Ca    8.6      24 Oct 2017 07:15      CARDIAC MARKERS ( 23 Oct 2017 00:48 )  x     / 0.19 ng/mL / x     / x     / x

## 2017-10-24 NOTE — CONSULT NOTE ADULT - ASSESSMENT
1. bilateral hypertrohic Onychomycosis digits 1-5   2. Pain from Elongated nails, ingrowing  and dystrophic nails    Plan: Discussed diagnosis and treatment with patient  Patient was seen and examined  Aseptic debridement and curettage of all fungal and ingrowing nails 1-5 bilateral  Discussed importance of daily foot examinations and proper shoe gear  Please re-consult as needed.

## 2017-10-24 NOTE — PROGRESS NOTE ADULT - ASSESSMENT
A/P:  Rt Ischial Healing chronic stage iv pressure ucler- Aquacel packing, may add prizma if brought from home  Lt buttock possible resolving hematoma=   con't Nutrition (as tolerated)  con't Offloading   con't Pericare  Care as per medicine will follow w/ you  Upon discharge f/u as [facility] [phone]  D/w team  & attng  Tanisha Weathers PA-C [facility] [id number]  I spent 15  minutes w/ this pt of which more than 50% of the time was spent counseling & coordinating care of this pt.

## 2017-10-24 NOTE — CONSULT NOTE ADULT - SUBJECTIVE AND OBJECTIVE BOX
S :   57y year old Female seen at bedside with a chief complaint of painful thickened, dystrophic, ingrowing and long toenails digits 1-5 bilaterally and preventative foot examination. Patient is medically managed  by Medicine/Hospitalists.  Patient denies any history of trauma to both feet.  Patient has no other pedal complaints.  Patient is experiencing pain while standing, walking, and in shoe gear.       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)  Calf DVT (Deep Venous Thrombosis) (ICD9 453.42)  Adenomatous Goiter (ICD9 241.9)  Chronic Gout (ICD9 274.02)  LBBB (Left Bundle Branch Block) (ICD9 426.3)  CHF (Congestive Heart Failure) (ICD9 428.0)    PSH:Hx of aorta-iliac-femoral bypass  TOP (Termination of Pregnancy)  S/P Dilatation and Curettage  History of Tubal Ligation  s/p AAA (Abdominal Aortic Aneurysm) Repair  History of Cholecystectomy  S/P Partial Hysterectomy      Allergies:heparin (Unknown)  losartan (Anaphylaxis)  nitroglycerin (Other)      Labs:                        9.8    9.3   )-----------( 164      ( 24 Oct 2017 07:15 )             30.6       WBC Trend  9.3 Date (10-24 @ 07:15)  8.2 Date (10-22 @ 19:34)  8.2 Date (10-22 @ 05:27)  7.2 Date (10-21 @ 06:50)  7.3 Date (10-20 @ 06:09)  6.8 Date (10-19 @ 06:57)  5.6 Date (10-18 @ 06:57)  5.8 Date (10-17 @ 06:16)  6.1 Date (10-16 @ 06:13)  6.2 Date (10-15 @ 06:38)  5.8 Date (10-13 @ 05:26)  5.2 Date (10-12 @ 05:54)  5.8 Date (10-11 @ 18:05)      Chem  10-24    143  |  106  |  64<H>  ----------------------------<  98  5.0   |  22  |  2.74<H>    Ca    8.6      24 Oct 2017 07:15            T(F): 98.2 (10-24-17 @ 12:24), Max: 98.2 (10-24-17 @ 12:24)  HR: 88 (10-24-17 @ 12:24) (80 - 94)  BP: 167/91 (10-24-17 @ 12:24) (156/80 - 176/90)  RR: 18 (10-24-17 @ 12:24) (18 - 18)  SpO2: 97% (10-24-17 @ 12:24) (95% - 97%)  Wt(kg): --    O:   Integument:  Skin warm, dry and supple bilateral.  No open lesions or inter-digital macerations noted bilateral.   Toenails 1-5 Right and Left feet thickened, elongated, discolored, and dystrophic with subungual debris. There is pain upon palpation of all fungal and ingrowing nails 1-5 bilaterally.   Vascular: Dorsalis Pedis and Posterior Tibial pulses 1/4.  Capillary re-fill time less than 3 seconds digits 1-5 bilateral. Neuro: Protective sensation intact to the level of the digits bilateral.  LLE edematous, wrapped with Ace bandage as per wound care.  MSK: Muscle strength 5/5 all major muscle groups bilateral. No structural abnormality, bilaterally.

## 2017-10-24 NOTE — PROGRESS NOTE ADULT - SUBJECTIVE AND OBJECTIVE BOX
Subjective: Patient seen and examined. No new events except as noted.     REVIEW OF SYSTEMS:    CONSTITUTIONAL: No weakness, fevers or chills  EYES/ENT: No visual changes;  No vertigo or throat pain   NECK: No pain or stiffness  RESPIRATORY: No cough, wheezing, hemoptysis; No shortness of breath  CARDIOVASCULAR: No chest pain or palpitations  GASTROINTESTINAL: No abdominal or epigastric pain. No nausea, vomiting, or hematemesis; No diarrhea or constipation. No melena or hematochezia.  GENITOURINARY: No dysuria, frequency or hematuria  NEUROLOGICAL: No numbness or weakness  SKIN: No itching, burning, rashes, or lesions   All other review of systems is negative unless indicated above.    MEDICATIONS:  MEDICATIONS  (STANDING):  amLODIPine   Tablet 10 milliGRAM(s) Oral daily  argatroban Infusion 2 MICROgram(s)/kG/Min (15.192 mL/Hr) IV Continuous <Continuous>  ascorbic acid 500 milliGRAM(s) Oral daily  carvedilol 25 milliGRAM(s) Oral every 12 hours  ferrous    sulfate 325 milliGRAM(s) Oral daily  furosemide   Injectable 20 milliGRAM(s) IV Push daily  hydrALAZINE 25 milliGRAM(s) Oral every 8 hours  influenza   Vaccine 0.5 milliLiter(s) IntraMuscular once  labetalol 200 milliGRAM(s) Oral every 8 hours  pantoprazole    Tablet 40 milliGRAM(s) Oral before breakfast  predniSONE   Tablet 10 milliGRAM(s) Oral two times a day  sucralfate suspension 1 Gram(s) Oral three times a day      PHYSICAL EXAM:  T(C): 36.8 (10-24-17 @ 12:24), Max: 36.8 (10-24-17 @ 12:24)  HR: 88 (10-24-17 @ 12:24) (80 - 94)  BP: 167/91 (10-24-17 @ 12:24) (156/80 - 176/90)  RR: 18 (10-24-17 @ 12:24) (18 - 18)  SpO2: 97% (10-24-17 @ 12:24) (95% - 97%)  Wt(kg): --  I&O's Summary    23 Oct 2017 07:01  -  24 Oct 2017 07:00  --------------------------------------------------------  IN: 1122.4 mL / OUT: 1700 mL / NET: -577.6 mL    24 Oct 2017 07:01  -  24 Oct 2017 19:07  --------------------------------------------------------  IN: 480 mL / OUT: 1700 mL / NET: -1220 mL          Appearance: Normal	  HEENT:   Normal oral mucosa, PERRL, EOMI	  Lymphatic: No lymphadenopathy , no edema  Cardiovascular: Normal S1 S2, No JVD, No murmurs , Peripheral pulses palpable 2+ bilaterally  Respiratory: Lungs clear to auscultation, normal effort 	  Gastrointestinal:  Soft, Non-tender, + BS	  Skin: +sacral decubitus ulcer   Musculoskeletal: Normal range of motion, normal strength  Psychiatry:  Mood & affect appropriate  Ext: 4+ chronic LLE   +fox       LABS:    CARDIAC MARKERS:  CARDIAC MARKERS ( 23 Oct 2017 00:48 )  x     / 0.19 ng/mL / x     / x     / x      CARDIAC MARKERS ( 22 Oct 2017 19:34 )  x     / 0.19 ng/mL / 82 U/L / x     / 4.3 ng/mL                                9.8    9.3   )-----------( 164      ( 24 Oct 2017 07:15 )             30.6     10-24    143  |  106  |  64<H>  ----------------------------<  98  5.0   |  22  |  2.74<H>    Ca    8.6      24 Oct 2017 07:15      proBNP:   Lipid Profile:   HgA1c:   TSH:             TELEMETRY: 	  VPaced   ECG:  	  RADIOLOGY:   DIAGNOSTIC TESTING:  [ ] Echocardiogram:  [ ]  Catheterization:  [ ] Stress Test:    OTHER:

## 2017-10-24 NOTE — PROGRESS NOTE ADULT - SUBJECTIVE AND OBJECTIVE BOX
Follow-up Pulm Progress Note    No new respiratory events overnight.  Denies SOB/CP.     Medications:  MEDICATIONS  (STANDING):  amLODIPine   Tablet 10 milliGRAM(s) Oral daily  argatroban Infusion 2 MICROgram(s)/kG/Min (15.192 mL/Hr) IV Continuous <Continuous>  ascorbic acid 500 milliGRAM(s) Oral daily  carvedilol 25 milliGRAM(s) Oral every 12 hours  ferrous    sulfate 325 milliGRAM(s) Oral daily  furosemide   Injectable 20 milliGRAM(s) IV Push daily  hydrALAZINE 25 milliGRAM(s) Oral every 8 hours  influenza   Vaccine 0.5 milliLiter(s) IntraMuscular once  labetalol 200 milliGRAM(s) Oral every 8 hours  pantoprazole    Tablet 40 milliGRAM(s) Oral before breakfast  predniSONE   Tablet 10 milliGRAM(s) Oral two times a day  sucralfate suspension 1 Gram(s) Oral three times a day    MEDICATIONS  (PRN):    Vital Signs Last 24 Hrs  T(C): 36.7 (24 Oct 2017 06:01), Max: 36.9 (23 Oct 2017 12:56)  T(F): 98.1 (24 Oct 2017 06:01), Max: 98.4 (23 Oct 2017 12:56)  HR: 80 (24 Oct 2017 06:01) (71 - 94)  BP: 156/80 (24 Oct 2017 06:01) (143/82 - 176/90)  BP(mean): --  RR: 18 (24 Oct 2017 06:01) (18 - 18)  SpO2: 95% (24 Oct 2017 06:01) (95% - 97%)    10-23 @ 07:01  -  10-24 @ 07:00  --------------------------------------------------------  IN: 1122.4 mL / OUT: 1700 mL / NET: -577.6 mL    LABS:                        9.8    9.3   )-----------( 164      ( 24 Oct 2017 07:15 )             30.6     10-24    143  |  106  |  64<H>  ----------------------------<  98  5.0   |  22  |  2.74<H>    Ca    8.6      24 Oct 2017 07:15    CARDIAC MARKERS ( 23 Oct 2017 00:48 )  x     / 0.19 ng/mL / x     / x     / x      CARDIAC MARKERS ( 22 Oct 2017 19:34 )  x     / 0.19 ng/mL / 82 U/L / x     / 4.3 ng/mL      CAPILLARY BLOOD GLUCOSE  PT/INR - ( 24 Oct 2017 07:15 )   PT: 23.4 sec;   INR: 2.13 ratio         PTT - ( 24 Oct 2017 07:15 )  PTT:76.3 sec    HIT ab Negative 10-21 @ 23:02  HIT ab EIA 0.170    CULTURES: (if applicable)    Physical Examination:  PULM: Clear to auscultation bilaterally, no significant sputum production  CVS: S1, S2 heard    RADIOLOGY REVIEWED  CXR:     CT chest:    TTE:

## 2017-10-24 NOTE — PROGRESS NOTE ADULT - ASSESSMENT
58 y/o female PMHx chronic systolic CHF (last known EF=32%) s/p AICD, SLE, AAA approx 14cm as of 12/2016 s/p aortoiliac stent, DVT/PE (not currently anticoagulated 2/2 HIT), h/o LLE compartment syndrome, multiple pericardial effusions s/p drainage most recently in Jan 2017 now presents with recurrent large pericardial effusion     1. Large pericardial effusion without tamponade.  	Likely chronic effusion, h/o SLE              Rheumatology consult reviewed    2. CT surgery recommended no surgical intervention and drainage by interventional means   Interventional Cardiology  and Vascular recommending radiologic guidance given the nature of the effusion - appears loculated). Will again discuss the possibility and feasibility of interventional approach with interventional radiolology  Add lasix 20 mg IV daily     3. Venous LE Doppler positive for DVT. Started on Argatroban (History Of HIT). Monitor CBC

## 2017-10-24 NOTE — PROGRESS NOTE ADULT - SUBJECTIVE AND OBJECTIVE BOX
HPI:  56 yo F w/ hx of SLE, systolic CHF (EF 32%) s/p AICD, HTN, CKD III- IV, PE / left leg DVT dx 2009, HIT, CAD (no stents), stage IV sacral ulcer, chronic fox, hx of recurrent pericardial effusion, AAA ~14cm in 12/2016 s/p repair in 2010 w/ aortoiliac stent c/b post-operatively with development of left lower extremity compartment syndrome admitted with  progressive shortness of breath. She history history of DVT/PE in 2009 and was treated  w/ heparin and developed HIT and warfarin for anticoagulation. The warfarin was discontinued because of what patient reports was life threatening epistaxis. Patient at baseline has chronic left leg edema in setting of her prior fasciotomy for compartment syndrome but it was more swollen and has acute DVT.    Patient had hospital admission in December 2016 for pericardial effusion requiring drainage/pericardiocentesis and another similar admission in 1/2017 and at that time also had pericardial drain placed. She was diagnosed with SLE. She been taking prednisone 5mg once daily.   Patient was also more anemic without obvious bleeding.  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)  Calf DVT (Deep Venous Thrombosis) (ICD9 453.42)  Adenomatous Goiter (ICD9 241.9)  Chronic Gout (ICD9 274.02)  LBBB (Left Bundle Branch Block) (ICD9 426.3)  CHF (Congestive Heart Failure) (ICD9 428.0)  Hx of aorta-iliac-femoral bypass  TOP (Termination of Pregnancy)  S/P Dilatation and Curettage  History of Tubal Ligation  s/p AAA (Abdominal Aortic Aneurysm) Repair: 2009 2010  History of Cholecystectomy  S/P Partial Hysterectomy    Medications:  amLODIPine   Tablet 10 milliGRAM(s) Oral daily  argatroban Infusion 2 MICROgram(s)/kG/Min IV Continuous <Continuous>  ascorbic acid 500 milliGRAM(s) Oral daily  carvedilol 25 milliGRAM(s) Oral every 12 hours  ferrous    sulfate 325 milliGRAM(s) Oral daily  furosemide   Injectable 20 milliGRAM(s) IV Push daily  hydrALAZINE 25 milliGRAM(s) Oral every 8 hours  influenza   Vaccine 0.5 milliLiter(s) IntraMuscular once  labetalol 200 milliGRAM(s) Oral every 8 hours  pantoprazole    Tablet 40 milliGRAM(s) Oral before breakfast  predniSONE   Tablet 10 milliGRAM(s) Oral two times a day  sucralfate suspension 1 Gram(s) Oral three times a day    Labs:  CBC Full  -  ( 24 Oct 2017 07:15 )  WBC Count : 9.3 K/uL  Hemoglobin : 9.8 g/dL  Hematocrit : 30.6 %  Platelet Count - Automated : 164 K/uL  Mean Cell Volume : 90.6 fl  Mean Cell Hemoglobin : 29.0 pg  Mean Cell Hemoglobin Concentration : 32.1 gm/dL  Auto Neutrophil # : x  Auto Lymphocyte # : x  Auto Monocyte # : x  Auto Eosinophil # : x  Auto Basophil # : x  Auto Neutrophil % : x  Auto Lymphocyte % : x  Auto Monocyte % : x  Auto Eosinophil % : x  Auto Basophil % : x    10-24    143  |  106  |  64<H>  ----------------------------<  98  5.0   |  22  |  2.74<H>    Ca    8.6      24 Oct 2017 07:15                  ROS:  Patient comfortable without distress  No SOB or chest pain  No palpitation  No abdominal pain, diarrhaea or constipation  left leg in wrap and swollen    Vital Signs Last 24 Hrs  T(C): 36.8 (24 Oct 2017 12:24), Max: 36.8 (24 Oct 2017 12:24)  T(F): 98.2 (24 Oct 2017 12:24), Max: 98.2 (24 Oct 2017 12:24)  HR: 88 (24 Oct 2017 12:24) (80 - 94)  BP: 167/91 (24 Oct 2017 12:24) (156/80 - 176/90)  BP(mean): --  RR: 18 (24 Oct 2017 12:24) (18 - 18)  SpO2: 97% (24 Oct 2017 12:24) (95% - 97%)    Physical exam:  Patient alert and oriented  No distress  CVS: S1, S2 regular or murmur  Chest: bilateral breath sound without rales  Abdomen: soft, not tender, no organomegaly or masses  Left leg as  before      Assessment and Plan:

## 2017-10-25 LAB
ANION GAP SERPL CALC-SCNC: 13 MMOL/L — SIGNIFICANT CHANGE UP (ref 5–17)
APTT BLD: 80.3 SEC — HIGH (ref 27.5–37.4)
BUN SERPL-MCNC: 65 MG/DL — HIGH (ref 7–23)
CALCIUM SERPL-MCNC: 8.6 MG/DL — SIGNIFICANT CHANGE UP (ref 8.4–10.5)
CHLORIDE SERPL-SCNC: 105 MMOL/L — SIGNIFICANT CHANGE UP (ref 96–108)
CO2 SERPL-SCNC: 24 MMOL/L — SIGNIFICANT CHANGE UP (ref 22–31)
CREAT SERPL-MCNC: 2.7 MG/DL — HIGH (ref 0.5–1.3)
GLUCOSE SERPL-MCNC: 102 MG/DL — HIGH (ref 70–99)
HCT VFR BLD CALC: 32.5 % — LOW (ref 34.5–45)
HGB BLD-MCNC: 10.3 G/DL — LOW (ref 11.5–15.5)
MCHC RBC-ENTMCNC: 28.8 PG — SIGNIFICANT CHANGE UP (ref 27–34)
MCHC RBC-ENTMCNC: 31.6 GM/DL — LOW (ref 32–36)
MCV RBC AUTO: 91.2 FL — SIGNIFICANT CHANGE UP (ref 80–100)
PLATELET # BLD AUTO: 158 K/UL — SIGNIFICANT CHANGE UP (ref 150–400)
POTASSIUM SERPL-MCNC: 4.8 MMOL/L — SIGNIFICANT CHANGE UP (ref 3.5–5.3)
POTASSIUM SERPL-SCNC: 4.8 MMOL/L — SIGNIFICANT CHANGE UP (ref 3.5–5.3)
RBC # BLD: 3.56 M/UL — LOW (ref 3.8–5.2)
RBC # FLD: 14.7 % — HIGH (ref 10.3–14.5)
SODIUM SERPL-SCNC: 142 MMOL/L — SIGNIFICANT CHANGE UP (ref 135–145)
WBC # BLD: 10.4 K/UL — SIGNIFICANT CHANGE UP (ref 3.8–10.5)
WBC # FLD AUTO: 10.4 K/UL — SIGNIFICANT CHANGE UP (ref 3.8–10.5)

## 2017-10-25 RX ORDER — ARGATROBAN 50 MG/50ML
2 INJECTION, SOLUTION INTRAVENOUS
Qty: 250 | Refills: 0 | Status: DISCONTINUED | OUTPATIENT
Start: 2017-10-25 | End: 2017-11-09

## 2017-10-25 RX ORDER — FUROSEMIDE 40 MG
20 TABLET ORAL
Qty: 0 | Refills: 0 | Status: DISCONTINUED | OUTPATIENT
Start: 2017-10-25 | End: 2017-10-27

## 2017-10-25 RX ADMIN — ARGATROBAN 15.19 MICROGRAM(S)/KG/MIN: 50 INJECTION, SOLUTION INTRAVENOUS at 11:46

## 2017-10-25 RX ADMIN — Medication 200 MILLIGRAM(S): at 17:45

## 2017-10-25 RX ADMIN — Medication 325 MILLIGRAM(S): at 13:18

## 2017-10-25 RX ADMIN — CARVEDILOL PHOSPHATE 25 MILLIGRAM(S): 80 CAPSULE, EXTENDED RELEASE ORAL at 09:06

## 2017-10-25 RX ADMIN — Medication 25 MILLIGRAM(S): at 06:29

## 2017-10-25 RX ADMIN — Medication 200 MILLIGRAM(S): at 11:46

## 2017-10-25 RX ADMIN — Medication 10 MILLIGRAM(S): at 09:05

## 2017-10-25 RX ADMIN — AMLODIPINE BESYLATE 10 MILLIGRAM(S): 2.5 TABLET ORAL at 09:06

## 2017-10-25 RX ADMIN — Medication 20 MILLIGRAM(S): at 06:29

## 2017-10-25 RX ADMIN — Medication 20 MILLIGRAM(S): at 17:45

## 2017-10-25 RX ADMIN — ARGATROBAN 15.19 MICROGRAM(S)/KG/MIN: 50 INJECTION, SOLUTION INTRAVENOUS at 20:00

## 2017-10-25 RX ADMIN — CARVEDILOL PHOSPHATE 25 MILLIGRAM(S): 80 CAPSULE, EXTENDED RELEASE ORAL at 21:33

## 2017-10-25 RX ADMIN — Medication 1 GRAM(S): at 11:43

## 2017-10-25 RX ADMIN — Medication 1 GRAM(S): at 21:33

## 2017-10-25 RX ADMIN — Medication 25 MILLIGRAM(S): at 13:18

## 2017-10-25 RX ADMIN — Medication 500 MILLIGRAM(S): at 11:43

## 2017-10-25 RX ADMIN — Medication 10 MILLIGRAM(S): at 17:45

## 2017-10-25 RX ADMIN — Medication 25 MILLIGRAM(S): at 21:33

## 2017-10-25 RX ADMIN — PANTOPRAZOLE SODIUM 40 MILLIGRAM(S): 20 TABLET, DELAYED RELEASE ORAL at 06:28

## 2017-10-25 RX ADMIN — Medication 200 MILLIGRAM(S): at 01:13

## 2017-10-25 NOTE — PROGRESS NOTE ADULT - SUBJECTIVE AND OBJECTIVE BOX
[facility]   [phone]  Dr. Queen (covering for Dr. Bennett)  RENAL FOLLOW UP NOTE  --------------------------------------------------------------------------------  Patient seen and examined during reapplication of LLE wrap.  Edema improving per patient and nurse  Denies SOB    PAST HISTORY  --------------------------------------------------------------------------------  No significant changes to PMH, PSH, FHx, SHx, unless otherwise noted    ALLERGIES & MEDICATIONS  --------------------------------------------------------------------------------  Allergies    heparin (Unknown)  losartan (Anaphylaxis)  nitroglycerin (Other)    Intolerances      Standing Inpatient Medications  amLODIPine   Tablet 10 milliGRAM(s) Oral daily  argatroban Infusion 2 MICROgram(s)/kG/Min IV Continuous <Continuous>  ascorbic acid 500 milliGRAM(s) Oral daily  carvedilol 25 milliGRAM(s) Oral every 12 hours  ferrous    sulfate 325 milliGRAM(s) Oral daily  furosemide   Injectable 20 milliGRAM(s) IV Push two times a day  hydrALAZINE 25 milliGRAM(s) Oral every 8 hours  influenza   Vaccine 0.5 milliLiter(s) IntraMuscular once  labetalol 200 milliGRAM(s) Oral every 8 hours  pantoprazole    Tablet 40 milliGRAM(s) Oral before breakfast  predniSONE   Tablet 10 milliGRAM(s) Oral two times a day  sucralfate suspension 1 Gram(s) Oral three times a day    PRN Inpatient Medications      REVIEW OF SYSTEMS  --------------------------------------------------------------------------------  Gen: denies fatigue, fevers/chills, weakness  CVS: denies chest pain/palpitations. Improved LLE edema  Resp: denies SOB/Cough  GI: Denies N/V/Abd pain  : Denies dysuria/oliguria/hematuria    All other systems were reviewed and are negative, except as noted.    VITALS/PHYSICAL EXAM  --------------------------------------------------------------------------------  T(C): 36.7 (10-25-17 @ 13:19), Max: 37.3 (10-24-17 @ 21:45)  HR: 73 (10-25-17 @ 13:19) (73 - 85)  BP: 140/73 (10-25-17 @ 13:19) (129/74 - 157/80)  RR: 19 (10-25-17 @ 13:19) (18 - 19)  SpO2: 97% (10-25-17 @ 13:19) (95% - 97%)  Wt(kg): --        10-24-17 @ 07:01  -  10-25-17 @ 07:00  --------------------------------------------------------  IN: 933.6 mL / OUT: 2850 mL / NET: -1916.4 mL    10-25-17 @ 07:01  -  10-25-17 @ 17:08  --------------------------------------------------------  IN: 480 mL / OUT: 1300 mL / NET: -820 mL      Physical Exam:  	Gen: NAD, obese  	Pulm: CTA B/L  	CV: RRR, S1S2  	Abd: +BS, soft, nontender/nondistended  	: No suprapubic tenderness.  + fox              Extremity: No cyanosis, LLE edema >>>RLE  	Neuro: A&O x 3  	      LABS/STUDIES  --------------------------------------------------------------------------------              10.3   10.4  >-----------<  158      [10-25-17 @ 06:15]              32.5     142  |  105  |  65  ----------------------------<  102      [10-25-17 @ 06:15]  4.8   |  24  |  2.70        Ca     8.6     [10-25-17 @ 06:15]      PT/INR: PT 23.4 , INR 2.13       [10-24-17 @ 07:15]  PTT: 80.3       [10-25-17 @ 06:15]      eGFR if Non African American: 19 mL/min/1.73M2 (10-25 @ 06:15)  eGFR if African American: 21 mL/min/1.73M2 (10-25 @ 06:15)    Creatinine Trend:  SCr 2.70 [10-25 @ 06:15]  SCr 2.74 [10-24 @ 07:15]  SCr 2.55 [10-23 @ 09:52]  SCr 2.76 [10-22 @ 05:27]  SCr 2.86 [10-21 @ 20:49]              Urinalysis - [10-11-17 @ 18:44]      Color Yellow / Appearance SL Turbid / SG 1.015 / pH 8.0      Gluc Negative / Ketone Negative  / Bili Negative / Urobili Negative       Blood Negative / Protein >600 / Leuk Est Large / Nitrite Negative      RBC  / WBC 5-10 / Hyaline  / Gran  / Sq Epi  / Non Sq Epi  / Bacteria       Iron 48, TIBC --, %sat --      [10-12-17 @ 07:14]  Ferritin 847.0      [10-12-17 @ 07:14]  HbA1c 4.5      [08-09-16 @ 06:49]  TSH 0.33      [10-22-17 @ 08:40]    dsDNA 44      [10-17-17 @ 08:26]  C3 Complement 105      [10-17-17 @ 08:26]  C4 Complement 31      [10-17-17 @ 08:26]

## 2017-10-25 NOTE — PROGRESS NOTE ADULT - SUBJECTIVE AND OBJECTIVE BOX
Subjective: Patient seen and examined. No new events except as noted.     REVIEW OF SYSTEMS:    CONSTITUTIONAL: + weakness, no fevers or chills  EYES/ENT: No visual changes;  No vertigo or throat pain   NECK: No pain or stiffness  RESPIRATORY: No cough, wheezing, hemoptysis; No shortness of breath  CARDIOVASCULAR: No chest pain or palpitations  GASTROINTESTINAL: No abdominal or epigastric pain. No nausea, vomiting, or hematemesis; No diarrhea or constipation. No melena or hematochezia.  GENITOURINARY: No dysuria, frequency or hematuria  NEUROLOGICAL: No numbness or weakness  SKIN: No itching, burning, rashes, or lesions   All other review of systems is negative unless indicated above.    MEDICATIONS:  MEDICATIONS  (STANDING):  amLODIPine   Tablet 10 milliGRAM(s) Oral daily  argatroban Infusion 2 MICROgram(s)/kG/Min (15.192 mL/Hr) IV Continuous <Continuous>  ascorbic acid 500 milliGRAM(s) Oral daily  carvedilol 25 milliGRAM(s) Oral every 12 hours  ferrous    sulfate 325 milliGRAM(s) Oral daily  furosemide   Injectable 20 milliGRAM(s) IV Push daily  hydrALAZINE 25 milliGRAM(s) Oral every 8 hours  influenza   Vaccine 0.5 milliLiter(s) IntraMuscular once  labetalol 200 milliGRAM(s) Oral every 8 hours  pantoprazole    Tablet 40 milliGRAM(s) Oral before breakfast  predniSONE   Tablet 10 milliGRAM(s) Oral two times a day  sucralfate suspension 1 Gram(s) Oral three times a day      PHYSICAL EXAM:  T(C): 36.6 (10-25-17 @ 04:25), Max: 37.3 (10-24-17 @ 21:45)  HR: 85 (10-25-17 @ 04:25) (76 - 88)  BP: 154/89 (10-25-17 @ 04:25) (129/74 - 167/91)  RR: 18 (10-25-17 @ 04:25) (18 - 19)  SpO2: 96% (10-25-17 @ 04:25) (95% - 97%)  Wt(kg): --  I&O's Summary    24 Oct 2017 07:01  -  25 Oct 2017 07:00  --------------------------------------------------------  IN: 933.6 mL / OUT: 2850 mL / NET: -1916.4 mL          Appearance: Normal	  HEENT:   Normal oral mucosa, PERRL, EOMI	  Lymphatic: No lymphadenopathy , no edema  Cardiovascular: Normal S1 S2, No JVD, No murmurs , Peripheral pulses palpable 2+ bilaterally  Respiratory: Lungs clear to auscultation, normal effort 	  Gastrointestinal:  Soft, Non-tender, + BS	  Skin: +sacral pressure ulcer   Musculoskeletal: Normal range of motion, normal strength  Psychiatry:  Mood & affect appropriate  Ext: chronic 4+ edema LLE   +fox       LABS:    CARDIAC MARKERS:  CARDIAC MARKERS ( 23 Oct 2017 00:48 )  x     / 0.19 ng/mL / x     / x     / x      CARDIAC MARKERS ( 22 Oct 2017 19:34 )  x     / 0.19 ng/mL / 82 U/L / x     / 4.3 ng/mL                                10.3   10.4  )-----------( 158      ( 25 Oct 2017 06:15 )             32.5     10-25    142  |  105  |  65<H>  ----------------------------<  102<H>  4.8   |  24  |  2.70<H>    Ca    8.6      25 Oct 2017 06:15      proBNP:   Lipid Profile:   HgA1c:   TSH:             TELEMETRY: 	    ECG:  	  RADIOLOGY:   DIAGNOSTIC TESTING:  [ ] Echocardiogram:  [ ]  Catheterization:  [ ] Stress Test:    OTHER:

## 2017-10-25 NOTE — PROGRESS NOTE ADULT - ASSESSMENT
Patient is a 58 yo F w/ hx of SLE, systolic CHF (EF 32%) s/p AICD, HTN, CKD III- IV, PE / left leg DVT/ PE in  2009 followed by  HIT, CAD (no stents), stage IV sacral ulcer, chronic fox, hx of recurrent pericardial effusion from SLE, AAA ~14cm in 12/2016 s/p repair in 2010 w/ aortoiliac stent c/b post-operatively with development of left lower extremity compartment syndrome admitted with  progressive shortness of breath.   She has acute LLE EVT but did not take AC at dx of DVT because of h/o bleeding. She was not a candidate for IVC filter and did not want it anyway. She agreed on 10/21/17 but was  afraid of heparin because of h/o HIT and she did not want any drug which does not have antidote or long half life.   HIT ab is negative thus chance of getting recurrent HIT with short term exposure to unfractionated heparin is low. She can also be monitored closely in Hospital.  However, patient is on argatroban as she does not want to take a chance.  Our problem is clinically, she requires anticoagulation for at least 3 months unless acutely bleeding and ideally longer because a previous history of thrombosis and lupus anticoagulant.  Patient was explained regarding anticoagulation options. She does not want coumadin. Newer oral anticoagulant will need dose modifications because of her CKD and do not have antidote. Patient is weighing her options.  I told the patient that  in 2013  she had von Willebrand's disease workup and factor assays,  which were normal.  She may have platelet dysfunction from renal insufficiency as one of the factor causing epistaxis and past.

## 2017-10-25 NOTE — PROGRESS NOTE ADULT - ASSESSMENT
58 y/o female PMHx chronic systolic CHF (last known EF=32%) s/p AICD, SLE, AAA approx 14cm as of 12/2016 s/p aortoiliac stent, DVT/PE (not currently anticoagulated 2/2 HIT), h/o LLE compartment syndrome, multiple pericardial effusions s/p drainage most recently in Jan 2017 now presents with recurrent large pericardial effusion     1. Large pericardial effusion without tamponade.  	Likely chronic effusion, h/o SLE              Rheumatology consult reviewed    2. CT surgery recommended no surgical intervention and drainage by interventional means   Interventional Cardiology  and Vascular recommending radiologic guidance given the nature of the effusion - appears loculated). Will again discuss the possibility and feasibility of interventional approach with interventional radiolology   lasix 20 mg IV daily     3. Venous LE Doppler positive for DVT. Started on Argatroban (History Of HIT). Monitor CBC

## 2017-10-25 NOTE — PROGRESS NOTE ADULT - PROBLEM SELECTOR PLAN 1
Multifactorial-restrictive disease 2nd obesity and cardiomegaly, pericardial effusion, debility and volume overloaded, with systolic HF (EF 25%)   -Lasix 20mg IV qd started, maintaining negative fluid balance   -Pericardial effusion is stable, no plan for pericardiocentesis at this time per cards  -Peripheral GGO in RUL new from Oct 11 2017 CT. Patient appears non-toxic. Recommend repeat CT chest in 6 weeks to further eval

## 2017-10-25 NOTE — PROGRESS NOTE ADULT - SUBJECTIVE AND OBJECTIVE BOX
MEDICINE, PROGRESS NOTE [phone]    COSTEN, SANDRA 57y [id number]    Patient seen and examined.  Patient is a 57y old  Female who presents with a chief complaint of shortness of breath (11 Oct 2017 22:01)  Pt still with pain in leg.    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)  Calf DVT (Deep Venous Thrombosis) (ICD9 453.42)  Adenomatous Goiter (ICD9 241.9)  Chronic Gout (ICD9 274.02)  LBBB (Left Bundle Branch Block) (ICD9 426.3)  CHF (Congestive Heart Failure) (ICD9 428.0)  Hx of aorta-iliac-femoral bypass  TOP (Termination of Pregnancy)  S/P Dilatation and Curettage  History of Tubal Ligation  s/p AAA (Abdominal Aortic Aneurysm) Repair: 2009 2010  History of Cholecystectomy  S/P Partial Hysterectomy    MEDICATIONS  (STANDING):  amLODIPine   Tablet 10 milliGRAM(s) Oral daily  argatroban Infusion 2 MICROgram(s)/kG/Min (15.192 mL/Hr) IV Continuous <Continuous>  ascorbic acid 500 milliGRAM(s) Oral daily  carvedilol 25 milliGRAM(s) Oral every 12 hours  ferrous    sulfate 325 milliGRAM(s) Oral daily  furosemide   Injectable 20 milliGRAM(s) IV Push daily  hydrALAZINE 25 milliGRAM(s) Oral every 8 hours  influenza   Vaccine 0.5 milliLiter(s) IntraMuscular once  labetalol 200 milliGRAM(s) Oral every 8 hours  pantoprazole    Tablet 40 milliGRAM(s) Oral before breakfast  predniSONE   Tablet 10 milliGRAM(s) Oral two times a day  sucralfate suspension 1 Gram(s) Oral three times a day    MEDICATIONS  (PRN):    Allergies    heparin (Unknown)  losartan (Anaphylaxis)  nitroglycerin (Other)    Intolerances        PHYSICAL EXAM:  Constitutional: NAD  HEENT: Normocephalic, EOMI  Neck:  No JVD  Respiratory: CTA B/L, No wheezes  Cardiovascular: S1, S2, RRR, + systolic murmur  Gastrointestinal: BS+, soft, NT/ND  Extremities: No peripheral edema  Neurological: AAOX3, no focal deficits  Psychiatric: Normal mood, normal affect  : No Hook    Vital Signs Last 24 Hrs  T(C): 36.7 (25 Oct 2017 13:19), Max: 37.3 (24 Oct 2017 21:45)  T(F): 98.1 (25 Oct 2017 13:19), Max: 99.1 (24 Oct 2017 21:45)  HR: 73 (25 Oct 2017 13:19) (73 - 85)  BP: 140/73 (25 Oct 2017 13:19) (129/74 - 157/80)  BP(mean): 106 (25 Oct 2017 01:13) (106 - 106)  RR: 19 (25 Oct 2017 13:19) (18 - 19)  SpO2: 97% (25 Oct 2017 13:19) (95% - 97%)  I&O's Summary    24 Oct 2017 07:01  -  25 Oct 2017 07:00  --------------------------------------------------------  IN: 933.6 mL / OUT: 2850 mL / NET: -1916.4 mL    25 Oct 2017 07:01  -  25 Oct 2017 16:59  --------------------------------------------------------  IN: 480 mL / OUT: 1300 mL / NET: -820 mL        LABS:                        10.3   10.4  )-----------( 158      ( 25 Oct 2017 06:15 )             32.5     10-25    142  |  105  |  65<H>  ----------------------------<  102<H>  4.8   |  24  |  2.70<H>    Ca    8.6      25 Oct 2017 06:15

## 2017-10-25 NOTE — PROGRESS NOTE ADULT - SUBJECTIVE AND OBJECTIVE BOX
HPI:  56 y/o female PMHx chronic systolic CHF (last known EF=32%) s/p AICD, SLE, AAA approx 14cm as of 12/2016 s/p aortoiliac stent, DVT/PE 2009, HIT, h/o LLE compartment syndrome,  stage IV sacral ulcer, chronic fox, multiple pericardial effusions s/p drainage most recently in Jan 2017, CKD.  She had acute DVT and she did not take AC at dx of DVT because of h/o bleeding   She is on argatroban as she did not want any other AC, HIT ab negative at this time            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)  Calf DVT (Deep Venous Thrombosis) (ICD9 453.42)  Adenomatous Goiter (ICD9 241.9)  Chronic Gout (ICD9 274.02)  LBBB (Left Bundle Branch Block) (ICD9 426.3)  CHF (Congestive Heart Failure) (ICD9 428.0)  Hx of aorta-iliac-femoral bypass  TOP (Termination of Pregnancy)  S/P Dilatation and Curettage  History of Tubal Ligation  s/p AAA (Abdominal Aortic Aneurysm) Repair: 2009 2010  History of Cholecystectomy  S/P Partial Hysterectomy    Medications:  amLODIPine   Tablet 10 milliGRAM(s) Oral daily  argatroban Infusion 2 MICROgram(s)/kG/Min IV Continuous <Continuous>  ascorbic acid 500 milliGRAM(s) Oral daily  carvedilol 25 milliGRAM(s) Oral every 12 hours  ferrous    sulfate 325 milliGRAM(s) Oral daily  furosemide   Injectable 20 milliGRAM(s) IV Push daily  hydrALAZINE 25 milliGRAM(s) Oral every 8 hours  influenza   Vaccine 0.5 milliLiter(s) IntraMuscular once  labetalol 200 milliGRAM(s) Oral every 8 hours  pantoprazole    Tablet 40 milliGRAM(s) Oral before breakfast  predniSONE   Tablet 10 milliGRAM(s) Oral two times a day  sucralfate suspension 1 Gram(s) Oral three times a day    Labs:  CBC Full  -  ( 25 Oct 2017 06:15 )  WBC Count : 10.4 K/uL  Hemoglobin : 10.3 g/dL  Hematocrit : 32.5 %  Platelet Count - Automated : 158 K/uL  Mean Cell Volume : 91.2 fl  Mean Cell Hemoglobin : 28.8 pg  Mean Cell Hemoglobin Concentration : 31.6 gm/dL  Auto Neutrophil # : x  Auto Lymphocyte # : x  Auto Monocyte # : x  Auto Eosinophil # : x  Auto Basophil # : x  Auto Neutrophil % : x  Auto Lymphocyte % : x  Auto Monocyte % : x  Auto Eosinophil % : x  Auto Basophil % : x    10-25    142  |  105  |  65<H>  ----------------------------<  102<H>  4.8   |  24  |  2.70<H>    Ca    8.6      25 Oct 2017 06:15        Radiology:             ROS:  Patient comfortable without distress  No SOB or chest pain  No palpitation  No abdominal pain, diarrhaea or constipation  left leg swelling as before  Vital Signs Last 24 Hrs  T(C): 36.6 (25 Oct 2017 04:25), Max: 37.3 (24 Oct 2017 21:45)  T(F): 97.9 (25 Oct 2017 04:25), Max: 99.1 (24 Oct 2017 21:45)  HR: 85 (25 Oct 2017 04:25) (76 - 88)  BP: 154/89 (25 Oct 2017 04:25) (129/74 - 167/91)  BP(mean): 106 (25 Oct 2017 01:13) (106 - 106)  RR: 18 (25 Oct 2017 04:25) (18 - 19)  SpO2: 96% (25 Oct 2017 04:25) (95% - 97%)    Physical exam:  Patient alert and oriented  No distress  CVS: S1, S2 regular or murmur  Chest: bilateral breath sound without rales  Abdomen: soft, not tender, no organomegaly or masses  left leg as mentioned      Assessment and Plan:

## 2017-10-25 NOTE — PROGRESS NOTE ADULT - ASSESSMENT
56 y/o female with ILANA on CKD3, SLE, sCHF s/p AICD, HTN, PE/LLE DVT, HIT, chronic fox, recurrent pericardial effusion, AAA s/p repair

## 2017-10-25 NOTE — PROGRESS NOTE ADULT - SUBJECTIVE AND OBJECTIVE BOX
Follow-up Pulm Progress Note    Denies SOB, CP, dyspnea  94% on RA    Medications:  MEDICATIONS  (STANDING):  amLODIPine   Tablet 10 milliGRAM(s) Oral daily  argatroban Infusion 2 MICROgram(s)/kG/Min (15.192 mL/Hr) IV Continuous <Continuous>  ascorbic acid 500 milliGRAM(s) Oral daily  carvedilol 25 milliGRAM(s) Oral every 12 hours  ferrous    sulfate 325 milliGRAM(s) Oral daily  furosemide   Injectable 20 milliGRAM(s) IV Push daily  hydrALAZINE 25 milliGRAM(s) Oral every 8 hours  influenza   Vaccine 0.5 milliLiter(s) IntraMuscular once  labetalol 200 milliGRAM(s) Oral every 8 hours  pantoprazole    Tablet 40 milliGRAM(s) Oral before breakfast  predniSONE   Tablet 10 milliGRAM(s) Oral two times a day  sucralfate suspension 1 Gram(s) Oral three times a day    MEDICATIONS  (PRN):          Vital Signs Last 24 Hrs  T(C): 36.6 (25 Oct 2017 04:25), Max: 37.3 (24 Oct 2017 21:45)  T(F): 97.9 (25 Oct 2017 04:25), Max: 99.1 (24 Oct 2017 21:45)  HR: 85 (25 Oct 2017 04:25) (76 - 85)  BP: 154/89 (25 Oct 2017 04:25) (129/74 - 157/80)  BP(mean): 106 (25 Oct 2017 01:13) (106 - 106)  RR: 18 (25 Oct 2017 04:25) (18 - 19)  SpO2: 96% (25 Oct 2017 04:25) (95% - 96%) on RA          10-24 @ 07:01  -  10-25 @ 07:00  --------------------------------------------------------  IN: 933.6 mL / OUT: 2850 mL / NET: -1916.4 mL          LABS:                        10.3   10.4  )-----------( 158      ( 25 Oct 2017 06:15 )             32.5     10-25    142  |  105  |  65<H>  ----------------------------<  102<H>  4.8   |  24  |  2.70<H>    Ca    8.6      25 Oct 2017 06:15            CAPILLARY BLOOD GLUCOSE        PT/INR - ( 24 Oct 2017 07:15 )   PT: 23.4 sec;   INR: 2.13 ratio         PTT - ( 25 Oct 2017 06:15 )  PTT:80.3 sec          HIT ab Negative 10-21 @ 23:02  HIT ab EIA 0.170                Physical Examination:  PULM: Decreased BS throughout   CVS: S1, S2 RRR    RADIOLOGY REVIEWED  CT chest: < from: CT Chest No Cont (10.18.17 @ 23:51) >  LUNGS AND LARGE AIRWAYS: Right upper lobe peripheral groundglass patchy   opacity, increased since October 11, 2017. Findings likely represent   infection. Partial compressive atelectasis of the left lower lobe.   Minimal compressive atelectasis of the right lower lobe. Patent central   airways.  No pulmonary nodules.  PLEURA: Small left and trace right pleural effusions.  VESSELS: Atherosclerotic disease of aorta.  HEART: Cardiomegaly. Small to moderate pericardial effusion, similar in   size to 10/11/2017.  MEDIASTINUM AND ANDRZEJ: No lymphadenopathy.  CHEST WALL AND LOWER NECK: Left upper chest wall cardiac device. Markedly   enlarged thyroid.   VISUALIZED UPPER ABDOMEN: Hyperdense liver.  BONES: Mild multilevel spinal degenerative changes.    < end of copied text >

## 2017-10-25 NOTE — PROGRESS NOTE ADULT - ASSESSMENT
left leg dvt  chronic lle edema  ckd 3    continue current care  continue argatroban  pt deciding on oral anticoagulation

## 2017-10-26 LAB
ANION GAP SERPL CALC-SCNC: 12 MMOL/L — SIGNIFICANT CHANGE UP (ref 5–17)
APTT BLD: 79.8 SEC — HIGH (ref 27.5–37.4)
BUN SERPL-MCNC: 66 MG/DL — HIGH (ref 7–23)
CALCIUM SERPL-MCNC: 8.8 MG/DL — SIGNIFICANT CHANGE UP (ref 8.4–10.5)
CHLORIDE SERPL-SCNC: 105 MMOL/L — SIGNIFICANT CHANGE UP (ref 96–108)
CO2 SERPL-SCNC: 24 MMOL/L — SIGNIFICANT CHANGE UP (ref 22–31)
CREAT SERPL-MCNC: 2.74 MG/DL — HIGH (ref 0.5–1.3)
GLUCOSE SERPL-MCNC: 80 MG/DL — SIGNIFICANT CHANGE UP (ref 70–99)
HCT VFR BLD CALC: 33 % — LOW (ref 34.5–45)
HGB BLD-MCNC: 10.2 G/DL — LOW (ref 11.5–15.5)
INR BLD: 2.3 RATIO — HIGH (ref 0.88–1.16)
MAGNESIUM SERPL-MCNC: 2.3 MG/DL — SIGNIFICANT CHANGE UP (ref 1.6–2.6)
MCHC RBC-ENTMCNC: 28.2 PG — SIGNIFICANT CHANGE UP (ref 27–34)
MCHC RBC-ENTMCNC: 30.9 GM/DL — LOW (ref 32–36)
MCV RBC AUTO: 91.2 FL — SIGNIFICANT CHANGE UP (ref 80–100)
PHOSPHATE SERPL-MCNC: 3.8 MG/DL — SIGNIFICANT CHANGE UP (ref 2.5–4.5)
PLATELET # BLD AUTO: 169 K/UL — SIGNIFICANT CHANGE UP (ref 150–400)
POTASSIUM SERPL-MCNC: 4.9 MMOL/L — SIGNIFICANT CHANGE UP (ref 3.5–5.3)
POTASSIUM SERPL-SCNC: 4.9 MMOL/L — SIGNIFICANT CHANGE UP (ref 3.5–5.3)
PROTHROM AB SERPL-ACNC: 25.5 SEC — HIGH (ref 9.8–12.7)
RBC # BLD: 3.62 M/UL — LOW (ref 3.8–5.2)
RBC # FLD: 14.5 % — SIGNIFICANT CHANGE UP (ref 10.3–14.5)
SODIUM SERPL-SCNC: 141 MMOL/L — SIGNIFICANT CHANGE UP (ref 135–145)
WBC # BLD: 9.1 K/UL — SIGNIFICANT CHANGE UP (ref 3.8–10.5)
WBC # FLD AUTO: 9.1 K/UL — SIGNIFICANT CHANGE UP (ref 3.8–10.5)

## 2017-10-26 RX ADMIN — Medication 20 MILLIGRAM(S): at 05:57

## 2017-10-26 RX ADMIN — ARGATROBAN 15.19 MICROGRAM(S)/KG/MIN: 50 INJECTION, SOLUTION INTRAVENOUS at 14:31

## 2017-10-26 RX ADMIN — Medication 200 MILLIGRAM(S): at 17:20

## 2017-10-26 RX ADMIN — Medication 500 MILLIGRAM(S): at 13:55

## 2017-10-26 RX ADMIN — Medication 25 MILLIGRAM(S): at 05:57

## 2017-10-26 RX ADMIN — CARVEDILOL PHOSPHATE 25 MILLIGRAM(S): 80 CAPSULE, EXTENDED RELEASE ORAL at 08:53

## 2017-10-26 RX ADMIN — Medication 200 MILLIGRAM(S): at 00:15

## 2017-10-26 RX ADMIN — Medication 1 GRAM(S): at 13:55

## 2017-10-26 RX ADMIN — Medication 200 MILLIGRAM(S): at 10:12

## 2017-10-26 RX ADMIN — Medication 1 GRAM(S): at 05:57

## 2017-10-26 RX ADMIN — CARVEDILOL PHOSPHATE 25 MILLIGRAM(S): 80 CAPSULE, EXTENDED RELEASE ORAL at 21:16

## 2017-10-26 RX ADMIN — Medication 325 MILLIGRAM(S): at 13:55

## 2017-10-26 RX ADMIN — PANTOPRAZOLE SODIUM 40 MILLIGRAM(S): 20 TABLET, DELAYED RELEASE ORAL at 05:57

## 2017-10-26 RX ADMIN — ARGATROBAN 15.19 MICROGRAM(S)/KG/MIN: 50 INJECTION, SOLUTION INTRAVENOUS at 09:56

## 2017-10-26 RX ADMIN — Medication 20 MILLIGRAM(S): at 17:20

## 2017-10-26 RX ADMIN — Medication 25 MILLIGRAM(S): at 13:55

## 2017-10-26 RX ADMIN — Medication 25 MILLIGRAM(S): at 21:16

## 2017-10-26 RX ADMIN — AMLODIPINE BESYLATE 10 MILLIGRAM(S): 2.5 TABLET ORAL at 08:53

## 2017-10-26 RX ADMIN — ARGATROBAN 15.19 MICROGRAM(S)/KG/MIN: 50 INJECTION, SOLUTION INTRAVENOUS at 08:08

## 2017-10-26 RX ADMIN — Medication 10 MILLIGRAM(S): at 08:53

## 2017-10-26 NOTE — PROGRESS NOTE ADULT - SUBJECTIVE AND OBJECTIVE BOX
MEDICINE, PROGRESS NOTE [phone]    COSTEN, SANDRA 57y [id number]    Patient seen and examined.  Patient is a 57y old  Female who presents with a chief complaint of shortness of breath (11 Oct 2017 22:01)  Pt feels a little better, Leg is hurting less.    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)  Chronic Gout (ICD9 274.02)  LBBB (Left Bundle Branch Block) (ICD9 426.3)  CHF (Congestive Heart Failure) (ICD9 428.0)  Hx of aorta-iliac-femoral bypass  TOP (Termination of Pregnancy)  S/P Dilatation and Curettage  History of Tubal Ligation  s/p AAA (Abdominal Aortic Aneurysm) Repair: 2009 2010  History of Cholecystectomy  S/P Partial Hysterectomy    MEDICATIONS  (STANDING):  amLODIPine   Tablet 10 milliGRAM(s) Oral daily  argatroban Infusion 2 MICROgram(s)/kG/Min (15.192 mL/Hr) IV Continuous <Continuous>  ascorbic acid 500 milliGRAM(s) Oral daily  carvedilol 25 milliGRAM(s) Oral every 12 hours  ferrous    sulfate 325 milliGRAM(s) Oral daily  furosemide   Injectable 20 milliGRAM(s) IV Push two times a day  hydrALAZINE 25 milliGRAM(s) Oral every 8 hours  influenza   Vaccine 0.5 milliLiter(s) IntraMuscular once  labetalol 200 milliGRAM(s) Oral every 8 hours  pantoprazole    Tablet 40 milliGRAM(s) Oral before breakfast  predniSONE   Tablet 15 milliGRAM(s) Oral daily  sucralfate suspension 1 Gram(s) Oral three times a day    MEDICATIONS  (PRN):    Allergies    heparin (Unknown)  losartan (Anaphylaxis)  nitroglycerin (Other)    Intolerances        PHYSICAL EXAM:  Constitutional: NAD  HEENT: Normocephalic, EOMI  Neck:  No JVD  Respiratory: CTA B/L, No wheezes  Cardiovascular: S1, S2, RRR, + systolic murmur  Gastrointestinal: BS+, soft, NT/ND  Extremities: + peripheral edema L>R, dec swelling in LEFT leg  Neurological: AAOX3, no focal deficits  Psychiatric: Normal mood, normal affect  : + Hook    Vital Signs Last 24 Hrs  T(C): 36.4 (26 Oct 2017 08:49), Max: 37.2 (25 Oct 2017 22:01)  T(F): 97.6 (26 Oct 2017 08:49), Max: 98.9 (25 Oct 2017 22:01)  HR: 82 (26 Oct 2017 17:20) (78 - 83)  BP: 163/88 (26 Oct 2017 17:20) (138/79 - 163/88)  BP(mean): --  RR: 18 (26 Oct 2017 13:41) (18 - 18)  SpO2: 99% (26 Oct 2017 13:41) (97% - 99%)  I&O's Summary    25 Oct 2017 07:01  -  26 Oct 2017 07:00  --------------------------------------------------------  IN: 846.6 mL / OUT: 3000 mL / NET: -2153.4 mL    26 Oct 2017 07:01  -  26 Oct 2017 19:45  --------------------------------------------------------  IN: 720 mL / OUT: 1700 mL / NET: -980 mL        LABS:                        10.2   9.1   )-----------( 169      ( 26 Oct 2017 09:17 )             33.0     10-26    141  |  105  |  66<H>  ----------------------------<  80  4.9   |  24  |  2.74<H>    Ca    8.8      26 Oct 2017 09:20  Phos  3.8     10-26  Mg     2.3     10-26          Magnesium, Serum: 2.3 mg/dL (10-26 @ 09:20)

## 2017-10-26 NOTE — PROGRESS NOTE ADULT - SUBJECTIVE AND OBJECTIVE BOX
Subjective: Patient seen and examined. No new events except as noted.   feels ok  daughter at bedside   REVIEW OF SYSTEMS:    CONSTITUTIONAL: + weakness, no fevers or chills  EYES/ENT: No visual changes;  No vertigo or throat pain   NECK: No pain or stiffness  RESPIRATORY: No cough, wheezing, hemoptysis; No shortness of breath  CARDIOVASCULAR: No chest pain or palpitations  GASTROINTESTINAL: No abdominal or epigastric pain. No nausea, vomiting, or hematemesis; No diarrhea or constipation. No melena or hematochezia.  GENITOURINARY: No dysuria, frequency or hematuria  NEUROLOGICAL: No numbness or weakness  SKIN: No itching, burning, rashes, or lesions   All other review of systems is negative unless indicated above.    MEDICATIONS:  MEDICATIONS  (STANDING):  amLODIPine   Tablet 10 milliGRAM(s) Oral daily  argatroban Infusion 2 MICROgram(s)/kG/Min (15.192 mL/Hr) IV Continuous <Continuous>  ascorbic acid 500 milliGRAM(s) Oral daily  carvedilol 25 milliGRAM(s) Oral every 12 hours  ferrous    sulfate 325 milliGRAM(s) Oral daily  furosemide   Injectable 20 milliGRAM(s) IV Push two times a day  hydrALAZINE 25 milliGRAM(s) Oral every 8 hours  influenza   Vaccine 0.5 milliLiter(s) IntraMuscular once  labetalol 200 milliGRAM(s) Oral every 8 hours  pantoprazole    Tablet 40 milliGRAM(s) Oral before breakfast  predniSONE   Tablet 10 milliGRAM(s) Oral two times a day  sucralfate suspension 1 Gram(s) Oral three times a day      PHYSICAL EXAM:  T(C): 36.4 (10-26-17 @ 08:49), Max: 37.2 (10-25-17 @ 22:01)  HR: 78 (10-26-17 @ 10:10) (73 - 83)  BP: 163/84 (10-26-17 @ 10:10) (138/79 - 163/84)  RR: 18 (10-26-17 @ 08:49) (18 - 19)  SpO2: 97% (10-26-17 @ 08:49) (97% - 97%)  Wt(kg): --  I&O's Summary    25 Oct 2017 07:01  -  26 Oct 2017 07:00  --------------------------------------------------------  IN: 846.6 mL / OUT: 3000 mL / NET: -2153.4 mL    26 Oct 2017 07:01  -  26 Oct 2017 12:48  --------------------------------------------------------  IN: 360 mL / OUT: 0 mL / NET: 360 mL          Appearance: Normal	  HEENT:   Normal oral mucosa, PERRL, EOMI	  Lymphatic: No lymphadenopathy , no edema  Cardiovascular: Normal S1 S2, No JVD, No murmurs , Peripheral pulses palpable 2+ bilaterally  Respiratory: Lungs clear to auscultation, normal effort 	  Gastrointestinal:  Soft, Non-tender, + BS	  Musculoskeletal: Normal range of motion, normal strength  Psychiatry:  Mood & affect appropriate  Ext: 4+ chronic LL edema  +fox       LABS:    CARDIAC MARKERS:                                10.2   9.1   )-----------( 169      ( 26 Oct 2017 09:17 )             33.0     10-26    141  |  105  |  66<H>  ----------------------------<  80  4.9   |  24  |  2.74<H>    Ca    8.8      26 Oct 2017 09:20  Phos  3.8     10-26  Mg     2.3     10-26      proBNP:   Lipid Profile:   HgA1c:   TSH:             TELEMETRY: VPaced 	    ECG:  	  RADIOLOGY:   DIAGNOSTIC TESTING:  [ ] Echocardiogram:  [ ]  Catheterization:  [ ] Stress Test:    OTHER:

## 2017-10-26 NOTE — PROGRESS NOTE ADULT - SUBJECTIVE AND OBJECTIVE BOX
Follow-up Pulm Progress Note    No new respiratory events overnight.  Denies SOB/CP.   95% on RA    Medications:  MEDICATIONS  (STANDING):  amLODIPine   Tablet 10 milliGRAM(s) Oral daily  argatroban Infusion 2 MICROgram(s)/kG/Min (15.192 mL/Hr) IV Continuous <Continuous>  ascorbic acid 500 milliGRAM(s) Oral daily  carvedilol 25 milliGRAM(s) Oral every 12 hours  ferrous    sulfate 325 milliGRAM(s) Oral daily  furosemide   Injectable 20 milliGRAM(s) IV Push two times a day  hydrALAZINE 25 milliGRAM(s) Oral every 8 hours  influenza   Vaccine 0.5 milliLiter(s) IntraMuscular once  labetalol 200 milliGRAM(s) Oral every 8 hours  pantoprazole    Tablet 40 milliGRAM(s) Oral before breakfast  predniSONE   Tablet 10 milliGRAM(s) Oral two times a day  sucralfate suspension 1 Gram(s) Oral three times a day    MEDICATIONS  (PRN):          Vital Signs Last 24 Hrs  T(C): 36.4 (26 Oct 2017 08:49), Max: 37.2 (25 Oct 2017 22:01)  T(F): 97.6 (26 Oct 2017 08:49), Max: 98.9 (25 Oct 2017 22:01)  HR: 82 (26 Oct 2017 13:41) (78 - 83)  BP: 153/81 (26 Oct 2017 13:41) (138/79 - 163/84)  BP(mean): --  RR: 18 (26 Oct 2017 13:41) (18 - 18)  SpO2: 99% (26 Oct 2017 13:41) (97% - 99%) on RA          10-25 @ 07:01  -  10-26 @ 07:00  --------------------------------------------------------  IN: 846.6 mL / OUT: 3000 mL / NET: -2153.4 mL          LABS:                        10.2   9.1   )-----------( 169      ( 26 Oct 2017 09:17 )             33.0     10-26    141  |  105  |  66<H>  ----------------------------<  80  4.9   |  24  |  2.74<H>    Ca    8.8      26 Oct 2017 09:20  Phos  3.8     10-26  Mg     2.3     10-26            CAPILLARY BLOOD GLUCOSE        PT/INR - ( 26 Oct 2017 09:17 )   PT: 25.5 sec;   INR: 2.30 ratio         PTT - ( 26 Oct 2017 09:17 )  PTT:79.8 sec          HIT ab Negative 10-21 @ 23:02  HIT ab EIA 0.170          Physical Examination:  PULM: Decreased BS at bases  CVS: S1, S2 RRR    RADIOLOGY REVIEWED

## 2017-10-26 NOTE — PROGRESS NOTE ADULT - SUBJECTIVE AND OBJECTIVE BOX
INTERVAL HPI/OVERNIGHT EVENTS:  Pt overall feeling better; less joint pains, started anticoagulation    MEDICATIONS  (STANDING):  amLODIPine   Tablet 10 milliGRAM(s) Oral daily  argatroban Infusion 2 MICROgram(s)/kG/Min (15.192 mL/Hr) IV Continuous <Continuous>  ascorbic acid 500 milliGRAM(s) Oral daily  carvedilol 25 milliGRAM(s) Oral every 12 hours  ferrous    sulfate 325 milliGRAM(s) Oral daily  furosemide   Injectable 20 milliGRAM(s) IV Push two times a day  hydrALAZINE 25 milliGRAM(s) Oral every 8 hours  influenza   Vaccine 0.5 milliLiter(s) IntraMuscular once  labetalol 200 milliGRAM(s) Oral every 8 hours  pantoprazole    Tablet 40 milliGRAM(s) Oral before breakfast  predniSONE   Tablet 15 milliGRAM(s) Oral daily  sucralfate suspension 1 Gram(s) Oral three times a day    MEDICATIONS  (PRN):      Allergies    heparin (Unknown)  losartan (Anaphylaxis)  nitroglycerin (Other)    Intolerances        REVIEW OF SYSTEMS    General:	    Skin/Breast:  	  Ophthalmologic:  	  ENMT:	    Respiratory and Thorax:  	  Cardiovascular:	    Gastrointestinal:	    Genitourinary:	    Musculoskeletal:	    Neurological:	    Psychiatric:	    Hematology/Lymphatics:    Vital Signs Last 24 Hrs  T(C): 36.4 (26 Oct 2017 08:49), Max: 37.2 (25 Oct 2017 22:01)  T(F): 97.6 (26 Oct 2017 08:49), Max: 98.9 (25 Oct 2017 22:01)  HR: 82 (26 Oct 2017 17:20) (78 - 83)  BP: 163/88 (26 Oct 2017 17:20) (138/79 - 163/88)  BP(mean): --  RR: 18 (26 Oct 2017 13:41) (18 - 18)  SpO2: 99% (26 Oct 2017 13:41) (97% - 99%)      PHYSICAL EXAM:    Constitutional:  well appearing    Eyes:    ENMT:    Neck:    Back:    Respiratory:    Cardiovascular:    Gastrointestinal:    Extremities:  4+edema LLE    Vascular:    Neurological:    Skin:    Lymph Nodes:    Musculoskeletal:  NO active synovitis, mild pain in R knee on flexion  Unable to move L leg much    Psychiatric:      LABS:                        10.2   9.1   )-----------( 169      ( 26 Oct 2017 09:17 )             33.0     10-26    141  |  105  |  66<H>  ----------------------------<  80  4.9   |  24  |  2.74<H>    Ca    8.8      26 Oct 2017 09:20  Phos  3.8     10-26  Mg     2.3     10-26      PT/INR - ( 26 Oct 2017 09:17 )   PT: 25.5 sec;   INR: 2.30 ratio         PTT - ( 26 Oct 2017 09:17 )  PTT:79.8 sec      RADIOLOGY & ADDITIONAL TESTS:

## 2017-10-26 NOTE — PROGRESS NOTE ADULT - PROBLEM SELECTOR PLAN 1
Multifactorial-restrictive disease 2nd obesity and cardiomegaly, pericardial effusion, debility and volume overloaded, with systolic HF (EF 25%)   -Lasix 20mg BID, maintaining negative fluid balance  -Pericardial effusion is stable, no plan for pericardiocentesis at this time per cards  -Peripheral GGO in RUL new from Oct 11 2017 CT. Patient appears non-toxic. Recommend repeat CT chest in 6 weeks to further eval

## 2017-10-26 NOTE — PROGRESS NOTE ADULT - ASSESSMENT
Imp:   56 yo female with SLE-joint pain, serositis.  A/W new LLE DVT.  recently started anticoagulation after refusing initially .  Joint pains improved on higher dose steroids    Rec:  Decrease prednisone to 15mg/day; follow symptoms  COntinue anticoagulation

## 2017-10-26 NOTE — PROGRESS NOTE ADULT - ASSESSMENT
LLE dvt  chronic left leg edema  sle joint pain/serositis  pericardial effusion    continue current care  pt still not decided on which oral agent she will take  discussed with dr goldberg, taper steroids slowly  encourage activity  continue to wrap leg per vasc surgery

## 2017-10-27 LAB
ANION GAP SERPL CALC-SCNC: 14 MMOL/L — SIGNIFICANT CHANGE UP (ref 5–17)
APTT BLD: 30.5 SEC — SIGNIFICANT CHANGE UP (ref 27.5–37.4)
APTT BLD: 51.1 SEC — HIGH (ref 27.5–37.4)
BUN SERPL-MCNC: 65 MG/DL — HIGH (ref 7–23)
CALCIUM SERPL-MCNC: 8.5 MG/DL — SIGNIFICANT CHANGE UP (ref 8.4–10.5)
CHLORIDE SERPL-SCNC: 104 MMOL/L — SIGNIFICANT CHANGE UP (ref 96–108)
CO2 SERPL-SCNC: 24 MMOL/L — SIGNIFICANT CHANGE UP (ref 22–31)
CREAT SERPL-MCNC: 2.74 MG/DL — HIGH (ref 0.5–1.3)
GLUCOSE SERPL-MCNC: 83 MG/DL — SIGNIFICANT CHANGE UP (ref 70–99)
HCT VFR BLD CALC: 31.1 % — LOW (ref 34.5–45)
HGB BLD-MCNC: 10.1 G/DL — LOW (ref 11.5–15.5)
INR BLD: 1.09 RATIO — SIGNIFICANT CHANGE UP (ref 0.88–1.16)
INR BLD: 1.3 RATIO — HIGH (ref 0.88–1.16)
MCHC RBC-ENTMCNC: 29.5 PG — SIGNIFICANT CHANGE UP (ref 27–34)
MCHC RBC-ENTMCNC: 32.6 GM/DL — SIGNIFICANT CHANGE UP (ref 32–36)
MCV RBC AUTO: 90.6 FL — SIGNIFICANT CHANGE UP (ref 80–100)
PLATELET # BLD AUTO: 159 K/UL — SIGNIFICANT CHANGE UP (ref 150–400)
POTASSIUM SERPL-MCNC: 4.5 MMOL/L — SIGNIFICANT CHANGE UP (ref 3.5–5.3)
POTASSIUM SERPL-SCNC: 4.5 MMOL/L — SIGNIFICANT CHANGE UP (ref 3.5–5.3)
PROTHROM AB SERPL-ACNC: 11.9 SEC — SIGNIFICANT CHANGE UP (ref 9.8–12.7)
PROTHROM AB SERPL-ACNC: 14.2 SEC — HIGH (ref 9.8–12.7)
RBC # BLD: 3.43 M/UL — LOW (ref 3.8–5.2)
RBC # FLD: 14.6 % — HIGH (ref 10.3–14.5)
SODIUM SERPL-SCNC: 142 MMOL/L — SIGNIFICANT CHANGE UP (ref 135–145)
WBC # BLD: 8.3 K/UL — SIGNIFICANT CHANGE UP (ref 3.8–10.5)
WBC # FLD AUTO: 8.3 K/UL — SIGNIFICANT CHANGE UP (ref 3.8–10.5)

## 2017-10-27 RX ORDER — FUROSEMIDE 40 MG
60 TABLET ORAL DAILY
Qty: 0 | Refills: 0 | Status: DISCONTINUED | OUTPATIENT
Start: 2017-10-28 | End: 2017-11-08

## 2017-10-27 RX ADMIN — Medication 20 MILLIGRAM(S): at 05:28

## 2017-10-27 RX ADMIN — Medication 1 GRAM(S): at 21:36

## 2017-10-27 RX ADMIN — Medication 200 MILLIGRAM(S): at 00:10

## 2017-10-27 RX ADMIN — AMLODIPINE BESYLATE 10 MILLIGRAM(S): 2.5 TABLET ORAL at 09:06

## 2017-10-27 RX ADMIN — Medication 200 MILLIGRAM(S): at 09:47

## 2017-10-27 RX ADMIN — Medication 20 MILLIGRAM(S): at 17:53

## 2017-10-27 RX ADMIN — Medication 25 MILLIGRAM(S): at 21:36

## 2017-10-27 RX ADMIN — Medication 15 MILLIGRAM(S): at 09:46

## 2017-10-27 RX ADMIN — CARVEDILOL PHOSPHATE 25 MILLIGRAM(S): 80 CAPSULE, EXTENDED RELEASE ORAL at 21:36

## 2017-10-27 RX ADMIN — Medication 25 MILLIGRAM(S): at 14:08

## 2017-10-27 RX ADMIN — Medication 1 GRAM(S): at 05:28

## 2017-10-27 RX ADMIN — CARVEDILOL PHOSPHATE 25 MILLIGRAM(S): 80 CAPSULE, EXTENDED RELEASE ORAL at 09:05

## 2017-10-27 RX ADMIN — ARGATROBAN 15.19 MICROGRAM(S)/KG/MIN: 50 INJECTION, SOLUTION INTRAVENOUS at 09:12

## 2017-10-27 RX ADMIN — Medication 500 MILLIGRAM(S): at 14:08

## 2017-10-27 RX ADMIN — Medication 325 MILLIGRAM(S): at 14:08

## 2017-10-27 RX ADMIN — Medication 25 MILLIGRAM(S): at 05:28

## 2017-10-27 RX ADMIN — PANTOPRAZOLE SODIUM 40 MILLIGRAM(S): 20 TABLET, DELAYED RELEASE ORAL at 06:03

## 2017-10-27 RX ADMIN — Medication 200 MILLIGRAM(S): at 17:53

## 2017-10-27 RX ADMIN — Medication 1 GRAM(S): at 17:53

## 2017-10-27 NOTE — PROGRESS NOTE ADULT - ASSESSMENT
58 yo F w/ hx of SLE, systolic CHF (EF 32%) s/p AICD, HTN, CKD III- IV, PE / left leg DVT dx 2009, HIT, CAD (no stents), stage IV sacral ulcer, chronic fox, hx of recurrent pericardial effusion, AAA ~14cm in 12/2016 s/p repair in 2010 w/ aortoiliac stent c/b post-operatively with development of left lower extremity   lymphedema December 2016;  pericardial effusion pericardial drain/SLE. now with ilana on ckd with baseline creatinine approx 1.8-2.     1- ILANA on ckd III  2- chf hx  3- cardiomyopathy  4- HTN   5- SLE  6- proteinuria    creatinine still elevated. has pericardial effusion as well.   cont with B-B .  Norvasc 10 mg daily  hyrdalazine to cont.   nephrotic syndrome.   continue with Fox catheter for chronic obstructive uropathy.  increase lasix 60mg iv qd give no significant net diuretics  monitor for gout attack

## 2017-10-27 NOTE — PROGRESS NOTE ADULT - SUBJECTIVE AND OBJECTIVE BOX
[facility]   [phone]  RENAL FOLLOW UP NOTE  --------------------------------------------------------------------------------  Chief Complaint:    24 hour events/subjective:    still with c/o edema     PAST HISTORY  --------------------------------------------------------------------------------  No significant changes to PMH, PSH, FHx, SHx, unless otherwise noted    ALLERGIES & MEDICATIONS  --------------------------------------------------------------------------------  Allergies    heparin (Unknown)  losartan (Anaphylaxis)  nitroglycerin (Other)    Intolerances      Standing Inpatient Medications  amLODIPine   Tablet 10 milliGRAM(s) Oral daily  argatroban Infusion 2 MICROgram(s)/kG/Min IV Continuous <Continuous>  ascorbic acid 500 milliGRAM(s) Oral daily  carvedilol 25 milliGRAM(s) Oral every 12 hours  ferrous    sulfate 325 milliGRAM(s) Oral daily  furosemide   Injectable 20 milliGRAM(s) IV Push two times a day  hydrALAZINE 25 milliGRAM(s) Oral every 8 hours  influenza   Vaccine 0.5 milliLiter(s) IntraMuscular once  labetalol 200 milliGRAM(s) Oral every 8 hours  pantoprazole    Tablet 40 milliGRAM(s) Oral before breakfast  predniSONE   Tablet 15 milliGRAM(s) Oral daily  sucralfate suspension 1 Gram(s) Oral three times a day    PRN Inpatient Medications      REVIEW OF SYSTEMS  --------------------------------------------------------------------------------    Gen: denies fevers/chills,  CVS: denies chest pain/palpitations  Resp: denies SOB/Cough  GI: Denies N/V/ Abd pain+   : Denies dysuria    All other systems were reviewed and are negative, except as noted.    VITALS/PHYSICAL EXAM  --------------------------------------------------------------------------------  T(C): 36.8 (10-27-17 @ 12:51), Max: 36.9 (10-26-17 @ 22:01)  HR: 76 (10-27-17 @ 12:51) (65 - 86)  BP: 126/71 (10-27-17 @ 12:51) (126/71 - 168/72)  RR: 18 (10-27-17 @ 12:51) (18 - 18)  SpO2: 96% (10-27-17 @ 12:51) (95% - 98%)  Wt(kg): --        10-26-17 @ 07:01  -  10-27-17 @ 07:00  --------------------------------------------------------  IN: 902.4 mL / OUT: 3200 mL / NET: -2297.6 mL    10-27-17 @ 07:01  -  10-27-17 @ 20:51  --------------------------------------------------------  IN: 1260 mL / OUT: 1600 mL / NET: -340 mL        Physical Exam:  	Gen: alert oriented   	Pulm: Decreased breath sounds b/l bases. no rales or ronchi or wheezing  	CV: RRR, S1/S2. no rub  	Back: No CVA tenderness; no sacral edema  	Abd: +BS, soft, nontender/nondistended  	: No suprapubic tenderness.               Extremity: RLE 2+ and LLE 4+ edema pitting   	  LABS/STUDIES  --------------------------------------------------------------------------------              10.1   8.3   >-----------<  159      [10-27-17 @ 06:05]              31.1     142  |  104  |  65  ----------------------------<  83      [10-27-17 @ 06:05]  4.5   |  24  |  2.74        Ca     8.5     [10-27-17 @ 06:05]      Mg     2.3     [10-26-17 @ 09:20]      Phos  3.8     [10-26-17 @ 09:20]      PT/INR: PT 14.2 , INR 1.30       [10-27-17 @ 08:16]  PTT: 51.1       [10-27-17 @ 08:16]      Creatinine Trend:  SCr 2.74 [10-27 @ 06:05]  SCr 2.74 [10-26 @ 09:20]  SCr 2.70 [10-25 @ 06:15]  SCr 2.74 [10-24 @ 07:15]  SCr 2.55 [10-23 @ 09:52]              Urinalysis - [10-11-17 @ 18:44]      Color Yellow / Appearance SL Turbid / SG 1.015 / pH 8.0      Gluc Negative / Ketone Negative  / Bili Negative / Urobili Negative       Blood Negative / Protein >600 / Leuk Est Large / Nitrite Negative      RBC  / WBC 5-10 / Hyaline  / Gran  / Sq Epi  / Non Sq Epi  / Bacteria       Iron 48, TIBC --, %sat --      [10-12-17 @ 07:14]  Ferritin 847.0      [10-12-17 @ 07:14]  HbA1c 4.5      [08-09-16 @ 06:49]  TSH 0.33      [10-22-17 @ 08:40]    dsDNA 44      [10-17-17 @ 08:26]  C3 Complement 105      [10-17-17 @ 08:26]  C4 Complement 31      [10-17-17 @ 08:26]

## 2017-10-27 NOTE — PROGRESS NOTE ADULT - SUBJECTIVE AND OBJECTIVE BOX
Subjective: Patient seen and examined. No new events except as noted.   no cp or sob  REVIEW OF SYSTEMS:    CONSTITUTIONAL: + weakness, no fevers or chills  EYES/ENT: No visual changes;  No vertigo or throat pain   NECK: No pain or stiffness  RESPIRATORY: No cough, wheezing, hemoptysis; No shortness of breath  CARDIOVASCULAR: No chest pain or palpitations  GASTROINTESTINAL: No abdominal or epigastric pain. No nausea, vomiting, or hematemesis; No diarrhea or constipation. No melena or hematochezia.  GENITOURINARY: No dysuria, frequency or hematuria  NEUROLOGICAL: No numbness or weakness  SKIN: No itching, burning, rashes, or lesions   All other review of systems is negative unless indicated above.    MEDICATIONS:  MEDICATIONS  (STANDING):  amLODIPine   Tablet 10 milliGRAM(s) Oral daily  argatroban Infusion 2 MICROgram(s)/kG/Min (15.192 mL/Hr) IV Continuous <Continuous>  ascorbic acid 500 milliGRAM(s) Oral daily  carvedilol 25 milliGRAM(s) Oral every 12 hours  ferrous    sulfate 325 milliGRAM(s) Oral daily  furosemide   Injectable 20 milliGRAM(s) IV Push two times a day  hydrALAZINE 25 milliGRAM(s) Oral every 8 hours  influenza   Vaccine 0.5 milliLiter(s) IntraMuscular once  labetalol 200 milliGRAM(s) Oral every 8 hours  pantoprazole    Tablet 40 milliGRAM(s) Oral before breakfast  predniSONE   Tablet 15 milliGRAM(s) Oral daily  sucralfate suspension 1 Gram(s) Oral three times a day      PHYSICAL EXAM:  T(C): 36.7 (10-27-17 @ 09:05), Max: 36.9 (10-26-17 @ 22:01)  HR: 83 (10-27-17 @ 09:45) (65 - 86)  BP: 149/92 (10-27-17 @ 09:45) (136/81 - 168/72)  RR: 18 (10-27-17 @ 09:05) (18 - 18)  SpO2: 95% (10-27-17 @ 09:05) (95% - 99%)  Wt(kg): --  I&O's Summary    26 Oct 2017 07:01  -  27 Oct 2017 07:00  --------------------------------------------------------  IN: 902.4 mL / OUT: 3200 mL / NET: -2297.6 mL    27 Oct 2017 07:01  -  27 Oct 2017 11:23  --------------------------------------------------------  IN: 360 mL / OUT: 900 mL / NET: -540 mL          Appearance: Normal	  HEENT:   Normal oral mucosa, PERRL, EOMI	  Lymphatic: No lymphadenopathy , no edema  Cardiovascular: Normal S1 S2, No JVD, No murmurs , Peripheral pulses palpable 2+ bilaterally  Respiratory: Lungs clear to auscultation, normal effort 	  Gastrointestinal:  Soft, Non-tender, + BS	  Skin: sacral pressure ulcer   Musculoskeletal: Normal range of motion, normal strength  Psychiatry:  Mood & affect appropriate  Ext: chronic 4+ LL edema  +fox      LABS:    CARDIAC MARKERS:                                10.1   8.3   )-----------( 159      ( 27 Oct 2017 06:05 )             31.1     10-27    142  |  104  |  65<H>  ----------------------------<  83  4.5   |  24  |  2.74<H>    Ca    8.5      27 Oct 2017 06:05  Phos  3.8     10-26  Mg     2.3     10-26      proBNP:   Lipid Profile:   HgA1c:   TSH:             TELEMETRY: 	  Vpaced  ECG:  	  RADIOLOGY:   DIAGNOSTIC TESTING:  [ ] Echocardiogram:  [ ]  Catheterization:  [ ] Stress Test:    OTHER:

## 2017-10-27 NOTE — PROGRESS NOTE ADULT - ASSESSMENT
56 y/o female PMHx chronic systolic CHF (last known EF=32%) s/p AICD, SLE, AAA approx 14cm as of 12/2016 s/p aortoiliac stent, DVT/PE (not currently anticoagulated 2/2 HIT), h/o LLE compartment syndrome, multiple pericardial effusions s/p drainage most recently in Jan 2017 now presents with recurrent large pericardial effusion     1. Large pericardial effusion without tamponade.  	Likely chronic effusion, h/o SLE              Rheumatology consult reviewed    2. CT surgery recommended no surgical intervention and drainage by interventional means   Interventional Cardiology  and Vascular recommending radiologic guidance given the nature of the effusion - appears loculated). Will again discuss the possibility and feasibility of interventional approach with interventional radiolology   lasix 20 mg IV daily     3. Venous LE Doppler positive for DVT. Started on Argatroban (History Of HIT). Monitor CBC

## 2017-10-27 NOTE — PROGRESS NOTE ADULT - ASSESSMENT
LLE DVT  ckd 3  chronic left le edema  sle  pericardial effusion    continue diuresis  continue current care  continue argatroban  await pt decision on oral anticoagulation  continue to wrap leg per vasc  steroids per rheum

## 2017-10-27 NOTE — PROGRESS NOTE ADULT - SUBJECTIVE AND OBJECTIVE BOX
Follow-up Pulm Progress Note    No new respiratory events overnight.  Denies SOB/CP.     Medications:  MEDICATIONS  (STANDING):  amLODIPine   Tablet 10 milliGRAM(s) Oral daily  argatroban Infusion 2 MICROgram(s)/kG/Min (15.192 mL/Hr) IV Continuous <Continuous>  ascorbic acid 500 milliGRAM(s) Oral daily  carvedilol 25 milliGRAM(s) Oral every 12 hours  ferrous    sulfate 325 milliGRAM(s) Oral daily  furosemide   Injectable 20 milliGRAM(s) IV Push two times a day  hydrALAZINE 25 milliGRAM(s) Oral every 8 hours  influenza   Vaccine 0.5 milliLiter(s) IntraMuscular once  labetalol 200 milliGRAM(s) Oral every 8 hours  pantoprazole    Tablet 40 milliGRAM(s) Oral before breakfast  predniSONE   Tablet 15 milliGRAM(s) Oral daily  sucralfate suspension 1 Gram(s) Oral three times a day    MEDICATIONS  (PRN):    Vital Signs Last 24 Hrs  T(C): 36.7 (27 Oct 2017 09:05), Max: 36.9 (26 Oct 2017 22:01)  T(F): 98 (27 Oct 2017 09:05), Max: 98.5 (26 Oct 2017 22:01)  HR: 83 (27 Oct 2017 09:45) (65 - 86)  BP: 149/92 (27 Oct 2017 09:45) (136/81 - 168/72)  BP(mean): --  RR: 18 (27 Oct 2017 09:05) (18 - 18)  SpO2: 95% (27 Oct 2017 09:05) (95% - 99%)    10-26 @ 07:01  -  10-27 @ 07:00  --------------------------------------------------------  IN: 902.4 mL / OUT: 3200 mL / NET: -2297.6 mL          LABS:                        10.1   8.3   )-----------( 159      ( 27 Oct 2017 06:05 )             31.1     10-27    142  |  104  |  65<H>  ----------------------------<  83  4.5   |  24  |  2.74<H>    Ca    8.5      27 Oct 2017 06:05  Phos  3.8     10-26  Mg     2.3     10-26      CAPILLARY BLOOD GLUCOSE        PT/INR - ( 27 Oct 2017 08:16 )   PT: 14.2 sec;   INR: 1.30 ratio         PTT - ( 27 Oct 2017 08:16 )  PTT:51.1 sec    HIT ab Negative 10-21 @ 23:02  HIT ab EIA 0.170    CULTURES: (if applicable)    Physical Examination:  PULM: Clear to auscultation bilaterally, no significant sputum production  CVS: S1, S2 heard    RADIOLOGY REVIEWED  CXR:     CT chest:    TTE:

## 2017-10-27 NOTE — PROGRESS NOTE ADULT - SUBJECTIVE AND OBJECTIVE BOX
MEDICINE, PROGRESS NOTE [phone]    COSTEN, SANDRA 57y [id number]    Patient seen and examined.  Patient is a 57y old  Female who presents with a chief complaint of shortness of breath (11 Oct 2017 22:01)  Pt has no new complaints. Leg is hurting less.    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)  Chronic Gout (ICD9 274.02)  LBBB (Left Bundle Branch Block) (ICD9 426.3)  CHF (Congestive Heart Failure) (ICD9 428.0)  Hx of aorta-iliac-femoral bypass  TOP (Termination of Pregnancy)  S/P Dilatation and Curettage  History of Tubal Ligation  s/p AAA (Abdominal Aortic Aneurysm) Repair: 2009 2010  History of Cholecystectomy  S/P Partial Hysterectomy    MEDICATIONS  (STANDING):  amLODIPine   Tablet 10 milliGRAM(s) Oral daily  argatroban Infusion 2 MICROgram(s)/kG/Min (15.192 mL/Hr) IV Continuous <Continuous>  ascorbic acid 500 milliGRAM(s) Oral daily  carvedilol 25 milliGRAM(s) Oral every 12 hours  ferrous    sulfate 325 milliGRAM(s) Oral daily  furosemide   Injectable 20 milliGRAM(s) IV Push two times a day  hydrALAZINE 25 milliGRAM(s) Oral every 8 hours  influenza   Vaccine 0.5 milliLiter(s) IntraMuscular once  labetalol 200 milliGRAM(s) Oral every 8 hours  pantoprazole    Tablet 40 milliGRAM(s) Oral before breakfast  predniSONE   Tablet 15 milliGRAM(s) Oral daily  sucralfate suspension 1 Gram(s) Oral three times a day    MEDICATIONS  (PRN):    Allergies    heparin (Unknown)  losartan (Anaphylaxis)  nitroglycerin (Other)    Intolerances        PHYSICAL EXAM:  Constitutional: NAD  HEENT: Normocephalic, EOMI  Neck:  No JVD  Respiratory: CTA B/L, No wheezes  Cardiovascular: S1, S2, RRR, + systolic murmur  Gastrointestinal: BS+, soft, NT/ND  Extremities: + edema le b/l L>R, slightly dec swelling left leg.  Neurological: AAOX3, no focal deficits  Psychiatric: Normal mood, normal affect  : No Hook    Vital Signs Last 24 Hrs  T(C): 36.8 (27 Oct 2017 12:51), Max: 36.9 (26 Oct 2017 22:01)  T(F): 98.2 (27 Oct 2017 12:51), Max: 98.5 (26 Oct 2017 22:01)  HR: 76 (27 Oct 2017 12:51) (65 - 86)  BP: 126/71 (27 Oct 2017 12:51) (126/71 - 168/72)  BP(mean): --  RR: 18 (27 Oct 2017 12:51) (18 - 18)  SpO2: 96% (27 Oct 2017 12:51) (95% - 98%)  I&O's Summary    26 Oct 2017 07:01  -  27 Oct 2017 07:00  --------------------------------------------------------  IN: 902.4 mL / OUT: 3200 mL / NET: -2297.6 mL    27 Oct 2017 07:01  -  27 Oct 2017 16:49  --------------------------------------------------------  IN: 960 mL / OUT: 1600 mL / NET: -640 mL        LABS:                        10.1   8.3   )-----------( 159      ( 27 Oct 2017 06:05 )             31.1     10-27    142  |  104  |  65<H>  ----------------------------<  83  4.5   |  24  |  2.74<H>    Ca    8.5      27 Oct 2017 06:05  Phos  3.8     10-26  Mg     2.3     10-26                REVIEW OF SYSTEMS:      RADIOLOGY & ADDITIONAL STUDIES:      ASSESSMENT/PLAN:

## 2017-10-28 LAB
APTT BLD: 77.2 SEC — HIGH (ref 27.5–37.4)
INR BLD: 2.17 RATIO — HIGH (ref 0.88–1.16)
PROTHROM AB SERPL-ACNC: 24 SEC — HIGH (ref 9.8–12.7)

## 2017-10-28 RX ADMIN — Medication 200 MILLIGRAM(S): at 01:11

## 2017-10-28 RX ADMIN — Medication 25 MILLIGRAM(S): at 21:35

## 2017-10-28 RX ADMIN — Medication 25 MILLIGRAM(S): at 13:40

## 2017-10-28 RX ADMIN — Medication 500 MILLIGRAM(S): at 10:03

## 2017-10-28 RX ADMIN — Medication 25 MILLIGRAM(S): at 05:41

## 2017-10-28 RX ADMIN — Medication 325 MILLIGRAM(S): at 10:02

## 2017-10-28 RX ADMIN — Medication 200 MILLIGRAM(S): at 10:02

## 2017-10-28 RX ADMIN — Medication 60 MILLIGRAM(S): at 05:40

## 2017-10-28 RX ADMIN — Medication 15 MILLIGRAM(S): at 10:02

## 2017-10-28 RX ADMIN — CARVEDILOL PHOSPHATE 25 MILLIGRAM(S): 80 CAPSULE, EXTENDED RELEASE ORAL at 11:18

## 2017-10-28 RX ADMIN — ARGATROBAN 15.19 MICROGRAM(S)/KG/MIN: 50 INJECTION, SOLUTION INTRAVENOUS at 14:38

## 2017-10-28 RX ADMIN — Medication 200 MILLIGRAM(S): at 17:25

## 2017-10-28 RX ADMIN — AMLODIPINE BESYLATE 10 MILLIGRAM(S): 2.5 TABLET ORAL at 10:03

## 2017-10-28 RX ADMIN — CARVEDILOL PHOSPHATE 25 MILLIGRAM(S): 80 CAPSULE, EXTENDED RELEASE ORAL at 21:35

## 2017-10-28 RX ADMIN — Medication 1 GRAM(S): at 21:35

## 2017-10-28 RX ADMIN — PANTOPRAZOLE SODIUM 40 MILLIGRAM(S): 20 TABLET, DELAYED RELEASE ORAL at 05:41

## 2017-10-28 NOTE — PROGRESS NOTE ADULT - SUBJECTIVE AND OBJECTIVE BOX
Subjective: Patient seen and examined. No new events except as noted.     REVIEW OF SYSTEMS:    CONSTITUTIONAL: + weakness, no fevers or chills  EYES/ENT: No visual changes;  No vertigo or throat pain   NECK: No pain or stiffness  RESPIRATORY: No cough, wheezing, hemoptysis; No shortness of breath  CARDIOVASCULAR: No chest pain or palpitations  GASTROINTESTINAL: No abdominal or epigastric pain. No nausea, vomiting, or hematemesis; No diarrhea or constipation. No melena or hematochezia.  GENITOURINARY: No dysuria, frequency or hematuria  NEUROLOGICAL: No numbness or weakness  SKIN: No itching, burning, rashes, or lesions   All other review of systems is negative unless indicated above.    MEDICATIONS:  MEDICATIONS  (STANDING):  amLODIPine   Tablet 10 milliGRAM(s) Oral daily  argatroban Infusion 2 MICROgram(s)/kG/Min (15.192 mL/Hr) IV Continuous <Continuous>  ascorbic acid 500 milliGRAM(s) Oral daily  carvedilol 25 milliGRAM(s) Oral every 12 hours  ferrous    sulfate 325 milliGRAM(s) Oral daily  furosemide   Injectable 60 milliGRAM(s) IV Push daily  hydrALAZINE 25 milliGRAM(s) Oral every 8 hours  influenza   Vaccine 0.5 milliLiter(s) IntraMuscular once  labetalol 200 milliGRAM(s) Oral every 8 hours  pantoprazole    Tablet 40 milliGRAM(s) Oral before breakfast  predniSONE   Tablet 15 milliGRAM(s) Oral daily  sucralfate suspension 1 Gram(s) Oral three times a day      PHYSICAL EXAM:  T(C): 36.4 (10-28-17 @ 04:54), Max: 36.9 (10-27-17 @ 21:01)  HR: 76 (10-28-17 @ 13:39) (63 - 83)  BP: 126/73 (10-28-17 @ 13:39) (126/73 - 168/89)  RR: 18 (10-28-17 @ 04:54) (18 - 18)  SpO2: 96% (10-28-17 @ 04:54) (96% - 97%)  Wt(kg): --  I&O's Summary    27 Oct 2017 07:01  -  28 Oct 2017 07:00  --------------------------------------------------------  IN: 1777.6 mL / OUT: 3800 mL / NET: -2022.4 mL    28 Oct 2017 07:01  -  28 Oct 2017 20:28  --------------------------------------------------------  IN: 1307.2 mL / OUT: 1200 mL / NET: 107.2 mL          Appearance: Normal	  HEENT:   Normal oral mucosa, PERRL, EOMI	  Lymphatic: No lymphadenopathy , no edema  Cardiovascular: Normal S1 S2, No JVD, No murmurs , Peripheral pulses palpable 2+ bilaterally  Respiratory: Lungs clear to auscultation, normal effort 	  Gastrointestinal:  Soft, Non-tender, + BS	  Skin: No rashes, No ecchymoses, No cyanosis, warm to touch  Musculoskeletal: Normal range of motion, normal strength  Psychiatry:  Mood & affect appropriate  Ext: chronic 4+ LL edema  +fox     LABS:    CARDIAC MARKERS:                                10.1   8.3   )-----------( 159      ( 27 Oct 2017 06:05 )             31.1     10-27    142  |  104  |  65<H>  ----------------------------<  83  4.5   |  24  |  2.74<H>    Ca    8.5      27 Oct 2017 06:05      proBNP:   Lipid Profile:   HgA1c:   TSH:             TELEMETRY: 	VPaced     ECG:  	  RADIOLOGY:   DIAGNOSTIC TESTING:  [ ] Echocardiogram:  [ ]  Catheterization:  [ ] Stress Test:    OTHER:

## 2017-10-28 NOTE — PROGRESS NOTE ADULT - SUBJECTIVE AND OBJECTIVE BOX
MEDICINE, PROGRESS NOTE [phone]    COSTEN, SANDRA 57y [id number]    Patient seen and examined.  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)  Chronic Gout (ICD9 274.02)  LBBB (Left Bundle Branch Block) (ICD9 426.3)  CHF (Congestive Heart Failure) (ICD9 428.0)  Hx of aorta-iliac-femoral bypass  TOP (Termination of Pregnancy)  S/P Dilatation and Curettage  History of Tubal Ligation  s/p AAA (Abdominal Aortic Aneurysm) Repair: 2009 2010  History of Cholecystectomy  S/P Partial Hysterectomy    MEDICATIONS  (STANDING):  amLODIPine   Tablet 10 milliGRAM(s) Oral daily  argatroban Infusion 2 MICROgram(s)/kG/Min (15.192 mL/Hr) IV Continuous <Continuous>  ascorbic acid 500 milliGRAM(s) Oral daily  carvedilol 25 milliGRAM(s) Oral every 12 hours  ferrous    sulfate 325 milliGRAM(s) Oral daily  furosemide   Injectable 60 milliGRAM(s) IV Push daily  hydrALAZINE 25 milliGRAM(s) Oral every 8 hours  influenza   Vaccine 0.5 milliLiter(s) IntraMuscular once  labetalol 200 milliGRAM(s) Oral every 8 hours  pantoprazole    Tablet 40 milliGRAM(s) Oral before breakfast  predniSONE   Tablet 15 milliGRAM(s) Oral daily  sucralfate suspension 1 Gram(s) Oral three times a day    MEDICATIONS  (PRN):    Allergies    heparin (Unknown)  losartan (Anaphylaxis)  nitroglycerin (Other)    Intolerances        PHYSICAL EXAM:  Constitutional: NAD  HEENT: Normocephalic, EOMI  Neck:  No JVD  Respiratory: CTA B/L, No wheezes  Cardiovascular: S1, S2, RRR, + systolic murmur  Gastrointestinal: BS+, soft, NT/ND  Extremities: No peripheral edema  Neurological: AAOX3, no focal deficits  Psychiatric: Normal mood, normal affect  : No Hook    Vital Signs Last 24 Hrs  T(C): 36.4 (28 Oct 2017 04:54), Max: 36.9 (27 Oct 2017 21:01)  T(F): 97.5 (28 Oct 2017 04:54), Max: 98.5 (27 Oct 2017 21:01)  HR: 76 (28 Oct 2017 13:39) (63 - 83)  BP: 126/73 (28 Oct 2017 13:39) (126/73 - 168/89)  BP(mean): --  RR: 18 (28 Oct 2017 04:54) (18 - 18)  SpO2: 96% (28 Oct 2017 04:54) (96% - 97%)  I&O's Summary    27 Oct 2017 07:01  -  28 Oct 2017 07:00  --------------------------------------------------------  IN: 1777.6 mL / OUT: 3800 mL / NET: -2022.4 mL    28 Oct 2017 07:01  -  28 Oct 2017 16:25  --------------------------------------------------------  IN: 841.6 mL / OUT: 1200 mL / NET: -358.4 mL        LABS:                        10.1   8.3   )-----------( 159      ( 27 Oct 2017 06:05 )             31.1     10-27    142  |  104  |  65<H>  ----------------------------<  83  4.5   |  24  |  2.74<H>    Ca    8.5      27 Oct 2017 06:05

## 2017-10-28 NOTE — PROGRESS NOTE ADULT - ASSESSMENT
LLE DVT  ckd 3  chronic left le edema  sle  pericardial effusion    continue current care  continue argatroban  continue diuresis  continue leg wraps  pt deciding on po anticoagulation.

## 2017-10-29 LAB
ANION GAP SERPL CALC-SCNC: 12 MMOL/L — SIGNIFICANT CHANGE UP (ref 5–17)
APTT BLD: 71.4 SEC — HIGH (ref 27.5–37.4)
BUN SERPL-MCNC: 67 MG/DL — HIGH (ref 7–23)
CALCIUM SERPL-MCNC: 8.6 MG/DL — SIGNIFICANT CHANGE UP (ref 8.4–10.5)
CHLORIDE SERPL-SCNC: 105 MMOL/L — SIGNIFICANT CHANGE UP (ref 96–108)
CO2 SERPL-SCNC: 27 MMOL/L — SIGNIFICANT CHANGE UP (ref 22–31)
CREAT SERPL-MCNC: 2.72 MG/DL — HIGH (ref 0.5–1.3)
GLUCOSE SERPL-MCNC: 84 MG/DL — SIGNIFICANT CHANGE UP (ref 70–99)
HCT VFR BLD CALC: 32.8 % — LOW (ref 34.5–45)
HGB BLD-MCNC: 10.3 G/DL — LOW (ref 11.5–15.5)
INR BLD: 1.96 RATIO — HIGH (ref 0.88–1.16)
MCHC RBC-ENTMCNC: 28.7 PG — SIGNIFICANT CHANGE UP (ref 27–34)
MCHC RBC-ENTMCNC: 31.4 GM/DL — LOW (ref 32–36)
MCV RBC AUTO: 91.7 FL — SIGNIFICANT CHANGE UP (ref 80–100)
PLATELET # BLD AUTO: 167 K/UL — SIGNIFICANT CHANGE UP (ref 150–400)
POTASSIUM SERPL-MCNC: 4.5 MMOL/L — SIGNIFICANT CHANGE UP (ref 3.5–5.3)
POTASSIUM SERPL-SCNC: 4.5 MMOL/L — SIGNIFICANT CHANGE UP (ref 3.5–5.3)
PROTHROM AB SERPL-ACNC: 21.7 SEC — HIGH (ref 9.8–12.7)
RBC # BLD: 3.58 M/UL — LOW (ref 3.8–5.2)
RBC # FLD: 14.7 % — HIGH (ref 10.3–14.5)
SODIUM SERPL-SCNC: 144 MMOL/L — SIGNIFICANT CHANGE UP (ref 135–145)
WBC # BLD: 7.6 K/UL — SIGNIFICANT CHANGE UP (ref 3.8–10.5)
WBC # FLD AUTO: 7.6 K/UL — SIGNIFICANT CHANGE UP (ref 3.8–10.5)

## 2017-10-29 PROCEDURE — 74176 CT ABD & PELVIS W/O CONTRAST: CPT | Mod: 26

## 2017-10-29 RX ADMIN — Medication 60 MILLIGRAM(S): at 06:09

## 2017-10-29 RX ADMIN — Medication 325 MILLIGRAM(S): at 14:01

## 2017-10-29 RX ADMIN — Medication 500 MILLIGRAM(S): at 14:01

## 2017-10-29 RX ADMIN — Medication 25 MILLIGRAM(S): at 06:09

## 2017-10-29 RX ADMIN — Medication 200 MILLIGRAM(S): at 17:48

## 2017-10-29 RX ADMIN — Medication 25 MILLIGRAM(S): at 21:48

## 2017-10-29 RX ADMIN — Medication 25 MILLIGRAM(S): at 14:01

## 2017-10-29 RX ADMIN — PANTOPRAZOLE SODIUM 40 MILLIGRAM(S): 20 TABLET, DELAYED RELEASE ORAL at 06:09

## 2017-10-29 RX ADMIN — ARGATROBAN 15.19 MICROGRAM(S)/KG/MIN: 50 INJECTION, SOLUTION INTRAVENOUS at 08:25

## 2017-10-29 RX ADMIN — CARVEDILOL PHOSPHATE 25 MILLIGRAM(S): 80 CAPSULE, EXTENDED RELEASE ORAL at 21:48

## 2017-10-29 RX ADMIN — Medication 15 MILLIGRAM(S): at 09:28

## 2017-10-29 RX ADMIN — Medication 200 MILLIGRAM(S): at 09:28

## 2017-10-29 RX ADMIN — Medication 200 MILLIGRAM(S): at 01:27

## 2017-10-29 RX ADMIN — CARVEDILOL PHOSPHATE 25 MILLIGRAM(S): 80 CAPSULE, EXTENDED RELEASE ORAL at 08:25

## 2017-10-29 RX ADMIN — AMLODIPINE BESYLATE 10 MILLIGRAM(S): 2.5 TABLET ORAL at 08:25

## 2017-10-29 NOTE — PROGRESS NOTE ADULT - SUBJECTIVE AND OBJECTIVE BOX
MEDICINE, PROGRESS NOTE [phone]    COSTEN, SANDRA 57y [id number]    Patient seen and examined.  Patient is a 57y old  Female who presents with a chief complaint of shortness of breath (11 Oct 2017 22:01)  Pt feels ok. 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)  Chronic Gout (ICD9 274.02)  LBBB (Left Bundle Branch Block) (ICD9 426.3)  CHF (Congestive Heart Failure) (ICD9 428.0)  Hx of aorta-iliac-femoral bypass  TOP (Termination of Pregnancy)  S/P Dilatation and Curettage  History of Tubal Ligation  s/p AAA (Abdominal Aortic Aneurysm) Repair: 2009 2010  History of Cholecystectomy  S/P Partial Hysterectomy    MEDICATIONS  (STANDING):  amLODIPine   Tablet 10 milliGRAM(s) Oral daily  argatroban Infusion 2 MICROgram(s)/kG/Min (15.192 mL/Hr) IV Continuous <Continuous>  ascorbic acid 500 milliGRAM(s) Oral daily  carvedilol 25 milliGRAM(s) Oral every 12 hours  ferrous    sulfate 325 milliGRAM(s) Oral daily  furosemide   Injectable 60 milliGRAM(s) IV Push daily  hydrALAZINE 25 milliGRAM(s) Oral every 8 hours  influenza   Vaccine 0.5 milliLiter(s) IntraMuscular once  labetalol 200 milliGRAM(s) Oral every 8 hours  pantoprazole    Tablet 40 milliGRAM(s) Oral before breakfast  predniSONE   Tablet 15 milliGRAM(s) Oral daily  sucralfate suspension 1 Gram(s) Oral three times a day    MEDICATIONS  (PRN):    Allergies    heparin (Unknown)  losartan (Anaphylaxis)  nitroglycerin (Other)    Intolerances        PHYSICAL EXAM:  Constitutional: NAD  HEENT: Normocephalic, EOMI  Neck:  No JVD  Respiratory: CTA B/L, No wheezes  Cardiovascular: S1, S2, RRR, + systolic murmur  Gastrointestinal: BS+, soft, NT/ND  Extremities: peripheral edema le b/l L>R, left wrapped.  Neurological: AAOX3, no focal deficits  Psychiatric: Normal mood, normal affect  : No Hook    Vital Signs Last 24 Hrs  T(C): 36.6 (29 Oct 2017 14:00), Max: 36.8 (29 Oct 2017 08:23)  T(F): 97.9 (29 Oct 2017 14:00), Max: 98.3 (29 Oct 2017 08:23)  HR: 75 (29 Oct 2017 14:00) (75 - 85)  BP: 154/79 (29 Oct 2017 14:00) (142/89 - 162/80)  BP(mean): --  RR: 18 (29 Oct 2017 14:00) (17 - 18)  SpO2: 97% (29 Oct 2017 14:00) (95% - 97%)  I&O's Summary    28 Oct 2017 07:01  -  29 Oct 2017 07:00  --------------------------------------------------------  IN: 2089.6 mL / OUT: 3800 mL / NET: -1710.4 mL    29 Oct 2017 07:01  -  29 Oct 2017 18:45  --------------------------------------------------------  IN: 480 mL / OUT: 1300 mL / NET: -820 mL        LABS:                        10.3   7.6   )-----------( 167      ( 29 Oct 2017 06:53 )             32.8     10-29    144  |  105  |  67<H>  ----------------------------<  84  4.5   |  27  |  2.72<H>    Ca    8.6      29 Oct 2017 06:53 MEDICINE, PROGRESS NOTE [phone]    COSTEN, SANDRA 57y [id number]    Patient seen and examined.  Patient is a 57y old  Female who presents with a chief complaint of shortness of breath (11 Oct 2017 22:01)  Pt feels ok. 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)  Chronic Gout (ICD9 274.02)  LBBB (Left Bundle Branch Block) (ICD9 426.3)  CHF (Congestive Heart Failure) (ICD9 428.0)  Hx of aorta-iliac-femoral bypass  TOP (Termination of Pregnancy)  S/P Dilatation and Curettage  History of Tubal Ligation  s/p AAA (Abdominal Aortic Aneurysm) Repair: 2009 2010  History of Cholecystectomy  S/P Partial Hysterectomy    MEDICATIONS  (STANDING):  amLODIPine   Tablet 10 milliGRAM(s) Oral daily  argatroban Infusion 2 MICROgram(s)/kG/Min (15.192 mL/Hr) IV Continuous <Continuous>  ascorbic acid 500 milliGRAM(s) Oral daily  carvedilol 25 milliGRAM(s) Oral every 12 hours  ferrous    sulfate 325 milliGRAM(s) Oral daily  furosemide   Injectable 60 milliGRAM(s) IV Push daily  hydrALAZINE 25 milliGRAM(s) Oral every 8 hours  influenza   Vaccine 0.5 milliLiter(s) IntraMuscular once  labetalol 200 milliGRAM(s) Oral every 8 hours  pantoprazole    Tablet 40 milliGRAM(s) Oral before breakfast  predniSONE   Tablet 15 milliGRAM(s) Oral daily  sucralfate suspension 1 Gram(s) Oral three times a day    MEDICATIONS  (PRN):    Allergies    heparin (Unknown)  losartan (Anaphylaxis)  nitroglycerin (Other)    Intolerances        PHYSICAL EXAM:  Constitutional: NAD  HEENT: Normocephalic, EOMI  Neck:  No JVD  Respiratory: CTA B/L, No wheezes  Cardiovascular: S1, S2, RRR, + systolic murmur  Gastrointestinal: BS+, soft, NT/ND  Extremities: peripheral edema le b/l L>R, left wrapped.  Neurological: AAOX3, no focal deficits  Psychiatric: Normal mood, normal affect  : + Hook    Vital Signs Last 24 Hrs  T(C): 36.6 (29 Oct 2017 14:00), Max: 36.8 (29 Oct 2017 08:23)  T(F): 97.9 (29 Oct 2017 14:00), Max: 98.3 (29 Oct 2017 08:23)  HR: 75 (29 Oct 2017 14:00) (75 - 85)  BP: 154/79 (29 Oct 2017 14:00) (142/89 - 162/80)  BP(mean): --  RR: 18 (29 Oct 2017 14:00) (17 - 18)  SpO2: 97% (29 Oct 2017 14:00) (95% - 97%)  I&O's Summary    28 Oct 2017 07:01  -  29 Oct 2017 07:00  --------------------------------------------------------  IN: 2089.6 mL / OUT: 3800 mL / NET: -1710.4 mL    29 Oct 2017 07:01  -  29 Oct 2017 18:45  --------------------------------------------------------  IN: 480 mL / OUT: 1300 mL / NET: -820 mL        LABS:                        10.3   7.6   )-----------( 167      ( 29 Oct 2017 06:53 )             32.8     10-29    144  |  105  |  67<H>  ----------------------------<  84  4.5   |  27  |  2.72<H>    Ca    8.6      29 Oct 2017 06:53 MEDICINE, PROGRESS NOTE [phone]    COSTEN, SANDRA 57y [id number]    Patient seen and examined.  Patient is a 57y old  Female who presents with a chief complaint of shortness of breath (11 Oct 2017 22:01)  Pt c/o pain all over today. Pt has discussed things with her family and HCP and decided they would like a second opinion on anticoagulation.    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)  Chronic Gout (ICD9 274.02)  LBBB (Left Bundle Branch Block) (ICD9 426.3)  CHF (Congestive Heart Failure) (ICD9 428.0)  Hx of aorta-iliac-femoral bypass  TOP (Termination of Pregnancy)  S/P Dilatation and Curettage  History of Tubal Ligation  s/p AAA (Abdominal Aortic Aneurysm) Repair: 2009 2010  History of Cholecystectomy  S/P Partial Hysterectomy    MEDICATIONS  (STANDING):  amLODIPine   Tablet 10 milliGRAM(s) Oral daily  argatroban Infusion 2 MICROgram(s)/kG/Min (15.192 mL/Hr) IV Continuous <Continuous>  ascorbic acid 500 milliGRAM(s) Oral daily  carvedilol 25 milliGRAM(s) Oral every 12 hours  ferrous    sulfate 325 milliGRAM(s) Oral daily  furosemide   Injectable 60 milliGRAM(s) IV Push daily  hydrALAZINE 25 milliGRAM(s) Oral every 8 hours  influenza   Vaccine 0.5 milliLiter(s) IntraMuscular once  labetalol 200 milliGRAM(s) Oral every 8 hours  pantoprazole    Tablet 40 milliGRAM(s) Oral before breakfast  predniSONE   Tablet 15 milliGRAM(s) Oral daily  sucralfate suspension 1 Gram(s) Oral three times a day    MEDICATIONS  (PRN):    Allergies    heparin (Unknown)  losartan (Anaphylaxis)  nitroglycerin (Other)    Intolerances        PHYSICAL EXAM:  Constitutional: NAD  HEENT: Normocephalic, EOMI  Neck:  No JVD  Respiratory: CTA B/L, No wheezes  Cardiovascular: S1, S2, RRR, + systolic murmur  Gastrointestinal: BS+, soft, NT/ND  Extremities: peripheral edema le b/l L>R, left wrapped.  Neurological: AAOX3, no focal deficits  Psychiatric: Normal mood, normal affect  : + Hook    Vital Signs Last 24 Hrs  T(C): 36.6 (29 Oct 2017 14:00), Max: 36.8 (29 Oct 2017 08:23)  T(F): 97.9 (29 Oct 2017 14:00), Max: 98.3 (29 Oct 2017 08:23)  HR: 75 (29 Oct 2017 14:00) (75 - 85)  BP: 154/79 (29 Oct 2017 14:00) (142/89 - 162/80)  BP(mean): --  RR: 18 (29 Oct 2017 14:00) (17 - 18)  SpO2: 97% (29 Oct 2017 14:00) (95% - 97%)  I&O's Summary    28 Oct 2017 07:01  -  29 Oct 2017 07:00  --------------------------------------------------------  IN: 2089.6 mL / OUT: 3800 mL / NET: -1710.4 mL    29 Oct 2017 07:01  -  29 Oct 2017 18:45  --------------------------------------------------------  IN: 480 mL / OUT: 1300 mL / NET: -820 mL        LABS:                        10.3   7.6   )-----------( 167      ( 29 Oct 2017 06:53 )             32.8     10-29    144  |  105  |  67<H>  ----------------------------<  84  4.5   |  27  |  2.72<H>    Ca    8.6      29 Oct 2017 06:53

## 2017-10-29 NOTE — PROGRESS NOTE ADULT - ASSESSMENT
56 y/o female PMHx chronic systolic CHF (last known EF=32%) s/p AICD, SLE, AAA approx 14cm as of 12/2016 s/p aortoiliac stent, DVT/PE (not currently anticoagulated 2/2 HIT), h/o LLE compartment syndrome, multiple pericardial effusions s/p drainage most recently in Jan 2017 now presents with recurrent large pericardial effusion     1. Large pericardial effusion without tamponade.  	Likely chronic effusion, h/o SLE              Rheumatology consult reviewed    2. CT surgery recommended no surgical intervention and drainage by interventional means   Interventional Cardiology  and Vascular recommending radiologic guidance given the nature of the effusion - appears loculated). Will again discuss the possibility and feasibility of interventional approach with interventional radiolology   lasix 20 mg IV daily     3. Venous LE Doppler positive for DVT. Started on Argatroban (History Of HIT) and wants to start coumadin.  Monitor CBC

## 2017-10-29 NOTE — PROGRESS NOTE ADULT - ASSESSMENT
LLE DVT  ckd 3  chronic left le edema  sle  pericardial effusion    check repeat limited echo to eval effusion  start coumadin tonight  continue argatroban  continue I/O's LLE DVT  ckd 3  chronic left le edema  sle  pericardial effusion      check repeat limited echo to eval effusion  call for second opinion heme consult  continue argatroban  continue I/O's  answered all of pts questions to the best of my ability and have told her to discuss anticoagulation questions with hematology.

## 2017-10-29 NOTE — PROGRESS NOTE ADULT - SUBJECTIVE AND OBJECTIVE BOX
Subjective: Patient seen and examined. No new events except as noted.     REVIEW OF SYSTEMS:    CONSTITUTIONAL: + weakness, no fevers or chills  EYES/ENT: No visual changes;  No vertigo or throat pain   NECK: No pain or stiffness  RESPIRATORY: No cough, wheezing, hemoptysis; No shortness of breath  CARDIOVASCULAR: No chest pain or palpitations  GASTROINTESTINAL: No abdominal or epigastric pain. No nausea, vomiting, or hematemesis; No diarrhea or constipation. No melena or hematochezia.  GENITOURINARY: No dysuria, frequency or hematuria  NEUROLOGICAL: No numbness or weakness  SKIN: No itching, burning, rashes, or lesions   All other review of systems is negative unless indicated above.    MEDICATIONS:  MEDICATIONS  (STANDING):  amLODIPine   Tablet 10 milliGRAM(s) Oral daily  argatroban Infusion 2 MICROgram(s)/kG/Min (15.192 mL/Hr) IV Continuous <Continuous>  ascorbic acid 500 milliGRAM(s) Oral daily  carvedilol 25 milliGRAM(s) Oral every 12 hours  ferrous    sulfate 325 milliGRAM(s) Oral daily  furosemide   Injectable 60 milliGRAM(s) IV Push daily  hydrALAZINE 25 milliGRAM(s) Oral every 8 hours  influenza   Vaccine 0.5 milliLiter(s) IntraMuscular once  labetalol 200 milliGRAM(s) Oral every 8 hours  pantoprazole    Tablet 40 milliGRAM(s) Oral before breakfast  predniSONE   Tablet 15 milliGRAM(s) Oral daily  sucralfate suspension 1 Gram(s) Oral three times a day      PHYSICAL EXAM:  T(C): 36.8 (10-29-17 @ 08:23), Max: 36.8 (10-29-17 @ 08:23)  HR: 85 (10-29-17 @ 08:23) (76 - 85)  BP: 158/89 (10-29-17 @ 08:23) (126/73 - 162/80)  RR: 18 (10-29-17 @ 08:23) (17 - 18)  SpO2: 97% (10-29-17 @ 08:23) (95% - 97%)  Wt(kg): --  I&O's Summary    28 Oct 2017 07:01  -  29 Oct 2017 07:00  --------------------------------------------------------  IN: 2089.6 mL / OUT: 3800 mL / NET: -1710.4 mL    29 Oct 2017 07:01  -  29 Oct 2017 11:03  --------------------------------------------------------  IN: 360 mL / OUT: 0 mL / NET: 360 mL          Appearance: Normal	  HEENT:   Normal oral mucosa, PERRL, EOMI	  Lymphatic: No lymphadenopathy , no edema  Cardiovascular: Normal S1 S2, No JVD, No murmurs , Peripheral pulses palpable 2+ bilaterally  Respiratory: Lungs clear to auscultation, normal effort 	  Gastrointestinal:  Soft, Non-tender, + BS	  Skin: No rashes, No ecchymoses, No cyanosis, warm to touch  Musculoskeletal: Normal range of motion, normal strength  Psychiatry:  Mood & affect appropriate  Ext: 4+ chronic LLE edema      LABS:    CARDIAC MARKERS:                                10.3   7.6   )-----------( 167      ( 29 Oct 2017 06:53 )             32.8     10-29    144  |  105  |  67<H>  ----------------------------<  84  4.5   |  27  |  2.72<H>    Ca    8.6      29 Oct 2017 06:53      proBNP:   Lipid Profile:   HgA1c:   TSH:             TELEMETRY: 	    ECG:  	  RADIOLOGY:   DIAGNOSTIC TESTING:  [ ] Echocardiogram:  [ ]  Catheterization:  [ ] Stress Test:    OTHER:

## 2017-10-30 LAB
ANION GAP SERPL CALC-SCNC: 14 MMOL/L — SIGNIFICANT CHANGE UP (ref 5–17)
APTT BLD: 72.9 SEC — HIGH (ref 27.5–37.4)
BUN SERPL-MCNC: 66 MG/DL — HIGH (ref 7–23)
CALCIUM SERPL-MCNC: 8.3 MG/DL — LOW (ref 8.4–10.5)
CHLORIDE SERPL-SCNC: 104 MMOL/L — SIGNIFICANT CHANGE UP (ref 96–108)
CO2 SERPL-SCNC: 26 MMOL/L — SIGNIFICANT CHANGE UP (ref 22–31)
CREAT SERPL-MCNC: 2.72 MG/DL — HIGH (ref 0.5–1.3)
GLUCOSE SERPL-MCNC: 79 MG/DL — SIGNIFICANT CHANGE UP (ref 70–99)
INR BLD: 2.18 RATIO — HIGH (ref 0.88–1.16)
POTASSIUM SERPL-MCNC: 4.3 MMOL/L — SIGNIFICANT CHANGE UP (ref 3.5–5.3)
POTASSIUM SERPL-SCNC: 4.3 MMOL/L — SIGNIFICANT CHANGE UP (ref 3.5–5.3)
PROTHROM AB SERPL-ACNC: 23.9 SEC — HIGH (ref 9.8–12.7)
SODIUM SERPL-SCNC: 144 MMOL/L — SIGNIFICANT CHANGE UP (ref 135–145)

## 2017-10-30 RX ADMIN — Medication 500 MILLIGRAM(S): at 09:36

## 2017-10-30 RX ADMIN — Medication 60 MILLIGRAM(S): at 05:06

## 2017-10-30 RX ADMIN — PANTOPRAZOLE SODIUM 40 MILLIGRAM(S): 20 TABLET, DELAYED RELEASE ORAL at 05:06

## 2017-10-30 RX ADMIN — AMLODIPINE BESYLATE 10 MILLIGRAM(S): 2.5 TABLET ORAL at 09:38

## 2017-10-30 RX ADMIN — Medication 200 MILLIGRAM(S): at 09:36

## 2017-10-30 RX ADMIN — Medication 325 MILLIGRAM(S): at 09:38

## 2017-10-30 RX ADMIN — Medication 25 MILLIGRAM(S): at 05:06

## 2017-10-30 RX ADMIN — Medication 200 MILLIGRAM(S): at 01:06

## 2017-10-30 RX ADMIN — Medication 25 MILLIGRAM(S): at 14:59

## 2017-10-30 RX ADMIN — ARGATROBAN 15.19 MICROGRAM(S)/KG/MIN: 50 INJECTION, SOLUTION INTRAVENOUS at 14:55

## 2017-10-30 RX ADMIN — Medication 15 MILLIGRAM(S): at 09:37

## 2017-10-30 RX ADMIN — CARVEDILOL PHOSPHATE 25 MILLIGRAM(S): 80 CAPSULE, EXTENDED RELEASE ORAL at 20:11

## 2017-10-30 RX ADMIN — CARVEDILOL PHOSPHATE 25 MILLIGRAM(S): 80 CAPSULE, EXTENDED RELEASE ORAL at 12:43

## 2017-10-30 RX ADMIN — Medication 200 MILLIGRAM(S): at 18:45

## 2017-10-30 RX ADMIN — Medication 25 MILLIGRAM(S): at 22:18

## 2017-10-30 RX ADMIN — ARGATROBAN 15.19 MICROGRAM(S)/KG/MIN: 50 INJECTION, SOLUTION INTRAVENOUS at 09:41

## 2017-10-30 NOTE — PROGRESS NOTE ADULT - ASSESSMENT
LEFT leg dvt   ckd 3  rul GGO - repeat ct in 6 weeks    continue current care  await second opinion heme to potentially decide on oral regimen  answered all of pts questions to the best of my ability.

## 2017-10-30 NOTE — PROGRESS NOTE ADULT - SUBJECTIVE AND OBJECTIVE BOX
[facility]   [phone]  RENAL FOLLOW UP NOTE  --------------------------------------------------------------------------------  Chief Complaint:    24 hour events/subjective:    states that cramps yesterday.    PAST HISTORY  --------------------------------------------------------------------------------  No significant changes to PMH, PSH, FHx, SHx, unless otherwise noted    ALLERGIES & MEDICATIONS  --------------------------------------------------------------------------------  Allergies    heparin (Unknown)  losartan (Anaphylaxis)  nitroglycerin (Other)    Intolerances      Standing Inpatient Medications  amLODIPine   Tablet 10 milliGRAM(s) Oral daily  argatroban Infusion 2 MICROgram(s)/kG/Min IV Continuous <Continuous>  ascorbic acid 500 milliGRAM(s) Oral daily  carvedilol 25 milliGRAM(s) Oral every 12 hours  ferrous    sulfate 325 milliGRAM(s) Oral daily  furosemide   Injectable 60 milliGRAM(s) IV Push daily  hydrALAZINE 25 milliGRAM(s) Oral every 8 hours  influenza   Vaccine 0.5 milliLiter(s) IntraMuscular once  labetalol 200 milliGRAM(s) Oral every 8 hours  pantoprazole    Tablet 40 milliGRAM(s) Oral before breakfast  predniSONE   Tablet 15 milliGRAM(s) Oral daily  sucralfate suspension 1 Gram(s) Oral three times a day    PRN Inpatient Medications      REVIEW OF SYSTEMS  --------------------------------------------------------------------------------    Gen: denies fevers/chills,  CVS: denies chest pain/palpitations  Resp: denies SOB/Cough  GI: Denies N/V/Abd pain  : Denies dysuria/oliguria/hematuria    All other systems were reviewed and are negative, except as noted.    VITALS/PHYSICAL EXAM  --------------------------------------------------------------------------------  T(C): 36.8 (10-30-17 @ 13:11), Max: 36.8 (10-30-17 @ 13:11)  HR: 79 (10-30-17 @ 13:11) (72 - 83)  BP: 153/77 (10-30-17 @ 13:11) (146/80 - 168/91)  RR: 19 (10-30-17 @ 13:11) (18 - 19)  SpO2: 95% (10-30-17 @ 13:11) (94% - 97%)  Wt(kg): --        10-29-17 @ 07:01  -  10-30-17 @ 07:00  --------------------------------------------------------  IN: 1457.6 mL / OUT: 2960 mL / NET: -1502.4 mL    10-30-17 @ 07:01  -  10-30-17 @ 16:25  --------------------------------------------------------  IN: 600 mL / OUT: 1650 mL / NET: -1050 mL      Physical Exam:  	    Physical Exam:  	Gen: alert oriented  	Pulm: Mild Decreased breath sounds b/l bases. no rales or ronchi or wheezing  	CV: RRR, S1/S2. no rub  	Back: No CVA tenderness; no sacral edema  	Abd: +BS, soft, nontender/nondistended  	: No suprapubic tenderness.               Extremity:Left lower extremity 4+ edema however significantly improved.  Right lower extremity 1-2+ edema  	    LABS/STUDIES  --------------------------------------------------------------------------------              10.3   7.6   >-----------<  167      [10-29-17 @ 06:53]              32.8     144  |  104  |  66  ----------------------------<  79      [10-30-17 @ 06:50]  4.3   |  26  |  2.72        Ca     8.3     [10-30-17 @ 06:50]      PT/INR: PT 23.9 , INR 2.18       [10-30-17 @ 06:50]  PTT: 72.9       [10-30-17 @ 06:50]      Creatinine Trend:  SCr 2.72 [10-30 @ 06:50]  SCr 2.72 [10-29 @ 06:53]  SCr 2.74 [10-27 @ 06:05]  SCr 2.74 [10-26 @ 09:20]  SCr 2.70 [10-25 @ 06:15]              Urinalysis - [10-11-17 @ 18:44]      Color Yellow / Appearance SL Turbid / SG 1.015 / pH 8.0      Gluc Negative / Ketone Negative  / Bili Negative / Urobili Negative       Blood Negative / Protein >600 / Leuk Est Large / Nitrite Negative      RBC  / WBC 5-10 / Hyaline  / Gran  / Sq Epi  / Non Sq Epi  / Bacteria       Iron 48, TIBC --, %sat --      [10-12-17 @ 07:14]  Ferritin 847.0      [10-12-17 @ 07:14]  HbA1c 4.5      [08-09-16 @ 06:49]  TSH 0.33      [10-22-17 @ 08:40]    dsDNA 44      [10-17-17 @ 08:26]  C3 Complement 105      [10-17-17 @ 08:26]  C4 Complement 31      [10-17-17 @ 08:26]

## 2017-10-30 NOTE — PROGRESS NOTE ADULT - SUBJECTIVE AND OBJECTIVE BOX
Follow-up Pulm Progress Note    Denies dyspnea, CP  95% on RA  c/o R knee pain  c/o abd cramping     Medications:  MEDICATIONS  (STANDING):  amLODIPine   Tablet 10 milliGRAM(s) Oral daily  argatroban Infusion 2 MICROgram(s)/kG/Min (15.192 mL/Hr) IV Continuous <Continuous>  ascorbic acid 500 milliGRAM(s) Oral daily  carvedilol 25 milliGRAM(s) Oral every 12 hours  ferrous    sulfate 325 milliGRAM(s) Oral daily  furosemide   Injectable 60 milliGRAM(s) IV Push daily  hydrALAZINE 25 milliGRAM(s) Oral every 8 hours  influenza   Vaccine 0.5 milliLiter(s) IntraMuscular once  labetalol 200 milliGRAM(s) Oral every 8 hours  pantoprazole    Tablet 40 milliGRAM(s) Oral before breakfast  predniSONE   Tablet 15 milliGRAM(s) Oral daily  sucralfate suspension 1 Gram(s) Oral three times a day    MEDICATIONS  (PRN):          Vital Signs Last 24 Hrs  T(C): 36.8 (30 Oct 2017 13:11), Max: 36.8 (30 Oct 2017 13:11)  T(F): 98.3 (30 Oct 2017 13:11), Max: 98.3 (30 Oct 2017 13:11)  HR: 79 (30 Oct 2017 13:11) (72 - 83)  BP: 153/77 (30 Oct 2017 13:11) (146/80 - 168/91)  BP(mean): --  RR: 19 (30 Oct 2017 13:11) (18 - 19)  SpO2: 95% (30 Oct 2017 13:11) (94% - 97%) on RA          10-29 @ 07:01  -  10-30 @ 07:00  --------------------------------------------------------  IN: 1457.6 mL / OUT: 2960 mL / NET: -1502.4 mL          LABS:                        10.3   7.6   )-----------( 167      ( 29 Oct 2017 06:53 )             32.8     10-30    144  |  104  |  66<H>  ----------------------------<  79  4.3   |  26  |  2.72<H>    Ca    8.3<L>      30 Oct 2017 06:50            CAPILLARY BLOOD GLUCOSE        PT/INR - ( 30 Oct 2017 06:50 )   PT: 23.9 sec;   INR: 2.18 ratio         PTT - ( 30 Oct 2017 06:50 )  PTT:72.9 sec                    CULTURES: (if applicable)          Physical Examination:  PULM: Decreased BS throughout   CVS: S1, S2 RRR    RADIOLOGY REVIEWED  CT A/P (lower chest): Moderate pericardial effusion-unchanged

## 2017-10-30 NOTE — PROGRESS NOTE ADULT - SUBJECTIVE AND OBJECTIVE BOX
Subjective: Patient seen and examined. No new events except as noted.   abdominal pain last night now resolved   no cp or sob     REVIEW OF SYSTEMS:    CONSTITUTIONAL: + weakness, no fevers or chills  EYES/ENT: No visual changes;  No vertigo or throat pain   NECK: No pain or stiffness  RESPIRATORY: No cough, wheezing, hemoptysis; No shortness of breath  CARDIOVASCULAR: No chest pain or palpitations  GASTROINTESTINAL: + abdominal or epigastric pain. No nausea, vomiting, or hematemesis; No diarrhea or constipation. No melena or hematochezia.  GENITOURINARY: No dysuria, frequency or hematuria  NEUROLOGICAL: No numbness or weakness  SKIN: No itching, burning, rashes, or lesions   All other review of systems is negative unless indicated above.    MEDICATIONS:  MEDICATIONS  (STANDING):  amLODIPine   Tablet 10 milliGRAM(s) Oral daily  argatroban Infusion 2 MICROgram(s)/kG/Min (15.192 mL/Hr) IV Continuous <Continuous>  ascorbic acid 500 milliGRAM(s) Oral daily  carvedilol 25 milliGRAM(s) Oral every 12 hours  ferrous    sulfate 325 milliGRAM(s) Oral daily  furosemide   Injectable 60 milliGRAM(s) IV Push daily  hydrALAZINE 25 milliGRAM(s) Oral every 8 hours  influenza   Vaccine 0.5 milliLiter(s) IntraMuscular once  labetalol 200 milliGRAM(s) Oral every 8 hours  pantoprazole    Tablet 40 milliGRAM(s) Oral before breakfast  predniSONE   Tablet 15 milliGRAM(s) Oral daily  sucralfate suspension 1 Gram(s) Oral three times a day      PHYSICAL EXAM:  T(C): 36.7 (10-30-17 @ 04:30), Max: 36.7 (10-29-17 @ 21:38)  HR: 83 (10-30-17 @ 09:19) (72 - 83)  BP: 168/91 (10-30-17 @ 09:19) (146/80 - 168/91)  RR: 18 (10-30-17 @ 04:30) (18 - 18)  SpO2: 94% (10-30-17 @ 04:30) (94% - 97%)  Wt(kg): --  I&O's Summary    29 Oct 2017 07:01  -  30 Oct 2017 07:00  --------------------------------------------------------  IN: 1457.6 mL / OUT: 2960 mL / NET: -1502.4 mL          Appearance: Normal	  HEENT:   Normal oral mucosa, PERRL, EOMI	  Lymphatic: No lymphadenopathy , no edema  Cardiovascular: Normal S1 S2, No JVD, No murmurs , Peripheral pulses palpable 2+ bilaterally  Respiratory: Lungs clear to auscultation, normal effort 	  Gastrointestinal:  Soft, Non-tender, + BS	  Skin: No rashes, No ecchymoses, No cyanosis, warm to touch  Musculoskeletal: Normal range of motion, normal strength  Psychiatry:  Mood & affect appropriate  Ext: 4+ chronic LLE edema  +fox       LABS:    CARDIAC MARKERS:                                10.3   7.6   )-----------( 167      ( 29 Oct 2017 06:53 )             32.8     10-30    144  |  104  |  66<H>  ----------------------------<  79  4.3   |  26  |  2.72<H>    Ca    8.3<L>      30 Oct 2017 06:50      proBNP:   Lipid Profile:   HgA1c:   TSH:             TELEMETRY: Vpaced 	    ECG:  	  RADIOLOGY:   < from: CT Abdomen and Pelvis No Cont (10.29.17 @ 22:25) >  INTERPRETATION:  CLINICAL INFORMATION: History of lupus, pericardial   effusion, HIT, evaluate for retroperitoneal bleed.    COMPARISON: CT abdomen and pelvis 12/15/2016, CT chest 10/18/2017.    PROCEDURE:   CT of the Abdomen and Pelvis was performed without intravenous contrast.   Intravenous contrast: None.  Oral contrast: None.  Sagittal and coronal reformats were performed.    FINDINGS:    LOWER CHEST: Moderate sized pericardial effusion, unchanged since   12/15/2016. Coronary artery calcifications.    LIVER: Within normal limits.  BILE DUCTS: Normal caliber.  GALLBLADDER: Cholecystectomy.  SPLEEN: Within normal limits.  PANCREAS: Within normal limits.  ADRENALS: Within normal limits.  KIDNEYS/URETERS: Within normal limits.    BLADDER: Fox catheter within a collapsed bladder.  REPRODUCTIVE ORGANS: The uterus and adnexa are within normal limits.    BOWEL: No bowel obstruction. Appendix isnot identified.  PERITONEUM: No retroperitoneal hematoma. No ascites.  VESSELS: Limited evaluation without the use of intravenous contrast.   Status post EVAR left common iliac artery aneurysm. Femorofemoral bypass   is noted. Bilateral inguinal and anterior abdominal wall collateral   vessels.  RETROPERITONEUM: No lymphadenopathy.    ABDOMINAL WALL: Anasarca.  BONES: Degenerative changes of the spine.    IMPRESSION:    No retroperitoneal hemorrhage.    Left common iliac artery EVAR.    Unchangedmoderate sized pericardial effusion.                      DIAGNOSTIC TESTING:  [ ] Echocardiogram:  [ ]  Catheterization:  [ ] Stress Test:    OTHER:

## 2017-10-30 NOTE — PROGRESS NOTE ADULT - ASSESSMENT
3 58 yo F w/ hx of SLE, systolic CHF (EF 32%) s/p AICD, HTN, CKD III- IV, PE / left leg DVT dx 2009, HIT, CAD (no stents), stage IV sacral ulcer, chronic fox, hx of recurrent pericardial effusion, AAA ~14cm in 12/2016 s/p repair in 2010 w/ aortoiliac stent c/b post-operatively with development of left lower extremity   lymphedema December 2016;  pericardial effusion pericardial drain/SLE. now with ilana on ckd with baseline creatinine approx 1.8-2.     1- ILANA on ckd III  2- chf hx  3- cardiomyopathy  4- HTN   5- SLE  6- proteinuria    creatinine still elevated However, remained steady.  Her fluid status is slowly improving.   has pericardial effusion as well.   cont with B-B .  Norvasc 10 mg daily  hyrdalazine to cont.   nephrotic syndrome.   continue with Fox catheter for chronic obstructive uropathy.   lasix 60mg iv qd   monitor for gout attack

## 2017-10-30 NOTE — PROGRESS NOTE ADULT - SUBJECTIVE AND OBJECTIVE BOX
MEDICINE, PROGRESS NOTE [phone]    COSTEN, SANDRA 57y [id number]    Patient seen and examined.  Patient is a 57y old  Female who presents with a chief complaint of shortness of breath (11 Oct 2017 22:01)  Pt feels ok. Pt waiting to discuss options with second opinion heme. Still not willing to transition to oral meds.    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)  Chronic Gout (ICD9 274.02)  LBBB (Left Bundle Branch Block) (ICD9 426.3)  CHF (Congestive Heart Failure) (ICD9 428.0)  Hx of aorta-iliac-femoral bypass  TOP (Termination of Pregnancy)  S/P Dilatation and Curettage  History of Tubal Ligation  s/p AAA (Abdominal Aortic Aneurysm) Repair: 2009 2010  History of Cholecystectomy  S/P Partial Hysterectomy    MEDICATIONS  (STANDING):  amLODIPine   Tablet 10 milliGRAM(s) Oral daily  argatroban Infusion 2 MICROgram(s)/kG/Min (15.192 mL/Hr) IV Continuous <Continuous>  ascorbic acid 500 milliGRAM(s) Oral daily  carvedilol 25 milliGRAM(s) Oral every 12 hours  ferrous    sulfate 325 milliGRAM(s) Oral daily  furosemide   Injectable 60 milliGRAM(s) IV Push daily  hydrALAZINE 25 milliGRAM(s) Oral every 8 hours  influenza   Vaccine 0.5 milliLiter(s) IntraMuscular once  labetalol 200 milliGRAM(s) Oral every 8 hours  pantoprazole    Tablet 40 milliGRAM(s) Oral before breakfast  predniSONE   Tablet 15 milliGRAM(s) Oral daily  sucralfate suspension 1 Gram(s) Oral three times a day    MEDICATIONS  (PRN):    Allergies    heparin (Unknown)  losartan (Anaphylaxis)  nitroglycerin (Other)    Intolerances        PHYSICAL EXAM:  Constitutional: NAD  HEENT: Normocephalic, EOMI  Neck:  No JVD  Respiratory: CTA B/L, No wheezes  Cardiovascular: S1, S2, RRR, + systolic murmur  Gastrointestinal: BS+, soft, NT/ND  Extremities: + peripheral edema b/l le L>R, right leg edema is improving, left leg is starting to improve  Neurological: AAOX3, no focal deficits  Psychiatric: Normal mood, normal affect  : + Hook    Vital Signs Last 24 Hrs  T(C): 36.8 (30 Oct 2017 13:11), Max: 36.8 (30 Oct 2017 13:11)  T(F): 98.3 (30 Oct 2017 13:11), Max: 98.3 (30 Oct 2017 13:11)  HR: 84 (30 Oct 2017 18:44) (72 - 84)  BP: 171/93 (30 Oct 2017 18:44) (146/80 - 171/93)  BP(mean): --  RR: 19 (30 Oct 2017 13:11) (18 - 19)  SpO2: 95% (30 Oct 2017 13:11) (94% - 97%)  I&O's Summary    29 Oct 2017 07:01  -  30 Oct 2017 07:00  --------------------------------------------------------  IN: 1457.6 mL / OUT: 2960 mL / NET: -1502.4 mL    30 Oct 2017 07:01  -  30 Oct 2017 20:01  --------------------------------------------------------  IN: 600 mL / OUT: 1650 mL / NET: -1050 mL        LABS:                        10.3   7.6   )-----------( 167      ( 29 Oct 2017 06:53 )             32.8     10-30    144  |  104  |  66<H>  ----------------------------<  79  4.3   |  26  |  2.72<H>    Ca    8.3<L>      30 Oct 2017 06:50

## 2017-10-30 NOTE — PROGRESS NOTE ADULT - PROBLEM SELECTOR PLAN 1
Multifactorial-restrictive disease 2nd obesity and cardiomegaly, pericardial effusion, debility and volume overloaded, with systolic HF (EF 25%)   -Lasix 60mg qd  -Dyspnea overall improved   -Pericardial effusion is stable, no plan for pericardiocentesis at this time per cards  -Peripheral GGO in RUL new from Oct 11 2017 CT. Patient appears non-toxic. Recommend repeat CT chest in 6 weeks to further eval

## 2017-10-30 NOTE — PROGRESS NOTE ADULT - ASSESSMENT
56 y/o female PMHx chronic systolic CHF (last known EF=32%) s/p AICD, SLE, AAA approx 14cm as of 12/2016 s/p aortoiliac stent, DVT/PE (not currently anticoagulated 2/2 HIT), h/o LLE compartment syndrome, multiple pericardial effusions s/p drainage most recently in Jan 2017 now presents with recurrent large pericardial effusion     1. pericardial effusion without tamponade. size unchanged on repeat CT   	Likely chronic effusion, h/o SLE              Rheumatology consult reviewed    2. CT surgery recommended no surgical intervention and drainage by interventional means   Interventional Cardiology  and Vascular recommending radiologic guidance given the nature of the effusion - appears loculated). Will again discuss the possibility and feasibility of interventional approach with interventional radiolology   lasix 20 mg IV daily     3. Venous LE Doppler positive for DVT. Started on Argatroban (History Of HIT) and wants to start coumadin.  Monitor CBC    4. Abdominal pain now resolved. No acute findings on CT

## 2017-10-31 LAB
APTT BLD: 68.1 SEC — HIGH (ref 27.5–37.4)
INR BLD: 1.95 RATIO — HIGH (ref 0.88–1.16)
PROTHROM AB SERPL-ACNC: 21.5 SEC — HIGH (ref 9.8–12.7)

## 2017-10-31 RX ADMIN — Medication 500 MILLIGRAM(S): at 09:21

## 2017-10-31 RX ADMIN — CARVEDILOL PHOSPHATE 25 MILLIGRAM(S): 80 CAPSULE, EXTENDED RELEASE ORAL at 21:20

## 2017-10-31 RX ADMIN — Medication 200 MILLIGRAM(S): at 00:41

## 2017-10-31 RX ADMIN — Medication 25 MILLIGRAM(S): at 14:29

## 2017-10-31 RX ADMIN — Medication 60 MILLIGRAM(S): at 05:28

## 2017-10-31 RX ADMIN — Medication 25 MILLIGRAM(S): at 21:20

## 2017-10-31 RX ADMIN — Medication 200 MILLIGRAM(S): at 09:21

## 2017-10-31 RX ADMIN — PANTOPRAZOLE SODIUM 40 MILLIGRAM(S): 20 TABLET, DELAYED RELEASE ORAL at 05:29

## 2017-10-31 RX ADMIN — CARVEDILOL PHOSPHATE 25 MILLIGRAM(S): 80 CAPSULE, EXTENDED RELEASE ORAL at 10:33

## 2017-10-31 RX ADMIN — AMLODIPINE BESYLATE 10 MILLIGRAM(S): 2.5 TABLET ORAL at 09:21

## 2017-10-31 RX ADMIN — Medication 200 MILLIGRAM(S): at 17:08

## 2017-10-31 RX ADMIN — Medication 15 MILLIGRAM(S): at 09:21

## 2017-10-31 RX ADMIN — Medication 25 MILLIGRAM(S): at 05:29

## 2017-10-31 RX ADMIN — Medication 325 MILLIGRAM(S): at 09:21

## 2017-10-31 NOTE — PROGRESS NOTE ADULT - ASSESSMENT
Imp: 58 yo female with h/o SLE, CHF, CKD, who was a/w new LLE blood clot.  Now on anticoagulation, but hesitant about continuing oral ac as outpt.  Has had increased bruising on steroids with ac    Rec:   decrease prednisone to 10mg/day-observe for new symptoms.  Awaiting 2nd opinion heme to discuss long term ac

## 2017-10-31 NOTE — PROGRESS NOTE ADULT - SUBJECTIVE AND OBJECTIVE BOX
INTERVAL HPI/OVERNIGHT EVENTS:  Pt feeling overall stable.  She has been c/o pain in the hands in the last day or so.    MEDICATIONS  (STANDING):  amLODIPine   Tablet 10 milliGRAM(s) Oral daily  argatroban Infusion 2 MICROgram(s)/kG/Min (15.192 mL/Hr) IV Continuous <Continuous>  ascorbic acid 500 milliGRAM(s) Oral daily  carvedilol 25 milliGRAM(s) Oral every 12 hours  ferrous    sulfate 325 milliGRAM(s) Oral daily  furosemide   Injectable 60 milliGRAM(s) IV Push daily  hydrALAZINE 25 milliGRAM(s) Oral every 8 hours  influenza   Vaccine 0.5 milliLiter(s) IntraMuscular once  labetalol 200 milliGRAM(s) Oral every 8 hours  pantoprazole    Tablet 40 milliGRAM(s) Oral before breakfast  predniSONE   Tablet 15 milliGRAM(s) Oral daily  sucralfate suspension 1 Gram(s) Oral three times a day    MEDICATIONS  (PRN):      Allergies    heparin (Unknown)  losartan (Anaphylaxis)  nitroglycerin (Other)    Intolerances        REVIEW OF SYSTEMS    General:	    Skin/Breast:  	  Ophthalmologic:  	  ENMT:	    Respiratory and Thorax:  	  Cardiovascular:	    Gastrointestinal:	    Genitourinary:	    Musculoskeletal:	  +joint pains shivani hands    Neurological:	    Psychiatric:	  appropriate    Hematology/Lymphatics:    Vital Signs Last 24 Hrs  T(C): 36.9 (31 Oct 2017 14:27), Max: 36.9 (31 Oct 2017 14:27)  T(F): 98.4 (31 Oct 2017 14:27), Max: 98.4 (31 Oct 2017 14:27)  HR: 73 (31 Oct 2017 16:50) (73 - 82)  BP: 160/80 (31 Oct 2017 16:50) (149/78 - 182/84)  BP(mean): --  RR: 19 (31 Oct 2017 14:27) (17 - 19)  SpO2: 94% (31 Oct 2017 14:27) (94% - 97%)      PHYSICAL EXAM:    Constitutional:  well appearing    Eyes:    ENMT:    Neck:  supple    Back:    Respiratory:    Cardiovascular:    Gastrointestinal:  abd soft nt nd, no masses    Extremities:  severe LLE edema, leg wrapped.  No RLE edema    Vascular:    Neurological:    Skin:  +Bruising    Lymph Nodes:    Musculoskeletal:  Hands w/o active synovitis, no swelling or redness, non tender  No other active synovitis    Psychiatric:  answers all questions, in good spirits    LABS:    10-30    144  |  104  |  66<H>  ----------------------------<  79  4.3   |  26  |  2.72<H>    Ca    8.3<L>      30 Oct 2017 06:50      PT/INR - ( 31 Oct 2017 06:42 )   PT: 21.5 sec;   INR: 1.95 ratio         PTT - ( 31 Oct 2017 06:42 )  PTT:68.1 sec      RADIOLOGY & ADDITIONAL TESTS:

## 2017-10-31 NOTE — PROGRESS NOTE ADULT - ASSESSMENT
Pericardial effusion  Lupus  lle dvt  chronic lle edema  ckd 3    continue current care  await second opinion heme input per pts request before she will decide on oral anticoagulation.  continue anticoag for now as tolerating with no evidence of bleeding.

## 2017-10-31 NOTE — PROGRESS NOTE ADULT - SUBJECTIVE AND OBJECTIVE BOX
Subjective: Patient seen and examined. No new events except as noted.     REVIEW OF SYSTEMS:    CONSTITUTIONAL: +eakness, no fevers or chills  EYES/ENT: No visual changes;  No vertigo or throat pain   NECK: No pain or stiffness  RESPIRATORY: No cough, wheezing, hemoptysis; No shortness of breath  CARDIOVASCULAR: No chest pain or palpitations  GASTROINTESTINAL: No abdominal or epigastric pain. No nausea, vomiting, or hematemesis; No diarrhea or constipation. No melena or hematochezia.  GENITOURINARY: No dysuria, frequency or hematuria  NEUROLOGICAL: No numbness or weakness  SKIN: No itching, burning, rashes, or lesions   All other review of systems is negative unless indicated above.    MEDICATIONS:  MEDICATIONS  (STANDING):  amLODIPine   Tablet 10 milliGRAM(s) Oral daily  argatroban Infusion 2 MICROgram(s)/kG/Min (15.192 mL/Hr) IV Continuous <Continuous>  ascorbic acid 500 milliGRAM(s) Oral daily  carvedilol 25 milliGRAM(s) Oral every 12 hours  ferrous    sulfate 325 milliGRAM(s) Oral daily  furosemide   Injectable 60 milliGRAM(s) IV Push daily  hydrALAZINE 25 milliGRAM(s) Oral every 8 hours  influenza   Vaccine 0.5 milliLiter(s) IntraMuscular once  labetalol 200 milliGRAM(s) Oral every 8 hours  pantoprazole    Tablet 40 milliGRAM(s) Oral before breakfast  predniSONE   Tablet 15 milliGRAM(s) Oral daily  sucralfate suspension 1 Gram(s) Oral three times a day      PHYSICAL EXAM:  T(C): 36.7 (10-31-17 @ 04:47), Max: 36.8 (10-30-17 @ 13:11)  HR: 76 (10-31-17 @ 09:19) (76 - 84)  BP: 182/84 (10-31-17 @ 09:19) (149/78 - 182/84)  RR: 18 (10-31-17 @ 04:47) (17 - 19)  SpO2: 94% (10-31-17 @ 04:47) (94% - 97%)  Wt(kg): --  I&O's Summary    30 Oct 2017 07:01  -  31 Oct 2017 07:00  --------------------------------------------------------  IN: 782.4 mL / OUT: 2650 mL / NET: -1867.6 mL    31 Oct 2017 07:01  -  31 Oct 2017 09:21  --------------------------------------------------------  IN: 30.4 mL / OUT: 0 mL / NET: 30.4 mL          Appearance: Normal	  HEENT:   Normal oral mucosa, PERRL, EOMI	  Lymphatic: No lymphadenopathy , no edema  Cardiovascular: Normal S1 S2, No JVD, No murmurs , Peripheral pulses palpable 2+ bilaterally  Respiratory: Lungs clear to auscultation, normal effort 	  Gastrointestinal:  Soft, Non-tender, + BS	  Skin: No rashes, No ecchymoses, No cyanosis, warm to touch  Musculoskeletal: Normal range of motion, normal strength  Psychiatry:  Mood & affect appropriate  Ext: chronic 4+ edema in LLE   +fox       LABS:    CARDIAC MARKERS:            10-30    144  |  104  |  66<H>  ----------------------------<  79  4.3   |  26  |  2.72<H>    Ca    8.3<L>      30 Oct 2017 06:50      proBNP:   Lipid Profile:   HgA1c:   TSH:             TELEMETRY: 	    ECG:  	  RADIOLOGY:   DIAGNOSTIC TESTING:  [ ] Echocardiogram:  [ ]  Catheterization:  [ ] Stress Test:    OTHER:

## 2017-10-31 NOTE — PROGRESS NOTE ADULT - SUBJECTIVE AND OBJECTIVE BOX
MEDICINE, PROGRESS NOTE [phone]    COSTEN, SANDRA 57y [id number]    Patient seen and examined.  Patient is a 57y old  Female who presents with a chief complaint of shortness of breath (11 Oct 2017 22:01)  Pt with no new complaints however still waiting to speak to second opinion heme prior to decision on oral anticoagulation    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)  Chronic Gout (ICD9 274.02)  LBBB (Left Bundle Branch Block) (ICD9 426.3)  CHF (Congestive Heart Failure) (ICD9 428.0)  Hx of aorta-iliac-femoral bypass  TOP (Termination of Pregnancy)  S/P Dilatation and Curettage  History of Tubal Ligation  s/p AAA (Abdominal Aortic Aneurysm) Repair: 2009 2010  History of Cholecystectomy  S/P Partial Hysterectomy    MEDICATIONS  (STANDING):  amLODIPine   Tablet 10 milliGRAM(s) Oral daily  argatroban Infusion 2 MICROgram(s)/kG/Min (15.192 mL/Hr) IV Continuous <Continuous>  ascorbic acid 500 milliGRAM(s) Oral daily  carvedilol 25 milliGRAM(s) Oral every 12 hours  ferrous    sulfate 325 milliGRAM(s) Oral daily  furosemide   Injectable 60 milliGRAM(s) IV Push daily  hydrALAZINE 25 milliGRAM(s) Oral every 8 hours  influenza   Vaccine 0.5 milliLiter(s) IntraMuscular once  labetalol 200 milliGRAM(s) Oral every 8 hours  pantoprazole    Tablet 40 milliGRAM(s) Oral before breakfast  predniSONE   Tablet 10 milliGRAM(s) Oral daily  sucralfate suspension 1 Gram(s) Oral three times a day    MEDICATIONS  (PRN):    Allergies    heparin (Unknown)  losartan (Anaphylaxis)  nitroglycerin (Other)    Intolerances        PHYSICAL EXAM:  Constitutional: NAD  HEENT: Normocephalic, EOMI  Neck:  No JVD  Respiratory: CTA B/L, No wheezes  Cardiovascular: S1, S2, RRR, + systolic murmur  Gastrointestinal: BS+, soft, NT/ND  Extremities: + peripheral edema L>R, both legs seem to be improving  Neurological: AAOX3, no focal deficits  Psychiatric: Normal mood, normal affect  : + Hook    Vital Signs Last 24 Hrs  T(C): 36.9 (31 Oct 2017 14:27), Max: 36.9 (31 Oct 2017 14:27)  T(F): 98.4 (31 Oct 2017 14:27), Max: 98.4 (31 Oct 2017 14:27)  HR: 73 (31 Oct 2017 16:50) (73 - 82)  BP: 160/80 (31 Oct 2017 16:50) (149/78 - 182/84)  BP(mean): --  RR: 19 (31 Oct 2017 14:27) (17 - 19)  SpO2: 94% (31 Oct 2017 14:27) (94% - 97%)  I&O's Summary    30 Oct 2017 07:01  -  31 Oct 2017 07:00  --------------------------------------------------------  IN: 782.4 mL / OUT: 2650 mL / NET: -1867.6 mL    31 Oct 2017 07:01  -  31 Oct 2017 20:12  --------------------------------------------------------  IN: 782.4 mL / OUT: 900 mL / NET: -117.6 mL        LABS:    10-30    144  |  104  |  66<H>  ----------------------------<  79  4.3   |  26  |  2.72<H>    Ca    8.3<L>      30 Oct 2017 06:50

## 2017-10-31 NOTE — PROGRESS NOTE ADULT - ASSESSMENT
56 y/o female PMHx chronic systolic CHF (last known EF=32%) s/p AICD, SLE, AAA approx 14cm as of 12/2016 s/p aortoiliac stent, DVT/PE (not currently anticoagulated 2/2 HIT), h/o LLE compartment syndrome, multiple pericardial effusions s/p drainage most recently in Jan 2017 now presents with recurrent large pericardial effusion     1. pericardial effusion without tamponade. size unchanged on repeat CT   	Likely chronic effusion, h/o SLE              Rheumatology consult reviewed    2. CT surgery recommended no surgical intervention and drainage by interventional means   Interventional Cardiology  and Vascular recommending radiologic guidance given the nature of the effusion - appears loculated). Will again discuss the possibility and feasibility of interventional approach with interventional radiolology  dc  lasix 20 mg IV daily     3. Venous LE Doppler positive for DVT. Started on Argatroban (History Of HIT) and wants to start coumadin.  Monitor CBC    4. Abdominal pain now resolved. No acute findings on CT

## 2017-10-31 NOTE — PROGRESS NOTE ADULT - SUBJECTIVE AND OBJECTIVE BOX
Follow-up Pulm Progress Note    No new respiratory events overnight.  Denies SOB/CP.   c/o mild R knee pain  Dyspnea with exertion much improved since admission   Eager to leave     Medications:  MEDICATIONS  (STANDING):  amLODIPine   Tablet 10 milliGRAM(s) Oral daily  argatroban Infusion 2 MICROgram(s)/kG/Min (15.192 mL/Hr) IV Continuous <Continuous>  ascorbic acid 500 milliGRAM(s) Oral daily  carvedilol 25 milliGRAM(s) Oral every 12 hours  ferrous    sulfate 325 milliGRAM(s) Oral daily  furosemide   Injectable 60 milliGRAM(s) IV Push daily  hydrALAZINE 25 milliGRAM(s) Oral every 8 hours  influenza   Vaccine 0.5 milliLiter(s) IntraMuscular once  labetalol 200 milliGRAM(s) Oral every 8 hours  pantoprazole    Tablet 40 milliGRAM(s) Oral before breakfast  predniSONE   Tablet 15 milliGRAM(s) Oral daily  sucralfate suspension 1 Gram(s) Oral three times a day    MEDICATIONS  (PRN):          Vital Signs Last 24 Hrs  T(C): 36.7 (31 Oct 2017 04:47), Max: 36.8 (30 Oct 2017 13:11)  T(F): 98 (31 Oct 2017 04:47), Max: 98.3 (30 Oct 2017 13:11)  HR: 76 (31 Oct 2017 09:19) (76 - 84)  BP: 182/84 (31 Oct 2017 09:19) (149/78 - 182/84)  BP(mean): --  RR: 18 (31 Oct 2017 04:47) (17 - 19)  SpO2: 94% (31 Oct 2017 04:47) (94% - 97%) on RA          10-30 @ 07:01  -  10-31 @ 07:00  --------------------------------------------------------  IN: 782.4 mL / OUT: 2650 mL / NET: -1867.6 mL          LABS:    10-30    144  |  104  |  66<H>  ----------------------------<  79  4.3   |  26  |  2.72<H>    Ca    8.3<L>      30 Oct 2017 06:50            CAPILLARY BLOOD GLUCOSE        PT/INR - ( 31 Oct 2017 06:42 )   PT: 21.5 sec;   INR: 1.95 ratio         PTT - ( 31 Oct 2017 06:42 )  PTT:68.1 sec            Physical Examination:  PULM: Decreased BS throughout   CVS: S1, S2 RRR    RADIOLOGY REVIEWED

## 2017-11-01 DIAGNOSIS — D75.82 HEPARIN INDUCED THROMBOCYTOPENIA (HIT): ICD-10-CM

## 2017-11-01 DIAGNOSIS — I82.412 ACUTE EMBOLISM AND THROMBOSIS OF LEFT FEMORAL VEIN: ICD-10-CM

## 2017-11-01 LAB
APTT BLD: 71 SEC — HIGH (ref 27.5–37.4)
HCT VFR BLD CALC: 33.2 % — LOW (ref 34.5–45)
HGB BLD-MCNC: 10.8 G/DL — LOW (ref 11.5–15.5)
INR BLD: 1.83 RATIO — HIGH (ref 0.88–1.16)
MCHC RBC-ENTMCNC: 29.6 PG — SIGNIFICANT CHANGE UP (ref 27–34)
MCHC RBC-ENTMCNC: 32.5 GM/DL — SIGNIFICANT CHANGE UP (ref 32–36)
MCV RBC AUTO: 91.2 FL — SIGNIFICANT CHANGE UP (ref 80–100)
PLATELET # BLD AUTO: 174 K/UL — SIGNIFICANT CHANGE UP (ref 150–400)
PROTHROM AB SERPL-ACNC: 20 SEC — HIGH (ref 9.8–12.7)
RBC # BLD: 3.64 M/UL — LOW (ref 3.8–5.2)
RBC # FLD: 14.6 % — HIGH (ref 10.3–14.5)
WBC # BLD: 8.2 K/UL — SIGNIFICANT CHANGE UP (ref 3.8–10.5)
WBC # FLD AUTO: 8.2 K/UL — SIGNIFICANT CHANGE UP (ref 3.8–10.5)

## 2017-11-01 PROCEDURE — 99222 1ST HOSP IP/OBS MODERATE 55: CPT | Mod: GC

## 2017-11-01 RX ORDER — WARFARIN SODIUM 2.5 MG/1
2.5 TABLET ORAL ONCE
Qty: 0 | Refills: 0 | Status: COMPLETED | OUTPATIENT
Start: 2017-11-01 | End: 2017-11-01

## 2017-11-01 RX ADMIN — PANTOPRAZOLE SODIUM 40 MILLIGRAM(S): 20 TABLET, DELAYED RELEASE ORAL at 05:21

## 2017-11-01 RX ADMIN — Medication 500 MILLIGRAM(S): at 09:05

## 2017-11-01 RX ADMIN — Medication 200 MILLIGRAM(S): at 17:26

## 2017-11-01 RX ADMIN — Medication 25 MILLIGRAM(S): at 05:21

## 2017-11-01 RX ADMIN — WARFARIN SODIUM 2.5 MILLIGRAM(S): 2.5 TABLET ORAL at 22:55

## 2017-11-01 RX ADMIN — Medication 60 MILLIGRAM(S): at 05:21

## 2017-11-01 RX ADMIN — Medication 200 MILLIGRAM(S): at 01:05

## 2017-11-01 RX ADMIN — Medication 325 MILLIGRAM(S): at 09:05

## 2017-11-01 RX ADMIN — CARVEDILOL PHOSPHATE 25 MILLIGRAM(S): 80 CAPSULE, EXTENDED RELEASE ORAL at 21:38

## 2017-11-01 RX ADMIN — Medication 200 MILLIGRAM(S): at 09:05

## 2017-11-01 RX ADMIN — AMLODIPINE BESYLATE 10 MILLIGRAM(S): 2.5 TABLET ORAL at 09:05

## 2017-11-01 RX ADMIN — Medication 25 MILLIGRAM(S): at 13:36

## 2017-11-01 RX ADMIN — Medication 10 MILLIGRAM(S): at 09:59

## 2017-11-01 RX ADMIN — Medication 25 MILLIGRAM(S): at 21:38

## 2017-11-01 RX ADMIN — CARVEDILOL PHOSPHATE 25 MILLIGRAM(S): 80 CAPSULE, EXTENDED RELEASE ORAL at 09:59

## 2017-11-01 NOTE — PROGRESS NOTE ADULT - ASSESSMENT
58 y/o female PMHx chronic systolic CHF (last known EF=32%) s/p AICD, SLE, AAA approx 14cm as of 12/2016 s/p aortoiliac stent, DVT/PE (not currently anticoagulated 2/2 HIT), h/o LLE compartment syndrome, multiple pericardial effusions s/p drainage most recently in Jan 2017 now presents with recurrent large pericardial effusion     1. pericardial effusion without tamponade. size unchanged on repeat CT   	Likely chronic effusion, h/o SLE              Rheumatology following     2. CT surgery recommended no surgical intervention and drainage by interventional means   Interventional Cardiology  and Vascular recommending radiologic guidance given the nature of the effusion - appears loculated). Will again discuss the possibility and feasibility of interventional approach with interventional radiolology  started on lasix 60 mg IV daily by nephrology     3. Venous LE Doppler positive for DVT. Started on Argatroban (History Of HIT) and wants to start coumadin.  Monitor CBC    4. Abdominal pain now resolved. No acute findings on CT

## 2017-11-01 NOTE — CHART NOTE - NSCHARTNOTEFT_GEN_A_CORE
COSTEN, SANDRA    Notified by RN patient with ting pinkish note on tissue from patient nasal ? blood  CBC Stat ordered   Seen and examined patient report ting pinkish on tissue   patient on  argatroban /coumadin   continue gtt   monitor cbc and bleeding   dose coumadin if no bleeding and cbc stable   Discussed with Dr Chicas     Vital Signs Last 24 Hrs  T(C): 36.9 (01 Nov 2017 14:52), Max: 36.9 (31 Oct 2017 21:05)  T(F): 98.4 (01 Nov 2017 14:52), Max: 98.4 (31 Oct 2017 21:05)  HR: 80 (01 Nov 2017 16:53) (74 - 81)  BP: 155/78 (01 Nov 2017 16:53) (137/71 - 179/89)  BP(mean): --  RR: 19 (01 Nov 2017 14:52) (17 - 19)  SpO2: 97% (01 Nov 2017 14:52) (96% - 97%)        Interventions taken   stat cbc   discussed with Dr Chicas   sign out to night shift NP to continue to monitor                         Kristen Martinez NP-C

## 2017-11-01 NOTE — CONSULT NOTE ADULT - ASSESSMENT
58 yo F w/ hx of SLE, systolic CHF (EF 32%) s/p AICD, HTN, CKD III- IV, PE / left leg DVT dx 2009, hx of HIT, CAD (no stents), stage IV sacral ulcer, chronic fox, hx of recurrent pericardial effusion, AAA ~14cm in 12/2016 s/p repair in 2010 w/ aortoiliac stent c/b post-operatively with development of left lower extremity compartment syndrome presented with SOB and LLE swelling, found to have acute LLE DVT, abbi on ckd.  Heme-onc consulted as per patient request regarding a/c options for her acute LLE DVT.

## 2017-11-01 NOTE — PROGRESS NOTE ADULT - SUBJECTIVE AND OBJECTIVE BOX
MEDICINE, PROGRESS NOTE [phone]    COSTEN, SANDRA 57y [id number]    Patient seen and examined.  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)  Chronic Gout (ICD9 274.02)  LBBB (Left Bundle Branch Block) (ICD9 426.3)  CHF (Congestive Heart Failure) (ICD9 428.0)  Hx of aorta-iliac-femoral bypass  TOP (Termination of Pregnancy)  S/P Dilatation and Curettage  History of Tubal Ligation  s/p AAA (Abdominal Aortic Aneurysm) Repair: 2009 2010  History of Cholecystectomy  S/P Partial Hysterectomy    MEDICATIONS  (STANDING):  amLODIPine   Tablet 10 milliGRAM(s) Oral daily  argatroban Infusion 2 MICROgram(s)/kG/Min (15.192 mL/Hr) IV Continuous <Continuous>  ascorbic acid 500 milliGRAM(s) Oral daily  carvedilol 25 milliGRAM(s) Oral every 12 hours  ferrous    sulfate 325 milliGRAM(s) Oral daily  furosemide   Injectable 60 milliGRAM(s) IV Push daily  hydrALAZINE 25 milliGRAM(s) Oral every 8 hours  influenza   Vaccine 0.5 milliLiter(s) IntraMuscular once  labetalol 200 milliGRAM(s) Oral every 8 hours  pantoprazole    Tablet 40 milliGRAM(s) Oral before breakfast  predniSONE   Tablet 10 milliGRAM(s) Oral daily  sucralfate suspension 1 Gram(s) Oral three times a day    MEDICATIONS  (PRN):    Allergies    heparin (Unknown)  losartan (Anaphylaxis)  nitroglycerin (Other)    Intolerances        PHYSICAL EXAM:  Constitutional: NAD  HEENT: Normocephalic, EOMI  Neck:  No JVD  Respiratory: CTA B/L, No wheezes  Cardiovascular: S1, S2, RRR, + systolic murmur  Gastrointestinal: BS+, soft, NT/ND  Extremities: No peripheral edema  Neurological: AAOX3, no focal deficits  Psychiatric: Normal mood, normal affect  : No Hook    Vital Signs Last 24 Hrs  T(C): 36.9 (01 Nov 2017 14:52), Max: 36.9 (31 Oct 2017 21:05)  T(F): 98.4 (01 Nov 2017 14:52), Max: 98.4 (31 Oct 2017 21:05)  HR: 80 (01 Nov 2017 16:53) (74 - 81)  BP: 155/78 (01 Nov 2017 16:53) (137/71 - 179/89)  BP(mean): --  RR: 19 (01 Nov 2017 14:52) (17 - 19)  SpO2: 97% (01 Nov 2017 14:52) (96% - 97%)  I&O's Summary    31 Oct 2017 07:01  -  01 Nov 2017 07:00  --------------------------------------------------------  IN: 1284.8 mL / OUT: 3000 mL / NET: -1715.2 mL    01 Nov 2017 07:01  -  01 Nov 2017 17:36  --------------------------------------------------------  IN: 752 mL / OUT: 1200 mL / NET: -448 mL        LABS:                    REVIEW OF SYSTEMS:      RADIOLOGY & ADDITIONAL STUDIES:      ASSESSMENT/PLAN:

## 2017-11-01 NOTE — PROVIDER CONTACT NOTE (OTHER) - ACTION/TREATMENT ORDERED:
NP made aware. Hold coumadin tonight. Will continue to monitor for bleed. NP to follow up with day shift Patient educated on risks/benefits of med.NP made aware. Hold coumadin tonight. Will continue to monitor for bleed. NP to follow up with day shift   ** Update: Patient agreed to take coumadin @ [id number]**

## 2017-11-01 NOTE — PROGRESS NOTE ADULT - SUBJECTIVE AND OBJECTIVE BOX
[facility]   [phone]  RENAL FOLLOW UP NOTE  --------------------------------------------------------------------------------  Chief Complaint:    24 hour events/subjective:  c/o edema felt dizziness when OOB--> chair      PAST HISTORY  --------------------------------------------------------------------------------  No significant changes to PMH, PSH, FHx, SHx, unless otherwise noted    ALLERGIES & MEDICATIONS  --------------------------------------------------------------------------------  Allergies    heparin (Unknown)  losartan (Anaphylaxis)  nitroglycerin (Other)    Intolerances      Standing Inpatient Medications  amLODIPine   Tablet 10 milliGRAM(s) Oral daily  argatroban Infusion 2 MICROgram(s)/kG/Min IV Continuous <Continuous>  ascorbic acid 500 milliGRAM(s) Oral daily  carvedilol 25 milliGRAM(s) Oral every 12 hours  ferrous    sulfate 325 milliGRAM(s) Oral daily  furosemide   Injectable 60 milliGRAM(s) IV Push daily  hydrALAZINE 25 milliGRAM(s) Oral every 8 hours  influenza   Vaccine 0.5 milliLiter(s) IntraMuscular once  labetalol 200 milliGRAM(s) Oral every 8 hours  pantoprazole    Tablet 40 milliGRAM(s) Oral before breakfast  predniSONE   Tablet 10 milliGRAM(s) Oral daily  sucralfate suspension 1 Gram(s) Oral three times a day  warfarin 2.5 milliGRAM(s) Oral once    PRN Inpatient Medications      REVIEW OF SYSTEMS  --------------------------------------------------------------------------------    Gen: denies fatigue, fevers/chills,  CVS: denies chest pain/palpitations  Resp: denies SOB/Cough  GI: Denies N/V/Abd pain  : Denies dysuria    All other systems were reviewed and are negative, except as noted.    VITALS/PHYSICAL EXAM  --------------------------------------------------------------------------------  T(C): 36.9 (11-01-17 @ 14:52), Max: 36.9 (10-31-17 @ 21:05)  HR: 80 (11-01-17 @ 16:53) (74 - 81)  BP: 155/78 (11-01-17 @ 16:53) (137/71 - 179/89)  RR: 19 (11-01-17 @ 14:52) (17 - 19)  SpO2: 97% (11-01-17 @ 14:52) (96% - 97%)  Wt(kg): --        10-31-17 @ 07:01  -  11-01-17 @ 07:00  --------------------------------------------------------  IN: 1284.8 mL / OUT: 3000 mL / NET: -1715.2 mL    11-01-17 @ 07:01  -  11-01-17 @ 19:33  --------------------------------------------------------  IN: 782.4 mL / OUT: 1200 mL / NET: -417.6 mL        Physical Exam:  	Gen: alert oriented  	Pulm:  no rales or ronchi or wheezing  	CV: RRR, S1/S2. no rub  	Back: No CVA tenderness; no sacral edema  	Abd: +BS, soft, nontender/nondistended  	: No suprapubic tenderness.               Extremity: No cyanosis, RLE 2- LLE lymphedema with decrease edema no clubbing  	      LABS/STUDIES  --------------------------------------------------------------------------------          PT/INR: PT 20.0 , INR 1.83       [11-01-17 @ 06:58]  PTT: 71.0       [11-01-17 @ 06:58]      Creatinine Trend:  SCr 2.72 [10-30 @ 06:50]  SCr 2.72 [10-29 @ 06:53]  SCr 2.74 [10-27 @ 06:05]  SCr 2.74 [10-26 @ 09:20]  SCr 2.70 [10-25 @ 06:15]              Urinalysis - [10-11-17 @ 18:44]      Color Yellow / Appearance SL Turbid / SG 1.015 / pH 8.0      Gluc Negative / Ketone Negative  / Bili Negative / Urobili Negative       Blood Negative / Protein >600 / Leuk Est Large / Nitrite Negative      RBC  / WBC 5-10 / Hyaline  / Gran  / Sq Epi  / Non Sq Epi  / Bacteria       Iron 48, TIBC --, %sat --      [10-12-17 @ 07:14]  Ferritin 847.0      [10-12-17 @ 07:14]  HbA1c 4.5      [08-09-16 @ 06:49]  TSH 0.33      [10-22-17 @ 08:40]    dsDNA 44      [10-17-17 @ 08:26]  C3 Complement 105      [10-17-17 @ 08:26]  C4 Complement 31      [10-17-17 @ 08:26]

## 2017-11-01 NOTE — PROGRESS NOTE ADULT - PROBLEM SELECTOR PLAN 1
Multifactorial-restrictive disease 2nd obesity and cardiomegaly, pericardial effusion, debility and volume overloaded, with systolic HF (EF 25%)   -Lasix 60mg qd  -Dyspnea overall improved   -Pericardial effusion is stable, no plan for pericardiocentesis at this time per cards  -Peripheral GGO in RUL new from Oct 11 2017 CT  -Recommend repeat CT chest in 6 weeks to further eval

## 2017-11-01 NOTE — PROVIDER CONTACT NOTE (OTHER) - SITUATION
Patient refusing Coumadin tonight Patient refusing Coumadin tonight   ** Update: Patient agreed to take coumadin @ 2163**

## 2017-11-01 NOTE — CONSULT NOTE ADULT - SUBJECTIVE AND OBJECTIVE BOX
Hematology Consult Note    HPI:  56 yo F w/ hx of SLE, systolic CHF (EF 32%) s/p AICD, HTN, CKD III- IV, PE / left leg DVT dx 2009, hx of HIT, CAD (no stents), stage IV sacral ulcer, chronic fox, hx of recurrent pericardial effusion, AAA ~14cm in 12/2016 s/p repair in 2010 w/ aortoiliac stent c/b post-operatively with development of left lower extremity compartment syndrome presented with 1-wk history of progressive shortness of breath. Patient denied any URI symptoms, fever, pleurisy, chills, coughs, nausea, vomiting at the time. She reports a remote history of DVT/PE in 2009 and was treated initially w/ heparin and developed HIT and warfarin for anticoagulation. The warfarin was discontinued because of what patient reports was life threatening epistaxis. Unclear how long she was on anticoagulation. Of note, patient had hospital admission in December 2016 for pericardial effusion requiring drainage/pericardiocentesis. She had another similar admission in 1/2017 and at that time also had pericardial drain placed. During that time she was diagnosed with SLE. She been taking prednisone 5mg once daily and is not sure if this is similar to her SLE flare. She denied visual changes, aphthous ulcers, joint pains, and rashes.  She reports no melena or hematochezia. She reports taking no NSAIDs and reports no vaginal bleeding. She takes iron therapy.She does not know size of AAA but as of 12/2016 imaging it was 14cm  Currently patient has no complaints. Heme-onc consulted as per patient request regarding a/c options for her acute LLE DVT.        Allergies    heparin (Unknown)  losartan (Anaphylaxis)  nitroglycerin (Other)    Intolerances        MEDICATIONS  (STANDING):  amLODIPine   Tablet 10 milliGRAM(s) Oral daily  argatroban Infusion 2 MICROgram(s)/kG/Min (15.192 mL/Hr) IV Continuous <Continuous>  ascorbic acid 500 milliGRAM(s) Oral daily  carvedilol 25 milliGRAM(s) Oral every 12 hours  ferrous    sulfate 325 milliGRAM(s) Oral daily  furosemide   Injectable 60 milliGRAM(s) IV Push daily  hydrALAZINE 25 milliGRAM(s) Oral every 8 hours  influenza   Vaccine 0.5 milliLiter(s) IntraMuscular once  labetalol 200 milliGRAM(s) Oral every 8 hours  pantoprazole    Tablet 40 milliGRAM(s) Oral before breakfast  predniSONE   Tablet 10 milliGRAM(s) Oral daily  sucralfate suspension 1 Gram(s) Oral three times a day    MEDICATIONS  (PRN):      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)  Chronic Gout (ICD9 274.02)  LBBB (Left Bundle Branch Block) (ICD9 426.3)  CHF (Congestive Heart Failure) (ICD9 428.0)  Hx of aorta-iliac-femoral bypass  TOP (Termination of Pregnancy)  S/P Dilatation and Curettage  History of Tubal Ligation  s/p AAA (Abdominal Aortic Aneurysm) Repair: 2009 2010  History of Cholecystectomy  S/P Partial Hysterectomy      FAMILY HISTORY:  No pertinent family history in first degree relatives      SOCIAL HISTORY: No EtOH, no tobacco    REVIEW OF SYSTEMS:    CONSTITUTIONAL: No weakness, fevers or chills  EYES/ENT: No visual changes;  No vertigo or throat pain   NECK: No pain or stiffness  RESPIRATORY: No cough, wheezing, hemoptysis; No shortness of breath  CARDIOVASCULAR: No chest pain or palpitations  GASTROINTESTINAL: No abdominal or epigastric pain. No nausea, vomiting, or hematemesis; No diarrhea or constipation. No melena or hematochezia.  GENITOURINARY: No dysuria, frequency or hematuria  NEUROLOGICAL: No numbness or weakness  SKIN: No itching, burning, rashes, or lesions   All other review of systems is negative unless indicated above.        T(F): 98.2 (11-01-17 @ 04:52), Max: 98.4 (10-31-17 @ 14:27)  HR: 80 (11-01-17 @ 09:03)  BP: 179/89 (11-01-17 @ 09:03)  RR: 18 (11-01-17 @ 04:52)  SpO2: 96% (11-01-17 @ 04:52)  Wt(kg): --    GENERAL: NAD, well-developed  HEAD:  Atraumatic, Normocephalic  EYES: EOMI, PERRLA, conjunctiva and sclera clear  NECK: Supple, No JVD  CHEST/LUNG: Clear to auscultation bilaterally; No wheeze  HEART: Regular rate and rhythm; No murmurs, rubs, or gallops  ABDOMEN: Soft, Nontender, Nondistended; Bowel sounds present  EXTREMITIES:  2+ Peripheral Pulses, No clubbing, cyanosis, or edema  NEUROLOGY: non-focal  SKIN: No rashes or lesions Hematology Consult Note    HPI:  58 yo F w/ hx of SLE, systolic CHF (EF 32%) s/p AICD, HTN, CKD III- IV, PE / left leg DVT dx 2009, hx of HIT, CAD (no stents), stage IV sacral ulcer, chronic fox, hx of recurrent pericardial effusion, AAA ~14cm in 12/2016 s/p repair in 2010 w/ aortoiliac stent c/b post-operatively with development of left lower extremity compartment syndrome presented with 1-wk history of progressive shortness of breath. Patient denied any URI symptoms, fever, pleurisy, chills, coughs, nausea, vomiting at the time. She reports a remote history of DVT/PE in 2009 and was treated initially w/ heparin and developed HIT and warfarin for anticoagulation. The warfarin was discontinued because of what patient reports was life threatening epistaxis. Unclear how long she was on anticoagulation. Of note, patient had hospital admission in December 2016 for pericardial effusion requiring drainage/pericardiocentesis. She had another similar admission in 1/2017 and at that time also had pericardial drain placed. During that time she was diagnosed with SLE. She been taking prednisone 5mg once daily and is not sure if this is similar to her SLE flare. She denied visual changes, aphthous ulcers, joint pains, and rashes.  She reports no melena or hematochezia. She reports taking no NSAIDs and reports no vaginal bleeding. She takes iron therapy.She does not know size of AAA but as of 12/2016 imaging it was 14cm    Heme History:  Patient states she does not recall exact timelime, but provided some limited history. As per patient, she has had bleeding issues her whole life, particularly nose bleeds, bruising easily w/ no inciting factor, heavy menstrual periods. Her sisters have had the same history, but none of her brothers or parents have had bleeding problems. She recalls that in 2009 she had a LLE DVT and PE and was placed on heparin and then discharged on coumadin which she took for at least two years. She states she had a AAA repair in 2010 and during that time her platelets dropped and she was diagnosed w/ HIT and she believes she was on coumadin. She hasnt taken coumadin 5-6 years, but does not recall why it was stopped. She states during the time of taking coumadin, she continued to have nose bleeds and bruised easily. In 2011, she was in the [place] and she had severe epistaxis requiring MICU stay, blood transfusions, and ENT intervention. She was followed by Dr. Peterson and Dr. Pierre up until 2014 for bleeding work-up and does not recall any work-up coming back positive.        Currently patient has no complaints. Heme-onc consulted as per patient request regarding a/c options for her acute LLE DVT.        Allergies    heparin (Unknown)  losartan (Anaphylaxis)  nitroglycerin (Other)    Intolerances        MEDICATIONS  (STANDING):  amLODIPine   Tablet 10 milliGRAM(s) Oral daily  argatroban Infusion 2 MICROgram(s)/kG/Min (15.192 mL/Hr) IV Continuous <Continuous>  ascorbic acid 500 milliGRAM(s) Oral daily  carvedilol 25 milliGRAM(s) Oral every 12 hours  ferrous    sulfate 325 milliGRAM(s) Oral daily  furosemide   Injectable 60 milliGRAM(s) IV Push daily  hydrALAZINE 25 milliGRAM(s) Oral every 8 hours  influenza   Vaccine 0.5 milliLiter(s) IntraMuscular once  labetalol 200 milliGRAM(s) Oral every 8 hours  pantoprazole    Tablet 40 milliGRAM(s) Oral before breakfast  predniSONE   Tablet 10 milliGRAM(s) Oral daily  sucralfate suspension 1 Gram(s) Oral three times a day    MEDICATIONS  (PRN):      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)  Chronic Gout (ICD9 274.02)  LBBB (Left Bundle Branch Block) (ICD9 426.3)  CHF (Congestive Heart Failure) (ICD9 428.0)  Hx of aorta-iliac-femoral bypass  TOP (Termination of Pregnancy)  S/P Dilatation and Curettage  History of Tubal Ligation  s/p AAA (Abdominal Aortic Aneurysm) Repair: 2009 2010  History of Cholecystectomy  S/P Partial Hysterectomy      FAMILY HISTORY:  No pertinent family history in first degree relatives      SOCIAL HISTORY: No EtOH, no tobacco    REVIEW OF SYSTEMS:    CONSTITUTIONAL: No weakness, fevers or chills  EYES/ENT: No visual changes;  No vertigo or throat pain   NECK: No pain or stiffness  RESPIRATORY: No cough, wheezing, hemoptysis;   CARDIOVASCULAR: No chest pain or palpitations  GASTROINTESTINAL: No abdominal or epigastric pain. No nausea, vomiting, or hematemesis; No diarrhea or constipation. No melena or hematochezia.  GENITOURINARY: No dysuria, frequency or hematuria  NEUROLOGICAL: No numbness or weakness  SKIN: No itching, burning, rashes, or lesions   All other review of systems is negative unless indicated above.        T(F): 98.2 (11-01-17 @ 04:52), Max: 98.4 (10-31-17 @ 14:27)  HR: 80 (11-01-17 @ 09:03)  BP: 179/89 (11-01-17 @ 09:03)  RR: 18 (11-01-17 @ 04:52)  SpO2: 96% (11-01-17 @ 04:52)  Wt(kg): --    GENERAL: NAD, obese  HEAD:  Atraumatic, Normocephalic  EYES: conjunctiva and sclera clear  NECK: Supple  CHEST/LUNG: Clear to auscultation bilaterally; No wheeze  HEART: Regular rate and rhythm; No murmurs, rubs, or gallops  ABDOMEN: Soft, Nontender, Nondistended; Bowel sounds present  EXTREMITIES:  2+ Peripheral Pulses, edema in LLE  NEUROLOGY: non-focal  SKIN: has bruising in UE's

## 2017-11-01 NOTE — CONSULT NOTE ADULT - PROBLEM SELECTOR RECOMMENDATION 9
-patient w/ h/x of HIT and prior complications w/ comadin (hx of severe epistaxis as per patient), now w/ new acute LLE DVT, currently on argatroban gtt  -plt currently 167, w/ abbi-creatinine clearance 45  -fondaparinux and NOACs contraindicated w/ Cr. clearance <30, and should be used w/ caution in w/ cr. clearance of 30-50, ideally best agent for patient is coumadin -patient w/ h/x of HIT and prior complications w/ comadin (hx of severe epistaxis as per patient), now w/ new acute LLE DVT, currently on argatroban gtt  -plt currently 167, w/ abbi-creatinine clearance 45  -fondaparinux and NOACs contraindicated w/ Cr. clearance <30, and should be used w/ caution in w/ cr. clearance of 30-50, ideally best agent for patient is coumadin, however given extensive history of bleeding, consideration for IVC filter?  -will need heme-workup regarding bleeding

## 2017-11-01 NOTE — PROGRESS NOTE ADULT - SUBJECTIVE AND OBJECTIVE BOX
MEDICINE, PROGRESS NOTE [phone]    COSTEN, SANDRA 57y [id number]    Patient seen and examined.  Patient is a 57y old  Female who presents with a chief complaint of shortness of breath (11 Oct 2017 22:01)  Pt feels ok. Pt had discussion with second opinion heme and has decided to start to coumadin.    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)  Chronic Gout (ICD9 274.02)  LBBB (Left Bundle Branch Block) (ICD9 426.3)  CHF (Congestive Heart Failure) (ICD9 428.0)  Hx of aorta-iliac-femoral bypass  TOP (Termination of Pregnancy)  S/P Dilatation and Curettage  History of Tubal Ligation  s/p AAA (Abdominal Aortic Aneurysm) Repair: 2009 2010  History of Cholecystectomy  S/P Partial Hysterectomy    MEDICATIONS  (STANDING):  amLODIPine   Tablet 10 milliGRAM(s) Oral daily  argatroban Infusion 2 MICROgram(s)/kG/Min (15.192 mL/Hr) IV Continuous <Continuous>  ascorbic acid 500 milliGRAM(s) Oral daily  carvedilol 25 milliGRAM(s) Oral every 12 hours  ferrous    sulfate 325 milliGRAM(s) Oral daily  furosemide   Injectable 60 milliGRAM(s) IV Push daily  hydrALAZINE 25 milliGRAM(s) Oral every 8 hours  influenza   Vaccine 0.5 milliLiter(s) IntraMuscular once  labetalol 200 milliGRAM(s) Oral every 8 hours  pantoprazole    Tablet 40 milliGRAM(s) Oral before breakfast  predniSONE   Tablet 10 milliGRAM(s) Oral daily  sucralfate suspension 1 Gram(s) Oral three times a day    MEDICATIONS  (PRN):    Allergies    heparin (Unknown)  losartan (Anaphylaxis)  nitroglycerin (Other)    Intolerances        PHYSICAL EXAM:  Constitutional: NAD  HEENT: Normocephalic, EOMI  Neck:  No JVD  Respiratory: CTA B/L, No wheezes  Cardiovascular: S1, S2, RRR, + systolic murmur  Gastrointestinal: BS+, soft, NT/ND  Extremities: No peripheral edema  Neurological: AAOX3, no focal deficits  Psychiatric: Normal mood, normal affect  : No Hook    Vital Signs Last 24 Hrs  T(C): 36.9 (01 Nov 2017 14:52), Max: 36.9 (31 Oct 2017 21:05)  T(F): 98.4 (01 Nov 2017 14:52), Max: 98.4 (31 Oct 2017 21:05)  HR: 80 (01 Nov 2017 16:53) (74 - 81)  BP: 155/78 (01 Nov 2017 16:53) (137/71 - 179/89)  BP(mean): --  RR: 19 (01 Nov 2017 14:52) (17 - 19)  SpO2: 97% (01 Nov 2017 14:52) (96% - 97%)  I&O's Summary    31 Oct 2017 07:01  -  01 Nov 2017 07:00  --------------------------------------------------------  IN: 1284.8 mL / OUT: 3000 mL / NET: -1715.2 mL    01 Nov 2017 07:01  -  01 Nov 2017 18:13  --------------------------------------------------------  IN: 752 mL / OUT: 1200 mL / NET: -448 mL

## 2017-11-01 NOTE — PROGRESS NOTE ADULT - SUBJECTIVE AND OBJECTIVE BOX
Subjective: Patient seen and examined. No new events except as noted.   no cp or sob     REVIEW OF SYSTEMS:    CONSTITUTIONAL: + weakness, no fevers or chills  EYES/ENT: No visual changes;  No vertigo or throat pain   NECK: No pain or stiffness  RESPIRATORY: No cough, wheezing, hemoptysis; No shortness of breath  CARDIOVASCULAR: No chest pain or palpitations  GASTROINTESTINAL: No abdominal or epigastric pain. No nausea, vomiting, or hematemesis; No diarrhea or constipation. No melena or hematochezia.  GENITOURINARY: No dysuria, frequency or hematuria  NEUROLOGICAL: No numbness or weakness  SKIN: No itching, burning, rashes, or lesions   All other review of systems is negative unless indicated above.    MEDICATIONS:  MEDICATIONS  (STANDING):  amLODIPine   Tablet 10 milliGRAM(s) Oral daily  argatroban Infusion 2 MICROgram(s)/kG/Min (15.192 mL/Hr) IV Continuous <Continuous>  ascorbic acid 500 milliGRAM(s) Oral daily  carvedilol 25 milliGRAM(s) Oral every 12 hours  ferrous    sulfate 325 milliGRAM(s) Oral daily  furosemide   Injectable 60 milliGRAM(s) IV Push daily  hydrALAZINE 25 milliGRAM(s) Oral every 8 hours  influenza   Vaccine 0.5 milliLiter(s) IntraMuscular once  labetalol 200 milliGRAM(s) Oral every 8 hours  pantoprazole    Tablet 40 milliGRAM(s) Oral before breakfast  predniSONE   Tablet 10 milliGRAM(s) Oral daily  sucralfate suspension 1 Gram(s) Oral three times a day      PHYSICAL EXAM:  T(C): 36.8 (11-01-17 @ 04:52), Max: 36.9 (10-31-17 @ 14:27)  HR: 80 (11-01-17 @ 09:03) (73 - 81)  BP: 179/89 (11-01-17 @ 09:03) (150/76 - 182/84)  RR: 18 (11-01-17 @ 04:52) (17 - 19)  SpO2: 96% (11-01-17 @ 04:52) (94% - 96%)  Wt(kg): --  I&O's Summary    31 Oct 2017 07:01  -  01 Nov 2017 07:00  --------------------------------------------------------  IN: 1284.8 mL / OUT: 3000 mL / NET: -1715.2 mL    01 Nov 2017 07:01  -  01 Nov 2017 09:13  --------------------------------------------------------  IN: 30.4 mL / OUT: 0 mL / NET: 30.4 mL          Appearance: Normal	  HEENT:   Normal oral mucosa, PERRL, EOMI	  Lymphatic: No lymphadenopathy , no edema  Cardiovascular: Normal S1 S2, No JVD, No murmurs , Peripheral pulses palpable 2+ bilaterally  Respiratory: Lungs clear to auscultation, normal effort 	  Gastrointestinal:  Soft, Non-tender, + BS	  Skin: No rashes, No ecchymoses, No cyanosis, warm to touch  Musculoskeletal: Normal range of motion, normal strength  Psychiatry:  Mood & affect appropriate  Ext: 4+ chronic LLE edema  +fox       LABS:    CARDIAC MARKERS:                  proBNP:   Lipid Profile:   HgA1c:   TSH:             TELEMETRY: Vpaced 	    ECG:  	  RADIOLOGY:   DIAGNOSTIC TESTING:  [ ] Echocardiogram:  [ ]  Catheterization:  [ ] Stress Test:    OTHER:

## 2017-11-01 NOTE — PROGRESS NOTE ADULT - SUBJECTIVE AND OBJECTIVE BOX
Follow-up Pulm Progress Note    No new respiratory events overnight.  Denies SOB/CP.   95% on RA    Medications:  MEDICATIONS  (STANDING):  amLODIPine   Tablet 10 milliGRAM(s) Oral daily  argatroban Infusion 2 MICROgram(s)/kG/Min (15.192 mL/Hr) IV Continuous <Continuous>  ascorbic acid 500 milliGRAM(s) Oral daily  carvedilol 25 milliGRAM(s) Oral every 12 hours  ferrous    sulfate 325 milliGRAM(s) Oral daily  furosemide   Injectable 60 milliGRAM(s) IV Push daily  hydrALAZINE 25 milliGRAM(s) Oral every 8 hours  influenza   Vaccine 0.5 milliLiter(s) IntraMuscular once  labetalol 200 milliGRAM(s) Oral every 8 hours  pantoprazole    Tablet 40 milliGRAM(s) Oral before breakfast  predniSONE   Tablet 10 milliGRAM(s) Oral daily  sucralfate suspension 1 Gram(s) Oral three times a day    MEDICATIONS  (PRN):          Vital Signs Last 24 Hrs  T(C): 36.9 (01 Nov 2017 14:52), Max: 36.9 (31 Oct 2017 21:05)  T(F): 98.4 (01 Nov 2017 14:52), Max: 98.4 (31 Oct 2017 21:05)  HR: 80 (01 Nov 2017 16:53) (74 - 81)  BP: 155/78 (01 Nov 2017 16:53) (137/71 - 179/89)  BP(mean): --  RR: 19 (01 Nov 2017 14:52) (17 - 19)  SpO2: 97% (01 Nov 2017 14:52) (96% - 97%) on RA          10-31 @ 07:01  -  11-01 @ 07:00  --------------------------------------------------------  IN: 1284.8 mL / OUT: 3000 mL / NET: -1715.2 mL          LABS:                CAPILLARY BLOOD GLUCOSE        PT/INR - ( 01 Nov 2017 06:58 )   PT: 20.0 sec;   INR: 1.83 ratio         PTT - ( 01 Nov 2017 06:58 )  PTT:71.0 sec            Physical Examination:  PULM: Decreased BS at bases  CVS: S1, S2 RRR    RADIOLOGY REVIEWED

## 2017-11-01 NOTE — PROGRESS NOTE ADULT - ASSESSMENT
58 yo F w/ hx of SLE, systolic CHF (EF 32%) s/p AICD, HTN, CKD III- IV, PE / left leg DVT dx 2009, HIT, CAD (no stents), stage IV sacral ulcer, chronic fox, hx of recurrent pericardial effusion, AAA ~14cm in 12/2016 s/p repair in 2010 w/ aortoiliac stent c/b post-operatively with development of left lower extremity   lymphedema December 2016;  pericardial effusion pericardial drain/SLE. now with ilana on ckd with baseline creatinine approx 1.8-2.     1- ILANA on ckd III  2- chf hx  3- cardiomyopathy  4- HTN   5- SLE  6- proteinuria    lasix 60mg iv qd fluid status improving  prednisone 10mg daily   uloric 40mg   check creatinine and lytes   has allergies to arb

## 2017-11-02 LAB
ALBUMIN SERPL ELPH-MCNC: 3.5 G/DL — SIGNIFICANT CHANGE UP (ref 3.3–5)
ALP SERPL-CCNC: 56 U/L — SIGNIFICANT CHANGE UP (ref 40–120)
ALT FLD-CCNC: 10 U/L RC — SIGNIFICANT CHANGE UP (ref 10–45)
ANION GAP SERPL CALC-SCNC: 11 MMOL/L — SIGNIFICANT CHANGE UP (ref 5–17)
APTT BLD: 71.7 SEC — HIGH (ref 27.5–37.4)
AST SERPL-CCNC: 13 U/L — SIGNIFICANT CHANGE UP (ref 10–40)
BILIRUB SERPL-MCNC: 0.3 MG/DL — SIGNIFICANT CHANGE UP (ref 0.2–1.2)
BUN SERPL-MCNC: 65 MG/DL — HIGH (ref 7–23)
CALCIUM SERPL-MCNC: 7.7 MG/DL — LOW (ref 8.4–10.5)
CHLORIDE SERPL-SCNC: 104 MMOL/L — SIGNIFICANT CHANGE UP (ref 96–108)
CO2 SERPL-SCNC: 28 MMOL/L — SIGNIFICANT CHANGE UP (ref 22–31)
CREAT SERPL-MCNC: 2.79 MG/DL — HIGH (ref 0.5–1.3)
DRVVT 50/50: 77.9 SEC — SIGNIFICANT CHANGE UP
DRVVT SCREEN TO CONFIRM RATIO: SIGNIFICANT CHANGE UP
GLUCOSE SERPL-MCNC: 91 MG/DL — SIGNIFICANT CHANGE UP (ref 70–99)
HCT VFR BLD CALC: 30.8 % — LOW (ref 34.5–45)
HGB BLD-MCNC: 10 G/DL — LOW (ref 11.5–15.5)
INR BLD: 1.92 RATIO — HIGH (ref 0.88–1.16)
LA NT DPL PPP QL: 110.4 SEC — SIGNIFICANT CHANGE UP
MCHC RBC-ENTMCNC: 29.5 PG — SIGNIFICANT CHANGE UP (ref 27–34)
MCHC RBC-ENTMCNC: 32.4 GM/DL — SIGNIFICANT CHANGE UP (ref 32–36)
MCV RBC AUTO: 91.2 FL — SIGNIFICANT CHANGE UP (ref 80–100)
NORMALIZED SCT PPP-RTO: 0.92 RATIO — SIGNIFICANT CHANGE UP (ref 0–1.16)
NORMALIZED SCT PPP-RTO: SIGNIFICANT CHANGE UP
PLATELET # BLD AUTO: 169 K/UL — SIGNIFICANT CHANGE UP (ref 150–400)
POTASSIUM SERPL-MCNC: 4.3 MMOL/L — SIGNIFICANT CHANGE UP (ref 3.5–5.3)
POTASSIUM SERPL-SCNC: 4.3 MMOL/L — SIGNIFICANT CHANGE UP (ref 3.5–5.3)
PROT SERPL-MCNC: 5.9 G/DL — LOW (ref 6–8.3)
PROTHROM AB SERPL-ACNC: 21.2 SEC — HIGH (ref 9.8–12.7)
RBC # BLD: 3.38 M/UL — LOW (ref 3.8–5.2)
RBC # FLD: 14.6 % — HIGH (ref 10.3–14.5)
SODIUM SERPL-SCNC: 143 MMOL/L — SIGNIFICANT CHANGE UP (ref 135–145)
WBC # BLD: 6.9 K/UL — SIGNIFICANT CHANGE UP (ref 3.8–10.5)
WBC # FLD AUTO: 6.9 K/UL — SIGNIFICANT CHANGE UP (ref 3.8–10.5)

## 2017-11-02 PROCEDURE — 99232 SBSQ HOSP IP/OBS MODERATE 35: CPT

## 2017-11-02 RX ORDER — PETROLATUM,WHITE
1 JELLY (GRAM) TOPICAL
Qty: 0 | Refills: 0 | Status: DISCONTINUED | OUTPATIENT
Start: 2017-11-02 | End: 2017-11-18

## 2017-11-02 RX ORDER — WARFARIN SODIUM 2.5 MG/1
2.5 TABLET ORAL ONCE
Qty: 0 | Refills: 0 | Status: DISCONTINUED | OUTPATIENT
Start: 2017-11-02 | End: 2017-11-02

## 2017-11-02 RX ORDER — WARFARIN SODIUM 2.5 MG/1
3 TABLET ORAL ONCE
Qty: 0 | Refills: 0 | Status: COMPLETED | OUTPATIENT
Start: 2017-11-02 | End: 2017-11-02

## 2017-11-02 RX ADMIN — Medication 1 GRAM(S): at 13:52

## 2017-11-02 RX ADMIN — Medication 200 MILLIGRAM(S): at 10:13

## 2017-11-02 RX ADMIN — Medication 10 MILLIGRAM(S): at 10:10

## 2017-11-02 RX ADMIN — Medication 500 MILLIGRAM(S): at 10:12

## 2017-11-02 RX ADMIN — PANTOPRAZOLE SODIUM 40 MILLIGRAM(S): 20 TABLET, DELAYED RELEASE ORAL at 05:53

## 2017-11-02 RX ADMIN — Medication 60 MILLIGRAM(S): at 05:53

## 2017-11-02 RX ADMIN — Medication 25 MILLIGRAM(S): at 05:53

## 2017-11-02 RX ADMIN — Medication 1 APPLICATION(S): at 18:51

## 2017-11-02 RX ADMIN — AMLODIPINE BESYLATE 10 MILLIGRAM(S): 2.5 TABLET ORAL at 10:09

## 2017-11-02 RX ADMIN — WARFARIN SODIUM 3 MILLIGRAM(S): 2.5 TABLET ORAL at 23:01

## 2017-11-02 RX ADMIN — Medication 200 MILLIGRAM(S): at 01:23

## 2017-11-02 RX ADMIN — Medication 1 TABLET(S): at 12:11

## 2017-11-02 RX ADMIN — Medication 25 MILLIGRAM(S): at 23:01

## 2017-11-02 RX ADMIN — Medication 325 MILLIGRAM(S): at 10:12

## 2017-11-02 RX ADMIN — Medication 25 MILLIGRAM(S): at 13:52

## 2017-11-02 RX ADMIN — Medication 200 MILLIGRAM(S): at 17:40

## 2017-11-02 RX ADMIN — CARVEDILOL PHOSPHATE 25 MILLIGRAM(S): 80 CAPSULE, EXTENDED RELEASE ORAL at 10:09

## 2017-11-02 RX ADMIN — CARVEDILOL PHOSPHATE 25 MILLIGRAM(S): 80 CAPSULE, EXTENDED RELEASE ORAL at 23:01

## 2017-11-02 NOTE — CHART NOTE - NSCHARTNOTEFT_GEN_A_CORE
Source: Patient [x ]    Family [ ]     other [x ]chart    Diet : DASH  anticoagulant issues preventing discharge as per rounds      Patient reports [ ] nausea  [ ] vomiting [ ] diarrhea [ ] constipation  [ ]chewing problems [ ] swallowing issues  [x ] other: pt requesting discontinue low cholesterol diet, states the food is mundane. states she has never had a cholesterol issue, reinforced DASH diet. pt understands but prefers low sodium. she is not new to coumadin and appreciated VitK/coumadin diet ed reinforcement     PO intake:  < 50% [ ] 50-75% [ ]   % [x ]  other :     Source for PO intake [x ] Patient [ ] family [ ] chart [ ] staff [ ] other     Enteral /Parenteral Nutrition:       Current Weight: 248.6pounds/112.8kg  % Weight Change: 9% loss since 10/14    Pertinent Medications: MEDICATIONS  (STANDING):  amLODIPine   Tablet 10 milliGRAM(s) Oral daily  argatroban Infusion 2 MICROgram(s)/kG/Min (15.192 mL/Hr) IV Continuous <Continuous>  ascorbic acid 500 milliGRAM(s) Oral daily  carvedilol 25 milliGRAM(s) Oral every 12 hours  ferrous    sulfate 325 milliGRAM(s) Oral daily  furosemide   Injectable 60 milliGRAM(s) IV Push daily  hydrALAZINE 25 milliGRAM(s) Oral every 8 hours  influenza   Vaccine 0.5 milliLiter(s) IntraMuscular once  labetalol 200 milliGRAM(s) Oral every 8 hours  Nephro-alyse 1 Tablet(s) Oral daily  pantoprazole    Tablet 40 milliGRAM(s) Oral before breakfast  predniSONE   Tablet 10 milliGRAM(s) Oral daily  sucralfate suspension 1 Gram(s) Oral three times a day    MEDICATIONS  (PRN):    Pertinent Labs:  11-02 Na143 mmol/L Glu 91 mg/dL K+ 4.3 mmol/L Cr  2.79 mg/dL<H> BUN 65 mg/dL<H>                 Skin: +1 right leg/ankle/foot, +4 leg/knee/ankle/foot    Estimated Needs:   [x ] no change since previous assessment  [ ] recalculated:       Previous Nutrition Diagnosis:     [ ] Inadequate Energy Intake [ ]Inadequate Oral Intake [ ] Excessive Energy Intake     [ ] Underweight [x ] Increased Nutrient Needs (protein) [ ] Overweight/Obesity     [ ] Altered GI Function [ ] Unintended Weight Loss [ ] Food & Nutrition Related Knowledge Deficit [ ] Malnutrition          Nutrition Diagnosis is [x ] ongoing  [ ] resolved [ ] not applicable          New Nutrition Diagnosis: [x ] not applicable    [ ] Inadequate Protein Energy Intake [ ]Inadequate Oral Intake [ ] Excessive Energy Intake     [ ] Underweight [ ] Increased Nutrient Needs [ ] Overweight/Obesity     [ ] Altered GI Function [ ] Unintended Weight Loss [ ] Food & Nutrition Related Knowledge Deficit[ ] Limited Adherence to nutrition related recommendations [ ] Malnutrition  [ ] other: Free text       Related to:      As evidenced by:      Interventions:     Recommend    [ x] Change Diet To: Low Sodium    [x ] Nutrition Supplement: Miguel x 2 daily    [ ] Nutrition Support    [x ] Other: Nephro-Alyse daily       Monitoring and Evaluation:     [x ] PO intake [ x] Tolerance to diet prescription [ x] weights [x ] follow up per protocol    [ ] other:

## 2017-11-02 NOTE — PROGRESS NOTE ADULT - ASSESSMENT
LLE dvt  pericardial effusion  lupus  chronic lle edema    continue to transition to coumadin  continue current care  continue diuresis  f/u with renal

## 2017-11-02 NOTE — PROGRESS NOTE ADULT - ASSESSMENT
A/P:  Rt Ischial Healing chronic stage iv pressure ucler- Aquacel packing, may add prizma if brought from home  Wound is stalled possibly 2/2 medical condition and sedentary behavior.  Hopefully as pt becomes stronger & more mobile and is discharged wound will improved.  Pt anticoagulated therefore hesitate to debride macerated callous  Lt buttock possible resolving hematoma=   LLE edema- Con't elevation/ ACEwrapping per Vasc  con't Nutrition (as tolerated)  con't Offloading   con't Pericare  Care as per medicine will follow w/ you  Upon discharge [facility] [address] [place] [address] [phone]  D/w team  & attng  ЮЛИЯ GarciaC CWS [id number]  I spent 15  minutes w/ this pt of which more than 50% of the time was spent counseling & coordinating care of this pt.

## 2017-11-02 NOTE — PROGRESS NOTE ADULT - SUBJECTIVE AND OBJECTIVE BOX
MEDICINE, PROGRESS NOTE [phone]    COSTEN, SANDRA 57y [id number]    Patient seen and examined.  Patient is a 57y old  Female who presents with a chief complaint of shortness of breath (11 Oct 2017 22:01)  Pt feels ok. one little episode of nose bleed  yesterday prior to coumadin. Pt willing to continue transtion to coumadin.    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)  Chronic Gout (ICD9 274.02)  LBBB (Left Bundle Branch Block) (ICD9 426.3)  CHF (Congestive Heart Failure) (ICD9 428.0)  Hx of aorta-iliac-femoral bypass  TOP (Termination of Pregnancy)  S/P Dilatation and Curettage  History of Tubal Ligation  s/p AAA (Abdominal Aortic Aneurysm) Repair: 2009 2010  History of Cholecystectomy  S/P Partial Hysterectomy    MEDICATIONS  (STANDING):  amLODIPine   Tablet 10 milliGRAM(s) Oral daily  AQUAPHOR (petrolatum Ointment) 1 Application(s) Topical two times a day  argatroban Infusion 2 MICROgram(s)/kG/Min (15.192 mL/Hr) IV Continuous <Continuous>  ascorbic acid 500 milliGRAM(s) Oral daily  carvedilol 25 milliGRAM(s) Oral every 12 hours  ferrous    sulfate 325 milliGRAM(s) Oral daily  furosemide   Injectable 60 milliGRAM(s) IV Push daily  hydrALAZINE 25 milliGRAM(s) Oral every 8 hours  influenza   Vaccine 0.5 milliLiter(s) IntraMuscular once  labetalol 200 milliGRAM(s) Oral every 8 hours  Nephro-alyse 1 Tablet(s) Oral daily  pantoprazole    Tablet 40 milliGRAM(s) Oral before breakfast  predniSONE   Tablet 10 milliGRAM(s) Oral daily  sucralfate suspension 1 Gram(s) Oral three times a day  warfarin 3 milliGRAM(s) Oral once    MEDICATIONS  (PRN):    Allergies    heparin (Unknown)  losartan (Anaphylaxis)  nitroglycerin (Other)    Intolerances        PHYSICAL EXAM:  Constitutional: NAD  HEENT: Normocephalic, EOMI  Neck:  No JVD  Respiratory: CTA B/L, No wheezes  Cardiovascular: S1, S2, RRR, + systolic murmur  Gastrointestinal: BS+, soft, NT/ND  Extremities: No peripheral edema  Neurological: AAOX3, no focal deficits  Psychiatric: Normal mood, normal affect  : No Hook    Vital Signs Last 24 Hrs  T(C): 36.8 (02 Nov 2017 14:54), Max: 36.8 (01 Nov 2017 21:37)  T(F): 98.3 (02 Nov 2017 14:54), Max: 98.3 (01 Nov 2017 21:37)  HR: 80 (02 Nov 2017 14:54) (78 - 84)  BP: 148/77 (02 Nov 2017 14:54) (148/77 - 163/78)  BP(mean): --  RR: 20 (02 Nov 2017 14:54) (18 - 20)  SpO2: 97% (02 Nov 2017 14:54) (97% - 97%)  I&O's Summary    01 Nov 2017 07:01  -  02 Nov 2017 07:00  --------------------------------------------------------  IN: 1764.8 mL / OUT: 2950 mL / NET: -1185.2 mL    02 Nov 2017 07:01  -  02 Nov 2017 20:25  --------------------------------------------------------  IN: 662.4 mL / OUT: 2200 mL / NET: -1537.6 mL        LABS:                        10.0   6.9   )-----------( 169      ( 02 Nov 2017 06:16 )             30.8     11-02    143  |  104  |  65<H>  ----------------------------<  91  4.3   |  28  |  2.79<H>    Ca    7.7<L>      02 Nov 2017 06:16    TPro  5.9<L>  /  Alb  3.5  /  TBili  0.3  /  DBili  x   /  AST  13  /  ALT  10  /  AlkPhos  56  11-02

## 2017-11-02 NOTE — PROGRESS NOTE ADULT - SUBJECTIVE AND OBJECTIVE BOX
SUBJECTIVE: Pt seen, chart reviewed.  Pt w/o complaints or concerns.  Feeling better.  Agreed to start coumadin & d/c planning  no pain, odor, f/c/s, redness, warmth, increased drainage    ROS- skin chronic wound see HPI  all other systems negative    argatroban Infusion 2 MICROgram(s)/kG/Min IV Continuous <Continuous>      Physical Exam:  Vital Signs Last 24 Hrs  T(C): 36.7 (02 Nov 2017 04:44), Max: 36.9 (01 Nov 2017 14:52)  T(F): 98.1 (02 Nov 2017 04:44), Max: 98.4 (01 Nov 2017 14:52)  HR: 82 (02 Nov 2017 04:44) (75 - 84)  BP: 163/78 (02 Nov 2017 04:44) (137/71 - 163/78)  BP(mean): --  RR: 19 (02 Nov 2017 04:44) (18 - 19)  SpO2: 97% (02 Nov 2017 04:44) (97% - 97%)  General Appearance:  NAD, A&Ox3, MO   Versa Care P500      MSK FROM X4 equally warm  LLE edema resolving, but stil present  No deformities or contractures    Skin moist w/ good turgor  BUE w/ resolving ecchymosis w/o hematoma in areas of failed IV sites w/o infection s/sx  Rt ischial stage iv pressure ulcer w/ ring of macerated skin/callous on wound edge  (+) moderate serosanguinous drainage  No odor, erythema, tender, induration, fluctuance, increased warmth  Lt medial buttock into gluteal cleft w/ resolving hematoma   resolving ecchymosis of surrounding skin- no open wounds or s/sx infection    LABS:                        10.0   6.9   )-----------( 169      ( 02 Nov 2017 06:16 )             30.8     PT/INR - ( 02 Nov 2017 06:16 )   PT: 21.2 sec;   INR: 1.92 ratio         PTT - ( 02 Nov 2017 06:16 )  PTT:71.7 sec    RADIOLOGY & ADDITIONAL STUDIES:  CULTURES:  < from: CT Abdomen and Pelvis No Cont (10.29.17 @ 22:25) >  IMPRESSION:    No retroperitoneal hemorrhage.    Left common iliac artery EVAR.    Unchangedmoderate sized pericardial effusion.

## 2017-11-02 NOTE — PROGRESS NOTE ADULT - SUBJECTIVE AND OBJECTIVE BOX
Subjective: Patient seen and examined. No new events except as noted.     no cp or sob       REVIEW OF SYSTEMS:    CONSTITUTIONAL: +weakness, fevers or chills  EYES/ENT: No visual changes;  No vertigo or throat pain   NECK: No pain or stiffness  RESPIRATORY: No cough, wheezing, hemoptysis; No shortness of breath  CARDIOVASCULAR: No chest pain or palpitations  GASTROINTESTINAL: No abdominal or epigastric pain. No nausea, vomiting, or hematemesis; No diarrhea or constipation. No melena or hematochezia.  GENITOURINARY: No dysuria, frequency or hematuria  NEUROLOGICAL: No numbness +weakness  SKIN: No itching, burning, rashes, or lesions   All other review of systems is negative unless indicated above.    MEDICATIONS:  MEDICATIONS  (STANDING):  amLODIPine   Tablet 10 milliGRAM(s) Oral daily  argatroban Infusion 2 MICROgram(s)/kG/Min (15.192 mL/Hr) IV Continuous <Continuous>  ascorbic acid 500 milliGRAM(s) Oral daily  carvedilol 25 milliGRAM(s) Oral every 12 hours  ferrous    sulfate 325 milliGRAM(s) Oral daily  furosemide   Injectable 60 milliGRAM(s) IV Push daily  hydrALAZINE 25 milliGRAM(s) Oral every 8 hours  influenza   Vaccine 0.5 milliLiter(s) IntraMuscular once  labetalol 200 milliGRAM(s) Oral every 8 hours  Nephro-alyse 1 Tablet(s) Oral daily  pantoprazole    Tablet 40 milliGRAM(s) Oral before breakfast  predniSONE   Tablet 10 milliGRAM(s) Oral daily  sucralfate suspension 1 Gram(s) Oral three times a day      PHYSICAL EXAM:  T(C): 36.7 (11-02-17 @ 04:44), Max: 36.9 (11-01-17 @ 14:52)  HR: 82 (11-02-17 @ 04:44) (75 - 84)  BP: 163/78 (11-02-17 @ 04:44) (137/71 - 163/78)  RR: 19 (11-02-17 @ 04:44) (18 - 19)  SpO2: 97% (11-02-17 @ 04:44) (97% - 97%)  Wt(kg): --  I&O's Summary    01 Nov 2017 07:01  -  02 Nov 2017 07:00  --------------------------------------------------------  IN: 1764.8 mL / OUT: 2950 mL / NET: -1185.2 mL          Appearance: Normal	  HEENT:   Normal oral mucosa, PERRL, EOMI	  Lymphatic: No lymphadenopathy , no edema  Cardiovascular: Normal S1 S2, No JVD, No murmurs , Peripheral pulses palpable 2+ bilaterally  Respiratory: Lungs clear to auscultation, normal effort 	  Gastrointestinal:  Soft, Non-tender, + BS	  Skin: No rashes, No ecchymoses, No cyanosis, warm to touch  Musculoskeletal: Normal range of motion, normal strength  Psychiatry:  Mood & affect appropriate  Ext: 4+ chronic LLE edema  +fox     LABS:    CARDIAC MARKERS:                                10.0   6.9   )-----------( 169      ( 02 Nov 2017 06:16 )             30.8     11-02    143  |  104  |  65<H>  ----------------------------<  91  4.3   |  28  |  2.79<H>    Ca    7.7<L>      02 Nov 2017 06:16    TPro  5.9<L>  /  Alb  3.5  /  TBili  0.3  /  DBili  x   /  AST  13  /  ALT  10  /  AlkPhos  56  11-02    proBNP:   Lipid Profile:   HgA1c:   TSH:             TELEMETRY: 	Vpaced     ECG:  	  RADIOLOGY:   DIAGNOSTIC TESTING:  [ ] Echocardiogram:  [ ]  Catheterization:  [ ] Stress Test:    OTHER:

## 2017-11-02 NOTE — PROGRESS NOTE ADULT - ASSESSMENT
56 y/o female PMHx chronic systolic CHF (last known EF=32%) s/p AICD, SLE, AAA approx 14cm as of 12/2016 s/p aortoiliac stent, DVT/PE (not currently anticoagulated 2/2 HIT), h/o LLE compartment syndrome, multiple pericardial effusions s/p drainage most recently in Jan 2017 now presents with recurrent large pericardial effusion     1. pericardial effusion without tamponade. size unchanged on repeat CT   	Likely chronic effusion, h/o SLE              Rheumatology following     2. CT surgery recommended no surgical intervention and drainage by interventional means   Interventional Cardiology  and Vascular recommending radiologic guidance given the nature of the effusion - appears loculated). Will again discuss the possibility and feasibility of interventional approach with interventional radiolology  started on lasix 60 mg IV daily by nephrology     3. Venous LE Doppler positive for DVT. Started on Argatroban (History Of HIT) and wants to start coumadin.  Monitor CBC    4. Abdominal pain now resolved. No acute findings on CT

## 2017-11-03 LAB
ANION GAP SERPL CALC-SCNC: 13 MMOL/L — SIGNIFICANT CHANGE UP (ref 5–17)
BUN SERPL-MCNC: 67 MG/DL — HIGH (ref 7–23)
CALCIUM SERPL-MCNC: 8.7 MG/DL — SIGNIFICANT CHANGE UP (ref 8.4–10.5)
CHLORIDE SERPL-SCNC: 103 MMOL/L — SIGNIFICANT CHANGE UP (ref 96–108)
CO2 SERPL-SCNC: 29 MMOL/L — SIGNIFICANT CHANGE UP (ref 22–31)
CREAT SERPL-MCNC: 2.79 MG/DL — HIGH (ref 0.5–1.3)
GLUCOSE SERPL-MCNC: 83 MG/DL — SIGNIFICANT CHANGE UP (ref 70–99)
HCT VFR BLD CALC: 30.8 % — LOW (ref 34.5–45)
HCT VFR BLD CALC: 32.3 % — LOW (ref 34.5–45)
HGB BLD-MCNC: 10.2 G/DL — LOW (ref 11.5–15.5)
HGB BLD-MCNC: 9.8 G/DL — LOW (ref 11.5–15.5)
INR BLD: 1.77 RATIO — HIGH (ref 0.88–1.16)
MCHC RBC-ENTMCNC: 28.7 PG — SIGNIFICANT CHANGE UP (ref 27–34)
MCHC RBC-ENTMCNC: 29.1 PG — SIGNIFICANT CHANGE UP (ref 27–34)
MCHC RBC-ENTMCNC: 31.6 GM/DL — LOW (ref 32–36)
MCHC RBC-ENTMCNC: 32 GM/DL — SIGNIFICANT CHANGE UP (ref 32–36)
MCV RBC AUTO: 90.9 FL — SIGNIFICANT CHANGE UP (ref 80–100)
MCV RBC AUTO: 91 FL — SIGNIFICANT CHANGE UP (ref 80–100)
PLATELET # BLD AUTO: 161 K/UL — SIGNIFICANT CHANGE UP (ref 150–400)
PLATELET # BLD AUTO: 173 K/UL — SIGNIFICANT CHANGE UP (ref 150–400)
POTASSIUM SERPL-MCNC: 4.2 MMOL/L — SIGNIFICANT CHANGE UP (ref 3.5–5.3)
POTASSIUM SERPL-SCNC: 4.2 MMOL/L — SIGNIFICANT CHANGE UP (ref 3.5–5.3)
PROTHROM AB SERPL-ACNC: 19.5 SEC — HIGH (ref 9.8–12.7)
RBC # BLD: 3.39 M/UL — LOW (ref 3.8–5.2)
RBC # BLD: 3.55 M/UL — LOW (ref 3.8–5.2)
RBC # FLD: 14.7 % — HIGH (ref 10.3–14.5)
RBC # FLD: 14.8 % — HIGH (ref 10.3–14.5)
SODIUM SERPL-SCNC: 145 MMOL/L — SIGNIFICANT CHANGE UP (ref 135–145)
WBC # BLD: 6.6 K/UL — SIGNIFICANT CHANGE UP (ref 3.8–10.5)
WBC # BLD: 7.2 K/UL — SIGNIFICANT CHANGE UP (ref 3.8–10.5)
WBC # FLD AUTO: 6.6 K/UL — SIGNIFICANT CHANGE UP (ref 3.8–10.5)
WBC # FLD AUTO: 7.2 K/UL — SIGNIFICANT CHANGE UP (ref 3.8–10.5)

## 2017-11-03 RX ORDER — WARFARIN SODIUM 2.5 MG/1
4 TABLET ORAL ONCE
Qty: 0 | Refills: 0 | Status: COMPLETED | OUTPATIENT
Start: 2017-11-03 | End: 2017-11-03

## 2017-11-03 RX ADMIN — Medication 60 MILLIGRAM(S): at 05:29

## 2017-11-03 RX ADMIN — Medication 200 MILLIGRAM(S): at 17:40

## 2017-11-03 RX ADMIN — PANTOPRAZOLE SODIUM 40 MILLIGRAM(S): 20 TABLET, DELAYED RELEASE ORAL at 05:29

## 2017-11-03 RX ADMIN — Medication 325 MILLIGRAM(S): at 09:29

## 2017-11-03 RX ADMIN — CARVEDILOL PHOSPHATE 25 MILLIGRAM(S): 80 CAPSULE, EXTENDED RELEASE ORAL at 22:00

## 2017-11-03 RX ADMIN — Medication 25 MILLIGRAM(S): at 13:28

## 2017-11-03 RX ADMIN — Medication 1 TABLET(S): at 13:28

## 2017-11-03 RX ADMIN — WARFARIN SODIUM 4 MILLIGRAM(S): 2.5 TABLET ORAL at 22:03

## 2017-11-03 RX ADMIN — Medication 1 GRAM(S): at 13:29

## 2017-11-03 RX ADMIN — CARVEDILOL PHOSPHATE 25 MILLIGRAM(S): 80 CAPSULE, EXTENDED RELEASE ORAL at 09:27

## 2017-11-03 RX ADMIN — Medication 10 MILLIGRAM(S): at 09:28

## 2017-11-03 RX ADMIN — AMLODIPINE BESYLATE 10 MILLIGRAM(S): 2.5 TABLET ORAL at 09:27

## 2017-11-03 RX ADMIN — Medication 200 MILLIGRAM(S): at 09:29

## 2017-11-03 RX ADMIN — Medication 200 MILLIGRAM(S): at 01:58

## 2017-11-03 RX ADMIN — Medication 25 MILLIGRAM(S): at 05:29

## 2017-11-03 RX ADMIN — Medication 1 APPLICATION(S): at 17:39

## 2017-11-03 RX ADMIN — Medication 1 APPLICATION(S): at 05:32

## 2017-11-03 RX ADMIN — Medication 25 MILLIGRAM(S): at 22:00

## 2017-11-03 RX ADMIN — Medication 500 MILLIGRAM(S): at 09:29

## 2017-11-03 NOTE — PROGRESS NOTE ADULT - SUBJECTIVE AND OBJECTIVE BOX
MEDICINE, PROGRESS NOTE [phone]    COSTEN, SANDRA 57y [id number]    Patient seen and examined.  Patient is a 57y old  Female who presents with a chief complaint of shortness of breath (11 Oct 2017 22:01)  Pt states she has a headache.  states pt has not had bm in a couple of days.    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)  Chronic Gout (ICD9 274.02)  LBBB (Left Bundle Branch Block) (ICD9 426.3)  CHF (Congestive Heart Failure) (ICD9 428.0)  Hx of aorta-iliac-femoral bypass  TOP (Termination of Pregnancy)  S/P Dilatation and Curettage  History of Tubal Ligation  s/p AAA (Abdominal Aortic Aneurysm) Repair: 2009 2010  History of Cholecystectomy  S/P Partial Hysterectomy    MEDICATIONS  (STANDING):  amLODIPine   Tablet 10 milliGRAM(s) Oral daily  AQUAPHOR (petrolatum Ointment) 1 Application(s) Topical two times a day  argatroban Infusion 2 MICROgram(s)/kG/Min (15.192 mL/Hr) IV Continuous <Continuous>  ascorbic acid 500 milliGRAM(s) Oral daily  carvedilol 25 milliGRAM(s) Oral every 12 hours  ferrous    sulfate 325 milliGRAM(s) Oral daily  furosemide   Injectable 60 milliGRAM(s) IV Push daily  hydrALAZINE 25 milliGRAM(s) Oral every 8 hours  influenza   Vaccine 0.5 milliLiter(s) IntraMuscular once  labetalol 200 milliGRAM(s) Oral every 8 hours  Nephro-alyse 1 Tablet(s) Oral daily  pantoprazole    Tablet 40 milliGRAM(s) Oral before breakfast  predniSONE   Tablet 10 milliGRAM(s) Oral daily  sucralfate suspension 1 Gram(s) Oral three times a day  warfarin 4 milliGRAM(s) Oral once    MEDICATIONS  (PRN):    Allergies    heparin (Unknown)  losartan (Anaphylaxis)  nitroglycerin (Other)    Intolerances        PHYSICAL EXAM:  Constitutional: NAD  HEENT: Normocephalic, EOMI  Neck:  No JVD  Respiratory: CTA B/L, No wheezes  Cardiovascular: S1, S2, RRR, + systolic murmur  Gastrointestinal: BS+, soft, NT/ND  Extremities: + peripheral edema  Neurological: AAOX2, no focal deficits  Psychiatric: Normal mood, normal affect  : No Hook    Vital Signs Last 24 Hrs  T(C): 37 (03 Nov 2017 12:57), Max: 37.4 (02 Nov 2017 21:50)  T(F): 98.6 (03 Nov 2017 12:57), Max: 99.4 (02 Nov 2017 21:50)  HR: 71 (03 Nov 2017 12:57) (71 - 88)  BP: 152/76 (03 Nov 2017 12:57) (150/75 - 155/88)  BP(mean): --  RR: 18 (03 Nov 2017 12:57) (18 - 19)  SpO2: 96% (03 Nov 2017 12:57) (95% - 96%)  I&O's Summary    02 Nov 2017 07:01  -  03 Nov 2017 07:00  --------------------------------------------------------  IN: 662.4 mL / OUT: 2450 mL / NET: -1787.6 mL    03 Nov 2017 07:01  -  03 Nov 2017 18:53  --------------------------------------------------------  IN: 720 mL / OUT: 3400 mL / NET: -2680 mL        LABS:                        10.2   6.6   )-----------( 173      ( 03 Nov 2017 06:54 )             32.3     11-03    145  |  103  |  67<H>  ----------------------------<  83  4.2   |  29  |  2.79<H>    Ca    8.7      03 Nov 2017 06:54    TPro  5.9<L>  /  Alb  3.5  /  TBili  0.3  /  DBili  x   /  AST  13  /  ALT  10  /  AlkPhos  56  11-02 MEDICINE, PROGRESS NOTE [phone]    COSTEN, SANDRA 57y [id number]    Patient seen and examined.  Patient is a 57y old  Female who presents with a chief complaint of shortness of breath (11 Oct 2017 22:01)  Pt feels ok. Pt states she noticed some blood when cleaning herself after bm.    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)  Chronic Gout (ICD9 274.02)  LBBB (Left Bundle Branch Block) (ICD9 426.3)  CHF (Congestive Heart Failure) (ICD9 428.0)  Hx of aorta-iliac-femoral bypass  TOP (Termination of Pregnancy)  S/P Dilatation and Curettage  History of Tubal Ligation  s/p AAA (Abdominal Aortic Aneurysm) Repair: 2009 2010  History of Cholecystectomy  S/P Partial Hysterectomy    MEDICATIONS  (STANDING):  amLODIPine   Tablet 10 milliGRAM(s) Oral daily  AQUAPHOR (petrolatum Ointment) 1 Application(s) Topical two times a day  argatroban Infusion 2 MICROgram(s)/kG/Min (15.192 mL/Hr) IV Continuous <Continuous>  ascorbic acid 500 milliGRAM(s) Oral daily  carvedilol 25 milliGRAM(s) Oral every 12 hours  ferrous    sulfate 325 milliGRAM(s) Oral daily  furosemide   Injectable 60 milliGRAM(s) IV Push daily  hydrALAZINE 25 milliGRAM(s) Oral every 8 hours  influenza   Vaccine 0.5 milliLiter(s) IntraMuscular once  labetalol 200 milliGRAM(s) Oral every 8 hours  Nephro-alyse 1 Tablet(s) Oral daily  pantoprazole    Tablet 40 milliGRAM(s) Oral before breakfast  predniSONE   Tablet 10 milliGRAM(s) Oral daily  sucralfate suspension 1 Gram(s) Oral three times a day  warfarin 4 milliGRAM(s) Oral once    MEDICATIONS  (PRN):    Allergies    heparin (Unknown)  losartan (Anaphylaxis)  nitroglycerin (Other)    Intolerances        PHYSICAL EXAM:  Constitutional: NAD  HEENT: Normocephalic, EOMI  Neck:  No JVD  Respiratory: CTA B/L, No wheezes  Cardiovascular: S1, S2, RRR, + systolic murmur  Gastrointestinal: BS+, soft, NT/ND  Extremities: + peripheral edema  Neurological: AAOX2, no focal deficits  Psychiatric: Normal mood, normal affect  : No Hook    Vital Signs Last 24 Hrs  T(C): 37 (03 Nov 2017 12:57), Max: 37.4 (02 Nov 2017 21:50)  T(F): 98.6 (03 Nov 2017 12:57), Max: 99.4 (02 Nov 2017 21:50)  HR: 71 (03 Nov 2017 12:57) (71 - 88)  BP: 152/76 (03 Nov 2017 12:57) (150/75 - 155/88)  BP(mean): --  RR: 18 (03 Nov 2017 12:57) (18 - 19)  SpO2: 96% (03 Nov 2017 12:57) (95% - 96%)  I&O's Summary    02 Nov 2017 07:01  -  03 Nov 2017 07:00  --------------------------------------------------------  IN: 662.4 mL / OUT: 2450 mL / NET: -1787.6 mL    03 Nov 2017 07:01  -  03 Nov 2017 18:53  --------------------------------------------------------  IN: 720 mL / OUT: 3400 mL / NET: -2680 mL        LABS:                        10.2   6.6   )-----------( 173      ( 03 Nov 2017 06:54 )             32.3     11-03    145  |  103  |  67<H>  ----------------------------<  83  4.2   |  29  |  2.79<H>    Ca    8.7      03 Nov 2017 06:54    TPro  5.9<L>  /  Alb  3.5  /  TBili  0.3  /  DBili  x   /  AST  13  /  ALT  10  /  AlkPhos  56  11-02

## 2017-11-03 NOTE — PROGRESS NOTE ADULT - SUBJECTIVE AND OBJECTIVE BOX
[facility]   [phone]  RENAL FOLLOW UP NOTE  --------------------------------------------------------------------------------  Chief Complaint:    24 hour events/subjective:    states edema is better. no cp or sob able to move LLE better     PAST HISTORY  --------------------------------------------------------------------------------  No significant changes to PMH, PSH, FHx, SHx, unless otherwise noted    ALLERGIES & MEDICATIONS  --------------------------------------------------------------------------------  Allergies    heparin (Unknown)  losartan (Anaphylaxis)  nitroglycerin (Other)    Intolerances      Standing Inpatient Medications  amLODIPine   Tablet 10 milliGRAM(s) Oral daily  AQUAPHOR (petrolatum Ointment) 1 Application(s) Topical two times a day  argatroban Infusion 2 MICROgram(s)/kG/Min IV Continuous <Continuous>  ascorbic acid 500 milliGRAM(s) Oral daily  carvedilol 25 milliGRAM(s) Oral every 12 hours  ferrous    sulfate 325 milliGRAM(s) Oral daily  furosemide   Injectable 60 milliGRAM(s) IV Push daily  hydrALAZINE 25 milliGRAM(s) Oral every 8 hours  influenza   Vaccine 0.5 milliLiter(s) IntraMuscular once  labetalol 200 milliGRAM(s) Oral every 8 hours  Nephro-alyse 1 Tablet(s) Oral daily  pantoprazole    Tablet 40 milliGRAM(s) Oral before breakfast  predniSONE   Tablet 10 milliGRAM(s) Oral daily  sucralfate suspension 1 Gram(s) Oral three times a day    PRN Inpatient Medications      REVIEW OF SYSTEMS  --------------------------------------------------------------------------------    Gen: denies fevers/chills,  CVS: denies chest pain/palpitations  Resp: denies SOB/Cough  GI: Denies N/V/Abd pain  : Denies dysuria    All other systems were reviewed and are negative, except as noted.    VITALS/PHYSICAL EXAM  --------------------------------------------------------------------------------  T(C): 37 (11-03-17 @ 12:57), Max: 37.4 (11-02-17 @ 21:50)  HR: 71 (11-03-17 @ 12:57) (71 - 88)  BP: 152/76 (11-03-17 @ 12:57) (150/75 - 155/88)  RR: 18 (11-03-17 @ 12:57) (18 - 19)  SpO2: 96% (11-03-17 @ 12:57) (95% - 96%)  Wt(kg): --        11-02-17 @ 07:01  -  11-03-17 @ 07:00  --------------------------------------------------------  IN: 662.4 mL / OUT: 2450 mL / NET: -1787.6 mL    11-03-17 @ 07:01  -  11-03-17 @ 16:43  --------------------------------------------------------  IN: 720 mL / OUT: 3400 mL / NET: -2680 mL      	    Physical Exam:  	Gen: alert oriented place person and date   	Pulm:  no rales or ronchi or wheezing  	CV: RRR, S1/S2. no rub  	Back: No CVA tenderness; no sacral edema  	Abd: +BS, soft, nontender/nondistended  	: No suprapubic tenderness.               Extremity: LLE lymphedema RLE 1-2-   	Neuro: No focal deficits  	Psych: Normal affect and mood      LABS/STUDIES  --------------------------------------------------------------------------------              10.2   6.6   >-----------<  173      [11-03-17 @ 06:54]              32.3     145  |  103  |  67  ----------------------------<  83      [11-03-17 @ 06:54]  4.2   |  29  |  2.79        Ca     8.7     [11-03-17 @ 06:54]    TPro  5.9  /  Alb  3.5  /  TBili  0.3  /  DBili  x   /  AST  13  /  ALT  10  /  AlkPhos  56  [11-02-17 @ 06:16]    PT/INR: PT 19.5 , INR 1.77       [11-03-17 @ 06:54]  PTT: 71.7       [11-02-17 @ 06:16]      Creatinine Trend:  SCr 2.79 [11-03 @ 06:54]  SCr 2.79 [11-02 @ 06:16]  SCr 2.72 [10-30 @ 06:50]  SCr 2.72 [10-29 @ 06:53]  SCr 2.74 [10-27 @ 06:05]              Urinalysis - [10-11-17 @ 18:44]      Color Yellow / Appearance SL Turbid / SG 1.015 / pH 8.0      Gluc Negative / Ketone Negative  / Bili Negative / Urobili Negative       Blood Negative / Protein >600 / Leuk Est Large / Nitrite Negative      RBC  / WBC 5-10 / Hyaline  / Gran  / Sq Epi  / Non Sq Epi  / Bacteria       Iron 48, TIBC --, %sat --      [10-12-17 @ 07:14]  Ferritin 847.0      [10-12-17 @ 07:14]  HbA1c 4.5      [08-09-16 @ 06:49]  TSH 0.33      [10-22-17 @ 08:40]    dsDNA 44      [10-17-17 @ 08:26]  C3 Complement 105      [10-17-17 @ 08:26]  C4 Complement 31      [10-17-17 @ 08:26]

## 2017-11-03 NOTE — PROGRESS NOTE ADULT - SUBJECTIVE AND OBJECTIVE BOX
Follow-up Pulm Progress Note    No new respiratory events overnight.  Denies SOB/CP.     Medications:  MEDICATIONS  (STANDING):  amLODIPine   Tablet 10 milliGRAM(s) Oral daily  AQUAPHOR (petrolatum Ointment) 1 Application(s) Topical two times a day  argatroban Infusion 2 MICROgram(s)/kG/Min (15.192 mL/Hr) IV Continuous <Continuous>  ascorbic acid 500 milliGRAM(s) Oral daily  carvedilol 25 milliGRAM(s) Oral every 12 hours  ferrous    sulfate 325 milliGRAM(s) Oral daily  furosemide   Injectable 60 milliGRAM(s) IV Push daily  hydrALAZINE 25 milliGRAM(s) Oral every 8 hours  influenza   Vaccine 0.5 milliLiter(s) IntraMuscular once  labetalol 200 milliGRAM(s) Oral every 8 hours  Nephro-alyse 1 Tablet(s) Oral daily  pantoprazole    Tablet 40 milliGRAM(s) Oral before breakfast  predniSONE   Tablet 10 milliGRAM(s) Oral daily  sucralfate suspension 1 Gram(s) Oral three times a day    MEDICATIONS  (PRN):    Vital Signs Last 24 Hrs  T(C): 36.8 (03 Nov 2017 04:40), Max: 37.4 (02 Nov 2017 21:50)  T(F): 98.3 (03 Nov 2017 04:40), Max: 99.4 (02 Nov 2017 21:50)  HR: 88 (03 Nov 2017 04:40) (76 - 88)  BP: 155/79 (03 Nov 2017 04:40) (148/77 - 155/88)  BP(mean): --  RR: 19 (03 Nov 2017 04:40) (19 - 20)  SpO2: 95% (03 Nov 2017 04:40) (95% - 97%)          11-02 @ 07:01  -  11-03 @ 07:00  --------------------------------------------------------  IN: 662.4 mL / OUT: 2450 mL / NET: -1787.6 mL          LABS:                        10.2   6.6   )-----------( 173      ( 03 Nov 2017 06:54 )             32.3     11-03    145  |  103  |  67<H>  ----------------------------<  83  4.2   |  29  |  2.79<H>    Ca    8.7      03 Nov 2017 06:54    TPro  5.9<L>  /  Alb  3.5  /  TBili  0.3  /  DBili  x   /  AST  13  /  ALT  10  /  AlkPhos  56  11-02    CAPILLARY BLOOD GLUCOSE        PT/INR - ( 03 Nov 2017 06:54 )   PT: 19.5 sec;   INR: 1.77 ratio         PTT - ( 02 Nov 2017 06:16 )  PTT:71.7 sec    CULTURES: (if applicable)    Physical Examination:  PULM: Clear to auscultation bilaterally, no significant sputum production  CVS: S1, S2 heard    RADIOLOGY REVIEWED  CXR:     CT chest:    TTE:

## 2017-11-03 NOTE — PROGRESS NOTE ADULT - SUBJECTIVE AND OBJECTIVE BOX
Subjective: Patient seen and examined. No new events except as noted.     REVIEW OF SYSTEMS:    CONSTITUTIONAL: No weakness, fevers or chills  EYES/ENT: No visual changes;  No vertigo or throat pain   NECK: No pain or stiffness  RESPIRATORY: No cough, wheezing, hemoptysis; No shortness of breath  CARDIOVASCULAR: No chest pain or palpitations  GASTROINTESTINAL: No abdominal or epigastric pain. No nausea, vomiting, or hematemesis; No diarrhea or constipation. No melena or hematochezia.  GENITOURINARY: No dysuria, frequency or hematuria  NEUROLOGICAL: No numbness or weakness  SKIN: No itching, burning, rashes, or lesions   All other review of systems is negative unless indicated above.    MEDICATIONS:  MEDICATIONS  (STANDING):  amLODIPine   Tablet 10 milliGRAM(s) Oral daily  AQUAPHOR (petrolatum Ointment) 1 Application(s) Topical two times a day  argatroban Infusion 2 MICROgram(s)/kG/Min (15.192 mL/Hr) IV Continuous <Continuous>  ascorbic acid 500 milliGRAM(s) Oral daily  carvedilol 25 milliGRAM(s) Oral every 12 hours  ferrous    sulfate 325 milliGRAM(s) Oral daily  furosemide   Injectable 60 milliGRAM(s) IV Push daily  hydrALAZINE 25 milliGRAM(s) Oral every 8 hours  influenza   Vaccine 0.5 milliLiter(s) IntraMuscular once  labetalol 200 milliGRAM(s) Oral every 8 hours  Nephro-alyse 1 Tablet(s) Oral daily  pantoprazole    Tablet 40 milliGRAM(s) Oral before breakfast  predniSONE   Tablet 10 milliGRAM(s) Oral daily  sucralfate suspension 1 Gram(s) Oral three times a day      PHYSICAL EXAM:  T(C): 36.8 (11-03-17 @ 04:40), Max: 37.4 (11-02-17 @ 21:50)  HR: 88 (11-03-17 @ 04:40) (76 - 88)  BP: 155/79 (11-03-17 @ 04:40) (148/77 - 155/88)  RR: 19 (11-03-17 @ 04:40) (19 - 20)  SpO2: 95% (11-03-17 @ 04:40) (95% - 97%)  Wt(kg): --  I&O's Summary    02 Nov 2017 07:01  -  03 Nov 2017 07:00  --------------------------------------------------------  IN: 662.4 mL / OUT: 2450 mL / NET: -1787.6 mL    03 Nov 2017 07:01  -  03 Nov 2017 12:10  --------------------------------------------------------  IN: 0 mL / OUT: 1500 mL / NET: -1500 mL          Appearance: Normal	  HEENT:   Normal oral mucosa, PERRL, EOMI	  Lymphatic: No lymphadenopathy , no edema  Cardiovascular: Normal S1 S2, No JVD, No murmurs , Peripheral pulses palpable 2+ bilaterally  Respiratory: Lungs clear to auscultation, normal effort 	  Gastrointestinal:  Soft, Non-tender, + BS	  Skin: No rashes, No ecchymoses, No cyanosis, warm to touch  Musculoskeletal: Normal range of motion, normal strength  Psychiatry:  Mood & affect appropriate  Ext: 4+ chronic LLE edema and 2+ pitting edema   +fox       LABS:    CARDIAC MARKERS:                                10.2   6.6   )-----------( 173      ( 03 Nov 2017 06:54 )             32.3     11-03    145  |  103  |  67<H>  ----------------------------<  83  4.2   |  29  |  2.79<H>    Ca    8.7      03 Nov 2017 06:54    TPro  5.9<L>  /  Alb  3.5  /  TBili  0.3  /  DBili  x   /  AST  13  /  ALT  10  /  AlkPhos  56  11-02    proBNP:   Lipid Profile:   HgA1c:   TSH:             TELEMETRY: 	  Vpaced   ECG:  	  RADIOLOGY:   DIAGNOSTIC TESTING:  [ ] Echocardiogram:  [ ]  Catheterization:  [ ] Stress Test:    OTHER:

## 2017-11-03 NOTE — PROGRESS NOTE ADULT - ASSESSMENT
LLE dvt  ckd 3  pericardial effusion  chronic le edema    continue current care  transition to coumadin  gi eval as pt states she saw blood when cleaning herself.  monitor labs  monitor h/h

## 2017-11-03 NOTE — PROGRESS NOTE ADULT - ASSESSMENT
58 yo F w/ hx of SLE, systolic CHF (EF 32%) s/p AICD, HTN, CKD III- IV, PE / left leg DVT dx 2009, HIT, CAD (no stents), stage IV sacral ulcer, chronic fox, hx of recurrent pericardial effusion, AAA ~14cm in 12/2016 s/p repair in 2010 w/ aortoiliac stent c/b post-operatively with development of left lower extremity   lymphedema December 2016;  pericardial effusion pericardial drain/SLE. now with ilana on ckd with baseline creatinine approx 1.8-2.     1- ILANA on ckd III  2- chf hx  3- cardiomyopathy  4- HTN   5- SLE  6- proteinuria    lasix 60mg iv qd fluid status improving  prednisone 10mg daily   uloric 40mg   cr slowly rising   cont with labetalol for bp control and hydralazine  LE edema improving

## 2017-11-04 LAB
ANION GAP SERPL CALC-SCNC: 11 MMOL/L — SIGNIFICANT CHANGE UP (ref 5–17)
APTT BLD: 70 SEC — HIGH (ref 27.5–37.4)
BUN SERPL-MCNC: 65 MG/DL — HIGH (ref 7–23)
CALCIUM SERPL-MCNC: 9.4 MG/DL — SIGNIFICANT CHANGE UP (ref 8.4–10.5)
CHLORIDE SERPL-SCNC: 104 MMOL/L — SIGNIFICANT CHANGE UP (ref 96–108)
CO2 SERPL-SCNC: 31 MMOL/L — SIGNIFICANT CHANGE UP (ref 22–31)
CREAT SERPL-MCNC: 2.59 MG/DL — HIGH (ref 0.5–1.3)
GLUCOSE SERPL-MCNC: 108 MG/DL — HIGH (ref 70–99)
HCT VFR BLD CALC: 31.6 % — LOW (ref 34.5–45)
HCT VFR BLD CALC: 33.4 % — LOW (ref 34.5–45)
HGB BLD-MCNC: 10.2 G/DL — LOW (ref 11.5–15.5)
HGB BLD-MCNC: 10.7 G/DL — LOW (ref 11.5–15.5)
INR BLD: 1.87 RATIO — HIGH (ref 0.88–1.16)
MCHC RBC-ENTMCNC: 29.3 PG — SIGNIFICANT CHANGE UP (ref 27–34)
MCHC RBC-ENTMCNC: 29.5 PG — SIGNIFICANT CHANGE UP (ref 27–34)
MCHC RBC-ENTMCNC: 32 GM/DL — SIGNIFICANT CHANGE UP (ref 32–36)
MCHC RBC-ENTMCNC: 32.4 GM/DL — SIGNIFICANT CHANGE UP (ref 32–36)
MCV RBC AUTO: 91 FL — SIGNIFICANT CHANGE UP (ref 80–100)
MCV RBC AUTO: 91.7 FL — SIGNIFICANT CHANGE UP (ref 80–100)
PLATELET # BLD AUTO: 159 K/UL — SIGNIFICANT CHANGE UP (ref 150–400)
PLATELET # BLD AUTO: 164 K/UL — SIGNIFICANT CHANGE UP (ref 150–400)
POTASSIUM SERPL-MCNC: 3.8 MMOL/L — SIGNIFICANT CHANGE UP (ref 3.5–5.3)
POTASSIUM SERPL-SCNC: 3.8 MMOL/L — SIGNIFICANT CHANGE UP (ref 3.5–5.3)
PROTHROM AB SERPL-ACNC: 20.7 SEC — HIGH (ref 9.8–12.7)
RBC # BLD: 3.47 M/UL — LOW (ref 3.8–5.2)
RBC # BLD: 3.64 M/UL — LOW (ref 3.8–5.2)
RBC # FLD: 14.8 % — HIGH (ref 10.3–14.5)
RBC # FLD: 14.8 % — HIGH (ref 10.3–14.5)
SODIUM SERPL-SCNC: 146 MMOL/L — HIGH (ref 135–145)
WBC # BLD: 6 K/UL — SIGNIFICANT CHANGE UP (ref 3.8–10.5)
WBC # BLD: 7 K/UL — SIGNIFICANT CHANGE UP (ref 3.8–10.5)
WBC # FLD AUTO: 6 K/UL — SIGNIFICANT CHANGE UP (ref 3.8–10.5)
WBC # FLD AUTO: 7 K/UL — SIGNIFICANT CHANGE UP (ref 3.8–10.5)

## 2017-11-04 RX ORDER — WARFARIN SODIUM 2.5 MG/1
5 TABLET ORAL ONCE
Qty: 0 | Refills: 0 | Status: COMPLETED | OUTPATIENT
Start: 2017-11-04 | End: 2017-11-04

## 2017-11-04 RX ADMIN — Medication 60 MILLIGRAM(S): at 05:23

## 2017-11-04 RX ADMIN — Medication 325 MILLIGRAM(S): at 09:20

## 2017-11-04 RX ADMIN — Medication 200 MILLIGRAM(S): at 09:20

## 2017-11-04 RX ADMIN — Medication 1 TABLET(S): at 09:21

## 2017-11-04 RX ADMIN — Medication 1 APPLICATION(S): at 17:20

## 2017-11-04 RX ADMIN — Medication 25 MILLIGRAM(S): at 21:05

## 2017-11-04 RX ADMIN — Medication 25 MILLIGRAM(S): at 13:15

## 2017-11-04 RX ADMIN — Medication 10 MILLIGRAM(S): at 10:23

## 2017-11-04 RX ADMIN — Medication 500 MILLIGRAM(S): at 09:21

## 2017-11-04 RX ADMIN — WARFARIN SODIUM 5 MILLIGRAM(S): 2.5 TABLET ORAL at 21:05

## 2017-11-04 RX ADMIN — Medication 200 MILLIGRAM(S): at 02:19

## 2017-11-04 RX ADMIN — Medication 1 APPLICATION(S): at 05:25

## 2017-11-04 RX ADMIN — AMLODIPINE BESYLATE 10 MILLIGRAM(S): 2.5 TABLET ORAL at 09:20

## 2017-11-04 RX ADMIN — Medication 25 MILLIGRAM(S): at 05:26

## 2017-11-04 RX ADMIN — PANTOPRAZOLE SODIUM 40 MILLIGRAM(S): 20 TABLET, DELAYED RELEASE ORAL at 05:28

## 2017-11-04 RX ADMIN — CARVEDILOL PHOSPHATE 25 MILLIGRAM(S): 80 CAPSULE, EXTENDED RELEASE ORAL at 21:04

## 2017-11-04 RX ADMIN — Medication 200 MILLIGRAM(S): at 17:20

## 2017-11-04 RX ADMIN — CARVEDILOL PHOSPHATE 25 MILLIGRAM(S): 80 CAPSULE, EXTENDED RELEASE ORAL at 10:23

## 2017-11-04 NOTE — PROGRESS NOTE ADULT - SUBJECTIVE AND OBJECTIVE BOX
Subjective: Patient seen and examined. No new events except as noted.     REVIEW OF SYSTEMS:    CONSTITUTIONAL: + weakness, no fevers or chills  EYES/ENT: No visual changes;  No vertigo or throat pain   NECK: No pain or stiffness  RESPIRATORY: No cough, wheezing, hemoptysis; No shortness of breath  CARDIOVASCULAR: No chest pain or palpitations  GASTROINTESTINAL: No abdominal or epigastric pain. No nausea, vomiting, or hematemesis; No diarrhea or constipation. No melena or hematochezia.  GENITOURINARY: No dysuria, frequency or hematuria  NEUROLOGICAL: No numbness or weakness  SKIN: No itching, burning, rashes, or lesions   All other review of systems is negative unless indicated above.    MEDICATIONS:  MEDICATIONS  (STANDING):  amLODIPine   Tablet 10 milliGRAM(s) Oral daily  AQUAPHOR (petrolatum Ointment) 1 Application(s) Topical two times a day  argatroban Infusion 2 MICROgram(s)/kG/Min (15.192 mL/Hr) IV Continuous <Continuous>  ascorbic acid 500 milliGRAM(s) Oral daily  carvedilol 25 milliGRAM(s) Oral every 12 hours  ferrous    sulfate 325 milliGRAM(s) Oral daily  furosemide   Injectable 60 milliGRAM(s) IV Push daily  hydrALAZINE 25 milliGRAM(s) Oral every 8 hours  influenza   Vaccine 0.5 milliLiter(s) IntraMuscular once  labetalol 200 milliGRAM(s) Oral every 8 hours  Nephro-alyse 1 Tablet(s) Oral daily  pantoprazole    Tablet 40 milliGRAM(s) Oral before breakfast  predniSONE   Tablet 10 milliGRAM(s) Oral daily  sucralfate suspension 1 Gram(s) Oral three times a day  warfarin 5 milliGRAM(s) Oral once      PHYSICAL EXAM:  T(C): 36.6 (11-04-17 @ 04:31), Max: 36.8 (11-03-17 @ 21:25)  HR: 77 (11-04-17 @ 16:54) (77 - 87)  BP: 137/82 (11-04-17 @ 16:54) (133/72 - 168/88)  RR: 18 (11-04-17 @ 04:31) (18 - 18)  SpO2: 97% (11-04-17 @ 04:31) (97% - 98%)  Wt(kg): --  I&O's Summary    03 Nov 2017 07:01  -  04 Nov 2017 07:00  --------------------------------------------------------  IN: 1020 mL / OUT: 4900 mL / NET: -3880 mL    04 Nov 2017 08:01  -  04 Nov 2017 20:06  --------------------------------------------------------  IN: 722.4 mL / OUT: 2100 mL / NET: -1377.6 mL          Appearance: Normal	  HEENT:   Normal oral mucosa, PERRL, EOMI	  Lymphatic: No lymphadenopathy , no edema  Cardiovascular: Normal S1 S2, No JVD, No murmurs , Peripheral pulses palpable 2+ bilaterally  Respiratory: Lungs clear to auscultation, normal effort 	  Gastrointestinal:  Soft, Non-tender, + BS	  Skin: No rashes, No ecchymoses, No cyanosis, warm to touch  Musculoskeletal: Normal range of motion, normal strength  Psychiatry:  Mood & affect appropriate  Ext: 4+ chronic LLE edema, 2+ RLE  +fox       LABS:    CARDIAC MARKERS:                                10.7   6.0   )-----------( 164      ( 04 Nov 2017 10:20 )             33.4     11-04    146<H>  |  104  |  65<H>  ----------------------------<  108<H>  3.8   |  31  |  2.59<H>    Ca    9.4      04 Nov 2017 10:20      proBNP:   Lipid Profile:   HgA1c:   TSH:             TELEMETRY: 	VPaced    ECG:  	  RADIOLOGY:   DIAGNOSTIC TESTING:  [ ] Echocardiogram:  [ ]  Catheterization:  [ ] Stress Test:    OTHER:

## 2017-11-04 NOTE — CONSULT NOTE ADULT - ASSESSMENT
57F with SLE noted to have self limited rectal bleeding the day before last could have been hemorrhoidal in origin.  Currently has brown stool on rectal exam.    Rec-  -Would just observe for now.  -Monitor bowel movements, H/H.  -Can give AnnusolHC prn.  -To consider colonoscopy if with persistent symptoms.    Thank you for the courtesy of this consultation.

## 2017-11-04 NOTE — PROGRESS NOTE ADULT - ASSESSMENT
LLE dvt  acute on chronic left lower extremity edema  ckd 3    continue current care  argatroban to coumadin  activity as tolerated  continue fox catheter

## 2017-11-05 LAB
ANION GAP SERPL CALC-SCNC: 13 MMOL/L — SIGNIFICANT CHANGE UP (ref 5–17)
APTT BLD: 70.3 SEC — HIGH (ref 27.5–37.4)
BUN SERPL-MCNC: 65 MG/DL — HIGH (ref 7–23)
CALCIUM SERPL-MCNC: 8.9 MG/DL — SIGNIFICANT CHANGE UP (ref 8.4–10.5)
CHLORIDE SERPL-SCNC: 103 MMOL/L — SIGNIFICANT CHANGE UP (ref 96–108)
CO2 SERPL-SCNC: 29 MMOL/L — SIGNIFICANT CHANGE UP (ref 22–31)
CREAT SERPL-MCNC: 2.74 MG/DL — HIGH (ref 0.5–1.3)
GLUCOSE SERPL-MCNC: 79 MG/DL — SIGNIFICANT CHANGE UP (ref 70–99)
HCT VFR BLD CALC: 33.6 % — LOW (ref 34.5–45)
HGB BLD-MCNC: 10.4 G/DL — LOW (ref 11.5–15.5)
INR BLD: 1.89 RATIO — HIGH (ref 0.88–1.16)
MCHC RBC-ENTMCNC: 28.3 PG — SIGNIFICANT CHANGE UP (ref 27–34)
MCHC RBC-ENTMCNC: 30.9 GM/DL — LOW (ref 32–36)
MCV RBC AUTO: 91.8 FL — SIGNIFICANT CHANGE UP (ref 80–100)
PLATELET # BLD AUTO: 160 K/UL — SIGNIFICANT CHANGE UP (ref 150–400)
POTASSIUM SERPL-MCNC: 4 MMOL/L — SIGNIFICANT CHANGE UP (ref 3.5–5.3)
POTASSIUM SERPL-SCNC: 4 MMOL/L — SIGNIFICANT CHANGE UP (ref 3.5–5.3)
PROTHROM AB SERPL-ACNC: 20.9 SEC — HIGH (ref 9.8–12.7)
RBC # BLD: 3.66 M/UL — LOW (ref 3.8–5.2)
RBC # FLD: 14.7 % — HIGH (ref 10.3–14.5)
SODIUM SERPL-SCNC: 145 MMOL/L — SIGNIFICANT CHANGE UP (ref 135–145)
WBC # BLD: 6.2 K/UL — SIGNIFICANT CHANGE UP (ref 3.8–10.5)
WBC # FLD AUTO: 6.2 K/UL — SIGNIFICANT CHANGE UP (ref 3.8–10.5)

## 2017-11-05 RX ORDER — WARFARIN SODIUM 2.5 MG/1
6 TABLET ORAL ONCE
Qty: 0 | Refills: 0 | Status: COMPLETED | OUTPATIENT
Start: 2017-11-05 | End: 2017-11-05

## 2017-11-05 RX ADMIN — Medication 25 MILLIGRAM(S): at 05:15

## 2017-11-05 RX ADMIN — Medication 1 TABLET(S): at 09:33

## 2017-11-05 RX ADMIN — WARFARIN SODIUM 6 MILLIGRAM(S): 2.5 TABLET ORAL at 21:48

## 2017-11-05 RX ADMIN — Medication 200 MILLIGRAM(S): at 10:33

## 2017-11-05 RX ADMIN — Medication 60 MILLIGRAM(S): at 05:14

## 2017-11-05 RX ADMIN — CARVEDILOL PHOSPHATE 25 MILLIGRAM(S): 80 CAPSULE, EXTENDED RELEASE ORAL at 21:45

## 2017-11-05 RX ADMIN — Medication 200 MILLIGRAM(S): at 17:30

## 2017-11-05 RX ADMIN — Medication 500 MILLIGRAM(S): at 09:31

## 2017-11-05 RX ADMIN — Medication 200 MILLIGRAM(S): at 00:13

## 2017-11-05 RX ADMIN — Medication 25 MILLIGRAM(S): at 13:33

## 2017-11-05 RX ADMIN — AMLODIPINE BESYLATE 10 MILLIGRAM(S): 2.5 TABLET ORAL at 09:31

## 2017-11-05 RX ADMIN — CARVEDILOL PHOSPHATE 25 MILLIGRAM(S): 80 CAPSULE, EXTENDED RELEASE ORAL at 09:31

## 2017-11-05 RX ADMIN — Medication 325 MILLIGRAM(S): at 09:31

## 2017-11-05 RX ADMIN — Medication 1 APPLICATION(S): at 05:15

## 2017-11-05 RX ADMIN — Medication 25 MILLIGRAM(S): at 21:45

## 2017-11-05 RX ADMIN — Medication 1 APPLICATION(S): at 17:30

## 2017-11-05 RX ADMIN — PANTOPRAZOLE SODIUM 40 MILLIGRAM(S): 20 TABLET, DELAYED RELEASE ORAL at 05:15

## 2017-11-05 RX ADMIN — Medication 10 MILLIGRAM(S): at 09:31

## 2017-11-05 NOTE — PROGRESS NOTE ADULT - SUBJECTIVE AND OBJECTIVE BOX
MEDICINE, PROGRESS NOTE [phone]    COSTEN, SANDRA 57y [id number]    Patient seen and examined.  Patient is a 57y old  Female who presents with a chief complaint of shortness of breath (11 Oct 2017 22:01)  Pt feels well. 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)  Chronic Gout (ICD9 274.02)  LBBB (Left Bundle Branch Block) (ICD9 426.3)  CHF (Congestive Heart Failure) (ICD9 428.0)  Hx of aorta-iliac-femoral bypass  TOP (Termination of Pregnancy)  S/P Dilatation and Curettage  History of Tubal Ligation  s/p AAA (Abdominal Aortic Aneurysm) Repair: 2009 2010  History of Cholecystectomy  S/P Partial Hysterectomy    MEDICATIONS  (STANDING):  amLODIPine   Tablet 10 milliGRAM(s) Oral daily  AQUAPHOR (petrolatum Ointment) 1 Application(s) Topical two times a day  argatroban Infusion 2 MICROgram(s)/kG/Min (15.192 mL/Hr) IV Continuous <Continuous>  ascorbic acid 500 milliGRAM(s) Oral daily  carvedilol 25 milliGRAM(s) Oral every 12 hours  ferrous    sulfate 325 milliGRAM(s) Oral daily  furosemide   Injectable 60 milliGRAM(s) IV Push daily  hydrALAZINE 25 milliGRAM(s) Oral every 8 hours  influenza   Vaccine 0.5 milliLiter(s) IntraMuscular once  labetalol 200 milliGRAM(s) Oral every 8 hours  Nephro-alyse 1 Tablet(s) Oral daily  pantoprazole    Tablet 40 milliGRAM(s) Oral before breakfast  predniSONE   Tablet 10 milliGRAM(s) Oral daily  sucralfate suspension 1 Gram(s) Oral three times a day    MEDICATIONS  (PRN):    Allergies    heparin (Unknown)  losartan (Anaphylaxis)  nitroglycerin (Other)    Intolerances        PHYSICAL EXAM:  Constitutional: NAD  HEENT: Normocephalic, EOMI  Neck:  No JVD  Respiratory: CTA B/L, No wheezes  Cardiovascular: S1, S2, RRR, + systolic murmur  Gastrointestinal: BS+, soft, NT/ND  Extremities: No peripheral edema  Neurological: AAOX3, no focal deficits  Psychiatric: Normal mood, normal affect  : No Hook    Vital Signs Last 24 Hrs  T(C): 36.7 (05 Nov 2017 04:56), Max: 36.7 (05 Nov 2017 01:18)  T(F): 98 (05 Nov 2017 04:56), Max: 98 (05 Nov 2017 01:18)  HR: 83 (05 Nov 2017 13:31) (77 - 92)  BP: 149/77 (05 Nov 2017 13:31) (137/82 - 164/82)  BP(mean): --  RR: 16 (05 Nov 2017 13:31) (16 - 18)  SpO2: 96% (05 Nov 2017 13:31) (95% - 97%)  I&O's Summary    04 Nov 2017 08:01  -  05 Nov 2017 07:00  --------------------------------------------------------  IN: 1384.7 mL / OUT: 4100 mL / NET: -2715.3 mL    05 Nov 2017 07:01  -  05 Nov 2017 16:44  --------------------------------------------------------  IN: 911.2 mL / OUT: 1200 mL / NET: -288.8 mL        LABS:                        10.4   6.2   )-----------( 160      ( 05 Nov 2017 06:52 )             33.6     11-05    145  |  103  |  65<H>  ----------------------------<  79  4.0   |  29  |  2.74<H>    Ca    8.9      05 Nov 2017 06:47

## 2017-11-05 NOTE — PROGRESS NOTE ADULT - ASSESSMENT
57F with SLE noted to have self limited rectal bleeding two days ago and again last night could just be hemorrhoidal in origin.  Yesterday had brown stool on rectal exam.  H/H stable.    Rec-  -Would just observe for now.  -Monitor bowel movements, H/H.  -Can give AnnusolHC prn.  -To consider colonoscopy if with persistent symptoms.

## 2017-11-05 NOTE — PROGRESS NOTE ADULT - SUBJECTIVE AND OBJECTIVE BOX
Patient is a 57y old  Female who presented with a chief complaint of shortness of breath (11 Oct 2017 22:01)    INTERVAL HPI/OVERNIGHT EVENTS: Noted trace rectal bleeding again last night    MEDICATIONS  (STANDING):  amLODIPine   Tablet 10 milliGRAM(s) Oral daily  AQUAPHOR (petrolatum Ointment) 1 Application(s) Topical two times a day  argatroban Infusion 2 MICROgram(s)/kG/Min (15.192 mL/Hr) IV Continuous <Continuous>  ascorbic acid 500 milliGRAM(s) Oral daily  carvedilol 25 milliGRAM(s) Oral every 12 hours  ferrous    sulfate 325 milliGRAM(s) Oral daily  furosemide   Injectable 60 milliGRAM(s) IV Push daily  hydrALAZINE 25 milliGRAM(s) Oral every 8 hours  influenza   Vaccine 0.5 milliLiter(s) IntraMuscular once  labetalol 200 milliGRAM(s) Oral every 8 hours  Nephro-alyse 1 Tablet(s) Oral daily  pantoprazole    Tablet 40 milliGRAM(s) Oral before breakfast  predniSONE   Tablet 10 milliGRAM(s) Oral daily  sucralfate suspension 1 Gram(s) Oral three times a day    MEDICATIONS  (PRN):    Allergies  heparin (Unknown)  losartan (Anaphylaxis)  nitroglycerin (Other)    Review of Systems:  General:  No wt loss, fevers, chills, night sweats,fatigue,   CV:  No pain, palpitatioins, hypo/hypertension  Resp:  No dyspnea, cough, tachypnea, wheezing  GI:  No pain, No nausea, No vomiting, No diarrhea, + constipation, No weight loss, No fever, No pruritis, No tarry stools, No dysphagia,  :  No pain, bleeding, incontinence, nocturia  Muscle:  No pain, weakness  Neuro:  No weakness, tingling, memory problems  Psych:  No fatigue, insomnia, mood problems, depression  Endocrine:  No polyuria, polydypsia, cold/heat intolerance  Heme:  No petechiae, ecchymosis, easy bruisability  Skin:  No rash, tattoos, scars, edema    Vital Signs Last 24 Hrs  T(C): 36.7 (05 Nov 2017 04:56), Max: 36.7 (05 Nov 2017 01:18)  T(F): 98 (05 Nov 2017 04:56), Max: 98 (05 Nov 2017 01:18)  HR: 84 (05 Nov 2017 08:17) (77 - 92)  BP: 161/84 (05 Nov 2017 08:17) (133/72 - 168/88)  BP(mean): --  RR: 18 (05 Nov 2017 04:56) (18 - 18)  SpO2: 95% (05 Nov 2017 04:56) (95% - 97%)    PHYSICAL EXAM:  Constitutional: NAD, well-developed  Neck: No LAD, supple  Respiratory: CTA and P  Cardiovascular: S1 and S2, RRR, no M  Gastrointestinal: BS+, soft, NT/ND, neg HSM  Extremities: No peripheral edema, neg clubing, cyanosis  Vascular: 2+ peripheral pulses  Neurological: A/O x 3, no focal deficits  Psychiatric: Normal mood, normal affect  Skin: No rashes    LABS:                      10.4   6.2   )-----------( 160      ( 05 Nov 2017 06:52 )             33.6     Hemoglobin: 10.4 g/dL <L> [11.5 - 15.5] (11-05-17 @ 06:52)  Hemoglobin: 10.2 g/dL <L> [11.5 - 15.5] (11-04-17 @ 23:46)  Hemoglobin: 10.7 g/dL <L> [11.5 - 15.5] (11-04-17 @ 10:20)  Hemoglobin: 9.8 g/dL <L> [11.5 - 15.5] (11-03-17 @ 20:24)  Hemoglobin: 10.2 g/dL <L> [11.5 - 15.5] (11-03-17 @ 06:54)    11-05    145  |  103  |  65<H>  ----------------------------<  79  4.0   |  29  |  2.74<H>    Ca    8.9      05 Nov 2017 06:47    PT/INR - ( 05 Nov 2017 06:52 )   PT: 20.9 sec;   INR: 1.89 ratio    PTT - ( 05 Nov 2017 06:52 )  PTT:70.3 sec    RADIOLOGY & ADDITIONAL TESTS:

## 2017-11-05 NOTE — PROGRESS NOTE ADULT - SUBJECTIVE AND OBJECTIVE BOX
Subjective: Patient seen and examined. No new events except as noted.   feels well     REVIEW OF SYSTEMS:    CONSTITUTIONAL: No weakness, fevers or chills  EYES/ENT: No visual changes;  No vertigo or throat pain   NECK: No pain or stiffness  RESPIRATORY: No cough, wheezing, hemoptysis; No shortness of breath  CARDIOVASCULAR: No chest pain or palpitations  GASTROINTESTINAL: No abdominal or epigastric pain. No nausea, vomiting, or hematemesis; No diarrhea or constipation. No melena or hematochezia.  GENITOURINARY: No dysuria, frequency or hematuria  NEUROLOGICAL: No numbness or weakness  SKIN: No itching, burning, rashes, or lesions   All other review of systems is negative unless indicated above.    MEDICATIONS:  MEDICATIONS  (STANDING):  amLODIPine   Tablet 10 milliGRAM(s) Oral daily  AQUAPHOR (petrolatum Ointment) 1 Application(s) Topical two times a day  argatroban Infusion 2 MICROgram(s)/kG/Min (15.192 mL/Hr) IV Continuous <Continuous>  ascorbic acid 500 milliGRAM(s) Oral daily  carvedilol 25 milliGRAM(s) Oral every 12 hours  ferrous    sulfate 325 milliGRAM(s) Oral daily  furosemide   Injectable 60 milliGRAM(s) IV Push daily  hydrALAZINE 25 milliGRAM(s) Oral every 8 hours  influenza   Vaccine 0.5 milliLiter(s) IntraMuscular once  labetalol 200 milliGRAM(s) Oral every 8 hours  Nephro-alyse 1 Tablet(s) Oral daily  pantoprazole    Tablet 40 milliGRAM(s) Oral before breakfast  predniSONE   Tablet 10 milliGRAM(s) Oral daily  sucralfate suspension 1 Gram(s) Oral three times a day      PHYSICAL EXAM:  T(C): 36.7 (11-05-17 @ 04:56), Max: 36.7 (11-05-17 @ 01:18)  HR: 84 (11-05-17 @ 08:17) (77 - 92)  BP: 161/84 (11-05-17 @ 08:17) (133/72 - 164/82)  RR: 18 (11-05-17 @ 04:56) (18 - 18)  SpO2: 95% (11-05-17 @ 04:56) (95% - 97%)  Wt(kg): --  I&O's Summary    04 Nov 2017 08:01  -  05 Nov 2017 07:00  --------------------------------------------------------  IN: 1384.7 mL / OUT: 4100 mL / NET: -2715.3 mL          Appearance: Normal	  HEENT:   Normal oral mucosa, PERRL, EOMI	  Lymphatic: No lymphadenopathy , no edema  Cardiovascular: Normal S1 S2, No JVD, No murmurs , Peripheral pulses palpable 2+ bilaterally  Respiratory: Lungs clear to auscultation, normal effort 	  Gastrointestinal:  Soft, Non-tender, + BS	  Skin: No rashes, No ecchymoses, No cyanosis, warm to touch  Musculoskeletal: Normal range of motion, normal strength  Psychiatry:  Mood & affect appropriate  Ext: LLE edema improved, now wrapped   +fox     LABS:    CARDIAC MARKERS:                                10.4   6.2   )-----------( 160      ( 05 Nov 2017 06:52 )             33.6     11-05    145  |  103  |  65<H>  ----------------------------<  79  4.0   |  29  |  2.74<H>    Ca    8.9      05 Nov 2017 06:47      proBNP:   Lipid Profile:   HgA1c:   TSH:             TELEMETRY:  Vpaced , 10 beats WCT 	    ECG:  	  RADIOLOGY:   DIAGNOSTIC TESTING:  [ ] Echocardiogram:  [ ]  Catheterization:  [ ] Stress Test:    OTHER:

## 2017-11-06 LAB
ANION GAP SERPL CALC-SCNC: 13 MMOL/L — SIGNIFICANT CHANGE UP (ref 5–17)
APTT BLD: 54.5 SEC — HIGH (ref 27.5–37.4)
BUN SERPL-MCNC: 66 MG/DL — HIGH (ref 7–23)
CALCIUM SERPL-MCNC: 8.8 MG/DL — SIGNIFICANT CHANGE UP (ref 8.4–10.5)
CHLORIDE SERPL-SCNC: 104 MMOL/L — SIGNIFICANT CHANGE UP (ref 96–108)
CO2 SERPL-SCNC: 28 MMOL/L — SIGNIFICANT CHANGE UP (ref 22–31)
CREAT SERPL-MCNC: 2.69 MG/DL — HIGH (ref 0.5–1.3)
GLUCOSE SERPL-MCNC: 88 MG/DL — SIGNIFICANT CHANGE UP (ref 70–99)
HCT VFR BLD CALC: 30.8 % — LOW (ref 34.5–45)
HGB BLD-MCNC: 10 G/DL — LOW (ref 11.5–15.5)
INR BLD: 2.27 RATIO — HIGH (ref 0.88–1.16)
MAGNESIUM SERPL-MCNC: 2.1 MG/DL — SIGNIFICANT CHANGE UP (ref 1.6–2.6)
MCHC RBC-ENTMCNC: 29.6 PG — SIGNIFICANT CHANGE UP (ref 27–34)
MCHC RBC-ENTMCNC: 32.4 GM/DL — SIGNIFICANT CHANGE UP (ref 32–36)
MCV RBC AUTO: 91.2 FL — SIGNIFICANT CHANGE UP (ref 80–100)
PHOSPHATE SERPL-MCNC: 3.5 MG/DL — SIGNIFICANT CHANGE UP (ref 2.5–4.5)
PLATELET # BLD AUTO: 147 K/UL — LOW (ref 150–400)
POTASSIUM SERPL-MCNC: 4.1 MMOL/L — SIGNIFICANT CHANGE UP (ref 3.5–5.3)
POTASSIUM SERPL-SCNC: 4.1 MMOL/L — SIGNIFICANT CHANGE UP (ref 3.5–5.3)
PROTHROM AB SERPL-ACNC: 24.9 SEC — HIGH (ref 9.8–12.7)
RBC # BLD: 3.37 M/UL — LOW (ref 3.8–5.2)
RBC # FLD: 14.9 % — HIGH (ref 10.3–14.5)
SODIUM SERPL-SCNC: 145 MMOL/L — SIGNIFICANT CHANGE UP (ref 135–145)
WBC # BLD: 5.8 K/UL — SIGNIFICANT CHANGE UP (ref 3.8–10.5)
WBC # FLD AUTO: 5.8 K/UL — SIGNIFICANT CHANGE UP (ref 3.8–10.5)

## 2017-11-06 RX ORDER — WARFARIN SODIUM 2.5 MG/1
5 TABLET ORAL ONCE
Qty: 0 | Refills: 0 | Status: COMPLETED | OUTPATIENT
Start: 2017-11-06 | End: 2017-11-06

## 2017-11-06 RX ADMIN — Medication 1 APPLICATION(S): at 17:50

## 2017-11-06 RX ADMIN — ARGATROBAN 15.19 MICROGRAM(S)/KG/MIN: 50 INJECTION, SOLUTION INTRAVENOUS at 19:29

## 2017-11-06 RX ADMIN — CARVEDILOL PHOSPHATE 25 MILLIGRAM(S): 80 CAPSULE, EXTENDED RELEASE ORAL at 09:59

## 2017-11-06 RX ADMIN — CARVEDILOL PHOSPHATE 25 MILLIGRAM(S): 80 CAPSULE, EXTENDED RELEASE ORAL at 20:53

## 2017-11-06 RX ADMIN — Medication 10 MILLIGRAM(S): at 10:01

## 2017-11-06 RX ADMIN — Medication 25 MILLIGRAM(S): at 05:25

## 2017-11-06 RX ADMIN — Medication 200 MILLIGRAM(S): at 02:07

## 2017-11-06 RX ADMIN — Medication 25 MILLIGRAM(S): at 13:16

## 2017-11-06 RX ADMIN — Medication 325 MILLIGRAM(S): at 10:03

## 2017-11-06 RX ADMIN — Medication 200 MILLIGRAM(S): at 17:51

## 2017-11-06 RX ADMIN — Medication 1 APPLICATION(S): at 09:59

## 2017-11-06 RX ADMIN — AMLODIPINE BESYLATE 10 MILLIGRAM(S): 2.5 TABLET ORAL at 09:58

## 2017-11-06 RX ADMIN — Medication 200 MILLIGRAM(S): at 10:00

## 2017-11-06 RX ADMIN — Medication 1 TABLET(S): at 13:15

## 2017-11-06 RX ADMIN — WARFARIN SODIUM 5 MILLIGRAM(S): 2.5 TABLET ORAL at 21:01

## 2017-11-06 RX ADMIN — Medication 60 MILLIGRAM(S): at 05:24

## 2017-11-06 RX ADMIN — PANTOPRAZOLE SODIUM 40 MILLIGRAM(S): 20 TABLET, DELAYED RELEASE ORAL at 05:26

## 2017-11-06 RX ADMIN — Medication 25 MILLIGRAM(S): at 21:01

## 2017-11-06 RX ADMIN — Medication 500 MILLIGRAM(S): at 10:02

## 2017-11-06 NOTE — PROGRESS NOTE ADULT - SUBJECTIVE AND OBJECTIVE BOX
Patient is a 57y old  Female who presents with a chief complaint of shortness of breath (11 Oct 2017 22:01)      INTERVAL HPI/OVERNIGHT EVENTS:  no abdominal pain  appetite good  formed stools - brown in color denies any further traces of blood    MEDICATIONS  (STANDING):  amLODIPine   Tablet 10 milliGRAM(s) Oral daily  AQUAPHOR (petrolatum Ointment) 1 Application(s) Topical two times a day  argatroban Infusion 2 MICROgram(s)/kG/Min (15.192 mL/Hr) IV Continuous <Continuous>  ascorbic acid 500 milliGRAM(s) Oral daily  carvedilol 25 milliGRAM(s) Oral every 12 hours  ferrous    sulfate 325 milliGRAM(s) Oral daily  furosemide   Injectable 60 milliGRAM(s) IV Push daily  hydrALAZINE 25 milliGRAM(s) Oral every 8 hours  influenza   Vaccine 0.5 milliLiter(s) IntraMuscular once  labetalol 200 milliGRAM(s) Oral every 8 hours  Nephro-alyse 1 Tablet(s) Oral daily  pantoprazole    Tablet 40 milliGRAM(s) Oral before breakfast  predniSONE   Tablet 10 milliGRAM(s) Oral daily  sucralfate suspension 1 Gram(s) Oral three times a day    MEDICATIONS  (PRN):      Allergies    heparin (Unknown)  losartan (Anaphylaxis)  nitroglycerin (Other)    Intolerances      Review of Systems:  General:  No wt loss, fevers, chills, night sweats,fatigue,   CV:  No pain, palpitatioins, hypo/hypertension  Resp:  No dyspnea, cough, tachypnea, wheezing  :  No pain, bleeding, incontinence, nocturia  Muscle:  No pain, weakness  Neuro:  No weakness, tingling, memory problems  Heme:  No petechiae, ecchymosis, easy bruisability  Skin:  +ischial decubitus ulcer    Vital Signs Last 24 Hrs  T(C): 36.7 (06 Nov 2017 04:50), Max: 36.8 (06 Nov 2017 00:00)  T(F): 98 (06 Nov 2017 04:50), Max: 98.2 (06 Nov 2017 00:00)  HR: 82 (06 Nov 2017 04:50) (82 - 89)  BP: 167/84 (06 Nov 2017 04:50) (149/77 - 167/84)  BP(mean): --  RR: 18 (06 Nov 2017 04:50) (16 - 18)  SpO2: 96% (06 Nov 2017 04:50) (96% - 97%)    PHYSICAL EXAM:  Constitutional: NAD, well-developed, eating breakfast  Neck: No LAD, supple  Respiratory: CTA and P  Cardiovascular: S1 and S2, RRR, no Murmur  Gastrointestinal: BS+, soft, NT/ND, neg HSM  Extremities: No peripheral edema, neg clubbing, cyanosis  Vascular: 2+ peripheral pulses  Neurological: A/O x 3, no focal deficits  Psychiatric: Normal mood, normal affect  Skin: No rashes    LABS:                        10.0   5.8   )-----------( 147      ( 06 Nov 2017 06:30 )             30.8     11-06    145  |  104  |  66<H>  ----------------------------<  88  4.1   |  28  |  2.69<H>    Ca    8.8      06 Nov 2017 06:30  Phos  3.5     11-06  Mg     2.1     11-06      PT/INR - ( 06 Nov 2017 06:30 )   PT: 24.9 sec;   INR: 2.27 ratio         PTT - ( 06 Nov 2017 06:30 )  PTT:54.5 sec    LIVER FUNCTIONS - ( 02 Nov 2017 06:16 )  Alb: 3.5 g/dL / Pro: 5.9 g/dL / ALK PHOS: 56 U/L / ALT: 10 U/L RC / AST: 13 U/L / GGT: x             RADIOLOGY & ADDITIONAL TESTS:

## 2017-11-06 NOTE — PROGRESS NOTE ADULT - ASSESSMENT
57F with SLE noted to have self limited rectal bleeding two days ago and again last night could just be hemorrhoidal in origin.  Yesterday had brown stool on rectal exam.  H/H stable.    Rec-  -Would just observe for now.  -Monitor bowel movements, H/H.  -Can give AnnusolHC prn.  -No plans for colonoscopy at this time  - Wound care per recommendations     Francesco Lake PA-C    [facility]  [phone]

## 2017-11-06 NOTE — PROGRESS NOTE ADULT - SUBJECTIVE AND OBJECTIVE BOX
[facility]   [phone]  RENAL FOLLOW UP NOTE  --------------------------------------------------------------------------------  Chief Complaint:    24 hour events/subjective:    felt sob with PT    PAST HISTORY  --------------------------------------------------------------------------------  No significant changes to PMH, PSH, FHx, SHx, unless otherwise noted    ALLERGIES & MEDICATIONS  --------------------------------------------------------------------------------  Allergies    heparin (Unknown)  losartan (Anaphylaxis)  nitroglycerin (Other)    Intolerances      Standing Inpatient Medications  amLODIPine   Tablet 10 milliGRAM(s) Oral daily  AQUAPHOR (petrolatum Ointment) 1 Application(s) Topical two times a day  argatroban Infusion 2 MICROgram(s)/kG/Min IV Continuous <Continuous>  ascorbic acid 500 milliGRAM(s) Oral daily  carvedilol 25 milliGRAM(s) Oral every 12 hours  ferrous    sulfate 325 milliGRAM(s) Oral daily  furosemide   Injectable 60 milliGRAM(s) IV Push daily  hydrALAZINE 25 milliGRAM(s) Oral every 8 hours  influenza   Vaccine 0.5 milliLiter(s) IntraMuscular once  labetalol 200 milliGRAM(s) Oral every 8 hours  Nephro-alyse 1 Tablet(s) Oral daily  pantoprazole    Tablet 40 milliGRAM(s) Oral before breakfast  predniSONE   Tablet 10 milliGRAM(s) Oral daily  sucralfate suspension 1 Gram(s) Oral three times a day  warfarin 5 milliGRAM(s) Oral once    PRN Inpatient Medications      REVIEW OF SYSTEMS  --------------------------------------------------------------------------------    Gen: denies, fevers/chills,  CVS: denies chest pain/palpitations  Resp: denies /Cough or phelgm  GI: Denies N/V/Abd pain  : Denies dysuria  All other systems were reviewed and are negative, except as noted.    VITALS/PHYSICAL EXAM  --------------------------------------------------------------------------------  T(C): 36.6 (11-06-17 @ 13:12), Max: 36.8 (11-06-17 @ 00:00)  HR: 76 (11-06-17 @ 13:12) (76 - 89)  BP: 166/86 (11-06-17 @ 13:12) (154/84 - 167/84)  RR: 18 (11-06-17 @ 13:12) (18 - 18)  SpO2: 98% (11-06-17 @ 13:12) (96% - 98%)  Wt(kg): --        11-05-17 @ 07:01  -  11-06-17 @ 07:00  --------------------------------------------------------  IN: 1709.6 mL / OUT: 3250 mL / NET: -1540.4 mL    11-06-17 @ 07:01  -  11-06-17 @ 16:16  --------------------------------------------------------  IN: 360 mL / OUT: 1200 mL / NET: -840 mL      Physical Exam:  	    Physical Exam:  	Gen: alert oriented place person and date   	Pulm: Decreased breath sounds b/l bases. no rales or ronchi or wheezing  	CV: RRR, S1/S2. no rub  	Back: No CVA tenderness; no sacral edema  	Abd: +BS, soft, nontender/nondistended  	: No suprapubic tenderness.               Extremity: LLE dressing over lymphedema  RLE no edema     LABS/STUDIES  --------------------------------------------------------------------------------              10.0   5.8   >-----------<  147      [11-06-17 @ 06:30]              30.8     145  |  104  |  66  ----------------------------<  88      [11-06-17 @ 06:30]  4.1   |  28  |  2.69        Ca     8.8     [11-06-17 @ 06:30]      Mg     2.1     [11-06-17 @ 06:30]      Phos  3.5     [11-06-17 @ 06:30]      PT/INR: PT 24.9 , INR 2.27       [11-06-17 @ 06:30]  PTT: 54.5       [11-06-17 @ 06:30]      Creatinine Trend:  SCr 2.69 [11-06 @ 06:30]  SCr 2.74 [11-05 @ 06:47]  SCr 2.59 [11-04 @ 10:20]  SCr 2.79 [11-03 @ 06:54]  SCr 2.79 [11-02 @ 06:16]              Urinalysis - [10-11-17 @ 18:44]      Color Yellow / Appearance SL Turbid / SG 1.015 / pH 8.0      Gluc Negative / Ketone Negative  / Bili Negative / Urobili Negative       Blood Negative / Protein >600 / Leuk Est Large / Nitrite Negative      RBC  / WBC 5-10 / Hyaline  / Gran  / Sq Epi  / Non Sq Epi  / Bacteria       Iron 48, TIBC --, %sat --      [10-12-17 @ 07:14]  Ferritin 847.0      [10-12-17 @ 07:14]  HbA1c 4.5      [08-09-16 @ 06:49]  TSH 0.33      [10-22-17 @ 08:40]    dsDNA 44      [10-17-17 @ 08:26]  C3 Complement 105      [10-17-17 @ 08:26]  C4 Complement 31      [10-17-17 @ 08:26]

## 2017-11-06 NOTE — PROGRESS NOTE ADULT - ASSESSMENT
lle dvt  chronic lle edmea  ckd 3      continue argatroban to coumadin  encourage activity  wrap leg per vascular sx  appreciate cardio and gi input  await heme/onc f/u

## 2017-11-06 NOTE — PROGRESS NOTE ADULT - ASSESSMENT
56 yo F w/ hx of SLE, systolic CHF (EF 32%) s/p AICD, HTN, CKD III- IV, PE / left leg DVT dx 2009, HIT, CAD (no stents), stage IV sacral ulcer, chronic fox, hx of recurrent pericardial effusion, AAA ~14cm in 12/2016 s/p repair in 2010 w/ aortoiliac stent c/b post-operatively with development of left lower extremity   lymphedema December 2016;  pericardial effusion pericardial drain/SLE. now with ilana on ckd with baseline creatinine approx 1.8-2.     1- ILANA on ckd III  2- chf hx  3- cardiomyopathy  4- HTN   5- SLE  6- proteinuria    change lasix to 80 mg po qd  prednisone 10mg daily   uloric 40mg   cr steady at this high level   cont with labetalol for bp control and hydralazine  LE edema improving significantly

## 2017-11-06 NOTE — CHART NOTE - NSCHARTNOTEFT_GEN_A_CORE
Cardio: RN notirfied that, pt had 11 beats of WCT on Tele. Pt was evaluated. Denies CP/SOB/ Palpitations/ Diaphoresis, HA/ Dizziness/ Blurry vision/ syncope, fever/ chills.     Vital Signs Last 24 Hrs  T(C): 36.7 (06 Nov 2017 04:50), Max: 36.8 (06 Nov 2017 00:00)  T(F): 98 (06 Nov 2017 04:50), Max: 98.2 (06 Nov 2017 00:00)  HR: 82 (06 Nov 2017 04:50) (82 - 89)  BP: 167/84 (06 Nov 2017 04:50) (149/77 - 167/84)  BP(mean): --  RR: 18 (06 Nov 2017 04:50) (16 - 18)  SpO2: 96% (06 Nov 2017 04:50) (96% - 97%)                          10.4   6.2   )-----------( 160      ( 05 Nov 2017 06:52 )             33.6     11-05    145  |  103  |  65<H>  ----------------------------<  79  4.0   |  29  |  2.74<H>    Ca    8.9      05 Nov 2017 06:47      PT/INR - ( 05 Nov 2017 06:52 )   PT: 20.9 sec;   INR: 1.89 ratio         PTT - ( 05 Nov 2017 06:52 )  PTT:70.3 sec    A/P: 56 y/o female PMHx chronic systolic CHF (last known EF=32%) s/p AICD, SLE, AAA approx 14cm as of 12/2016 s/p aortoiliac stent, DVT/PE (not currently anticoagulated 2/2 HIT), h/o LLE compartment syndrome, multiple pericardial effusions s/p drainage most recently in Jan 2017 now presents with recurrent large pericardial effusion without tamponade likely chronic effusion 2/2 SLE. . CT surgery recommended no surgical intervention and drainage by interventional means. Interventional Cardiology  and Vascular recommending radiologic guidance given the nature of the effusion - appears loculated). Also  Venous LE Doppler positive for DVT. Started on Argatroban (History Of HIT) and wants to start coumadin.      # Non sustained Monomorphic V- Tach 11 beats.   - STAT K/Mg/PO:   - c/w Labetolol 200mg q8h.   - c/w Coreg 25mg bid.   - Monitor on Tele.   - Keep K/Mg> 4.0/2.0.   - check TSH.   - Cardiology: Dr. Yoder notified.   - Will endorse to primary team in amSusie OLIVEROS. TIMOTEO TIWARI   # [phone]  [facility].

## 2017-11-06 NOTE — PROGRESS NOTE ADULT - SUBJECTIVE AND OBJECTIVE BOX
Subjective: Patient seen and examined. No new events except as noted.   no cp or sob     REVIEW OF SYSTEMS:    CONSTITUTIONAL: No weakness, fevers or chills  EYES/ENT: No visual changes;  No vertigo or throat pain   NECK: No pain or stiffness  RESPIRATORY: No cough, wheezing, hemoptysis; No shortness of breath  CARDIOVASCULAR: No chest pain or palpitations  GASTROINTESTINAL: No abdominal or epigastric pain. No nausea, vomiting, or hematemesis; No diarrhea or constipation. No melena or hematochezia.  GENITOURINARY: No dysuria, frequency or hematuria  NEUROLOGICAL: No numbness or weakness  SKIN: No itching, burning, rashes, or lesions   All other review of systems is negative unless indicated above.    MEDICATIONS:  MEDICATIONS  (STANDING):  amLODIPine   Tablet 10 milliGRAM(s) Oral daily  AQUAPHOR (petrolatum Ointment) 1 Application(s) Topical two times a day  argatroban Infusion 2 MICROgram(s)/kG/Min (15.192 mL/Hr) IV Continuous <Continuous>  ascorbic acid 500 milliGRAM(s) Oral daily  carvedilol 25 milliGRAM(s) Oral every 12 hours  ferrous    sulfate 325 milliGRAM(s) Oral daily  furosemide   Injectable 60 milliGRAM(s) IV Push daily  hydrALAZINE 25 milliGRAM(s) Oral every 8 hours  influenza   Vaccine 0.5 milliLiter(s) IntraMuscular once  labetalol 200 milliGRAM(s) Oral every 8 hours  Nephro-alyse 1 Tablet(s) Oral daily  pantoprazole    Tablet 40 milliGRAM(s) Oral before breakfast  predniSONE   Tablet 10 milliGRAM(s) Oral daily  sucralfate suspension 1 Gram(s) Oral three times a day      PHYSICAL EXAM:  T(C): 36.7 (11-06-17 @ 04:50), Max: 36.8 (11-06-17 @ 00:00)  HR: 82 (11-06-17 @ 04:50) (82 - 89)  BP: 167/84 (11-06-17 @ 04:50) (149/77 - 167/84)  RR: 18 (11-06-17 @ 04:50) (16 - 18)  SpO2: 96% (11-06-17 @ 04:50) (96% - 97%)  Wt(kg): --  I&O's Summary    05 Nov 2017 07:01  -  06 Nov 2017 07:00  --------------------------------------------------------  IN: 1709.6 mL / OUT: 3250 mL / NET: -1540.4 mL          Appearance: Normal	  HEENT:   Normal oral mucosa, PERRL, EOMI	  Lymphatic: No lymphadenopathy , no edema  Cardiovascular: Normal S1 S2, No JVD, No murmurs , Peripheral pulses palpable 2+ bilaterally  Respiratory: Lungs clear to auscultation, normal effort 	  Gastrointestinal:  Soft, Non-tender, + BS	  Skin: No rashes, No ecchymoses, No cyanosis, warm to touch  Musculoskeletal: Normal range of motion, normal strength  Psychiatry:  Mood & affect appropriate  Ext: + edema LLE and RLE , both improved   +fox       LABS:    CARDIAC MARKERS:                                10.0   5.8   )-----------( 147      ( 06 Nov 2017 06:30 )             30.8     11-06    145  |  104  |  66<H>  ----------------------------<  88  4.1   |  28  |  2.69<H>    Ca    8.8      06 Nov 2017 06:30  Phos  3.5     11-06  Mg     2.1     11-06      proBNP:   Lipid Profile:   HgA1c:   TSH:             TELEMETRY: 	 Vpaced   ECG:  	  RADIOLOGY:   DIAGNOSTIC TESTING:  [ ] Echocardiogram:  [ ]  Catheterization:  [ ] Stress Test:    OTHER:

## 2017-11-06 NOTE — PROGRESS NOTE ADULT - SUBJECTIVE AND OBJECTIVE BOX
MEDICINE, PROGRESS NOTE [phone]    COSTEN, SANDRA 57y [id number]    Patient seen and examined.  Patient is a 57y old  Female who presents with a chief complaint of shortness of breath (11 Oct 2017 22:01)  Pt has no current complaints. Leg is less swollen.    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)  Chronic Gout (ICD9 274.02)  LBBB (Left Bundle Branch Block) (ICD9 426.3)  CHF (Congestive Heart Failure) (ICD9 428.0)  Hx of aorta-iliac-femoral bypass  TOP (Termination of Pregnancy)  S/P Dilatation and Curettage  History of Tubal Ligation  s/p AAA (Abdominal Aortic Aneurysm) Repair: 2009 2010  History of Cholecystectomy  S/P Partial Hysterectomy    MEDICATIONS  (STANDING):  amLODIPine   Tablet 10 milliGRAM(s) Oral daily  AQUAPHOR (petrolatum Ointment) 1 Application(s) Topical two times a day  argatroban Infusion 2 MICROgram(s)/kG/Min (15.192 mL/Hr) IV Continuous <Continuous>  ascorbic acid 500 milliGRAM(s) Oral daily  carvedilol 25 milliGRAM(s) Oral every 12 hours  ferrous    sulfate 325 milliGRAM(s) Oral daily  furosemide   Injectable 60 milliGRAM(s) IV Push daily  hydrALAZINE 25 milliGRAM(s) Oral every 8 hours  influenza   Vaccine 0.5 milliLiter(s) IntraMuscular once  labetalol 200 milliGRAM(s) Oral every 8 hours  Nephro-alyse 1 Tablet(s) Oral daily  pantoprazole    Tablet 40 milliGRAM(s) Oral before breakfast  predniSONE   Tablet 10 milliGRAM(s) Oral daily  sucralfate suspension 1 Gram(s) Oral three times a day    MEDICATIONS  (PRN):    Allergies    heparin (Unknown)  losartan (Anaphylaxis)  nitroglycerin (Other)    Intolerances        PHYSICAL EXAM:  Constitutional: NAD  HEENT: Normocephalic, EOMI  Neck:  No JVD  Respiratory: CTA B/L, No wheezes  Cardiovascular: S1, S2, RRR, + systolic murmur  Gastrointestinal: BS+, soft, NT/ND  Extremities: + peripheral edema le b/l left greater than right.  Neurological: AAOX3, no focal deficits  Psychiatric: Normal mood, normal affect  : + Hook    Vital Signs Last 24 Hrs  T(C): 36.6 (06 Nov 2017 13:12), Max: 36.8 (06 Nov 2017 00:00)  T(F): 97.8 (06 Nov 2017 13:12), Max: 98.2 (06 Nov 2017 00:00)  HR: 76 (06 Nov 2017 13:12) (76 - 89)  BP: 166/86 (06 Nov 2017 13:12) (154/84 - 167/84)  BP(mean): --  RR: 18 (06 Nov 2017 13:12) (18 - 18)  SpO2: 98% (06 Nov 2017 13:12) (96% - 98%)  I&O's Summary    05 Nov 2017 07:01  -  06 Nov 2017 07:00  --------------------------------------------------------  IN: 1709.6 mL / OUT: 3250 mL / NET: -1540.4 mL    06 Nov 2017 07:01  -  06 Nov 2017 15:22  --------------------------------------------------------  IN: 360 mL / OUT: 1200 mL / NET: -840 mL        LABS:                        10.0   5.8   )-----------( 147      ( 06 Nov 2017 06:30 )             30.8     11-06    145  |  104  |  66<H>  ----------------------------<  88  4.1   |  28  |  2.69<H>    Ca    8.8      06 Nov 2017 06:30  Phos  3.5     11-06  Mg     2.1     11-06          Magnesium, Serum: 2.1 mg/dL (11-06 @ 06:30)

## 2017-11-07 LAB
ANION GAP SERPL CALC-SCNC: 13 MMOL/L — SIGNIFICANT CHANGE UP (ref 5–17)
APTT BLD: 73.5 SEC — HIGH (ref 27.5–37.4)
B2 GLYCOPROT1 AB SER QL: NEGATIVE — SIGNIFICANT CHANGE UP
BUN SERPL-MCNC: 66 MG/DL — HIGH (ref 7–23)
CALCIUM SERPL-MCNC: 8.7 MG/DL — SIGNIFICANT CHANGE UP (ref 8.4–10.5)
CARDIOLIPIN AB SER-ACNC: POSITIVE
CHLORIDE SERPL-SCNC: 103 MMOL/L — SIGNIFICANT CHANGE UP (ref 96–108)
CO2 SERPL-SCNC: 29 MMOL/L — SIGNIFICANT CHANGE UP (ref 22–31)
CREAT SERPL-MCNC: 2.83 MG/DL — HIGH (ref 0.5–1.3)
GLUCOSE SERPL-MCNC: 90 MG/DL — SIGNIFICANT CHANGE UP (ref 70–99)
HCT VFR BLD CALC: 33.9 % — LOW (ref 34.5–45)
HGB BLD-MCNC: 10.7 G/DL — LOW (ref 11.5–15.5)
INR BLD: 2.53 RATIO — HIGH (ref 0.88–1.16)
MCHC RBC-ENTMCNC: 29.1 PG — SIGNIFICANT CHANGE UP (ref 27–34)
MCHC RBC-ENTMCNC: 31.6 GM/DL — LOW (ref 32–36)
MCV RBC AUTO: 91.9 FL — SIGNIFICANT CHANGE UP (ref 80–100)
OB PNL STL: NEGATIVE — SIGNIFICANT CHANGE UP
PLATELET # BLD AUTO: 149 K/UL — LOW (ref 150–400)
POTASSIUM SERPL-MCNC: 4.2 MMOL/L — SIGNIFICANT CHANGE UP (ref 3.5–5.3)
POTASSIUM SERPL-SCNC: 4.2 MMOL/L — SIGNIFICANT CHANGE UP (ref 3.5–5.3)
PROTHROM AB SERPL-ACNC: 28.1 SEC — HIGH (ref 9.8–12.7)
RBC # BLD: 3.69 M/UL — LOW (ref 3.8–5.2)
RBC # FLD: 14.7 % — HIGH (ref 10.3–14.5)
SODIUM SERPL-SCNC: 145 MMOL/L — SIGNIFICANT CHANGE UP (ref 135–145)
WBC # BLD: 6.1 K/UL — SIGNIFICANT CHANGE UP (ref 3.8–10.5)
WBC # FLD AUTO: 6.1 K/UL — SIGNIFICANT CHANGE UP (ref 3.8–10.5)

## 2017-11-07 RX ORDER — WARFARIN SODIUM 2.5 MG/1
5 TABLET ORAL ONCE
Qty: 0 | Refills: 0 | Status: DISCONTINUED | OUTPATIENT
Start: 2017-11-07 | End: 2017-11-07

## 2017-11-07 RX ORDER — WARFARIN SODIUM 2.5 MG/1
7.5 TABLET ORAL ONCE
Qty: 0 | Refills: 0 | Status: COMPLETED | OUTPATIENT
Start: 2017-11-07 | End: 2017-11-07

## 2017-11-07 RX ORDER — WARFARIN SODIUM 2.5 MG/1
7 TABLET ORAL ONCE
Qty: 0 | Refills: 0 | Status: DISCONTINUED | OUTPATIENT
Start: 2017-11-07 | End: 2017-11-07

## 2017-11-07 RX ADMIN — Medication 60 MILLIGRAM(S): at 06:26

## 2017-11-07 RX ADMIN — Medication 1 TABLET(S): at 13:02

## 2017-11-07 RX ADMIN — Medication 25 MILLIGRAM(S): at 06:26

## 2017-11-07 RX ADMIN — Medication 25 MILLIGRAM(S): at 13:04

## 2017-11-07 RX ADMIN — Medication 1 GRAM(S): at 21:44

## 2017-11-07 RX ADMIN — Medication 1 APPLICATION(S): at 10:26

## 2017-11-07 RX ADMIN — Medication 1 APPLICATION(S): at 18:11

## 2017-11-07 RX ADMIN — CARVEDILOL PHOSPHATE 25 MILLIGRAM(S): 80 CAPSULE, EXTENDED RELEASE ORAL at 10:26

## 2017-11-07 RX ADMIN — AMLODIPINE BESYLATE 10 MILLIGRAM(S): 2.5 TABLET ORAL at 10:25

## 2017-11-07 RX ADMIN — WARFARIN SODIUM 7.5 MILLIGRAM(S): 2.5 TABLET ORAL at 21:44

## 2017-11-07 RX ADMIN — CARVEDILOL PHOSPHATE 25 MILLIGRAM(S): 80 CAPSULE, EXTENDED RELEASE ORAL at 21:44

## 2017-11-07 RX ADMIN — Medication 325 MILLIGRAM(S): at 10:29

## 2017-11-07 RX ADMIN — ARGATROBAN 15.19 MICROGRAM(S)/KG/MIN: 50 INJECTION, SOLUTION INTRAVENOUS at 10:34

## 2017-11-07 RX ADMIN — Medication 500 MILLIGRAM(S): at 10:28

## 2017-11-07 RX ADMIN — PANTOPRAZOLE SODIUM 40 MILLIGRAM(S): 20 TABLET, DELAYED RELEASE ORAL at 06:26

## 2017-11-07 RX ADMIN — Medication 200 MILLIGRAM(S): at 00:41

## 2017-11-07 RX ADMIN — Medication 10 MILLIGRAM(S): at 10:29

## 2017-11-07 RX ADMIN — Medication 25 MILLIGRAM(S): at 21:43

## 2017-11-07 RX ADMIN — Medication 200 MILLIGRAM(S): at 10:27

## 2017-11-07 RX ADMIN — Medication 200 MILLIGRAM(S): at 18:11

## 2017-11-07 NOTE — PROGRESS NOTE ADULT - SUBJECTIVE AND OBJECTIVE BOX
Patient is a 57y old  Female who presents with a chief complaint of shortness of breath (11 Oct 2017 22:01)    INTERVAL HPI/OVERNIGHT EVENTS:  no abdominal pain  appetite good  formed stools - brown in color denies any further traces of blood    MEDICATIONS  (STANDING):  amLODIPine   Tablet 10 milliGRAM(s) Oral daily  AQUAPHOR (petrolatum Ointment) 1 Application(s) Topical two times a day  argatroban Infusion 2 MICROgram(s)/kG/Min (15.192 mL/Hr) IV Continuous <Continuous>  ascorbic acid 500 milliGRAM(s) Oral daily  carvedilol 25 milliGRAM(s) Oral every 12 hours  ferrous    sulfate 325 milliGRAM(s) Oral daily  furosemide   Injectable 60 milliGRAM(s) IV Push daily  hydrALAZINE 25 milliGRAM(s) Oral every 8 hours  influenza   Vaccine 0.5 milliLiter(s) IntraMuscular once  labetalol 200 milliGRAM(s) Oral every 8 hours  Nephro-alyse 1 Tablet(s) Oral daily  pantoprazole    Tablet 40 milliGRAM(s) Oral before breakfast  predniSONE   Tablet 10 milliGRAM(s) Oral daily  sucralfate suspension 1 Gram(s) Oral three times a day    MEDICATIONS  (PRN):    Allergies  heparin (Unknown)  losartan (Anaphylaxis)  nitroglycerin (Other)    Review of Systems:  General:  No wt loss, fevers, chills, night sweats,fatigue,   CV:  No pain, palpitatioins, hypo/hypertension  Resp:  No dyspnea, cough, tachypnea, wheezing  :  No pain, bleeding, incontinence, nocturia  Muscle:  No pain, weakness  Neuro:  No weakness, tingling, memory problems  Heme:  No petechiae, ecchymosis, easy bruisability  Skin:  +ischial decubitus ulcer    T(F): 98.3 (11-07-17 @ 04:55), Max: 98.7 (11-07-17 @ 00:26)  HR: 82 (11-07-17 @ 04:55) (76 - 82)  BP: 156/85 (11-07-17 @ 04:55) (156/82 - 166/86)  RR: 18 (11-07-17 @ 04:55) (18 - 18)  SpO2: 95% (11-07-17 @ 04:55) (95% - 98%)  Wt(kg): --  ,   I&O's Summary    06 Nov 2017 07:01  -  07 Nov 2017 07:00  --------------------------------------------------------  IN: 1022.4 mL / OUT: 1600 mL / NET: -577.6 mL    07 Nov 2017 07:01  -  07 Nov 2017 09:37  --------------------------------------------------------  IN: 0 mL / OUT: 1050 mL / NET: -1050 mL    PHYSICAL EXAM:  Constitutional: NAD, well-developed, eating breakfast  Neck: No LAD, supple  Respiratory: CTA and P  Cardiovascular: S1 and S2, RRR, no Murmur  Gastrointestinal: BS+, soft, NT/ND, neg HSM  Extremities: No peripheral edema, neg clubbing, cyanosis  Vascular: 2+ peripheral pulses  Neurological: A/O x 3, no focal deficits  Psychiatric: Normal mood, normal affect  Skin: No rashes    LABS:                      10.0   5.8   )-----------( 147      ( 06 Nov 2017 06:30 )             30.8     11-06    145  |  104  |  66<H>  ----------------------------<  88  4.1   |  28  |  2.69<H>    Ca    8.8      06 Nov 2017 06:30  Phos  3.5     11-06  Mg     2.1     11-06      PT/INR - ( 06 Nov 2017 06:30 )   PT: 24.9 sec;   INR: 2.27 ratio         PTT - ( 06 Nov 2017 06:30 )  PTT:54.5 sec    LIVER FUNCTIONS - ( 02 Nov 2017 06:16 )  Alb: 3.5 g/dL / Pro: 5.9 g/dL / ALK PHOS: 56 U/L / ALT: 10 U/L RC / AST: 13 U/L / GGT: x             RADIOLOGY & ADDITIONAL TESTS:

## 2017-11-07 NOTE — PROGRESS NOTE ADULT - ASSESSMENT
Patient is a 56 yo F w/ hx of SLE, systolic CHF (EF 32%) s/p AICD, HTN, CKD III- IV, PE / left leg DVT/ PE in  2009 followed by  HIT, CAD (no stents), stage IV sacral ulcer, chronic fox, hx of recurrent pericardial effusion from SLE, AAA ~14cm in 12/2016 s/p repair in 2010 w/ aortoiliac stent c/b post-operatively with development of left lower extremity compartment syndrome was admitted with  progressive shortness of breath and managed  She had acute LLE EVT but did not take AC at dx of DVT because of h/o bleeding. I had told the patient earlier regarding her bleeding history that  in 2013,  she had von Willebrand's disease workup and factor assays,  which were normal.  She may have platelet dysfunction from renal insufficiency as one of the factor causing epistaxis and past.    She was not a candidate for IVC filter and did not want it anyway. She agreed on 10/21/17 but was  afraid of heparin because of h/o HIT and she did not want any drug which does not have antidote or long half life.   HIT ab is negative. Patient is on argatroban. Patient was explained regarding anticoagulation options. Initially she did not want coumadin but is getting 5 mg for last 5-7 days.   Her PT INR at the start of coumadin showed INR around 1.8 on argatroban. Thus we should have INR between 3.5-4 on coumadin before discontinuing argartoban.   I called NP and said that dose of coumadin can be increased to 7.5 mg for now to get INR in needed range. Watch for bleeding. Keep diet constant in low to moderate amount of greens to facilitate keeping INR in therapeutic range

## 2017-11-07 NOTE — PROGRESS NOTE ADULT - ASSESSMENT
57F with SLE noted to have self limited rectal bleeding two days ago and again last night could just be hemorrhoidal in origin.  Yesterday had brown stool on rectal exam.  H/H stable.    Rec-  -Would continue to observe for now.  -Monitor bowel movements, H/H.  -Can give AnnusolHC prn.  -No plans for colonoscopy at this time

## 2017-11-07 NOTE — PROGRESS NOTE ADULT - ASSESSMENT
LLE dvt  chronic LE edema    continue current care  transition to coumadin  monitor labs  monitor for bleeding.

## 2017-11-07 NOTE — PROGRESS NOTE ADULT - SUBJECTIVE AND OBJECTIVE BOX
HPI:    58 y/o female PMHx chronic systolic CHF (last known EF=32%) s/p AICD, SLE, AAA approx 14cm as of 12/2016 s/p aortoiliac stent, DVT/PE 2009, HIT, h/o LLE compartment syndrome,  stage IV sacral ulcer, chronic fox, multiple pericardial effusions s/p drainage most recently in Jan 2017, CKD.  She had acute DVT and she did not take AC at dx of DVT because of h/o bleeding   She is on argatroban as she did not want any other AC, HIT ab negative at this time  She did not want bridge to coumadin initially as she was scared of bleeding.   She had hematology opinion from Dr. Crawford as well and finally decided to take coumadin  So far no bleeding except recently from IH and no drop in hct.  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)  Chronic Gout (ICD9 274.02)  LBBB (Left Bundle Branch Block) (ICD9 426.3)  CHF (Congestive Heart Failure) (ICD9 428.0)  Hx of aorta-iliac-femoral bypass  TOP (Termination of Pregnancy)  S/P Dilatation and Curettage  History of Tubal Ligation  s/p AAA (Abdominal Aortic Aneurysm) Repair: 2009 2010  History of Cholecystectomy  S/P Partial Hysterectomy    Medications:  amLODIPine   Tablet 10 milliGRAM(s) Oral daily  AQUAPHOR (petrolatum Ointment) 1 Application(s) Topical two times a day  argatroban Infusion 2 MICROgram(s)/kG/Min IV Continuous <Continuous>  ascorbic acid 500 milliGRAM(s) Oral daily  carvedilol 25 milliGRAM(s) Oral every 12 hours  ferrous    sulfate 325 milliGRAM(s) Oral daily  furosemide   Injectable 60 milliGRAM(s) IV Push daily  hydrALAZINE 25 milliGRAM(s) Oral every 8 hours  influenza   Vaccine 0.5 milliLiter(s) IntraMuscular once  labetalol 200 milliGRAM(s) Oral every 8 hours  Nephro-alyse 1 Tablet(s) Oral daily  pantoprazole    Tablet 40 milliGRAM(s) Oral before breakfast  predniSONE   Tablet 10 milliGRAM(s) Oral daily  sucralfate suspension 1 Gram(s) Oral three times a day  warfarin 5 milliGRAM(s) Oral once    Labs:  CBC Full  -  ( 07 Nov 2017 05:55 )  WBC Count : 6.1 K/uL  Hemoglobin : 10.7 g/dL  Hematocrit : 33.9 %  Platelet Count - Automated : 149 K/uL  Mean Cell Volume : 91.9 fl  Mean Cell Hemoglobin : 29.1 pg  Mean Cell Hemoglobin Concentration : 31.6 gm/dL  Auto Neutrophil # : x  Auto Lymphocyte # : x  Auto Monocyte # : x  Auto Eosinophil # : x  Auto Basophil # : x  Auto Neutrophil % : x  Auto Lymphocyte % : x  Auto Monocyte % : x  Auto Eosinophil % : x  Auto Basophil % : x    11-07    145  |  103  |  66<H>  ----------------------------<  90  4.2   |  29  |  2.83<H>    Ca    8.7      07 Nov 2017 05:55  Phos  3.5     11-06  Mg     2.1     11-06        Radiology:             ROS:  Patient comfortable without distress  No SOB or chest pain  No palpitation  No abdominal pain, diarrhaea or constipation  No weakness of extremities  No skin changes or swelling of legs    Vital Signs Last 24 Hrs  T(C): 36.6 (07 Nov 2017 13:50), Max: 37.1 (07 Nov 2017 00:26)  T(F): 97.9 (07 Nov 2017 13:50), Max: 98.7 (07 Nov 2017 00:26)  HR: 78 (07 Nov 2017 13:50) (78 - 82)  BP: 150/80 (07 Nov 2017 13:50) (150/80 - 159/84)  BP(mean): --  RR: 16 (07 Nov 2017 13:50) (16 - 18)  SpO2: 97% (07 Nov 2017 13:50) (95% - 97%)    Physical exam:  Patient alert and oriented  No distress  CVS: S1, S2 regular or murmur  Chest: bilateral breath sound without rales  Abdomen: soft, not tender, no organomegaly or masses  No focal neuro deficit  Left extremity in dressing. Fox present      Assessment and Plan:

## 2017-11-07 NOTE — PROGRESS NOTE ADULT - SUBJECTIVE AND OBJECTIVE BOX
Subjective: Patient seen and examined. No new events except as noted.   feels ok       REVIEW OF SYSTEMS:    CONSTITUTIONAL: No weakness, fevers or chills  EYES/ENT: No visual changes;  No vertigo or throat pain   NECK: No pain or stiffness  RESPIRATORY: No cough, wheezing, hemoptysis; No shortness of breath  CARDIOVASCULAR: No chest pain or palpitations  GASTROINTESTINAL: No abdominal or epigastric pain. No nausea, vomiting, or hematemesis; No diarrhea or constipation. No melena or hematochezia.  GENITOURINARY: No dysuria, frequency or hematuria  NEUROLOGICAL: No numbness or weakness  SKIN: No itching, burning, rashes, or lesions   All other review of systems is negative unless indicated above.    MEDICATIONS:  MEDICATIONS  (STANDING):  amLODIPine   Tablet 10 milliGRAM(s) Oral daily  AQUAPHOR (petrolatum Ointment) 1 Application(s) Topical two times a day  argatroban Infusion 2 MICROgram(s)/kG/Min (15.192 mL/Hr) IV Continuous <Continuous>  ascorbic acid 500 milliGRAM(s) Oral daily  carvedilol 25 milliGRAM(s) Oral every 12 hours  ferrous    sulfate 325 milliGRAM(s) Oral daily  furosemide   Injectable 60 milliGRAM(s) IV Push daily  hydrALAZINE 25 milliGRAM(s) Oral every 8 hours  influenza   Vaccine 0.5 milliLiter(s) IntraMuscular once  labetalol 200 milliGRAM(s) Oral every 8 hours  Nephro-alyse 1 Tablet(s) Oral daily  pantoprazole    Tablet 40 milliGRAM(s) Oral before breakfast  predniSONE   Tablet 10 milliGRAM(s) Oral daily  sucralfate suspension 1 Gram(s) Oral three times a day      PHYSICAL EXAM:  T(C): 36.8 (11-07-17 @ 04:55), Max: 37.1 (11-07-17 @ 00:26)  HR: 82 (11-07-17 @ 04:55) (76 - 82)  BP: 156/85 (11-07-17 @ 04:55) (156/82 - 166/86)  RR: 18 (11-07-17 @ 04:55) (18 - 18)  SpO2: 95% (11-07-17 @ 04:55) (95% - 98%)  Wt(kg): --  I&O's Summary    06 Nov 2017 07:01  -  07 Nov 2017 07:00  --------------------------------------------------------  IN: 1022.4 mL / OUT: 1600 mL / NET: -577.6 mL    07 Nov 2017 07:01  -  07 Nov 2017 11:51  --------------------------------------------------------  IN: 360 mL / OUT: 1050 mL / NET: -690 mL          Appearance: Normal	  HEENT:   Normal oral mucosa, PERRL, EOMI	  Lymphatic: No lymphadenopathy , no edema  Cardiovascular: Normal S1 S2, No JVD, No murmurs , Peripheral pulses palpable 2+ bilaterally  Respiratory: Lungs clear to auscultation, normal effort 	  Gastrointestinal:  Soft, Non-tender, + BS	  Skin: No rashes, No ecchymoses, No cyanosis, warm to touch  Musculoskeletal: Normal range of motion, normal strength  Psychiatry:  Mood & affect appropriate  Ext: +chronic LLE  edema  +fox     LABS:    CARDIAC MARKERS:                                10.7   6.1   )-----------( 149      ( 07 Nov 2017 05:55 )             33.9     11-07    145  |  103  |  66<H>  ----------------------------<  90  4.2   |  29  |  2.83<H>    Ca    8.7      07 Nov 2017 05:55  Phos  3.5     11-06  Mg     2.1     11-06      proBNP:   Lipid Profile:   HgA1c:   TSH:             TELEMETRY: VPaced	    ECG:  	  RADIOLOGY:   DIAGNOSTIC TESTING:  [ ] Echocardiogram:  [ ]  Catheterization:  [ ] Stress Test:    OTHER:

## 2017-11-07 NOTE — PROGRESS NOTE ADULT - SUBJECTIVE AND OBJECTIVE BOX
MEDICINE, PROGRESS NOTE [phone]    COSTEN, SANDRA 57y [id number]    Patient seen and examined.  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)  Chronic Gout (ICD9 274.02)  LBBB (Left Bundle Branch Block) (ICD9 426.3)  CHF (Congestive Heart Failure) (ICD9 428.0)  Hx of aorta-iliac-femoral bypass  TOP (Termination of Pregnancy)  S/P Dilatation and Curettage  History of Tubal Ligation  s/p AAA (Abdominal Aortic Aneurysm) Repair: 2009 2010  History of Cholecystectomy  S/P Partial Hysterectomy    MEDICATIONS  (STANDING):  amLODIPine   Tablet 10 milliGRAM(s) Oral daily  AQUAPHOR (petrolatum Ointment) 1 Application(s) Topical two times a day  argatroban Infusion 2 MICROgram(s)/kG/Min (15.192 mL/Hr) IV Continuous <Continuous>  ascorbic acid 500 milliGRAM(s) Oral daily  carvedilol 25 milliGRAM(s) Oral every 12 hours  ferrous    sulfate 325 milliGRAM(s) Oral daily  furosemide   Injectable 60 milliGRAM(s) IV Push daily  hydrALAZINE 25 milliGRAM(s) Oral every 8 hours  influenza   Vaccine 0.5 milliLiter(s) IntraMuscular once  labetalol 200 milliGRAM(s) Oral every 8 hours  Nephro-alyse 1 Tablet(s) Oral daily  pantoprazole    Tablet 40 milliGRAM(s) Oral before breakfast  predniSONE   Tablet 10 milliGRAM(s) Oral daily  sucralfate suspension 1 Gram(s) Oral three times a day  warfarin 7.5 milliGRAM(s) Oral once    MEDICATIONS  (PRN):    Allergies    heparin (Unknown)  losartan (Anaphylaxis)  nitroglycerin (Other)    Intolerances        PHYSICAL EXAM:  Constitutional: NAD  HEENT: Normocephalic, EOMI  Neck:  No JVD  Respiratory: CTA B/L, No wheezes  Cardiovascular: S1, S2, RRR, + systolic murmur  Gastrointestinal: BS+, soft, NT/ND  Extremities: No peripheral edema  Neurological: AAOX3, no focal deficits  Psychiatric: Normal mood, normal affect  : No Hook    Vital Signs Last 24 Hrs  T(C): 36.6 (07 Nov 2017 13:50), Max: 37.1 (07 Nov 2017 00:26)  T(F): 97.9 (07 Nov 2017 13:50), Max: 98.7 (07 Nov 2017 00:26)  HR: 78 (07 Nov 2017 13:50) (78 - 82)  BP: 150/80 (07 Nov 2017 13:50) (150/80 - 159/84)  BP(mean): --  RR: 16 (07 Nov 2017 13:50) (16 - 18)  SpO2: 97% (07 Nov 2017 13:50) (95% - 97%)  I&O's Summary    06 Nov 2017 07:01  -  07 Nov 2017 07:00  --------------------------------------------------------  IN: 1022.4 mL / OUT: 1600 mL / NET: -577.6 mL    07 Nov 2017 07:01  -  07 Nov 2017 20:38  --------------------------------------------------------  IN: 960 mL / OUT: 2250 mL / NET: -1290 mL        LABS:                        10.7   6.1   )-----------( 149      ( 07 Nov 2017 05:55 )             33.9     11-07    145  |  103  |  66<H>  ----------------------------<  90  4.2   |  29  |  2.83<H>    Ca    8.7      07 Nov 2017 05:55  Phos  3.5     11-06  Mg     2.1     11-06

## 2017-11-08 LAB
ANION GAP SERPL CALC-SCNC: 12 MMOL/L — SIGNIFICANT CHANGE UP (ref 5–17)
APTT BLD: 78 SEC — HIGH (ref 27.5–37.4)
BUN SERPL-MCNC: 66 MG/DL — HIGH (ref 7–23)
CALCIUM SERPL-MCNC: 8.8 MG/DL — SIGNIFICANT CHANGE UP (ref 8.4–10.5)
CHLORIDE SERPL-SCNC: 101 MMOL/L — SIGNIFICANT CHANGE UP (ref 96–108)
CO2 SERPL-SCNC: 28 MMOL/L — SIGNIFICANT CHANGE UP (ref 22–31)
CREAT SERPL-MCNC: 2.89 MG/DL — HIGH (ref 0.5–1.3)
GLUCOSE SERPL-MCNC: 87 MG/DL — SIGNIFICANT CHANGE UP (ref 70–99)
HCT VFR BLD CALC: 32 % — LOW (ref 34.5–45)
HGB BLD-MCNC: 10.4 G/DL — LOW (ref 11.5–15.5)
INR BLD: 3.19 RATIO — HIGH (ref 0.88–1.16)
MAGNESIUM SERPL-MCNC: 2.2 MG/DL — SIGNIFICANT CHANGE UP (ref 1.6–2.6)
MCHC RBC-ENTMCNC: 29.9 PG — SIGNIFICANT CHANGE UP (ref 27–34)
MCHC RBC-ENTMCNC: 32.6 GM/DL — SIGNIFICANT CHANGE UP (ref 32–36)
MCV RBC AUTO: 91.8 FL — SIGNIFICANT CHANGE UP (ref 80–100)
PHOSPHATE SERPL-MCNC: 3.8 MG/DL — SIGNIFICANT CHANGE UP (ref 2.5–4.5)
PLATELET # BLD AUTO: 141 K/UL — LOW (ref 150–400)
POTASSIUM SERPL-MCNC: 4.1 MMOL/L — SIGNIFICANT CHANGE UP (ref 3.5–5.3)
POTASSIUM SERPL-SCNC: 4.1 MMOL/L — SIGNIFICANT CHANGE UP (ref 3.5–5.3)
PROTHROM AB SERPL-ACNC: 35.3 SEC — HIGH (ref 9.8–12.7)
RBC # BLD: 3.49 M/UL — LOW (ref 3.8–5.2)
RBC # FLD: 14.9 % — HIGH (ref 10.3–14.5)
SODIUM SERPL-SCNC: 141 MMOL/L — SIGNIFICANT CHANGE UP (ref 135–145)
WBC # BLD: 5.7 K/UL — SIGNIFICANT CHANGE UP (ref 3.8–10.5)
WBC # FLD AUTO: 5.7 K/UL — SIGNIFICANT CHANGE UP (ref 3.8–10.5)

## 2017-11-08 PROCEDURE — 71010: CPT | Mod: 26

## 2017-11-08 RX ORDER — FUROSEMIDE 40 MG
60 TABLET ORAL DAILY
Qty: 0 | Refills: 0 | Status: DISCONTINUED | OUTPATIENT
Start: 2017-11-09 | End: 2017-11-10

## 2017-11-08 RX ORDER — WARFARIN SODIUM 2.5 MG/1
10 TABLET ORAL ONCE
Qty: 0 | Refills: 0 | Status: COMPLETED | OUTPATIENT
Start: 2017-11-08 | End: 2017-11-08

## 2017-11-08 RX ADMIN — Medication 25 MILLIGRAM(S): at 13:22

## 2017-11-08 RX ADMIN — Medication 200 MILLIGRAM(S): at 17:15

## 2017-11-08 RX ADMIN — Medication 25 MILLIGRAM(S): at 05:25

## 2017-11-08 RX ADMIN — Medication 1 TABLET(S): at 09:02

## 2017-11-08 RX ADMIN — PANTOPRAZOLE SODIUM 40 MILLIGRAM(S): 20 TABLET, DELAYED RELEASE ORAL at 05:25

## 2017-11-08 RX ADMIN — WARFARIN SODIUM 10 MILLIGRAM(S): 2.5 TABLET ORAL at 21:51

## 2017-11-08 RX ADMIN — Medication 60 MILLIGRAM(S): at 05:25

## 2017-11-08 RX ADMIN — Medication 200 MILLIGRAM(S): at 00:55

## 2017-11-08 RX ADMIN — AMLODIPINE BESYLATE 10 MILLIGRAM(S): 2.5 TABLET ORAL at 09:07

## 2017-11-08 RX ADMIN — Medication 500 MILLIGRAM(S): at 09:02

## 2017-11-08 RX ADMIN — Medication 25 MILLIGRAM(S): at 21:51

## 2017-11-08 RX ADMIN — CARVEDILOL PHOSPHATE 25 MILLIGRAM(S): 80 CAPSULE, EXTENDED RELEASE ORAL at 10:13

## 2017-11-08 RX ADMIN — Medication 325 MILLIGRAM(S): at 09:02

## 2017-11-08 RX ADMIN — CARVEDILOL PHOSPHATE 25 MILLIGRAM(S): 80 CAPSULE, EXTENDED RELEASE ORAL at 20:07

## 2017-11-08 RX ADMIN — Medication 1 APPLICATION(S): at 17:15

## 2017-11-08 RX ADMIN — Medication 10 MILLIGRAM(S): at 09:02

## 2017-11-08 RX ADMIN — Medication 1 APPLICATION(S): at 05:25

## 2017-11-08 RX ADMIN — Medication 200 MILLIGRAM(S): at 09:02
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
## 2017-11-08 NOTE — PROGRESS NOTE ADULT - ASSESSMENT
57F with SLE noted to have self limited rectal bleeding two days ago and again last night could just be hemorrhoidal in origin.  Yesterday had brown stool on rectal exam.  H/H stable. [place] NEGATIVE    Rec-  -Would continue to observe for now.  -Monitor bowel movements, H/H.  -AC per primary team  -Can give AnnusolHC prn.  -No plans for colonoscopy at this time    Francesco Lake PA-C    [facility]  [phone]

## 2017-11-08 NOTE — PROGRESS NOTE ADULT - SUBJECTIVE AND OBJECTIVE BOX
Subjective: Patient seen and examined. No new events except as noted.     REVIEW OF SYSTEMS:    CONSTITUTIONAL: + weakness, no fevers or chills  EYES/ENT: No visual changes;  No vertigo or throat pain   NECK: No pain or stiffness  RESPIRATORY: No cough, wheezing, hemoptysis; No shortness of breath  CARDIOVASCULAR: No chest pain or palpitations  GASTROINTESTINAL: No abdominal or epigastric pain. No nausea, vomiting, or hematemesis; No diarrhea or constipation. No melena or hematochezia.  GENITOURINARY: No dysuria, frequency or hematuria  NEUROLOGICAL: No numbness or weakness  SKIN: No itching, burning, rashes, or lesions   All other review of systems is negative unless indicated above.    MEDICATIONS:  MEDICATIONS  (STANDING):  amLODIPine   Tablet 10 milliGRAM(s) Oral daily  AQUAPHOR (petrolatum Ointment) 1 Application(s) Topical two times a day  argatroban Infusion 2 MICROgram(s)/kG/Min (15.192 mL/Hr) IV Continuous <Continuous>  ascorbic acid 500 milliGRAM(s) Oral daily  carvedilol 25 milliGRAM(s) Oral every 12 hours  ferrous    sulfate 325 milliGRAM(s) Oral daily  furosemide   Injectable 60 milliGRAM(s) IV Push daily  hydrALAZINE 25 milliGRAM(s) Oral every 8 hours  influenza   Vaccine 0.5 milliLiter(s) IntraMuscular once  labetalol 200 milliGRAM(s) Oral every 8 hours  Nephro-alyse 1 Tablet(s) Oral daily  pantoprazole    Tablet 40 milliGRAM(s) Oral before breakfast  predniSONE   Tablet 10 milliGRAM(s) Oral daily  sucralfate suspension 1 Gram(s) Oral three times a day      PHYSICAL EXAM:  T(C): 36.7 (11-08-17 @ 03:52), Max: 37.1 (11-07-17 @ 21:29)  HR: 88 (11-08-17 @ 09:01) (67 - 88)  BP: 167/92 (11-08-17 @ 09:01) (144/75 - 167/92)  RR: 18 (11-08-17 @ 03:52) (16 - 18)  SpO2: 96% (11-08-17 @ 03:52) (96% - 97%)  Wt(kg): --  I&O's Summary    07 Nov 2017 07:01 - 08 Nov 2017 07:00  --------------------------------------------------------  IN: 1142.4 mL / OUT: 4000 mL / NET: -2857.6 mL    08 Nov 2017 07:01  -  08 Nov 2017 10:33  --------------------------------------------------------  IN: 60.8 mL / OUT: 0 mL / NET: 60.8 mL          Appearance: Normal	  HEENT:   Normal oral mucosa, PERRL, EOMI	  Lymphatic: No lymphadenopathy , no edema  Cardiovascular: Normal S1 S2, No JVD, No murmurs , Peripheral pulses palpable 2+ bilaterally  Respiratory: Lungs clear to auscultation, normal effort 	  Gastrointestinal:  Soft, Non-tender, + BS	  Skin: No rashes, No ecchymoses, No cyanosis, warm to touch  Musculoskeletal: Normal range of motion, normal strength  Psychiatry:  Mood & affect appropriate  Ext: +chronic LLE edema, wrapped   +fox     LABS:    CARDIAC MARKERS:                                10.4   5.7   )-----------( 141      ( 08 Nov 2017 06:40 )             32.0     11-08    141  |  101  |  66<H>  ----------------------------<  87  4.1   |  28  |  2.89<H>    Ca    8.8      08 Nov 2017 06:41  Phos  3.8     11-08  Mg     2.2     11-08      proBNP:   Lipid Profile:   HgA1c:   TSH:             TELEMETRY: Vpaced 	    ECG:  	  RADIOLOGY:   DIAGNOSTIC TESTING:  [ ] Echocardiogram:  [ ]  Catheterization:  [ ] Stress Test:    OTHER:

## 2017-11-08 NOTE — PROGRESS NOTE ADULT - SUBJECTIVE AND OBJECTIVE BOX
Patient is a 57y old  Female who presents with a chief complaint of shortness of breath (11 Oct 2017 22:01)      INTERVAL HPI/OVERNIGHT EVENTS:  1 BM overnight - formed, brown  no GI events per report    MEDICATIONS  (STANDING):  amLODIPine   Tablet 10 milliGRAM(s) Oral daily  AQUAPHOR (petrolatum Ointment) 1 Application(s) Topical two times a day  argatroban Infusion 2 MICROgram(s)/kG/Min (15.192 mL/Hr) IV Continuous <Continuous>  ascorbic acid 500 milliGRAM(s) Oral daily  carvedilol 25 milliGRAM(s) Oral every 12 hours  ferrous    sulfate 325 milliGRAM(s) Oral daily  furosemide   Injectable 60 milliGRAM(s) IV Push daily  hydrALAZINE 25 milliGRAM(s) Oral every 8 hours  influenza   Vaccine 0.5 milliLiter(s) IntraMuscular once  labetalol 200 milliGRAM(s) Oral every 8 hours  Nephro-alyse 1 Tablet(s) Oral daily  pantoprazole    Tablet 40 milliGRAM(s) Oral before breakfast  predniSONE   Tablet 10 milliGRAM(s) Oral daily  sucralfate suspension 1 Gram(s) Oral three times a day    MEDICATIONS  (PRN):      Allergies    heparin (Unknown)  losartan (Anaphylaxis)  nitroglycerin (Other)    Intolerances        Review of Systems:  General:  No wt loss, fevers, chills, night sweats  CV:  No pain, palpitations, hypo/hypertension  Resp:  No dyspnea, cough, tachypnea, wheezing  :  No pain, bleeding, incontinence, nocturia  Muscle:  No pain, weakness  Neuro:  No weakness, tingling, memory problems  Heme:  No petechiae, ecchymosis, easy bruisability  Skin:  +ischial decubitus ulcer    Vital Signs Last 24 Hrs  T(C): 36.7 (08 Nov 2017 03:52), Max: 37.1 (07 Nov 2017 21:29)  T(F): 98 (08 Nov 2017 03:52), Max: 98.8 (07 Nov 2017 21:29)  HR: 88 (08 Nov 2017 09:01) (67 - 88)  BP: 167/92 (08 Nov 2017 09:01) (144/75 - 167/92)  BP(mean): --  RR: 18 (08 Nov 2017 03:52) (16 - 18)  SpO2: 96% (08 Nov 2017 03:52) (96% - 97%)    PHYSICAL EXAM:  Constitutional: NAD, well-developed, eating breakfast  Neck: No LAD, supple  Respiratory: CTA and P  Cardiovascular: S1 and S2, RRR, no Murmur  Gastrointestinal: BS+, soft, NT/ND, neg HSM  Extremities: LLE in Z flow boot  Vascular: 2+ peripheral pulses  Neurological: A/O x 3, no focal deficits  Psychiatric: Normal mood, normal affect  Skin: No rashes    LABS:                        10.4   5.7   )-----------( 141      ( 08 Nov 2017 06:40 )             32.0     11-08    141  |  101  |  66<H>  ----------------------------<  87  4.1   |  28  |  2.89<H>    Ca    8.8      08 Nov 2017 06:41  Phos  3.8     11-08  Mg     2.2     11-08    Occult Blood, Feces (11.07.17 @ 13:30)    Occult Blood, Feces: Negative      PT/INR - ( 08 Nov 2017 06:41 )   PT: 35.3 sec;   INR: 3.19 ratio         PTT - ( 08 Nov 2017 06:41 )  PTT:78.0 sec        RADIOLOGY & ADDITIONAL TESTS:

## 2017-11-08 NOTE — PROGRESS NOTE ADULT - SUBJECTIVE AND OBJECTIVE BOX
Follow-up Pulm Progress Note    No new respiratory events overnight.  Denies SOB/CP. 02 sat 96% on room air with rest, c/o rios    Medications:  MEDICATIONS  (STANDING):  amLODIPine   Tablet 10 milliGRAM(s) Oral daily  AQUAPHOR (petrolatum Ointment) 1 Application(s) Topical two times a day  argatroban Infusion 2 MICROgram(s)/kG/Min (15.192 mL/Hr) IV Continuous <Continuous>  ascorbic acid 500 milliGRAM(s) Oral daily  carvedilol 25 milliGRAM(s) Oral every 12 hours  ferrous    sulfate 325 milliGRAM(s) Oral daily  furosemide   Injectable 60 milliGRAM(s) IV Push daily  hydrALAZINE 25 milliGRAM(s) Oral every 8 hours  influenza   Vaccine 0.5 milliLiter(s) IntraMuscular once  labetalol 200 milliGRAM(s) Oral every 8 hours  Nephro-alyse 1 Tablet(s) Oral daily  pantoprazole    Tablet 40 milliGRAM(s) Oral before breakfast  predniSONE   Tablet 10 milliGRAM(s) Oral daily  sucralfate suspension 1 Gram(s) Oral three times a day    MEDICATIONS  (PRN):          Vital Signs Last 24 Hrs  T(C): 36.7 (08 Nov 2017 03:52), Max: 37.1 (07 Nov 2017 21:29)  T(F): 98 (08 Nov 2017 03:52), Max: 98.8 (07 Nov 2017 21:29)  HR: 88 (08 Nov 2017 09:01) (67 - 88)  BP: 167/92 (08 Nov 2017 09:01) (144/75 - 167/92)  BP(mean): --  RR: 18 (08 Nov 2017 03:52) (16 - 18)  SpO2: 96% (08 Nov 2017 03:52) (96% - 97%)          11-07 @ 07:01  -  11-08 @ 07:00  --------------------------------------------------------  IN: 1142.4 mL / OUT: 4000 mL / NET: -2857.6 mL          LABS:                        10.4   5.7   )-----------( 141      ( 08 Nov 2017 06:40 )             32.0     11-08    141  |  101  |  66<H>  ----------------------------<  87  4.1   |  28  |  2.89<H>    Ca    8.8      08 Nov 2017 06:41  Phos  3.8     11-08  Mg     2.2     11-08            CAPILLARY BLOOD GLUCOSE        PT/INR - ( 08 Nov 2017 06:41 )   PT: 35.3 sec;   INR: 3.19 ratio         PTT - ( 08 Nov 2017 06:41 )  PTT:78.0 sec                    CULTURES: (if applicable)          Physical Examination:  PULM: decreased bs  bilaterally, no significant sputum production  CVS: S1, S2 heard    RADIOLOGY REVIEWED  : < from: CT Chest No Cont (10.18.17 @ 23:51) >  CHEST:     LUNGS AND LARGE AIRWAYS: Right upper lobe peripheral groundglass patchy   opacity, increased since October 11, 2017. Findings likely represent   infection. Partial compressive atelectasis of the left lower lobe.   Minimal compressive atelectasis of the right lower lobe. Patent central   airways.  No pulmonary nodules.  PLEURA: Small left and trace right pleural effusions.  VESSELS: Atherosclerotic disease of aorta.  HEART: Cardiomegaly. Small to moderate pericardial effusion, similar in   size to 10/11/2017.  MEDIASTINUM AND ANDRZEJ: No lymphadenopathy.  CHEST WALL AND LOWER NECK: Left upper chest wall cardiac device. Markedly   enlarged thyroid.   VISUALIZED UPPER ABDOMEN: Hyperdense liver.  BONES: Mild multilevel spinal degenerative changes.    IMPRESSION:        Small to moderate pericardial effusion, similar in size to 10/11/2017.    Right upper lobe groundglass opacity increased in density since October 11, 2017, likely representing infection.    < end of copied text >      TTE:

## 2017-11-08 NOTE — PROGRESS NOTE ADULT - ASSESSMENT
56 yo F w/ hx of SLE, systolic CHF (EF 32%) s/p AICD, HTN, CKD III- IV, PE / left leg DVT dx 2009, HIT, CAD (no stents), stage IV sacral ulcer, chronic fox, hx of recurrent pericardial effusion, AAA ~14cm in 12/2016 s/p repair in 2010 w/ aortoiliac stent c/b post-operatively with development of left lower extremity   lymphedema December 2016;  pericardial effusion pericardial drain/SLE. now with ilana on ckd with baseline creatinine approx 1.8-2.     1- ILANA on ckd III  2- chf hx  3- cardiomyopathy  4- HTN   5- SLE  6- proteinuria    cr worsening slowly fluid status imrproved significantly   change lasix to 60 mg po qd  prednisone 10mg daily   uloric 40mg   cont with labetalol for bp control and hydralazine  LE edema improving  check pth and phos and vit d25 and vit d1,25 as well as uric acid level

## 2017-11-08 NOTE — PROGRESS NOTE ADULT - PROBLEM SELECTOR PLAN 1
Multifactorial-restrictive disease 2nd obesity and cardiomegaly, pericardial effusion, debility and volume overloaded, with systolic HF (EF 25%)   -Lasix 60mg qd  -Dyspnea overall improved   -Pericardial effusion is stable, no plan for pericardiocentesis at this time per cards  -Peripheral GGO in RUL new from Oct 11 2017 CT  -Recommend repeat CT chest in 6 weeks to further eval  repeat cxr today

## 2017-11-09 DIAGNOSIS — R04.0 EPISTAXIS: ICD-10-CM

## 2017-11-09 LAB
24R-OH-CALCIDIOL SERPL-MCNC: 7.8 NG/ML — LOW (ref 30–80)
ALBUMIN SERPL ELPH-MCNC: 3.9 G/DL — SIGNIFICANT CHANGE UP (ref 3.3–5)
ALP SERPL-CCNC: 64 U/L — SIGNIFICANT CHANGE UP (ref 40–120)
ALT FLD-CCNC: 13 U/L RC — SIGNIFICANT CHANGE UP (ref 10–45)
ANION GAP SERPL CALC-SCNC: 13 MMOL/L — SIGNIFICANT CHANGE UP (ref 5–17)
APTT BLD: 80.1 SEC — HIGH (ref 27.5–37.4)
AST SERPL-CCNC: 16 U/L — SIGNIFICANT CHANGE UP (ref 10–40)
BILIRUB SERPL-MCNC: 0.3 MG/DL — SIGNIFICANT CHANGE UP (ref 0.2–1.2)
BUN SERPL-MCNC: 68 MG/DL — HIGH (ref 7–23)
CALCIUM SERPL-MCNC: 8.9 MG/DL — SIGNIFICANT CHANGE UP (ref 8.4–10.5)
CALCIUM SERPL-MCNC: 9 MG/DL — SIGNIFICANT CHANGE UP (ref 8.4–10.5)
CHLORIDE SERPL-SCNC: 103 MMOL/L — SIGNIFICANT CHANGE UP (ref 96–108)
CO2 SERPL-SCNC: 29 MMOL/L — SIGNIFICANT CHANGE UP (ref 22–31)
CREAT SERPL-MCNC: 2.93 MG/DL — HIGH (ref 0.5–1.3)
GLUCOSE SERPL-MCNC: 74 MG/DL — SIGNIFICANT CHANGE UP (ref 70–99)
HCT VFR BLD CALC: 32.7 % — LOW (ref 34.5–45)
HGB BLD-MCNC: 10.5 G/DL — LOW (ref 11.5–15.5)
INR BLD: 3.4 RATIO — HIGH (ref 0.88–1.16)
INR BLD: 3.42 RATIO — HIGH (ref 0.88–1.16)
INR BLD: 4.01 RATIO — HIGH (ref 0.88–1.16)
MAGNESIUM SERPL-MCNC: 2.1 MG/DL — SIGNIFICANT CHANGE UP (ref 1.6–2.6)
MCHC RBC-ENTMCNC: 29.5 PG — SIGNIFICANT CHANGE UP (ref 27–34)
MCHC RBC-ENTMCNC: 32 GM/DL — SIGNIFICANT CHANGE UP (ref 32–36)
MCV RBC AUTO: 92.2 FL — SIGNIFICANT CHANGE UP (ref 80–100)
PHOSPHATE SERPL-MCNC: 3.6 MG/DL — SIGNIFICANT CHANGE UP (ref 2.5–4.5)
PLATELET # BLD AUTO: 142 K/UL — LOW (ref 150–400)
POTASSIUM SERPL-MCNC: 4 MMOL/L — SIGNIFICANT CHANGE UP (ref 3.5–5.3)
POTASSIUM SERPL-SCNC: 4 MMOL/L — SIGNIFICANT CHANGE UP (ref 3.5–5.3)
PROT SERPL-MCNC: 6.2 G/DL — SIGNIFICANT CHANGE UP (ref 6–8.3)
PROTHROM AB SERPL-ACNC: 37.7 SEC — HIGH (ref 9.8–12.7)
PROTHROM AB SERPL-ACNC: 37.9 SEC — HIGH (ref 9.8–12.7)
PROTHROM AB SERPL-ACNC: 45 SEC — HIGH (ref 9.8–12.7)
PTH-INTACT FLD-MCNC: 387 PG/ML — HIGH (ref 15–65)
RBC # BLD: 3.54 M/UL — LOW (ref 3.8–5.2)
RBC # FLD: 14.9 % — HIGH (ref 10.3–14.5)
SODIUM SERPL-SCNC: 145 MMOL/L — SIGNIFICANT CHANGE UP (ref 135–145)
URATE SERPL-MCNC: 11.9 MG/DL — HIGH (ref 2.5–7)
VIT D25+D1,25 OH+D1,25 PNL SERPL-MCNC: 22.9 PG/ML — SIGNIFICANT CHANGE UP (ref 19.9–79.3)
WBC # BLD: 5.6 K/UL — SIGNIFICANT CHANGE UP (ref 3.8–10.5)
WBC # FLD AUTO: 5.6 K/UL — SIGNIFICANT CHANGE UP (ref 3.8–10.5)

## 2017-11-09 PROCEDURE — 99222 1ST HOSP IP/OBS MODERATE 55: CPT

## 2017-11-09 PROCEDURE — 99232 SBSQ HOSP IP/OBS MODERATE 35: CPT

## 2017-11-09 RX ORDER — BACITRACIN ZINC 500 UNIT/G
1 OINTMENT IN PACKET (EA) TOPICAL
Qty: 0 | Refills: 0 | Status: DISCONTINUED | OUTPATIENT
Start: 2017-11-09 | End: 2017-11-18

## 2017-11-09 RX ORDER — SODIUM CHLORIDE 0.65 %
1 AEROSOL, SPRAY (ML) NASAL THREE TIMES A DAY
Qty: 0 | Refills: 0 | Status: DISCONTINUED | OUTPATIENT
Start: 2017-11-09 | End: 2017-11-18

## 2017-11-09 RX ADMIN — Medication 1 APPLICATION(S): at 05:59

## 2017-11-09 RX ADMIN — Medication 25 MILLIGRAM(S): at 05:59

## 2017-11-09 RX ADMIN — Medication 1 APPLICATION(S): at 17:35

## 2017-11-09 RX ADMIN — Medication 1 TABLET(S): at 13:04

## 2017-11-09 RX ADMIN — PANTOPRAZOLE SODIUM 40 MILLIGRAM(S): 20 TABLET, DELAYED RELEASE ORAL at 05:59

## 2017-11-09 RX ADMIN — Medication 60 MILLIGRAM(S): at 05:58

## 2017-11-09 RX ADMIN — Medication 200 MILLIGRAM(S): at 13:05

## 2017-11-09 RX ADMIN — Medication 25 MILLIGRAM(S): at 21:27

## 2017-11-09 RX ADMIN — Medication 325 MILLIGRAM(S): at 09:59

## 2017-11-09 RX ADMIN — AMLODIPINE BESYLATE 10 MILLIGRAM(S): 2.5 TABLET ORAL at 09:58

## 2017-11-09 RX ADMIN — CARVEDILOL PHOSPHATE 25 MILLIGRAM(S): 80 CAPSULE, EXTENDED RELEASE ORAL at 09:58

## 2017-11-09 RX ADMIN — Medication 25 MILLIGRAM(S): at 13:04

## 2017-11-09 RX ADMIN — Medication 10 MILLIGRAM(S): at 09:58

## 2017-11-09 RX ADMIN — Medication 200 MILLIGRAM(S): at 00:31

## 2017-11-09 RX ADMIN — Medication 200 MILLIGRAM(S): at 20:08

## 2017-11-09 RX ADMIN — CARVEDILOL PHOSPHATE 25 MILLIGRAM(S): 80 CAPSULE, EXTENDED RELEASE ORAL at 20:08

## 2017-11-09 RX ADMIN — ARGATROBAN 15.19 MICROGRAM(S)/KG/MIN: 50 INJECTION, SOLUTION INTRAVENOUS at 13:16

## 2017-11-09 RX ADMIN — Medication 500 MILLIGRAM(S): at 09:59

## 2017-11-09 NOTE — PROGRESS NOTE ADULT - ASSESSMENT
A/P:  Rt Ischial Healing chronic stage iv pressure ucler- Aquacel packing, may add prizma if brought from home  Wound is stalled possibly 2/2 medical condition and sedentary behavior.  Hopefully as pt becomes stronger & more mobile and is discharged wound will improved.  Pt anticoagulated therefore hesitate to debride macerated callous  Lt buttock possible resolving hematoma=   LLE edema- Con't elevation/ ACEwrapping per Vasc  con't Nutrition (as tolerated)  con't Offloading   con't Pericare  Care as per medicine will follow w/ you  Upon discharge [facility] [address] [place] [address] [phone]  D/w team  & attng  ЮЛИЯ GarciaC CWS [id number]  I spent 15  minutes w/ this pt of which more than 50% of the time was spent counseling & coordinating care of this pt.

## 2017-11-09 NOTE — PROVIDER CONTACT NOTE (OTHER) - ACTION/TREATMENT ORDERED:
NP notified. No new orders at this time for risk of possible bleeding from Hook reinsertion. Will continue to monitor pt.

## 2017-11-09 NOTE — PROGRESS NOTE ADULT - SUBJECTIVE AND OBJECTIVE BOX
Follow-up Pulm Progress Note    No new respiratory events overnight.  Denies SOB/CP. o2 sat 96% on room air w/ exertion, 97% on ra at rest    Medications:  MEDICATIONS  (STANDING):  amLODIPine   Tablet 10 milliGRAM(s) Oral daily  AQUAPHOR (petrolatum Ointment) 1 Application(s) Topical two times a day  ascorbic acid 500 milliGRAM(s) Oral daily  carvedilol 25 milliGRAM(s) Oral every 12 hours  ferrous    sulfate 325 milliGRAM(s) Oral daily  furosemide    Tablet 60 milliGRAM(s) Oral daily  hydrALAZINE 25 milliGRAM(s) Oral every 8 hours  influenza   Vaccine 0.5 milliLiter(s) IntraMuscular once  labetalol 200 milliGRAM(s) Oral every 8 hours  Nephro-alyse 1 Tablet(s) Oral daily  pantoprazole    Tablet 40 milliGRAM(s) Oral before breakfast  predniSONE   Tablet 10 milliGRAM(s) Oral daily  sucralfate suspension 1 Gram(s) Oral three times a day    MEDICATIONS  (PRN):          Vital Signs Last 24 Hrs  T(C): 37.2 (09 Nov 2017 13:10), Max: 37.2 (09 Nov 2017 13:10)  T(F): 98.9 (09 Nov 2017 13:10), Max: 98.9 (09 Nov 2017 13:10)  HR: 79 (09 Nov 2017 13:10) (78 - 91)  BP: 149/80 (09 Nov 2017 13:10) (149/80 - 160/78)  BP(mean): --  RR: 16 (09 Nov 2017 13:10) (16 - 18)  SpO2: 97% (09 Nov 2017 13:10) (97% - 98%)          11-08 @ 07:01  -  11-09 @ 07:00  --------------------------------------------------------  IN: 1444.7 mL / OUT: 2500 mL / NET: -1055.3 mL          LABS:                        10.5   5.6   )-----------( 142      ( 09 Nov 2017 06:50 )             32.7     11-09    145  |  103  |  68<H>  ----------------------------<  74  4.0   |  29  |  2.93<H>    Ca    8.9      09 Nov 2017 06:50  Phos  3.6     11-09  Mg     2.1     11-09    TPro  6.2  /  Alb  3.9  /  TBili  0.3  /  DBili  x   /  AST  16  /  ALT  13  /  AlkPhos  64  11-09          CAPILLARY BLOOD GLUCOSE        PT/INR - ( 09 Nov 2017 07:01 )   PT: 45.0 sec;   INR: 4.01 ratio         PTT - ( 09 Nov 2017 07:01 )  PTT:80.1 sec                    CULTURES: (if applicable)          Physical Examination:  PULM: decreased bs bilaterally, no significant sputum production  CVS: S1, S2 heard    RADIOLOGY REVIEWED  CXR: < from: Xray Chest 1 View AP- PORTABLE-Urgent (11.08.17 @ 12:39) >  Comparison: 10/11/2017.     The mediastinal cardiac silhouette is unremarkable. There is an ACID.     Low lung volumes. Obscuration of left hemidiaphragm which may be   secondary to overlying soft tissue in this poor inspiratory film or   pleural effusion and/or atelectasis. Clear rightlung.    No acute osseous finding.     < end of copied text >      CT chest:    TTE:

## 2017-11-09 NOTE — CONSULT NOTE ADULT - ASSESSMENT
56yo F with history of significant epistaxis requiring emergent operative management admitted for dyspnea c/o of epistaxis this afternoon that resolved without intervention. Pt currently without active bleeding.

## 2017-11-09 NOTE — CHART NOTE - NSCHARTNOTEFT_GEN_A_CORE
Called by Tele Tech for patient with 1 minute episode of wide complex beats with occasional pacing spikes - rate approx. 130.  Patient asymptomatic.  d/w Dr. Yoder, will continue to monitor.

## 2017-11-09 NOTE — CONSULT NOTE ADULT - ATTENDING COMMENTS
Sabino Cee MD (for Dr. Yoder)  [phone]
Alonso Hammond MD, FACP, FACG, AGAF  [facility]  [phone]
Asked if IVC filter is needed.  Complex case, prior VTE not on AC due to bleeding in the past, now with effusion  Upon review of prior CT scans (although non-contrast), her IVC is large (4+cm) and anatomically not favorable for IVC filter placement, in addition, it may be a nidus for thrombus. Additionally, there is complex Aortic anatomy with multiple interventions, is there central venous compression on the left that may be causing this DVT?    Would prefer to avoid filter if possible.    Thank you,    Tacos Sharp  Vascular Medicine
Patient with right nasal vestibular stenosis and nasal septal perforation related to surgical procedure to control epistaxis many years ago. She notes bleeding today that resolved spontaneously. She is on a regimen of nasal saline irrigations, spray and gel at night which she should continue. No active bleeding so no intervention at this time. Patient should follow up in ENT office as an outpatient. May see Dr. Erwin or Marcial. Call [phone].
57 year old female with history of LLE dvt (provoked in 2009) severe nose bleed in 2011, history of chronic bruising with negative workup in 2013 here for a new unprovoked DVT. Given history of bleed, risk benefit analysis. Presumably with APLA, gives her high risk of thrombosis.   -per other hematology notes, has had a heme workup in past with negative factor levels and vwf levels,   -given her bleeding histroy, consideration of platelet aggregation studies at later date, currently on argatroban   -also per notes histroy of + LA in the past, repeat APLA - anticardiolipin, LA, silica clotting time and [id number]  -favor coumadin for now, understanding risks and benefits  -patient though worried is in agreement with plan
Pt with dilated cardiomyopathy now with RUL GG opacitiy worsened in last week. No change in symptoms.  Would fu w serial ct scans.

## 2017-11-09 NOTE — CHART NOTE - NSCHARTNOTEFT_GEN_A_CORE
Previously requested testing for antiphospholipid syndrome negative. Patient's private hematologist group continues to follow. Will sign off at this time.

## 2017-11-09 NOTE — PROGRESS NOTE ADULT - SUBJECTIVE AND OBJECTIVE BOX
Subjective: Patient seen and examined. No new events except as noted.     REVIEW OF SYSTEMS:    CONSTITUTIONAL: + weakness, no fevers or chills  EYES/ENT: No visual changes;  No vertigo or throat pain   NECK: No pain or stiffness  RESPIRATORY: No cough, wheezing, hemoptysis; No shortness of breath  CARDIOVASCULAR: No chest pain or palpitations  GASTROINTESTINAL: No abdominal or epigastric pain. No nausea, vomiting, or hematemesis; No diarrhea or constipation. No melena or hematochezia.  GENITOURINARY: No dysuria, frequency or hematuria  NEUROLOGICAL: No numbness or weakness  SKIN: No itching, burning, rashes, or lesions   All other review of systems is negative unless indicated above.    MEDICATIONS:  MEDICATIONS  (STANDING):  amLODIPine   Tablet 10 milliGRAM(s) Oral daily  AQUAPHOR (petrolatum Ointment) 1 Application(s) Topical two times a day  argatroban Infusion 2 MICROgram(s)/kG/Min (15.192 mL/Hr) IV Continuous <Continuous>  ascorbic acid 500 milliGRAM(s) Oral daily  carvedilol 25 milliGRAM(s) Oral every 12 hours  ferrous    sulfate 325 milliGRAM(s) Oral daily  hydrALAZINE 25 milliGRAM(s) Oral every 8 hours  influenza   Vaccine 0.5 milliLiter(s) IntraMuscular once  labetalol 200 milliGRAM(s) Oral every 8 hours  Nephro-alyse 1 Tablet(s) Oral daily  pantoprazole    Tablet 40 milliGRAM(s) Oral before breakfast  predniSONE   Tablet 10 milliGRAM(s) Oral daily  sucralfate suspension 1 Gram(s) Oral three times a day      PHYSICAL EXAM:  T(C): 36.4 (11-09-17 @ 04:39), Max: 36.7 (11-08-17 @ 20:06)  HR: 85 (11-09-17 @ 04:39) (77 - 96)  BP: 155/79 (11-09-17 @ 04:39) (144/80 - 160/78)  RR: 18 (11-09-17 @ 04:39) (18 - 18)  SpO2: 98% (11-09-17 @ 04:39) (95% - 98%)  Wt(kg): --  I&O's Summary    08 Nov 2017 07:01  -  09 Nov 2017 07:00  --------------------------------------------------------  IN: 1444.7 mL / OUT: 2500 mL / NET: -1055.3 mL          Appearance: Normal	  HEENT:   Normal oral mucosa, PERRL, EOMI	  Lymphatic: No lymphadenopathy , no edema  Cardiovascular: Normal S1 S2, No JVD, No murmurs , Peripheral pulses palpable 2+ bilaterally  Respiratory: Lungs clear to auscultation, normal effort 	  Gastrointestinal:  Soft, Non-tender, + BS	  Skin: No rashes, No ecchymoses, No cyanosis, warm to touch  Musculoskeletal: Normal range of motion, normal strength  Psychiatry:  Mood & affect appropriate  Ext: +chronic LLE edema wrapped  +fox       LABS:    CARDIAC MARKERS:                                10.5   5.6   )-----------( 142      ( 09 Nov 2017 06:50 )             32.7     11-09    145  |  103  |  68<H>  ----------------------------<  74  4.0   |  29  |  2.93<H>    Ca    8.9      09 Nov 2017 06:50  Phos  3.6     11-09  Mg     2.1     11-09    TPro  6.2  /  Alb  3.9  /  TBili  0.3  /  DBili  x   /  AST  16  /  ALT  13  /  AlkPhos  64  11-09    proBNP:   Lipid Profile:   HgA1c:   TSH:             TELEMETRY: Vpaced	    ECG:  	  RADIOLOGY:   DIAGNOSTIC TESTING:  [ ] Echocardiogram:  [ ]  Catheterization:  [ ] Stress Test:    OTHER:

## 2017-11-09 NOTE — PROGRESS NOTE ADULT - SUBJECTIVE AND OBJECTIVE BOX
Patient is a 57y old  Female who presented with a chief complaint of shortness of breath (11 Oct 2017 22:01)    INTERVAL HPI/OVERNIGHT EVENTS:  1 BM overnight - formed, brown  no GI events per report    MEDICATIONS  (STANDING):  amLODIPine   Tablet 10 milliGRAM(s) Oral daily  AQUAPHOR (petrolatum Ointment) 1 Application(s) Topical two times a day  argatroban Infusion 2 MICROgram(s)/kG/Min (15.192 mL/Hr) IV Continuous <Continuous>  ascorbic acid 500 milliGRAM(s) Oral daily  carvedilol 25 milliGRAM(s) Oral every 12 hours  ferrous    sulfate 325 milliGRAM(s) Oral daily  furosemide   Injectable 60 milliGRAM(s) IV Push daily  hydrALAZINE 25 milliGRAM(s) Oral every 8 hours  influenza   Vaccine 0.5 milliLiter(s) IntraMuscular once  labetalol 200 milliGRAM(s) Oral every 8 hours  Nephro-alyse 1 Tablet(s) Oral daily  pantoprazole    Tablet 40 milliGRAM(s) Oral before breakfast  predniSONE   Tablet 10 milliGRAM(s) Oral daily  sucralfate suspension 1 Gram(s) Oral three times a day    MEDICATIONS  (PRN):    Allergies  heparin (Unknown)  losartan (Anaphylaxis)  nitroglycerin (Other)    Review of Systems:  General:  No wt loss, fevers, chills, night sweats  CV:  No pain, palpitations, hypo/hypertension  Resp:  No dyspnea, cough, tachypnea, wheezing  :  No pain, bleeding, incontinence, nocturia  Muscle:  No pain, weakness  Neuro:  No weakness, tingling, memory problems  Heme:  No petechiae, ecchymosis, easy bruisability  Skin:  +ischial decubitus ulcer    T(F): 97.6 (11-09-17 @ 04:39), Max: 98 (11-08-17 @ 20:06)  HR: 85 (11-09-17 @ 04:39) (77 - 96)  BP: 155/79 (11-09-17 @ 04:39) (144/80 - 160/78)  RR: 18 (11-09-17 @ 04:39) (18 - 18)  SpO2: 98% (11-09-17 @ 04:39) (95% - 98%)  Wt(kg): --  ,   I&O's Summary    08 Nov 2017 07:01  -  09 Nov 2017 07:00  --------------------------------------------------------  IN: 1444.7 mL / OUT: 2500 mL / NET: -1055.3 mL    PHYSICAL EXAM:  Constitutional: NAD, well-developed, eating breakfast  Neck: No LAD, supple  Respiratory: CTA and P  Cardiovascular: S1 and S2, RRR, no Murmur  Gastrointestinal: BS+, soft, NT/ND, neg HSM  Extremities: LLE in Z flow boot  Vascular: 2+ peripheral pulses  Neurological: A/O x 3, no focal deficits  Psychiatric: Normal mood, normal affect  Skin: No rashes    LABS:                      10.4   5.7   )-----------( 141      ( 08 Nov 2017 06:40 )             32.0   11-08    141  |  101  |  66<H>  ----------------------------<  87  4.1   |  28  |  2.89<H>    Ca    8.8      08 Nov 2017 06:41  Phos  3.8     11-08  Mg     2.2     11-08    Occult Blood, Feces (11.07.17 @ 13:30)    Occult Blood, Feces: Negative      PT/INR - ( 08 Nov 2017 06:41 )   PT: 35.3 sec;   INR: 3.19 ratio         PTT - ( 08 Nov 2017 06:41 )  PTT:78.0 sec        RADIOLOGY & ADDITIONAL TESTS:

## 2017-11-09 NOTE — PROGRESS NOTE ADULT - ASSESSMENT
57F with SLE noted to have self limited rectal bleeding two days ago and again last night could just be hemorrhoidal in origin.  Yesterday had brown stool on rectal exam.  H/H stable. [place] NEGATIVE    Rec-  -Would continue to observe for now.  -Monitor bowel movements, H/H.  -AC per primary team  -Can give AnnusolHC prn.  -No plans for colonoscopy at this time

## 2017-11-09 NOTE — PROGRESS NOTE ADULT - PROBLEM SELECTOR PLAN 1
Multifactorial-restrictive disease 2nd obesity and cardiomegaly, pericardial effusion, debility and volume overloaded, with systolic HF (EF 25%)   -Lasix 60mg qd    -Pericardial effusion is stable, no plan for pericardiocentesis at this time per cards  -Peripheral GGO in RUL new from Oct 11 2017 CT  -Recommend repeat CT chest in 6 weeks to further eval

## 2017-11-09 NOTE — PROGRESS NOTE ADULT - SUBJECTIVE AND OBJECTIVE BOX
SUBJECTIVE: Pt seen, chart reviewed.  Pt w/o complaints or concerns.  Feeling better.  d/c planning  no pain, odor, f/c/s, redness, warmth, increased drainage    ROS- skin chronic wound see HPI  all other systems negative      Physical Exam:  Vital Signs Last 24 Hrs  T(C): 37.2 (09 Nov 2017 13:10), Max: 37.2 (09 Nov 2017 13:10)  T(F): 98.9 (09 Nov 2017 13:10), Max: 98.9 (09 Nov 2017 13:10)  HR: 79 (09 Nov 2017 13:10) (78 - 91)  BP: 149/80 (09 Nov 2017 13:10) (149/80 - 160/78)  BP(mean): --  RR: 16 (09 Nov 2017 13:10) (16 - 18)  SpO2: 97% (09 Nov 2017 13:10) (97% - 98%)    General Appearance:  NAD, A&Ox3, MO   Versa Care P500      MSK FROM X4 equally warm  LLE edema resolving, but stil present  No deformities or contractures    Skin moist w/ good turgor  BUE w/near resolved ecchymosis w/o hematoma in areas of failed IV sites w/o infection s/sx  Rt ischial stage iv pressure ulcer w/ ring of macerated skin/callous on wound edge  (+) moderate serosanguinous drainage  No odor, erythema, tender, induration, fluctuance, increased warmth  Lt medial buttock into gluteal cleft w/ resolving hematoma   resolving ecchymosis of surrounding skin- no open wounds or s/sx infection        LABS:                        10.5   5.6   )-----------( 142      ( 09 Nov 2017 06:50 )             32.7     PT/INR - ( 09 Nov 2017 07:01 )   PT: 45.0 sec;   INR: 4.01 ratio    PTT - ( 09 Nov 2017 07:01 )  PTT:80.1 sec

## 2017-11-09 NOTE — PROGRESS NOTE ADULT - SUBJECTIVE AND OBJECTIVE BOX
MEDICINE, PROGRESS NOTE [phone]    COSTEN, SANDRA 57y [id number]    Patient seen and examined.  Patient is a 57y old  Female who presents with a chief complaint of shortness of breath (11 Oct 2017 22:01)  Pt feels better today. Pt had one episode of epistaxis today.    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)  Chronic Gout (ICD9 274.02)  LBBB (Left Bundle Branch Block) (ICD9 426.3)  CHF (Congestive Heart Failure) (ICD9 428.0)  Hx of aorta-iliac-femoral bypass  TOP (Termination of Pregnancy)  S/P Dilatation and Curettage  History of Tubal Ligation  s/p AAA (Abdominal Aortic Aneurysm) Repair: 2009 2010  History of Cholecystectomy  S/P Partial Hysterectomy    MEDICATIONS  (STANDING):  amLODIPine   Tablet 10 milliGRAM(s) Oral daily  AQUAPHOR (petrolatum Ointment) 1 Application(s) Topical two times a day  ascorbic acid 500 milliGRAM(s) Oral daily  BACItracin   Ointment 1 Application(s) Topical two times a day  carvedilol 25 milliGRAM(s) Oral every 12 hours  ferrous    sulfate 325 milliGRAM(s) Oral daily  furosemide    Tablet 60 milliGRAM(s) Oral daily  hydrALAZINE 25 milliGRAM(s) Oral every 8 hours  influenza   Vaccine 0.5 milliLiter(s) IntraMuscular once  labetalol 200 milliGRAM(s) Oral every 8 hours  Nephro-alyse 1 Tablet(s) Oral daily  pantoprazole    Tablet 40 milliGRAM(s) Oral before breakfast  predniSONE   Tablet 10 milliGRAM(s) Oral daily  sodium chloride 0.65% Nasal 1 Spray(s) Both Nostrils three times a day    MEDICATIONS  (PRN):    Allergies    heparin (Unknown)  losartan (Anaphylaxis)  nitroglycerin (Other)    Intolerances        PHYSICAL EXAM:  Constitutional: NAD  HEENT: Normocephalic, EOMI  Neck:  No JVD  Respiratory: CTA B/L, No wheezes  Cardiovascular: S1, S2, RRR, + systolic murmur  Gastrointestinal: BS+, soft, NT/ND  Extremities: No peripheral edema  Neurological: AAOX3, no focal deficits  Psychiatric: Normal mood, normal affect  : No Hook    Vital Signs Last 24 Hrs  T(C): 37.2 (09 Nov 2017 13:10), Max: 37.2 (09 Nov 2017 13:10)  T(F): 98.9 (09 Nov 2017 13:10), Max: 98.9 (09 Nov 2017 13:10)  HR: 81 (09 Nov 2017 21:26) (78 - 85)  BP: 163/83 (09 Nov 2017 21:26) (149/80 - 163/83)  BP(mean): --  RR: 18 (09 Nov 2017 21:26) (16 - 18)  SpO2: 95% (09 Nov 2017 21:26) (95% - 98%)  I&O's Summary    08 Nov 2017 07:01  -  09 Nov 2017 07:00  --------------------------------------------------------  IN: 1444.7 mL / OUT: 2500 mL / NET: -1055.3 mL    09 Nov 2017 07:01  -  09 Nov 2017 21:56  --------------------------------------------------------  IN: 680 mL / OUT: 1625 mL / NET: -945 mL        LABS:                        10.5   5.6   )-----------( 142      ( 09 Nov 2017 06:50 )             32.7     11-09    145  |  103  |  68<H>  ----------------------------<  74  4.0   |  29  |  2.93<H>    Ca    8.9      09 Nov 2017 06:50  Phos  3.6     11-09  Mg     2.1     11-09    TPro  6.2  /  Alb  3.9  /  TBili  0.3  /  DBili  x   /  AST  16  /  ALT  13  /  AlkPhos  64  11-09        Phosphorus Level, Serum: 3.6 mg/dL (11-09 @ 06:50)  PT/INR - ( 09 Nov 2017 21:22 )   PT: 37.9 sec;   INR: 3.42 ratio         PTT - ( 09 Nov 2017 07:01 )  PTT:80.1 sec

## 2017-11-09 NOTE — PROGRESS NOTE ADULT - ASSESSMENT
LLE dvt  lle acute on chronic edema  ckd 3    continue current care  argatroban stopped today  hold coumadin  monitor inr  appreciate ent input

## 2017-11-10 LAB
ANION GAP SERPL CALC-SCNC: 13 MMOL/L — SIGNIFICANT CHANGE UP (ref 5–17)
BUN SERPL-MCNC: 66 MG/DL — HIGH (ref 7–23)
CALCIUM SERPL-MCNC: 8.9 MG/DL — SIGNIFICANT CHANGE UP (ref 8.4–10.5)
CHLORIDE SERPL-SCNC: 106 MMOL/L — SIGNIFICANT CHANGE UP (ref 96–108)
CO2 SERPL-SCNC: 27 MMOL/L — SIGNIFICANT CHANGE UP (ref 22–31)
CREAT SERPL-MCNC: 2.91 MG/DL — HIGH (ref 0.5–1.3)
GLUCOSE SERPL-MCNC: 88 MG/DL — SIGNIFICANT CHANGE UP (ref 70–99)
HCT VFR BLD CALC: 33.1 % — LOW (ref 34.5–45)
HGB BLD-MCNC: 10.5 G/DL — LOW (ref 11.5–15.5)
INR BLD: 3.15 RATIO — HIGH (ref 0.88–1.16)
MCHC RBC-ENTMCNC: 29.3 PG — SIGNIFICANT CHANGE UP (ref 27–34)
MCHC RBC-ENTMCNC: 31.8 GM/DL — LOW (ref 32–36)
MCV RBC AUTO: 92.1 FL — SIGNIFICANT CHANGE UP (ref 80–100)
PLATELET # BLD AUTO: 135 K/UL — LOW (ref 150–400)
POTASSIUM SERPL-MCNC: 4.3 MMOL/L — SIGNIFICANT CHANGE UP (ref 3.5–5.3)
POTASSIUM SERPL-SCNC: 4.3 MMOL/L — SIGNIFICANT CHANGE UP (ref 3.5–5.3)
PROTHROM AB SERPL-ACNC: 34.8 SEC — HIGH (ref 9.8–12.7)
RBC # BLD: 3.6 M/UL — LOW (ref 3.8–5.2)
RBC # FLD: 14.8 % — HIGH (ref 10.3–14.5)
SODIUM SERPL-SCNC: 146 MMOL/L — HIGH (ref 135–145)
WBC # BLD: 6.2 K/UL — SIGNIFICANT CHANGE UP (ref 3.8–10.5)
WBC # FLD AUTO: 6.2 K/UL — SIGNIFICANT CHANGE UP (ref 3.8–10.5)

## 2017-11-10 RX ORDER — FUROSEMIDE 40 MG
60 TABLET ORAL DAILY
Qty: 0 | Refills: 0 | Status: DISCONTINUED | OUTPATIENT
Start: 2017-11-11 | End: 2017-11-11

## 2017-11-10 RX ORDER — FUROSEMIDE 40 MG
40 TABLET ORAL ONCE
Qty: 0 | Refills: 0 | Status: COMPLETED | OUTPATIENT
Start: 2017-11-10 | End: 2017-11-10

## 2017-11-10 RX ADMIN — PANTOPRAZOLE SODIUM 40 MILLIGRAM(S): 20 TABLET, DELAYED RELEASE ORAL at 05:45

## 2017-11-10 RX ADMIN — CARVEDILOL PHOSPHATE 25 MILLIGRAM(S): 80 CAPSULE, EXTENDED RELEASE ORAL at 09:20

## 2017-11-10 RX ADMIN — Medication 200 MILLIGRAM(S): at 05:45

## 2017-11-10 RX ADMIN — Medication 60 MILLIGRAM(S): at 05:45

## 2017-11-10 RX ADMIN — Medication 1 APPLICATION(S): at 17:00

## 2017-11-10 RX ADMIN — AMLODIPINE BESYLATE 10 MILLIGRAM(S): 2.5 TABLET ORAL at 09:21

## 2017-11-10 RX ADMIN — Medication 10 MILLIGRAM(S): at 09:19

## 2017-11-10 RX ADMIN — Medication 200 MILLIGRAM(S): at 13:05

## 2017-11-10 RX ADMIN — Medication 325 MILLIGRAM(S): at 09:19

## 2017-11-10 RX ADMIN — Medication 500 MILLIGRAM(S): at 09:19

## 2017-11-10 RX ADMIN — Medication 1 APPLICATION(S): at 05:46

## 2017-11-10 RX ADMIN — Medication 25 MILLIGRAM(S): at 05:46

## 2017-11-10 RX ADMIN — Medication 1 TABLET(S): at 09:19

## 2017-11-10 RX ADMIN — Medication 200 MILLIGRAM(S): at 22:25

## 2017-11-10 RX ADMIN — Medication 25 MILLIGRAM(S): at 13:05

## 2017-11-10 RX ADMIN — CARVEDILOL PHOSPHATE 25 MILLIGRAM(S): 80 CAPSULE, EXTENDED RELEASE ORAL at 20:08

## 2017-11-10 RX ADMIN — Medication 40 MILLIGRAM(S): at 18:32

## 2017-11-10 RX ADMIN — Medication 25 MILLIGRAM(S): at 22:25

## 2017-11-10 NOTE — PROGRESS NOTE ADULT - PROBLEM SELECTOR PLAN 1
Multifactorial-restrictive disease 2nd obesity and cardiomegaly, pericardial effusion, debility and volume overloaded, with systolic HF (EF 25%)   -Lasix 60mg qd    -11/10 cards to f/u with IR about interventional approach to drainage of pericardial effusion  -Peripheral GGO in RUL new from Oct 11 2017 CT  -Recommend repeat CT chest in 6 weeks to further eval

## 2017-11-10 NOTE — PROGRESS NOTE ADULT - SUBJECTIVE AND OBJECTIVE BOX
MEDICINE, PROGRESS NOTE [phone]    COSTEN, SANDRA 57y [id number]    Patient seen and examined.  Patient is a 57y old  Female who presents with a chief complaint of shortness of breath (11 Oct 2017 22:01)  Pt c/o leg is swollen today and painful.    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)  Chronic Gout (ICD9 274.02)  LBBB (Left Bundle Branch Block) (ICD9 426.3)  CHF (Congestive Heart Failure) (ICD9 428.0)  Hx of aorta-iliac-femoral bypass  TOP (Termination of Pregnancy)  S/P Dilatation and Curettage  History of Tubal Ligation  s/p AAA (Abdominal Aortic Aneurysm) Repair: 2009 2010  History of Cholecystectomy  S/P Partial Hysterectomy    MEDICATIONS  (STANDING):  amLODIPine   Tablet 10 milliGRAM(s) Oral daily  AQUAPHOR (petrolatum Ointment) 1 Application(s) Topical two times a day  ascorbic acid 500 milliGRAM(s) Oral daily  BACItracin   Ointment 1 Application(s) Topical two times a day  carvedilol 25 milliGRAM(s) Oral every 12 hours  ferrous    sulfate 325 milliGRAM(s) Oral daily  furosemide   Injectable 40 milliGRAM(s) IV Push once  hydrALAZINE 25 milliGRAM(s) Oral every 8 hours  influenza   Vaccine 0.5 milliLiter(s) IntraMuscular once  labetalol 200 milliGRAM(s) Oral every 8 hours  Nephro-alyse 1 Tablet(s) Oral daily  pantoprazole    Tablet 40 milliGRAM(s) Oral before breakfast  predniSONE   Tablet 10 milliGRAM(s) Oral daily  sodium chloride 0.65% Nasal 1 Spray(s) Both Nostrils three times a day    MEDICATIONS  (PRN):    Allergies    heparin (Unknown)  losartan (Anaphylaxis)  nitroglycerin (Other)    Intolerances        PHYSICAL EXAM:  Constitutional: NAD  HEENT: Normocephalic, EOMI  Neck:  No JVD  Respiratory: CTA B/L, No wheezes  Cardiovascular: S1, S2, RRR, + systolic murmur  Gastrointestinal: BS+, soft, NT/ND  Extremities: + peripheral edema le b/l L>R, left leg double size ti was yesterday  Neurological: AAOX3, no focal deficits  Psychiatric: Normal mood, normal affect  : No Hook    Vital Signs Last 24 Hrs  T(C): 37 (10 Nov 2017 13:04), Max: 37 (10 Nov 2017 13:04)  T(F): 98.6 (10 Nov 2017 13:04), Max: 98.6 (10 Nov 2017 13:04)  HR: 82 (10 Nov 2017 13:04) (81 - 106)  BP: 150/90 (10 Nov 2017 13:04) (150/90 - 163/83)  BP(mean): --  RR: 18 (10 Nov 2017 13:04) (18 - 18)  SpO2: 98% (10 Nov 2017 13:04) (95% - 98%)  I&O's Summary    09 Nov 2017 07:01  -  10 Nov 2017 07:00  --------------------------------------------------------  IN: 830 mL / OUT: 2550 mL / NET: -1720 mL    10 Nov 2017 07:01  -  10 Nov 2017 18:30  --------------------------------------------------------  IN: 360 mL / OUT: 275 mL / NET: 85 mL        LABS:                        10.5   6.2   )-----------( 135      ( 10 Nov 2017 06:22 )             33.1     11-10    146<H>  |  106  |  66<H>  ----------------------------<  88  4.3   |  27  |  2.91<H>    Ca    8.9      10 Nov 2017 06:22  Phos  3.6     11-09  Mg     2.1     11-09    TPro  6.2  /  Alb  3.9  /  TBili  0.3  /  DBili  x   /  AST  16  /  ALT  13  /  AlkPhos  64  11-09

## 2017-11-10 NOTE — PROGRESS NOTE ADULT - ASSESSMENT
LLE dvt  acute on chronic left leg edema    continue current care  wrap leg  switch back to iv lasix  hold cumadin tonight

## 2017-11-10 NOTE — PROGRESS NOTE ADULT - SUBJECTIVE AND OBJECTIVE BOX
Subjective: Patient seen and examined. No new events except as noted.   leaking around the fox   REVIEW OF SYSTEMS:    CONSTITUTIONAL: + weakness, no fevers or chills  EYES/ENT: No visual changes;  No vertigo or throat pain   NECK: No pain or stiffness  RESPIRATORY: No cough, wheezing, hemoptysis; No shortness of breath  CARDIOVASCULAR: No chest pain or palpitations  GASTROINTESTINAL: No abdominal or epigastric pain. No nausea, vomiting, or hematemesis; No diarrhea or constipation. No melena or hematochezia.  GENITOURINARY: No dysuria, frequency or hematuria  NEUROLOGICAL: No numbness or weakness  SKIN: No itching, burning, rashes, or lesions   All other review of systems is negative unless indicated above.    MEDICATIONS:  MEDICATIONS  (STANDING):  amLODIPine   Tablet 10 milliGRAM(s) Oral daily  AQUAPHOR (petrolatum Ointment) 1 Application(s) Topical two times a day  ascorbic acid 500 milliGRAM(s) Oral daily  BACItracin   Ointment 1 Application(s) Topical two times a day  carvedilol 25 milliGRAM(s) Oral every 12 hours  ferrous    sulfate 325 milliGRAM(s) Oral daily  furosemide    Tablet 60 milliGRAM(s) Oral daily  hydrALAZINE 25 milliGRAM(s) Oral every 8 hours  influenza   Vaccine 0.5 milliLiter(s) IntraMuscular once  labetalol 200 milliGRAM(s) Oral every 8 hours  Nephro-alyse 1 Tablet(s) Oral daily  pantoprazole    Tablet 40 milliGRAM(s) Oral before breakfast  predniSONE   Tablet 10 milliGRAM(s) Oral daily  sodium chloride 0.65% Nasal 1 Spray(s) Both Nostrils three times a day      PHYSICAL EXAM:  T(C): 36.6 (11-10-17 @ 04:40), Max: 37.2 (11-09-17 @ 13:10)  HR: 81 (11-10-17 @ 09:18) (79 - 106)  BP: 161/92 (11-10-17 @ 09:18) (149/80 - 163/83)  RR: 18 (11-10-17 @ 04:40) (16 - 18)  SpO2: 95% (11-10-17 @ 04:40) (95% - 97%)  Wt(kg): --  I&O's Summary    09 Nov 2017 07:01  -  10 Nov 2017 07:00  --------------------------------------------------------  IN: 830 mL / OUT: 2550 mL / NET: -1720 mL          Appearance: Normal	  HEENT:   Normal oral mucosa, PERRL, EOMI	  Lymphatic: No lymphadenopathy , no edema  Cardiovascular: Normal S1 S2, No JVD, No murmurs , Peripheral pulses palpable 2+ bilaterally  Respiratory: Lungs clear to auscultation, normal effort 	  Gastrointestinal:  Soft, Non-tender, + BS	  Skin: No rashes, No ecchymoses, No cyanosis, warm to touch  Musculoskeletal: Normal range of motion, normal strength  Psychiatry:  Mood & affect appropriate  Ext: chronic LLE aaron, wrapped  +fox       LABS:    CARDIAC MARKERS:                                10.5   6.2   )-----------( 135      ( 10 Nov 2017 06:22 )             33.1     11-10    146<H>  |  106  |  66<H>  ----------------------------<  88  4.3   |  27  |  2.91<H>    Ca    8.9      10 Nov 2017 06:22  Phos  3.6     11-09  Mg     2.1     11-09    TPro  6.2  /  Alb  3.9  /  TBili  0.3  /  DBili  x   /  AST  16  /  ALT  13  /  AlkPhos  64  11-09    proBNP:   Lipid Profile:   HgA1c:   TSH:             TELEMETRY: 	Vpaced     ECG:  	  RADIOLOGY:   DIAGNOSTIC TESTING:  [ ] Echocardiogram:  [ ]  Catheterization:  [ ] Stress Test:    OTHER:

## 2017-11-10 NOTE — PROGRESS NOTE ADULT - SUBJECTIVE AND OBJECTIVE BOX
Patient is a 57y old  Female who presented with a chief complaint of shortness of breath (11 Oct 2017 22:01)      INTERVAL HPI/OVERNIGHT EVENTS:  appetite ok  no abdominal pain, nausea, vomiting or diarrhea    MEDICATIONS  (STANDING):  amLODIPine   Tablet 10 milliGRAM(s) Oral daily  AQUAPHOR (petrolatum Ointment) 1 Application(s) Topical two times a day  ascorbic acid 500 milliGRAM(s) Oral daily  BACItracin   Ointment 1 Application(s) Topical two times a day  carvedilol 25 milliGRAM(s) Oral every 12 hours  ferrous    sulfate 325 milliGRAM(s) Oral daily  furosemide    Tablet 60 milliGRAM(s) Oral daily  hydrALAZINE 25 milliGRAM(s) Oral every 8 hours  influenza   Vaccine 0.5 milliLiter(s) IntraMuscular once  labetalol 200 milliGRAM(s) Oral every 8 hours  Nephro-alyse 1 Tablet(s) Oral daily  pantoprazole    Tablet 40 milliGRAM(s) Oral before breakfast  predniSONE   Tablet 10 milliGRAM(s) Oral daily  sodium chloride 0.65% Nasal 1 Spray(s) Both Nostrils three times a day    MEDICATIONS  (PRN):      Allergies    heparin (Unknown)  losartan (Anaphylaxis)  nitroglycerin (Other)    Intolerances        Review of Systems:  General:  No wt loss, fevers, chills, night sweats  CV:  No pain, palpitations, hypo/hypertension  Resp:  No dyspnea, cough, tachypnea, wheezing  :  No pain, bleeding, incontinence, nocturia  Muscle:  No pain, weakness  Neuro:  No weakness, tingling, memory problems  Heme:  No petechiae, ecchymosis, easy bruisability  Skin:  +ischial decubitus ulcer    Vital Signs Last 24 Hrs  T(C): 36.6 (10 Nov 2017 04:40), Max: 37.2 (09 Nov 2017 13:10)  T(F): 97.9 (10 Nov 2017 04:40), Max: 98.9 (09 Nov 2017 13:10)  HR: 81 (10 Nov 2017 09:18) (79 - 106)  BP: 161/92 (10 Nov 2017 09:18) (149/80 - 163/83)  BP(mean): --  RR: 18 (10 Nov 2017 04:40) (16 - 18)  SpO2: 95% (10 Nov 2017 04:40) (95% - 97%)    PHYSICAL EXAM:  Constitutional: NAD, well-developed, eating breakfast  Neck: No LAD, supple  Respiratory: CTA and P  Cardiovascular: S1 and S2, RRR, no Murmur  Gastrointestinal: BS+, soft, NT/ND, neg HSM  Extremities: LLE in Z flow boot  Vascular: 2+ peripheral pulses  Neurological: A/O x 3, no focal deficits  Psychiatric: Normal mood, normal affect  Skin: No rashes    LABS:                        10.5   6.2   )-----------( 135      ( 10 Nov 2017 06:22 )             33.1     11-10    146<H>  |  106  |  66<H>  ----------------------------<  88  4.3   |  27  |  2.91<H>    Ca    8.9      10 Nov 2017 06:22  Phos  3.6     11-09  Mg     2.1     11-09    TPro  6.2  /  Alb  3.9  /  TBili  0.3  /  DBili  x   /  AST  16  /  ALT  13  /  AlkPhos  64  11-09    PT/INR - ( 10 Nov 2017 06:22 )   PT: 34.8 sec;   INR: 3.15 ratio         PTT - ( 09 Nov 2017 07:01 )  PTT:80.1 sec    LIVER FUNCTIONS - ( 09 Nov 2017 06:50 )  Alb: 3.9 g/dL / Pro: 6.2 g/dL / ALK PHOS: 64 U/L / ALT: 13 U/L RC / AST: 16 U/L / GGT: x             RADIOLOGY & ADDITIONAL TESTS:

## 2017-11-10 NOTE — PROGRESS NOTE ADULT - SUBJECTIVE AND OBJECTIVE BOX
Follow-up Pulm Progress Note    No new respiratory events overnight.  Denies SOB/CP.   -seen by ent yesterday for epistaxis - resolved on own  - 11/9, 11/10 rectal bleeding-likely hemorrhoids - gi   - 11/9 with [facility]- cards looking into IR drain of pericardial effusion    Medications:  MEDICATIONS  (STANDING):  amLODIPine   Tablet 10 milliGRAM(s) Oral daily  AQUAPHOR (petrolatum Ointment) 1 Application(s) Topical two times a day  ascorbic acid 500 milliGRAM(s) Oral daily  BACItracin   Ointment 1 Application(s) Topical two times a day  carvedilol 25 milliGRAM(s) Oral every 12 hours  ferrous    sulfate 325 milliGRAM(s) Oral daily  furosemide    Tablet 60 milliGRAM(s) Oral daily  hydrALAZINE 25 milliGRAM(s) Oral every 8 hours  influenza   Vaccine 0.5 milliLiter(s) IntraMuscular once  labetalol 200 milliGRAM(s) Oral every 8 hours  Nephro-alyse 1 Tablet(s) Oral daily  pantoprazole    Tablet 40 milliGRAM(s) Oral before breakfast  predniSONE   Tablet 10 milliGRAM(s) Oral daily  sodium chloride 0.65% Nasal 1 Spray(s) Both Nostrils three times a day    MEDICATIONS  (PRN):          Vital Signs Last 24 Hrs  T(C): 37 (10 Nov 2017 13:04), Max: 37 (10 Nov 2017 13:04)  T(F): 98.6 (10 Nov 2017 13:04), Max: 98.6 (10 Nov 2017 13:04)  HR: 82 (10 Nov 2017 13:04) (81 - 106)  BP: 150/90 (10 Nov 2017 13:04) (150/90 - 163/83)  BP(mean): --  RR: 18 (10 Nov 2017 13:04) (18 - 18)  SpO2: 98% (10 Nov 2017 13:04) (95% - 98%)          11-09 @ 07:01  -  11-10 @ 07:00  --------------------------------------------------------  IN: 830 mL / OUT: 2550 mL / NET: -1720 mL          LABS:                        10.5   6.2   )-----------( 135      ( 10 Nov 2017 06:22 )             33.1     11-10    146<H>  |  106  |  66<H>  ----------------------------<  88  4.3   |  27  |  2.91<H>    Ca    8.9      10 Nov 2017 06:22  Phos  3.6     11-09  Mg     2.1     11-09    TPro  6.2  /  Alb  3.9  /  TBili  0.3  /  DBili  x   /  AST  16  /  ALT  13  /  AlkPhos  64  11-09          CAPILLARY BLOOD GLUCOSE        PT/INR - ( 10 Nov 2017 06:22 )   PT: 34.8 sec;   INR: 3.15 ratio         PTT - ( 09 Nov 2017 07:01 )  PTT:80.1 sec                    CULTURES: (if applicable)          Physical Examination:  PULM: decreased bs bilaterally, no significant sputum production  CVS: S1, S2 heard    RADIOLOGY REVIEWED  CXR: < from: Xray Chest 1 View AP- PORTABLE-Urgent (11.08.17 @ 12:39) >  INTERPRETATION:  CLINICAL INFORMATION: Shortness of breath.    A single portable view of the chest was obtained.     Comparison: 10/11/2017.     The mediastinal cardiac silhouette is unremarkable. There is an ACID.     Low lung volumes. Obscuration of left hemidiaphragm which may be   secondary to overlying soft tissue in this poor inspiratory film or   pleural effusion and/or atelectasis. Clear rightlung.    No acute osseous finding.     < end of copied text >      CT chest:    TTE:

## 2017-11-10 NOTE — PROGRESS NOTE ADULT - ASSESSMENT
57F with SLE noted to have self limited rectal bleeding two days ago and again last night could just be hemorrhoidal in origin.  Yesterday had brown stool on rectal exam.  H/H stable. [place]iac NEGATIVE    Rec-  -Would continue to observe for now.  -Monitor bowel movements, H/H.  -AC per primary team  -Can give AnnusolHC prn.  -No plans for colonoscopy at this time    Will Sign off care. Please recall prn for GI concerns.  Thank You.    Francesco Lake PA-C    [facility]  [phone]

## 2017-11-11 DIAGNOSIS — M25.569 PAIN IN UNSPECIFIED KNEE: ICD-10-CM

## 2017-11-11 LAB
ANION GAP SERPL CALC-SCNC: 14 MMOL/L — SIGNIFICANT CHANGE UP (ref 5–17)
BUN SERPL-MCNC: 69 MG/DL — HIGH (ref 7–23)
CALCIUM SERPL-MCNC: 8.9 MG/DL — SIGNIFICANT CHANGE UP (ref 8.4–10.5)
CHLORIDE SERPL-SCNC: 103 MMOL/L — SIGNIFICANT CHANGE UP (ref 96–108)
CO2 SERPL-SCNC: 26 MMOL/L — SIGNIFICANT CHANGE UP (ref 22–31)
CREAT SERPL-MCNC: 2.99 MG/DL — HIGH (ref 0.5–1.3)
GLUCOSE SERPL-MCNC: 81 MG/DL — SIGNIFICANT CHANGE UP (ref 70–99)
HCT VFR BLD CALC: 33.5 % — LOW (ref 34.5–45)
HGB BLD-MCNC: 10.7 G/DL — LOW (ref 11.5–15.5)
INR BLD: 2.53 RATIO — HIGH (ref 0.88–1.16)
MAGNESIUM SERPL-MCNC: 2.2 MG/DL — SIGNIFICANT CHANGE UP (ref 1.6–2.6)
MCHC RBC-ENTMCNC: 29.5 PG — SIGNIFICANT CHANGE UP (ref 27–34)
MCHC RBC-ENTMCNC: 32.1 GM/DL — SIGNIFICANT CHANGE UP (ref 32–36)
MCV RBC AUTO: 92 FL — SIGNIFICANT CHANGE UP (ref 80–100)
PHOSPHATE SERPL-MCNC: 4.1 MG/DL — SIGNIFICANT CHANGE UP (ref 2.5–4.5)
PLATELET # BLD AUTO: 125 K/UL — LOW (ref 150–400)
POTASSIUM SERPL-MCNC: 4.1 MMOL/L — SIGNIFICANT CHANGE UP (ref 3.5–5.3)
POTASSIUM SERPL-SCNC: 4.1 MMOL/L — SIGNIFICANT CHANGE UP (ref 3.5–5.3)
PROTHROM AB SERPL-ACNC: 27.9 SEC — HIGH (ref 9.8–12.7)
RBC # BLD: 3.64 M/UL — LOW (ref 3.8–5.2)
RBC # FLD: 14.8 % — HIGH (ref 10.3–14.5)
SODIUM SERPL-SCNC: 143 MMOL/L — SIGNIFICANT CHANGE UP (ref 135–145)
WBC # BLD: 5.6 K/UL — SIGNIFICANT CHANGE UP (ref 3.8–10.5)
WBC # FLD AUTO: 5.6 K/UL — SIGNIFICANT CHANGE UP (ref 3.8–10.5)

## 2017-11-11 RX ORDER — FUROSEMIDE 40 MG
60 TABLET ORAL DAILY
Qty: 0 | Refills: 0 | Status: DISCONTINUED | OUTPATIENT
Start: 2017-11-11 | End: 2017-11-16

## 2017-11-11 RX ORDER — HYDRALAZINE HCL 50 MG
50 TABLET ORAL EVERY 8 HOURS
Qty: 0 | Refills: 0 | Status: DISCONTINUED | OUTPATIENT
Start: 2017-11-11 | End: 2017-11-18

## 2017-11-11 RX ORDER — WARFARIN SODIUM 2.5 MG/1
7 TABLET ORAL ONCE
Qty: 0 | Refills: 0 | Status: COMPLETED | OUTPATIENT
Start: 2017-11-11 | End: 2017-11-11

## 2017-11-11 RX ORDER — COLCHICINE 0.6 MG
0.6 TABLET ORAL ONCE
Qty: 0 | Refills: 0 | Status: COMPLETED | OUTPATIENT
Start: 2017-11-11 | End: 2017-11-11

## 2017-11-11 RX ADMIN — Medication 325 MILLIGRAM(S): at 09:28

## 2017-11-11 RX ADMIN — Medication 1 APPLICATION(S): at 17:01

## 2017-11-11 RX ADMIN — Medication 0.6 MILLIGRAM(S): at 13:27

## 2017-11-11 RX ADMIN — Medication 500 MILLIGRAM(S): at 09:28

## 2017-11-11 RX ADMIN — AMLODIPINE BESYLATE 10 MILLIGRAM(S): 2.5 TABLET ORAL at 09:28

## 2017-11-11 RX ADMIN — Medication 50 MILLIGRAM(S): at 21:02

## 2017-11-11 RX ADMIN — Medication 1 APPLICATION(S): at 05:33

## 2017-11-11 RX ADMIN — CARVEDILOL PHOSPHATE 25 MILLIGRAM(S): 80 CAPSULE, EXTENDED RELEASE ORAL at 09:29

## 2017-11-11 RX ADMIN — PANTOPRAZOLE SODIUM 40 MILLIGRAM(S): 20 TABLET, DELAYED RELEASE ORAL at 05:33

## 2017-11-11 RX ADMIN — Medication 200 MILLIGRAM(S): at 23:28

## 2017-11-11 RX ADMIN — Medication 60 MILLIGRAM(S): at 05:32

## 2017-11-11 RX ADMIN — CARVEDILOL PHOSPHATE 25 MILLIGRAM(S): 80 CAPSULE, EXTENDED RELEASE ORAL at 21:00

## 2017-11-11 RX ADMIN — Medication 1 TABLET(S): at 09:28

## 2017-11-11 RX ADMIN — Medication 1 SPRAY(S): at 21:03

## 2017-11-11 RX ADMIN — Medication 10 MILLIGRAM(S): at 09:29

## 2017-11-11 RX ADMIN — Medication 25 MILLIGRAM(S): at 13:26

## 2017-11-11 RX ADMIN — Medication 25 MILLIGRAM(S): at 05:33

## 2017-11-11 RX ADMIN — WARFARIN SODIUM 7 MILLIGRAM(S): 2.5 TABLET ORAL at 21:02

## 2017-11-11 RX ADMIN — Medication 200 MILLIGRAM(S): at 13:26

## 2017-11-11 RX ADMIN — Medication 200 MILLIGRAM(S): at 05:33

## 2017-11-11 NOTE — PROGRESS NOTE ADULT - ASSESSMENT
LLE dvt on a/c  ckd 3  gout     continue current care  colchicine per renal  change to po diuretics  coumadin 7 mg tonight  f/u labs tiffanie bagley d/c tiffanie LLE dvt on a/c  ckd 3  gout   htn    continue current care  colchicine per renal  change to po diuretics  coumadin 7 mg tonight  f/u labs tiffanie bagley d/c tiffanie

## 2017-11-11 NOTE — PROGRESS NOTE ADULT - ASSESSMENT
58 y/o female PMHx chronic systolic CHF (last known EF=32%) s/p AICD, SLE, AAA approx 14cm as of 12/2016 s/p aortoiliac stent, DVT/PE (not currently anticoagulated 2/2 HIT), h/o LLE compartment syndrome, multiple pericardial effusions s/p drainage most recently in Jan 2017 now presents with recurrent large pericardial effusion     1. pericardial effusion without tamponade. size unchanged on repeat CT   	Likely chronic effusion, h/o SLE              Rheumatology following     2. CT surgery recommended no surgical intervention and drainage by interventional means   Interventional Cardiology  and Vascular recommending radiologic guidance given the nature of the effusion - appears loculated). Will again discuss the possibility and feasibility of interventional approach with interventional radiolology  started on lasix 60 mg IV daily by nephrology     3. Venous LE Doppler positive for DVT. Started on Argatroban (History Of HIT) and wants to start coumadin.  Monitor CBC    4. Abdominal pain now resolved. No acute findings on CT     5. HTN- increase hydralazine 50 mg TID

## 2017-11-11 NOTE — PROGRESS NOTE ADULT - SUBJECTIVE AND OBJECTIVE BOX
Subjective: Patient seen and examined. No new events except as noted.   feels ok  no cp or sob     REVIEW OF SYSTEMS:    CONSTITUTIONAL: No weakness, fevers or chills  EYES/ENT: No visual changes;  No vertigo or throat pain   NECK: No pain or stiffness  RESPIRATORY: No cough, wheezing, hemoptysis; No shortness of breath  CARDIOVASCULAR: No chest pain or palpitations  GASTROINTESTINAL: No abdominal or epigastric pain. No nausea, vomiting, or hematemesis; No diarrhea or constipation. No melena or hematochezia.  GENITOURINARY: No dysuria, frequency or hematuria  NEUROLOGICAL: No numbness or weakness  SKIN: No itching, burning, rashes, or lesions   All other review of systems is negative unless indicated above.    MEDICATIONS:  MEDICATIONS  (STANDING):  amLODIPine   Tablet 10 milliGRAM(s) Oral daily  AQUAPHOR (petrolatum Ointment) 1 Application(s) Topical two times a day  ascorbic acid 500 milliGRAM(s) Oral daily  BACItracin   Ointment 1 Application(s) Topical two times a day  carvedilol 25 milliGRAM(s) Oral every 12 hours  ferrous    sulfate 325 milliGRAM(s) Oral daily  furosemide    Tablet 60 milliGRAM(s) Oral daily  hydrALAZINE 50 milliGRAM(s) Oral every 8 hours  influenza   Vaccine 0.5 milliLiter(s) IntraMuscular once  labetalol 200 milliGRAM(s) Oral every 8 hours  Nephro-alyse 1 Tablet(s) Oral daily  pantoprazole    Tablet 40 milliGRAM(s) Oral before breakfast  predniSONE   Tablet 10 milliGRAM(s) Oral daily  sodium chloride 0.65% Nasal 1 Spray(s) Both Nostrils three times a day  warfarin 7 milliGRAM(s) Oral once      PHYSICAL EXAM:  T(C): 36.8 (11-11-17 @ 13:24), Max: 37.2 (11-11-17 @ 04:36)  HR: 77 (11-11-17 @ 13:24) (77 - 80)  BP: 170/77 (11-11-17 @ 13:24) (147/82 - 170/77)  RR: 18 (11-11-17 @ 13:24) (18 - 18)  SpO2: 99% (11-11-17 @ 13:24) (95% - 99%)  Wt(kg): --  I&O's Summary    10 Nov 2017 07:01  -  11 Nov 2017 07:00  --------------------------------------------------------  IN: 710 mL / OUT: 900 mL / NET: -190 mL    11 Nov 2017 07:01  -  11 Nov 2017 19:10  --------------------------------------------------------  IN: 360 mL / OUT: 1475 mL / NET: -1115 mL          Appearance: Normal	  HEENT:   Normal oral mucosa, PERRL, EOMI	  Lymphatic: No lymphadenopathy , no edema  Cardiovascular: Normal S1 S2, No JVD, No murmurs , Peripheral pulses palpable 2+ bilaterally  Respiratory: Lungs clear to auscultation, normal effort 	  Gastrointestinal:  Soft, Non-tender, + BS	  Skin: No rashes, No ecchymoses, No cyanosis, warm to touch  Musculoskeletal: Normal range of motion, normal strength  Psychiatry:  Mood & affect appropriate  Ext: chronic LLE edema  +fox     LABS:    CARDIAC MARKERS:                                10.7   5.6   )-----------( 125      ( 11 Nov 2017 09:06 )             33.5     11-11    143  |  103  |  69<H>  ----------------------------<  81  4.1   |  26  |  2.99<H>    Ca    8.9      11 Nov 2017 09:07  Phos  4.1     11-11  Mg     2.2     11-11      proBNP:   Lipid Profile:   HgA1c:   TSH:             TELEMETRY: 	    ECG:  	  RADIOLOGY:   DIAGNOSTIC TESTING:  [ ] Echocardiogram:  [ ]  Catheterization:  [ ] Stress Test:    OTHER:

## 2017-11-11 NOTE — PROGRESS NOTE ADULT - SUBJECTIVE AND OBJECTIVE BOX
[facility]   [phone]  Dr. Queen (covering for Dr. Bennett)  RENAL FOLLOW UP NOTE  --------------------------------------------------------------------------------  Patient seen and examined.  Denies chest pain / palpitations / SOB.  Reports right knee swelling and tenderness    PAST HISTORY  --------------------------------------------------------------------------------  No significant changes to PMH, PSH, FHx, SHx, unless otherwise noted    ALLERGIES & MEDICATIONS  --------------------------------------------------------------------------------  Allergies    heparin (Unknown)  losartan (Anaphylaxis)  nitroglycerin (Other)    Intolerances      Standing Inpatient Medications  amLODIPine   Tablet 10 milliGRAM(s) Oral daily  AQUAPHOR (petrolatum Ointment) 1 Application(s) Topical two times a day  ascorbic acid 500 milliGRAM(s) Oral daily  BACItracin   Ointment 1 Application(s) Topical two times a day  carvedilol 25 milliGRAM(s) Oral every 12 hours  ferrous    sulfate 325 milliGRAM(s) Oral daily  furosemide   Injectable 60 milliGRAM(s) IV Push daily  hydrALAZINE 25 milliGRAM(s) Oral every 8 hours  influenza   Vaccine 0.5 milliLiter(s) IntraMuscular once  labetalol 200 milliGRAM(s) Oral every 8 hours  Nephro-alyse 1 Tablet(s) Oral daily  pantoprazole    Tablet 40 milliGRAM(s) Oral before breakfast  predniSONE   Tablet 10 milliGRAM(s) Oral daily  sodium chloride 0.65% Nasal 1 Spray(s) Both Nostrils three times a day    PRN Inpatient Medications      FOCUSED REVIEW OF SYSTEMS  --------------------------------------------------------------------------------  Denies chest pain/palpitations  reports improved LLE edema  +R knee pain      VITALS/PHYSICAL EXAM  --------------------------------------------------------------------------------  T(C): 37.2 (11-11-17 @ 04:36), Max: 37.2 (11-11-17 @ 04:36)  HR: 78 (11-11-17 @ 09:28) (77 - 82)  BP: 158/90 (11-11-17 @ 09:28) (147/82 - 158/90)  RR: 18 (11-11-17 @ 04:36) (18 - 18)  SpO2: 97% (11-11-17 @ 04:36) (95% - 98%)  Wt(kg): --        11-10-17 @ 07:01  -  11-11-17 @ 07:00  --------------------------------------------------------  IN: 710 mL / OUT: 900 mL / NET: -190 mL    11-11-17 @ 07:01  -  11-11-17 @ 11:50  --------------------------------------------------------  IN: 0 mL / OUT: 1100 mL / NET: -1100 mL      Physical Exam:  	Gen: NAD, obese  	Pulm: CTA B/L  	CV: RRR, S1S2  	Abd: +BS, soft, nontender/nondistended  	: No suprapubic tenderness.  + fox              Extremity: No cyanosis, + chronic LLE edema with wrap in place.  +R knee TTP  	Neuro: A&O x 3      LABS/STUDIES  --------------------------------------------------------------------------------              10.7   5.6   >-----------<  125      [11-11-17 @ 09:06]              33.5     143  |  103  |  69  ----------------------------<  81      [11-11-17 @ 09:07]  4.1   |  26  |  2.99        Ca     8.9     [11-11-17 @ 09:07]      Mg     2.2     [11-11-17 @ 09:07]      Phos  4.1     [11-11-17 @ 09:07]      PT/INR: PT 27.9 , INR 2.53       [11-11-17 @ 09:06]      eGFR if Non African American: 16 mL/min/1.73M2 (11-11 @ 09:07)  eGFR if : 19 mL/min/1.73M2 (11-11 @ 09:07)    Creatinine Trend:  SCr 2.99 [11-11 @ 09:07]  SCr 2.91 [11-10 @ 06:22]  SCr 2.93 [11-09 @ 06:50]  SCr 2.89 [11-08 @ 06:41]  SCr 2.83 [11-07 @ 05:55]                  Iron 48, TIBC --, %sat --      [10-12-17 @ 07:14]  Ferritin 847.0      [10-12-17 @ 07:14]  PTH -- (Ca 9.0)      [11-09-17 @ 08:51]   387  Vitamin D (25OH) 7.8      [11-09-17 @ 08:51]  HbA1c 4.5      [08-09-16 @ 06:49]  TSH 0.33      [10-22-17 @ 08:40]    dsDNA 44      [10-17-17 @ 08:26]  C3 Complement 105      [10-17-17 @ 08:26]  C4 Complement 31      [10-17-17 @ 08:26]

## 2017-11-11 NOTE — PROGRESS NOTE ADULT - SUBJECTIVE AND OBJECTIVE BOX
MEDICINE, PROGRESS NOTE [phone]    COSTEN, SANDRA 57y [id number]    Patient seen and examined.  Patient is a 57y old  Female who presents with a chief complaint of shortness of breath (11 Oct 2017 22:01)  Pt feels ok but has right knee pain.    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)  Chronic Gout (ICD9 274.02)  LBBB (Left Bundle Branch Block) (ICD9 426.3)  CHF (Congestive Heart Failure) (ICD9 428.0)  Hx of aorta-iliac-femoral bypass  TOP (Termination of Pregnancy)  S/P Dilatation and Curettage  History of Tubal Ligation  s/p AAA (Abdominal Aortic Aneurysm) Repair: 2009 2010  History of Cholecystectomy  S/P Partial Hysterectomy    MEDICATIONS  (STANDING):  amLODIPine   Tablet 10 milliGRAM(s) Oral daily  AQUAPHOR (petrolatum Ointment) 1 Application(s) Topical two times a day  ascorbic acid 500 milliGRAM(s) Oral daily  BACItracin   Ointment 1 Application(s) Topical two times a day  carvedilol 25 milliGRAM(s) Oral every 12 hours  ferrous    sulfate 325 milliGRAM(s) Oral daily  furosemide    Tablet 60 milliGRAM(s) Oral daily  hydrALAZINE 25 milliGRAM(s) Oral every 8 hours  influenza   Vaccine 0.5 milliLiter(s) IntraMuscular once  labetalol 200 milliGRAM(s) Oral every 8 hours  Nephro-alyse 1 Tablet(s) Oral daily  pantoprazole    Tablet 40 milliGRAM(s) Oral before breakfast  predniSONE   Tablet 10 milliGRAM(s) Oral daily  sodium chloride 0.65% Nasal 1 Spray(s) Both Nostrils three times a day    MEDICATIONS  (PRN):    Allergies    heparin (Unknown)  losartan (Anaphylaxis)  nitroglycerin (Other)    Intolerances        PHYSICAL EXAM:  Constitutional: NAD  HEENT: Normocephalic, EOMI  Neck:  No JVD  Respiratory: CTA B/L, No wheezes  Cardiovascular: S1, S2, RRR, + systolic murmur  Gastrointestinal: BS+, soft, NT/ND  Extremities: + peripheral edema le b/l L>R, left leg less edematous today than yesterday, Right knee tenderness  Neurological: AAOX3, no focal deficits  Psychiatric: Normal mood, normal affect  : + Hook    Vital Signs Last 24 Hrs  T(C): 36.8 (11 Nov 2017 13:24), Max: 37.2 (11 Nov 2017 04:36)  T(F): 98.2 (11 Nov 2017 13:24), Max: 98.9 (11 Nov 2017 04:36)  HR: 77 (11 Nov 2017 13:24) (77 - 80)  BP: 170/77 (11 Nov 2017 13:24) (147/82 - 170/77)  BP(mean): --  RR: 18 (11 Nov 2017 13:24) (18 - 18)  SpO2: 99% (11 Nov 2017 13:24) (95% - 99%)  I&O's Summary    10 Nov 2017 07:01  -  11 Nov 2017 07:00  --------------------------------------------------------  IN: 710 mL / OUT: 900 mL / NET: -190 mL    11 Nov 2017 07:01  -  11 Nov 2017 18:32  --------------------------------------------------------  IN: 360 mL / OUT: 1475 mL / NET: -1115 mL        LABS:                        10.7   5.6   )-----------( 125      ( 11 Nov 2017 09:06 )             33.5     11-11    143  |  103  |  69<H>  ----------------------------<  81  4.1   |  26  |  2.99<H>    Ca    8.9      11 Nov 2017 09:07  Phos  4.1     11-11  Mg     2.2     11-11          Magnesium, Serum: 2.2 mg/dL (11-11 @ 09:07)

## 2017-11-11 NOTE — PROGRESS NOTE ADULT - PROBLEM SELECTOR PLAN 1
Diuretics changed to PO in setting of worsening azotemia  Hopeful that creatinine is beginning to plateau  Dose meds for eGFR ~15-20  Avoid NSAIDs and nephrotoxins as able

## 2017-11-12 DIAGNOSIS — N25.81 SECONDARY HYPERPARATHYROIDISM OF RENAL ORIGIN: ICD-10-CM

## 2017-11-12 LAB
ANION GAP SERPL CALC-SCNC: 13 MMOL/L — SIGNIFICANT CHANGE UP (ref 5–17)
BUN SERPL-MCNC: 75 MG/DL — HIGH (ref 7–23)
CALCIUM SERPL-MCNC: 8.9 MG/DL — SIGNIFICANT CHANGE UP (ref 8.4–10.5)
CHLORIDE SERPL-SCNC: 105 MMOL/L — SIGNIFICANT CHANGE UP (ref 96–108)
CO2 SERPL-SCNC: 27 MMOL/L — SIGNIFICANT CHANGE UP (ref 22–31)
CREAT SERPL-MCNC: 2.9 MG/DL — HIGH (ref 0.5–1.3)
GLUCOSE SERPL-MCNC: 80 MG/DL — SIGNIFICANT CHANGE UP (ref 70–99)
HCT VFR BLD CALC: 32.8 % — LOW (ref 34.5–45)
HGB BLD-MCNC: 10.3 G/DL — LOW (ref 11.5–15.5)
INR BLD: 2.47 RATIO — HIGH (ref 0.88–1.16)
MCHC RBC-ENTMCNC: 29 PG — SIGNIFICANT CHANGE UP (ref 27–34)
MCHC RBC-ENTMCNC: 31.5 GM/DL — LOW (ref 32–36)
MCV RBC AUTO: 92.1 FL — SIGNIFICANT CHANGE UP (ref 80–100)
PLATELET # BLD AUTO: 126 K/UL — LOW (ref 150–400)
POTASSIUM SERPL-MCNC: 4.1 MMOL/L — SIGNIFICANT CHANGE UP (ref 3.5–5.3)
POTASSIUM SERPL-SCNC: 4.1 MMOL/L — SIGNIFICANT CHANGE UP (ref 3.5–5.3)
PROTHROM AB SERPL-ACNC: 27.2 SEC — HIGH (ref 9.8–12.7)
RBC # BLD: 3.56 M/UL — LOW (ref 3.8–5.2)
RBC # FLD: 14.9 % — HIGH (ref 10.3–14.5)
SODIUM SERPL-SCNC: 145 MMOL/L — SIGNIFICANT CHANGE UP (ref 135–145)
WBC # BLD: 5.6 K/UL — SIGNIFICANT CHANGE UP (ref 3.8–10.5)
WBC # FLD AUTO: 5.6 K/UL — SIGNIFICANT CHANGE UP (ref 3.8–10.5)

## 2017-11-12 RX ORDER — CALCITRIOL 0.5 UG/1
0.25 CAPSULE ORAL DAILY
Qty: 0 | Refills: 0 | Status: DISCONTINUED | OUTPATIENT
Start: 2017-11-12 | End: 2017-11-18

## 2017-11-12 RX ORDER — WARFARIN SODIUM 2.5 MG/1
7 TABLET ORAL ONCE
Qty: 0 | Refills: 0 | Status: COMPLETED | OUTPATIENT
Start: 2017-11-12 | End: 2017-11-12

## 2017-11-12 RX ADMIN — AMLODIPINE BESYLATE 10 MILLIGRAM(S): 2.5 TABLET ORAL at 08:23

## 2017-11-12 RX ADMIN — Medication 1 APPLICATION(S): at 17:19

## 2017-11-12 RX ADMIN — WARFARIN SODIUM 7 MILLIGRAM(S): 2.5 TABLET ORAL at 22:39

## 2017-11-12 RX ADMIN — PANTOPRAZOLE SODIUM 40 MILLIGRAM(S): 20 TABLET, DELAYED RELEASE ORAL at 05:29

## 2017-11-12 RX ADMIN — Medication 200 MILLIGRAM(S): at 17:19

## 2017-11-12 RX ADMIN — Medication 1 TABLET(S): at 10:01

## 2017-11-12 RX ADMIN — Medication 1 SPRAY(S): at 22:39

## 2017-11-12 RX ADMIN — Medication 10 MILLIGRAM(S): at 10:04

## 2017-11-12 RX ADMIN — Medication 325 MILLIGRAM(S): at 10:01

## 2017-11-12 RX ADMIN — Medication 50 MILLIGRAM(S): at 22:39

## 2017-11-12 RX ADMIN — Medication 1 APPLICATION(S): at 05:30

## 2017-11-12 RX ADMIN — Medication 1 SPRAY(S): at 05:29

## 2017-11-12 RX ADMIN — CARVEDILOL PHOSPHATE 25 MILLIGRAM(S): 80 CAPSULE, EXTENDED RELEASE ORAL at 10:04

## 2017-11-12 RX ADMIN — Medication 60 MILLIGRAM(S): at 05:29

## 2017-11-12 RX ADMIN — Medication 200 MILLIGRAM(S): at 22:38

## 2017-11-12 RX ADMIN — Medication 200 MILLIGRAM(S): at 08:22

## 2017-11-12 RX ADMIN — CALCITRIOL 0.25 MICROGRAM(S): 0.5 CAPSULE ORAL at 13:32

## 2017-11-12 RX ADMIN — Medication 50 MILLIGRAM(S): at 05:29

## 2017-11-12 RX ADMIN — CARVEDILOL PHOSPHATE 25 MILLIGRAM(S): 80 CAPSULE, EXTENDED RELEASE ORAL at 20:30

## 2017-11-12 RX ADMIN — Medication 50 MILLIGRAM(S): at 13:32

## 2017-11-12 RX ADMIN — Medication 500 MILLIGRAM(S): at 10:04

## 2017-11-12 NOTE — PROGRESS NOTE ADULT - SUBJECTIVE AND OBJECTIVE BOX
MEDICINE, PROGRESS NOTE [phone]    COSTEN, SANDRA 57y [id number]    Patient seen and examined.  Patient is a 57y old  Female who presents with a chief complaint of shortness of breath (11 Oct 2017 22:01)  Pt feels better today.    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)  Chronic Gout (ICD9 274.02)  LBBB (Left Bundle Branch Block) (ICD9 426.3)  CHF (Congestive Heart Failure) (ICD9 428.0)  Hx of aorta-iliac-femoral bypass  TOP (Termination of Pregnancy)  S/P Dilatation and Curettage  History of Tubal Ligation  s/p AAA (Abdominal Aortic Aneurysm) Repair: 2009 2010  History of Cholecystectomy  S/P Partial Hysterectomy    MEDICATIONS  (STANDING):  amLODIPine   Tablet 10 milliGRAM(s) Oral daily  AQUAPHOR (petrolatum Ointment) 1 Application(s) Topical two times a day  ascorbic acid 500 milliGRAM(s) Oral daily  BACItracin   Ointment 1 Application(s) Topical two times a day  calcitriol   Capsule 0.25 MICROGram(s) Oral daily  carvedilol 25 milliGRAM(s) Oral every 12 hours  ferrous    sulfate 325 milliGRAM(s) Oral daily  furosemide    Tablet 60 milliGRAM(s) Oral daily  hydrALAZINE 50 milliGRAM(s) Oral every 8 hours  influenza   Vaccine 0.5 milliLiter(s) IntraMuscular once  labetalol 200 milliGRAM(s) Oral every 8 hours  Nephro-alyse 1 Tablet(s) Oral daily  pantoprazole    Tablet 40 milliGRAM(s) Oral before breakfast  predniSONE   Tablet 10 milliGRAM(s) Oral daily  sodium chloride 0.65% Nasal 1 Spray(s) Both Nostrils three times a day  warfarin 7 milliGRAM(s) Oral once    MEDICATIONS  (PRN):    Allergies    heparin (Unknown)  losartan (Anaphylaxis)  nitroglycerin (Other)    Intolerances        PHYSICAL EXAM:  Constitutional: NAD  HEENT: Normocephalic, EOMI  Neck:  No JVD  Respiratory: CTA B/L, No wheezes  Cardiovascular: S1, S2, RRR, + systolic murmur  Gastrointestinal: BS+, soft, NT/ND  Extremities: + peripheral edema le b/l L>R  Neurological: AAOX3, no focal deficits  Psychiatric: Normal mood, normal affect  : + Hook    Vital Signs Last 24 Hrs  T(C): 36.7 (12 Nov 2017 13:30), Max: 36.7 (11 Nov 2017 21:01)  T(F): 98 (12 Nov 2017 13:30), Max: 98.1 (12 Nov 2017 04:39)  HR: 76 (12 Nov 2017 17:02) (76 - 90)  BP: 152/84 (12 Nov 2017 17:02) (135/79 - 161/89)  BP(mean): --  RR: 18 (12 Nov 2017 13:30) (18 - 18)  SpO2: 97% (12 Nov 2017 13:30) (97% - 97%)  I&O's Summary    11 Nov 2017 07:01  -  12 Nov 2017 07:00  --------------------------------------------------------  IN: 600 mL / OUT: 2575 mL / NET: -1975 mL    12 Nov 2017 07:01  -  12 Nov 2017 18:46  --------------------------------------------------------  IN: 420 mL / OUT: 1100 mL / NET: -680 mL        LABS:                        10.3   5.6   )-----------( 126      ( 12 Nov 2017 06:38 )             32.8     11-12    145  |  105  |  75<H>  ----------------------------<  80  4.1   |  27  |  2.90<H>    Ca    8.9      12 Nov 2017 06:38  Phos  4.1     11-11  Mg     2.2     11-11

## 2017-11-12 NOTE — PROGRESS NOTE ADULT - SUBJECTIVE AND OBJECTIVE BOX
Subjective: Patient seen and examined. No new events except as noted.   feels ok     REVIEW OF SYSTEMS:    CONSTITUTIONAL: + weakness, no fevers or chills  EYES/ENT: No visual changes;  No vertigo or throat pain   NECK: No pain or stiffness  RESPIRATORY: No cough, wheezing, hemoptysis; No shortness of breath  CARDIOVASCULAR: No chest pain or palpitations  GASTROINTESTINAL: No abdominal or epigastric pain. No nausea, vomiting, or hematemesis; No diarrhea or constipation. No melena or hematochezia.  GENITOURINARY: No dysuria, frequency or hematuria  NEUROLOGICAL: No numbness or weakness  SKIN: No itching, burning, rashes, or lesions   All other review of systems is negative unless indicated above.    MEDICATIONS:  MEDICATIONS  (STANDING):  amLODIPine   Tablet 10 milliGRAM(s) Oral daily  AQUAPHOR (petrolatum Ointment) 1 Application(s) Topical two times a day  ascorbic acid 500 milliGRAM(s) Oral daily  BACItracin   Ointment 1 Application(s) Topical two times a day  carvedilol 25 milliGRAM(s) Oral every 12 hours  ferrous    sulfate 325 milliGRAM(s) Oral daily  furosemide    Tablet 60 milliGRAM(s) Oral daily  hydrALAZINE 50 milliGRAM(s) Oral every 8 hours  influenza   Vaccine 0.5 milliLiter(s) IntraMuscular once  labetalol 200 milliGRAM(s) Oral every 8 hours  Nephro-alyse 1 Tablet(s) Oral daily  pantoprazole    Tablet 40 milliGRAM(s) Oral before breakfast  predniSONE   Tablet 10 milliGRAM(s) Oral daily  sodium chloride 0.65% Nasal 1 Spray(s) Both Nostrils three times a day      PHYSICAL EXAM:  T(C): 36.7 (11-12-17 @ 04:39), Max: 36.8 (11-11-17 @ 13:24)  HR: 84 (11-12-17 @ 08:21) (77 - 90)  BP: 161/89 (11-12-17 @ 08:21) (135/79 - 170/77)  RR: 18 (11-12-17 @ 04:39) (18 - 18)  SpO2: 97% (11-12-17 @ 04:39) (97% - 99%)  Wt(kg): --  I&O's Summary    11 Nov 2017 07:01  -  12 Nov 2017 07:00  --------------------------------------------------------  IN: 600 mL / OUT: 2575 mL / NET: -1975 mL          Appearance: Normal	  HEENT:   Normal oral mucosa, PERRL, EOMI	  Lymphatic: No lymphadenopathy , no edema  Cardiovascular: Normal S1 S2, No JVD, No murmurs , Peripheral pulses palpable 2+ bilaterally  Respiratory: Lungs clear to auscultation, normal effort 	  Gastrointestinal:  Soft, Non-tender, + BS	  Skin: No rashes, No ecchymoses, No cyanosis, warm to touch  Musculoskeletal: Normal range of motion, normal strength  Psychiatry:  Mood & affect appropriate  Ext: LLE chronic edema      LABS:    CARDIAC MARKERS:                                10.3   5.6   )-----------( 126      ( 12 Nov 2017 06:38 )             32.8     11-12    145  |  105  |  75<H>  ----------------------------<  80  4.1   |  27  |  2.90<H>    Ca    8.9      12 Nov 2017 06:38  Phos  4.1     11-11  Mg     2.2     11-11      proBNP:   Lipid Profile:   HgA1c:   TSH:             TELEMETRY: 	    ECG:  	  RADIOLOGY:   DIAGNOSTIC TESTING:  [ ] Echocardiogram:  [ ]  Catheterization:  [ ] Stress Test:    OTHER:

## 2017-11-12 NOTE — PROGRESS NOTE ADULT - PROBLEM SELECTOR PLAN 1
Diuretics changed to PO in setting of worsening azotemia, renal function now improving  Dose meds for eGFR ~15-20  Avoid NSAIDs and nephrotoxins as able

## 2017-11-12 NOTE — PROGRESS NOTE ADULT - SUBJECTIVE AND OBJECTIVE BOX
[facility]   [phone]  Dr. Queen (covering for Dr. Bennett)  RENAL FOLLOW UP NOTE  --------------------------------------------------------------------------------  Patient seen and examined.  States right knee pain is better s/p colchicine.  c/o ongoing leg cramps    PAST HISTORY  --------------------------------------------------------------------------------  No significant changes to PMH, PSH, FHx, SHx, unless otherwise noted    ALLERGIES & MEDICATIONS  --------------------------------------------------------------------------------  Allergies    heparin (Unknown)  losartan (Anaphylaxis)  nitroglycerin (Other)    Intolerances      Standing Inpatient Medications  amLODIPine   Tablet 10 milliGRAM(s) Oral daily  AQUAPHOR (petrolatum Ointment) 1 Application(s) Topical two times a day  ascorbic acid 500 milliGRAM(s) Oral daily  BACItracin   Ointment 1 Application(s) Topical two times a day  calcitriol   Capsule 0.25 MICROGram(s) Oral daily  carvedilol 25 milliGRAM(s) Oral every 12 hours  ferrous    sulfate 325 milliGRAM(s) Oral daily  furosemide    Tablet 60 milliGRAM(s) Oral daily  hydrALAZINE 50 milliGRAM(s) Oral every 8 hours  influenza   Vaccine 0.5 milliLiter(s) IntraMuscular once  labetalol 200 milliGRAM(s) Oral every 8 hours  Nephro-alyse 1 Tablet(s) Oral daily  pantoprazole    Tablet 40 milliGRAM(s) Oral before breakfast  predniSONE   Tablet 10 milliGRAM(s) Oral daily  sodium chloride 0.65% Nasal 1 Spray(s) Both Nostrils three times a day    PRN Inpatient Medications      FOCUSED REVIEW OF SYSTEMS  --------------------------------------------------------------------------------  CVS: Denies CP/palpitations  Resp: Denies SOB  MSK: Improved R knee pain  Neuro: ongoing peripheral cramps      VITALS/PHYSICAL EXAM  --------------------------------------------------------------------------------  T(C): 36.7 (11-12-17 @ 04:39), Max: 36.8 (11-11-17 @ 13:24)  HR: 84 (11-12-17 @ 08:21) (77 - 90)  BP: 161/89 (11-12-17 @ 08:21) (135/79 - 170/77)  RR: 18 (11-12-17 @ 04:39) (18 - 18)  SpO2: 97% (11-12-17 @ 04:39) (97% - 99%)  Wt(kg): --        11-11-17 @ 07:01  -  11-12-17 @ 07:00  --------------------------------------------------------  IN: 600 mL / OUT: 2575 mL / NET: -1975 mL    11-12-17 @ 07:01  -  11-12-17 @ 12:35  --------------------------------------------------------  IN: 180 mL / OUT: 1100 mL / NET: -920 mL      Physical Exam:  	Gen: NAD, well-appearing  	Pulm: CTA B/L no w/r/r  	CV: RRR, S1S2  	Abd: +BS, soft, nontender/nondistended  	: No suprapubic tenderness.  + fox         	 Extremity: chronic LLE edema with wrap in place.  	Neuro: A&O x 3      LABS/STUDIES  --------------------------------------------------------------------------------              10.3   5.6   >-----------<  126      [11-12-17 @ 06:38]              32.8     145  |  105  |  75  ----------------------------<  80      [11-12-17 @ 06:38]  4.1   |  27  |  2.90        Ca     8.9     [11-12-17 @ 06:38]      Mg     2.2     [11-11-17 @ 09:07]      Phos  4.1     [11-11-17 @ 09:07]      PT/INR: PT 27.2 , INR 2.47       [11-12-17 @ 06:38]      eGFR if Non African American: 17 mL/min/1.73M2 (11-12 @ 06:38)  eGFR if African American: 20 mL/min/1.73M2 (11-12 @ 06:38)    Creatinine Trend:  SCr 2.90 [11-12 @ 06:38]  SCr 2.99 [11-11 @ 09:07]  SCr 2.91 [11-10 @ 06:22]  SCr 2.93 [11-09 @ 06:50]  SCr 2.89 [11-08 @ 06:41]                  Iron 48, TIBC --, %sat --      [10-12-17 @ 07:14]  Ferritin 847.0      [10-12-17 @ 07:14]  PTH -- (Ca 9.0)      [11-09-17 @ 08:51]   387  Vitamin D (25OH) 7.8      [11-09-17 @ 08:51]  HbA1c 4.5      [08-09-16 @ 06:49]  TSH 0.33      [10-22-17 @ 08:40]    dsDNA 44      [10-17-17 @ 08:26]  C3 Complement 105      [10-17-17 @ 08:26]  C4 Complement 31      [10-17-17 @ 08:26]

## 2017-11-12 NOTE — PROGRESS NOTE ADULT - ASSESSMENT
58 y/o female PMHx chronic systolic CHF (last known EF=32%) s/p AICD, SLE, AAA approx 14cm as of 12/2016 s/p aortoiliac stent, DVT/PE (not currently anticoagulated 2/2 HIT), h/o LLE compartment syndrome, multiple pericardial effusions s/p drainage most recently in Jan 2017 now presents with recurrent large pericardial effusion     1. pericardial effusion without tamponade. size unchanged on repeat CT   	Likely chronic effusion, h/o SLE              Rheumatology following     2. CT surgery recommended no surgical intervention and drainage by interventional means   Interventional Cardiology  and Vascular recommending radiologic guidance given the nature of the effusion - appears loculated). Will again discuss the possibility and feasibility of interventional approach with interventional radiolology  started on lasix 60 mg IV daily by nephrology     3. Venous LE Doppler positive for DVT. Started on Argatroban (History Of HIT) and wants to start coumadin.  Monitor CBC    4. Abdominal pain now resolved. No acute findings on CT     5. HTN- continue hydralazine 50 mg TID

## 2017-11-12 NOTE — PROGRESS NOTE ADULT - ASSESSMENT
58 y/o female with SLE, systolic CHF (EF 32%) s/p AICD, HTN, CKD III- IV, PE / left leg DVT dx 2009, HIT, CAD (no stents), stage IV sacral ulcer, chronic fox, hx of recurrent pericardial effusion, AAA ~14cm in 12/2016 s/p repair in 2010 w/ aortoiliac stent c/b post-operatively with development of left lower extremity lymphedema December 2016;  pericardial effusion pericardial drain/SLE. now with ILANA on CKD with baseline creatinine approx 1.8-2, possible gout flare, and now noted to have secondary hyperparathyroidism and vitamin D deficiency

## 2017-11-12 NOTE — PROGRESS NOTE ADULT - ASSESSMENT
HTN better controlled  dvt on a/c  chronic fox    continue current care  coumadin 7 mg tonight  monitor bp.  d/c planning tiffanie

## 2017-11-13 LAB
ANION GAP SERPL CALC-SCNC: 14 MMOL/L — SIGNIFICANT CHANGE UP (ref 5–17)
BUN SERPL-MCNC: 76 MG/DL — HIGH (ref 7–23)
CALCIUM SERPL-MCNC: 8.9 MG/DL — SIGNIFICANT CHANGE UP (ref 8.4–10.5)
CHLORIDE SERPL-SCNC: 105 MMOL/L — SIGNIFICANT CHANGE UP (ref 96–108)
CO2 SERPL-SCNC: 27 MMOL/L — SIGNIFICANT CHANGE UP (ref 22–31)
CREAT SERPL-MCNC: 2.99 MG/DL — HIGH (ref 0.5–1.3)
GLUCOSE SERPL-MCNC: 81 MG/DL — SIGNIFICANT CHANGE UP (ref 70–99)
HCT VFR BLD CALC: 32.3 % — LOW (ref 34.5–45)
HGB BLD-MCNC: 10.2 G/DL — LOW (ref 11.5–15.5)
INR BLD: 2.75 RATIO — HIGH (ref 0.88–1.16)
MCHC RBC-ENTMCNC: 29.1 PG — SIGNIFICANT CHANGE UP (ref 27–34)
MCHC RBC-ENTMCNC: 31.5 GM/DL — LOW (ref 32–36)
MCV RBC AUTO: 92.3 FL — SIGNIFICANT CHANGE UP (ref 80–100)
PLATELET # BLD AUTO: 125 K/UL — LOW (ref 150–400)
POTASSIUM SERPL-MCNC: 4.2 MMOL/L — SIGNIFICANT CHANGE UP (ref 3.5–5.3)
POTASSIUM SERPL-SCNC: 4.2 MMOL/L — SIGNIFICANT CHANGE UP (ref 3.5–5.3)
PROTHROM AB SERPL-ACNC: 30.6 SEC — HIGH (ref 9.8–12.7)
RBC # BLD: 3.51 M/UL — LOW (ref 3.8–5.2)
RBC # FLD: 14.9 % — HIGH (ref 10.3–14.5)
SODIUM SERPL-SCNC: 146 MMOL/L — HIGH (ref 135–145)
WBC # BLD: 4.9 K/UL — SIGNIFICANT CHANGE UP (ref 3.8–10.5)
WBC # FLD AUTO: 4.9 K/UL — SIGNIFICANT CHANGE UP (ref 3.8–10.5)

## 2017-11-13 RX ORDER — LABETALOL HCL 100 MG
300 TABLET ORAL THREE TIMES A DAY
Qty: 0 | Refills: 0 | Status: DISCONTINUED | OUTPATIENT
Start: 2017-11-13 | End: 2017-11-18

## 2017-11-13 RX ORDER — WARFARIN SODIUM 2.5 MG/1
7 TABLET ORAL ONCE
Qty: 0 | Refills: 0 | Status: DISCONTINUED | OUTPATIENT
Start: 2017-11-13 | End: 2017-11-13

## 2017-11-13 RX ORDER — WARFARIN SODIUM 2.5 MG/1
6 TABLET ORAL ONCE
Qty: 0 | Refills: 0 | Status: COMPLETED | OUTPATIENT
Start: 2017-11-13 | End: 2017-11-13

## 2017-11-13 RX ORDER — ACETAMINOPHEN 500 MG
650 TABLET ORAL ONCE
Qty: 0 | Refills: 0 | Status: COMPLETED | OUTPATIENT
Start: 2017-11-13 | End: 2017-11-13

## 2017-11-13 RX ADMIN — Medication 300 MILLIGRAM(S): at 21:12

## 2017-11-13 RX ADMIN — Medication 1 APPLICATION(S): at 05:57

## 2017-11-13 RX ADMIN — AMLODIPINE BESYLATE 10 MILLIGRAM(S): 2.5 TABLET ORAL at 09:44

## 2017-11-13 RX ADMIN — Medication 10 MILLIGRAM(S): at 09:45

## 2017-11-13 RX ADMIN — WARFARIN SODIUM 6 MILLIGRAM(S): 2.5 TABLET ORAL at 22:41

## 2017-11-13 RX ADMIN — Medication 200 MILLIGRAM(S): at 05:57

## 2017-11-13 RX ADMIN — Medication 1 APPLICATION(S): at 17:10

## 2017-11-13 RX ADMIN — Medication 1 TABLET(S): at 09:46

## 2017-11-13 RX ADMIN — Medication 50 MILLIGRAM(S): at 05:57

## 2017-11-13 RX ADMIN — PANTOPRAZOLE SODIUM 40 MILLIGRAM(S): 20 TABLET, DELAYED RELEASE ORAL at 05:57

## 2017-11-13 RX ADMIN — Medication 1 SPRAY(S): at 05:57

## 2017-11-13 RX ADMIN — Medication 650 MILLIGRAM(S): at 12:50

## 2017-11-13 RX ADMIN — Medication 50 MILLIGRAM(S): at 13:26

## 2017-11-13 RX ADMIN — Medication 50 MILLIGRAM(S): at 21:10

## 2017-11-13 RX ADMIN — CARVEDILOL PHOSPHATE 25 MILLIGRAM(S): 80 CAPSULE, EXTENDED RELEASE ORAL at 09:44

## 2017-11-13 RX ADMIN — Medication 60 MILLIGRAM(S): at 05:57

## 2017-11-13 RX ADMIN — Medication 300 MILLIGRAM(S): at 13:27

## 2017-11-13 RX ADMIN — CALCITRIOL 0.25 MICROGRAM(S): 0.5 CAPSULE ORAL at 09:46

## 2017-11-13 RX ADMIN — CARVEDILOL PHOSPHATE 25 MILLIGRAM(S): 80 CAPSULE, EXTENDED RELEASE ORAL at 21:09

## 2017-11-13 RX ADMIN — Medication 500 MILLIGRAM(S): at 09:44

## 2017-11-13 RX ADMIN — Medication 1 SPRAY(S): at 13:26

## 2017-11-13 RX ADMIN — Medication 325 MILLIGRAM(S): at 09:45

## 2017-11-13 RX ADMIN — Medication 650 MILLIGRAM(S): at 13:20

## 2017-11-13 RX ADMIN — Medication 1 SPRAY(S): at 21:10

## 2017-11-13 NOTE — PROGRESS NOTE ADULT - ASSESSMENT
58 yo F w/ hx of SLE, systolic CHF (EF 32%) s/p AICD, HTN, CKD III- IV, PE / left leg DVT dx 2009, HIT, CAD (no stents), stage IV sacral ulcer, chronic fox, hx of recurrent pericardial effusion, AAA ~14cm in 12/2016 s/p repair in 2010 w/ aortoiliac stent c/b post-operatively with development of left lower extremity   lymphedema December 2016;  pericardial effusion pericardial drain/SLE. now with ilana on ckd with baseline creatinine approx 1.8-2.     1- ILANA on ckd III  2- chf hx  3- cardiomyopathy  4- HTN   5- SLE  6- proteinuria  7- gout     cr worsening slowly fluid status imrproved significantly. arb when cr stabilizes for a few days   lasix to 60 mg po qd  prednisone 10mg daily   uloric 40mg   cont with labetalol for bp control and hydralazine  add colcrys 0.6mg every other day

## 2017-11-13 NOTE — CHART NOTE - NSCHARTNOTEFT_GEN_A_CORE
Source: Patient [ x]    Family [ ]     other [x]chart    Diet : LOW SODIUM  Miguel 2 packs daily      Patient reports [ ] nausea  [ ] vomiting [ ] diarrhea [ ] constipation  [ ]chewing problems [ ] swallowing issues  [ X] other: No issues related to food and nutrition. she is consuming what she is ordering. in addition she is consuming Miguel 2 x daily      PO intake:  < 50% [ ] 50-75% [ ]   % [ ]  other :     Source for PO intake [x ] Patient [ ] family [ ] chart [ ] staff [ ] other     Enteral /Parenteral Nutrition:       Current Weight: 240.3pounds/109kg  % Weight Change:3% loss since previous assess on 11/2.    Pertinent Medications: MEDICATIONS  (STANDING):  amLODIPine   Tablet 10 milliGRAM(s) Oral daily  AQUAPHOR (petrolatum Ointment) 1 Application(s) Topical two times a day  ascorbic acid 500 milliGRAM(s) Oral daily  BACItracin   Ointment 1 Application(s) Topical two times a day  calcitriol   Capsule 0.25 MICROGram(s) Oral daily  carvedilol 25 milliGRAM(s) Oral every 12 hours  ferrous    sulfate 325 milliGRAM(s) Oral daily  furosemide    Tablet 60 milliGRAM(s) Oral daily  hydrALAZINE 50 milliGRAM(s) Oral every 8 hours  influenza   Vaccine 0.5 milliLiter(s) IntraMuscular once  labetalol 200 milliGRAM(s) Oral every 8 hours  Nephro-alyse 1 Tablet(s) Oral daily  pantoprazole    Tablet 40 milliGRAM(s) Oral before breakfast  predniSONE   Tablet 10 milliGRAM(s) Oral daily  sodium chloride 0.65% Nasal 1 Spray(s) Both Nostrils three times a day    MEDICATIONS  (PRN):    Pertinent Labs:  11-13 Na146 mmol/L<H> Glu 81 mg/dL K+ 4.2 mmol/L Cr  2.99 mg/dL<H> BUN 76 mg/dL<H>        Skin: +1 right leg, +4 left leg/knee, pressure ulcer stage 4 right ischium    Estimated Needs:   [x ] no change since previous assessment  [ ] recalculated:       Previous Nutrition Diagnosis:     [ ] Inadequate Energy Intake [ ]Inadequate Oral Intake [ ] Excessive Energy Intake     [ ] Underweight [x ] Increased Nutrient Needs (protein) [ ] Overweight/Obesity     [ ] Altered GI Function [ ] Unintended Weight Loss [ ] Food & Nutrition Related Knowledge Deficit [ ] Malnutrition          Nutrition Diagnosis is [ x] ongoing  [ ] resolved [ ] not applicable          New Nutrition Diagnosis: [x ] not applicable    [ ] Inadequate Protein Energy Intake [ ]Inadequate Oral Intake [ ] Excessive Energy Intake     [ ] Underweight [ ] Increased Nutrient Needs [ ] Overweight/Obesity     [ ] Altered GI Function [ ] Unintended Weight Loss [ ] Food & Nutrition Related Knowledge Deficit[ ] Limited Adherence to nutrition related recommendations [ ] Malnutrition  [ ] other: Free text       Related to:      As evidenced by:      Interventions:     Recommend    [ x] Change Diet To: Low Sodium/60Gram protein    [] Nutrition Supplement    [ ] Nutrition Support    [ ] Other:        Monitoring and Evaluation:     [x ] PO intake [ x] Tolerance to diet prescription [x ] weights [ x] follow up per protocol    [ ] other:

## 2017-11-13 NOTE — PROGRESS NOTE ADULT - ASSESSMENT
Patient is a 56 yo F w/ hx of SLE, systolic CHF (EF 32%) s/p AICD, HTN, CKD III- IV, PE / left leg DVT/ PE in  2009 followed by  HIT, CAD (no stents), stage IV sacral ulcer, chronic fox, hx of recurrent pericardial effusion from SLE, AAA ~14cm in 12/2016 s/p repair in 2010 w/ aortoiliac stent c/b post-operatively with development of left lower extremity compartment syndrome was admitted with  progressive shortness of breath and managed  She had acute LLE EVT but did not take AC at dx of DVT because of h/o bleeding. I had told the patient earlier regarding her bleeding history that  in 2013,  she had von Willebrand's disease workup and factor assays,  which were normal.  She may have platelet dysfunction from renal insufficiency as one of the factor causing epistaxis and past.  She was on argatroban for fear of HIT and now has been bridged to coumadin, so far no significant bleeding.   INR being monitored.  I told patient that considering her PMH, SLE, immobility etc, long term anticoagulation is preferred as long as she does not have significant bleeding  Rest of management as per chart

## 2017-11-13 NOTE — PROGRESS NOTE ADULT - SUBJECTIVE AND OBJECTIVE BOX
[facility]   [phone]  RENAL FOLLOW UP NOTE  --------------------------------------------------------------------------------  Chief Complaint:    24 hour events/subjective:    still with fatigue and difficult ambulating. c/o pain right knee    PAST HISTORY  --------------------------------------------------------------------------------  No significant changes to PMH, PSH, FHx, SHx, unless otherwise noted    ALLERGIES & MEDICATIONS  --------------------------------------------------------------------------------  Allergies    heparin (Unknown)  losartan (Anaphylaxis)  nitroglycerin (Other)    Intolerances      Standing Inpatient Medications  amLODIPine   Tablet 10 milliGRAM(s) Oral daily  AQUAPHOR (petrolatum Ointment) 1 Application(s) Topical two times a day  ascorbic acid 500 milliGRAM(s) Oral daily  BACItracin   Ointment 1 Application(s) Topical two times a day  calcitriol   Capsule 0.25 MICROGram(s) Oral daily  carvedilol 25 milliGRAM(s) Oral every 12 hours  ferrous    sulfate 325 milliGRAM(s) Oral daily  furosemide    Tablet 60 milliGRAM(s) Oral daily  hydrALAZINE 50 milliGRAM(s) Oral every 8 hours  influenza   Vaccine 0.5 milliLiter(s) IntraMuscular once  labetalol 300 milliGRAM(s) Oral three times a day  Nephro-alyse 1 Tablet(s) Oral daily  pantoprazole    Tablet 40 milliGRAM(s) Oral before breakfast  predniSONE   Tablet 10 milliGRAM(s) Oral daily  sodium chloride 0.65% Nasal 1 Spray(s) Both Nostrils three times a day  warfarin 6 milliGRAM(s) Oral once    PRN Inpatient Medications      REVIEW OF SYSTEMS  --------------------------------------------------------------------------------    Gen: denies fevers/chills,  CVS: denies chest pain/palpitations  Resp: denies worsening SOB/Cough  GI: Denies N/V/Abd pain  : Denies dysuria/  All other systems were reviewed and are negative, except as noted.    VITALS/PHYSICAL EXAM  --------------------------------------------------------------------------------  T(C): 36.1 (11-13-17 @ 21:06), Max: 36.6 (11-12-17 @ 21:28)  HR: 85 (11-13-17 @ 21:06) (81 - 95)  BP: 167/89 (11-13-17 @ 21:06) (140/84 - 170/90)  RR: 18 (11-13-17 @ 21:06) (18 - 18)  SpO2: 97% (11-13-17 @ 21:06) (96% - 97%)  Wt(kg): --        11-12-17 @ 07:01  -  11-13-17 @ 07:00  --------------------------------------------------------  IN: 740 mL / OUT: 2500 mL / NET: -1760 mL    11-13-17 @ 07:01 - 11-13-17 @ 21:22  --------------------------------------------------------  IN: 720 mL / OUT: 1200 mL / NET: -480 mL        Physical Exam:  	Gen: alert oriented place person and date   	Pulm: Decreased breath sounds b/l bases. no rales or ronchi or wheezing  	CV: RRR, S1/S2. no rub  	Abd: +BS, soft, nontender/nondistended  	: No suprapubic tenderness.               Extremity: No cyanosis, LLE non pitting edema no clubbing  	    LABS/STUDIES  --------------------------------------------------------------------------------              10.2   4.9   >-----------<  125      [11-13-17 @ 06:48]              32.3     146  |  105  |  76  ----------------------------<  81      [11-13-17 @ 06:48]  4.2   |  27  |  2.99        Ca     8.9     [11-13-17 @ 06:48]      PT/INR: PT 30.6 , INR 2.75       [11-13-17 @ 06:48]      Creatinine Trend:  SCr 2.99 [11-13 @ 06:48]  SCr 2.90 [11-12 @ 06:38]  SCr 2.99 [11-11 @ 09:07]  SCr 2.91 [11-10 @ 06:22]  SCr 2.93 [11-09 @ 06:50]                  Iron 48, TIBC --, %sat --      [10-12-17 @ 07:14]  Ferritin 847.0      [10-12-17 @ 07:14]  PTH -- (Ca 9.0)      [11-09-17 @ 08:51]   387  Vitamin D (25OH) 7.8      [11-09-17 @ 08:51]  HbA1c 4.5      [08-09-16 @ 06:49]  TSH 0.33      [10-22-17 @ 08:40]    dsDNA 44      [10-17-17 @ 08:26]  C3 Complement 105      [10-17-17 @ 08:26]  C4 Complement 31      [10-17-17 @ 08:26]

## 2017-11-13 NOTE — PROGRESS NOTE ADULT - SUBJECTIVE AND OBJECTIVE BOX
Follow-up Pulm Progress Note    No new respiratory events overnight.  Denies SOB/CP.     Medications:  MEDICATIONS  (STANDING):  amLODIPine   Tablet 10 milliGRAM(s) Oral daily  AQUAPHOR (petrolatum Ointment) 1 Application(s) Topical two times a day  ascorbic acid 500 milliGRAM(s) Oral daily  BACItracin   Ointment 1 Application(s) Topical two times a day  calcitriol   Capsule 0.25 MICROGram(s) Oral daily  carvedilol 25 milliGRAM(s) Oral every 12 hours  ferrous    sulfate 325 milliGRAM(s) Oral daily  furosemide    Tablet 60 milliGRAM(s) Oral daily  hydrALAZINE 50 milliGRAM(s) Oral every 8 hours  influenza   Vaccine 0.5 milliLiter(s) IntraMuscular once  labetalol 300 milliGRAM(s) Oral three times a day  Nephro-alyse 1 Tablet(s) Oral daily  pantoprazole    Tablet 40 milliGRAM(s) Oral before breakfast  predniSONE   Tablet 10 milliGRAM(s) Oral daily  sodium chloride 0.65% Nasal 1 Spray(s) Both Nostrils three times a day    MEDICATIONS  (PRN):          Vital Signs Last 24 Hrs  T(C): 36.4 (13 Nov 2017 13:25), Max: 36.6 (12 Nov 2017 21:28)  T(F): 97.5 (13 Nov 2017 13:25), Max: 97.9 (12 Nov 2017 21:28)  HR: 81 (13 Nov 2017 13:25) (76 - 95)  BP: 140/84 (13 Nov 2017 13:25) (140/84 - 170/90)  BP(mean): --  RR: 18 (13 Nov 2017 13:25) (18 - 18)  SpO2: 96% (13 Nov 2017 13:25) (96% - 97%)          11-12 @ 07:01  -  11-13 @ 07:00  --------------------------------------------------------  IN: 740 mL / OUT: 2500 mL / NET: -1760 mL          LABS:                        10.2   4.9   )-----------( 125      ( 13 Nov 2017 06:48 )             32.3     11-13    146<H>  |  105  |  76<H>  ----------------------------<  81  4.2   |  27  |  2.99<H>    Ca    8.9      13 Nov 2017 06:48            CAPILLARY BLOOD GLUCOSE        PT/INR - ( 13 Nov 2017 06:48 )   PT: 30.6 sec;   INR: 2.75 ratio                             CULTURES: (if applicable)          Physical Examination:  PULM: decreased bs bilaterally, no significant sputum production  CVS: S1, S2 heard    RADIOLOGY REVIEWED  CXR: < from: Xray Chest 1 View AP- PORTABLE-Urgent (11.08.17 @ 12:39) >  INTERPRETATION:  CLINICAL INFORMATION: Shortness of breath.    A single portable view of the chest was obtained.     Comparison: 10/11/2017.     The mediastinal cardiac silhouette is unremarkable. There is an ACID.     Low lung volumes. Obscuration of left hemidiaphragm which may be   secondary to overlying soft tissue in this poor inspiratory film or   pleural effusion and/or atelectasis. Clear rightlung.    No acute osseous finding.     IMPRESSION:    Findings as above.    < end of copied text >

## 2017-11-13 NOTE — CHART NOTE - NSCHARTNOTEFT_GEN_A_CORE
Notify by RN patient complaint of right knee pain properly related to gout flare   Patient received prednisone with positive relief  no swelling , positive pulse   positive tenderness      Patient is a 57y old  Female who presents with a chief complaint of shortness of breath (11 Oct 2017 22:01)      SUBJECTIVE / OVERNIGHT EVENTS:    MEDICATIONS  (STANDING):  amLODIPine   Tablet 10 milliGRAM(s) Oral daily  AQUAPHOR (petrolatum Ointment) 1 Application(s) Topical two times a day  ascorbic acid 500 milliGRAM(s) Oral daily  BACItracin   Ointment 1 Application(s) Topical two times a day  calcitriol   Capsule 0.25 MICROGram(s) Oral daily  carvedilol 25 milliGRAM(s) Oral every 12 hours  ferrous    sulfate 325 milliGRAM(s) Oral daily  furosemide    Tablet 60 milliGRAM(s) Oral daily  hydrALAZINE 50 milliGRAM(s) Oral every 8 hours  influenza   Vaccine 0.5 milliLiter(s) IntraMuscular once  labetalol 300 milliGRAM(s) Oral three times a day  Nephro-alyse 1 Tablet(s) Oral daily  pantoprazole    Tablet 40 milliGRAM(s) Oral before breakfast  predniSONE   Tablet 10 milliGRAM(s) Oral daily  sodium chloride 0.65% Nasal 1 Spray(s) Both Nostrils three times a day    MEDICATIONS  (PRN):        CAPILLARY BLOOD GLUCOSE        I&O's Summary    12 Nov 2017 07:01  -  13 Nov 2017 07:00  --------------------------------------------------------  IN: 740 mL / OUT: 2500 mL / NET: -1760 mL    13 Nov 2017 07:01  -  13 Nov 2017 15:59  --------------------------------------------------------  IN: 720 mL / OUT: 1200 mL / NET: -480 mL        LABS:                        10.2   4.9   )-----------( 125      ( 13 Nov 2017 06:48 )             32.3     11-13    146<H>  |  105  |  76<H>  ----------------------------<  81  4.2   |  27  |  2.99<H>    Ca    8.9      13 Nov 2017 06:48      PT/INR - ( 13 Nov 2017 06:48 )   PT: 30.6 sec;   INR: 2.75 ratio                     RADIOLOGY & ADDITIONAL TESTS:    PHYSICAL EXAM:  GENERAL: NAD, well-developed  HEAD:  Atraumatic, Normocephalic  EYES: EOMI, PERRLA, conjunctiva and sclera clear  NECK: Supple, No JVD  CHEST/LUNG: Clear to auscultation bilaterally; No wheeze  HEART: Regular rate and rhythm; No murmurs, rubs, or gallops  ABDOMEN: Soft, Nontender, Nondistended; Bowel sounds present  EXTREMITIES:  2+ Peripheral Pulses, No clubbing, cyanosis, or edema  PSYCH: AAOx3  NEUROLOGY: non-focal  SKIN: No rashes or lesions        A/P:  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)  Chronic Gout (ICD9 274.02)  LBBB (Left Bundle Branch Block) (ICD9 426.3)  CHF (Congestive Heart Failure) (ICD9 428.0)  Hx of aorta-iliac-femoral bypass  TOP (Termination of Pregnancy)  S/P Dilatation and Curettage  History of Tubal Ligation  s/p AAA (Abdominal Aortic Aneurysm) Repair: 2009 2010  History of Cholecystectomy  S/P Partial Hysterectomy  HPI:  56 yo F w/ hx of SLE, systolic CHF (EF 32%) s/p AICD, HTN, CKD III- IV, PE / left leg DVT dx 2009, HIT, CAD (no stents), stage IV sacral ulcer, chronic fox, hx of recurrent pericardial effusion, AAA ~14cm in 12/2016 s/p repair in 2010 w/ aortoiliac stent c/b post-operatively with development of left lower extremity compartment syndrome presents with 1-wk history of progressive shortness of breath.    Patient was sleep this morning when she woke up with sudden onset of dyspnea. She had similar episode about 1-wk ago which resolved on its own. This time she felt more clammy and diaphoretic. She also had epigastric pain that was 6/10, non-radiating, described as burning quality, with no relief w/ positional change. She called her PCP who instructed her to come to ED. Patient denies any URI symptoms, fever, pleurisy, chills, coughs, nausea, vomiting. She reports a remote history of DVT/PE in 2009 and was treated initially w/ heparin and developed HIT and warfarin for anticoagulation. The warfarin was discontinued because of what patient reports was life threatening epistaxis. Unclear how long she was on anticoagulation. She also reports this morning her daughter noticed her left leg was much more swollen. Patient at baseline has chronic left leg edema in setting of her prior fasciotomy for compartment syndrome but this time it is more swollen.   Patient had hospital admission in December 2016 for pericardial effusion requiring drainage/pericardiocentesis. She had another similar admission in 1/2017 and at that time also had pericardial drain placed. During that time she was diagnosed with SLE. She been taking prednisone 5mg once daily and is not sure if this is similar to her SLE flare. She denies visual changes, aphthous ulcers, joint pains, and rashes.   Patient reports her last AICD interrogation was about 2-mo ago and reportedly normal. She denies any dysuria and urinary symptoms but has chronic Fox. She normally uses wheelchair for transferring and has had no syncope or falls. She has chronic sacral ulcer stage IV but denies any drainage from that site.   Of note patient had hemoglobin of 12 in 1/2017 and now is 9.8. She reports no melena or hematochezia. She reports taking no NSAIDs and reports no vaginal bleeding. She takes iron therapy. She does not follow up with primary prevention screening. In terms of the AAA patient reports she last had visit with her vascular surgeon about 3mo ago and was told everything was stable. She does not know size of AAA but as of 12/2016 imaging it was 14cm found with DVT left LE     # right knee pain possible relate to gout flare with elevate uric acid   - c/w with prednisone  - Monitor pain scale   Tylenol with positive relief   consider doppler if patient had swelling unlikely secondary to patient on anticoagulation  Follow up with attending   continue to monitor

## 2017-11-13 NOTE — PROGRESS NOTE ADULT - ASSESSMENT
58 y/o female PMHx chronic systolic CHF (last known EF=32%) s/p AICD, SLE, AAA approx 14cm as of 12/2016 s/p aortoiliac stent, DVT/PE (not currently anticoagulated 2/2 HIT), h/o LLE compartment syndrome, multiple pericardial effusions s/p drainage most recently in Jan 2017 now presents with recurrent large pericardial effusion     1. pericardial effusion without tamponade. size unchanged on repeat CT   	Likely chronic effusion, h/o SLE              Rheumatology following     2. CT surgery recommended no surgical intervention and drainage by interventional means   Interventional Cardiology  and Vascular recommending radiologic guidance given the nature of the effusion - appears loculated). Will again discuss the possibility and feasibility of interventional approach with interventional radiolology  lasix 60 mg PO  daily     3. Venous LE Doppler positive for DVT. Started on Argatroban (History Of HIT) and wants to start coumadin.  Monitor CBC    4. Abdominal pain now resolved. No acute findings on CT     5. HTN- remains hypertensive. Increase Labetolol 300 mg TID

## 2017-11-13 NOTE — PROGRESS NOTE ADULT - SUBJECTIVE AND OBJECTIVE BOX
Subjective: Patient seen and examined. No new events except as noted.   no cp or sob       REVIEW OF SYSTEMS:    CONSTITUTIONAL: + weakness, fevers or chills  EYES/ENT: No visual changes;  No vertigo or throat pain   NECK: No pain or stiffness  RESPIRATORY: No cough, wheezing, hemoptysis; No shortness of breath  CARDIOVASCULAR: No chest pain or palpitations  GASTROINTESTINAL: No abdominal or epigastric pain. No nausea, vomiting, or hematemesis; No diarrhea or constipation. No melena or hematochezia.  GENITOURINARY: No dysuria, frequency or hematuria  NEUROLOGICAL: No numbness or weakness  SKIN: No itching, burning, rashes, or lesions   All other review of systems is negative unless indicated above.    MEDICATIONS:  MEDICATIONS  (STANDING):  amLODIPine   Tablet 10 milliGRAM(s) Oral daily  AQUAPHOR (petrolatum Ointment) 1 Application(s) Topical two times a day  ascorbic acid 500 milliGRAM(s) Oral daily  BACItracin   Ointment 1 Application(s) Topical two times a day  calcitriol   Capsule 0.25 MICROGram(s) Oral daily  carvedilol 25 milliGRAM(s) Oral every 12 hours  ferrous    sulfate 325 milliGRAM(s) Oral daily  furosemide    Tablet 60 milliGRAM(s) Oral daily  hydrALAZINE 50 milliGRAM(s) Oral every 8 hours  influenza   Vaccine 0.5 milliLiter(s) IntraMuscular once  labetalol 200 milliGRAM(s) Oral every 8 hours  Nephro-alyse 1 Tablet(s) Oral daily  pantoprazole    Tablet 40 milliGRAM(s) Oral before breakfast  predniSONE   Tablet 10 milliGRAM(s) Oral daily  sodium chloride 0.65% Nasal 1 Spray(s) Both Nostrils three times a day      PHYSICAL EXAM:  T(C): 36.6 (11-13-17 @ 04:29), Max: 36.7 (11-12-17 @ 13:30)  HR: 95 (11-13-17 @ 04:29) (76 - 95)  BP: 170/90 (11-13-17 @ 04:29) (146/79 - 170/90)  RR: 18 (11-13-17 @ 04:29) (18 - 18)  SpO2: 96% (11-13-17 @ 04:29) (96% - 97%)  Wt(kg): --  I&O's Summary    12 Nov 2017 07:01  -  13 Nov 2017 07:00  --------------------------------------------------------  IN: 740 mL / OUT: 2500 mL / NET: -1760 mL    13 Nov 2017 07:01  -  13 Nov 2017 12:20  --------------------------------------------------------  IN: 360 mL / OUT: 0 mL / NET: 360 mL          Appearance: Normal	  HEENT:   Normal oral mucosa, PERRL, EOMI	  Lymphatic: No lymphadenopathy , no edema  Cardiovascular: Normal S1 S2, No JVD, No murmurs , Peripheral pulses palpable 2+ bilaterally  Respiratory: Lungs clear to auscultation, normal effort 	  Gastrointestinal:  Soft, Non-tender, + BS	  Skin: No rashes, No ecchymoses, No cyanosis, warm to touch  Musculoskeletal: Normal range of motion, normal strength  Psychiatry:  Mood & affect appropriate  Ext: Chronic LLE edema, wrapped   +fox     LABS:    CARDIAC MARKERS:                                10.2   4.9   )-----------( 125      ( 13 Nov 2017 06:48 )             32.3     11-13    146<H>  |  105  |  76<H>  ----------------------------<  81  4.2   |  27  |  2.99<H>    Ca    8.9      13 Nov 2017 06:48      proBNP:   Lipid Profile:   HgA1c:   TSH:             TELEMETRY: 	    ECG:  	  RADIOLOGY:   DIAGNOSTIC TESTING:  [ ] Echocardiogram:  [ ]  Catheterization:  [ ] Stress Test:    OTHER:

## 2017-11-13 NOTE — PROGRESS NOTE ADULT - SUBJECTIVE AND OBJECTIVE BOX
HPI:  56 y/o female PMHx chronic systolic CHF (last known EF=32%) s/p AICD, SLE, AAA approx 14cm as of 12/2016 s/p aortoiliac stent, DVT/PE 2009, HIT, h/o LLE compartment syndrome,  stage IV sacral ulcer, chronic fox, multiple pericardial effusions s/p drainage most recently in Jan 2017, CKD.  She had acute DVT and she did not take AC at dx of DVT because of h/o bleeding   She was on argatroban as she did not want any other AC, HIT ab negative at this time  She did not want bridge to coumadin initially as she was scared of bleeding.  However she is on coumadin now and argatraban was d/c. So far no significant bleeding  She had hematology opinion from Dr. 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)  Chronic Gout (ICD9 274.02)  LBBB (Left Bundle Branch Block) (ICD9 426.3)  CHF (Congestive Heart Failure) (ICD9 428.0)  Hx of aorta-iliac-femoral bypass  TOP (Termination of Pregnancy)  S/P Dilatation and Curettage  History of Tubal Ligation  s/p AAA (Abdominal Aortic Aneurysm) Repair: 2009 2010  History of Cholecystectomy  S/P Partial Hysterectomy    Medications:  amLODIPine   Tablet 10 milliGRAM(s) Oral daily  AQUAPHOR (petrolatum Ointment) 1 Application(s) Topical two times a day  ascorbic acid 500 milliGRAM(s) Oral daily  BACItracin   Ointment 1 Application(s) Topical two times a day  calcitriol   Capsule 0.25 MICROGram(s) Oral daily  carvedilol 25 milliGRAM(s) Oral every 12 hours  ferrous    sulfate 325 milliGRAM(s) Oral daily  furosemide    Tablet 60 milliGRAM(s) Oral daily  hydrALAZINE 50 milliGRAM(s) Oral every 8 hours  influenza   Vaccine 0.5 milliLiter(s) IntraMuscular once  labetalol 200 milliGRAM(s) Oral every 8 hours  Nephro-alyse 1 Tablet(s) Oral daily  pantoprazole    Tablet 40 milliGRAM(s) Oral before breakfast  predniSONE   Tablet 10 milliGRAM(s) Oral daily  sodium chloride 0.65% Nasal 1 Spray(s) Both Nostrils three times a day    Labs:  CBC Full  -  ( 13 Nov 2017 06:48 )  WBC Count : 4.9 K/uL  Hemoglobin : 10.2 g/dL  Hematocrit : 32.3 %  Platelet Count - Automated : 125 K/uL  Mean Cell Volume : 92.3 fl  Mean Cell Hemoglobin : 29.1 pg  Mean Cell Hemoglobin Concentration : 31.5 gm/dL  Auto Neutrophil # : x  Auto Lymphocyte # : x  Auto Monocyte # : x  Auto Eosinophil # : x  Auto Basophil # : x  Auto Neutrophil % : x  Auto Lymphocyte % : x  Auto Monocyte % : x  Auto Eosinophil % : x  Auto Basophil % : x    11-13    146<H>  |  105  |  76<H>  ----------------------------<  81  4.2   |  27  |  2.99<H>    Ca    8.9      13 Nov 2017 06:48        Radiology:             ROS:  Patient comfortable without distress  No SOB or chest pain  No palpitation  Says had abdominal discomfort at night  No weakness of extremities  No skin changes or swelling of legs    Vital Signs Last 24 Hrs  T(C): 36.6 (13 Nov 2017 04:29), Max: 36.7 (12 Nov 2017 13:30)  T(F): 97.9 (13 Nov 2017 04:29), Max: 98 (12 Nov 2017 13:30)  HR: 95 (13 Nov 2017 04:29) (76 - 95)  BP: 170/90 (13 Nov 2017 04:29) (146/79 - 170/90)  BP(mean): --  RR: 18 (13 Nov 2017 04:29) (18 - 18)  SpO2: 96% (13 Nov 2017 04:29) (96% - 97%)    Physical exam:  Patient alert and oriented  No distress  CVS: S1, S2 regular or murmur  Chest: bilateral breath sound without rales  Abdomen: soft, not tender, no organomegaly or masses  left extremity in dressing and has fox    Assessment and Plan:

## 2017-11-13 NOTE — PROGRESS NOTE ADULT - SUBJECTIVE AND OBJECTIVE BOX
MEDICINE, PROGRESS NOTE [phone]    COSTEN, SANDRA 57y [id number]    Patient seen and examined.  Patient is a 57y old  Female who presents with a chief complaint of shortness of breath (11 Oct 2017 22:01)  pt had right knee pain earlier today.    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)  Chronic Gout (ICD9 274.02)  LBBB (Left Bundle Branch Block) (ICD9 426.3)  CHF (Congestive Heart Failure) (ICD9 428.0)  Hx of aorta-iliac-femoral bypass  TOP (Termination of Pregnancy)  S/P Dilatation and Curettage  History of Tubal Ligation  s/p AAA (Abdominal Aortic Aneurysm) Repair: 2009 2010  History of Cholecystectomy  S/P Partial Hysterectomy    MEDICATIONS  (STANDING):  amLODIPine   Tablet 10 milliGRAM(s) Oral daily  AQUAPHOR (petrolatum Ointment) 1 Application(s) Topical two times a day  ascorbic acid 500 milliGRAM(s) Oral daily  BACItracin   Ointment 1 Application(s) Topical two times a day  calcitriol   Capsule 0.25 MICROGram(s) Oral daily  carvedilol 25 milliGRAM(s) Oral every 12 hours  ferrous    sulfate 325 milliGRAM(s) Oral daily  furosemide    Tablet 60 milliGRAM(s) Oral daily  hydrALAZINE 50 milliGRAM(s) Oral every 8 hours  influenza   Vaccine 0.5 milliLiter(s) IntraMuscular once  labetalol 300 milliGRAM(s) Oral three times a day  Nephro-alyse 1 Tablet(s) Oral daily  pantoprazole    Tablet 40 milliGRAM(s) Oral before breakfast  predniSONE   Tablet 10 milliGRAM(s) Oral daily  sodium chloride 0.65% Nasal 1 Spray(s) Both Nostrils three times a day  warfarin 6 milliGRAM(s) Oral once    MEDICATIONS  (PRN):    Allergies    heparin (Unknown)  losartan (Anaphylaxis)  nitroglycerin (Other)    Intolerances        PHYSICAL EXAM:  Constitutional: NAD  HEENT: Normocephalic, EOMI  Neck:  No JVD  Respiratory: CTA B/L, No wheezes  Cardiovascular: S1, S2, RRR, + systolic murmur  Gastrointestinal: BS+, soft, NT/ND  Extremities: + peripheral edema le b/l L>R, pp+  Neurological: AAOX3, no focal deficits  Psychiatric: Normal mood, normal affect  : + Hook    Vital Signs Last 24 Hrs  T(C): 36.4 (13 Nov 2017 13:25), Max: 36.6 (12 Nov 2017 21:28)  T(F): 97.5 (13 Nov 2017 13:25), Max: 97.9 (12 Nov 2017 21:28)  HR: 81 (13 Nov 2017 13:25) (81 - 95)  BP: 140/84 (13 Nov 2017 13:25) (140/84 - 170/90)  BP(mean): --  RR: 18 (13 Nov 2017 13:25) (18 - 18)  SpO2: 96% (13 Nov 2017 13:25) (96% - 97%)  I&O's Summary    12 Nov 2017 07:01  -  13 Nov 2017 07:00  --------------------------------------------------------  IN: 740 mL / OUT: 2500 mL / NET: -1760 mL    13 Nov 2017 07:01  -  13 Nov 2017 18:13  --------------------------------------------------------  IN: 720 mL / OUT: 1200 mL / NET: -480 mL        LABS:                        10.2   4.9   )-----------( 125      ( 13 Nov 2017 06:48 )             32.3     11-13    146<H>  |  105  |  76<H>  ----------------------------<  81  4.2   |  27  |  2.99<H>    Ca    8.9      13 Nov 2017 06:48

## 2017-11-13 NOTE — PROGRESS NOTE ADULT - ASSESSMENT
lle dvt  lupus  gout  ckd 3  anticardiolipin AB +  thrombocytopenia    continue current care  coumadin 6 mg tonight  f/u with heme  d/c planning

## 2017-11-14 ENCOUNTER — TRANSCRIPTION ENCOUNTER (OUTPATIENT)
Age: 57
End: 2017-11-14

## 2017-11-14 LAB
ANION GAP SERPL CALC-SCNC: 13 MMOL/L — SIGNIFICANT CHANGE UP (ref 5–17)
APTT BLD: 40 SEC — HIGH (ref 27.5–37.4)
BUN SERPL-MCNC: 70 MG/DL — HIGH (ref 7–23)
CALCIUM SERPL-MCNC: 9.1 MG/DL — SIGNIFICANT CHANGE UP (ref 8.4–10.5)
CHLORIDE SERPL-SCNC: 104 MMOL/L — SIGNIFICANT CHANGE UP (ref 96–108)
CO2 SERPL-SCNC: 27 MMOL/L — SIGNIFICANT CHANGE UP (ref 22–31)
CREAT SERPL-MCNC: 2.94 MG/DL — HIGH (ref 0.5–1.3)
GLUCOSE SERPL-MCNC: 81 MG/DL — SIGNIFICANT CHANGE UP (ref 70–99)
HCT VFR BLD CALC: 32.9 % — LOW (ref 34.5–45)
HGB BLD-MCNC: 10.6 G/DL — LOW (ref 11.5–15.5)
INR BLD: 3.14 RATIO — HIGH (ref 0.88–1.16)
MCHC RBC-ENTMCNC: 29.8 PG — SIGNIFICANT CHANGE UP (ref 27–34)
MCHC RBC-ENTMCNC: 32.4 GM/DL — SIGNIFICANT CHANGE UP (ref 32–36)
MCV RBC AUTO: 92.1 FL — SIGNIFICANT CHANGE UP (ref 80–100)
PLATELET # BLD AUTO: 131 K/UL — LOW (ref 150–400)
POTASSIUM SERPL-MCNC: 4 MMOL/L — SIGNIFICANT CHANGE UP (ref 3.5–5.3)
POTASSIUM SERPL-SCNC: 4 MMOL/L — SIGNIFICANT CHANGE UP (ref 3.5–5.3)
PROTHROM AB SERPL-ACNC: 34.7 SEC — HIGH (ref 9.8–12.7)
RBC # BLD: 3.57 M/UL — LOW (ref 3.8–5.2)
RBC # FLD: 14.8 % — HIGH (ref 10.3–14.5)
SODIUM SERPL-SCNC: 144 MMOL/L — SIGNIFICANT CHANGE UP (ref 135–145)
WBC # BLD: 5.2 K/UL — SIGNIFICANT CHANGE UP (ref 3.8–10.5)
WBC # FLD AUTO: 5.2 K/UL — SIGNIFICANT CHANGE UP (ref 3.8–10.5)

## 2017-11-14 RX ORDER — WARFARIN SODIUM 2.5 MG/1
4 TABLET ORAL ONCE
Qty: 0 | Refills: 0 | Status: COMPLETED | OUTPATIENT
Start: 2017-11-14 | End: 2017-11-14

## 2017-11-14 RX ADMIN — Medication 300 MILLIGRAM(S): at 13:13

## 2017-11-14 RX ADMIN — CALCITRIOL 0.25 MICROGRAM(S): 0.5 CAPSULE ORAL at 11:24

## 2017-11-14 RX ADMIN — Medication 60 MILLIGRAM(S): at 06:07

## 2017-11-14 RX ADMIN — Medication 1 APPLICATION(S): at 06:07

## 2017-11-14 RX ADMIN — Medication 10 MILLIGRAM(S): at 09:02

## 2017-11-14 RX ADMIN — CARVEDILOL PHOSPHATE 25 MILLIGRAM(S): 80 CAPSULE, EXTENDED RELEASE ORAL at 09:03

## 2017-11-14 RX ADMIN — Medication 50 MILLIGRAM(S): at 13:12

## 2017-11-14 RX ADMIN — Medication 50 MILLIGRAM(S): at 21:53

## 2017-11-14 RX ADMIN — CARVEDILOL PHOSPHATE 25 MILLIGRAM(S): 80 CAPSULE, EXTENDED RELEASE ORAL at 20:22

## 2017-11-14 RX ADMIN — Medication 1 SPRAY(S): at 06:07

## 2017-11-14 RX ADMIN — WARFARIN SODIUM 4 MILLIGRAM(S): 2.5 TABLET ORAL at 21:53

## 2017-11-14 RX ADMIN — Medication 1 APPLICATION(S): at 09:02

## 2017-11-14 RX ADMIN — PANTOPRAZOLE SODIUM 40 MILLIGRAM(S): 20 TABLET, DELAYED RELEASE ORAL at 06:08

## 2017-11-14 RX ADMIN — Medication 50 MILLIGRAM(S): at 06:08

## 2017-11-14 RX ADMIN — AMLODIPINE BESYLATE 10 MILLIGRAM(S): 2.5 TABLET ORAL at 09:02

## 2017-11-14 RX ADMIN — Medication 300 MILLIGRAM(S): at 21:53

## 2017-11-14 RX ADMIN — Medication 1 APPLICATION(S): at 17:41

## 2017-11-14 RX ADMIN — Medication 325 MILLIGRAM(S): at 09:03

## 2017-11-14 RX ADMIN — Medication 1 SPRAY(S): at 13:13

## 2017-11-14 RX ADMIN — Medication 500 MILLIGRAM(S): at 09:03

## 2017-11-14 RX ADMIN — Medication 300 MILLIGRAM(S): at 06:07

## 2017-11-14 RX ADMIN — Medication 1 APPLICATION(S): at 17:42

## 2017-11-14 RX ADMIN — Medication 1 TABLET(S): at 11:24

## 2017-11-14 NOTE — DISCHARGE NOTE ADULT - PROVIDER TOKENS
TOKEN:'[id number]',TOKEN:'[id number]' TOKEN:'[id number]:'[id number]:'[id number]',TOKEN:'[id number]'

## 2017-11-14 NOTE — DISCHARGE NOTE ADULT - PLAN OF CARE
Avoid taking NSAIDs (ex: Ibuprofen, Advil, Celebrex, Naprosyn) and other agents that can harm the kidneys such as intravenous contrast for diagnostic testing, combination cold medications, etc. until you are instructed to do so by your Primary Care Physician.  Have all of your medications adjusted for your renal function by your Health Care Provider.  Blood pressure control is important.  Take all medication as prescribed.  Do not overconsume foods that are high in potassium, such as bananas, until you are instructed to do so by your primary care physician. Resolved Notify your doctor immediately if you experience bleeding from your nose. Continue to wrap left leg with ACE wrap and elevate whenever you are sitting down. Pack with Aquacel.    Continue offloading to avoid continuous pressure on the area, continue to practice good hygiene, and make sure that you are adequately nourished and hydrated.    Upon discharge, follow[facility] at [facility] [address] [place] [address]; [phone]. Continue taking "blood thinners" as prescribed.  Blood thinners can result in abnormal bleeding and brushing.  Be mindful to avoid accidental trauma.  Participate in activities as prescribed.  If you develop new leg pain, swelling, and/or redness contact your healthcare provider.  Call your doctor if you experience lightheadedness, loss of consciousness, shortness of breath (especially with exercise), feel your heart racing or beating unusually, or experience frequent or abnormal bleeding. Take all medications as prescribed.  Call your doctor if you have any new pain, pressure, or discomfort in your chest, shortness of breath, or abnormal heartbeat es (quitting smoking if you smoke). Take all medications as prescribed.  Stop smoking if you currently smoke, and avoid high altitudes.  Weigh yourself daily.  If you gain 3lbs in 3 days, or 5lbs in a week call your Health Care Provider.  Eat a low sodium diet.  If you have pulmonary hypertension and you are a female of child bearing age, talk to your caregiver about using birth control pills or getting pregnant.  Call your Health Care Provider if you have any swelling or increased swelling in your feet, ankles, and/or stomach.  If you experience dizziness, chest pain, or shortness of breath, seek immediate medical attention. DVT will continue to resolve. Pericardial effusion has resolved. heart failure will be stable. ILANA will continue to be stable. Left leg swelling will continue to improve. Off load.

## 2017-11-14 NOTE — DISCHARGE NOTE ADULT - SECONDARY DIAGNOSIS.
Epistaxis Left leg swelling Stage IV pressure ulcer Pericardial effusion Chronic systolic heart failure ILANA (acute kidney injury)

## 2017-11-14 NOTE — PROGRESS NOTE ADULT - ASSESSMENT
58 y/o female PMHx chronic systolic CHF (last known EF=32%) s/p AICD, SLE, AAA approx 14cm as of 12/2016 s/p aortoiliac stent, DVT/PE (not currently anticoagulated 2/2 HIT), h/o LLE compartment syndrome, multiple pericardial effusions s/p drainage most recently in Jan 2017 now presents with recurrent large pericardial effusion     1. pericardial effusion without tamponade. size unchanged on repeat CT   	Likely chronic effusion, h/o SLE              Rheumatology following     2. CT surgery recommended no surgical intervention and drainage by interventional means   Interventional Cardiology  and Vascular recommending radiologic guidance given the nature of the effusion - appears loculated). Will again discuss the possibility and feasibility of interventional approach with interventional radiolology  lasix 60 mg PO  daily     3. Venous LE Doppler positive for DVT. Started on Argatroban (History Of HIT) and wants to start coumadin.  Monitor CBC    4. Abdominal pain now resolved. No acute findings on CT     5. HTN- continue with all current meds

## 2017-11-14 NOTE — DISCHARGE NOTE ADULT - CARE PROVIDER_API CALL
Gerry Yoder (), Cardiology; Internal Medicine  [address] [place] [address]  [address]  [place] [address]  Phone: [phone]  Fax: [phone]    Goldberg, Avram Z (MD), Internal Medicine; Rheumatology  [address] [facility] [address]  [place] [address]  Phone: [phone]  Fax: [phone] Gerry Yoder (DO), Cardiology; Internal Medicine  [address] [facility]  [address]  [place] [address]  Phone: [phone]  Fax: [phone]    Goldberg, Avram Z (MD), Internal Medicine; Rheumatology  [address] [facility] [address]  [place] [address]  Phone: [phone]  Fax: [phone]    Cy Chicas), Internal Medicine  [address] [facility]  [address]  [place] [address]  Phone: [phone]  Fax: [phone]    Kevin Bennett (DO), Nephrology  [address] [place] [address] [address] [place] [address]  Phone: [phone]  Fax: [phone]

## 2017-11-14 NOTE — PROGRESS NOTE ADULT - SUBJECTIVE AND OBJECTIVE BOX
Follow-up Pulm Progress Note    No new respiratory events overnight.  Denies SOB/CP. 02 sat 95% on room air    Medications:  MEDICATIONS  (STANDING):  amLODIPine   Tablet 10 milliGRAM(s) Oral daily  AQUAPHOR (petrolatum Ointment) 1 Application(s) Topical two times a day  ascorbic acid 500 milliGRAM(s) Oral daily  BACItracin   Ointment 1 Application(s) Topical two times a day  calcitriol   Capsule 0.25 MICROGram(s) Oral daily  carvedilol 25 milliGRAM(s) Oral every 12 hours  ferrous    sulfate 325 milliGRAM(s) Oral daily  furosemide    Tablet 60 milliGRAM(s) Oral daily  hydrALAZINE 50 milliGRAM(s) Oral every 8 hours  influenza   Vaccine 0.5 milliLiter(s) IntraMuscular once  labetalol 300 milliGRAM(s) Oral three times a day  Nephro-alyse 1 Tablet(s) Oral daily  pantoprazole    Tablet 40 milliGRAM(s) Oral before breakfast  predniSONE   Tablet 10 milliGRAM(s) Oral daily  sodium chloride 0.65% Nasal 1 Spray(s) Both Nostrils three times a day    MEDICATIONS  (PRN):          Vital Signs Last 24 Hrs  T(C): 36.6 (14 Nov 2017 04:38), Max: 36.6 (14 Nov 2017 04:38)  T(F): 97.9 (14 Nov 2017 04:38), Max: 97.9 (14 Nov 2017 04:38)  HR: 84 (14 Nov 2017 04:38) (78 - 85)  BP: 159/84 (14 Nov 2017 04:38) (140/84 - 167/89)  BP(mean): --  RR: 18 (14 Nov 2017 04:38) (18 - 18)  SpO2: 97% (14 Nov 2017 04:38) (96% - 97%)          11-13 @ 07:01  -  11-14 @ 07:00  --------------------------------------------------------  IN: 720 mL / OUT: 2500 mL / NET: -1780 mL          LABS:                        10.6   5.2   )-----------( 131      ( 14 Nov 2017 06:19 )             32.9     11-14    144  |  104  |  70<H>  ----------------------------<  81  4.0   |  27  |  2.94<H>    Ca    9.1      14 Nov 2017 06:19            CAPILLARY BLOOD GLUCOSE        PT/INR - ( 14 Nov 2017 06:19 )   PT: 34.7 sec;   INR: 3.14 ratio         PTT - ( 14 Nov 2017 06:19 )  PTT:40.0 sec                    CULTURES: (if applicable)          Physical Examination:  PULM: decreased bs bilaterally, no significant sputum production  CVS: S1, S2 heard    RADIOLOGY REVIEWED  CXR: < from: Xray Chest 1 View AP- PORTABLE-Urgent (11.08.17 @ 12:39) >    INTERPRETATION:  CLINICAL INFORMATION: Shortness of breath.    A single portable view of the chest was obtained.     Comparison: 10/11/2017.     The mediastinal cardiac silhouette is unremarkable. There is an ACID.     Low lung volumes. Obscuration of left hemidiaphragm which may be   secondary to overlying soft tissue in this poor inspiratory film or   pleural effusion and/or atelectasis. Clear rightlung.    No acute osseous finding.     < end of copied text >

## 2017-11-14 NOTE — PROGRESS NOTE ADULT - PROBLEM SELECTOR PLAN 1
Multifactorial-restrictive disease 2nd obesity and cardiomegaly, pericardial effusion, debility and volume overloaded, with systolic HF (EF 25%)   -Lasix 60mg qd  -Peripheral GGO in RUL new from Oct 11 2017 CT  -Recommend repeat CT chest in 6 weeks to further eval

## 2017-11-14 NOTE — DISCHARGE NOTE ADULT - CARE PLAN
Principal Discharge DX:	DVT (deep venous thrombosis)  Instructions for follow-up, activity and diet:	Continue taking "blood thinners" as prescribed.  Blood thinners can result in abnormal bleeding and brushing.  Be mindful to avoid accidental trauma.  Participate in activities as prescribed.  If you develop new leg pain, swelling, and/or redness contact your healthcare provider.  Call your doctor if you experience lightheadedness, loss of consciousness, shortness of breath (especially with exercise), feel your heart racing or beating unusually, or experience frequent or abnormal bleeding.  Secondary Diagnosis:	Pericardial effusion  Instructions for follow-up, activity and diet:	Take all medications as prescribed.  Call your doctor if you have any new pain, pressure, or discomfort in your chest, shortness of breath, or abnormal heartbeat es (quitting smoking if you smoke).  Secondary Diagnosis:	Chronic systolic heart failure  Instructions for follow-up, activity and diet:	Take all medications as prescribed.  Stop smoking if you currently smoke, and avoid high altitudes.  Weigh yourself daily.  If you gain 3lbs in 3 days, or 5lbs in a week call your Health Care Provider.  Eat a low sodium diet.  If you have pulmonary hypertension and you are a female of child bearing age, talk to your caregiver about using birth control pills or getting pregnant.  Call your Health Care Provider if you have any swelling or increased swelling in your feet, ankles, and/or stomach.  If you experience dizziness, chest pain, or shortness of breath, seek immediate medical attention.  Secondary Diagnosis:	ILANA (acute kidney injury)  Instructions for follow-up, activity and diet:	Avoid taking NSAIDs (ex: Ibuprofen, Advil, Celebrex, Naprosyn) and other agents that can harm the kidneys such as intravenous contrast for diagnostic testing, combination cold medications, etc. until you are instructed to do so by your Primary Care Physician.  Have all of your medications adjusted for your renal function by your Health Care Provider.  Blood pressure control is important.  Take all medication as prescribed.  Do not overconsume foods that are high in potassium, such as bananas, until you are instructed to do so by your primary care physician.  Secondary Diagnosis:	Epistaxis  Goal:	Resolved  Instructions for follow-up, activity and diet:	Notify your doctor immediately if you experience bleeding from your nose.  Secondary Diagnosis:	Left leg swelling  Instructions for follow-up, activity and diet:	Continue to wrap left leg with ACE wrap and elevate whenever you are sitting down.  Secondary Diagnosis:	Stage IV pressure ulcer  Instructions for follow-up, activity and diet:	Pack with Aquacel.    Continue offloading to avoid continuous pressure on the area, continue to practice good hygiene, and make sure that you are adequately nourished and hydrated.    Upon discharge, follow-up as outpatient at [facility] [address] [facility]; [phone]. Principal Discharge DX:	DVT (deep venous thrombosis)  Goal:	DVT will continue to resolve.  Instructions for follow-up, activity and diet:	Continue taking "blood thinners" as prescribed.  Blood thinners can result in abnormal bleeding and brushing.  Be mindful to avoid accidental trauma.  Participate in activities as prescribed.  If you develop new leg pain, swelling, and/or redness contact your healthcare provider.  Call your doctor if you experience lightheadedness, loss of consciousness, shortness of breath (especially with exercise), feel your heart racing or beating unusually, or experience frequent or abnormal bleeding.  Secondary Diagnosis:	Pericardial effusion  Goal:	Pericardial effusion has resolved.  Instructions for follow-up, activity and diet:	Take all medications as prescribed.  Call your doctor if you have any new pain, pressure, or discomfort in your chest, shortness of breath, or abnormal heartbeat es (quitting smoking if you smoke).  Secondary Diagnosis:	Chronic systolic heart failure  Goal:	heart failure will be stable.  Instructions for follow-up, activity and diet:	Take all medications as prescribed.  Stop smoking if you currently smoke, and avoid high altitudes.  Weigh yourself daily.  If you gain 3lbs in 3 days, or 5lbs in a week call your Health Care Provider.  Eat a low sodium diet.  If you have pulmonary hypertension and you are a female of child bearing age, talk to your caregiver about using birth control pills or getting pregnant.  Call your Health Care Provider if you have any swelling or increased swelling in your feet, ankles, and/or stomach.  If you experience dizziness, chest pain, or shortness of breath, seek immediate medical attention.  Secondary Diagnosis:	ILANA (acute kidney injury)  Goal:	ILANA will continue to be stable.  Instructions for follow-up, activity and diet:	Avoid taking NSAIDs (ex: Ibuprofen, Advil, Celebrex, Naprosyn) and other agents that can harm the kidneys such as intravenous contrast for diagnostic testing, combination cold medications, etc. until you are instructed to do so by your Primary Care Physician.  Have all of your medications adjusted for your renal function by your Health Care Provider.  Blood pressure control is important.  Take all medication as prescribed.  Do not overconsume foods that are high in potassium, such as bananas, until you are instructed to do so by your primary care physician.  Secondary Diagnosis:	Epistaxis  Goal:	Resolved  Instructions for follow-up, activity and diet:	Notify your doctor immediately if you experience bleeding from your nose.  Secondary Diagnosis:	Left leg swelling  Goal:	Left leg swelling will continue to improve.  Instructions for follow-up, activity and diet:	Continue to wrap left leg with ACE wrap and elevate whenever you are sitting down.  Secondary Diagnosis:	Stage IV pressure ulcer  Goal:	Off load.  Instructions for follow-up, activity and diet:	Pack with Aquacel.    Continue offloading to avoid continuous pressure on the area, continue to practice good hygiene, and make sure that you are adequately nourished and hydrated.    Upon discharge, follow-up as outpatient at [facility] [address] [place] [address]; [phone].

## 2017-11-14 NOTE — DISCHARGE NOTE ADULT - HOSPITAL COURSE
58 yo F w/ hx of SLE, systolic CHF (EF 32%) s/p AICD, HTN, CKD III- IV, PE / left leg DVT dx 2009, HIT, CAD (no stents), stage IV sacral ulcer, chronic fox, hx of recurrent pericardial effusion, AAA ~14cm in 12/2016 s/p repair in 2010 w/ aortoiliac stent c/b post-operatively with development of left lower extremity compartment syndrome initially presented with 1-wk history of progressive shortness of breath, found with large pericardial effusion and left pleural effusion without tamponade. During hospitalization, evaluated by CT surgery team recommended no surgical intervention and drainage by interventional means, repeat CT unchanged in size, likely chronic in nature, medically managed. Also found with acute DVT Left common and fem veins-pt initially refused anticoagulation 2/2 epistaxis, also deemed not a candidate for IVC filter 2/2 due to the anatomy and dilatation of the IVC. She subsequently agreed to A/C after multiple discussions regarding risks vs benefits, bridged to Coumadin, had one episode of self-limiting rectal bleeding, with stable hgb, closely monitoring and since then was cleared by GI for no further episodes of bleeding. She also endorsed joint pain in the setting of gout-improved with high-dose steroid taper & colchicine, was diuresed, and deemed clinically stable for discharge with plan to follow-up with PCP within one week. 58 yo F w/ hx of SLE, systolic CHF (EF 32%) s/p AICD, HTN, CKD III- IV, PE / left leg DVT dx 2009, HIT, CAD (no stents), stage IV sacral ulcer, chronic fox, hx of recurrent pericardial effusion, AAA ~14cm in 12/2016 s/p repair in 2010 w/ aortoiliac stent c/b post-operatively with development of left lower extremity compartment syndrome initially presented with 1-wk history of progressive shortness of breath, found with large pericardial effusion and left pleural effusion without tamponade. During hospitalization, evaluated by CT surgery team recommended no surgical intervention and drainage by interventional means, repeat CT unchanged in size, likely chronic in nature, medically managed. Also found with acute DVT Left common and fem veins-pt initially refused anticoagulation 2/2 epistaxis, also deemed not a candidate for IVC filter 2/2 due to the anatomy and dilatation of the IVC. She subsequently agreed to A/C after multiple discussions regarding risks vs benefits, bridged to Coumadin, had one episode of self-limiting rectal bleeding, with stable hgb, closely monitoring and since then was cleared by GI for no further episodes of bleeding. She also endorsed joint pain in the setting of gout-improved with high-dose steroid taper & colchicine, was diuresed, and deemed clinically stable for discharge with plan to follow-up with PCP, Nephrologist, and Cardiologist within one week - CBC/INR to be drawn twice weekly, results sent to PCP; BMP/CBC to be drawn once weekly and sent to nephrologist.

## 2017-11-14 NOTE — DISCHARGE NOTE ADULT - PATIENT PORTAL LINK FT
“You can access the [facility] Patient Portal, offered by [facility], by registering with the following website: [email or web address]”

## 2017-11-14 NOTE — DISCHARGE NOTE ADULT - ADDITIONAL INSTRUCTIONS
F/u as outpatient at [facility] [address] [facility] [phone]  *Peripheral GGO in RUL new from Oct 11 2017 CT. Patient appears non-toxic. Recommend repeat CT chest in 6 weeks to further eval. F/u as outpatient at [facility] [address] [facility] with 1 week of discharge.  [phone]  Follow up with your doctor wi*Peripheral GGO in RUL new from Oct 11 2017 CT. Patient appears non-toxic. Recommend repeat CT chest in 6 weeks to further eval. F/u as outpatient at [facility] [address] [facility] with 1 week of discharge.  [phone]  Follow up with your doctor wi*Peripheral GGO in RUL new from Oct 11 2017 CT. Patient appears non-toxic. Recommend repeat CT chest in 6 weeks to further eval (December 2018). F/u as outpatient at [facility] [address] [place] [address] with 1 week of discharge.  [phone]  Follow up with your doctor wi*Peripheral GGO in RUL new from Oct 11 2017 CT. Patient appears non-toxic. Recommend repeat CT chest in 6 weeks to further eval (December 2018).  Labs are to be drawn by [facility] on Mondays and Thursdays.  Report INR Stat to Dr. Cy Chicas @ [phone].

## 2017-11-14 NOTE — PROGRESS NOTE ADULT - ASSESSMENT
LLE dvt  ckd 3  thrombocytopenia    continue current care  due to pts inability to ambulate well on her own, will need to set up home inr monitoring vs blood draws  coumadin per heme  d/c planning

## 2017-11-14 NOTE — DISCHARGE NOTE ADULT - MEDICATION SUMMARY - MEDICATIONS TO TAKE
I will START or STAY ON the medications listed below when I get home from the hospital:    predniSONE 10 mg oral tablet  -- 1 tab(s) by mouth once a day  -- Indication: For Prophylactic measure    Coumadin 2 mg oral tablet  -- 1 tab(s) by mouth once a day MDD:1  -- Do not take this drug if you are pregnant.  It is very important that you take or use this exactly as directed.  Do not skip doses or discontinue unless directed by your doctor.  Obtain medical advice before taking any non-prescription drugs as some may affect the action of this medication.    -- Indication: For DVT (deep venous thrombosis)    colchicine 0.6 mg oral tablet  -- 1 tab(s) by mouth every other day MDD:1  -- Indication: For Gout    labetalol 300 mg oral tablet  -- 1 tab(s) by mouth 3 times a day MDD:3  -- Indication: For Hypertension     carvedilol 12.5 mg oral tablet  -- 3 tab(s) by mouth every 12 hours MDD:6  -- Indication: For CAD    amLODIPine 10 mg oral tablet  -- 1 tab(s) by mouth once a day  -- Indication: For Hypertension     petrolatum topical ointment  -- 1 application on skin 2 times a day  -- Indication: For Dry skin     ferrous sulfate 325 mg (65 mg elemental iron) oral tablet  -- 1 tab(s) by mouth once a day  -- Indication: For Anemia, unspecified type    sodium chloride 0.65% nasal spray  -- 1 spray(s) into nose 2 times a day   -- Indication: For Dry nose    pantoprazole 40 mg oral delayed release tablet  -- 1 tab(s) by mouth once a day (before a meal) MDD:1  -- Indication: For Gastritis    hydrALAZINE 50 mg oral tablet  -- 1 tab(s) by mouth every 8 hours MDD:3  -- Indication: For Hypertension     Nephro-Riddhi oral tablet  -- 1 tab(s) by mouth once a day MDD:1  -- Indication: For Supplement     calcitriol 0.25 mcg oral capsule  -- 1 cap(s) by mouth once a day MDD:1  -- Indication: For CKD    Vitamin C 500 mg oral tablet  -- 1 tab(s) by mouth once a day  -- Indication: For Supplement

## 2017-11-14 NOTE — PROGRESS NOTE ADULT - SUBJECTIVE AND OBJECTIVE BOX
Subjective: Patient seen and examined. No new events except as noted.   feels ok     REVIEW OF SYSTEMS:    CONSTITUTIONAL: + weakness, no fevers or chills  EYES/ENT: No visual changes;  No vertigo or throat pain   NECK: No pain or stiffness  RESPIRATORY: No cough, wheezing, hemoptysis; No shortness of breath  CARDIOVASCULAR: No chest pain or palpitations  GASTROINTESTINAL: No abdominal or epigastric pain. No nausea, vomiting, or hematemesis; No diarrhea or constipation. No melena or hematochezia.  GENITOURINARY: No dysuria, frequency or hematuria  NEUROLOGICAL: No numbness or weakness  SKIN: No itching, burning, rashes, or lesions   All other review of systems is negative unless indicated above.    MEDICATIONS:  MEDICATIONS  (STANDING):  amLODIPine   Tablet 10 milliGRAM(s) Oral daily  AQUAPHOR (petrolatum Ointment) 1 Application(s) Topical two times a day  ascorbic acid 500 milliGRAM(s) Oral daily  BACItracin   Ointment 1 Application(s) Topical two times a day  calcitriol   Capsule 0.25 MICROGram(s) Oral daily  carvedilol 25 milliGRAM(s) Oral every 12 hours  ferrous    sulfate 325 milliGRAM(s) Oral daily  furosemide    Tablet 60 milliGRAM(s) Oral daily  hydrALAZINE 50 milliGRAM(s) Oral every 8 hours  influenza   Vaccine 0.5 milliLiter(s) IntraMuscular once  labetalol 300 milliGRAM(s) Oral three times a day  Nephro-alyse 1 Tablet(s) Oral daily  pantoprazole    Tablet 40 milliGRAM(s) Oral before breakfast  predniSONE   Tablet 10 milliGRAM(s) Oral daily  sodium chloride 0.65% Nasal 1 Spray(s) Both Nostrils three times a day      PHYSICAL EXAM:  T(C): 36.6 (11-14-17 @ 04:38), Max: 36.6 (11-14-17 @ 04:38)  HR: 84 (11-14-17 @ 04:38) (78 - 85)  BP: 159/84 (11-14-17 @ 04:38) (140/84 - 167/89)  RR: 18 (11-14-17 @ 04:38) (18 - 18)  SpO2: 97% (11-14-17 @ 04:38) (96% - 97%)  Wt(kg): --  I&O's Summary    13 Nov 2017 07:01  -  14 Nov 2017 07:00  --------------------------------------------------------  IN: 720 mL / OUT: 2500 mL / NET: -1780 mL          Appearance: Normal	  HEENT:   Normal oral mucosa, PERRL, EOMI	  Lymphatic: No lymphadenopathy , no edema  Cardiovascular: Normal S1 S2, No JVD, No murmurs , Peripheral pulses palpable 2+ bilaterally  Respiratory: Lungs clear to auscultation, normal effort 	  Gastrointestinal:  Soft, Non-tender, + BS	  Skin: No rashes, No ecchymoses, No cyanosis, warm to touch  Musculoskeletal: Normal range of motion, normal strength  Psychiatry:  Mood & affect appropriate  Ext: Chronic LLE edema, wrapped   +fox     LABS:    CARDIAC MARKERS:                                10.6   5.2   )-----------( 131      ( 14 Nov 2017 06:19 )             32.9     11-14    144  |  104  |  70<H>  ----------------------------<  81  4.0   |  27  |  2.94<H>    Ca    9.1      14 Nov 2017 06:19      proBNP:   Lipid Profile:   HgA1c:   TSH:             TELEMETRY: 	V-paced     ECG:  	  RADIOLOGY:   DIAGNOSTIC TESTING:  [ ] Echocardiogram:  [ ]  Catheterization:  [ ] Stress Test:    OTHER:

## 2017-11-14 NOTE — PROGRESS NOTE ADULT - SUBJECTIVE AND OBJECTIVE BOX
MEDICINE, PROGRESS NOTE [phone]    COSTEN, SANDRA 57y [id number]    Patient seen and examined.  Patient is a 57y old  Female who presents with a chief complaint of shortness of breath (11 Oct 2017 22:01)  Pt has no current complaints  Pt feels better.    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)  Chronic Gout (ICD9 274.02)  LBBB (Left Bundle Branch Block) (ICD9 426.3)  CHF (Congestive Heart Failure) (ICD9 428.0)  Hx of aorta-iliac-femoral bypass  TOP (Termination of Pregnancy)  S/P Dilatation and Curettage  History of Tubal Ligation  s/p AAA (Abdominal Aortic Aneurysm) Repair: 2009 2010  History of Cholecystectomy  S/P Partial Hysterectomy    MEDICATIONS  (STANDING):  amLODIPine   Tablet 10 milliGRAM(s) Oral daily  AQUAPHOR (petrolatum Ointment) 1 Application(s) Topical two times a day  ascorbic acid 500 milliGRAM(s) Oral daily  BACItracin   Ointment 1 Application(s) Topical two times a day  calcitriol   Capsule 0.25 MICROGram(s) Oral daily  carvedilol 25 milliGRAM(s) Oral every 12 hours  ferrous    sulfate 325 milliGRAM(s) Oral daily  furosemide    Tablet 60 milliGRAM(s) Oral daily  hydrALAZINE 50 milliGRAM(s) Oral every 8 hours  influenza   Vaccine 0.5 milliLiter(s) IntraMuscular once  labetalol 300 milliGRAM(s) Oral three times a day  Nephro-alyse 1 Tablet(s) Oral daily  pantoprazole    Tablet 40 milliGRAM(s) Oral before breakfast  predniSONE   Tablet 10 milliGRAM(s) Oral daily  sodium chloride 0.65% Nasal 1 Spray(s) Both Nostrils three times a day  warfarin 4 milliGRAM(s) Oral once    MEDICATIONS  (PRN):    Allergies    heparin (Unknown)  losartan (Anaphylaxis)  nitroglycerin (Other)    Intolerances        PHYSICAL EXAM:  Constitutional: NAD  HEENT: Normocephalic, EOMI  Neck:  No JVD  Respiratory: CTA B/L, No wheezes  Cardiovascular: S1, S2, RRR, + systolic murmur  Gastrointestinal: BS+, soft, NT/ND  Extremities: No peripheral edema  Neurological: AAOX3, no focal deficits  Psychiatric: Normal mood, normal affect  : No Hook    Vital Signs Last 24 Hrs  T(C): 36.9 (14 Nov 2017 15:16), Max: 36.9 (14 Nov 2017 15:16)  T(F): 98.5 (14 Nov 2017 15:16), Max: 98.5 (14 Nov 2017 15:16)  HR: 71 (14 Nov 2017 15:16) (71 - 85)  BP: 127/72 (14 Nov 2017 15:16) (127/72 - 167/89)  BP(mean): --  RR: 18 (14 Nov 2017 15:16) (18 - 18)  SpO2: 98% (14 Nov 2017 15:16) (97% - 98%)  I&O's Summary    13 Nov 2017 07:01  -  14 Nov 2017 07:00  --------------------------------------------------------  IN: 720 mL / OUT: 2500 mL / NET: -1780 mL    14 Nov 2017 07:01  -  14 Nov 2017 18:27  --------------------------------------------------------  IN: 480 mL / OUT: 800 mL / NET: -320 mL        LABS:                        10.6   5.2   )-----------( 131      ( 14 Nov 2017 06:19 )             32.9     11-14    144  |  104  |  70<H>  ----------------------------<  81  4.0   |  27  |  2.94<H>    Ca    9.1      14 Nov 2017 06:19

## 2017-11-15 LAB
ANION GAP SERPL CALC-SCNC: 13 MMOL/L — SIGNIFICANT CHANGE UP (ref 5–17)
APTT BLD: 42.1 SEC — HIGH (ref 27.5–37.4)
BUN SERPL-MCNC: 72 MG/DL — HIGH (ref 7–23)
CALCIUM SERPL-MCNC: 9.1 MG/DL — SIGNIFICANT CHANGE UP (ref 8.4–10.5)
CHLORIDE SERPL-SCNC: 105 MMOL/L — SIGNIFICANT CHANGE UP (ref 96–108)
CO2 SERPL-SCNC: 27 MMOL/L — SIGNIFICANT CHANGE UP (ref 22–31)
CREAT SERPL-MCNC: 3.07 MG/DL — HIGH (ref 0.5–1.3)
GLUCOSE SERPL-MCNC: 81 MG/DL — SIGNIFICANT CHANGE UP (ref 70–99)
HCT VFR BLD CALC: 32 % — LOW (ref 34.5–45)
HGB BLD-MCNC: 10 G/DL — LOW (ref 11.5–15.5)
INR BLD: 4.1 RATIO — HIGH (ref 0.88–1.16)
MCHC RBC-ENTMCNC: 29.1 PG — SIGNIFICANT CHANGE UP (ref 27–34)
MCHC RBC-ENTMCNC: 31.4 GM/DL — LOW (ref 32–36)
MCV RBC AUTO: 92.4 FL — SIGNIFICANT CHANGE UP (ref 80–100)
PLATELET # BLD AUTO: 131 K/UL — LOW (ref 150–400)
POTASSIUM SERPL-MCNC: 4.2 MMOL/L — SIGNIFICANT CHANGE UP (ref 3.5–5.3)
POTASSIUM SERPL-SCNC: 4.2 MMOL/L — SIGNIFICANT CHANGE UP (ref 3.5–5.3)
PROTHROM AB SERPL-ACNC: 46 SEC — HIGH (ref 9.8–12.7)
RBC # BLD: 3.46 M/UL — LOW (ref 3.8–5.2)
RBC # FLD: 14.8 % — HIGH (ref 10.3–14.5)
SODIUM SERPL-SCNC: 145 MMOL/L — SIGNIFICANT CHANGE UP (ref 135–145)
WBC # BLD: 5.5 K/UL — SIGNIFICANT CHANGE UP (ref 3.8–10.5)
WBC # FLD AUTO: 5.5 K/UL — SIGNIFICANT CHANGE UP (ref 3.8–10.5)

## 2017-11-15 RX ADMIN — CALCITRIOL 0.25 MICROGRAM(S): 0.5 CAPSULE ORAL at 13:17

## 2017-11-15 RX ADMIN — AMLODIPINE BESYLATE 10 MILLIGRAM(S): 2.5 TABLET ORAL at 09:22

## 2017-11-15 RX ADMIN — Medication 50 MILLIGRAM(S): at 21:58

## 2017-11-15 RX ADMIN — Medication 300 MILLIGRAM(S): at 05:36

## 2017-11-15 RX ADMIN — Medication 1 APPLICATION(S): at 05:37

## 2017-11-15 RX ADMIN — Medication 1 SPRAY(S): at 21:58

## 2017-11-15 RX ADMIN — Medication 1 TABLET(S): at 11:24

## 2017-11-15 RX ADMIN — Medication 500 MILLIGRAM(S): at 09:22

## 2017-11-15 RX ADMIN — Medication 50 MILLIGRAM(S): at 05:36

## 2017-11-15 RX ADMIN — Medication 1 SPRAY(S): at 05:37

## 2017-11-15 RX ADMIN — Medication 60 MILLIGRAM(S): at 05:36

## 2017-11-15 RX ADMIN — Medication 50 MILLIGRAM(S): at 13:16

## 2017-11-15 RX ADMIN — Medication 1 SPRAY(S): at 13:16

## 2017-11-15 RX ADMIN — Medication 10 MILLIGRAM(S): at 09:22

## 2017-11-15 RX ADMIN — Medication 1 APPLICATION(S): at 17:48

## 2017-11-15 RX ADMIN — CARVEDILOL PHOSPHATE 25 MILLIGRAM(S): 80 CAPSULE, EXTENDED RELEASE ORAL at 09:22

## 2017-11-15 RX ADMIN — Medication 300 MILLIGRAM(S): at 13:16

## 2017-11-15 RX ADMIN — PANTOPRAZOLE SODIUM 40 MILLIGRAM(S): 20 TABLET, DELAYED RELEASE ORAL at 05:36

## 2017-11-15 RX ADMIN — Medication 300 MILLIGRAM(S): at 21:58

## 2017-11-15 RX ADMIN — Medication 325 MILLIGRAM(S): at 09:23

## 2017-11-15 RX ADMIN — CARVEDILOL PHOSPHATE 25 MILLIGRAM(S): 80 CAPSULE, EXTENDED RELEASE ORAL at 20:40

## 2017-11-15 NOTE — PROGRESS NOTE ADULT - SUBJECTIVE AND OBJECTIVE BOX
Subjective: Patient seen and examined. No new events except as noted.     REVIEW OF SYSTEMS:    CONSTITUTIONAL: No weakness, fevers or chills  EYES/ENT: No visual changes;  No vertigo or throat pain   NECK: No pain or stiffness  RESPIRATORY: No cough, wheezing, hemoptysis; No shortness of breath  CARDIOVASCULAR: No chest pain or palpitations  GASTROINTESTINAL: No abdominal or epigastric pain. No nausea, vomiting, or hematemesis; No diarrhea or constipation. No melena or hematochezia.  GENITOURINARY: No dysuria, frequency or hematuria  NEUROLOGICAL: No numbness or weakness  SKIN: No itching, burning, rashes, or lesions   All other review of systems is negative unless indicated above.    MEDICATIONS:  MEDICATIONS  (STANDING):  amLODIPine   Tablet 10 milliGRAM(s) Oral daily  AQUAPHOR (petrolatum Ointment) 1 Application(s) Topical two times a day  ascorbic acid 500 milliGRAM(s) Oral daily  BACItracin   Ointment 1 Application(s) Topical two times a day  calcitriol   Capsule 0.25 MICROGram(s) Oral daily  carvedilol 25 milliGRAM(s) Oral every 12 hours  ferrous    sulfate 325 milliGRAM(s) Oral daily  furosemide    Tablet 60 milliGRAM(s) Oral daily  hydrALAZINE 50 milliGRAM(s) Oral every 8 hours  influenza   Vaccine 0.5 milliLiter(s) IntraMuscular once  labetalol 300 milliGRAM(s) Oral three times a day  Nephro-alyse 1 Tablet(s) Oral daily  pantoprazole    Tablet 40 milliGRAM(s) Oral before breakfast  predniSONE   Tablet 10 milliGRAM(s) Oral daily  sodium chloride 0.65% Nasal 1 Spray(s) Both Nostrils three times a day      PHYSICAL EXAM:  T(C): 36.8 (11-15-17 @ 05:35), Max: 36.9 (11-14-17 @ 15:16)  HR: 88 (11-15-17 @ 05:35) (71 - 88)  BP: 162/87 (11-15-17 @ 05:35) (127/72 - 162/87)  RR: 18 (11-15-17 @ 05:35) (18 - 18)  SpO2: 97% (11-15-17 @ 05:35) (97% - 98%)  Wt(kg): --  I&O's Summary    14 Nov 2017 07:01  -  15 Nov 2017 07:00  --------------------------------------------------------  IN: 800 mL / OUT: 1850 mL / NET: -1050 mL          Appearance: Normal	  HEENT:   Normal oral mucosa, PERRL, EOMI	  Lymphatic: No lymphadenopathy , no edema  Cardiovascular: Normal S1 S2, No JVD, No murmurs , Peripheral pulses palpable 2+ bilaterally  Respiratory: Lungs clear to auscultation, normal effort 	  Gastrointestinal:  Soft, Non-tender, + BS	  Skin: No rashes, No ecchymoses, No cyanosis, warm to touch  Musculoskeletal: Normal range of motion, normal strength  Psychiatry:  Mood & affect appropriate  Ext: +chronic LLE edema  +fox       LABS:    CARDIAC MARKERS:                                10.0   5.5   )-----------( 131      ( 15 Nov 2017 06:29 )             32.0     11-15    145  |  105  |  72<H>  ----------------------------<  81  4.2   |  27  |  3.07<H>    Ca    9.1      15 Nov 2017 06:29      proBNP:   Lipid Profile:   HgA1c:   TSH:             TELEMETRY: 	vpaced    ECG:  	  RADIOLOGY:   DIAGNOSTIC TESTING:  [ ] Echocardiogram:  [ ]  Catheterization:  [ ] Stress Test:    OTHER:

## 2017-11-15 NOTE — PROGRESS NOTE ADULT - ASSESSMENT
supratherapeutic INR  thrombocytopenia  lle dvt  epistaxis    continue current care  hold coumadin  ent eval of expistaxis  encourage activity  continue wrapping leg  wound care for ulcer

## 2017-11-15 NOTE — PROGRESS NOTE ADULT - SUBJECTIVE AND OBJECTIVE BOX
[facility]   [phone]  RENAL FOLLOW UP NOTE  --------------------------------------------------------------------------------  Chief Complaint:    24 hour events/subjective:    states had epistaxis none now.     PAST HISTORY  --------------------------------------------------------------------------------  No significant changes to PMH, PSH, FHx, SHx, unless otherwise noted    ALLERGIES & MEDICATIONS  --------------------------------------------------------------------------------  Allergies    heparin (Unknown)  losartan (Anaphylaxis)  nitroglycerin (Other)    Intolerances      Standing Inpatient Medications  amLODIPine   Tablet 10 milliGRAM(s) Oral daily  AQUAPHOR (petrolatum Ointment) 1 Application(s) Topical two times a day  ascorbic acid 500 milliGRAM(s) Oral daily  BACItracin   Ointment 1 Application(s) Topical two times a day  calcitriol   Capsule 0.25 MICROGram(s) Oral daily  carvedilol 25 milliGRAM(s) Oral every 12 hours  ferrous    sulfate 325 milliGRAM(s) Oral daily  furosemide    Tablet 60 milliGRAM(s) Oral daily  hydrALAZINE 50 milliGRAM(s) Oral every 8 hours  influenza   Vaccine 0.5 milliLiter(s) IntraMuscular once  labetalol 300 milliGRAM(s) Oral three times a day  Nephro-alyse 1 Tablet(s) Oral daily  pantoprazole    Tablet 40 milliGRAM(s) Oral before breakfast  predniSONE   Tablet 10 milliGRAM(s) Oral daily  sodium chloride 0.65% Nasal 1 Spray(s) Both Nostrils three times a day    PRN Inpatient Medications      REVIEW OF SYSTEMS  --------------------------------------------------------------------------------    Gen: denies  fevers/chills,  CVS: denies chest pain/palpitations  Resp: denies any worsening SOB/Cough  GI: Denies N/V/Abd pain  : Denies dysuria/  All other systems were reviewed and are negative, except as noted.    VITALS/PHYSICAL EXAM  --------------------------------------------------------------------------------  T(C): 37.1 (11-15-17 @ 12:29), Max: 37.1 (11-15-17 @ 12:29)  HR: 80 (11-15-17 @ 12:29) (80 - 88)  BP: 149/75 (11-15-17 @ 12:29) (136/80 - 162/87)  RR: 17 (11-15-17 @ 12:29) (17 - 18)  SpO2: 98% (11-15-17 @ 12:29) (97% - 98%)  Wt(kg): --        11-14-17 @ 07:01  -  11-15-17 @ 07:00  --------------------------------------------------------  IN: 800 mL / OUT: 1850 mL / NET: -1050 mL    11-15-17 @ 07:01  -  11-15-17 @ 18:59  --------------------------------------------------------  IN: 380 mL / OUT: 600 mL / NET: -220 mL        Physical Exam:  	Gen: alert oriented place person and date   	Pulm: Decreased breath sounds b/l bases. no rales or ronchi or wheezing  	CV: RRR, S1/S2. no rub  	Back: No CVA tenderness; no sacral edema  	Abd: +BS, soft, nontender/nondistended  	: No suprapubic tenderness.               Extremity: LLE dressing over lymphedema  RLE 1++  edema   LABS/STUDIES  --------------------------------------------------------------------------------              10.0   5.5   >-----------<  131      [11-15-17 @ 06:29]              32.0     145  |  105  |  72  ----------------------------<  81      [11-15-17 @ 06:29]  4.2   |  27  |  3.07        Ca     9.1     [11-15-17 @ 06:29]      PT/INR: PT 46.0 , INR 4.10       [11-15-17 @ 06:29]  PTT: 42.1       [11-15-17 @ 06:29]      Creatinine Trend:  SCr 3.07 [11-15 @ 06:29]  SCr 2.94 [11-14 @ 06:19]  SCr 2.99 [11-13 @ 06:48]  SCr 2.90 [11-12 @ 06:38]  SCr 2.99 [11-11 @ 09:07]                  Iron 48, TIBC --, %sat --      [10-12-17 @ 07:14]  Ferritin 847.0      [10-12-17 @ 07:14]  PTH -- (Ca 9.0)      [11-09-17 @ 08:51]   387  Vitamin D (25OH) 7.8      [11-09-17 @ 08:51]  HbA1c 4.5      [08-09-16 @ 06:49]  TSH 0.33      [10-22-17 @ 08:40]    dsDNA 44      [10-17-17 @ 08:26]  C3 Complement 105      [10-17-17 @ 08:26]  C4 Complement 31      [10-17-17 @ 08:26]

## 2017-11-15 NOTE — PROGRESS NOTE ADULT - ASSESSMENT
58 yo F w/ hx of SLE, systolic CHF (EF 32%) s/p AICD, HTN, CKD III- IV, PE / left leg DVT dx 2009, HIT, CAD (no stents), stage IV sacral ulcer, chronic fox, hx of recurrent pericardial effusion, AAA ~14cm in 12/2016 s/p repair in 2010 w/ aortoiliac stent c/b post-operatively with development of left lower extremity   lymphedema December 2016;  pericardial effusion pericardial drain/SLE. now with ilana on ckd with baseline creatinine approx 1.8-2.     1- ILANA on ckd III  2- chf hx  3- cardiomyopathy  4- HTN   5- SLE  6- proteinuria  7- gout     cr worsening slowly fluid status worsening but edema  worsening as well  lasix to 60 mg po alternating with 40mg every other day   prednisone 10mg daily   uloric 40mg   cont norvasc hydralazine and coreg  cont with labetalol for bp control and hydralazine   colcrys 0.6mg every other day

## 2017-11-15 NOTE — PROGRESS NOTE ADULT - ASSESSMENT
56 y/o female PMHx chronic systolic CHF (last known EF=32%) s/p AICD, SLE, AAA approx 14cm as of 12/2016 s/p aortoiliac stent, DVT/PE (not currently anticoagulated 2/2 HIT), h/o LLE compartment syndrome, multiple pericardial effusions s/p drainage most recently in Jan 2017 now presents with recurrent large pericardial effusion     1. pericardial effusion without tamponade. size unchanged on repeat CT   	Likely chronic effusion, h/o SLE              Rheumatology following     2. CT surgery recommended no surgical intervention and drainage by interventional means   Interventional Cardiology  and Vascular recommending radiologic guidance given the nature of the effusion - appears loculated). Will again discuss the possibility and feasibility of interventional approach with interventional radiolology  lasix 60 mg PO  daily     3. Venous LE Doppler positive for DVT. Started on Argatroban (History Of HIT) and wants to start coumadin.  Monitor CBC    4. Abdominal pain now resolved. No acute findings on CT     5. HTN- Overall better but still labile at times. For now continue with all current meds

## 2017-11-15 NOTE — PROGRESS NOTE ADULT - SUBJECTIVE AND OBJECTIVE BOX
HPI:  Patient followed for DVT, h/o HIT. Has multiple other problems as listed in previous notes.  Follow up for slightly low platelets. 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)  Chronic Gout (ICD9 274.02)  LBBB (Left Bundle Branch Block) (ICD9 426.3)  CHF (Congestive Heart Failure) (ICD9 428.0)  Hx of aorta-iliac-femoral bypass  TOP (Termination of Pregnancy)  S/P Dilatation and Curettage  History of Tubal Ligation  s/p AAA (Abdominal Aortic Aneurysm) Repair: 2009 2010  History of Cholecystectomy  S/P Partial Hysterectomy    Medications:  amLODIPine   Tablet 10 milliGRAM(s) Oral daily  AQUAPHOR (petrolatum Ointment) 1 Application(s) Topical two times a day  ascorbic acid 500 milliGRAM(s) Oral daily  BACItracin   Ointment 1 Application(s) Topical two times a day  calcitriol   Capsule 0.25 MICROGram(s) Oral daily  carvedilol 25 milliGRAM(s) Oral every 12 hours  ferrous    sulfate 325 milliGRAM(s) Oral daily  furosemide    Tablet 60 milliGRAM(s) Oral daily  hydrALAZINE 50 milliGRAM(s) Oral every 8 hours  influenza   Vaccine 0.5 milliLiter(s) IntraMuscular once  labetalol 300 milliGRAM(s) Oral three times a day  Nephro-alyse 1 Tablet(s) Oral daily  pantoprazole    Tablet 40 milliGRAM(s) Oral before breakfast  predniSONE   Tablet 10 milliGRAM(s) Oral daily  sodium chloride 0.65% Nasal 1 Spray(s) Both Nostrils three times a day    Labs:  CBC Full  -  ( 15 Nov 2017 06:29 )  WBC Count : 5.5 K/uL  Hemoglobin : 10.0 g/dL  Hematocrit : 32.0 %  Platelet Count - Automated : 131 K/uL  Mean Cell Volume : 92.4 fl  Mean Cell Hemoglobin : 29.1 pg  Mean Cell Hemoglobin Concentration : 31.4 gm/dL  Auto Neutrophil # : x  Auto Lymphocyte # : x  Auto Monocyte # : x  Auto Eosinophil # : x  Auto Basophil # : x  Auto Neutrophil % : x  Auto Lymphocyte % : x  Auto Monocyte % : x  Auto Eosinophil % : x  Auto Basophil % : x    11-15    145  |  105  |  72<H>  ----------------------------<  81  4.2   |  27  |  3.07<H>    Ca    9.1      15 Nov 2017 06:29        Radiology:             ROS:  Patient comfortable without distress, says had little epistaxis in AM  No SOB or chest pain  No palpitation  No abdominal pain, diarrhaea or constipation  No weakness of extremities  No skin changes or swelling of legs    Vital Signs Last 24 Hrs  T(C): 36.8 (15 Nov 2017 05:35), Max: 36.9 (14 Nov 2017 15:16)  T(F): 98.2 (15 Nov 2017 05:35), Max: 98.5 (14 Nov 2017 15:16)  HR: 88 (15 Nov 2017 05:35) (71 - 88)  BP: 162/87 (15 Nov 2017 05:35) (127/72 - 162/87)  BP(mean): --  RR: 18 (15 Nov 2017 05:35) (18 - 18)  SpO2: 97% (15 Nov 2017 05:35) (97% - 98%)    Physical exam:  Patient alert and oriented  No distress  left leg in bandage as before.   rest in chart      Assessment and Plan:

## 2017-11-15 NOTE — PROGRESS NOTE ADULT - NSHPATTENDINGPLANDISCUSS_GEN_ALL_CORE
Patient and NP
Discussed with NP and patient
Patient
Patient and NP
NP on unit, Deya Solano

## 2017-11-15 NOTE — PROGRESS NOTE ADULT - ASSESSMENT
Patient is a 56 yo F w/ hx of SLE, systolic CHF (EF 32%) s/p AICD, HTN, CKD III- IV, PE / left leg DVT/ PE in  2009 followed by  HIT, CAD (no stents), stage IV sacral ulcer, chronic fox, hx of recurrent pericardial effusion from SLE, AAA ~14cm in 12/2016 s/p repair in 2010 w/ aortoiliac stent c/b post-operatively with development of left lower extremity compartment syndrome was admitted with  progressive shortness of breath and managed  She had acute LLE DVT, s/p argatroban and now on coumadin. She never had heparin in this admission. Her INR is on higher side and coumadin is being adjusted. No bleeding except mild epistaxis now and then, says will be seen by ENT  Workup for her bleeding history  in 2013,  for von Willebrand's disease, factor assays was normal.  She may have platelet dysfunction from renal insufficiency as one of the factor causing epistaxis along with local causes  I told patient that considering her PMH, SLE, immobility etc, long term anticoagulation is preferred as long as she does not have significant bleeding    I told patient that her platelets have been  in safe and stable range for several days. Since she did not have heparin, she should not worry about HIT.  She is not on any medication to decrease platelet.  Mild fluctuations in platelets are common from lab reasons and multiple reactive causes.   Continue follow up of CBC, INR and any bleeding

## 2017-11-15 NOTE — PROGRESS NOTE ADULT - SUBJECTIVE AND OBJECTIVE BOX
MEDICINE, PROGRESS NOTE [phone]    COSTEN, SANDRA 57y [id number]    Patient seen and examined.  Patient is a 57y old  Female who presents with a chief complaint of shortness of breath (11 Oct 2017 22:01)  Pt had epistaxis.    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)  Chronic Gout (ICD9 274.02)  LBBB (Left Bundle Branch Block) (ICD9 426.3)  CHF (Congestive Heart Failure) (ICD9 428.0)  Hx of aorta-iliac-femoral bypass  TOP (Termination of Pregnancy)  S/P Dilatation and Curettage  History of Tubal Ligation  s/p AAA (Abdominal Aortic Aneurysm) Repair: 2009 2010  History of Cholecystectomy  S/P Partial Hysterectomy    MEDICATIONS  (STANDING):  amLODIPine   Tablet 10 milliGRAM(s) Oral daily  AQUAPHOR (petrolatum Ointment) 1 Application(s) Topical two times a day  ascorbic acid 500 milliGRAM(s) Oral daily  BACItracin   Ointment 1 Application(s) Topical two times a day  calcitriol   Capsule 0.25 MICROGram(s) Oral daily  carvedilol 25 milliGRAM(s) Oral every 12 hours  ferrous    sulfate 325 milliGRAM(s) Oral daily  furosemide    Tablet 60 milliGRAM(s) Oral daily  hydrALAZINE 50 milliGRAM(s) Oral every 8 hours  influenza   Vaccine 0.5 milliLiter(s) IntraMuscular once  labetalol 300 milliGRAM(s) Oral three times a day  Nephro-layse 1 Tablet(s) Oral daily  pantoprazole    Tablet 40 milliGRAM(s) Oral before breakfast  predniSONE   Tablet 10 milliGRAM(s) Oral daily  sodium chloride 0.65% Nasal 1 Spray(s) Both Nostrils three times a day    MEDICATIONS  (PRN):    Allergies    heparin (Unknown)  losartan (Anaphylaxis)  nitroglycerin (Other)    Intolerances        PHYSICAL EXAM:  Constitutional: NAD  HEENT: Normocephalic, EOMI  Neck:  No JVD  Respiratory: CTA B/L, No wheezes  Cardiovascular: S1, S2, RRR, + systolic murmur  Gastrointestinal: BS+, soft, NT/ND  Extremities: + peripheral edema le b/l,   Neurological: AAOX3, no focal deficits  Psychiatric: Normal mood, normal affect  : + Hook    Vital Signs Last 24 Hrs  T(C): 37.1 (15 Nov 2017 12:29), Max: 37.1 (15 Nov 2017 12:29)  T(F): 98.8 (15 Nov 2017 12:29), Max: 98.8 (15 Nov 2017 12:29)  HR: 80 (15 Nov 2017 12:29) (80 - 88)  BP: 149/75 (15 Nov 2017 12:29) (136/80 - 162/87)  BP(mean): --  RR: 17 (15 Nov 2017 12:29) (17 - 18)  SpO2: 98% (15 Nov 2017 12:29) (97% - 98%)  I&O's Summary    14 Nov 2017 07:01  -  15 Nov 2017 07:00  --------------------------------------------------------  IN: 800 mL / OUT: 1850 mL / NET: -1050 mL    15 Nov 2017 07:01  -  15 Nov 2017 18:03  --------------------------------------------------------  IN: 380 mL / OUT: 600 mL / NET: -220 mL        LABS:                        10.0   5.5   )-----------( 131      ( 15 Nov 2017 06:29 )             32.0     11-15    145  |  105  |  72<H>  ----------------------------<  81  4.2   |  27  |  3.07<H>    Ca    9.1      15 Nov 2017 06:29

## 2017-11-16 LAB
ANION GAP SERPL CALC-SCNC: 16 MMOL/L — SIGNIFICANT CHANGE UP (ref 5–17)
BUN SERPL-MCNC: 79 MG/DL — HIGH (ref 7–23)
CALCIUM SERPL-MCNC: 9 MG/DL — SIGNIFICANT CHANGE UP (ref 8.4–10.5)
CHLORIDE SERPL-SCNC: 102 MMOL/L — SIGNIFICANT CHANGE UP (ref 96–108)
CO2 SERPL-SCNC: 26 MMOL/L — SIGNIFICANT CHANGE UP (ref 22–31)
CREAT SERPL-MCNC: 3.21 MG/DL — HIGH (ref 0.5–1.3)
GLUCOSE SERPL-MCNC: 88 MG/DL — SIGNIFICANT CHANGE UP (ref 70–99)
HCT VFR BLD CALC: 33 % — LOW (ref 34.5–45)
HGB BLD-MCNC: 10.6 G/DL — LOW (ref 11.5–15.5)
INR BLD: 4 RATIO — HIGH (ref 0.88–1.16)
MCHC RBC-ENTMCNC: 29.7 PG — SIGNIFICANT CHANGE UP (ref 27–34)
MCHC RBC-ENTMCNC: 32.2 GM/DL — SIGNIFICANT CHANGE UP (ref 32–36)
MCV RBC AUTO: 92.3 FL — SIGNIFICANT CHANGE UP (ref 80–100)
PLATELET # BLD AUTO: 140 K/UL — LOW (ref 150–400)
POTASSIUM SERPL-MCNC: 4 MMOL/L — SIGNIFICANT CHANGE UP (ref 3.5–5.3)
POTASSIUM SERPL-SCNC: 4 MMOL/L — SIGNIFICANT CHANGE UP (ref 3.5–5.3)
PROTHROM AB SERPL-ACNC: 44.4 SEC — HIGH (ref 9.8–12.7)
RBC # BLD: 3.57 M/UL — LOW (ref 3.8–5.2)
RBC # FLD: 14.7 % — HIGH (ref 10.3–14.5)
SODIUM SERPL-SCNC: 144 MMOL/L — SIGNIFICANT CHANGE UP (ref 135–145)
WBC # BLD: 4.9 K/UL — SIGNIFICANT CHANGE UP (ref 3.8–10.5)
WBC # FLD AUTO: 4.9 K/UL — SIGNIFICANT CHANGE UP (ref 3.8–10.5)

## 2017-11-16 PROCEDURE — 99232 SBSQ HOSP IP/OBS MODERATE 35: CPT

## 2017-11-16 RX ORDER — COLCHICINE 0.6 MG
0.6 TABLET ORAL EVERY OTHER DAY
Qty: 0 | Refills: 0 | Status: DISCONTINUED | OUTPATIENT
Start: 2017-11-16 | End: 2017-11-18

## 2017-11-16 RX ADMIN — Medication 1 APPLICATION(S): at 05:38

## 2017-11-16 RX ADMIN — Medication 1 APPLICATION(S): at 05:37

## 2017-11-16 RX ADMIN — Medication 300 MILLIGRAM(S): at 05:37

## 2017-11-16 RX ADMIN — CALCITRIOL 0.25 MICROGRAM(S): 0.5 CAPSULE ORAL at 09:58

## 2017-11-16 RX ADMIN — Medication 50 MILLIGRAM(S): at 05:37

## 2017-11-16 RX ADMIN — Medication 10 MILLIGRAM(S): at 09:58

## 2017-11-16 RX ADMIN — Medication 1 SPRAY(S): at 22:03

## 2017-11-16 RX ADMIN — Medication 0.6 MILLIGRAM(S): at 14:25

## 2017-11-16 RX ADMIN — Medication 50 MILLIGRAM(S): at 22:05

## 2017-11-16 RX ADMIN — Medication 50 MILLIGRAM(S): at 13:15

## 2017-11-16 RX ADMIN — AMLODIPINE BESYLATE 10 MILLIGRAM(S): 2.5 TABLET ORAL at 09:58

## 2017-11-16 RX ADMIN — CARVEDILOL PHOSPHATE 25 MILLIGRAM(S): 80 CAPSULE, EXTENDED RELEASE ORAL at 09:58

## 2017-11-16 RX ADMIN — Medication 1 SPRAY(S): at 05:38

## 2017-11-16 RX ADMIN — Medication 325 MILLIGRAM(S): at 09:58

## 2017-11-16 RX ADMIN — CARVEDILOL PHOSPHATE 25 MILLIGRAM(S): 80 CAPSULE, EXTENDED RELEASE ORAL at 22:02

## 2017-11-16 RX ADMIN — PANTOPRAZOLE SODIUM 40 MILLIGRAM(S): 20 TABLET, DELAYED RELEASE ORAL at 05:37

## 2017-11-16 RX ADMIN — Medication 1 APPLICATION(S): at 17:13

## 2017-11-16 RX ADMIN — Medication 1 TABLET(S): at 09:58

## 2017-11-16 RX ADMIN — Medication 300 MILLIGRAM(S): at 13:15

## 2017-11-16 RX ADMIN — Medication 500 MILLIGRAM(S): at 09:58

## 2017-11-16 RX ADMIN — Medication 60 MILLIGRAM(S): at 05:37

## 2017-11-16 RX ADMIN — Medication 300 MILLIGRAM(S): at 23:01

## 2017-11-16 NOTE — PROGRESS NOTE ADULT - SUBJECTIVE AND OBJECTIVE BOX
POD/STATUS POST/ENT ISSUE: Epistaxis    INTERVAL HPI: Pt seen by ENT previously this admission to evaluate for episodic epistaxis. Today pt states she had a one time episode which self resolved when she irrigated with nasal saline. Pt states bleeding was from the left nare. Currently on coumadin. INR 4.10    Vital Signs Last 24 Hrs  T(C): 36.9 (15 Nov 2017 21:22), Max: 37.1 (15 Nov 2017 12:29)  T(F): 98.4 (15 Nov 2017 21:22), Max: 98.8 (15 Nov 2017 12:29)  HR: 81 (15 Nov 2017 21:22) (80 - 88)  BP: 157/80 (15 Nov 2017 21:22) (149/75 - 162/87)  BP(mean): --  RR: 18 (15 Nov 2017 21:22) (17 - 18)  SpO2: 97% (15 Nov 2017 21:22) (97% - 98%)    PHYSICAL EXAM:  Gen: NAD, well-developed  Head: Normocephalic, Atraumatic  Eyes: PERRL, EOMI, no scleral injection  Nose: Right nare partially occluded by surgical scar. Left nares patent. Moderate septal perforation. No active bleeding at this time.  Mouth: Mucosa moist, tongue/uvula midline, oropharynx clear-no active bleeding   Neck: Flat, supple, no lymphadenopathy, trachea midline, no masses  Resp: breathing easily, no stridor  CV: no peripheral edema/cyanosis    LABS:                        10.0   5.5   )-----------( 131      ( 15 Nov 2017 06:29 )             32.0

## 2017-11-16 NOTE — PROGRESS NOTE ADULT - PROBLEM SELECTOR PLAN 1
-Avoid nasal trauma  -HOB elevation  -Continue using nasal saline TID  -Bacitracin ointment to left nare  -Humidified O2

## 2017-11-16 NOTE — PROGRESS NOTE ADULT - SUBJECTIVE AND OBJECTIVE BOX
Subjective: Patient seen and examined. No new events except as noted.   no cp or sob     REVIEW OF SYSTEMS:    CONSTITUTIONAL: + weakness, no fevers or chills  EYES/ENT: No visual changes;  No vertigo or throat pain   NECK: No pain or stiffness  RESPIRATORY: No cough, wheezing, hemoptysis; No shortness of breath  CARDIOVASCULAR: No chest pain or palpitations  GASTROINTESTINAL: No abdominal or epigastric pain. No nausea, vomiting, or hematemesis; No diarrhea or constipation. No melena or hematochezia.  GENITOURINARY: No dysuria, frequency or hematuria  NEUROLOGICAL: No numbness or weakness  SKIN: No itching, burning, rashes, or lesions   All other review of systems is negative unless indicated above.    MEDICATIONS:  MEDICATIONS  (STANDING):  amLODIPine   Tablet 10 milliGRAM(s) Oral daily  AQUAPHOR (petrolatum Ointment) 1 Application(s) Topical two times a day  ascorbic acid 500 milliGRAM(s) Oral daily  BACItracin   Ointment 1 Application(s) Topical two times a day  calcitriol   Capsule 0.25 MICROGram(s) Oral daily  carvedilol 25 milliGRAM(s) Oral every 12 hours  ferrous    sulfate 325 milliGRAM(s) Oral daily  furosemide    Tablet 60 milliGRAM(s) Oral daily  hydrALAZINE 50 milliGRAM(s) Oral every 8 hours  influenza   Vaccine 0.5 milliLiter(s) IntraMuscular once  labetalol 300 milliGRAM(s) Oral three times a day  Nephro-alyse 1 Tablet(s) Oral daily  pantoprazole    Tablet 40 milliGRAM(s) Oral before breakfast  predniSONE   Tablet 10 milliGRAM(s) Oral daily  sodium chloride 0.65% Nasal 1 Spray(s) Both Nostrils three times a day      PHYSICAL EXAM:  T(C): 36.9 (11-16-17 @ 04:27), Max: 37.1 (11-15-17 @ 12:29)  HR: 82 (11-16-17 @ 09:56) (80 - 82)  BP: 163/82 (11-16-17 @ 09:56) (149/75 - 164/90)  RR: 18 (11-16-17 @ 04:27) (17 - 18)  SpO2: 97% (11-16-17 @ 04:27) (97% - 98%)  Wt(kg): --  I&O's Summary    15 Nov 2017 07:01  -  16 Nov 2017 07:00  --------------------------------------------------------  IN: 800 mL / OUT: 1825 mL / NET: -1025 mL          Appearance: Normal	  HEENT:   Normal oral mucosa, PERRL, EOMI	  Lymphatic: No lymphadenopathy , no edema  Cardiovascular: Normal S1 S2, No JVD, No murmurs , Peripheral pulses palpable 2+ bilaterally  Respiratory: Lungs clear to auscultation, normal effort 	  Gastrointestinal:  Soft, Non-tender, + BS	  Skin: No rashes, No ecchymoses, No cyanosis, warm to touch  Musculoskeletal: Normal range of motion, normal strength  Psychiatry:  Mood & affect appropriate  Ext: Chronic LLE edema, wrapped  +fox       LABS:    CARDIAC MARKERS:                                10.6   4.9   )-----------( 140      ( 16 Nov 2017 06:17 )             33.0     11-16    144  |  102  |  79<H>  ----------------------------<  88  4.0   |  26  |  3.21<H>    Ca    9.0      16 Nov 2017 06:17      proBNP:   Lipid Profile:   HgA1c:   TSH:             TELEMETRY: Vpaced 	    ECG:  	  RADIOLOGY:   DIAGNOSTIC TESTING:  [ ] Echocardiogram:  [ ]  Catheterization:  [ ] Stress Test:    OTHER:

## 2017-11-16 NOTE — PROGRESS NOTE ADULT - SUBJECTIVE AND OBJECTIVE BOX
Follow-up Pulm Progress Note    No new respiratory events overnight.  Denies SOB/CP.   No further episodes of epistaxis   97% on RA    Medications:  MEDICATIONS  (STANDING):  amLODIPine   Tablet 10 milliGRAM(s) Oral daily  AQUAPHOR (petrolatum Ointment) 1 Application(s) Topical two times a day  ascorbic acid 500 milliGRAM(s) Oral daily  BACItracin   Ointment 1 Application(s) Topical two times a day  calcitriol   Capsule 0.25 MICROGram(s) Oral daily  carvedilol 25 milliGRAM(s) Oral every 12 hours  ferrous    sulfate 325 milliGRAM(s) Oral daily  furosemide    Tablet 60 milliGRAM(s) Oral daily  hydrALAZINE 50 milliGRAM(s) Oral every 8 hours  influenza   Vaccine 0.5 milliLiter(s) IntraMuscular once  labetalol 300 milliGRAM(s) Oral three times a day  Nephro-alyse 1 Tablet(s) Oral daily  pantoprazole    Tablet 40 milliGRAM(s) Oral before breakfast  predniSONE   Tablet 10 milliGRAM(s) Oral daily  sodium chloride 0.65% Nasal 1 Spray(s) Both Nostrils three times a day    MEDICATIONS  (PRN):          Vital Signs Last 24 Hrs  T(C): 36.9 (16 Nov 2017 04:27), Max: 37.1 (15 Nov 2017 12:29)  T(F): 98.5 (16 Nov 2017 04:27), Max: 98.8 (15 Nov 2017 12:29)  HR: 82 (16 Nov 2017 09:56) (80 - 82)  BP: 163/82 (16 Nov 2017 09:56) (149/75 - 164/90)  BP(mean): --  RR: 18 (16 Nov 2017 04:27) (17 - 18)  SpO2: 97% (16 Nov 2017 04:27) (97% - 98%) on RA          11-15 @ 07:01  -  11-16 @ 07:00  --------------------------------------------------------  IN: 800 mL / OUT: 1825 mL / NET: -1025 mL          LABS:                        10.6   4.9   )-----------( 140      ( 16 Nov 2017 06:17 )             33.0     11-16    144  |  102  |  79<H>  ----------------------------<  88  4.0   |  26  |  3.21<H>    Ca    9.0      16 Nov 2017 06:17            CAPILLARY BLOOD GLUCOSE        PT/INR - ( 16 Nov 2017 06:17 )   PT: 44.4 sec;   INR: 4.00 ratio         PTT - ( 15 Nov 2017 06:29 )  PTT:42.1 sec                    CULTURES: (if applicable)          Physical Examination:  PULM: Decreased BS L base  CVS: S1, S2 RRR

## 2017-11-16 NOTE — PROGRESS NOTE ADULT - PROBLEM SELECTOR PLAN 1
Multifactorial-restrictive disease 2nd obesity and cardiomegaly, pericardial effusion, debility and volume overloaded, with systolic HF (EF 25%)   -Lasix 60mg/40mg alternating. Changed yesterday 2nd worsening renal fxn  -Peripheral GGO in RUL new from Oct 11 2017 CT  -Recommend repeat CT chest in 6 weeks to further eval

## 2017-11-16 NOTE — PROGRESS NOTE ADULT - ASSESSMENT
56 y/o female with right nasal vestibular stenosis and nasal septal perforation related to surgical procedure to control epistaxis many years ago. Pt experienced a one time episode of epistaxis from the left nare which self resolved. Pt currently on coumadin with INR of 4.66.

## 2017-11-16 NOTE — PROGRESS NOTE ADULT - SUBJECTIVE AND OBJECTIVE BOX
MEDICINE, PROGRESS NOTE [phone]    COSTEN, SANDRA 57y [id number]    Patient seen and examined.  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)  Chronic Gout (ICD9 274.02)  LBBB (Left Bundle Branch Block) (ICD9 426.3)  CHF (Congestive Heart Failure) (ICD9 428.0)  Hx of aorta-iliac-femoral bypass  TOP (Termination of Pregnancy)  S/P Dilatation and Curettage  History of Tubal Ligation  s/p AAA (Abdominal Aortic Aneurysm) Repair: 2009 2010  History of Cholecystectomy  S/P Partial Hysterectomy    MEDICATIONS  (STANDING):  amLODIPine   Tablet 10 milliGRAM(s) Oral daily  AQUAPHOR (petrolatum Ointment) 1 Application(s) Topical two times a day  ascorbic acid 500 milliGRAM(s) Oral daily  BACItracin   Ointment 1 Application(s) Topical two times a day  calcitriol   Capsule 0.25 MICROGram(s) Oral daily  carvedilol 25 milliGRAM(s) Oral every 12 hours  colchicine 0.6 milliGRAM(s) Oral every other day  ferrous    sulfate 325 milliGRAM(s) Oral daily  hydrALAZINE 50 milliGRAM(s) Oral every 8 hours  influenza   Vaccine 0.5 milliLiter(s) IntraMuscular once  labetalol 300 milliGRAM(s) Oral three times a day  Nephro-alyse 1 Tablet(s) Oral daily  pantoprazole    Tablet 40 milliGRAM(s) Oral before breakfast  predniSONE   Tablet 10 milliGRAM(s) Oral daily  sodium chloride 0.65% Nasal 1 Spray(s) Both Nostrils three times a day    MEDICATIONS  (PRN):    Allergies    heparin (Unknown)  losartan (Anaphylaxis)  nitroglycerin (Other)    Intolerances        PHYSICAL EXAM:  Constitutional: NAD  HEENT: Normocephalic, EOMI  Neck:  No JVD  Respiratory: CTA B/L, No wheezes  Cardiovascular: S1, S2, RRR, + systolic murmur  Gastrointestinal: BS+, soft, NT/ND  Extremities: + peripheral edema le b/l l>R,   Neurological: AAOX3, no focal deficits  Psychiatric: Normal mood, normal affect  : + Hook    Vital Signs Last 24 Hrs  T(C): 36.9 (16 Nov 2017 12:50), Max: 36.9 (15 Nov 2017 21:22)  T(F): 98.4 (16 Nov 2017 12:50), Max: 98.5 (16 Nov 2017 04:27)  HR: 78 (16 Nov 2017 12:50) (78 - 82)  BP: 143/75 (16 Nov 2017 12:50) (143/75 - 164/90)  BP(mean): --  RR: 18 (16 Nov 2017 12:50) (18 - 18)  SpO2: 98% (16 Nov 2017 12:50) (97% - 98%)  I&O's Summary    15 Nov 2017 07:01  -  16 Nov 2017 07:00  --------------------------------------------------------  IN: 800 mL / OUT: 1825 mL / NET: -1025 mL    16 Nov 2017 07:01  -  16 Nov 2017 19:05  --------------------------------------------------------  IN: 620 mL / OUT: 1150 mL / NET: -530 mL        LABS:                        10.6   4.9   )-----------( 140      ( 16 Nov 2017 06:17 )             33.0     11-16    144  |  102  |  79<H>  ----------------------------<  88  4.0   |  26  |  3.21<H>    Ca    9.0      16 Nov 2017 06:17

## 2017-11-16 NOTE — PROGRESS NOTE ADULT - SUBJECTIVE AND OBJECTIVE BOX
[facility]   [phone]  RENAL FOLLOW UP NOTE  --------------------------------------------------------------------------------  Chief Complaint:    24 hour events/subjective:    still with c/o weakness overall     PAST HISTORY  --------------------------------------------------------------------------------  No significant changes to PMH, PSH, FHx, SHx, unless otherwise noted    ALLERGIES & MEDICATIONS  --------------------------------------------------------------------------------  Allergies    heparin (Unknown)  losartan (Anaphylaxis)  nitroglycerin (Other)    Intolerances      Standing Inpatient Medications  amLODIPine   Tablet 10 milliGRAM(s) Oral daily  AQUAPHOR (petrolatum Ointment) 1 Application(s) Topical two times a day  ascorbic acid 500 milliGRAM(s) Oral daily  BACItracin   Ointment 1 Application(s) Topical two times a day  calcitriol   Capsule 0.25 MICROGram(s) Oral daily  carvedilol 25 milliGRAM(s) Oral every 12 hours  colchicine 0.6 milliGRAM(s) Oral every other day  ferrous    sulfate 325 milliGRAM(s) Oral daily  hydrALAZINE 50 milliGRAM(s) Oral every 8 hours  influenza   Vaccine 0.5 milliLiter(s) IntraMuscular once  labetalol 300 milliGRAM(s) Oral three times a day  Nephro-alyse 1 Tablet(s) Oral daily  pantoprazole    Tablet 40 milliGRAM(s) Oral before breakfast  predniSONE   Tablet 10 milliGRAM(s) Oral daily  sodium chloride 0.65% Nasal 1 Spray(s) Both Nostrils three times a day    PRN Inpatient Medications      REVIEW OF SYSTEMS  --------------------------------------------------------------------------------    Gen: denies fatigue, fevers/chills,  CVS: denies chest pain/palpitations  Resp: denies SOB/Cough  GI: Denies N/V/Abd pain  : Denies dysuria  All other systems were reviewed and are negative, except as noted.    VITALS/PHYSICAL EXAM  --------------------------------------------------------------------------------  T(C): 36.9 (11-16-17 @ 12:50), Max: 36.9 (11-15-17 @ 21:22)  HR: 78 (11-16-17 @ 12:50) (78 - 82)  BP: 143/75 (11-16-17 @ 12:50) (143/75 - 164/90)  RR: 18 (11-16-17 @ 12:50) (18 - 18)  SpO2: 98% (11-16-17 @ 12:50) (97% - 98%)  Wt(kg): --        11-15-17 @ 07:01  -  11-16-17 @ 07:00  --------------------------------------------------------  IN: 800 mL / OUT: 1825 mL / NET: -1025 mL    11-16-17 @ 07:01  -  11-16-17 @ 19:29  --------------------------------------------------------  IN: 620 mL / OUT: 1150 mL / NET: -530 mL        Physical Exam:  	Gen: alert oriented place person and date   	Pulm: CTA . no rales or ronchi or wheezing  	CV: RRR, S1/S2. no rub  	Abd: +BS, soft, nontender/nondistended  	: No suprapubic tenderness.               Extremity: No cyanosis, LLE lymphedema right trace edema no clubbing  	  LABS/STUDIES  --------------------------------------------------------------------------------              10.6   4.9   >-----------<  140      [11-16-17 @ 06:17]              33.0     144  |  102  |  79  ----------------------------<  88      [11-16-17 @ 06:17]  4.0   |  26  |  3.21        Ca     9.0     [11-16-17 @ 06:17]      PT/INR: PT 44.4 , INR 4.00       [11-16-17 @ 06:17]  PTT: 42.1       [11-15-17 @ 06:29]      Creatinine Trend:  SCr 3.21 [11-16 @ 06:17]  SCr 3.07 [11-15 @ 06:29]  SCr 2.94 [11-14 @ 06:19]  SCr 2.99 [11-13 @ 06:48]  SCr 2.90 [11-12 @ 06:38]                  Iron 48, TIBC --, %sat --      [10-12-17 @ 07:14]  Ferritin 847.0      [10-12-17 @ 07:14]  PTH -- (Ca 9.0)      [11-09-17 @ 08:51]   387  Vitamin D (25OH) 7.8      [11-09-17 @ 08:51]  HbA1c 4.5      [08-09-16 @ 06:49]  TSH 0.33      [10-22-17 @ 08:40]

## 2017-11-16 NOTE — PROGRESS NOTE ADULT - ASSESSMENT
58 yo F w/ hx of SLE, systolic CHF (EF 32%) s/p AICD, HTN, CKD III- IV, PE / left leg DVT dx 2009, HIT, CAD (no stents), stage IV sacral ulcer, chronic fox, hx of recurrent pericardial effusion, AAA ~14cm in 12/2016 s/p repair in 2010 w/ aortoiliac stent c/b post-operatively with development of left lower extremity   lymphedema December 2016;  pericardial effusion pericardial drain/SLE. now with ilana on ckd with baseline creatinine approx 1.8-2.     1- ILANA on ckd III  2- chf hx  3- cardiomyopathy  4- HTN   5- SLE  6- proteinuria  7- gout     cr worsening still   hold lasix for three days given cr worsening   prednisone 10mg daily   uloric 40mg   cont norvasc hydralazine and coreg  cont with labetalol for bp control and hydralazine   colcrys 0.6mg every other day

## 2017-11-16 NOTE — PROGRESS NOTE ADULT - ASSESSMENT
dvt  chronic left le edema  nsvt  acute on ckd 3  volume overload    continue current care  d/c planning tiffanie as long as pt with no bleeding

## 2017-11-16 NOTE — PROGRESS NOTE ADULT - SUBJECTIVE AND OBJECTIVE BOX
SUBJECTIVE: Pt seen, chart reviewed.  SUBJECTIVE: Pt seen, chart reviewed.  Pt w/o complaints or concerns.  Feeling better.  d/c planning  no pain, odor, f/c/s, redness, warmth, increased drainage    ROS- skin chronic wound see HPI  all other systems negative      Physical Exam:  Vital Signs Last 24 Hrs  T(C): 36.9 (16 Nov 2017 12:50), Max: 36.9 (15 Nov 2017 21:22)  T(F): 98.4 (16 Nov 2017 12:50), Max: 98.5 (16 Nov 2017 04:27)  HR: 78 (16 Nov 2017 12:50) (78 - 82)  BP: 143/75 (16 Nov 2017 12:50) (143/75 - 164/90)  BP(mean): --  RR: 18 (16 Nov 2017 12:50) (18 - 18)  SpO2: 98% (16 Nov 2017 12:50) (97% - 98%)    General Appearance:  NAD, A&Ox3, MO   Versa Care P500      MSK FROM X4 equally warm  LLE edema resolving, but stil present  No deformities or contractures    Skin moist w/ good turgor  Rt ischial stage iv pressure ulcer w/ ring of macerated skin/callous on wound edge  1.5cm x 0.4cm x 1.4cm  (+) moderate serosanguinous drainage  No odor, erythema, tender, induration, fluctuance, increased warmth      LABS:                        10.6   4.9   )-----------( 140      ( 16 Nov 2017 06:17 )             33.0     PT/INR - ( 16 Nov 2017 06:17 )   PT: 44.4 sec;   INR: 4.00 ratio    PTT - ( 15 Nov 2017 06:29 )  PTT:42.1 sec

## 2017-11-16 NOTE — PROGRESS NOTE ADULT - ASSESSMENT
A/P:  Rt Ischial Healing chronic stage iv pressure ucler- Aquacel packing, may add prizma if brought from home  Wound is stalled possibly 2/2 medical condition and sedentary behavior.  Hopefully as pt becomes stronger & more mobile and is discharged wound will improved.  Pt anticoagulated therefore hesitate to debride macerated callous  LLE edema- Con't elevation/ ACEwrapping per Vasc  con't Nutrition (as tolerated)  con't Offloading   con't Pericare  Care as per medicine will follow w/ you  Upon discharge f/u as outpatient at [facility] [address] [place], [place] [phone]  D/w team  & attng  Tanisha Weathers PA-C [facility] [id number]  I spent 15  minutes w/ this pt of which more than 50% of the time was spent counseling & coordinating care of this pt.

## 2017-11-17 LAB
ANION GAP SERPL CALC-SCNC: 14 MMOL/L — SIGNIFICANT CHANGE UP (ref 5–17)
BUN SERPL-MCNC: 83 MG/DL — HIGH (ref 7–23)
CALCIUM SERPL-MCNC: 9.2 MG/DL — SIGNIFICANT CHANGE UP (ref 8.4–10.5)
CHLORIDE SERPL-SCNC: 104 MMOL/L — SIGNIFICANT CHANGE UP (ref 96–108)
CO2 SERPL-SCNC: 26 MMOL/L — SIGNIFICANT CHANGE UP (ref 22–31)
CREAT SERPL-MCNC: 3.18 MG/DL — HIGH (ref 0.5–1.3)
GLUCOSE SERPL-MCNC: 79 MG/DL — SIGNIFICANT CHANGE UP (ref 70–99)
HCT VFR BLD CALC: 33.5 % — LOW (ref 34.5–45)
HGB BLD-MCNC: 10.6 G/DL — LOW (ref 11.5–15.5)
INR BLD: 3.23 RATIO — HIGH (ref 0.88–1.16)
MCHC RBC-ENTMCNC: 29.1 PG — SIGNIFICANT CHANGE UP (ref 27–34)
MCHC RBC-ENTMCNC: 31.6 GM/DL — LOW (ref 32–36)
MCV RBC AUTO: 92.1 FL — SIGNIFICANT CHANGE UP (ref 80–100)
PLATELET # BLD AUTO: 142 K/UL — LOW (ref 150–400)
POTASSIUM SERPL-MCNC: 4.1 MMOL/L — SIGNIFICANT CHANGE UP (ref 3.5–5.3)
POTASSIUM SERPL-SCNC: 4.1 MMOL/L — SIGNIFICANT CHANGE UP (ref 3.5–5.3)
PROTHROM AB SERPL-ACNC: 36.1 SEC — HIGH (ref 9.8–12.7)
RBC # BLD: 3.64 M/UL — LOW (ref 3.8–5.2)
RBC # FLD: 14.7 % — HIGH (ref 10.3–14.5)
SODIUM SERPL-SCNC: 144 MMOL/L — SIGNIFICANT CHANGE UP (ref 135–145)
WBC # BLD: 5 K/UL — SIGNIFICANT CHANGE UP (ref 3.8–10.5)
WBC # FLD AUTO: 5 K/UL — SIGNIFICANT CHANGE UP (ref 3.8–10.5)

## 2017-11-17 PROCEDURE — 99232 SBSQ HOSP IP/OBS MODERATE 35: CPT

## 2017-11-17 RX ORDER — WARFARIN SODIUM 2.5 MG/1
2 TABLET ORAL ONCE
Qty: 0 | Refills: 0 | Status: COMPLETED | OUTPATIENT
Start: 2017-11-17 | End: 2017-11-17

## 2017-11-17 RX ORDER — CARVEDILOL PHOSPHATE 80 MG/1
37.5 CAPSULE, EXTENDED RELEASE ORAL EVERY 12 HOURS
Qty: 0 | Refills: 0 | Status: DISCONTINUED | OUTPATIENT
Start: 2017-11-17 | End: 2017-11-18

## 2017-11-17 RX ADMIN — Medication 1 SPRAY(S): at 05:50

## 2017-11-17 RX ADMIN — Medication 1 APPLICATION(S): at 09:23

## 2017-11-17 RX ADMIN — Medication 500 MILLIGRAM(S): at 09:23

## 2017-11-17 RX ADMIN — Medication 50 MILLIGRAM(S): at 05:50

## 2017-11-17 RX ADMIN — Medication 10 MILLIGRAM(S): at 09:23

## 2017-11-17 RX ADMIN — Medication 325 MILLIGRAM(S): at 09:23

## 2017-11-17 RX ADMIN — CALCITRIOL 0.25 MICROGRAM(S): 0.5 CAPSULE ORAL at 09:23

## 2017-11-17 RX ADMIN — Medication 50 MILLIGRAM(S): at 21:43

## 2017-11-17 RX ADMIN — Medication 300 MILLIGRAM(S): at 05:50

## 2017-11-17 RX ADMIN — Medication 1 TABLET(S): at 09:23

## 2017-11-17 RX ADMIN — WARFARIN SODIUM 2 MILLIGRAM(S): 2.5 TABLET ORAL at 21:43

## 2017-11-17 RX ADMIN — Medication 1 APPLICATION(S): at 17:01

## 2017-11-17 RX ADMIN — Medication 300 MILLIGRAM(S): at 13:10

## 2017-11-17 RX ADMIN — CARVEDILOL PHOSPHATE 37.5 MILLIGRAM(S): 80 CAPSULE, EXTENDED RELEASE ORAL at 20:11

## 2017-11-17 RX ADMIN — PANTOPRAZOLE SODIUM 40 MILLIGRAM(S): 20 TABLET, DELAYED RELEASE ORAL at 05:50

## 2017-11-17 RX ADMIN — Medication 50 MILLIGRAM(S): at 13:09

## 2017-11-17 RX ADMIN — CARVEDILOL PHOSPHATE 25 MILLIGRAM(S): 80 CAPSULE, EXTENDED RELEASE ORAL at 09:23

## 2017-11-17 RX ADMIN — Medication 300 MILLIGRAM(S): at 21:43

## 2017-11-17 RX ADMIN — Medication 1 SPRAY(S): at 21:43

## 2017-11-17 RX ADMIN — AMLODIPINE BESYLATE 10 MILLIGRAM(S): 2.5 TABLET ORAL at 09:22

## 2017-11-17 NOTE — PROGRESS NOTE ADULT - ATTENDING COMMENTS
Sabino Cee MD (for Dr. Yoder)  [phone]
Alonso Hammond MD, FACP, FACG, AGAF  [facility]  [phone]
Alonso Hammond MD, FACP, FACG, AGAF  [facility]  [phone]
Alonso Hammond MD, FACP, FACG, AGAF  [facility]  [phone]
Alonso Hammond MD, FACP, FACG, AGAF  [facility]  [phone]
Sabino Cee MD (for Dr. Yoder)  [phone]
As above

## 2017-11-17 NOTE — PROGRESS NOTE ADULT - ASSESSMENT
dvt  acute on chronic lle edema  ckd 3  gout flare  lupus    continue current care  coumadin 2 mg tonight  f/u labs tiffanie

## 2017-11-17 NOTE — PROVIDER CONTACT NOTE (OTHER) - DATE AND TIME:
18-Oct-2017
01-Nov-2017 21:30
04-Nov-2017 23:00
05-Nov-2017 01:20
07-Nov-2017 00:49
08-Nov-2017 04:00
08-Nov-2017 22:00
11-Nov-2017 04:40
12-Oct-2017 06:40
15-Oct-2017 23:30
16-Oct-2017 23:20
17-Nov-2017 13:59
17-Oct-2017 07:50
19-Oct-2017 06:20
22-Oct-2017 20:00
23-Oct-2017 22:30
26-Oct-2017 06:33
30-Oct-2017 01:15

## 2017-11-17 NOTE — PROGRESS NOTE ADULT - SUBJECTIVE AND OBJECTIVE BOX
Follow-up Pulm Progress Note    No new respiratory events overnight.  Denies SOB/CP.     Medications:  MEDICATIONS  (STANDING):  amLODIPine   Tablet 10 milliGRAM(s) Oral daily  AQUAPHOR (petrolatum Ointment) 1 Application(s) Topical two times a day  ascorbic acid 500 milliGRAM(s) Oral daily  BACItracin   Ointment 1 Application(s) Topical two times a day  calcitriol   Capsule 0.25 MICROGram(s) Oral daily  carvedilol 25 milliGRAM(s) Oral every 12 hours  colchicine 0.6 milliGRAM(s) Oral every other day  ferrous    sulfate 325 milliGRAM(s) Oral daily  hydrALAZINE 50 milliGRAM(s) Oral every 8 hours  influenza   Vaccine 0.5 milliLiter(s) IntraMuscular once  labetalol 300 milliGRAM(s) Oral three times a day  Nephro-alyse 1 Tablet(s) Oral daily  pantoprazole    Tablet 40 milliGRAM(s) Oral before breakfast  predniSONE   Tablet 10 milliGRAM(s) Oral daily  sodium chloride 0.65% Nasal 1 Spray(s) Both Nostrils three times a day    MEDICATIONS  (PRN):    Vital Signs Last 24 Hrs  T(C): 36.8 (17 Nov 2017 12:10), Max: 36.9 (16 Nov 2017 22:04)  T(F): 98.3 (17 Nov 2017 12:10), Max: 98.4 (16 Nov 2017 22:04)  HR: 79 (17 Nov 2017 12:10) (79 - 83)  BP: 151/74 (17 Nov 2017 12:10) (141/70 - 161/80)  BP(mean): --  RR: 17 (17 Nov 2017 12:10) (16 - 18)  SpO2: 98% (17 Nov 2017 12:10) (96% - 98%)    11-16 @ 07:01  -  11-17 @ 07:00  --------------------------------------------------------  IN: 980 mL / OUT: 2300 mL / NET: -1320 mL    LABS:                        10.6   5.0   )-----------( 142      ( 17 Nov 2017 07:00 )             33.5     11-17    144  |  104  |  83<H>  ----------------------------<  79  4.1   |  26  |  3.18<H>    Ca    9.2      17 Nov 2017 07:00    CAPILLARY BLOOD GLUCOSE    PT/INR - ( 17 Nov 2017 07:00 )   PT: 36.1 sec;   INR: 3.23 ratio      CULTURES: (if applicable)    Physical Examination:  PULM: Clear to auscultation bilaterally, no significant sputum production  CVS: S1, S2 heard    RADIOLOGY REVIEWED  CXR:     CT chest:    TTE:

## 2017-11-17 NOTE — PROGRESS NOTE ADULT - SUBJECTIVE AND OBJECTIVE BOX
POD/STATUS POST/ENT ISSUE: history of nose bleeds, now on anticoagulation    INTERVAL HPI: Patient seen at bedside. States some small oozing of mixed blood with clear mucous from left nostril which has not resolved. Patient states no further bleeding at this time.     Vital Signs Last 24 Hrs  T(C): 36.8 (17 Nov 2017 04:58), Max: 36.9 (16 Nov 2017 12:50)  T(F): 98.2 (17 Nov 2017 04:58), Max: 98.4 (16 Nov 2017 12:50)  HR: 79 (17 Nov 2017 09:21) (78 - 83)  BP: 161/80 (17 Nov 2017 09:21) (141/70 - 161/80)  BP(mean): --  RR: 16 (17 Nov 2017 04:58) (16 - 18)  SpO2: 96% (17 Nov 2017 04:58) (96% - 98%)    PHYSICAL EXAM:  Gen: NAD, well-developed  Head: Normocephalic, Atraumatic  Eyes: PERRL, EOMI, no scleral injection  Ears: Right - ear canal clear, TM intact without effusion            Left - ear canal clear, TM intact without effusion  Nose: Nares bilaterally patent, no discharge, no active bleeding noted, no clots noted in bilateral nares  Mouth: Mucosa moist, tongue/uvula midline, oropharynx clear, no blood streaking in posterior pharynx  Neck: Flat, supple, no lymphadenopathy, trachea midline, no masses  Resp: breathing easily, no stridor  CV: no peripheral edema/cyanosis    LABS:                        10.6   5.0   )-----------( 142      ( 17 Nov 2017 07:00 )             33.5

## 2017-11-17 NOTE — PROGRESS NOTE ADULT - PROBLEM SELECTOR PLAN 2
LLE DVT with history of HIT and epistaxis with coumadin. L gluteal hematoma noted  -Now on argatroban gtt, hematology following  -Monitor CBC
LLE DVT with history of HIT and epistaxis with coumadin. L gluteal hematoma noted  -Now on argatroban gtt, hematology following  -Patient deciding about oral anticoagulation   -Monitor CBC
LLE DVT with history of HIT and epistaxis with coumadin. L gluteal hematoma noted  -Now on heparin gtt, hematology following  -Monitor CBC
LLE DVT with history of HIT and epistaxis with coumadin. L gluteal hematoma noted  -Coumadin INR 2-3  -Monitor CBC
LLE DVT with history of HIT and epistaxis with coumadin. L gluteal hematoma noted  -Coumadin INR 2-3  -Monitor CBC
LLE DVT with history of HIT and epistaxis with coumadin. L gluteal hematoma noted  -Now on argatroban gtt, 2nd opinion heme consult noted  -If IVCF is only option and no oral AC is safe would re-consult vascular  -Monitor CBC
LLE DVT with history of HIT and epistaxis with coumadin. L gluteal hematoma noted  -Now on argatroban gtt, 2nd opinion heme consult noted  -Monitor CBC
LLE DVT with history of HIT and epistaxis with coumadin. L gluteal hematoma noted  -Now on argatroban gtt, hematology following  -Await plan for oral AC  -Monitor CBC
LLE DVT with history of HIT and epistaxis with coumadin. L gluteal hematoma noted  -Now on argatroban gtt, hematology following  -Await plan for oral AC with 2nd opinion heme consult  -Monitor CBC
LLE DVT with history of HIT and epistaxis with coumadin. L gluteal hematoma noted  -Now on argatroban gtt, hematology following  -Monitor CBC
LLE DVT with history of HIT and epistaxis with coumadin. L gluteal hematoma noted  -Now on argatroban gtt, hematology following  -Patient deciding about oral anticoagulation   -Monitor CBC
LLE DVT with history of HIT and epistaxis with coumadin. L gluteal hematoma noted  -Now on argatroban gtt, hematology following  -Patient deciding about oral anticoagulation   -Monitor CBC
LLE DVT with history of HIT and epistaxis with coumadin. L gluteal hematoma noted  -argatroban gtt off, dosing coumadin to keep inr 2-3  -Monitor CBC
on prednisone 10mg PO twice a day  f/u Rheum
patient reports history of gout and has elevated uric acid  pain now improved s/p colchicine
patient reports history of gout and has elevated uric acid  will dose colchicine 0.6mg PO x 1 today

## 2017-11-17 NOTE — PROGRESS NOTE ADULT - SUBJECTIVE AND OBJECTIVE BOX
Subjective: Patient seen and examined. No new events except as noted.     REVIEW OF SYSTEMS:    CONSTITUTIONAL: + weakness, no fevers or chills  EYES/ENT: No visual changes;  No vertigo or throat pain   NECK: No pain or stiffness  RESPIRATORY: No cough, wheezing, hemoptysis; No shortness of breath  CARDIOVASCULAR: No chest pain or palpitations  GASTROINTESTINAL: No abdominal or epigastric pain. No nausea, vomiting, or hematemesis; No diarrhea or constipation. No melena or hematochezia.  GENITOURINARY: No dysuria, frequency or hematuria  NEUROLOGICAL: No numbness or weakness  SKIN: No itching, burning, rashes, or lesions   All other review of systems is negative unless indicated above.    MEDICATIONS:  MEDICATIONS  (STANDING):  amLODIPine   Tablet 10 milliGRAM(s) Oral daily  AQUAPHOR (petrolatum Ointment) 1 Application(s) Topical two times a day  ascorbic acid 500 milliGRAM(s) Oral daily  BACItracin   Ointment 1 Application(s) Topical two times a day  calcitriol   Capsule 0.25 MICROGram(s) Oral daily  carvedilol 25 milliGRAM(s) Oral every 12 hours  colchicine 0.6 milliGRAM(s) Oral every other day  ferrous    sulfate 325 milliGRAM(s) Oral daily  hydrALAZINE 50 milliGRAM(s) Oral every 8 hours  influenza   Vaccine 0.5 milliLiter(s) IntraMuscular once  labetalol 300 milliGRAM(s) Oral three times a day  Nephro-alyse 1 Tablet(s) Oral daily  pantoprazole    Tablet 40 milliGRAM(s) Oral before breakfast  predniSONE   Tablet 10 milliGRAM(s) Oral daily  sodium chloride 0.65% Nasal 1 Spray(s) Both Nostrils three times a day      PHYSICAL EXAM:  T(C): 36.8 (11-17-17 @ 04:58), Max: 36.9 (11-16-17 @ 12:50)  HR: 79 (11-17-17 @ 09:21) (78 - 83)  BP: 161/80 (11-17-17 @ 09:21) (141/70 - 161/80)  RR: 16 (11-17-17 @ 04:58) (16 - 18)  SpO2: 96% (11-17-17 @ 04:58) (96% - 98%)  Wt(kg): --  I&O's Summary    16 Nov 2017 07:01  -  17 Nov 2017 07:00  --------------------------------------------------------  IN: 980 mL / OUT: 2300 mL / NET: -1320 mL    17 Nov 2017 07:01  -  17 Nov 2017 11:42  --------------------------------------------------------  IN: 200 mL / OUT: 600 mL / NET: -400 mL          Appearance: Normal	  HEENT:   Normal oral mucosa, PERRL, EOMI	  Lymphatic: No lymphadenopathy , no edema  Cardiovascular: Normal S1 S2, No JVD, No murmurs , Peripheral pulses palpable 2+ bilaterally  Respiratory: Lungs clear to auscultation, normal effort 	  Gastrointestinal:  Soft, Non-tender, + BS	  Skin: No rashes, No ecchymoses, No cyanosis, warm to touch  Musculoskeletal: Normal range of motion, normal strength  Psychiatry:  Mood & affect appropriate  Ext: +LLE edema  +fox     LABS:    CARDIAC MARKERS:                                10.6   5.0   )-----------( 142      ( 17 Nov 2017 07:00 )             33.5     11-17    144  |  104  |  83<H>  ----------------------------<  79  4.1   |  26  |  3.18<H>    Ca    9.2      17 Nov 2017 07:00      proBNP:   Lipid Profile:   HgA1c:   TSH:             TELEMETRY: 	  V paced   ECG:  	  RADIOLOGY:   DIAGNOSTIC TESTING:  [ ] Echocardiogram:  [ ]  Catheterization:  [ ] Stress Test:    OTHER:

## 2017-11-17 NOTE — PROVIDER CONTACT NOTE (OTHER) - RECOMMENDATIONS
Np to f/u with Dr. Yoder.
Add Mg & Phos to the am labs
Mg and Phos
NP to assess pt.
TUMS
added IAD parameter to the assessment.
continue to monitor on tele, Labs Magnesium, Phos, AM
labs?

## 2017-11-17 NOTE — PROGRESS NOTE ADULT - PROBLEM SELECTOR PROBLEM 1
Dyspnea
Epistaxis
Epistaxis
ILANA (acute kidney injury)
Dyspnea
Dyspnea

## 2017-11-17 NOTE — CHART NOTE - NSCHARTNOTEFT_GEN_A_CORE
Notified earlier of patient with 19 beats WCT on monitor.  Asymptomatic.  Will continue to monitor. Notified earlier of patient with 19 beats WCT on monitor.  Asymptomatic.  - d/w Dr. Yoder - patient on dual BB.  Will increase Carvedilol dose and monitor.

## 2017-11-17 NOTE — PROGRESS NOTE ADULT - ASSESSMENT
58 y/o female PMHx chronic systolic CHF (last known EF=32%) s/p AICD, SLE, AAA approx 14cm as of 12/2016 s/p aortoiliac stent, DVT/PE (not currently anticoagulated 2/2 HIT), h/o LLE compartment syndrome, multiple pericardial effusions s/p drainage most recently in Jan 2017 now presents with recurrent large pericardial effusion     1. pericardial effusion without tamponade. size unchanged on repeat CT   	Likely chronic effusion, h/o SLE              Rheumatology following     2. CT surgery recommended no surgical intervention and drainage by interventional means   Interventional Cardiology  and Vascular recommending radiologic guidance given the nature of the effusion - appears loculated). Will again discuss the possibility and feasibility of interventional approach with interventional radiolology  lasix 60 mg PO  daily     3. Venous LE Doppler positive for DVT. Started on Argatroban (History Of HIT) and wants to start coumadin.  Monitor CBC    4. Abdominal pain now resolved. No acute findings on CT     5. HTN- Overall better controlled.  Continue with all current meds

## 2017-11-17 NOTE — PROVIDER CONTACT NOTE (OTHER) - NAME OF MD/NP/PA/DO NOTIFIED:
Caridad Alarcon, NP
Domenica Villeda NP
JAROCHO Nicholas
KRISTINE Chan
KRISTINE Isidro
KRISTINE Katz
KRISTINE Kilgore
KRISTINE Maya
Lorenzo, NP
Madyson Guzman, NP
Madyson NP
Miguelito Campo
Neida AVENDAÑO
Ramana Russo NP
Sarah Orr
Sarah Orr
Tona Paz
Zahira Chan NP
Caridad Martinez, NP

## 2017-11-17 NOTE — PROGRESS NOTE ADULT - PROBLEM SELECTOR PLAN 1
Avoid nasal trauma  -HOB elevation  -Continue using nasal saline TID  -Bacitracin ointment to left nares

## 2017-11-17 NOTE — PROVIDER CONTACT NOTE (OTHER) - ASSESSMENT
Pt asymptomatic, denies CP/SOB or dizziness
Pt asymptomatic, pt denies any CP/SOB, or dizziness
patient asymptomatic. denies cp/sob. patient asleep at time of occurrence. /84, hr 82, pox 97% on RA. patient A&O x3,  on tele monitor at 60-80's.
pt A04. pt has no complaints.
A&Ox4. /82, HR 80, RR18, O2sat 97%RA.  Pt denies any discomfort
Patient A&Ox4, VSS. HR 74, /83, RR 18, O2 saturation 97% on room air. Patient denies chest pain, lightheadedness, or shortness of breath. Patient asymptomatic.
Patient A&Ox4, VSS. Patient states she had brief bleed from nose about two hours ago- assessed by day shift NP. CBC stat ordered- Hemoglobin 10.8 and hematocrit 33.2. Patient refusing Coumadin because she "is afraid of bleeding."
Pt A+Ox4. /90 HR85. Pt c/o heartburn after eating fruit cup. Pt states she has had heartburn before and takes TUMS for relief.
Pt AOx4. Pt asymptomatic. Pt denies sob, chest pain or any other discomfort. /93, HR 83, RR 20, O2 95%, Temp 98.2 F.
Pt AOx4. Pt asymptomatic. Temp 98.9 F, /88, HR 80, RR 18, O2 97%.
Pt AOx4. Pt denies hx of hemorrhoid. Pt denies straining. Pt denies pain or any other distress. Pt on Argatroban infusion for left lower ext DVT.
Pt AOx4. pt asymptomatic. Pt denies SOB, chest pain or any other distress. /85, HR 88, RR 18, O2 95%, Temp 98.0 F.
Pt AOx4. pt c/o of fox leaking. small of amount of urine noted on incontinent pad. Fox assessed. Draining adequate output in urometer.
Pt AOx4. pt was sleeping at the time of the event. Pt asymptomatic. Pt denies sob, chest pain or any other distress. VSS Temp 98.0 F, /85, HR 89, RR 18, O2 95% on RA
axox4.vss. pts o2 on room air at 89-90% at this time, pt educated on O2 but refusing at this time.
denies chest pain, ON Argatroban drip
patient had large BM. patient A&O x4, On close skin assessment with light, soft stool noted at the anus. Caitie-anal area noted to be erythematous , denuded , & skin erosion with blood stained area on the skin.  no streaks of blood noted in the stool. patient denies pain at perianal area, patient denies pain and discomfort during defecation. .
patient resting comfortable, denies CP,SOB,Palpitations, Dizziness, N/V. VSS

## 2017-11-17 NOTE — PROVIDER CONTACT NOTE (OTHER) - BACKGROUND
Hx ICD, admitted for SOB & large pericardial effusion
Admitted for SOB. Hx of HTN, HIT, LBBB, and CHF
Admitted with SOB.  Hx of anemia, HTN, DVT, LBBB, aneurysm
C/O SOB ED ADMIT
Left leg DVT on Argatroban infusion. Large pericardial infusion,CHF, CAD
Patient admitted for shortness of breath.
Patient admitted for shortness of breath. Patient has 10 beats WCT previously during this admission.
Pt admitted for SOB, cough; increased LLE swelling
Pt admitted for SOB. Hx of GIB, HIT, AAA, PE and compartment syndrome
Pt admitted for SOB. Hx of GIB, HIT, AAA, PE, systolic CHF, and compartment syndrome.
Pt admitted for SOB. Hx of HIT, PE, Calf DVT, AAA, CAD, AICD.
Pt admitted for SOB. Hx of SLE, systolic CHF, HTN, PE, DVT, HIT, CAD, AAA and compartment syndrome.
Pt admitted for SOB. Hx of SLE, systolic CHF, HTN, PE, DVT, HIT, CAD, AAA and compartment syndrome. Pt on argatroban infusion bridging to coumadin.
admitted for SOB. no change in H/H.
chest pain, SLE, DVT, CHF, pericardial effusion, stage 4 pressure ulcer,
DX: shortness of breath  PMH: anemia, obesity, hypertension, compartment syndrome, herpain-induced thrombocytopenia, history of pulmonary embolism, calf dvt.

## 2017-11-17 NOTE — PROGRESS NOTE ADULT - PROBLEM SELECTOR PLAN 3
on prednisone per rheum recs
Prednisone increased to 10mg BID per rheum recs.
-Prednisone lowered to 15mg qd last week per rheum recs
-Prednisone lowered to 15mg qd last week per rheum recs
-Prednisone slowly tapering back to baseline (5mg qd), lowered to 10mg qd  as per rheum
-Prednisone slowly tapering back to baseline (5mg qd), lowered to 10mg qd yesterday
-Prednisone slowly tapering back to baseline (5mg qd), lowered to 10mg qd yesterday
Prednisone increased to 10mg BID per rheum recs.
and vitamin D deficiency  start calcitriol 0.25mcg PO daily and vitamin d3 1000 units PO Daily
on prednisone per renal
on prednisone per rheum recs
on prednisone per rheum recs

## 2017-11-17 NOTE — PROGRESS NOTE ADULT - SUBJECTIVE AND OBJECTIVE BOX
MEDICINE, PROGRESS NOTE [phone]    COSTEN, SANDRA 57y [id number]    Patient seen and examined.  Patient is a 57y old  Female who presents with a chief complaint of shortness of breath (11 Oct 2017 22:01)  Pt feel ok. No current complaints.    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)  Chronic Gout (ICD9 274.02)  LBBB (Left Bundle Branch Block) (ICD9 426.3)  CHF (Congestive Heart Failure) (ICD9 428.0)  Hx of aorta-iliac-femoral bypass  TOP (Termination of Pregnancy)  S/P Dilatation and Curettage  History of Tubal Ligation  s/p AAA (Abdominal Aortic Aneurysm) Repair: 2009 2010  History of Cholecystectomy  S/P Partial Hysterectomy    MEDICATIONS  (STANDING):  amLODIPine   Tablet 10 milliGRAM(s) Oral daily  AQUAPHOR (petrolatum Ointment) 1 Application(s) Topical two times a day  ascorbic acid 500 milliGRAM(s) Oral daily  BACItracin   Ointment 1 Application(s) Topical two times a day  calcitriol   Capsule 0.25 MICROGram(s) Oral daily  carvedilol 25 milliGRAM(s) Oral every 12 hours  colchicine 0.6 milliGRAM(s) Oral every other day  ferrous    sulfate 325 milliGRAM(s) Oral daily  hydrALAZINE 50 milliGRAM(s) Oral every 8 hours  influenza   Vaccine 0.5 milliLiter(s) IntraMuscular once  labetalol 300 milliGRAM(s) Oral three times a day  Nephro-alyse 1 Tablet(s) Oral daily  pantoprazole    Tablet 40 milliGRAM(s) Oral before breakfast  predniSONE   Tablet 10 milliGRAM(s) Oral daily  sodium chloride 0.65% Nasal 1 Spray(s) Both Nostrils three times a day  warfarin 2 milliGRAM(s) Oral once    MEDICATIONS  (PRN):    Allergies    heparin (Unknown)  losartan (Anaphylaxis)  nitroglycerin (Other)    Intolerances        PHYSICAL EXAM:  Constitutional: NAD  HEENT: Normocephalic, EOMI  Neck:  No JVD  Respiratory: CTA B/L, No wheezes  Cardiovascular: S1, S2, RRR, + systolic murmur  Gastrointestinal: BS+, soft, NT/ND  Extremities: No peripheral edema  Neurological: AAOX3, no focal deficits  Psychiatric: Normal mood, normal affect  : No Hook    Vital Signs Last 24 Hrs  T(C): 36.8 (17 Nov 2017 12:10), Max: 36.9 (16 Nov 2017 22:04)  T(F): 98.3 (17 Nov 2017 12:10), Max: 98.4 (16 Nov 2017 22:04)  HR: 79 (17 Nov 2017 12:10) (79 - 83)  BP: 151/74 (17 Nov 2017 12:10) (141/70 - 161/80)  BP(mean): --  RR: 17 (17 Nov 2017 12:10) (16 - 18)  SpO2: 98% (17 Nov 2017 12:10) (96% - 98%)  I&O's Summary    16 Nov 2017 07:01  -  17 Nov 2017 07:00  --------------------------------------------------------  IN: 980 mL / OUT: 2300 mL / NET: -1320 mL    17 Nov 2017 07:01  -  17 Nov 2017 16:24  --------------------------------------------------------  IN: 320 mL / OUT: 600 mL / NET: -280 mL        LABS:                        10.6   5.0   )-----------( 142      ( 17 Nov 2017 07:00 )             33.5     11-17    144  |  104  |  83<H>  ----------------------------<  79  4.1   |  26  |  3.18<H>    Ca    9.2      17 Nov 2017 07:00

## 2017-11-17 NOTE — PROGRESS NOTE ADULT - PROBLEM SELECTOR PLAN 1
Multifactorial-restrictive disease 2nd obesity and cardiomegaly, pericardial effusion, debility and volume overloaded, with systolic HF (EF 25%)   cont lasix  -Peripheral GGO in RUL new from Oct 11 2017 CT  -Recommend repeat CT chest in 3 weeks to further eval Multifactorial-restrictive disease 2nd obesity and cardiomegaly, pericardial effusion, debility and volume overloaded, with systolic HF (EF 25%)   cont lasix  -Peripheral GGO in RUL new from Oct 11 2017 CT  -Recommend repeat CT chest in early december

## 2017-11-17 NOTE — PROGRESS NOTE ADULT - ASSESSMENT
57 year old female with history of nose bleeds, now on anticoagulation with 1 episode of some left sided rhinorrhea with some blood streaking, now resolved.

## 2017-11-17 NOTE — PROGRESS NOTE ADULT - PROBLEM SELECTOR PROBLEM 2
DVT (deep venous thrombosis)
Knee pain
Knee pain
SLE (systemic lupus erythematosus)
DVT (deep venous thrombosis)
DVT (deep venous thrombosis)

## 2017-11-17 NOTE — PROGRESS NOTE ADULT - PROBLEM SELECTOR PROBLEM 3
SLE (systemic lupus erythematosus)
Secondary hyperparathyroidism

## 2017-11-18 VITALS
DIASTOLIC BLOOD PRESSURE: 79 MMHG | HEART RATE: 76 BPM | OXYGEN SATURATION: 98 % | RESPIRATION RATE: 18 BRPM | TEMPERATURE: 98 F | SYSTOLIC BLOOD PRESSURE: 152 MMHG

## 2017-11-18 LAB
ANION GAP SERPL CALC-SCNC: 14 MMOL/L — SIGNIFICANT CHANGE UP (ref 5–17)
BUN SERPL-MCNC: 77 MG/DL — HIGH (ref 7–23)
CALCIUM SERPL-MCNC: 9 MG/DL — SIGNIFICANT CHANGE UP (ref 8.4–10.5)
CHLORIDE SERPL-SCNC: 104 MMOL/L — SIGNIFICANT CHANGE UP (ref 96–108)
CO2 SERPL-SCNC: 26 MMOL/L — SIGNIFICANT CHANGE UP (ref 22–31)
CREAT SERPL-MCNC: 2.99 MG/DL — HIGH (ref 0.5–1.3)
GLUCOSE SERPL-MCNC: 86 MG/DL — SIGNIFICANT CHANGE UP (ref 70–99)
HCT VFR BLD CALC: 32.2 % — LOW (ref 34.5–45)
HGB BLD-MCNC: 10.3 G/DL — LOW (ref 11.5–15.5)
INR BLD: 2.74 RATIO — HIGH (ref 0.88–1.16)
MCHC RBC-ENTMCNC: 29.2 PG — SIGNIFICANT CHANGE UP (ref 27–34)
MCHC RBC-ENTMCNC: 31.9 GM/DL — LOW (ref 32–36)
MCV RBC AUTO: 91.7 FL — SIGNIFICANT CHANGE UP (ref 80–100)
PLATELET # BLD AUTO: 141 K/UL — LOW (ref 150–400)
POTASSIUM SERPL-MCNC: 4.2 MMOL/L — SIGNIFICANT CHANGE UP (ref 3.5–5.3)
POTASSIUM SERPL-SCNC: 4.2 MMOL/L — SIGNIFICANT CHANGE UP (ref 3.5–5.3)
PROTHROM AB SERPL-ACNC: 30.2 SEC — HIGH (ref 9.8–12.7)
RBC # BLD: 3.51 M/UL — LOW (ref 3.8–5.2)
RBC # FLD: 14.9 % — HIGH (ref 10.3–14.5)
SODIUM SERPL-SCNC: 144 MMOL/L — SIGNIFICANT CHANGE UP (ref 135–145)
WBC # BLD: 4.9 K/UL — SIGNIFICANT CHANGE UP (ref 3.8–10.5)
WBC # FLD AUTO: 4.9 K/UL — SIGNIFICANT CHANGE UP (ref 3.8–10.5)

## 2017-11-18 RX ORDER — LABETALOL HCL 100 MG
1 TABLET ORAL
Qty: 90 | Refills: 0 | OUTPATIENT
Start: 2017-11-18 | End: 2017-12-18

## 2017-11-18 RX ORDER — HYDRALAZINE HCL 50 MG
1 TABLET ORAL
Qty: 0 | Refills: 0 | COMMUNITY

## 2017-11-18 RX ORDER — WARFARIN SODIUM 2.5 MG/1
1 TABLET ORAL
Qty: 30 | Refills: 0 | OUTPATIENT
Start: 2017-11-18 | End: 2017-12-18

## 2017-11-18 RX ORDER — WARFARIN SODIUM 2.5 MG/1
4 TABLET ORAL
Qty: 120 | Refills: 0 | OUTPATIENT
Start: 2017-11-18 | End: 2017-12-18

## 2017-11-18 RX ORDER — PETROLATUM,WHITE
1 JELLY (GRAM) TOPICAL
Qty: 1 | Refills: 0 | OUTPATIENT
Start: 2017-11-18 | End: 2017-12-18

## 2017-11-18 RX ORDER — COLCHICINE 0.6 MG
1 TABLET ORAL
Qty: 15 | Refills: 0 | OUTPATIENT
Start: 2017-11-18 | End: 2017-12-18

## 2017-11-18 RX ORDER — CARVEDILOL PHOSPHATE 80 MG/1
3 CAPSULE, EXTENDED RELEASE ORAL
Qty: 180 | Refills: 0 | OUTPATIENT
Start: 2017-11-18 | End: 2017-12-18

## 2017-11-18 RX ORDER — LABETALOL HCL 100 MG
1 TABLET ORAL
Qty: 0 | Refills: 0 | COMMUNITY

## 2017-11-18 RX ORDER — SODIUM CHLORIDE 0.65 %
1 AEROSOL, SPRAY (ML) NASAL
Qty: 1 | Refills: 0 | OUTPATIENT
Start: 2017-11-18 | End: 2017-12-18

## 2017-11-18 RX ORDER — FUROSEMIDE 40 MG
1 TABLET ORAL
Qty: 0 | Refills: 0 | COMMUNITY

## 2017-11-18 RX ORDER — CALCITRIOL 0.5 UG/1
1 CAPSULE ORAL
Qty: 30 | Refills: 0 | OUTPATIENT
Start: 2017-11-18 | End: 2017-12-18

## 2017-11-18 RX ORDER — CARVEDILOL PHOSPHATE 80 MG/1
1 CAPSULE, EXTENDED RELEASE ORAL
Qty: 0 | Refills: 0 | COMMUNITY

## 2017-11-18 RX ORDER — PANTOPRAZOLE SODIUM 20 MG/1
1 TABLET, DELAYED RELEASE ORAL
Qty: 30 | Refills: 0 | OUTPATIENT
Start: 2017-11-18 | End: 2017-12-18

## 2017-11-18 RX ORDER — HYDRALAZINE HCL 50 MG
1 TABLET ORAL
Qty: 90 | Refills: 0 | OUTPATIENT
Start: 2017-11-18 | End: 2017-12-18

## 2017-11-18 RX ADMIN — Medication 1 SPRAY(S): at 05:41

## 2017-11-18 RX ADMIN — Medication 1 SPRAY(S): at 13:41

## 2017-11-18 RX ADMIN — AMLODIPINE BESYLATE 10 MILLIGRAM(S): 2.5 TABLET ORAL at 09:16

## 2017-11-18 RX ADMIN — Medication 1 APPLICATION(S): at 05:40

## 2017-11-18 RX ADMIN — Medication 50 MILLIGRAM(S): at 05:40

## 2017-11-18 RX ADMIN — Medication 500 MILLIGRAM(S): at 09:16

## 2017-11-18 RX ADMIN — CALCITRIOL 0.25 MICROGRAM(S): 0.5 CAPSULE ORAL at 09:17

## 2017-11-18 RX ADMIN — Medication 50 MILLIGRAM(S): at 13:41

## 2017-11-18 RX ADMIN — Medication 1 TABLET(S): at 09:17

## 2017-11-18 RX ADMIN — PANTOPRAZOLE SODIUM 40 MILLIGRAM(S): 20 TABLET, DELAYED RELEASE ORAL at 05:40

## 2017-11-18 RX ADMIN — CARVEDILOL PHOSPHATE 37.5 MILLIGRAM(S): 80 CAPSULE, EXTENDED RELEASE ORAL at 09:17

## 2017-11-18 RX ADMIN — Medication 0.6 MILLIGRAM(S): at 09:17

## 2017-11-18 RX ADMIN — Medication 300 MILLIGRAM(S): at 13:41

## 2017-11-18 RX ADMIN — Medication 300 MILLIGRAM(S): at 05:41

## 2017-11-18 RX ADMIN — Medication 325 MILLIGRAM(S): at 09:17

## 2017-11-18 RX ADMIN — Medication 10 MILLIGRAM(S): at 09:17

## 2017-11-18 NOTE — PROGRESS NOTE ADULT - SUBJECTIVE AND OBJECTIVE BOX
MEDICINE, PROGRESS NOTE [phone]    COSTEN, SANDRA 57y [id number]    Patient seen and examined.  Patient is a 57y old  Female who presents with a chief complaint of shortness of breath (11 Oct 2017 22:01)  Pt feels well.    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)  Chronic Gout (ICD9 274.02)  LBBB (Left Bundle Branch Block) (ICD9 426.3)  CHF (Congestive Heart Failure) (ICD9 428.0)  Hx of aorta-iliac-femoral bypass  TOP (Termination of Pregnancy)  S/P Dilatation and Curettage  History of Tubal Ligation  s/p AAA (Abdominal Aortic Aneurysm) Repair: 2009 2010  History of Cholecystectomy  S/P Partial Hysterectomy    MEDICATIONS  (STANDING):  amLODIPine   Tablet 10 milliGRAM(s) Oral daily  AQUAPHOR (petrolatum Ointment) 1 Application(s) Topical two times a day  ascorbic acid 500 milliGRAM(s) Oral daily  BACItracin   Ointment 1 Application(s) Topical two times a day  calcitriol   Capsule 0.25 MICROGram(s) Oral daily  carvedilol 37.5 milliGRAM(s) Oral every 12 hours  colchicine 0.6 milliGRAM(s) Oral every other day  ferrous    sulfate 325 milliGRAM(s) Oral daily  hydrALAZINE 50 milliGRAM(s) Oral every 8 hours  influenza   Vaccine 0.5 milliLiter(s) IntraMuscular once  labetalol 300 milliGRAM(s) Oral three times a day  Nephro-alyse 1 Tablet(s) Oral daily  pantoprazole    Tablet 40 milliGRAM(s) Oral before breakfast  predniSONE   Tablet 10 milliGRAM(s) Oral daily  sodium chloride 0.65% Nasal 1 Spray(s) Both Nostrils three times a day    MEDICATIONS  (PRN):    Allergies    heparin (Unknown)  losartan (Anaphylaxis)  nitroglycerin (Other)    Intolerances        PHYSICAL EXAM:  Constitutional: NAD  HEENT: Normocephalic, EOMI  Neck:  No JVD  Respiratory: CTA B/L, No wheezes  Cardiovascular: S1, S2, RRR, + systolic murmur  Gastrointestinal: BS+, soft, NT/ND  Extremities: + peripheral edema le b/l Left > R  Neurological: AAOX3, no focal deficits  Psychiatric: Normal mood, normal affect  : No Hook    Vital Signs Last 24 Hrs  T(C): 36.9 (18 Nov 2017 13:55), Max: 36.9 (18 Nov 2017 13:55)  T(F): 98.5 (18 Nov 2017 13:55), Max: 98.5 (18 Nov 2017 13:55)  HR: 76 (18 Nov 2017 13:55) (76 - 87)  BP: 152/79 (18 Nov 2017 13:55) (134/75 - 165/83)  BP(mean): --  RR: 18 (18 Nov 2017 13:55) (18 - 18)  SpO2: 98% (18 Nov 2017 13:55) (96% - 98%)  I&O's Summary    17 Nov 2017 07:01  -  18 Nov 2017 07:00  --------------------------------------------------------  IN: 1020 mL / OUT: 1750 mL / NET: -730 mL    18 Nov 2017 07:01  -  18 Nov 2017 15:20  --------------------------------------------------------  IN: 460 mL / OUT: 1000 mL / NET: -540 mL        LABS:                        10.3   4.9   )-----------( 141      ( 18 Nov 2017 06:16 )             32.2     11-18    144  |  104  |  77<H>  ----------------------------<  86  4.2   |  26  |  2.99<H>    Ca    9.0      18 Nov 2017 06:16

## 2017-11-18 NOTE — CHART NOTE - NSCHARTNOTEFT_GEN_A_CORE
Patient is a 57y old  Female who presents with a chief complaint of shortness of breath (11 Oct 2017 22:01)  Pt with no complaints at present.  Pt to be discharged to home today on Coumadin 4 mg daily.      Vital Signs Last 24 Hrs  T(C): 36.9 (18 Nov 2017 13:55), Max: 36.9 (18 Nov 2017 13:55)  T(F): 98.5 (18 Nov 2017 13:55), Max: 98.5 (18 Nov 2017 13:55)  HR: 76 (18 Nov 2017 13:55) (76 - 87)  BP: 152/79 (18 Nov 2017 13:55) (134/75 - 165/83)  BP(mean): --  RR: 18 (18 Nov 2017 13:55) (18 - 18)  SpO2: 98% (18 Nov 2017 13:55) (96% - 98%)      Labs:                          10.3   4.9   )-----------( 141      ( 18 Nov 2017 06:16 )             32.2     11-18    144  |  104  |  77<H>  ----------------------------<  86  4.2   |  26  |  2.99<H>    Ca    9.0      18 Nov 2017 06:16              Radiology:    Physical Exam:  General: WN/WD NAD  Neurology: A&Ox3, nonfocal.  Head:  Normocephalic, atraumatic  Respiratory: CTA B/L  CV: RRR, S1S2, no murmur  Abdominal: Soft, NT, ND   MSK: Edema of LLE continues to improve.  , + peripheral pulses,     Discharge Note and Plan:  >Follow up with PMD, Dr. Cy Chicas in 1 week.   > INR to be checked at home by [facility] and reported to Dr. Chicas.   > Discharge on Coumadin 4 mg daily.   > Pt to follow up with Dr. Yoder in 1 week.   >Continue to Ace Wrap LLE daily.   >Pt Is hemodynamically stable for discharge to home today.      Bailey Fagan ANP-BC  [facility] #[id number] Patient is a 57y old  Female who presents with a chief complaint of shortness of breath (11 Oct 2017 22:01)  Pt with no complaints at present.  Pt to be discharged to home today on Coumadin 4 mg daily.      Vital Signs Last 24 Hrs  T(C): 36.9 (18 Nov 2017 13:55), Max: 36.9 (18 Nov 2017 13:55)  T(F): 98.5 (18 Nov 2017 13:55), Max: 98.5 (18 Nov 2017 13:55)  HR: 76 (18 Nov 2017 13:55) (76 - 87)  BP: 152/79 (18 Nov 2017 13:55) (134/75 - 165/83)  BP(mean): --  RR: 18 (18 Nov 2017 13:55) (18 - 18)  SpO2: 98% (18 Nov 2017 13:55) (96% - 98%)      Labs:                          10.3   4.9   )-----------( 141      ( 18 Nov 2017 06:16 )             32.2     11-18    144  |  104  |  77<H>  ----------------------------<  86  4.2   |  26  |  2.99<H>    Ca    9.0      18 Nov 2017 06:16              Radiology:    Physical Exam:  General: WN/WD NAD  Neurology: A&Ox3, nonfocal.  Head:  Normocephalic, atraumatic  Respiratory: CTA B/L  CV: RRR, S1S2, no murmur  Abdominal: Soft, NT, ND   MSK: Edema of LLE continues to improve.  , + peripheral pulses,     Discharge Note and Plan:  >Follow up with PMD, Dr. Cy Chicas in 1 week.   > INR to be checked at home by [facility] and reported to Dr. Chicas.   > Discharge on Coumadin 4 mg daily.   > Pt to follow up with Dr. Yoder in 1 week.   >Continue to Ace Wrap LLE daily.   > Discharge with fox in place.   >Pt Is hemodynamically stable for discharge to home today.      Bailey Fagan ANP-[facility] #[id number]

## 2017-11-18 NOTE — PROGRESS NOTE ADULT - PROVIDER SPECIALTY LIST ADULT
Bariatric Surgery
Cardiology
ENT
ENT
Gastroenterology
Heme/Onc
Internal Medicine
Nephrology
Pulmonology
Rheumatology
Vascular Surgery
Wound Care
Cardiology
Cardiology
Gastroenterology
Internal Medicine
Nephrology
Pulmonology
Cardiology
Heme/Onc
Pulmonology
Pulmonology

## 2017-11-18 NOTE — PROGRESS NOTE ADULT - ASSESSMENT
LLE dvt  acute on chronic lle edema - improved  ckd 3    continue current care  coumadin 4 mg tonight  home blood draws  d/c home today.

## 2017-11-25 ENCOUNTER — INPATIENT (INPATIENT)
Facility: HOSPITAL | Age: 57
LOS: 17 days | Discharge: HOME | DRG: 640 | End: 2017-12-13
Attending: INTERNAL MEDICINE | Admitting: INTERNAL MEDICINE
Payer: MEDICARE

## 2017-11-25 VITALS
HEART RATE: 60 BPM | RESPIRATION RATE: 17 BRPM | SYSTOLIC BLOOD PRESSURE: 165 MMHG | DIASTOLIC BLOOD PRESSURE: 83 MMHG | TEMPERATURE: 98 F

## 2017-11-25 DIAGNOSIS — R62.7 ADULT FAILURE TO THRIVE: ICD-10-CM

## 2017-11-25 DIAGNOSIS — I50.22 CHRONIC SYSTOLIC (CONGESTIVE) HEART FAILURE: ICD-10-CM

## 2017-11-25 DIAGNOSIS — D75.82 HEPARIN INDUCED THROMBOCYTOPENIA (HIT): ICD-10-CM

## 2017-11-25 DIAGNOSIS — M10.9 GOUT, UNSPECIFIED: ICD-10-CM

## 2017-11-25 DIAGNOSIS — R55 SYNCOPE AND COLLAPSE: ICD-10-CM

## 2017-11-25 DIAGNOSIS — D64.9 ANEMIA, UNSPECIFIED: ICD-10-CM

## 2017-11-25 DIAGNOSIS — I10 ESSENTIAL (PRIMARY) HYPERTENSION: ICD-10-CM

## 2017-11-25 DIAGNOSIS — N18.3 CHRONIC KIDNEY DISEASE, STAGE 3 (MODERATE): ICD-10-CM

## 2017-11-25 DIAGNOSIS — I82.409 ACUTE EMBOLISM AND THROMBOSIS OF UNSPECIFIED DEEP VEINS OF UNSPECIFIED LOWER EXTREMITY: ICD-10-CM

## 2017-11-25 LAB
ALBUMIN SERPL ELPH-MCNC: 4 G/DL — SIGNIFICANT CHANGE UP (ref 3.3–5)
ALP SERPL-CCNC: 71 U/L — SIGNIFICANT CHANGE UP (ref 40–120)
ALT FLD-CCNC: 21 U/L RC — SIGNIFICANT CHANGE UP (ref 10–45)
ANION GAP SERPL CALC-SCNC: 17 MMOL/L — SIGNIFICANT CHANGE UP (ref 5–17)
ANISOCYTOSIS BLD QL: SLIGHT — SIGNIFICANT CHANGE UP
APPEARANCE UR: ABNORMAL
APTT BLD: 29.7 SEC — SIGNIFICANT CHANGE UP (ref 27.5–37.4)
AST SERPL-CCNC: 28 U/L — SIGNIFICANT CHANGE UP (ref 10–40)
BACTERIA # UR AUTO: ABNORMAL /HPF
BASE EXCESS BLDV CALC-SCNC: -0.9 MMOL/L — SIGNIFICANT CHANGE UP (ref -2–2)
BASOPHILS # BLD AUTO: 0 K/UL — SIGNIFICANT CHANGE UP (ref 0–0.2)
BASOPHILS NFR BLD AUTO: 0 % — SIGNIFICANT CHANGE UP (ref 0–2)
BILIRUB SERPL-MCNC: 0.3 MG/DL — SIGNIFICANT CHANGE UP (ref 0.2–1.2)
BILIRUB UR-MCNC: NEGATIVE — SIGNIFICANT CHANGE UP
BUN SERPL-MCNC: 75 MG/DL — HIGH (ref 7–23)
CA-I SERPL-SCNC: 1.26 MMOL/L — SIGNIFICANT CHANGE UP (ref 1.12–1.3)
CALCIUM SERPL-MCNC: 9.4 MG/DL — SIGNIFICANT CHANGE UP (ref 8.4–10.5)
CHLORIDE BLDV-SCNC: 109 MMOL/L — HIGH (ref 96–108)
CHLORIDE SERPL-SCNC: 104 MMOL/L — SIGNIFICANT CHANGE UP (ref 96–108)
CO2 BLDV-SCNC: 28 MMOL/L — SIGNIFICANT CHANGE UP (ref 22–30)
CO2 SERPL-SCNC: 22 MMOL/L — SIGNIFICANT CHANGE UP (ref 22–31)
COLOR SPEC: YELLOW — SIGNIFICANT CHANGE UP
CREAT SERPL-MCNC: 3 MG/DL — HIGH (ref 0.5–1.3)
DACRYOCYTES BLD QL SMEAR: SLIGHT — SIGNIFICANT CHANGE UP
DIFF PNL FLD: NEGATIVE — SIGNIFICANT CHANGE UP
ELLIPTOCYTES BLD QL SMEAR: SLIGHT — SIGNIFICANT CHANGE UP
EOSINOPHIL # BLD AUTO: 0 K/UL — SIGNIFICANT CHANGE UP (ref 0–0.5)
EOSINOPHIL NFR BLD AUTO: 0.5 % — SIGNIFICANT CHANGE UP (ref 0–6)
EPI CELLS # UR: SIGNIFICANT CHANGE UP /HPF
GAS PNL BLDV: 142 MMOL/L — SIGNIFICANT CHANGE UP (ref 136–145)
GAS PNL BLDV: SIGNIFICANT CHANGE UP
GAS PNL BLDV: SIGNIFICANT CHANGE UP
GLUCOSE BLDV-MCNC: 92 MG/DL — SIGNIFICANT CHANGE UP (ref 70–99)
GLUCOSE SERPL-MCNC: 92 MG/DL — SIGNIFICANT CHANGE UP (ref 70–99)
GLUCOSE UR QL: NEGATIVE — SIGNIFICANT CHANGE UP
HCO3 BLDV-SCNC: 26 MMOL/L — SIGNIFICANT CHANGE UP (ref 21–29)
HCT VFR BLD CALC: 32.8 % — LOW (ref 34.5–45)
HCT VFR BLDA CALC: 50 % — SIGNIFICANT CHANGE UP (ref 39–50)
HGB BLD CALC-MCNC: 16.3 G/DL — HIGH (ref 11.5–15.5)
HGB BLD-MCNC: 10.7 G/DL — LOW (ref 11.5–15.5)
HOROWITZ INDEX BLDV+IHG-RTO: SIGNIFICANT CHANGE UP
HYPOCHROMIA BLD QL: SLIGHT — SIGNIFICANT CHANGE UP
INR BLD: 1.33 RATIO — HIGH (ref 0.88–1.16)
KETONES UR-MCNC: NEGATIVE — SIGNIFICANT CHANGE UP
LACTATE BLDV-MCNC: 1.2 MMOL/L — SIGNIFICANT CHANGE UP (ref 0.7–2)
LEUKOCYTE ESTERASE UR-ACNC: ABNORMAL
LYMPHOCYTES # BLD AUTO: 0.6 K/UL — LOW (ref 1–3.3)
LYMPHOCYTES # BLD AUTO: 8.1 % — LOW (ref 13–44)
MACROCYTES BLD QL: SLIGHT — SIGNIFICANT CHANGE UP
MCHC RBC-ENTMCNC: 29.7 PG — SIGNIFICANT CHANGE UP (ref 27–34)
MCHC RBC-ENTMCNC: 32.5 GM/DL — SIGNIFICANT CHANGE UP (ref 32–36)
MCV RBC AUTO: 91.5 FL — SIGNIFICANT CHANGE UP (ref 80–100)
MONOCYTES # BLD AUTO: 0.4 K/UL — SIGNIFICANT CHANGE UP (ref 0–0.9)
MONOCYTES NFR BLD AUTO: 5.2 % — SIGNIFICANT CHANGE UP (ref 2–14)
NEUTROPHILS # BLD AUTO: 6.1 K/UL — SIGNIFICANT CHANGE UP (ref 1.8–7.4)
NEUTROPHILS NFR BLD AUTO: 86.2 % — HIGH (ref 43–77)
NITRITE UR-MCNC: NEGATIVE — SIGNIFICANT CHANGE UP
PCO2 BLDV: 52 MMHG — HIGH (ref 35–50)
PH BLDV: 7.31 — LOW (ref 7.35–7.45)
PH UR: 7 — SIGNIFICANT CHANGE UP (ref 5–8)
PLAT MORPH BLD: NORMAL — SIGNIFICANT CHANGE UP
PLATELET # BLD AUTO: 136 K/UL — LOW (ref 150–400)
PO2 BLDV: 34 MMHG — SIGNIFICANT CHANGE UP (ref 25–45)
POIKILOCYTOSIS BLD QL AUTO: SLIGHT — SIGNIFICANT CHANGE UP
POLYCHROMASIA BLD QL SMEAR: SLIGHT — SIGNIFICANT CHANGE UP
POTASSIUM BLDV-SCNC: 4.2 MMOL/L — SIGNIFICANT CHANGE UP (ref 3.5–5)
POTASSIUM SERPL-MCNC: 4.7 MMOL/L — SIGNIFICANT CHANGE UP (ref 3.5–5.3)
POTASSIUM SERPL-SCNC: 4.7 MMOL/L — SIGNIFICANT CHANGE UP (ref 3.5–5.3)
PROT SERPL-MCNC: 6.9 G/DL — SIGNIFICANT CHANGE UP (ref 6–8.3)
PROT UR-MCNC: >600 MG/DL
PROTHROM AB SERPL-ACNC: 14.5 SEC — HIGH (ref 9.8–12.7)
RBC # BLD: 3.59 M/UL — LOW (ref 3.8–5.2)
RBC # FLD: 14.6 % — HIGH (ref 10.3–14.5)
RBC BLD AUTO: ABNORMAL
SAO2 % BLDV: 60 % — LOW (ref 67–88)
SODIUM SERPL-SCNC: 143 MMOL/L — SIGNIFICANT CHANGE UP (ref 135–145)
SP GR SPEC: 1.02 — SIGNIFICANT CHANGE UP (ref 1.01–1.02)
UROBILINOGEN FLD QL: 2
WBC # BLD: 7.1 K/UL — SIGNIFICANT CHANGE UP (ref 3.8–10.5)
WBC # FLD AUTO: 7.1 K/UL — SIGNIFICANT CHANGE UP (ref 3.8–10.5)
WBC UR QL: SIGNIFICANT CHANGE UP /HPF (ref 0–5)

## 2017-11-25 PROCEDURE — 71010: CPT | Mod: 26

## 2017-11-25 PROCEDURE — 99285 EMERGENCY DEPT VISIT HI MDM: CPT | Mod: 25

## 2017-11-25 PROCEDURE — 93010 ELECTROCARDIOGRAM REPORT: CPT

## 2017-11-25 PROCEDURE — 99223 1ST HOSP IP/OBS HIGH 75: CPT

## 2017-11-25 RX ORDER — SODIUM CHLORIDE 0.65 %
1 AEROSOL, SPRAY (ML) NASAL
Qty: 0 | Refills: 0 | Status: DISCONTINUED | OUTPATIENT
Start: 2017-11-25 | End: 2017-12-13

## 2017-11-25 RX ORDER — HYDRALAZINE HCL 50 MG
50 TABLET ORAL EVERY 8 HOURS
Qty: 0 | Refills: 0 | Status: DISCONTINUED | OUTPATIENT
Start: 2017-11-25 | End: 2017-12-08

## 2017-11-25 RX ORDER — FERROUS SULFATE 325(65) MG
325 TABLET ORAL DAILY
Qty: 0 | Refills: 0 | Status: DISCONTINUED | OUTPATIENT
Start: 2017-11-25 | End: 2017-12-13

## 2017-11-25 RX ORDER — WARFARIN SODIUM 2.5 MG/1
4 TABLET ORAL ONCE
Qty: 0 | Refills: 0 | Status: COMPLETED | OUTPATIENT
Start: 2017-11-25 | End: 2017-11-25

## 2017-11-25 RX ORDER — PANTOPRAZOLE SODIUM 20 MG/1
40 TABLET, DELAYED RELEASE ORAL
Qty: 0 | Refills: 0 | Status: DISCONTINUED | OUTPATIENT
Start: 2017-11-25 | End: 2017-12-13

## 2017-11-25 RX ORDER — COLCHICINE 0.6 MG
0.6 TABLET ORAL EVERY OTHER DAY
Qty: 0 | Refills: 0 | Status: DISCONTINUED | OUTPATIENT
Start: 2017-11-25 | End: 2017-12-13

## 2017-11-25 RX ORDER — LABETALOL HCL 100 MG
300 TABLET ORAL THREE TIMES A DAY
Qty: 0 | Refills: 0 | Status: DISCONTINUED | OUTPATIENT
Start: 2017-11-25 | End: 2017-12-11

## 2017-11-25 RX ORDER — CARVEDILOL PHOSPHATE 80 MG/1
12.5 CAPSULE, EXTENDED RELEASE ORAL EVERY 12 HOURS
Qty: 0 | Refills: 0 | Status: DISCONTINUED | OUTPATIENT
Start: 2017-11-25 | End: 2017-12-05

## 2017-11-25 RX ORDER — SODIUM CHLORIDE 9 MG/ML
1000 INJECTION INTRAMUSCULAR; INTRAVENOUS; SUBCUTANEOUS ONCE
Qty: 0 | Refills: 0 | Status: COMPLETED | OUTPATIENT
Start: 2017-11-25 | End: 2017-11-25

## 2017-11-25 RX ORDER — ASCORBIC ACID 60 MG
500 TABLET,CHEWABLE ORAL DAILY
Qty: 0 | Refills: 0 | Status: DISCONTINUED | OUTPATIENT
Start: 2017-11-25 | End: 2017-12-13

## 2017-11-25 RX ORDER — CALCITRIOL 0.5 UG/1
0.25 CAPSULE ORAL DAILY
Qty: 0 | Refills: 0 | Status: DISCONTINUED | OUTPATIENT
Start: 2017-11-25 | End: 2017-12-13

## 2017-11-25 RX ORDER — AMLODIPINE BESYLATE 2.5 MG/1
10 TABLET ORAL DAILY
Qty: 0 | Refills: 0 | Status: DISCONTINUED | OUTPATIENT
Start: 2017-11-25 | End: 2017-11-30

## 2017-11-25 RX ADMIN — CARVEDILOL PHOSPHATE 12.5 MILLIGRAM(S): 80 CAPSULE, EXTENDED RELEASE ORAL at 22:37

## 2017-11-25 RX ADMIN — WARFARIN SODIUM 4 MILLIGRAM(S): 2.5 TABLET ORAL at 22:37

## 2017-11-25 RX ADMIN — SODIUM CHLORIDE 1000 MILLILITER(S): 9 INJECTION INTRAMUSCULAR; INTRAVENOUS; SUBCUTANEOUS at 17:15

## 2017-11-25 RX ADMIN — Medication 300 MILLIGRAM(S): at 21:59

## 2017-11-25 RX ADMIN — Medication 50 MILLIGRAM(S): at 21:59

## 2017-11-25 NOTE — ED ADULT NURSE NOTE - OBJECTIVE STATEMENT
56 y/o F to ED by EMS from home c/o RLE pain constant today.  A&Ox4.  No dizziness.  +HERNANDEZ.  Lungs clear and diminished bilat.  Easy work of breathing.  No chest pain, numbness/tingling.  Abd soft round nontender.  LLE swollen, wrapped in bandage prior to arrival.  Pt states LLE DVT currently on coumadin.  Pt states at baseline does not ambulate.  Pt has stage 4 pressure ulcer on back/upper leg > one year.  Abd rounded, pt states nauseous when pain is present. 58 y/o F to ED by EMS from home c/o RLE pain constant today.  A&Ox4.  No dizziness.  +HERNANDEZ.  Lungs clear and diminished bilat.  Easy work of breathing.  No chest pain, numbness/tingling.  Abd soft round nontender.  Med lock 20 L forearm, labs drawn.  Pt states at baseline does not ambulate, able to stand but not walk "doctors unable to explain why my legs cant hold my body".  Pt has stage 4 pressure ulcer on back/upper leg > one year.  Abd rounded, pt states nauseous when pain is present.   LLE extremely swollen, wrapped in bandage prior to arrival.  Pt states LLE DVT currently on coumadin. Moves all extremities. Pt presents with indwelling urinary catheter, draining cloudy dark urine.

## 2017-11-25 NOTE — H&P ADULT - PROBLEM SELECTOR PLAN 1
Pt presented with one day of fatigue, decreased po intake and nausea a/w dizziness and lightheadedness on standing. With dry mucus membranes on exam. Suspect minor volume depletion as likely cause of current symptoms. Received fluids in ED. Will hold off on further fluid administration given HFrEF. Lab work appears in line with discharge labs. Doubt infection. UA with 25-50 WBC although with some epithelial cells.   - Will check CXR  - Check orthostatics  - monitor uop  - No JVD on exam, heart sounds on distant, BP acceptable. Will check echo given  large pericardial effusion without tamponade on last admission  - PT consult

## 2017-11-25 NOTE — H&P ADULT - NSHPREVIEWOFSYSTEMS_GEN_ALL_CORE
CONSTITUTIONAL: No weakness, fevers or chills  EYES/ENT: + dizzieness on standing,   NECK: No pain or stiffness  RESPIRATORY: No cough, wheezing, hemoptysis; No shortness of breath  CARDIOVASCULAR: No chest pain or palpitations  GASTROINTESTINAL: No abdominal or epigastric pain. + nausea, no vomiting, or hematemesis; No diarrhea or constipation. No melena or hematochezia.  GENITOURINARY: No dysuria, frequency or hematuria  NEUROLOGICAL: No numbness or weakness  SKIN: No itching, burning, rashes, or lesions   All other review of systems is negative unless indicated above.

## 2017-11-25 NOTE — ED PROVIDER NOTE - OBJECTIVE STATEMENT
56 y/o f with pmhx anemia, iliac artery aneurysm, CHF, HIT, HTN, presents for not feeling well. was feeling lightheaded, dizzy, nausea and dry mouth this am. called her pmd Dr. Cy Chicas and he rec she come to ER for further work up and care. no abd pain . no chest pain. no bleeding. admits to dark urine the other day. has chronic indwelling fox and healing sac ulcer. no fever. no chills. no back pain.

## 2017-11-25 NOTE — H&P ADULT - PROBLEM SELECTOR PLAN 2
Appeared mildly dry on exam. s/p IVF in ED. Will be judicious in fluid administartion given CHF.  c/w hydral, coreg

## 2017-11-25 NOTE — H&P ADULT - ASSESSMENT
58 yo F w/ hx of SLE, systolic CHF (EF 32%) s/p AICD, HTN, CKD III- IV, PE / left leg DVT dx 2009, HIT, CAD (no stents), stage IV sacral ulcer, chronic fox, hx of recurrent pericardial effusion, AAA ~14cm in 12/2016 s/p repair in 2010 w/ aortoiliac stent c/b post-operatively with development of left lower extremity compartment syndrome presented with one day history of nausea, decreased po intake and dizziness on standing likely pre syncope in setting of decreased oral intake

## 2017-11-25 NOTE — ED ADULT NURSE REASSESSMENT NOTE - NS ED NURSE REASSESS COMMENT FT1
report received from Susana DEMARCO  pt mentating at baseline. no change in neuro status. pt denies pain at this time. pt placed in position of comfort, given warm blankets. safety maintained. will continue to monitor, will monitor

## 2017-11-25 NOTE — H&P ADULT - PROBLEM SELECTOR PLAN 4
On Coumadin for acute DVT Left common and femoral veins. No heparin given h/o HIT  - INR subtherapeutic  - Dose Coumadin 4mg

## 2017-11-25 NOTE — ED PROVIDER NOTE - CARE PLAN
Principal Discharge DX:	FTT (failure to thrive) in adult  Secondary Diagnosis:	Lower extremity edema

## 2017-11-25 NOTE — ED PROVIDER NOTE - ATTENDING CONTRIBUTION TO CARE
56 y/o f with pmhx anemia, iliac artery aneurysm, CHF, HIT, HTN, presents for not feeling well. was feeling lightheaded, dizzy, nausea and dry mouth this am. called her pmd Dr. Cy Chicas and he rec she come to ER for further work up and care. no abd pain . no chest pain. no bleeding. admits to dark urine the other day. has chronic indwelling fox and healing sac ulcer. no fever. no chills. no back pain.   Gen. no acute distress, Non toxic   HEENT: EOMI, mmm,   Lungs: CTAB/L no C/ W /R   CVS: S1S2   Abd; Soft non tender, non distended   Ext: no edema   Neuro AAOx3 non focal clear speech

## 2017-11-25 NOTE — ED ADULT NURSE NOTE - PMH
Adenomatous Goiter (ICD9 241.9)    Anemia    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)    Calf DVT (Deep Venous Thrombosis) (ICD9 453.42)    CHF (Congestive Heart Failure) (ICD9 428.0)    Chronic Gout (ICD9 274.02)    Compartment Syndrome  fasciotomy LLE  History of Pulmonary Embolism  2009  HIT (Heparin-Induced Thrombocytopenia)    HTN (Hypertension)    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)  repair  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)  left iliac aneurysm repair  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)  endoleak l iliac aneurysm repair  LBBB (Left Bundle Branch Block) (ICD9 426.3)    Morbid obesity

## 2017-11-25 NOTE — H&P ADULT - NSHPLABSRESULTS_GEN_ALL_CORE
.  LABS:                         10.7   7.1   )-----------( 136      ( 2017 17:05 )             32.8         143  |  104  |  75<H>  ----------------------------<  92  4.7   |  22  |  3.00<H>    Ca    9.4      2017 17:05    TPro  6.9  /  Alb  4.0  /  TBili  0.3  /  DBili  x   /  AST  28  /  ALT  21  /  AlkPhos  71      PT/INR - ( 2017 17:05 )   PT: 14.5 sec;   INR: 1.33 ratio         PTT - ( 2017 17:05 )  PTT:29.7 sec  Urinalysis Basic - ( 2017 17:48 )    Color: Yellow / Appearance: SL Turbid / S.017 / pH: x  Gluc: x / Ketone: Negative  / Bili: Negative / Urobili: 2   Blood: x / Protein: >600 mg/dL / Nitrite: Negative   Leuk Esterase: Moderate / RBC: x / WBC 26-50 /HPF   Sq Epi: x / Non Sq Epi: OCC /HPF / Bacteria: Moderate /HPF                RADIOLOGY, EKG & ADDITIONAL TESTS: Reviewed.

## 2017-11-25 NOTE — H&P ADULT - NSHPPHYSICALEXAM_GEN_ALL_CORE
.  VITAL SIGNS:  T(C): 36.8 (11-25-17 @ 16:00), Max: 36.8 (11-25-17 @ 16:00)  T(F): 98.3 (11-25-17 @ 16:00), Max: 98.3 (11-25-17 @ 16:00)  HR: 84 (11-25-17 @ 16:00) (60 - 84)  BP: 165/83 (11-25-17 @ 16:00) (165/83 - 165/83)  BP(mean): --  RR: 21 (11-25-17 @ 16:00) (17 - 21)  SpO2: 96% (11-25-17 @ 16:00) (96% - 96%)  Wt(kg): --    PHYSICAL EXAM:    Constitutional: NAD  HEENT: Normocephalic, EOMI, dry MM  Neck:  No JVD  Respiratory: CTA B/L, No wheezes  Cardiovascular: S1, S2, RRR, + systolic murmur  Gastrointestinal: BS+, soft, NT/ND  Extremities: + peripheral edema le b/l Left > R  Neurological: AAOX3, no focal deficits  Psychiatric: Normal mood, normal affect  : +fox

## 2017-11-25 NOTE — ED PROVIDER NOTE - PHYSICAL EXAMINATION
Gen. no acute distress, Non toxic   HEENT: EOMI, mmm,   Lungs: b/l bs  CVS: S1S2   Abd; Soft non tender, non distended   Ext: no edema   Neuro AAOx3 non focal clear speech

## 2017-11-25 NOTE — H&P ADULT - HISTORY OF PRESENT ILLNESS
56 yo F w/ hx of SLE, systolic CHF (EF 32%) s/p AICD, HTN, CKD III- IV, PE / left leg DVT dx 2009, HIT, CAD (no stents), stage IV sacral ulcer, chronic fox, hx of recurrent pericardial effusion, AAA ~14cm in 12/2016 s/p repair in 2010 w/ aortoiliac stent c/b post-operatively with development of left lower extremity compartment syndrome presented with one day history of nausea, decreased po intake and dizziness on standing. Pt states that she felt as though she was doing well after she was recently discharged. Taking all medications as prescribed. She helped with the cooking on thanksgiving, the following day she noticed that she was feeling a nit more tired than usual and decided to take the day to rest. Pt reported that she didn't have more oral intake yesterday. This am of presentation, she stated that she noticed the urine in her fox bag to be darker than usual, and she felt dizzy upon standing. Pt also reported dry mouth. No chest pain, SOB, palpitations. Did not lose consciousness. No sick contacts, fever/chills.

## 2017-11-25 NOTE — ED ADULT NURSE REASSESSMENT NOTE - NS ED NURSE REASSESS COMMENT FT1
Report given to change of shift RN.  Easy work of breathing.  Med lock intact.  Pt resting comfortably. NAD.  Awaiting admission bed. Vitals unchanged.  Pt speaking on phone with loved one.

## 2017-11-26 LAB
ANION GAP SERPL CALC-SCNC: 12 MMOL/L — SIGNIFICANT CHANGE UP (ref 5–17)
APTT BLD: 68.2 SEC — HIGH (ref 27.5–37.4)
APTT BLD: 74.2 SEC — HIGH (ref 27.5–37.4)
BUN SERPL-MCNC: 73 MG/DL — HIGH (ref 7–23)
CALCIUM SERPL-MCNC: 9.2 MG/DL — SIGNIFICANT CHANGE UP (ref 8.4–10.5)
CHLORIDE SERPL-SCNC: 106 MMOL/L — SIGNIFICANT CHANGE UP (ref 96–108)
CO2 SERPL-SCNC: 26 MMOL/L — SIGNIFICANT CHANGE UP (ref 22–31)
CREAT SERPL-MCNC: 2.98 MG/DL — HIGH (ref 0.5–1.3)
CULTURE RESULTS: SIGNIFICANT CHANGE UP
GLUCOSE SERPL-MCNC: 108 MG/DL — HIGH (ref 70–99)
HCT VFR BLD CALC: 32.3 % — LOW (ref 34.5–45)
HGB BLD-MCNC: 10.5 G/DL — LOW (ref 11.5–15.5)
INR BLD: 1.34 RATIO — HIGH (ref 0.88–1.16)
MCHC RBC-ENTMCNC: 30 PG — SIGNIFICANT CHANGE UP (ref 27–34)
MCHC RBC-ENTMCNC: 32.5 GM/DL — SIGNIFICANT CHANGE UP (ref 32–36)
MCV RBC AUTO: 92.4 FL — SIGNIFICANT CHANGE UP (ref 80–100)
PLATELET # BLD AUTO: 125 K/UL — LOW (ref 150–400)
POTASSIUM SERPL-MCNC: 3.9 MMOL/L — SIGNIFICANT CHANGE UP (ref 3.5–5.3)
POTASSIUM SERPL-SCNC: 3.9 MMOL/L — SIGNIFICANT CHANGE UP (ref 3.5–5.3)
PROTHROM AB SERPL-ACNC: 14.7 SEC — HIGH (ref 9.8–12.7)
RBC # BLD: 3.5 M/UL — LOW (ref 3.8–5.2)
RBC # FLD: 14.6 % — HIGH (ref 10.3–14.5)
SODIUM SERPL-SCNC: 144 MMOL/L — SIGNIFICANT CHANGE UP (ref 135–145)
SPECIMEN SOURCE: SIGNIFICANT CHANGE UP
WBC # BLD: 5.3 K/UL — SIGNIFICANT CHANGE UP (ref 3.8–10.5)
WBC # FLD AUTO: 5.3 K/UL — SIGNIFICANT CHANGE UP (ref 3.8–10.5)

## 2017-11-26 PROCEDURE — 93306 TTE W/DOPPLER COMPLETE: CPT | Mod: 26

## 2017-11-26 RX ORDER — ARGATROBAN 50 MG/50ML
2 INJECTION, SOLUTION INTRAVENOUS
Qty: 250 | Refills: 0 | Status: DISCONTINUED | OUTPATIENT
Start: 2017-11-26 | End: 2017-11-27

## 2017-11-26 RX ORDER — PETROLATUM,WHITE
1 JELLY (GRAM) TOPICAL
Qty: 0 | Refills: 0 | Status: DISCONTINUED | OUTPATIENT
Start: 2017-11-26 | End: 2017-12-13

## 2017-11-26 RX ORDER — WARFARIN SODIUM 2.5 MG/1
5 TABLET ORAL ONCE
Qty: 0 | Refills: 0 | Status: COMPLETED | OUTPATIENT
Start: 2017-11-26 | End: 2017-11-26

## 2017-11-26 RX ADMIN — CALCITRIOL 0.25 MICROGRAM(S): 0.5 CAPSULE ORAL at 12:46

## 2017-11-26 RX ADMIN — PANTOPRAZOLE SODIUM 40 MILLIGRAM(S): 20 TABLET, DELAYED RELEASE ORAL at 05:32

## 2017-11-26 RX ADMIN — Medication 50 MILLIGRAM(S): at 12:46

## 2017-11-26 RX ADMIN — Medication 500 MILLIGRAM(S): at 12:45

## 2017-11-26 RX ADMIN — Medication 300 MILLIGRAM(S): at 12:47

## 2017-11-26 RX ADMIN — Medication 1 TABLET(S): at 12:46

## 2017-11-26 RX ADMIN — CARVEDILOL PHOSPHATE 12.5 MILLIGRAM(S): 80 CAPSULE, EXTENDED RELEASE ORAL at 17:41

## 2017-11-26 RX ADMIN — Medication 50 MILLIGRAM(S): at 22:00

## 2017-11-26 RX ADMIN — CARVEDILOL PHOSPHATE 12.5 MILLIGRAM(S): 80 CAPSULE, EXTENDED RELEASE ORAL at 09:58

## 2017-11-26 RX ADMIN — Medication 50 MILLIGRAM(S): at 05:32

## 2017-11-26 RX ADMIN — Medication 10 MILLIGRAM(S): at 09:58

## 2017-11-26 RX ADMIN — ARGATROBAN 13.57 MICROGRAM(S)/KG/MIN: 50 INJECTION, SOLUTION INTRAVENOUS at 18:50

## 2017-11-26 RX ADMIN — Medication 300 MILLIGRAM(S): at 05:31

## 2017-11-26 RX ADMIN — Medication 325 MILLIGRAM(S): at 12:46

## 2017-11-26 RX ADMIN — Medication 1 SPRAY(S): at 05:32

## 2017-11-26 RX ADMIN — Medication 300 MILLIGRAM(S): at 22:00

## 2017-11-26 RX ADMIN — Medication 0.6 MILLIGRAM(S): at 12:45

## 2017-11-26 RX ADMIN — AMLODIPINE BESYLATE 10 MILLIGRAM(S): 2.5 TABLET ORAL at 05:32

## 2017-11-26 RX ADMIN — WARFARIN SODIUM 5 MILLIGRAM(S): 2.5 TABLET ORAL at 22:00

## 2017-11-26 NOTE — PROGRESS NOTE ADULT - ASSESSMENT
DVT with subtherapeutic inr    continue current care  continue fox  argatroban drip until therapeutic  will need home services re set given the problems pt had that caused her to be hospitalized

## 2017-11-27 DIAGNOSIS — I31.3 PERICARDIAL EFFUSION (NONINFLAMMATORY): ICD-10-CM

## 2017-11-27 DIAGNOSIS — R06.00 DYSPNEA, UNSPECIFIED: ICD-10-CM

## 2017-11-27 LAB
ANION GAP SERPL CALC-SCNC: 15 MMOL/L — SIGNIFICANT CHANGE UP (ref 5–17)
BUN SERPL-MCNC: 71 MG/DL — HIGH (ref 7–23)
CALCIUM SERPL-MCNC: 9.3 MG/DL — SIGNIFICANT CHANGE UP (ref 8.4–10.5)
CHLORIDE SERPL-SCNC: 107 MMOL/L — SIGNIFICANT CHANGE UP (ref 96–108)
CO2 SERPL-SCNC: 22 MMOL/L — SIGNIFICANT CHANGE UP (ref 22–31)
CREAT SERPL-MCNC: 3.03 MG/DL — HIGH (ref 0.5–1.3)
GLUCOSE SERPL-MCNC: 92 MG/DL — SIGNIFICANT CHANGE UP (ref 70–99)
HCT VFR BLD CALC: 33.1 % — LOW (ref 34.5–45)
HGB BLD-MCNC: 10.3 G/DL — LOW (ref 11.5–15.5)
INR BLD: 2.98 RATIO — HIGH (ref 0.88–1.16)
MCHC RBC-ENTMCNC: 28 PG — SIGNIFICANT CHANGE UP (ref 27–34)
MCHC RBC-ENTMCNC: 31.1 GM/DL — LOW (ref 32–36)
MCV RBC AUTO: 89.9 FL — SIGNIFICANT CHANGE UP (ref 80–100)
PLATELET # BLD AUTO: 159 K/UL — SIGNIFICANT CHANGE UP (ref 150–400)
POTASSIUM SERPL-MCNC: 4.2 MMOL/L — SIGNIFICANT CHANGE UP (ref 3.5–5.3)
POTASSIUM SERPL-SCNC: 4.2 MMOL/L — SIGNIFICANT CHANGE UP (ref 3.5–5.3)
PROTHROM AB SERPL-ACNC: 34.4 SEC — HIGH (ref 10–13.1)
RBC # BLD: 3.68 M/UL — LOW (ref 3.8–5.2)
RBC # FLD: 16.5 % — HIGH (ref 10.3–14.5)
SODIUM SERPL-SCNC: 144 MMOL/L — SIGNIFICANT CHANGE UP (ref 135–145)
WBC # BLD: 5.89 K/UL — SIGNIFICANT CHANGE UP (ref 3.8–10.5)
WBC # FLD AUTO: 5.89 K/UL — SIGNIFICANT CHANGE UP (ref 3.8–10.5)

## 2017-11-27 RX ORDER — ARGATROBAN 50 MG/50ML
2 INJECTION, SOLUTION INTRAVENOUS
Qty: 250 | Refills: 0 | Status: DISCONTINUED | OUTPATIENT
Start: 2017-11-27 | End: 2017-11-29

## 2017-11-27 RX ORDER — WARFARIN SODIUM 2.5 MG/1
5 TABLET ORAL ONCE
Qty: 0 | Refills: 0 | Status: COMPLETED | OUTPATIENT
Start: 2017-11-27 | End: 2017-11-27

## 2017-11-27 RX ORDER — ARGATROBAN 50 MG/50ML
2 INJECTION, SOLUTION INTRAVENOUS
Qty: 250 | Refills: 0 | Status: DISCONTINUED | OUTPATIENT
Start: 2017-11-27 | End: 2017-11-27

## 2017-11-27 RX ADMIN — WARFARIN SODIUM 5 MILLIGRAM(S): 2.5 TABLET ORAL at 22:12

## 2017-11-27 RX ADMIN — Medication 300 MILLIGRAM(S): at 22:12

## 2017-11-27 RX ADMIN — PANTOPRAZOLE SODIUM 40 MILLIGRAM(S): 20 TABLET, DELAYED RELEASE ORAL at 05:50

## 2017-11-27 RX ADMIN — CARVEDILOL PHOSPHATE 12.5 MILLIGRAM(S): 80 CAPSULE, EXTENDED RELEASE ORAL at 17:52

## 2017-11-27 RX ADMIN — Medication 50 MILLIGRAM(S): at 22:12

## 2017-11-27 RX ADMIN — Medication 1 APPLICATION(S): at 17:11

## 2017-11-27 RX ADMIN — AMLODIPINE BESYLATE 10 MILLIGRAM(S): 2.5 TABLET ORAL at 05:51

## 2017-11-27 RX ADMIN — Medication 300 MILLIGRAM(S): at 15:04

## 2017-11-27 RX ADMIN — Medication 50 MILLIGRAM(S): at 15:04

## 2017-11-27 RX ADMIN — Medication 1 SPRAY(S): at 05:58

## 2017-11-27 RX ADMIN — Medication 500 MILLIGRAM(S): at 12:10

## 2017-11-27 RX ADMIN — Medication 1 TABLET(S): at 12:11

## 2017-11-27 RX ADMIN — Medication 10 MILLIGRAM(S): at 12:10

## 2017-11-27 RX ADMIN — Medication 325 MILLIGRAM(S): at 12:11

## 2017-11-27 RX ADMIN — Medication 300 MILLIGRAM(S): at 10:00

## 2017-11-27 RX ADMIN — ARGATROBAN 13.57 MICROGRAM(S)/KG/MIN: 50 INJECTION, SOLUTION INTRAVENOUS at 15:05

## 2017-11-27 RX ADMIN — CALCITRIOL 0.25 MICROGRAM(S): 0.5 CAPSULE ORAL at 12:11

## 2017-11-27 RX ADMIN — Medication 1 SPRAY(S): at 17:11

## 2017-11-27 RX ADMIN — Medication 50 MILLIGRAM(S): at 05:51

## 2017-11-27 RX ADMIN — CARVEDILOL PHOSPHATE 12.5 MILLIGRAM(S): 80 CAPSULE, EXTENDED RELEASE ORAL at 05:50

## 2017-11-27 NOTE — PHYSICAL THERAPY INITIAL EVALUATION ADULT - PERTINENT HX OF CURRENT PROBLEM, REHAB EVAL
57yoF w/ hx of SLE, systolic CHF (EF 32%) s/p AICD, HTN, CKD III- IV, PE / left leg DVT dx 2009, HIT, CAD (no stents), stage IV sacral ulcer, chronic fox, hx of recurrent pericardial effusion, AAA ~14cm in 12/2016 s/p repair in 2010 w/ aortoiliac stent c/b post-op w/ development of LLE compartment syndrome p/w 1day h/o nausea, decreased po intake & dizziness on standing

## 2017-11-27 NOTE — PHYSICAL THERAPY INITIAL EVALUATION ADULT - RISK REDUCTION/PREVENTION, PT EVAL
recurrence of condition/secondary impairments risk factors/recurrence of condition/secondary impairments

## 2017-11-27 NOTE — PHYSICAL THERAPY INITIAL EVALUATION ADULT - MODALITIES TREATMENT COMMENTS
PT WC order rec'ed to address Stage 4 chronic R ischium pressure ulcer. Pt rec'ed supine in bed, NAD, call bell in reach, agreeable to PT WC Eval of chronic Stage 4 Rt sacral pressure ulcer. Rec'ed C/D/I, no odor, purulence, erythema noted. 100% granular. Measuring 1.5cm x 0.4cm x 1.4cm. Cleansed w/ NS, cavilon to periwound, aquacel rope cut to size, DSD, pt requesting allevyne foam to cover instead of tegaderm. Pt left side-lying in bed, declined further assessment of functional mobility as she wants to get washed up first, all lines & tubes intact, call bell in reach, RN/Raquel aware. Purposeful proactive rounding took place during WC evaluation.

## 2017-11-27 NOTE — PROGRESS NOTE ADULT - ASSESSMENT
dvt with subtherp inr  acute on chronic left leg swelling  chronic fox    continue current care  argatroban to coumadin  d/c planning with home set up as pt was rehospitalized because home services didn't  do their jobs.

## 2017-11-27 NOTE — PHYSICAL THERAPY INITIAL EVALUATION ADULT - ACTIVE RANGE OF MOTION EXAMINATION, REHAB EVAL
LLE foot drop/bilateral upper extremity Active ROM was WFL (within functional limits)/bilateral  lower extremity Active ROM was WFL (within functional limits)

## 2017-11-27 NOTE — PHYSICAL THERAPY INITIAL EVALUATION ADULT - IMPAIRED TRANSFERS: SIT/STAND, REHAB EVAL
decreased strength/forward flexed posture; pt unable to complete transfer during first 3 trials/impaired postural control/impaired balance

## 2017-11-27 NOTE — PHYSICAL THERAPY INITIAL EVALUATION ADULT - IMPAIRMENTS FOUND, PT EVAL
gait, locomotion, and balance/anthropometric characteristics/integumentary integrity/muscle strength aerobic capacity/endurance/muscle strength/integumentary integrity/gait, locomotion, and balance/anthropometric characteristics

## 2017-11-27 NOTE — CONSULT NOTE ADULT - SUBJECTIVE AND OBJECTIVE BOX
CHIEF COMPLAINT: SOB     HISTORY OF PRESENT ILLNESS:   58 yo F w/ hx of SLE, systolic CHF (EF 32%) s/p AICD, HTN, CKD III- IV, PE / left leg DVT dx 2009, HIT, CAD (no stents), stage IV sacral ulcer, chronic fox, hx of recurrent pericardial effusion, AAA ~14cm in 12/2016 s/p repair in 2010 w/ aortoiliac stent c/b post-operatively with development of left lower extremity compartment syndrome presented with one day history of nausea, decreased po intake and dizziness on standing. Pt states that she felt as though she was doing well after she was recently discharged. Taking all medications as prescribed. She helped with the cooking on thanksgiving, the following day she noticed that she was feeling a nit more tired than usual and decided to take the day to rest. Pt reported that she didn't have more oral intake yesterday. This am of presentation, she stated that she noticed the urine in her fox bag to be darker than usual, and she felt dizzy upon standing. Pt also reported dry mouth. No chest pain, SOB, palpitations. Did not lose consciousness. No sick contacts, fever/chills.        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)  Chronic Gout (ICD9 274.02)  LBBB (Left Bundle Branch Block) (ICD9 426.3)  CHF (Congestive Heart Failure) (ICD9 428.0)  Hx of aorta-iliac-femoral bypass  TOP (Termination of Pregnancy)  S/P Dilatation and Curettage  History of Tubal Ligation  s/p AAA (Abdominal Aortic Aneurysm) Repair: 2009 2010  History of Cholecystectomy  S/P Partial Hysterectomy          MEDICATIONS:  amLODIPine   Tablet 10 milliGRAM(s) Oral daily  argatroban Infusion 2 MICROgram(s)/kG/Min IV Continuous <Continuous>  carvedilol 12.5 milliGRAM(s) Oral every 12 hours  hydrALAZINE 50 milliGRAM(s) Oral every 8 hours  labetalol 300 milliGRAM(s) Oral three times a day  warfarin 5 milliGRAM(s) Oral once          pantoprazole    Tablet 40 milliGRAM(s) Oral before breakfast    colchicine 0.6 milliGRAM(s) Oral every other day  predniSONE   Tablet 10 milliGRAM(s) Oral daily    AQUAPHOR (petrolatum Ointment) 1 Application(s) Topical two times a day  ascorbic acid 500 milliGRAM(s) Oral daily  calcitriol   Capsule 0.25 MICROGram(s) Oral daily  ferrous    sulfate 325 milliGRAM(s) Oral daily  Nephro-alyse 1 Tablet(s) Oral daily  sodium chloride 0.65% Nasal 1 Spray(s) Both Nostrils two times a day      FAMILY HISTORY:  No pertinent family history in first degree relatives      SOCIAL HISTORY:    [ ] Non-smoker  [ ] Smoker  [ ] Alcohol    Allergies    heparin (Unknown)  losartan (Anaphylaxis)  nitroglycerin (Other)    Intolerances    	    REVIEW OF SYSTEMS:  CONSTITUTIONAL: No fever, weight loss, + fatigue  EYES: No eye pain, visual disturbances, or discharge  ENMT:  No difficulty hearing, tinnitus, vertigo; No sinus or throat pain  NECK: No pain or stiffness  RESPIRATORY: No cough, wheezing, chills or hemoptysis; No Shortness of Breath  CARDIOVASCULAR: No chest pain, palpitations, passing out, dizziness, or leg swelling  GASTROINTESTINAL: No abdominal or epigastric pain. No nausea, vomiting, or hematemesis; No diarrhea or constipation. No melena or hematochezia.  GENITOURINARY: No dysuria, frequency, hematuria, or incontinence  NEUROLOGICAL: No headaches, memory loss, loss of strength, numbness, or tremors  SKIN: No itching, burning, rashes, or lesions   LYMPH Nodes: No enlarged glands  ENDOCRINE: No heat or cold intolerance; No hair loss  MUSCULOSKELETAL: +joint pain or swelling; No muscle, back, or extremity pain  PSYCHIATRIC: No depression, anxiety, mood swings, or difficulty sleeping  HEME/LYMPH: No easy bruising, or bleeding gums  ALLERGY AND IMMUNOLOGIC: No hives or eczema	    [ ] All others negative	  [ ] Unable to obtain    PHYSICAL EXAM:  T(C): 36.5 (11-27-17 @ 13:48), Max: 36.9 (11-26-17 @ 20:08)  HR: 77 (11-27-17 @ 13:48) (77 - 86)  BP: 154/83 (11-27-17 @ 13:48) (154/83 - 173/93)  RR: 18 (11-27-17 @ 13:48) (18 - 18)  SpO2: 96% (11-27-17 @ 13:48) (94% - 96%)  Wt(kg): --  I&O's Summary    26 Nov 2017 07:01  -  27 Nov 2017 07:00  --------------------------------------------------------  IN: 300 mL / OUT: 1850 mL / NET: -1550 mL    27 Nov 2017 07:01  -  27 Nov 2017 19:41  --------------------------------------------------------  IN: 1215.6 mL / OUT: 900 mL / NET: 315.6 mL        Appearance: Normal	  HEENT:   Normal oral mucosa, PERRL, EOMI	  Lymphatic: No lymphadenopathy  Cardiovascular: Normal S1 S2, No JVD, No murmurs, No edema  Respiratory: decreased bs at bases 	  Psychiatry: A & O x 3, Mood & affect appropriate  Gastrointestinal:  Soft, Non-tender, + BS	  Skin: No rashes, No ecchymoses, No cyanosis	  Neurologic: Non-focal  Extremities: Chronic 4+ edema in LLE, 2+ RLE edema   Vascular: Peripheral pulses palpable 2+ bilaterally  +fox     TELEMETRY: 	    ECG:  	Vpaced   RADIOLOGY:   < from: Xray Chest 1 View AP -PORTABLE-Routine (11.25.17 @ 19:34) >  INTERPRETATION:  EXAMINATION: CHEST PORTABLE ROUTINE    CLINICAL INDICATION: sob    TECHNIQUE: Single portable view of the chest was obtained.    COMPARISON: 11/8/2017.    IMPRESSION:    The cardiac silhouette is enlarged with findings suggestive of presence   of pericardial effusion, similar to the prior study. There is an ICD with   leads unchanged in position. There is persistent obscuration of the left   hemidiaphragm which may be secondary to presence of a left pleural   effusion and atelectasis/pneumonia. There is no pneumothorax. Overall,   there has been no significant interval change as compared with the prior   study.        < from: Transthoracic Echocardiogram (11.26.17 @ 19:12) >  PROCEDURE: Transthoracic echocardiogram with 2-D, M-Mode  and complete spectral and color flow Doppler.  INDICATION: Cardiomyopathy, unspecified (I42.9)  ------------------------------------------------------------------------  Dimensions:    Normal Values:  LA:     4.5    2.0 - 4.0 cm  Ao:     2.9    2.0 - 3.8 cm  SEPTUM: 1.3    0.6 - 1.2 cm  PWT:    1.3 0.6 - 1.1 cm  LVIDd:  5.6    3.0 - 5.6 cm  LVIDs:  4.8    1.8 - 4.0 cm  Derived variables:  LVMI: 148 g/m2  RWT: 0.46  Fractional short: 14 %  EF (Teicholtz): 30 %  ------------------------------------------------------------------------  Observations:  Mitral Valve: Mitral annular calcification, otherwise  normal mitral valve. Mild-moderate mitral regurgitation.  Aortic Valve/Aorta: Calcified trileaflet aortic valve with  normal opening. Minimal aortic regurgitation.  Normal aortic root size. (Ao: 2.9 cm at the sinuses of  Valsalva).  Left Atrium: Moderately dilated left atrium.  LA volume  index = 48 cc/m2.  Left Ventricle: Severe global left ventricular systolic  dysfunction. Moderate concentric left ventricular  hypertrophy. Normal diastolic function  Right Heart: Normal right atrium. Normal right ventricular  size with decreased right ventricular systolic function.  A  device wire is noted in the right heart. Normal tricuspid  valve. Minimal tricuspid regurgitation. Normal pulmonic  valve. Mild pulmonic regurgitation.  Pericardium/Pleura: Large pericardial effusion, measuring  about  2.7 cm posterior to the left ventricle.  Moderate  pericardial effusion (about  1.2 cm) anterior to the RV.  No echocardiographic evidence of cardiac tamponade.  Hemodynamic: Estimated right atrial pressure is 8 mm Hg.  ------------------------------------------------------------------------  Conclusions:  1. Mitral annular calcification, otherwise normal mitral  valve. Mild-moderate mitral regurgitation.  2. Moderately dilated left atrium.  LA volume index = 48  cc/m2.  3. Moderate concentric left ventricular hypertrophy.  4. Severe global left ventricular systolic dysfunction.  5. Normal right ventricular size with decreased right  ventricular systolic function.  A device wire is noted in  the right heart.  6. Large pericardial effusion, measuring about  2.7 cm  posterior to the left ventricle.  Moderate pericardial  effusion (about  1.2 cm) anterior to the RV.  No  echocardiographic evidence of cardiac tamponade.  *** Compared with echocardiogram of 10/12/2017, no  significant changes noted.                  OTHER: 	  	  LABS:	 	    CARDIAC MARKERS:                                  10.3   5.89  )-----------( 159      ( 27 Nov 2017 07:23 )             33.1     11-27    144  |  107  |  71<H>  ----------------------------<  92  4.2   |  22  |  3.03<H>    Ca    9.3      27 Nov 2017 07:18      proBNP:   Lipid Profile:   HgA1c:   TSH:

## 2017-11-27 NOTE — CHART NOTE - NSCHARTNOTEFT_GEN_A_CORE
MEDICINE NP    COSTEN, SANDRA  57y Female  Patient is a 57y old  Female who presents with a chief complaint of Dizziness on standing. (25 Nov 2017 18:43)    HPI:  56 y/o Female, with known medical history of PE /DVT, HIT positive, on Coumadin, started on Argatroban during the day for subtherapeutic INR.      Event Summary:  Notified by RN, Patient with aPTT results x2 within target range.    PTT - ( 26 Nov 2017 22:57 )  PTT:74.2 sec  PTT - (26 Nov 2017 20:35)  PTT: 68.2 sec    -c/w Current Argatroban infusion rate and trend aPTT daily  -f/u AM INR  -c/w Monitoring closely      LUIS Rose-BC  Medicine Department  #[id number]

## 2017-11-27 NOTE — CONSULT NOTE ADULT - PROBLEM SELECTOR RECOMMENDATION 2
chronic   no evidence of tamponade  no Intervention as per CTS and IR as of last admission last week   clinically does not appear to be worse

## 2017-11-27 NOTE — PHYSICAL THERAPY INITIAL EVALUATION ADULT - PRECAUTIONS/LIMITATIONS, REHAB EVAL
Pt states that she felt as though she was doing well after she was recently D/C'ed. Taking all medications as prescribed. She helped w/ the cooking on Thanksgiving, the following day she noticed that she was feeling more tired than usual & decided to take the day to rest. This am of presentation, she stated that she noticed the urine in her fox bag to be darker than usua, & she felt dizzy upon standing. Pt also reported dry mouth. No chest pain, SOB, palpitations. Did not lose consciousness. No sick contacts, fever/chills/fall precautions

## 2017-11-27 NOTE — PHYSICAL THERAPY INITIAL EVALUATION ADULT - GENERAL OBSERVATIONS, REHAB EVAL
Pt rec'ed supine in bed, NAD, call bell in reach, agreeable to PT WC Eval of healing Stage 4 Rt sacral pressure ulcer.

## 2017-11-27 NOTE — PHYSICAL THERAPY INITIAL EVALUATION ADULT - DISCHARGE DISPOSITION, PT EVAL
TBD pending functional assessment subacute rehab; however, pt adamently wants to go home. if home, home with home PT, pt owns cane, rolling walker, wheelchair. Pt will require assist/supervision for functional mobility & ADL's; ambulette transfer home./rehabilitation facility

## 2017-11-28 ENCOUNTER — TRANSCRIPTION ENCOUNTER (OUTPATIENT)
Age: 57
End: 2017-11-28

## 2017-11-28 LAB
ANION GAP SERPL CALC-SCNC: 15 MMOL/L — SIGNIFICANT CHANGE UP (ref 5–17)
APTT BLD: 49.1 SEC — HIGH (ref 27.5–37.4)
BUN SERPL-MCNC: 71 MG/DL — HIGH (ref 7–23)
CALCIUM SERPL-MCNC: 8.7 MG/DL — SIGNIFICANT CHANGE UP (ref 8.4–10.5)
CHLORIDE SERPL-SCNC: 107 MMOL/L — SIGNIFICANT CHANGE UP (ref 96–108)
CO2 SERPL-SCNC: 23 MMOL/L — SIGNIFICANT CHANGE UP (ref 22–31)
CREAT SERPL-MCNC: 2.74 MG/DL — HIGH (ref 0.5–1.3)
GLUCOSE SERPL-MCNC: 86 MG/DL — SIGNIFICANT CHANGE UP (ref 70–99)
HCT VFR BLD CALC: 30.8 % — LOW (ref 34.5–45)
HGB BLD-MCNC: 9.7 G/DL — LOW (ref 11.5–15.5)
INR BLD: 1.93 RATIO — HIGH (ref 0.88–1.16)
MCHC RBC-ENTMCNC: 28.1 PG — SIGNIFICANT CHANGE UP (ref 27–34)
MCHC RBC-ENTMCNC: 31.5 GM/DL — LOW (ref 32–36)
MCV RBC AUTO: 89.3 FL — SIGNIFICANT CHANGE UP (ref 80–100)
PLATELET # BLD AUTO: 163 K/UL — SIGNIFICANT CHANGE UP (ref 150–400)
POTASSIUM SERPL-MCNC: 4.3 MMOL/L — SIGNIFICANT CHANGE UP (ref 3.5–5.3)
POTASSIUM SERPL-SCNC: 4.3 MMOL/L — SIGNIFICANT CHANGE UP (ref 3.5–5.3)
PROTHROM AB SERPL-ACNC: 22.1 SEC — HIGH (ref 10–13.1)
RBC # BLD: 3.45 M/UL — LOW (ref 3.8–5.2)
RBC # FLD: 16.7 % — HIGH (ref 10.3–14.5)
SODIUM SERPL-SCNC: 145 MMOL/L — SIGNIFICANT CHANGE UP (ref 135–145)
WBC # BLD: 5.82 K/UL — SIGNIFICANT CHANGE UP (ref 3.8–10.5)
WBC # FLD AUTO: 5.82 K/UL — SIGNIFICANT CHANGE UP (ref 3.8–10.5)

## 2017-11-28 RX ORDER — WARFARIN SODIUM 2.5 MG/1
6 TABLET ORAL ONCE
Qty: 0 | Refills: 0 | Status: COMPLETED | OUTPATIENT
Start: 2017-11-28 | End: 2017-11-28

## 2017-11-28 RX ORDER — FUROSEMIDE 40 MG
80 TABLET ORAL DAILY
Qty: 0 | Refills: 0 | Status: DISCONTINUED | OUTPATIENT
Start: 2017-11-28 | End: 2017-11-30

## 2017-11-28 RX ADMIN — CARVEDILOL PHOSPHATE 12.5 MILLIGRAM(S): 80 CAPSULE, EXTENDED RELEASE ORAL at 18:19

## 2017-11-28 RX ADMIN — Medication 500 MILLIGRAM(S): at 12:23

## 2017-11-28 RX ADMIN — Medication 50 MILLIGRAM(S): at 05:53

## 2017-11-28 RX ADMIN — Medication 50 MILLIGRAM(S): at 22:14

## 2017-11-28 RX ADMIN — WARFARIN SODIUM 6 MILLIGRAM(S): 2.5 TABLET ORAL at 22:14

## 2017-11-28 RX ADMIN — PANTOPRAZOLE SODIUM 40 MILLIGRAM(S): 20 TABLET, DELAYED RELEASE ORAL at 05:53

## 2017-11-28 RX ADMIN — CALCITRIOL 0.25 MICROGRAM(S): 0.5 CAPSULE ORAL at 12:22

## 2017-11-28 RX ADMIN — Medication 0.6 MILLIGRAM(S): at 12:23

## 2017-11-28 RX ADMIN — Medication 10 MILLIGRAM(S): at 09:14

## 2017-11-28 RX ADMIN — Medication 1 APPLICATION(S): at 09:14

## 2017-11-28 RX ADMIN — CARVEDILOL PHOSPHATE 12.5 MILLIGRAM(S): 80 CAPSULE, EXTENDED RELEASE ORAL at 05:53

## 2017-11-28 RX ADMIN — Medication 325 MILLIGRAM(S): at 12:23

## 2017-11-28 RX ADMIN — Medication 1 SPRAY(S): at 05:54

## 2017-11-28 RX ADMIN — Medication 1 TABLET(S): at 12:22

## 2017-11-28 RX ADMIN — Medication 50 MILLIGRAM(S): at 16:10

## 2017-11-28 RX ADMIN — Medication 300 MILLIGRAM(S): at 09:14

## 2017-11-28 RX ADMIN — Medication 1 SPRAY(S): at 18:18

## 2017-11-28 RX ADMIN — Medication 300 MILLIGRAM(S): at 16:10

## 2017-11-28 RX ADMIN — Medication 1 APPLICATION(S): at 22:14

## 2017-11-28 RX ADMIN — Medication 80 MILLIGRAM(S): at 18:19

## 2017-11-28 RX ADMIN — Medication 300 MILLIGRAM(S): at 22:14

## 2017-11-28 RX ADMIN — AMLODIPINE BESYLATE 10 MILLIGRAM(S): 2.5 TABLET ORAL at 05:53

## 2017-11-28 NOTE — DISCHARGE NOTE ADULT - PATIENT PORTAL LINK FT
“You can access the [facility] Patient Portal, offered by [facility], by registering with the following website: [email or web address]”

## 2017-11-28 NOTE — PROGRESS NOTE ADULT - ASSESSMENT
DVT left lower extrem  chronic syst hf  volume overload  sub therp inr  pericardial effusion    continue current care  iv lasix  argatroban to coumadin  d/c planning pending therpeutic inr, home setup and volume equilibration

## 2017-11-28 NOTE — DISCHARGE NOTE ADULT - PLAN OF CARE
Present Continue present medication regimen.   Follow up with PMD in 1 week.   Home care services to be provided by [facility]. Resolving. Take your "blood thinners" as prescribed.  Walking is encouraged, increase activity as tolerated.  If you develop new leg pain, swelling, and/or redness contact your healthcare provider.  If you develop new chest pain with difficulty breathing, a rapid heart rate and/or a feeling of passing out call emergency medical services [phone]. Stable. Weigh yourself daily.  If you gain 3lbs in 3 days, or 5lbs in a week call your Health Care Provider.  Do not eat or drink foods containing more than 2000mg of salt (sodium) in your diet every day.  Call your Health Care Provider if you have any swelling or increased swelling in your feet, ankles, and/or stomach.  Take all of your medication as directed.  If you become dizzy call your Health Care Provider. CKD is stable. Avoid taking (NSAIDs) - (ex: Ibuprofen, Advil, Celebrex, Naprosyn)  Avoid taking any nephrotoxic agents (can harm kidneys) - Intravenous contrast for diagnostic testing, combination cold medications.  Have all medications adjusted for your renal function by your Health Care Provider.  Blood pressure control is important.  Take all medication as prescribed. Stable Continue present medication regimen.   Follow up with Dr. Yoder next week to check blood pressure.

## 2017-11-28 NOTE — DISCHARGE NOTE ADULT - CARE PLAN
Principal Discharge DX:	FTT (failure to thrive) in adult  Secondary Diagnosis:	Calf DVT (deep venous thrombosis)  Secondary Diagnosis:	Chronic systolic heart failure  Secondary Diagnosis:	Vagina bleeding  Secondary Diagnosis:	CHF (congestive heart failure)  Secondary Diagnosis:	CKD (chronic kidney disease) stage 4, GFR 15-29 ml/min  Secondary Diagnosis:	HTN (hypertension) Principal Discharge DX:	FTT (failure to thrive) in adult  Goal:	Present  Instructions for follow-up, activity and diet:	Continue present medication regimen.   Follow up with PMD in 1 week.   Home care services to be provided by [facility].  Secondary Diagnosis:	Calf DVT (deep venous thrombosis)  Goal:	Resolving.  Instructions for follow-up, activity and diet:	Take your "blood thinners" as prescribed.  Walking is encouraged, increase activity as tolerated.  If you develop new leg pain, swelling, and/or redness contact your healthcare provider.  If you develop new chest pain with difficulty breathing, a rapid heart rate and/or a feeling of passing out call emergency medical services 911.  Secondary Diagnosis:	Chronic systolic heart failure  Goal:	Stable.  Instructions for follow-up, activity and diet:	Weigh yourself daily.  If you gain 3lbs in 3 days, or 5lbs in a week call your Health Care Provider.  Do not eat or drink foods containing more than 2000mg of salt (sodium) in your diet every day.  Call your Health Care Provider if you have any swelling or increased swelling in your feet, ankles, and/or stomach.  Take all of your medication as directed.  If you become dizzy call your Health Care Provider.  Secondary Diagnosis:	CKD (chronic kidney disease) stage 4, GFR 15-29 ml/min  Goal:	CKD is stable.  Instructions for follow-up, activity and diet:	Avoid taking (NSAIDs) - (ex: Ibuprofen, Advil, Celebrex, Naprosyn)  Avoid taking any nephrotoxic agents (can harm kidneys) - Intravenous contrast for diagnostic testing, combination cold medications.  Have all medications adjusted for your renal function by your Health Care Provider.  Blood pressure control is important.  Take all medication as prescribed.  Secondary Diagnosis:	HTN (hypertension)  Goal:	Stable  Instructions for follow-up, activity and diet:	Continue present medication regimen.   Follow up with Dr. Yoder next week to check blood pressure.

## 2017-11-28 NOTE — DISCHARGE NOTE ADULT - SECONDARY DIAGNOSIS.
Calf DVT (deep venous thrombosis) Chronic systolic heart failure Vagina bleeding CHF (congestive heart failure) CKD (chronic kidney disease) stage 4, GFR 15-29 ml/min HTN (hypertension)

## 2017-11-28 NOTE — DISCHARGE NOTE ADULT - MEDICATION SUMMARY - MEDICATIONS TO CHANGE
I will SWITCH the dose or number of times a day I take the medications listed below when I get home from the hospital:    labetalol 300 mg oral tablet  -- 1 tab(s) by mouth 3 times a day MDD:3

## 2017-11-28 NOTE — DISCHARGE NOTE ADULT - CARE PROVIDER_API CALL
Cy Chicas (MD), Internal Medicine  [address] [place] [address]  Phone: [phone]  Fax: [phone]    Kevin Bennett (), Nephrology  [address] [place] [address]  Phone: [phone]  Fax: [phone] Cy Chicas), Internal Medicine  [address] [address]  Phone: [phone]  Fax: [phone]    Kevin Bennett (DO), Nephrology  [address] [place] [address]  Phone: [phone]  Fax: [phone]    Gerry Yoder (), Cardiology; Internal Medicine  [address] [place] [address]  [address] [place] [address]  Phone: [phone]  Fax: [phone]

## 2017-11-28 NOTE — DISCHARGE NOTE ADULT - ADDITIONAL INSTRUCTIONS
Follow up with your Primary Care Doctor within 1 week.  Follow up with your Nephrologist within 1-2 weeks.  Follow up with your Cardiologist within 1 week.  Follow up with your Gynecologist within 1 week. Follow up with your Primary Care Doctor within 1 week.  Follow up with your Nephrologist within 1-2 weeks.  Follow up with your Cardiologist within 1 week.  Follow up with your Gynecologist within 1 week.  Weekly INR and BMP to be drawn by Home Care Agency and report findings immediately to Dr. Cy Chicas. Follow up with your Primary Care Doctor within 1 week.  Follow up with your Nephrologist within 1-2 weeks.  Follow up with your Cardiologist within 1 week.  Take Coumadin 4mg tonight (12/1317) then begin every other day alternating coumadin with 3 mg.    Follow up with your Gynecologist within 1 week.  Weekly INR and BMP to be drawn by Home Care Agency and report findings immediately to Dr. Cy Chicas.

## 2017-11-28 NOTE — DISCHARGE NOTE ADULT - MEDICATION SUMMARY - MEDICATIONS TO TAKE
I will START or STAY ON the medications listed below when I get home from the hospital:    predniSONE 10 mg oral tablet  -- 1 tab(s) by mouth once a day  -- Indication: For CKD (chronic kidney disease), stage III    Coumadin 4 mg oral tablet  -- 1 tab(s) by mouth every other day MDD:1  -- Do not take this drug if you are pregnant.  It is very important that you take or use this exactly as directed.  Do not skip doses or discontinue unless directed by your doctor.  Obtain medical advice before taking any non-prescription drugs as some may affect the action of this medication.    -- Indication: For DVT (deep venous thrombosis)    Coumadin 3 mg oral tablet  -- 1 tab(s) by mouth every other day MDD:1  -- Do not take this drug if you are pregnant.  It is very important that you take or use this exactly as directed.  Do not skip doses or discontinue unless directed by your doctor.  Obtain medical advice before taking any non-prescription drugs as some may affect the action of this medication.    -- Indication: For DVT (deep venous thrombosis)    colchicine 0.6 mg oral tablet  -- 1 tab(s) by mouth every other day  -- Indication: For Gout    carvedilol 25 mg oral tablet  -- 1 tab(s) by mouth every 12 hours  -- Indication: For Chronic systolic heart failure    labetalol 200 mg oral tablet  -- 2 tab(s) by mouth 3 times a day MDD:6  -- Indication: For HTN (hypertension)    amLODIPine 10 mg oral tablet  -- 1 tab(s) by mouth once a day  -- Indication: For HTN (hypertension)    petrolatum topical ointment  -- 1 application on skin 2 times a day  -- Indication: For Dry skin     furosemide 40 mg oral tablet  -- 1 tab(s) by mouth once a day MDD:1  -- Indication: For Chronic systolic heart failure    ferrous sulfate 325 mg (65 mg elemental iron) oral tablet  -- 1 tab(s) by mouth once a day  -- Indication: For Supplement     Ayr Saline 0.65% nasal solution  -- 2 drop(s) into nose , As Needed  -- Indication: For Nasal congestion     Ayr Saline 0.65% nasal gel  -- 1 spray(s) into nose once a day (at bedtime), As Needed  -- Indication: For Nasal congestion     pantoprazole 40 mg oral delayed release tablet  -- 1 tab(s) by mouth once a day  -- Indication: For GERDS    hydrALAZINE 25 mg oral tablet  -- 3 tab(s) by mouth in the morning, 2 tab(s) by mouth in the afternoon and evening  -- It is very important that you take or use this exactly as directed.  Do not skip doses or discontinue unless directed by your doctor.  Some non-prescription drugs may aggravate your condition.  Read all labels carefully.  If a warning appears, check with your doctor before taking.    -- Indication: For Essential hypertension    Nephro-Riddhi oral tablet  -- 1 tab(s) by mouth once a day  -- Indication: For Supplement    Vitamin C 500 mg oral tablet  -- 1 tab(s) by mouth once a day  -- Indication: For Supplement     calcitriol 0.25 mcg oral capsule  -- 1 cap(s) by mouth once a day  -- Indication: For CKD (chronic kidney disease) stage 4, GFR 15-29 ml/min

## 2017-11-28 NOTE — DISCHARGE NOTE ADULT - HOSPITAL COURSE
58 y/o female with PMH of SLE, systolic CHF (EF 32%) s/p AICD, HTN, CKD III- IV, PE / left leg DVT dx 2009, HIT, CAD (no stents), stage IV sacral ulcer, chronic Hook, recurrent pericardial effusion, AAA ~14cm in 12/2016 s/p repair in 2010 w/ aortoiliac stent complicated post-operatively with development of left lower extremity compartment syndrome presented with one day history of nausea, decreased PO intake, and dizziness on standing.  Patient was recently discharged following admission for shortness of breath, found to have large pericardial effusion and left pleural effusion without tamponade, hospital course complicated by finding of DVT, for which she is presently on Coumadin.  Patient states that she felt as though she was doing well after she was recently discharged.  Taking all medications as prescribed.  She helped with the cooking on thanksgiving, the following day she noticed that she was feeling a bit more tired than usual and decided to take the day to rest.  Pt reported that she didn't have more oral intake yesterday.  This am of presentation, she stated that she noticed the urine in her Hook bag to be darker than usual, and she felt dizzy upon standing. Pt also reported dry mouth.  Patient admitted to medical floor for failure to thrive.  Was initially on argatroban gtt with Coumadin bridge due to subtherapeutic INR, now therapeutic.  BB uptitrated due to episodes of WCT – asymptomatic; followed by Cardiology.  Had episode of vaginal bleeding – now resolved.  Transvaginal US showed normal endometrial thickness.  No bleeding noted on exam by the staff gynecologist.  Noted to have increased LLE swelling in recent days, diuretics changed to IV, now discontinued - patient to resume oral Lasix tomorrow (12/9/2017).  Patient stable for discharge to home., and with her owngynecologist in 7-10 days. 58 y/o female with PMH of SLE, systolic CHF (EF 32%) s/p AICD, HTN, CKD III- IV, PE / left leg DVT dx 2009, HIT, CAD (no stents), stage IV sacral ulcer, chronic Hook, recurrent pericardial effusion, AAA ~14cm in 12/2016 s/p repair in 2010 w/ aortoiliac stent complicated post-operatively with development of left lower extremity compartment syndrome presented with one day history of nausea, decreased PO intake, and dizziness on standing.  Patient was recently discharged following admission for shortness of breath, found to have large pericardial effusion and left pleural effusion without tamponade, hospital course complicated by finding of DVT, for which she is presently on Coumadin.  Patient states that she felt as though she was doing well after she was recently discharged.  Taking all medications as prescribed.  She helped with the cooking on thanksgiving, the following day she noticed that she was feeling a bit more tired than usual and decided to take the day to rest.  Pt reported that she didn't have more oral intake yesterday.  The morning of presentation, she stated that she noticed the urine in her Hook bag to be darker than usual, and she felt dizzy upon standing. Pt also reported dry mouth.  Patient admitted to telemetry with  failure to thrive.  Was initially on argatroban gtt with Coumadin bridge due to subtherapeutic INR, now therapeutic.  BB uptitrated due to episodes of WCT – asymptomatic; followed by Cardiology.  Had episode of vaginal bleeding – now resolved.  Transvaginal US showed normal endometrial thickness.  No bleeding noted on exam by the staff gynecologist.  Noted to have increased LLE swelling in recent days, diuretics changed to IV, now discontinued - patient to resume oral Lasix tomorrow (12/9/2017).  Patient stable for discharge to home., and with her owngynecologist in 7-10 days.

## 2017-11-28 NOTE — DISCHARGE NOTE ADULT - PROVIDER TOKENS
TOKEN:'[id number]:'[id number]' TOKEN:'[id number]:'[id number]:'[id number]'

## 2017-11-29 LAB
ANION GAP SERPL CALC-SCNC: 13 MMOL/L — SIGNIFICANT CHANGE UP (ref 5–17)
APTT BLD: 52.3 SEC — HIGH (ref 27.5–37.4)
APTT BLD: 84.8 SEC — HIGH (ref 27.5–37.4)
APTT BLD: 86.4 SEC — HIGH (ref 27.5–37.4)
BUN SERPL-MCNC: 69 MG/DL — HIGH (ref 7–23)
CALCIUM SERPL-MCNC: 8.9 MG/DL — SIGNIFICANT CHANGE UP (ref 8.4–10.5)
CHLORIDE SERPL-SCNC: 107 MMOL/L — SIGNIFICANT CHANGE UP (ref 96–108)
CO2 SERPL-SCNC: 23 MMOL/L — SIGNIFICANT CHANGE UP (ref 22–31)
CREAT SERPL-MCNC: 2.79 MG/DL — HIGH (ref 0.5–1.3)
GLUCOSE SERPL-MCNC: 89 MG/DL — SIGNIFICANT CHANGE UP (ref 70–99)
HCT VFR BLD CALC: 32 % — LOW (ref 34.5–45)
HGB BLD-MCNC: 9.9 G/DL — LOW (ref 11.5–15.5)
INR BLD: 2.8 RATIO — HIGH (ref 0.88–1.16)
INR BLD: 4.1 RATIO — HIGH (ref 0.88–1.16)
INR BLD: 4.61 RATIO — HIGH (ref 0.88–1.16)
MCHC RBC-ENTMCNC: 27.9 PG — SIGNIFICANT CHANGE UP (ref 27–34)
MCHC RBC-ENTMCNC: 30.9 GM/DL — LOW (ref 32–36)
MCV RBC AUTO: 90.1 FL — SIGNIFICANT CHANGE UP (ref 80–100)
PLATELET # BLD AUTO: 157 K/UL — SIGNIFICANT CHANGE UP (ref 150–400)
POTASSIUM SERPL-MCNC: 4.2 MMOL/L — SIGNIFICANT CHANGE UP (ref 3.5–5.3)
POTASSIUM SERPL-SCNC: 4.2 MMOL/L — SIGNIFICANT CHANGE UP (ref 3.5–5.3)
PROTHROM AB SERPL-ACNC: 30.9 SEC — HIGH (ref 9.8–12.7)
PROTHROM AB SERPL-ACNC: 47.7 SEC — HIGH (ref 10–13.1)
PROTHROM AB SERPL-ACNC: 51.4 SEC — HIGH (ref 9.8–12.7)
RBC # BLD: 3.55 M/UL — LOW (ref 3.8–5.2)
RBC # FLD: 16.2 % — HIGH (ref 10.3–14.5)
SODIUM SERPL-SCNC: 143 MMOL/L — SIGNIFICANT CHANGE UP (ref 135–145)
WBC # BLD: 5.85 K/UL — SIGNIFICANT CHANGE UP (ref 3.8–10.5)
WBC # FLD AUTO: 5.85 K/UL — SIGNIFICANT CHANGE UP (ref 3.8–10.5)

## 2017-11-29 RX ORDER — WARFARIN SODIUM 2.5 MG/1
2 TABLET ORAL ONCE
Qty: 0 | Refills: 0 | Status: DISCONTINUED | OUTPATIENT
Start: 2017-11-29 | End: 2017-11-29

## 2017-11-29 RX ORDER — WARFARIN SODIUM 2.5 MG/1
4 TABLET ORAL ONCE
Qty: 0 | Refills: 0 | Status: COMPLETED | OUTPATIENT
Start: 2017-11-29 | End: 2017-11-29

## 2017-11-29 RX ADMIN — Medication 300 MILLIGRAM(S): at 17:40

## 2017-11-29 RX ADMIN — AMLODIPINE BESYLATE 10 MILLIGRAM(S): 2.5 TABLET ORAL at 05:38

## 2017-11-29 RX ADMIN — Medication 1 APPLICATION(S): at 10:20

## 2017-11-29 RX ADMIN — CARVEDILOL PHOSPHATE 12.5 MILLIGRAM(S): 80 CAPSULE, EXTENDED RELEASE ORAL at 17:40

## 2017-11-29 RX ADMIN — Medication 300 MILLIGRAM(S): at 22:14

## 2017-11-29 RX ADMIN — Medication 80 MILLIGRAM(S): at 05:39

## 2017-11-29 RX ADMIN — Medication 50 MILLIGRAM(S): at 22:12

## 2017-11-29 RX ADMIN — Medication 500 MILLIGRAM(S): at 12:18

## 2017-11-29 RX ADMIN — CALCITRIOL 0.25 MICROGRAM(S): 0.5 CAPSULE ORAL at 12:18

## 2017-11-29 RX ADMIN — Medication 300 MILLIGRAM(S): at 10:23

## 2017-11-29 RX ADMIN — Medication 1 SPRAY(S): at 17:40

## 2017-11-29 RX ADMIN — PANTOPRAZOLE SODIUM 40 MILLIGRAM(S): 20 TABLET, DELAYED RELEASE ORAL at 05:39

## 2017-11-29 RX ADMIN — WARFARIN SODIUM 4 MILLIGRAM(S): 2.5 TABLET ORAL at 22:12

## 2017-11-29 RX ADMIN — Medication 325 MILLIGRAM(S): at 12:18

## 2017-11-29 RX ADMIN — Medication 1 TABLET(S): at 12:18

## 2017-11-29 RX ADMIN — CARVEDILOL PHOSPHATE 12.5 MILLIGRAM(S): 80 CAPSULE, EXTENDED RELEASE ORAL at 05:38

## 2017-11-29 RX ADMIN — Medication 10 MILLIGRAM(S): at 10:23

## 2017-11-29 RX ADMIN — Medication 50 MILLIGRAM(S): at 15:21

## 2017-11-29 RX ADMIN — Medication 50 MILLIGRAM(S): at 05:39

## 2017-11-29 RX ADMIN — Medication 1 SPRAY(S): at 05:38

## 2017-11-29 NOTE — CONSULT NOTE ADULT - SUBJECTIVE AND OBJECTIVE BOX
[facility]  [phone]  NEPHROLOGY      INITIAL CONSULT NOTE  --------------------------------------------------------------------------------  HPI:    58 yo F w/ hx of SLE, systolic CHF (EF 32%) s/p AICD, HTN, CKD III- IV, PE / left leg DVT dx 2009, HIT, CAD (no stents), stage IV sacral ulcer, chronic fox, hx of recurrent pericardial effusion, AAA ~14cm in 12/2016 s/p repair in 2010 w/ aortoiliac stent c/b post-operatively with development of left lower extremity   lymphedema December 2016  pericardial effusion requiring drainage/pericardiocentesis. She had another similar admission in 1/2017 and at that time also had pericardial drain placed. diagnosed with SLE. recent admission for UTI and chf treated. 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)  Chronic Gout (ICD9 274.02)  LBBB (Left Bundle Branch Block) (ICD9 426.3)  CHF (Congestive Heart Failure) (ICD9 428.0)  Hx of aorta-iliac-femoral bypass  TOP (Termination of Pregnancy)  S/P Dilatation and Curettage  History of Tubal Ligation  s/p AAA (Abdominal Aortic Aneurysm) Repair: 2009 2010  History of Cholecystectomy  S/P Partial Hysterectomy    FAMILY HISTORY:  No pertinent family history in first degree relatives    PAST SOCIAL HISTORY: denies tobacco no alcohol use     ALLERGIES & MEDICATIONS  --------------------------------------------------------------------------------  Allergies    heparin (Unknown)  losartan (Anaphylaxis)  nitroglycerin (Other)    Intolerances      Standing Inpatient Medications  amLODIPine   Tablet 10 milliGRAM(s) Oral daily  AQUAPHOR (petrolatum Ointment) 1 Application(s) Topical two times a day  ascorbic acid 500 milliGRAM(s) Oral daily  calcitriol   Capsule 0.25 MICROGram(s) Oral daily  carvedilol 12.5 milliGRAM(s) Oral every 12 hours  colchicine 0.6 milliGRAM(s) Oral every other day  ferrous    sulfate 325 milliGRAM(s) Oral daily  furosemide   Injectable 80 milliGRAM(s) IV Push daily  hydrALAZINE 50 milliGRAM(s) Oral every 8 hours  labetalol 300 milliGRAM(s) Oral three times a day  Nephro-alyse 1 Tablet(s) Oral daily  pantoprazole    Tablet 40 milliGRAM(s) Oral before breakfast  predniSONE   Tablet 10 milliGRAM(s) Oral daily  sodium chloride 0.65% Nasal 1 Spray(s) Both Nostrils two times a day  warfarin 4 milliGRAM(s) Oral once    PRN Inpatient Medications      REVIEW OF SYSTEMS  --------------------------------------------------------------------------------  Gen: No fevers/chills,   Head/Eyes/Ears/Mouth: No headache; Normal hearing; Normal vision w/o blurriness;   Respiratory: No dyspnea, cough, wheezing, hemoptysis  CV: No chest pain, PND, orthopnea  GI: No abdominal pain, diarrhea, constipation, nausea, vomiting,   : No  dysuria, hematuria in fox per pt   MSK: overall weakness and faitgue difficulty ambulating and standing up   Neuro: + dizziness/lightheadedness,   All other systems were reviewed and are negative, except as noted.    VITALS/PHYSICAL EXAM  --------------------------------------------------------------------------------  T(C): 36.6 (11-29-17 @ 19:59), Max: 36.8 (11-29-17 @ 05:04)  HR: 82 (11-29-17 @ 19:59) (80 - 87)  BP: 161/87 (11-29-17 @ 19:59) (155/80 - 164/95)  RR: 18 (11-29-17 @ 19:59) (17 - 18)  SpO2: 97% (11-29-17 @ 19:59) (97% - 98%)  Wt(kg): --        11-28-17 @ 07:01  -  11-29-17 @ 07:00  --------------------------------------------------------  IN: 1136.8 mL / OUT: 2350 mL / NET: -1213.2 mL    11-29-17 @ 07:01  -  11-29-17 @ 21:10  --------------------------------------------------------  IN: 790 mL / OUT: 1300 mL / NET: -510 mL      Physical Exam:  	Gen: alert and oriented speech coherent   	HEENT: PERRL,thyromegally ,   	Pulm: decrease bs no rales or ronchi  	CV: RRR, S1S2; no rub  	Back: No spinal or CVA tenderness; no sacral edema  	Abd: +BS, soft, nontender/nondistended  	: No suprapubic tenderness  	UE: Warm, FROM, no clubbing, intact strength; no edema; no asterixis  	LE: Warm, FROM, no clubbing,LLE lymphedema RLE 1+ edema no c/c/  	LABS/STUDIES  --------------------------------------------------------------------------------              9.9    5.85  >-----------<  157      [11-29-17 @ 07:08]              32.0     143  |  107  |  69  ----------------------------<  89      [11-29-17 @ 07:08]  4.2   |  23  |  2.79        Ca     8.9     [11-29-17 @ 07:08]      PT/INR: PT 30.9 , INR 2.80       [11-29-17 @ 17:51]  PTT: 52.3       [11-29-17 @ 17:51]      Creatinine Trend:  SCr 2.79 [11-29 @ 07:08]  SCr 2.74 [11-28 @ 07:48]  SCr 3.03 [11-27 @ 07:18]  SCr 2.98 [11-26 @ 12:33]  SCr 3.00 [11-25 @ 17:05]    Urinalysis - [11-25-17 @ 17:48]      Color Yellow / Appearance SL Turbid / SG 1.017 / pH 7.0      Gluc Negative / Ketone Negative  / Bili Negative / Urobili 2       Blood Negative / Protein >600 / Leuk Est Moderate / Nitrite Negative      RBC  / WBC 26-50 / Hyaline  / Gran  / Sq Epi  / Non Sq Epi OCC / Bacteria Moderate      Iron 48, TIBC --, %sat --      [10-12-17 @ 07:14]  Ferritin 847.0      [10-12-17 @ 07:14]  PTH -- (Ca 9.0)      [11-09-17 @ 08:51]   387  Vitamin D (25OH) 7.8      [11-09-17 @ 08:51]  HbA1c 4.5      [08-09-16 @ 06:49]  TSH 0.33      [10-22-17 @ 08:40]      KRISTOPHER: titer 1:320, pattern Homogeneous      [01-10-17 @ 12:14]  dsDNA 44      [10-17-17 @ 08:26]  C3 Complement 105      [10-17-17 @ 08:26]  C4 Complement 31      [10-17-17 @ 08:26]

## 2017-11-29 NOTE — PROGRESS NOTE ADULT - ASSESSMENT
dvt  acute on chronic le edema  pericardial effusion  ckd 3  volume overload    recheck inr  if > 4 will hold argatroban and recheck inr  continue current care  continue iv lasix  will need  for home setup

## 2017-11-29 NOTE — CONSULT NOTE ADULT - SUBJECTIVE AND OBJECTIVE BOX
Wound SURGERY CONSULT NOTE    HPI:  56 yo F w/ hx of SLE, systolic CHF (EF 32%) s/p AICD, HTN, CKD III- IV, PE / left leg DVT dx 2009, HIT, CAD (no stents), stage IV sacral ulcer, chronic fox, hx of recurrent pericardial effusion, AAA ~14cm in 12/2016 s/p repair in 2010 w/ aortoiliac stent c/b post-operatively with development of left lower extremity compartment syndrome presented with one day history of nausea, decreased po intake and dizziness on standing. Pt states that she felt as though she was doing well after she was recently discharged. Taking all medications as prescribed. She helped with the cooking on thanksgiving, the following day she noticed that she was feeling a nit more tired than usual and decided to take the day to rest. Pt reported that she didn't have more oral intake yesterday. Then  she noticed the urine in her fox bag to be darker than usual, and she felt dizzy upon standing. Pt also reported dry mouth. No chest pain, SOB, palpitations. Did not lose consciousness. No sick contacts, fever/chills.   Pt well known to team.  Pt w/ chronic Rt ischial wound tx w/ aquacel in hospital.  Pt has outpt f/u w/ osh and uses lorin.  Pt told if family can bring we will use it as hospital doesn't have supply.  Pt was feeling better and was more mobile.  No odor, increased drainage, redness, or warmth from wound.  no f/c/s.  LLE swelling improving.  Calf almost "normal" but Thigh still very edematous and limiting mobility 2/2 heviness    PAST MEDICAL & SURGICAL HISTORY:  Anemia  Morbid obesity  SLE  HTN (Hypertension)  Drop Foot Gait  Compartment Syndrome: fasciotomy LLE  HIT (Heparin-Induced Thrombocytopenia)  Pulmonary Embolism: 2009  Iliac Aneurysm  left iliac aneurysm repair  Iliac Aneurysm  endoleak l iliac aneurysm repair  Aneurysm of Iliac Artery  Calf DVT (Deep Venous Thrombosis)  Adenomatous Goiter  Chronic Gout   LBBB (Left Bundle Branch Block)   CHF (Congestive Heart Failure)   s/p aorta-iliac-femoral bypass  s/p TOP (Termination of Pregnancy)  S/P Dilatation and Curettage  s/p Tubal Ligation  s/p AAA (Abdominal Aortic Aneurysm) Repair: 2009 2010  s/p Cholecystectomy  S/P Partial Hysterectomy      REVIEW OF SYSTEMS    Skin/ Musculoskeletal:	 see HPI  All other systems negative    MEDICATIONS  (STANDING):  amLODIPine   Tablet 10 milliGRAM(s) Oral daily  AQUAPHOR (petrolatum Ointment) 1 Application(s) Topical two times a day  ascorbic acid 500 milliGRAM(s) Oral daily  calcitriol   Capsule 0.25 MICROGram(s) Oral daily  carvedilol 12.5 milliGRAM(s) Oral every 12 hours  colchicine 0.6 milliGRAM(s) Oral every other day  ferrous    sulfate 325 milliGRAM(s) Oral daily  furosemide   Injectable 80 milliGRAM(s) IV Push daily  hydrALAZINE 50 milliGRAM(s) Oral every 8 hours  labetalol 300 milliGRAM(s) Oral three times a day  Nephro-alyse 1 Tablet(s) Oral daily  pantoprazole    Tablet 40 milliGRAM(s) Oral before breakfast  predniSONE   Tablet 10 milliGRAM(s) Oral daily  sodium chloride 0.65% Nasal 1 Spray(s) Both Nostrils two times a day    Allergies    heparin (Unknown), HIT  losartan (Anaphylaxis)  nitroglycerin (Other)    SOCIAL HISTORY:  lives w/ daughter, single;  Social ETOH, Denies smoking, drugs    FAMILY HISTORY:  No pertinent family history in first degree relatives      Vital Signs Last 24 Hrs  T(C): 36.8 (29 Nov 2017 14:40), Max: 36.8 (28 Nov 2017 20:36)  T(F): 98.2 (29 Nov 2017 14:40), Max: 98.2 (28 Nov 2017 20:36)  HR: 80 (29 Nov 2017 14:40) (80 - 87)  BP: 155/80 (29 Nov 2017 14:40) (155/80 - 164/95)  BP(mean): --  RR: 18 (29 Nov 2017 14:40) (17 - 18)  SpO2: 98% (29 Nov 2017 14:40) (96% - 98%)    NAD / A&Ox3   MO/ Versa Care P500 bed    Cardiovascular: RRR (+)m    Respiratory: CTA    Gastrointestinal soft NT/ND (+)BS    Neurology  weakened strength & sensation grossly intact    Musculoskeletal/Vascular:  Limited FROM 2/2 LLE >>>>RLE edema- improving  BLE equally warm w/ no acute ischemia noted  no contractures    Skin:  dry w/ good cap refill  Rt Ischium chronic stage iv w/ calloused macerated wound edge  1.4cm x 0.4cm x 1.5cm  moist & granular base w/o palpable bone    moderate serosanguinous drainage      LABS:                        9.9    5.85  )-----------( 157      ( 29 Nov 2017 07:08 )             32.0     11-29    143  |  107  |  69<H>  ----------------------------<  89  4.2   |  23  |  2.79<H>    Ca    8.9      29 Nov 2017 07:08      PT/INR - ( 29 Nov 2017 14:15 )   PT: 51.4 sec;   INR: 4.61 ratio     PTT - ( 29 Nov 2017 14:15 )  PTT:86.4 sec      Albumin, Serum: 4.0 g/dL (11-25 @ 17:05)    RADIOLOGY & ADDITIONAL STUDIES:  Cultures:      < from: Xray Chest 1 View AP -PORTABLE-Routine (11.25.17 @ 19:34) >  IMPRESSION:    The cardiac silhouette is enlarged with findings suggestive of presence   of pericardial effusion, similar to the prior study. There is an ICD with   leads unchanged in position. There is persistent obscuration of the left   hemidiaphragm which may be secondary to presence of a left pleural   effusion and atelectasis/pneumonia. There is no pneumothorax. Overall,   there has been no significant interval change as compared with the prior   study. Wound SURGERY CONSULT NOTE    HPI:  56 yo F w/ hx of SLE, systolic CHF (EF 32%) s/p AICD, HTN, CKD III- IV, PE / left leg DVT dx 2009, HIT, CAD (no stents), stage IV sacral ulcer, chronic fox, hx of recurrent pericardial effusion, AAA ~14cm in 12/2016 s/p repair in 2010 w/ aortoiliac stent c/b post-operatively with development of left lower extremity compartment syndrome presented with one day history of nausea, decreased po intake and dizziness on standing. Pt states that she felt as though she was doing well after she was recently discharged. Taking all medications as prescribed. She helped with the cooking on thanksgiving, the following day she noticed that she was feeling a nit more tired than usual and decided to take the day to rest. Pt reported that she didn't have more oral intake yesterday. Then  she noticed the urine in her fox bag to be darker than usual, and she felt dizzy upon standing. Pt also reported dry mouth. No chest pain, SOB, palpitations. Did not lose consciousness. No sick contacts, fever/chills.   Pt well known to team.  Pt w/ chronic Rt ischial wound tx w/ aquacel in hospital.  Pt has outpt f/u w/ osh and uses lorin.  Pt told if family can bring we will use it as hospital doesn't have supply.  Pt was feeling better and was more mobile.  No odor, increased drainage, redness, or warmth from wound.  pt noted new "twitching" like pain in wound when cleaned.  no f/c/s.  LLE swelling improving.  Calf almost "normal" but Thigh still very edematous and limiting mobility 2/2 heaviness    PAST MEDICAL & SURGICAL HISTORY:  Anemia  Morbid obesity  SLE  HTN (Hypertension)  Drop Foot Gait  Compartment Syndrome: fasciotomy LLE  HIT (Heparin-Induced Thrombocytopenia)  Pulmonary Embolism: 2009  Iliac Aneurysm  left iliac aneurysm repair  Iliac Aneurysm  endoleak l iliac aneurysm repair  Aneurysm of Iliac Artery  Calf DVT (Deep Venous Thrombosis)  Adenomatous Goiter  Chronic Gout   LBBB (Left Bundle Branch Block)   CHF (Congestive Heart Failure)   s/p aorta-iliac-femoral bypass  s/p TOP (Termination of Pregnancy)  S/P Dilatation and Curettage  s/p Tubal Ligation  s/p AAA (Abdominal Aortic Aneurysm) Repair: 2009 2010  s/p Cholecystectomy  S/P Partial Hysterectomy      REVIEW OF SYSTEMS    Skin/ Musculoskeletal:	 see HPI  All other systems negative    MEDICATIONS  (STANDING):  amLODIPine   Tablet 10 milliGRAM(s) Oral daily  AQUAPHOR (petrolatum Ointment) 1 Application(s) Topical two times a day  ascorbic acid 500 milliGRAM(s) Oral daily  calcitriol   Capsule 0.25 MICROGram(s) Oral daily  carvedilol 12.5 milliGRAM(s) Oral every 12 hours  colchicine 0.6 milliGRAM(s) Oral every other day  ferrous    sulfate 325 milliGRAM(s) Oral daily  furosemide   Injectable 80 milliGRAM(s) IV Push daily  hydrALAZINE 50 milliGRAM(s) Oral every 8 hours  labetalol 300 milliGRAM(s) Oral three times a day  Nephro-alyse 1 Tablet(s) Oral daily  pantoprazole    Tablet 40 milliGRAM(s) Oral before breakfast  predniSONE   Tablet 10 milliGRAM(s) Oral daily  sodium chloride 0.65% Nasal 1 Spray(s) Both Nostrils two times a day    Allergies    heparin (Unknown), HIT  losartan (Anaphylaxis)  nitroglycerin (Other)    SOCIAL HISTORY:  lives w/ daughter, single;  Social ETOH, Denies smoking, drugs    FAMILY HISTORY:  No pertinent family history in first degree relatives      Vital Signs Last 24 Hrs  T(C): 36.8 (29 Nov 2017 14:40), Max: 36.8 (28 Nov 2017 20:36)  T(F): 98.2 (29 Nov 2017 14:40), Max: 98.2 (28 Nov 2017 20:36)  HR: 80 (29 Nov 2017 14:40) (80 - 87)  BP: 155/80 (29 Nov 2017 14:40) (155/80 - 164/95)  BP(mean): --  RR: 18 (29 Nov 2017 14:40) (17 - 18)  SpO2: 98% (29 Nov 2017 14:40) (96% - 98%)    NAD / A&Ox3   MO/ Versa Care P500 bed    Cardiovascular: RRR (+)m    Respiratory: CTA    Gastrointestinal soft NT/ND (+)BS    Neurology  weakened strength & sensation grossly intact    Musculoskeletal/Vascular:  Limited FROM 2/2 LLE >>>>RLE edema- improving  BLE equally warm w/ no acute ischemia noted  no contractures    Skin:  dry w/ good cap refill  Rt Ischium chronic stage iv w/ calloused macerated wound edge  1.4cm x 0.4cm x 1.5cm  moist & granular base w/o palpable bone,  mild tender, no erythema, induration, fluctuance, nor increased warmth  moderate serosanguinous drainage      LABS:                        9.9    5.85  )-----------( 157      ( 29 Nov 2017 07:08 )             32.0     11-29    143  |  107  |  69<H>  ----------------------------<  89  4.2   |  23  |  2.79<H>    Ca    8.9      29 Nov 2017 07:08      PT/INR - ( 29 Nov 2017 14:15 )   PT: 51.4 sec;   INR: 4.61 ratio     PTT - ( 29 Nov 2017 14:15 )  PTT:86.4 sec      Albumin, Serum: 4.0 g/dL (11-25 @ 17:05)    RADIOLOGY & ADDITIONAL STUDIES:  Cultures:      < from: Xray Chest 1 View AP -PORTABLE-Routine (11.25.17 @ 19:34) >  IMPRESSION:    The cardiac silhouette is enlarged with findings suggestive of presence   of pericardial effusion, similar to the prior study. There is an ICD with   leads unchanged in position. There is persistent obscuration of the left   hemidiaphragm which may be secondary to presence of a left pleural   effusion and atelectasis/pneumonia. There is no pneumothorax. Overall,   there has been no significant interval change as compared with the prior   study.

## 2017-11-30 LAB
ANION GAP SERPL CALC-SCNC: 16 MMOL/L — SIGNIFICANT CHANGE UP (ref 5–17)
BUN SERPL-MCNC: 74 MG/DL — HIGH (ref 7–23)
CALCIUM SERPL-MCNC: 8.5 MG/DL — SIGNIFICANT CHANGE UP (ref 8.4–10.5)
CHLORIDE SERPL-SCNC: 105 MMOL/L — SIGNIFICANT CHANGE UP (ref 96–108)
CO2 SERPL-SCNC: 21 MMOL/L — LOW (ref 22–31)
CREAT SERPL-MCNC: 3.06 MG/DL — HIGH (ref 0.5–1.3)
GLUCOSE SERPL-MCNC: 76 MG/DL — SIGNIFICANT CHANGE UP (ref 70–99)
INR BLD: 2.5 RATIO — HIGH (ref 0.88–1.16)
POTASSIUM SERPL-MCNC: 4.2 MMOL/L — SIGNIFICANT CHANGE UP (ref 3.5–5.3)
POTASSIUM SERPL-SCNC: 4.2 MMOL/L — SIGNIFICANT CHANGE UP (ref 3.5–5.3)
PROTHROM AB SERPL-ACNC: 27.5 SEC — HIGH (ref 9.8–12.7)
SODIUM SERPL-SCNC: 142 MMOL/L — SIGNIFICANT CHANGE UP (ref 135–145)

## 2017-11-30 PROCEDURE — 99232 SBSQ HOSP IP/OBS MODERATE 35: CPT

## 2017-11-30 RX ORDER — WARFARIN SODIUM 2.5 MG/1
5 TABLET ORAL ONCE
Qty: 0 | Refills: 0 | Status: COMPLETED | OUTPATIENT
Start: 2017-11-30 | End: 2017-11-30

## 2017-11-30 RX ORDER — FUROSEMIDE 40 MG
80 TABLET ORAL DAILY
Qty: 0 | Refills: 0 | Status: DISCONTINUED | OUTPATIENT
Start: 2017-11-30 | End: 2017-12-02

## 2017-11-30 RX ADMIN — Medication 10 MILLIGRAM(S): at 09:04

## 2017-11-30 RX ADMIN — Medication 1 SPRAY(S): at 05:52

## 2017-11-30 RX ADMIN — Medication 0.6 MILLIGRAM(S): at 13:13

## 2017-11-30 RX ADMIN — Medication 300 MILLIGRAM(S): at 09:04

## 2017-11-30 RX ADMIN — CARVEDILOL PHOSPHATE 12.5 MILLIGRAM(S): 80 CAPSULE, EXTENDED RELEASE ORAL at 05:53

## 2017-11-30 RX ADMIN — CALCITRIOL 0.25 MICROGRAM(S): 0.5 CAPSULE ORAL at 13:12

## 2017-11-30 RX ADMIN — Medication 50 MILLIGRAM(S): at 05:53

## 2017-11-30 RX ADMIN — Medication 1 APPLICATION(S): at 22:12

## 2017-11-30 RX ADMIN — Medication 50 MILLIGRAM(S): at 22:12

## 2017-11-30 RX ADMIN — Medication 1 TABLET(S): at 18:26

## 2017-11-30 RX ADMIN — Medication 325 MILLIGRAM(S): at 13:12

## 2017-11-30 RX ADMIN — Medication 50 MILLIGRAM(S): at 13:12

## 2017-11-30 RX ADMIN — PANTOPRAZOLE SODIUM 40 MILLIGRAM(S): 20 TABLET, DELAYED RELEASE ORAL at 05:52

## 2017-11-30 RX ADMIN — Medication 1 SPRAY(S): at 18:26

## 2017-11-30 RX ADMIN — WARFARIN SODIUM 5 MILLIGRAM(S): 2.5 TABLET ORAL at 23:57

## 2017-11-30 RX ADMIN — AMLODIPINE BESYLATE 10 MILLIGRAM(S): 2.5 TABLET ORAL at 05:53

## 2017-11-30 RX ADMIN — Medication 1 APPLICATION(S): at 09:03

## 2017-11-30 RX ADMIN — Medication 80 MILLIGRAM(S): at 05:52

## 2017-11-30 RX ADMIN — Medication 300 MILLIGRAM(S): at 18:27

## 2017-11-30 RX ADMIN — Medication 500 MILLIGRAM(S): at 13:13

## 2017-11-30 RX ADMIN — CARVEDILOL PHOSPHATE 12.5 MILLIGRAM(S): 80 CAPSULE, EXTENDED RELEASE ORAL at 18:27

## 2017-11-30 NOTE — PROGRESS NOTE ADULT - ASSESSMENT
DVT LLE  pericardial effusion  ckd    continue current care  coumadin 5mg tonight  wrap leg  conitnue lasix  d/c planning

## 2017-12-01 LAB
ANION GAP SERPL CALC-SCNC: 13 MMOL/L — SIGNIFICANT CHANGE UP (ref 5–17)
BUN SERPL-MCNC: 74 MG/DL — HIGH (ref 7–23)
CALCIUM SERPL-MCNC: 8.8 MG/DL — SIGNIFICANT CHANGE UP (ref 8.4–10.5)
CHLORIDE SERPL-SCNC: 105 MMOL/L — SIGNIFICANT CHANGE UP (ref 96–108)
CO2 SERPL-SCNC: 25 MMOL/L — SIGNIFICANT CHANGE UP (ref 22–31)
CREAT SERPL-MCNC: 3.16 MG/DL — HIGH (ref 0.5–1.3)
GLUCOSE SERPL-MCNC: 78 MG/DL — SIGNIFICANT CHANGE UP (ref 70–99)
HCT VFR BLD CALC: 34.3 % — LOW (ref 34.5–45)
HGB BLD-MCNC: 11.2 G/DL — LOW (ref 11.5–15.5)
INR BLD: 2.58 RATIO — HIGH (ref 0.88–1.16)
MCHC RBC-ENTMCNC: 29.8 PG — SIGNIFICANT CHANGE UP (ref 27–34)
MCHC RBC-ENTMCNC: 32.7 GM/DL — SIGNIFICANT CHANGE UP (ref 32–36)
MCV RBC AUTO: 91.3 FL — SIGNIFICANT CHANGE UP (ref 80–100)
PLATELET # BLD AUTO: 147 K/UL — LOW (ref 150–400)
POTASSIUM SERPL-MCNC: 3.9 MMOL/L — SIGNIFICANT CHANGE UP (ref 3.5–5.3)
POTASSIUM SERPL-SCNC: 3.9 MMOL/L — SIGNIFICANT CHANGE UP (ref 3.5–5.3)
PROTHROM AB SERPL-ACNC: 28.7 SEC — HIGH (ref 9.8–12.7)
RBC # BLD: 3.76 M/UL — LOW (ref 3.8–5.2)
RBC # FLD: 14.6 % — HIGH (ref 10.3–14.5)
SODIUM SERPL-SCNC: 143 MMOL/L — SIGNIFICANT CHANGE UP (ref 135–145)
WBC # BLD: 5.6 K/UL — SIGNIFICANT CHANGE UP (ref 3.8–10.5)
WBC # FLD AUTO: 5.6 K/UL — SIGNIFICANT CHANGE UP (ref 3.8–10.5)

## 2017-12-01 RX ORDER — WARFARIN SODIUM 2.5 MG/1
5 TABLET ORAL ONCE
Qty: 0 | Refills: 0 | Status: COMPLETED | OUTPATIENT
Start: 2017-12-01 | End: 2017-12-01

## 2017-12-01 RX ADMIN — Medication 10 MILLIGRAM(S): at 08:30

## 2017-12-01 RX ADMIN — Medication 50 MILLIGRAM(S): at 05:31

## 2017-12-01 RX ADMIN — Medication 1 TABLET(S): at 17:29

## 2017-12-01 RX ADMIN — Medication 50 MILLIGRAM(S): at 13:39

## 2017-12-01 RX ADMIN — PANTOPRAZOLE SODIUM 40 MILLIGRAM(S): 20 TABLET, DELAYED RELEASE ORAL at 05:30

## 2017-12-01 RX ADMIN — Medication 1 APPLICATION(S): at 08:31

## 2017-12-01 RX ADMIN — Medication 1 SPRAY(S): at 17:29

## 2017-12-01 RX ADMIN — CARVEDILOL PHOSPHATE 12.5 MILLIGRAM(S): 80 CAPSULE, EXTENDED RELEASE ORAL at 05:31

## 2017-12-01 RX ADMIN — Medication 300 MILLIGRAM(S): at 17:29

## 2017-12-01 RX ADMIN — Medication 300 MILLIGRAM(S): at 23:14

## 2017-12-01 RX ADMIN — CALCITRIOL 0.25 MICROGRAM(S): 0.5 CAPSULE ORAL at 13:39

## 2017-12-01 RX ADMIN — WARFARIN SODIUM 5 MILLIGRAM(S): 2.5 TABLET ORAL at 21:41

## 2017-12-01 RX ADMIN — Medication 50 MILLIGRAM(S): at 21:41

## 2017-12-01 RX ADMIN — CARVEDILOL PHOSPHATE 12.5 MILLIGRAM(S): 80 CAPSULE, EXTENDED RELEASE ORAL at 17:29

## 2017-12-01 RX ADMIN — Medication 1 SPRAY(S): at 05:30

## 2017-12-01 RX ADMIN — Medication 500 MILLIGRAM(S): at 13:39

## 2017-12-01 RX ADMIN — Medication 300 MILLIGRAM(S): at 01:19

## 2017-12-01 RX ADMIN — Medication 300 MILLIGRAM(S): at 08:30

## 2017-12-01 RX ADMIN — Medication 325 MILLIGRAM(S): at 13:39

## 2017-12-01 RX ADMIN — Medication 80 MILLIGRAM(S): at 05:31

## 2017-12-01 NOTE — CHART NOTE - NSCHARTNOTEFT_GEN_A_CORE
Upon Nutritional Assessment by the Registered Dietitian your patient was determined to meet criteria / has evidence of the following diagnosis/diagnoses:          [ ]  Mild Protein Calorie Malnutrition        [ ]  Moderate Protein Calorie Malnutrition        [ ] Severe Protein Calorie Malnutrition        [ ] Unspecified Protein Calorie Malnutrition        [ ] Underweight / BMI <19        [ x] Morbid Obesity / BMI > 40(pt states weight is most likely related to fluid retention)      Findings as based on:  [x ] Comprehensive nutrition assessment   [ ] Nutrition Focused Physical Exam  [ ] Other:       Nutrition Plan/Recommendations:  3 packs pro source daily, pt admitted with FTT and dehydrated-weight loss diet ed not indicated at this time        PROVIDER Section:     By signing this assessment you are acknowledging and agree with the diagnosis/diagnoses assigned by the Registered Dietitian    Comments:

## 2017-12-01 NOTE — PROGRESS NOTE ADULT - ASSESSMENT
dvt lle  pericardial effusion  acute on chronic LLE edema    continue current care  continue diuresis  d/c planning once services are set up

## 2017-12-01 NOTE — DIETITIAN INITIAL EVALUATION ADULT. - OTHER INFO
seen for length of stay. states good intake. pt states she was admitted due to INR not being therapeutic. admitting diagnosis FTT. pt dislikes Miguel and refused ensure. pt states her weight is higher than usual due to fluid retention. pt provided diet education on recent previous admission. last BM 2 days ago as per flow sheet. NKFA

## 2017-12-02 DIAGNOSIS — N18.4 CHRONIC KIDNEY DISEASE, STAGE 4 (SEVERE): ICD-10-CM

## 2017-12-02 LAB
ANION GAP SERPL CALC-SCNC: 14 MMOL/L — SIGNIFICANT CHANGE UP (ref 5–17)
BUN SERPL-MCNC: 71 MG/DL — HIGH (ref 7–23)
CALCIUM SERPL-MCNC: 9 MG/DL — SIGNIFICANT CHANGE UP (ref 8.4–10.5)
CHLORIDE SERPL-SCNC: 104 MMOL/L — SIGNIFICANT CHANGE UP (ref 96–108)
CO2 SERPL-SCNC: 26 MMOL/L — SIGNIFICANT CHANGE UP (ref 22–31)
CREAT SERPL-MCNC: 3.23 MG/DL — HIGH (ref 0.5–1.3)
GLUCOSE SERPL-MCNC: 97 MG/DL — SIGNIFICANT CHANGE UP (ref 70–99)
HCT VFR BLD CALC: 33 % — LOW (ref 34.5–45)
HGB BLD-MCNC: 10.7 G/DL — LOW (ref 11.5–15.5)
INR BLD: 3 RATIO — HIGH (ref 0.88–1.16)
MAGNESIUM SERPL-MCNC: 1.9 MG/DL — SIGNIFICANT CHANGE UP (ref 1.6–2.6)
MCHC RBC-ENTMCNC: 29.7 PG — SIGNIFICANT CHANGE UP (ref 27–34)
MCHC RBC-ENTMCNC: 32.5 GM/DL — SIGNIFICANT CHANGE UP (ref 32–36)
MCV RBC AUTO: 91.3 FL — SIGNIFICANT CHANGE UP (ref 80–100)
PLATELET # BLD AUTO: 143 K/UL — LOW (ref 150–400)
POTASSIUM SERPL-MCNC: 3.8 MMOL/L — SIGNIFICANT CHANGE UP (ref 3.5–5.3)
POTASSIUM SERPL-SCNC: 3.8 MMOL/L — SIGNIFICANT CHANGE UP (ref 3.5–5.3)
PROTHROM AB SERPL-ACNC: 33.4 SEC — HIGH (ref 9.8–12.7)
RBC # BLD: 3.61 M/UL — LOW (ref 3.8–5.2)
RBC # FLD: 14.6 % — HIGH (ref 10.3–14.5)
SODIUM SERPL-SCNC: 144 MMOL/L — SIGNIFICANT CHANGE UP (ref 135–145)
WBC # BLD: 5.9 K/UL — SIGNIFICANT CHANGE UP (ref 3.8–10.5)
WBC # FLD AUTO: 5.9 K/UL — SIGNIFICANT CHANGE UP (ref 3.8–10.5)

## 2017-12-02 RX ORDER — WARFARIN SODIUM 2.5 MG/1
5 TABLET ORAL ONCE
Qty: 0 | Refills: 0 | Status: DISCONTINUED | OUTPATIENT
Start: 2017-12-02 | End: 2017-12-02

## 2017-12-02 RX ORDER — WARFARIN SODIUM 2.5 MG/1
1 TABLET ORAL ONCE
Qty: 0 | Refills: 0 | Status: COMPLETED | OUTPATIENT
Start: 2017-12-02 | End: 2017-12-02

## 2017-12-02 RX ADMIN — WARFARIN SODIUM 1 MILLIGRAM(S): 2.5 TABLET ORAL at 22:10

## 2017-12-02 RX ADMIN — Medication 10 MILLIGRAM(S): at 09:05

## 2017-12-02 RX ADMIN — Medication 1 APPLICATION(S): at 22:06

## 2017-12-02 RX ADMIN — Medication 1 APPLICATION(S): at 09:05

## 2017-12-02 RX ADMIN — Medication 1 SPRAY(S): at 17:57

## 2017-12-02 RX ADMIN — Medication 1 TABLET(S): at 17:57

## 2017-12-02 RX ADMIN — Medication 500 MILLIGRAM(S): at 14:03

## 2017-12-02 RX ADMIN — Medication 1 SPRAY(S): at 06:25

## 2017-12-02 RX ADMIN — Medication 80 MILLIGRAM(S): at 06:25

## 2017-12-02 RX ADMIN — PANTOPRAZOLE SODIUM 40 MILLIGRAM(S): 20 TABLET, DELAYED RELEASE ORAL at 06:25

## 2017-12-02 RX ADMIN — CALCITRIOL 0.25 MICROGRAM(S): 0.5 CAPSULE ORAL at 14:03

## 2017-12-02 RX ADMIN — Medication 300 MILLIGRAM(S): at 16:03

## 2017-12-02 RX ADMIN — Medication 50 MILLIGRAM(S): at 22:07

## 2017-12-02 RX ADMIN — Medication 50 MILLIGRAM(S): at 06:25

## 2017-12-02 RX ADMIN — Medication 325 MILLIGRAM(S): at 14:04

## 2017-12-02 RX ADMIN — Medication 300 MILLIGRAM(S): at 09:05

## 2017-12-02 RX ADMIN — CARVEDILOL PHOSPHATE 12.5 MILLIGRAM(S): 80 CAPSULE, EXTENDED RELEASE ORAL at 17:57

## 2017-12-02 RX ADMIN — Medication 50 MILLIGRAM(S): at 14:04

## 2017-12-02 RX ADMIN — CARVEDILOL PHOSPHATE 12.5 MILLIGRAM(S): 80 CAPSULE, EXTENDED RELEASE ORAL at 06:25

## 2017-12-02 RX ADMIN — Medication 0.6 MILLIGRAM(S): at 14:03

## 2017-12-02 NOTE — PROGRESS NOTE ADULT - ASSESSMENT
58 y/o female with CKD3/4, CHF, AAA repair with aortoiliac stent c/b chronic LLE edema worsening azotemia in setting of diuresis

## 2017-12-02 NOTE — CHART NOTE - NSCHARTNOTEFT_GEN_A_CORE
At 0300, pt had 9 beats WCT, asymptomatic. Per tele record has had 5 beats in the past.  Patient seen at bedside. Alert and oriented, denies complaints, resting comfortably in bed.  - will check electrolytes  - continue with coreg and labetalol   - monitor VS closely  - c/w telemetry monitoring  - f/u with Cardiology  - F/u with Attending in am    Sabra Barkley, NP  Medicine  #[phone]

## 2017-12-02 NOTE — PROGRESS NOTE ADULT - ASSESSMENT
dvt lle  acute on chronic lle edema  pericardial effusion  nsvt  lupus  ckd 3    conitnue current care  change fox today  await case mangement / sw input into d/c planning.  continue a/c

## 2017-12-03 LAB
ANION GAP SERPL CALC-SCNC: 14 MMOL/L — SIGNIFICANT CHANGE UP (ref 5–17)
BUN SERPL-MCNC: 73 MG/DL — HIGH (ref 7–23)
CALCIUM SERPL-MCNC: 9 MG/DL — SIGNIFICANT CHANGE UP (ref 8.4–10.5)
CHLORIDE SERPL-SCNC: 104 MMOL/L — SIGNIFICANT CHANGE UP (ref 96–108)
CO2 SERPL-SCNC: 26 MMOL/L — SIGNIFICANT CHANGE UP (ref 22–31)
CREAT SERPL-MCNC: 3.21 MG/DL — HIGH (ref 0.5–1.3)
GLUCOSE SERPL-MCNC: 78 MG/DL — SIGNIFICANT CHANGE UP (ref 70–99)
HCT VFR BLD CALC: 33 % — LOW (ref 34.5–45)
HGB BLD-MCNC: 10.6 G/DL — LOW (ref 11.5–15.5)
INR BLD: 3.2 RATIO — HIGH (ref 0.88–1.16)
MAGNESIUM SERPL-MCNC: 2.1 MG/DL — SIGNIFICANT CHANGE UP (ref 1.6–2.6)
MCHC RBC-ENTMCNC: 29.4 PG — SIGNIFICANT CHANGE UP (ref 27–34)
MCHC RBC-ENTMCNC: 32.2 GM/DL — SIGNIFICANT CHANGE UP (ref 32–36)
MCV RBC AUTO: 91.3 FL — SIGNIFICANT CHANGE UP (ref 80–100)
PLATELET # BLD AUTO: 145 K/UL — LOW (ref 150–400)
POTASSIUM SERPL-MCNC: 3.7 MMOL/L — SIGNIFICANT CHANGE UP (ref 3.5–5.3)
POTASSIUM SERPL-SCNC: 3.7 MMOL/L — SIGNIFICANT CHANGE UP (ref 3.5–5.3)
PROTHROM AB SERPL-ACNC: 35.7 SEC — HIGH (ref 9.8–12.7)
RBC # BLD: 3.62 M/UL — LOW (ref 3.8–5.2)
RBC # FLD: 14.4 % — SIGNIFICANT CHANGE UP (ref 10.3–14.5)
SODIUM SERPL-SCNC: 144 MMOL/L — SIGNIFICANT CHANGE UP (ref 135–145)
WBC # BLD: 5.7 K/UL — SIGNIFICANT CHANGE UP (ref 3.8–10.5)
WBC # FLD AUTO: 5.7 K/UL — SIGNIFICANT CHANGE UP (ref 3.8–10.5)

## 2017-12-03 RX ORDER — WARFARIN SODIUM 2.5 MG/1
0.5 TABLET ORAL ONCE
Qty: 0 | Refills: 0 | Status: COMPLETED | OUTPATIENT
Start: 2017-12-03 | End: 2017-12-03

## 2017-12-03 RX ADMIN — CALCITRIOL 0.25 MICROGRAM(S): 0.5 CAPSULE ORAL at 13:41

## 2017-12-03 RX ADMIN — Medication 50 MILLIGRAM(S): at 22:12

## 2017-12-03 RX ADMIN — WARFARIN SODIUM 0.5 MILLIGRAM(S): 2.5 TABLET ORAL at 22:12

## 2017-12-03 RX ADMIN — Medication 300 MILLIGRAM(S): at 09:39

## 2017-12-03 RX ADMIN — PANTOPRAZOLE SODIUM 40 MILLIGRAM(S): 20 TABLET, DELAYED RELEASE ORAL at 06:01

## 2017-12-03 RX ADMIN — Medication 325 MILLIGRAM(S): at 13:41

## 2017-12-03 RX ADMIN — Medication 300 MILLIGRAM(S): at 23:05

## 2017-12-03 RX ADMIN — Medication 10 MILLIGRAM(S): at 09:39

## 2017-12-03 RX ADMIN — Medication 50 MILLIGRAM(S): at 13:41

## 2017-12-03 RX ADMIN — Medication 50 MILLIGRAM(S): at 06:01

## 2017-12-03 RX ADMIN — Medication 1 SPRAY(S): at 18:13

## 2017-12-03 RX ADMIN — Medication 1 APPLICATION(S): at 09:39

## 2017-12-03 RX ADMIN — CARVEDILOL PHOSPHATE 12.5 MILLIGRAM(S): 80 CAPSULE, EXTENDED RELEASE ORAL at 06:01

## 2017-12-03 RX ADMIN — Medication 1 TABLET(S): at 18:25

## 2017-12-03 RX ADMIN — Medication 300 MILLIGRAM(S): at 01:39

## 2017-12-03 RX ADMIN — Medication 300 MILLIGRAM(S): at 18:12

## 2017-12-03 RX ADMIN — Medication 1 APPLICATION(S): at 22:11

## 2017-12-03 RX ADMIN — Medication 1 SPRAY(S): at 06:01

## 2017-12-03 RX ADMIN — CARVEDILOL PHOSPHATE 12.5 MILLIGRAM(S): 80 CAPSULE, EXTENDED RELEASE ORAL at 18:11

## 2017-12-03 RX ADMIN — Medication 500 MILLIGRAM(S): at 13:40

## 2017-12-03 NOTE — PROGRESS NOTE ADULT - ASSESSMENT
56 y/o female with CKD3/4, CHF, AAA repair with aortoiliac stent c/b chronic LLE edema worsening azotemia in setting of diuresis, also now c/o left back pain

## 2017-12-03 NOTE — PROGRESS NOTE ADULT - ASSESSMENT
LLE dvt  acute on ckd 3  pericardial effusion  lupus    continue current care  monitor renal function  continue a/c  await sw/case management for several days now for d/c

## 2017-12-04 LAB
ANION GAP SERPL CALC-SCNC: 15 MMOL/L — SIGNIFICANT CHANGE UP (ref 5–17)
APTT BLD: 38.5 SEC — HIGH (ref 27.5–37.4)
BUN SERPL-MCNC: 70 MG/DL — HIGH (ref 7–23)
CALCIUM SERPL-MCNC: 8.6 MG/DL — SIGNIFICANT CHANGE UP (ref 8.4–10.5)
CHLORIDE SERPL-SCNC: 105 MMOL/L — SIGNIFICANT CHANGE UP (ref 96–108)
CO2 SERPL-SCNC: 24 MMOL/L — SIGNIFICANT CHANGE UP (ref 22–31)
CREAT SERPL-MCNC: 3.11 MG/DL — HIGH (ref 0.5–1.3)
GLUCOSE SERPL-MCNC: 83 MG/DL — SIGNIFICANT CHANGE UP (ref 70–99)
HCT VFR BLD CALC: 32.9 % — LOW (ref 34.5–45)
HGB BLD-MCNC: 10.7 G/DL — LOW (ref 11.5–15.5)
INR BLD: 2.7 RATIO — HIGH (ref 0.88–1.16)
MCHC RBC-ENTMCNC: 29.8 PG — SIGNIFICANT CHANGE UP (ref 27–34)
MCHC RBC-ENTMCNC: 32.6 GM/DL — SIGNIFICANT CHANGE UP (ref 32–36)
MCV RBC AUTO: 91.5 FL — SIGNIFICANT CHANGE UP (ref 80–100)
PLATELET # BLD AUTO: 148 K/UL — LOW (ref 150–400)
POTASSIUM SERPL-MCNC: 3.8 MMOL/L — SIGNIFICANT CHANGE UP (ref 3.5–5.3)
POTASSIUM SERPL-SCNC: 3.8 MMOL/L — SIGNIFICANT CHANGE UP (ref 3.5–5.3)
PROTHROM AB SERPL-ACNC: 29.8 SEC — HIGH (ref 9.8–12.7)
RBC # BLD: 3.59 M/UL — LOW (ref 3.8–5.2)
RBC # FLD: 14.4 % — SIGNIFICANT CHANGE UP (ref 10.3–14.5)
SODIUM SERPL-SCNC: 144 MMOL/L — SIGNIFICANT CHANGE UP (ref 135–145)
WBC # BLD: 5.7 K/UL — SIGNIFICANT CHANGE UP (ref 3.8–10.5)
WBC # FLD AUTO: 5.7 K/UL — SIGNIFICANT CHANGE UP (ref 3.8–10.5)

## 2017-12-04 PROCEDURE — 76770 US EXAM ABDO BACK WALL COMP: CPT | Mod: 26

## 2017-12-04 PROCEDURE — 99232 SBSQ HOSP IP/OBS MODERATE 35: CPT

## 2017-12-04 RX ORDER — WARFARIN SODIUM 2.5 MG/1
4 TABLET ORAL ONCE
Qty: 0 | Refills: 0 | Status: COMPLETED | OUTPATIENT
Start: 2017-12-04 | End: 2017-12-04

## 2017-12-04 RX ORDER — WARFARIN SODIUM 2.5 MG/1
1 TABLET ORAL ONCE
Qty: 0 | Refills: 0 | Status: DISCONTINUED | OUTPATIENT
Start: 2017-12-04 | End: 2017-12-04

## 2017-12-04 RX ADMIN — Medication 1 APPLICATION(S): at 10:42

## 2017-12-04 RX ADMIN — Medication 1 SPRAY(S): at 05:54

## 2017-12-04 RX ADMIN — Medication 500 MILLIGRAM(S): at 13:53

## 2017-12-04 RX ADMIN — CALCITRIOL 0.25 MICROGRAM(S): 0.5 CAPSULE ORAL at 13:53

## 2017-12-04 RX ADMIN — Medication 1 APPLICATION(S): at 22:19

## 2017-12-04 RX ADMIN — Medication 0.6 MILLIGRAM(S): at 13:53

## 2017-12-04 RX ADMIN — Medication 300 MILLIGRAM(S): at 18:28

## 2017-12-04 RX ADMIN — Medication 10 MILLIGRAM(S): at 10:42

## 2017-12-04 RX ADMIN — Medication 50 MILLIGRAM(S): at 05:55

## 2017-12-04 RX ADMIN — Medication 1 SPRAY(S): at 18:29

## 2017-12-04 RX ADMIN — CARVEDILOL PHOSPHATE 12.5 MILLIGRAM(S): 80 CAPSULE, EXTENDED RELEASE ORAL at 05:55

## 2017-12-04 RX ADMIN — Medication 300 MILLIGRAM(S): at 06:03

## 2017-12-04 RX ADMIN — PANTOPRAZOLE SODIUM 40 MILLIGRAM(S): 20 TABLET, DELAYED RELEASE ORAL at 06:03

## 2017-12-04 RX ADMIN — Medication 300 MILLIGRAM(S): at 23:06

## 2017-12-04 RX ADMIN — WARFARIN SODIUM 4 MILLIGRAM(S): 2.5 TABLET ORAL at 22:22

## 2017-12-04 RX ADMIN — Medication 50 MILLIGRAM(S): at 13:54

## 2017-12-04 RX ADMIN — Medication 50 MILLIGRAM(S): at 22:20

## 2017-12-04 RX ADMIN — Medication 1 TABLET(S): at 22:23

## 2017-12-04 RX ADMIN — Medication 325 MILLIGRAM(S): at 13:54

## 2017-12-04 RX ADMIN — CARVEDILOL PHOSPHATE 12.5 MILLIGRAM(S): 80 CAPSULE, EXTENDED RELEASE ORAL at 18:27

## 2017-12-04 NOTE — PROGRESS NOTE ADULT - ASSESSMENT
A/P Healing Stage IV Rt Ischial pressure ulcer- Aquacel Ag  LLE ACE WRap  BLE elevation  con't offloading  con't Nuturition  con't Pericare  Con't per Medicine  upon discharge F/u as outpatient in [facility] [address] [facility] [phone]  D/w team and attending  Tanisha Weathers PA-C [id number]  I spent 20  minutes w/ this pt of which more than 50% of the time was spent counseling & coordinating care of this pt.

## 2017-12-04 NOTE — PROGRESS NOTE ADULT - ASSESSMENT
56 yo female with extensive Pmhx admitted with SOB 56 yo female with extensive Pmhx admitted with SOB in setting of acute on chronic systolic heart failure

## 2017-12-04 NOTE — PROGRESS NOTE ADULT - ASSESSMENT
56 yo F w/ hx of SLE, systolic CHF (EF 32%) s/p AICD, HTN, CKD III- IV, PE / left leg DVT dx 2009, HIT, CAD (no stents), stage IV sacral ulcer, chronic fox, hx of recurrent pericardial effusion, AAA ~14cm in 12/2016 s/p repair in 2010 w/ aortoiliac stent c/b post-operatively with development of left lower extremity   lymphedema December 2016  pericardial effusion requiring drainage/pericardiocentesis. She had another similar admission in 1/2017 and at that time also had pericardial drain placed. diagnosed with SLE. recent admission for UTI and chf treated. discharged now readmitted with dizziness and lightheadedness/fatigue/weakness       1- ckd IV likely now   2- chf   3- htn   4- hx gout  5- pericardial effusion    cr higher hold lasix x 2 days   chf compensated   cont with uloric  d/w pt over time risk of worsening renal function   cont with labetalol

## 2017-12-04 NOTE — PROGRESS NOTE ADULT - ASSESSMENT
LLE dvt  acute on ckd 3  pericardial effusion  lupus    continue current care  coumadin 4 mg tonight  d/c planning pending casemangement set up

## 2017-12-05 LAB
ANION GAP SERPL CALC-SCNC: 14 MMOL/L — SIGNIFICANT CHANGE UP (ref 5–17)
BUN SERPL-MCNC: 72 MG/DL — HIGH (ref 7–23)
CALCIUM SERPL-MCNC: 9 MG/DL — SIGNIFICANT CHANGE UP (ref 8.4–10.5)
CHLORIDE SERPL-SCNC: 107 MMOL/L — SIGNIFICANT CHANGE UP (ref 96–108)
CO2 SERPL-SCNC: 24 MMOL/L — SIGNIFICANT CHANGE UP (ref 22–31)
CREAT SERPL-MCNC: 2.95 MG/DL — HIGH (ref 0.5–1.3)
GLUCOSE SERPL-MCNC: 78 MG/DL — SIGNIFICANT CHANGE UP (ref 70–99)
HCT VFR BLD CALC: 31.8 % — LOW (ref 34.5–45)
HCT VFR BLD CALC: 33 % — LOW (ref 34.5–45)
HGB BLD-MCNC: 10.4 G/DL — LOW (ref 11.5–15.5)
HGB BLD-MCNC: 10.6 G/DL — LOW (ref 11.5–15.5)
INR BLD: 2.22 RATIO — HIGH (ref 0.88–1.16)
MCHC RBC-ENTMCNC: 29.4 PG — SIGNIFICANT CHANGE UP (ref 27–34)
MCHC RBC-ENTMCNC: 30 PG — SIGNIFICANT CHANGE UP (ref 27–34)
MCHC RBC-ENTMCNC: 32.2 GM/DL — SIGNIFICANT CHANGE UP (ref 32–36)
MCHC RBC-ENTMCNC: 32.7 GM/DL — SIGNIFICANT CHANGE UP (ref 32–36)
MCV RBC AUTO: 91.5 FL — SIGNIFICANT CHANGE UP (ref 80–100)
MCV RBC AUTO: 91.6 FL — SIGNIFICANT CHANGE UP (ref 80–100)
PLATELET # BLD AUTO: 135 K/UL — LOW (ref 150–400)
PLATELET # BLD AUTO: 142 K/UL — LOW (ref 150–400)
POTASSIUM SERPL-MCNC: 3.8 MMOL/L — SIGNIFICANT CHANGE UP (ref 3.5–5.3)
POTASSIUM SERPL-SCNC: 3.8 MMOL/L — SIGNIFICANT CHANGE UP (ref 3.5–5.3)
PROTHROM AB SERPL-ACNC: 24.6 SEC — HIGH (ref 9.8–12.7)
RBC # BLD: 3.47 M/UL — LOW (ref 3.8–5.2)
RBC # BLD: 3.61 M/UL — LOW (ref 3.8–5.2)
RBC # FLD: 14.4 % — SIGNIFICANT CHANGE UP (ref 10.3–14.5)
RBC # FLD: 14.4 % — SIGNIFICANT CHANGE UP (ref 10.3–14.5)
SODIUM SERPL-SCNC: 145 MMOL/L — SIGNIFICANT CHANGE UP (ref 135–145)
WBC # BLD: 5.3 K/UL — SIGNIFICANT CHANGE UP (ref 3.8–10.5)
WBC # BLD: 6 K/UL — SIGNIFICANT CHANGE UP (ref 3.8–10.5)
WBC # FLD AUTO: 5.3 K/UL — SIGNIFICANT CHANGE UP (ref 3.8–10.5)
WBC # FLD AUTO: 6 K/UL — SIGNIFICANT CHANGE UP (ref 3.8–10.5)

## 2017-12-05 RX ORDER — CARVEDILOL PHOSPHATE 80 MG/1
12.5 CAPSULE, EXTENDED RELEASE ORAL ONCE
Qty: 0 | Refills: 0 | Status: COMPLETED | OUTPATIENT
Start: 2017-12-05 | End: 2017-12-05

## 2017-12-05 RX ORDER — CARVEDILOL PHOSPHATE 80 MG/1
25 CAPSULE, EXTENDED RELEASE ORAL EVERY 12 HOURS
Qty: 0 | Refills: 0 | Status: DISCONTINUED | OUTPATIENT
Start: 2017-12-05 | End: 2017-12-13

## 2017-12-05 RX ORDER — WARFARIN SODIUM 2.5 MG/1
5 TABLET ORAL ONCE
Qty: 0 | Refills: 0 | Status: COMPLETED | OUTPATIENT
Start: 2017-12-05 | End: 2017-12-05

## 2017-12-05 RX ORDER — MAGNESIUM SULFATE 500 MG/ML
1 VIAL (ML) INJECTION ONCE
Qty: 0 | Refills: 0 | Status: COMPLETED | OUTPATIENT
Start: 2017-12-05 | End: 2017-12-05

## 2017-12-05 RX ADMIN — Medication 1 TABLET(S): at 18:36

## 2017-12-05 RX ADMIN — Medication 1 SPRAY(S): at 18:37

## 2017-12-05 RX ADMIN — PANTOPRAZOLE SODIUM 40 MILLIGRAM(S): 20 TABLET, DELAYED RELEASE ORAL at 06:14

## 2017-12-05 RX ADMIN — Medication 50 MILLIGRAM(S): at 14:07

## 2017-12-05 RX ADMIN — Medication 100 GRAM(S): at 14:49

## 2017-12-05 RX ADMIN — Medication 50 MILLIGRAM(S): at 06:14

## 2017-12-05 RX ADMIN — Medication 50 MILLIGRAM(S): at 22:17

## 2017-12-05 RX ADMIN — WARFARIN SODIUM 5 MILLIGRAM(S): 2.5 TABLET ORAL at 22:17

## 2017-12-05 RX ADMIN — Medication 1 APPLICATION(S): at 22:16

## 2017-12-05 RX ADMIN — CARVEDILOL PHOSPHATE 25 MILLIGRAM(S): 80 CAPSULE, EXTENDED RELEASE ORAL at 18:40

## 2017-12-05 RX ADMIN — Medication 10 MILLIGRAM(S): at 10:44

## 2017-12-05 RX ADMIN — Medication 300 MILLIGRAM(S): at 07:12

## 2017-12-05 RX ADMIN — CARVEDILOL PHOSPHATE 12.5 MILLIGRAM(S): 80 CAPSULE, EXTENDED RELEASE ORAL at 14:49

## 2017-12-05 RX ADMIN — CALCITRIOL 0.25 MICROGRAM(S): 0.5 CAPSULE ORAL at 14:06

## 2017-12-05 RX ADMIN — Medication 300 MILLIGRAM(S): at 14:07

## 2017-12-05 RX ADMIN — Medication 300 MILLIGRAM(S): at 22:17

## 2017-12-05 RX ADMIN — Medication 500 MILLIGRAM(S): at 14:07

## 2017-12-05 RX ADMIN — Medication 1 APPLICATION(S): at 10:43

## 2017-12-05 RX ADMIN — Medication 1 SPRAY(S): at 06:13

## 2017-12-05 RX ADMIN — CARVEDILOL PHOSPHATE 12.5 MILLIGRAM(S): 80 CAPSULE, EXTENDED RELEASE ORAL at 06:13

## 2017-12-05 RX ADMIN — Medication 325 MILLIGRAM(S): at 14:06

## 2017-12-05 NOTE — CHART NOTE - NSCHARTNOTEFT_GEN_A_CORE
Seen and examined for 20 beats of wct .  Found sitting in the bed crying and upset about VNS call .  Pt Seen and examined for 20 beats of wct .  Found sitting in the bed crying and upset about VNS call .  Pt denied any CP or SOB , c/o HA since last night. ,  Pt s/p 1 gram magsulfate , d/w DR Yoder , advising to increase Coreg to 25 mg BID and  extra dose of 12.5 now . Awaiting call back form DR Chicas . c/w Manuel.    Rosario Crockett NP-C [id number]

## 2017-12-05 NOTE — PROGRESS NOTE ADULT - ASSESSMENT
56 yo female with extensive Pmhx admitted with SOB in setting of acute on chronic systolic heart failure

## 2017-12-05 NOTE — CHART NOTE - NSCHARTNOTEFT_GEN_A_CORE
Called by RN for bleeding while on bedpan. Pt seen and examined at bedside. No complaints of pain, dizziness, chest pain, shortness of breath, headaches, n/v.     Pt examined at bedside  General: Awake and alert, NAD  GI: Non-tender, +BS. No external or internal hemorrhoids. No visible rectal bleeding. Stool soft and light brown.   : Fox catheter changed 12/2, Catheter slightly taught when leg straightened. no visible blood in fox bag. Slow leakage of bright red blood from vaginal canal (Resolved)    Ext: LLE edema w/ACE wrapping and foot boot.     Vital Signs Last 24 Hrs  T(C): 37.1 (05 Dec 2017 00:16),   T(F): 98.8 (05 Dec 2017 00:16),   HR: 92 (05 Dec 2017 00:16)  BP: 179/103 (05 Dec 2017 00:16) Manual 160/90  RR: 18 (05 Dec 2017 00:16)   SpO2: 95% (05 Dec 2017 00:16)     a/p Called by RN for bleeding while on bedpan. Pt seen and examined at bedside. No complaints of pain, dizziness, chest pain, shortness of breath, headaches, n/v. Reports hysterectomy and b/l oophrectomy 15 years ago. Last pap 3 years ago- Normal per patient.     Pt examined at bedside  General: Awake and alert, NAD  GI: Non-tender, +BS. No external or internal hemorrhoids. No visible rectal bleeding. Stool soft and light brown.   : Fox catheter changed 12/2, Catheter slightly taught when leg straightened. no visible blood in fox bag. Slow leakage of bright red blood from vaginal canal (Resolved)    Ext: LLE edema w/ACE wrapping and foot boot.     Vital Signs Last 24 Hrs  T(C): 37.1 (05 Dec 2017 00:16),   T(F): 98.8 (05 Dec 2017 00:16),   HR: 92 (05 Dec 2017 00:16)  BP: 179/103 (05 Dec 2017 00:16) Manual 160/90  RR: 18 (05 Dec 2017 00:16)   SpO2: 95% (05 Dec 2017 00:16)     57F w/ hx of SLE, systolic CHF (EF 32%) s/p AICD, HTN, CKD III- IV, PE / left leg DVT dx 2009, HIT, CAD (no stents), stage IV sacral ulcer, chronic fox, hx of recurrent pericardial effusion, AAA ~14cm in 12/2016 s/p repair in 2010 w/ aortoiliac stent presents with Pre-syncope, dehydration on coumadin for DVT. Now with short episode of vaginal bleeding - resolved spontaneously  1) r/o hemorrhage   - STAT CBC  - CBC [id number]   - INR in AM  - c/w AC for now  - d/w Dr. Chicas    ~Jossy Chamberlain ANP-C  Spectralink [id number]

## 2017-12-05 NOTE — PROGRESS NOTE ADULT - ASSESSMENT
LLE dvt  acute on ckd 3  pericardial effusion  lupus  vaginal bleeding  NSVT    continue current care  continue a/c  tv sono  gyn eval   monitor h/h  BB increased.   d/c planning

## 2017-12-06 LAB
ANION GAP SERPL CALC-SCNC: 13 MMOL/L — SIGNIFICANT CHANGE UP (ref 5–17)
BUN SERPL-MCNC: 68 MG/DL — HIGH (ref 7–23)
CALCIUM SERPL-MCNC: 9.3 MG/DL — SIGNIFICANT CHANGE UP (ref 8.4–10.5)
CHLORIDE SERPL-SCNC: 107 MMOL/L — SIGNIFICANT CHANGE UP (ref 96–108)
CO2 SERPL-SCNC: 26 MMOL/L — SIGNIFICANT CHANGE UP (ref 22–31)
CREAT SERPL-MCNC: 3.04 MG/DL — HIGH (ref 0.5–1.3)
GLUCOSE SERPL-MCNC: 86 MG/DL — SIGNIFICANT CHANGE UP (ref 70–99)
HCT VFR BLD CALC: 32.3 % — LOW (ref 34.5–45)
HCT VFR BLD CALC: 33.2 % — LOW (ref 34.5–45)
HGB BLD-MCNC: 10.4 G/DL — LOW (ref 11.5–15.5)
HGB BLD-MCNC: 10.8 G/DL — LOW (ref 11.5–15.5)
INR BLD: 2.31 RATIO — HIGH (ref 0.88–1.16)
MAGNESIUM SERPL-MCNC: 2.2 MG/DL — SIGNIFICANT CHANGE UP (ref 1.6–2.6)
MCHC RBC-ENTMCNC: 29.5 PG — SIGNIFICANT CHANGE UP (ref 27–34)
MCHC RBC-ENTMCNC: 29.8 PG — SIGNIFICANT CHANGE UP (ref 27–34)
MCHC RBC-ENTMCNC: 32.2 GM/DL — SIGNIFICANT CHANGE UP (ref 32–36)
MCHC RBC-ENTMCNC: 32.5 GM/DL — SIGNIFICANT CHANGE UP (ref 32–36)
MCV RBC AUTO: 91.7 FL — SIGNIFICANT CHANGE UP (ref 80–100)
MCV RBC AUTO: 91.7 FL — SIGNIFICANT CHANGE UP (ref 80–100)
PLATELET # BLD AUTO: 137 K/UL — LOW (ref 150–400)
PLATELET # BLD AUTO: 138 K/UL — LOW (ref 150–400)
POTASSIUM SERPL-MCNC: 4.1 MMOL/L — SIGNIFICANT CHANGE UP (ref 3.5–5.3)
POTASSIUM SERPL-SCNC: 4.1 MMOL/L — SIGNIFICANT CHANGE UP (ref 3.5–5.3)
PROTHROM AB SERPL-ACNC: 25.6 SEC — HIGH (ref 9.8–12.7)
RBC # BLD: 3.53 M/UL — LOW (ref 3.8–5.2)
RBC # BLD: 3.62 M/UL — LOW (ref 3.8–5.2)
RBC # FLD: 14.3 % — SIGNIFICANT CHANGE UP (ref 10.3–14.5)
RBC # FLD: 14.3 % — SIGNIFICANT CHANGE UP (ref 10.3–14.5)
SODIUM SERPL-SCNC: 146 MMOL/L — HIGH (ref 135–145)
WBC # BLD: 5.4 K/UL — SIGNIFICANT CHANGE UP (ref 3.8–10.5)
WBC # BLD: 5.7 K/UL — SIGNIFICANT CHANGE UP (ref 3.8–10.5)
WBC # FLD AUTO: 5.4 K/UL — SIGNIFICANT CHANGE UP (ref 3.8–10.5)
WBC # FLD AUTO: 5.7 K/UL — SIGNIFICANT CHANGE UP (ref 3.8–10.5)

## 2017-12-06 PROCEDURE — 76856 US EXAM PELVIC COMPLETE: CPT | Mod: 26

## 2017-12-06 RX ORDER — FUROSEMIDE 40 MG
20 TABLET ORAL DAILY
Qty: 0 | Refills: 0 | Status: DISCONTINUED | OUTPATIENT
Start: 2017-12-06 | End: 2017-12-07

## 2017-12-06 RX ORDER — WARFARIN SODIUM 2.5 MG/1
5 TABLET ORAL ONCE
Qty: 0 | Refills: 0 | Status: COMPLETED | OUTPATIENT
Start: 2017-12-06 | End: 2017-12-06

## 2017-12-06 RX ADMIN — Medication 20 MILLIGRAM(S): at 15:05

## 2017-12-06 RX ADMIN — WARFARIN SODIUM 5 MILLIGRAM(S): 2.5 TABLET ORAL at 22:35

## 2017-12-06 RX ADMIN — Medication 50 MILLIGRAM(S): at 15:05

## 2017-12-06 RX ADMIN — Medication 1 APPLICATION(S): at 20:11

## 2017-12-06 RX ADMIN — Medication 1 SPRAY(S): at 18:49

## 2017-12-06 RX ADMIN — Medication 1 SPRAY(S): at 06:19

## 2017-12-06 RX ADMIN — Medication 1 APPLICATION(S): at 09:27

## 2017-12-06 RX ADMIN — CARVEDILOL PHOSPHATE 25 MILLIGRAM(S): 80 CAPSULE, EXTENDED RELEASE ORAL at 09:27

## 2017-12-06 RX ADMIN — Medication 300 MILLIGRAM(S): at 15:06

## 2017-12-06 RX ADMIN — Medication 1 TABLET(S): at 18:49

## 2017-12-06 RX ADMIN — Medication 500 MILLIGRAM(S): at 15:05

## 2017-12-06 RX ADMIN — Medication 10 MILLIGRAM(S): at 09:28

## 2017-12-06 RX ADMIN — CALCITRIOL 0.25 MICROGRAM(S): 0.5 CAPSULE ORAL at 15:05

## 2017-12-06 RX ADMIN — CARVEDILOL PHOSPHATE 25 MILLIGRAM(S): 80 CAPSULE, EXTENDED RELEASE ORAL at 20:11

## 2017-12-06 RX ADMIN — PANTOPRAZOLE SODIUM 40 MILLIGRAM(S): 20 TABLET, DELAYED RELEASE ORAL at 06:20

## 2017-12-06 RX ADMIN — Medication 50 MILLIGRAM(S): at 06:20

## 2017-12-06 RX ADMIN — Medication 325 MILLIGRAM(S): at 15:05

## 2017-12-06 RX ADMIN — Medication 300 MILLIGRAM(S): at 22:35

## 2017-12-06 RX ADMIN — Medication 50 MILLIGRAM(S): at 22:36

## 2017-12-06 RX ADMIN — Medication 300 MILLIGRAM(S): at 06:20

## 2017-12-06 RX ADMIN — Medication 0.6 MILLIGRAM(S): at 15:05

## 2017-12-06 NOTE — PROGRESS NOTE ADULT - ASSESSMENT
56 yo F w/ hx of SLE, systolic CHF (EF 32%) s/p AICD, HTN, CKD III- IV, PE / left leg DVT dx 2009, HIT, CAD (no stents), stage IV sacral ulcer, chronic fox, hx of recurrent pericardial effusion, AAA ~14cm in 12/2016 s/p repair in 2010 w/ aortoiliac stent c/b post-operatively with development of left lower extremity   lymphedema December 2016  pericardial effusion requiring drainage/pericardiocentesis. She had another similar admission in 1/2017 and at that time also had pericardial drain placed. diagnosed with SLE. recent admission for UTI and chf treated. discharged now readmitted with dizziness and lightheadedness/fatigue/weakness       1- ckd IV likely now   2- chf   3- htn   4- hx gout  5- pericardial effusion    resume lasix at 40mg daily in am   cr slowly improving.   trend hb   cont with coreg and labetalol may need to increase hydralazine dose although did not tolerate well in past.   ? taper off labetalol and add clonidine ??   has allergies to ARB

## 2017-12-06 NOTE — PROGRESS NOTE ADULT - ASSESSMENT
LLE dvt  acute on ckd 3  pericardial effusion  lupus  vaginal bleeding  NSVT    f/u tv sono  await gun input  continue a/c for now  monitor for bleeding  add iv diuretics  renal f/u  appreciate cardio input

## 2017-12-07 DIAGNOSIS — N93.9 ABNORMAL UTERINE AND VAGINAL BLEEDING, UNSPECIFIED: ICD-10-CM

## 2017-12-07 LAB
ANION GAP SERPL CALC-SCNC: 13 MMOL/L — SIGNIFICANT CHANGE UP (ref 5–17)
APTT BLD: 38 SEC — HIGH (ref 27.5–37.4)
BASOPHILS # BLD AUTO: 0 K/UL — SIGNIFICANT CHANGE UP (ref 0–0.2)
BASOPHILS NFR BLD AUTO: 0 % — SIGNIFICANT CHANGE UP (ref 0–2)
BUN SERPL-MCNC: 70 MG/DL — HIGH (ref 7–23)
CALCIUM SERPL-MCNC: 9.1 MG/DL — SIGNIFICANT CHANGE UP (ref 8.4–10.5)
CHLORIDE SERPL-SCNC: 106 MMOL/L — SIGNIFICANT CHANGE UP (ref 96–108)
CO2 SERPL-SCNC: 25 MMOL/L — SIGNIFICANT CHANGE UP (ref 22–31)
CREAT SERPL-MCNC: 3.23 MG/DL — HIGH (ref 0.5–1.3)
EOSINOPHIL # BLD AUTO: 0.1 K/UL — SIGNIFICANT CHANGE UP (ref 0–0.5)
EOSINOPHIL NFR BLD AUTO: 1 % — SIGNIFICANT CHANGE UP (ref 0–6)
GLUCOSE SERPL-MCNC: 93 MG/DL — SIGNIFICANT CHANGE UP (ref 70–99)
HCT VFR BLD CALC: 32.2 % — LOW (ref 34.5–45)
HGB BLD-MCNC: 10.4 G/DL — LOW (ref 11.5–15.5)
INR BLD: 2.55 RATIO — HIGH (ref 0.88–1.16)
LYMPHOCYTES # BLD AUTO: 0.9 K/UL — LOW (ref 1–3.3)
LYMPHOCYTES # BLD AUTO: 16.4 % — SIGNIFICANT CHANGE UP (ref 13–44)
MCHC RBC-ENTMCNC: 29.9 PG — SIGNIFICANT CHANGE UP (ref 27–34)
MCHC RBC-ENTMCNC: 32.3 GM/DL — SIGNIFICANT CHANGE UP (ref 32–36)
MCV RBC AUTO: 92.7 FL — SIGNIFICANT CHANGE UP (ref 80–100)
MONOCYTES # BLD AUTO: 0.5 K/UL — SIGNIFICANT CHANGE UP (ref 0–0.9)
MONOCYTES NFR BLD AUTO: 9 % — SIGNIFICANT CHANGE UP (ref 2–14)
NEUTROPHILS # BLD AUTO: 4.1 K/UL — SIGNIFICANT CHANGE UP (ref 1.8–7.4)
NEUTROPHILS NFR BLD AUTO: 73.6 % — SIGNIFICANT CHANGE UP (ref 43–77)
PLATELET # BLD AUTO: 139 K/UL — LOW (ref 150–400)
POTASSIUM SERPL-MCNC: 4 MMOL/L — SIGNIFICANT CHANGE UP (ref 3.5–5.3)
POTASSIUM SERPL-SCNC: 4 MMOL/L — SIGNIFICANT CHANGE UP (ref 3.5–5.3)
PROTHROM AB SERPL-ACNC: 28.1 SEC — HIGH (ref 9.8–12.7)
RBC # BLD: 3.47 M/UL — LOW (ref 3.8–5.2)
RBC # FLD: 14.4 % — SIGNIFICANT CHANGE UP (ref 10.3–14.5)
SODIUM SERPL-SCNC: 144 MMOL/L — SIGNIFICANT CHANGE UP (ref 135–145)
WBC # BLD: 5.6 K/UL — SIGNIFICANT CHANGE UP (ref 3.8–10.5)
WBC # FLD AUTO: 5.6 K/UL — SIGNIFICANT CHANGE UP (ref 3.8–10.5)

## 2017-12-07 RX ORDER — WARFARIN SODIUM 2.5 MG/1
4 TABLET ORAL ONCE
Qty: 0 | Refills: 0 | Status: COMPLETED | OUTPATIENT
Start: 2017-12-07 | End: 2017-12-07

## 2017-12-07 RX ORDER — CARVEDILOL PHOSPHATE 80 MG/1
3 CAPSULE, EXTENDED RELEASE ORAL
Qty: 0 | Refills: 0 | COMMUNITY

## 2017-12-07 RX ORDER — PETROLATUM,WHITE
1 JELLY (GRAM) TOPICAL
Qty: 0 | Refills: 0 | COMMUNITY
Start: 2017-12-07

## 2017-12-07 RX ORDER — LABETALOL HCL 100 MG
1 TABLET ORAL
Qty: 90 | Refills: 0 | OUTPATIENT
Start: 2017-12-07 | End: 2018-01-06

## 2017-12-07 RX ORDER — CARVEDILOL PHOSPHATE 80 MG/1
1 CAPSULE, EXTENDED RELEASE ORAL
Qty: 60 | Refills: 0 | OUTPATIENT
Start: 2017-12-07

## 2017-12-07 RX ORDER — LABETALOL HCL 100 MG
1 TABLET ORAL
Qty: 0 | Refills: 0 | COMMUNITY

## 2017-12-07 RX ADMIN — Medication 300 MILLIGRAM(S): at 22:48

## 2017-12-07 RX ADMIN — Medication 50 MILLIGRAM(S): at 21:41

## 2017-12-07 RX ADMIN — Medication 1 SPRAY(S): at 05:26

## 2017-12-07 RX ADMIN — Medication 50 MILLIGRAM(S): at 14:39

## 2017-12-07 RX ADMIN — Medication 1 SPRAY(S): at 17:24

## 2017-12-07 RX ADMIN — Medication 300 MILLIGRAM(S): at 14:39

## 2017-12-07 RX ADMIN — Medication 20 MILLIGRAM(S): at 05:26

## 2017-12-07 RX ADMIN — Medication 1 APPLICATION(S): at 21:41

## 2017-12-07 RX ADMIN — Medication 10 MILLIGRAM(S): at 10:13

## 2017-12-07 RX ADMIN — Medication 500 MILLIGRAM(S): at 14:39

## 2017-12-07 RX ADMIN — Medication 300 MILLIGRAM(S): at 05:25

## 2017-12-07 RX ADMIN — CARVEDILOL PHOSPHATE 25 MILLIGRAM(S): 80 CAPSULE, EXTENDED RELEASE ORAL at 10:13

## 2017-12-07 RX ADMIN — CALCITRIOL 0.25 MICROGRAM(S): 0.5 CAPSULE ORAL at 14:39

## 2017-12-07 RX ADMIN — Medication 50 MILLIGRAM(S): at 05:25

## 2017-12-07 RX ADMIN — Medication 1 APPLICATION(S): at 10:13

## 2017-12-07 RX ADMIN — Medication 1 TABLET(S): at 17:24

## 2017-12-07 RX ADMIN — WARFARIN SODIUM 4 MILLIGRAM(S): 2.5 TABLET ORAL at 21:41

## 2017-12-07 RX ADMIN — Medication 325 MILLIGRAM(S): at 14:39

## 2017-12-07 RX ADMIN — CARVEDILOL PHOSPHATE 25 MILLIGRAM(S): 80 CAPSULE, EXTENDED RELEASE ORAL at 21:40

## 2017-12-07 RX ADMIN — PANTOPRAZOLE SODIUM 40 MILLIGRAM(S): 20 TABLET, DELAYED RELEASE ORAL at 05:26

## 2017-12-07 NOTE — PROGRESS NOTE ADULT - ASSESSMENT
dvt lle  nsvt  ckd 3  htn  vaginal bleeding - resolved    continue current care  antihypertensives per cardio  diuretics per renal   await a completed gyn consult

## 2017-12-07 NOTE — CONSULT NOTE ADULT - SUBJECTIVE AND OBJECTIVE BOX
R2 GYN Consult Note    56 y/o  postmenopausal (LMP age 41) with multiple medical problems including coagulopathy on coumadin now consulted for vaginal bleeding.      57y G_P_ presents with   HPI:  58 yo F w/ hx of SLE, systolic CHF (EF 32%) s/p AICD, HTN, CKD III- IV, PE / left leg DVT dx , HIT, CAD (no stents), stage IV sacral ulcer, chronic fox, hx of recurrent pericardial effusion, AAA ~14cm in 2016 s/p repair in  w/ aortoiliac stent c/b post-operatively with development of left lower extremity compartment syndrome presented with one day history of nausea, decreased po intake and dizziness on standing. Pt states that she felt as though she was doing well after she was recently discharged. Taking all medications as prescribed. She helped with the cooking on , the following day she noticed that she was feeling a nit more tired than usual and decided to take the day to rest. Pt reported that she didn't have more oral intake yesterday. This am of presentation, she stated that she noticed the urine in her fox bag to be darker than usual, and she felt dizzy upon standing. Pt also reported dry mouth. No chest pain, SOB, palpitations. Did not lose consciousness. No sick contacts, fever/chills. 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)  Calf DVT (Deep Venous Thrombosis) (ICD9 453.42)  Adenomatous Goiter (ICD9 241.9)  Chronic Gout (ICD9 274.02)  LBBB (Left Bundle Branch Block) (ICD9 426.3)  CHF (Congestive Heart Failure) (ICD9 428.0)  Hx of aorta-iliac-femoral bypass  TOP (Termination of Pregnancy)  S/P Dilatation and Curettage  History of Tubal Ligation  s/p AAA (Abdominal Aortic Aneurysm) Repair: 2009  History of Cholecystectomy  S/P Partial Hysterectomy    Allergies    heparin (Unknown)  losartan (Anaphylaxis)  nitroglycerin (Other)    Intolerances      MEDICATIONS  (STANDING):  AQUAPHOR (petrolatum Ointment) 1 Application(s) Topical two times a day  ascorbic acid 500 milliGRAM(s) Oral daily  calcitriol   Capsule 0.25 MICROGram(s) Oral daily  carvedilol 25 milliGRAM(s) Oral every 12 hours  colchicine 0.6 milliGRAM(s) Oral every other day  ferrous    sulfate 325 milliGRAM(s) Oral daily  furosemide   Injectable 20 milliGRAM(s) IV Push daily  hydrALAZINE 50 milliGRAM(s) Oral every 8 hours  labetalol 300 milliGRAM(s) Oral three times a day  Nephro-alyse 1 Tablet(s) Oral daily  pantoprazole    Tablet 40 milliGRAM(s) Oral before breakfast  predniSONE   Tablet 10 milliGRAM(s) Oral daily  sodium chloride 0.65% Nasal 1 Spray(s) Both Nostrils two times a day    MEDICATIONS  (PRN):    FAMILY HISTORY:  No pertinent family history in first degree relatives    SOCIAL HISTORY:    Name of GYN Physician:  Date of Last Pap:  History of Abnormal Pap:  Date of Last Mammogram:  Date of Last Colonoscopy:       Vital Signs Last 24 Hrs  T(C): 36.6 (07 Dec 2017 14:20), Max: 36.8 (07 Dec 2017 12:44)  T(F): 97.9 (07 Dec 2017 14:20), Max: 98.2 (07 Dec 2017 12:44)  HR: 76 (07 Dec 2017 14:20) (71 - 89)  BP: 174/91 (07 Dec 2017 14:20) (162/95 - 175/97)  BP(mean): --  RR: 18 (07 Dec 2017 14:20) (18 - 18)  SpO2: 96% (07 Dec 2017 14:20) (93% - 96%)    PHYSICAL EXAM:      Constitutional: alert and oriented x 3    Breasts: no tenderness, no nodules    Respiratory: clear    Cardiovascular: regular rate and rhythm    Gastrointestinal: soft, non tender, + bowel sounds. No hepatosplenomegaly, no palpable masses    Genitourinary: NEFG  Cervix: closed/ long, no CMT  Uterus: normal size, non tender  Adnexa: non tender, no palpable masses    Rectal:     Extremities:    Neurological:    Skin:    Lymph Nodes:        LABS:                        10.4   5.6   )-----------( 139      ( 07 Dec 2017 06:27 )             32.2         144  |  106  |  70<H>  ----------------------------<  93  4.0   |  25  |  3.23<H>    Ca    9.1      07 Dec 2017 06:27  Mg     2.2     12-      PT/INR - ( 07 Dec 2017 06:27 )   PT: 28.1 sec;   INR: 2.55 ratio         PTT - ( 07 Dec 2017 06:27 )  PTT:38.0 sec          RADIOLOGY & ADDITIONAL STUDIES:    TVUS ():   EXAM:  US PELVIC COMPLETE                            PROCEDURE DATE:  2017            INTERPRETATION:  CLINICAL INFORMATION: Vaginal bleeding.    COMPARISON: Correlation is made with prior noncontrast abdominal CT dated   10/29/2017.    TECHNIQUE:     Transabdominal pelvic sonogram. Transvaginal sonography could not be   performed secondary to patient's clinical condition.    FINDINGS:    Uterus: Anteverted, measuring 7.4 x 3.4 x 4.7 cm. Within normal limits.    Endometrium: 3 mm. Within normal limits.    Right ovary: Not visualized     Left ovary: Not visualized.     Fluid: None.    Miscellaneous: Incidental note is made of a thrombosed left common iliac   artery aneurysm, measuring approximately 11.5 x 10 x 11.1 cm, with an   associated stent graft as noted on CT.    IMPRESSION:    Normal endometrial thickness of 3 mm.    Nonvisualization of the ovaries bilaterally.                    HECTOR HARRINGTON M.D., ATTENDING RADIOLOGIST  This document has been electronically signed. Dec  6 2017  1:15PM R2 GYN Consult Note    58 y/o  postmenopausal (LMP age 41) with multiple medical problems including coagulopathy on coumadin, consulted for vaginal bleeding.  Patient states that she first noted light spotting when this fox catheter was placed.  A few days ago, she noted wetness between her legs and noted bright read blood.  Bleeding has since resolved.  Patient has no new pelvic pain and has no history of postmenopausal bleeding.  Her last GYN visit was 2 years ago.    58 yo F w/ hx of SLE, systolic CHF (EF 32%) s/p AICD, HTN, CKD III- IV, PE / left leg DVT dx , HIT, CAD (no stents), stage IV sacral ulcer, chronic fox, hx of recurrent pericardial effusion, AAA ~14cm in 2016 s/p repair in  w/ aortoiliac stent c/b post-operatively with development of left lower extremity compartment syndrome presented with one day history of nausea, decreased po intake and dizziness on standing. Pt states that she felt as though she was doing well after she was recently discharged. Taking all medications as prescribed. She helped with the cooking on , the following day she noticed that she was feeling a nit more tired than usual and decided to take the day to rest. Pt reported that she didn't have more oral intake yesterday. This am of presentation, she stated that she noticed the urine in her fox bag to be darker than usual, and she felt dizzy upon standing. Pt also reported dry mouth. No chest pain, SOB, palpitations. Did not lose consciousness. No sick contacts, fever/chills. (2017 18:43)      Last Menstrual Period        OB/GYN HISTORY:  x 1, missed , elective termination  PAST MEDICAL & SURGICAL HISTORY: Anemia, Morbid obesity, HTN (Hypertension). 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), Calf DVT (Deep Venous Thrombosis) (ICD9 453.42)  Adenomatous Goiter (ICD9 241.9), Chronic Gout (ICD9 274.02), LBBB (Left Bundle Branch Block) (ICD9 426.3), CHF (Congestive Heart Failure) (ICD9 428.0), Hx of aorta-iliac-femoral bypass, TOP (Termination of Pregnancy), S/P Dilatation and Curettage, History of Tubal Ligation, s/p AAA (Abdominal Aortic Aneurysm) Repair: 2009, History of Cholecystectomy, S/P bilateral salpingo-oophorectomy    Allergies: heparin (Unknown), losartan (Anaphylaxis), nitroglycerin (Other)    MEDICATIONS  (STANDING):  AQUAPHOR (petrolatum Ointment) 1 Application(s) Topical two times a day  ascorbic acid 500 milliGRAM(s) Oral daily  calcitriol   Capsule 0.25 MICROGram(s) Oral daily  carvedilol 25 milliGRAM(s) Oral every 12 hours  colchicine 0.6 milliGRAM(s) Oral every other day  ferrous    sulfate 325 milliGRAM(s) Oral daily  furosemide   Injectable 20 milliGRAM(s) IV Push daily  hydrALAZINE 50 milliGRAM(s) Oral every 8 hours  labetalol 300 milliGRAM(s) Oral three times a day  Nephro-alyse 1 Tablet(s) Oral daily  pantoprazole    Tablet 40 milliGRAM(s) Oral before breakfast  predniSONE   Tablet 10 milliGRAM(s) Oral daily  sodium chloride 0.65% Nasal 1 Spray(s) Both Nostrils two times a day    FAMILY HISTORY:  No pertinent family history in first degree relatives      Name of GYN Physician: Dr Dileep Carcamo  History of Abnormal Pap: Denies       Vital Signs Last 24 Hrs  T(C): 36.6 (07 Dec 2017 14:20), Max: 36.8 (07 Dec 2017 12:44)  T(F): 97.9 (07 Dec 2017 14:20), Max: 98.2 (07 Dec 2017 12:44)  HR: 76 (07 Dec 2017 14:20) (71 - 89)  BP: 174/91 (07 Dec 2017 14:20) (162/95 - 175/97)  BP(mean): --  RR: 18 (07 Dec 2017 14:20) (18 - 18)  SpO2: 96% (07 Dec 2017 14:20) (93% - 96%)    PHYSICAL EXAM:      Constitutional: alert and oriented x 3    Gastrointestinal: soft, non tender. No hepatosplenomegaly, no palpable masses    Genitourinary: NEFG, indwelling catheter in place  Speculum exam not performed 2/2 inability to properly position patient  Bimanual exam: Uterus: normal size, non tender, adnexa: non tender, no palpable masses.  No vaginal bleeding      LABS:                        10.4   5.6   )-----------( 139      ( 07 Dec 2017 06:27 )             32.2         144  |  106  |  70<H>  ----------------------------<  93  4.0   |  25  |  3.23<H>    Ca    9.1      07 Dec 2017 06:27  Mg     2.2           PT/INR - ( 07 Dec 2017 06:27 )   PT: 28.1 sec;   INR: 2.55 ratio         PTT - ( 07 Dec 2017 06:27 )  PTT:38.0 sec          RADIOLOGY & ADDITIONAL STUDIES:    TVUS ():   EXAM:  US PELVIC COMPLETE                            PROCEDURE DATE:  2017            INTERPRETATION:  CLINICAL INFORMATION: Vaginal bleeding.    COMPARISON: Correlation is made with prior noncontrast abdominal CT dated   10/29/2017.    TECHNIQUE:     Transabdominal pelvic sonogram. Transvaginal sonography could not be   performed secondary to patient's clinical condition.    FINDINGS:    Uterus: Anteverted, measuring 7.4 x 3.4 x 4.7 cm. Within normal limits.    Endometrium: 3 mm. Within normal limits.    Right ovary: Not visualized     Left ovary: Not visualized.     Fluid: None.    Miscellaneous: Incidental note is made of a thrombosed left common iliac   artery aneurysm, measuring approximately 11.5 x 10 x 11.1 cm, with an   associated stent graft as noted on CT.    IMPRESSION:    Normal endometrial thickness of 3 mm.    Nonvisualization of the ovaries bilaterally.                    HECTOR HARRINGTON M.D., ATTENDING RADIOLOGIST  This document has been electronically signed. Dec  6 2017  1:15PM

## 2017-12-07 NOTE — CHART NOTE - NSCHARTNOTEFT_GEN_A_CORE
Pt unable to tolerate Prosource supplement. Recommending changing supplement to Suplena, with patient approval, to provide additional protein to pt due to stage V sacral ulcer. Will provide follow-up for tolerance.

## 2017-12-07 NOTE — CONSULT NOTE ADULT - ATTENDING COMMENTS
History as noted  Overweight  is primarily bedbound, and declines Hospital bed at home  has a cushion for WC but may need to be replaced  Has a left foot drop, with chronic soft tissue swelling of left leg and thigh  Also chronic scar of right external nares  Hook catheter is long standing  ischial wound redressed today , and Op f/u advised
Pt seen and examined. Agree with above assessment and plan. No evidence of uterine etiology and TVUS wnl with thin EMS. Recommend further workup for other etiology of bleeding. If atrophic vaginitis is cause pt can f/u with outside gyn for mgmt. reconsult as needed. Pt to f/u with outside GYN who she spoke to yesterday.

## 2017-12-07 NOTE — CONSULT NOTE ADULT - ASSESSMENT
56 yo F w/ hx of SLE, systolic CHF (EF 32%) s/p AICD, HTN, CKD III- IV, PE / left leg DVT dx 2009, HIT, CAD (no stents), stage IV sacral ulcer, chronic fox, hx of recurrent pericardial effusion, AAA ~14cm in 12/2016 s/p repair in 2010 w/ aortoiliac stent c/b post-operatively with development of left lower extremity   lymphedema December 2016  pericardial effusion requiring drainage/pericardiocentesis. She had another similar admission in 1/2017 and at that time also had pericardial drain placed. diagnosed with SLE. recent admission for UTI and chf treated. discharged now readmitted with dizziness and lightheadedness/fatigue/weakness       1- ckd IV likely now   2- chf   3- htn   4- hx gout  5- pericardial effusion    cr steady at this range  echo for pericardial effusion   lasix decrease to 40 mg iv qd  cont with colcrys  shpt cont with calcitriol as above  cont with coreg 25mg bid
A/P:  Rt Ischial chronic stage IV pressure ucler- Aquacel  LLE swelling / Resolving DVT- ACE wrap  LEG elevation    con't Nutrition (as tolerated)  con't Offloading   con't Pericare  Care as per medicine will follow w/ you  f/u [facility] outpatient at [facility] [address] [place] [address] [phone]   D/w team & attng  Thank you for this consult  Tanisha Weathers PA-C CWS [id number]
58 y/o  postmenopausal since age 41 on coumadin with reported vaginal bleeding, now resolved.  TVUS shows thin endometrium, 3 mm, and no blood noted in vagina.
58 yo female with extensive Pmhx admitted with SOB

## 2017-12-07 NOTE — CHART NOTE - NSCHARTNOTEFT_GEN_A_CORE
Nutrition Follow-Up   56 yo F with PMH of CKD III, systolic HF, HTN, and AAA s/p repair with aortoiliac stent in 2010, and gout.     Source: Patient [X ]  other [X ]: extensive medical chart review     Diet : Regular, low sodium with no carb Prosource 3x daily.     Pt is 56 yo F admitted with failure to thrive in adult. Per H&P, pt had low po intake PTA. Pt currently endorses good appetite, consuming all of her tray. She reports that her appetite "fluctuates", but that she eats "because she knows she has to". Pt refused to try Prosource during RD assessment because she states that she will not be able to tolerate it on an empty stomach. Pt expressed anxiety over the many diet restrictions she has.  Pt expresses understanding of diets, but feels overwhelmed and therefore not always compliant.       Patient reports [X ] nausea [X ] diarrhea [ X] other: Pt states she feels nauseous when medication is taken on an empty stomach. RD recommended taking medications with/after food.        PO intake: % [ X]  other : consistently      Source for PO intake [X ] Patient     Current Weight: 247 pounds   2 pound wt. loss since initial RD assessment (249 pounds) likely due to the loss of edema.     Pertinent Medications: MEDICATIONS  (STANDING):  ascorbic acid 500 milliGRAM(s) Oral daily  calcitriol   Capsule 0.25 MICROGram(s) Oral daily  ferrous    sulfate 325 milliGRAM(s) Oral daily  predniSONE   Tablet 10 milliGRAM(s) Oral daily  sodium chloride 0.65% Nasal 1 Spray(s) Both Nostrils two times a day      Pertinent Labs:  12-07 Na144 mmol/L Glu 93 mg/dL K+ 4.0 mmol/L Cr  3.23 mg/dL<H> BUN 70 mg/dL<H> Phos n/a         Skin: stage IV sacral wound   Edeam: 2+ L and R ankle; 4+ L and R foot     Estimated Needs:   [X ] no change since previous assessment: used IBW+10% for calculations on initial assessment (137.5 pounds).   Energy needs: 3325-6179 kcal/day (25-30kcal/kg)  Protein needs: 46.72-62.3 g/day (0.75-1.0g/kg)  Fluid needs: defer to team due to CHF and CKD         Previous Nutrition Diagnosis:     [X] Limited adherence to nutrition - related recommendations          Nutrition Diagnosis is [X ] ongoing: diagnosis is still pertinent as pt expressed she feels overwhelmed with the many diet restrictions and therefore not always compliant.          Interventions:     [ X] Other: continue with current low sodium diet and Prosource supplements 3x daily; encourage continued po and supplement intake >75% to meet estimated needs.        Monitoring and Evaluation:     [X ] PO intake [X] Tolerance to diet prescription [ X] weights [X ] follow up per protocol    [ X] other: RD to remain available as needed Nutrition Follow-Up   56 yo F with PMH of CKD III, systolic HF, HTN, and AAA s/p repair with aortoiliac stent in 2010, and gout.     Source: Patient [X ]  other [X ]: extensive medical chart review     Diet : Regular, low sodium with no carb Prosource 3x daily.     Pt is 56 yo F admitted with failure to thrive in adult. Per H&P, pt had low po intake PTA. Pt currently endorses good appetite, consuming all of her tray. She reports that her appetite "fluctuates", but that she eats "because she knows she has to". Pt refused to try Prosource during RD assessment because she states that she will not be able to tolerate it on an empty stomach. Pt expressed anxiety over the many diet restrictions she has.  Pt expresses understanding of diets, but feels overwhelmed and therefore not always compliant.       Patient reports [X ] nausea [X ] diarrhea [ X] other: Pt states she feels nauseous when medication is taken on an empty stomach. RD recommended taking medications with/after food.        PO intake: % [ X]  other : consistently      Source for PO intake [X ] Patient     Current Weight: 247 pounds   2 pound wt. loss since initial RD assessment (249 pounds) likely due to the loss of edema.     Pertinent Medications: MEDICATIONS  (STANDING):  ascorbic acid 500 milliGRAM(s) Oral daily  calcitriol   Capsule 0.25 MICROGram(s) Oral daily  ferrous    sulfate 325 milliGRAM(s) Oral daily  predniSONE   Tablet 10 milliGRAM(s) Oral daily  sodium chloride 0.65% Nasal 1 Spray(s) Both Nostrils two times a day      Pertinent Labs:  12-07 Na144 mmol/L Glu 93 mg/dL K+ 4.0 mmol/L Cr  3.23 mg/dL<H> BUN 70 mg/dL<H> Phos n/a         Skin: stage IV sacral wound   Edeam: 2+ L and R ankle; 4+ L and R foot     Estimated Needs:   [X ] no change since previous assessment: used IBW+10% for calculations on initial assessment (137.5 pounds).   Energy needs: 5577-6905 kcal/day (25-30kcal/kg)  Protein needs: 46.72-62.3 g/day (0.75-1.0g/kg)  Fluid needs: defer to team due to CHF and CKD         Previous Nutrition Diagnosis:     [X] Limited adherence to nutrition - related recommendations          Nutrition Diagnosis is [X ] ongoing: diagnosis is still pertinent as pt expressed she feels overwhelmed with the many diet restrictions and therefore not always compliant.          Interventions:     [ X] Other: continue with current low sodium diet and Prosource supplements 3x daily; encourage continued po and supplement intake >75% to meet estimated needs.        Monitoring and Evaluation:     [X ] PO intake [X] Tolerance to diet prescription [ X] weights [X ] follow up per protocol    [ X] other: Although creatinine level is elevated, stage IV pressure ulcer present therefore Prosource indicated at this time. Monitor creatinine in need to adjust for supplement. RD to remain available as needed

## 2017-12-07 NOTE — CONSULT NOTE ADULT - PROBLEM SELECTOR RECOMMENDATION 9
Likely secondary to acute on chronic systolic heart failure   lasix 40 mg IV daily
-No evidence of vaginal bleeding on exam and TVUS without gynecologic abnormality.  Rule out rectal bleeding with occult blood test  -Patient to follow-up with outpatient GYN for routine care, or earlier if vaginal bleeding resumes.  Would consider endometrial biopsy at that time.    d/w Dr Destini Willis PGY2

## 2017-12-08 LAB
ANION GAP SERPL CALC-SCNC: 15 MMOL/L — SIGNIFICANT CHANGE UP (ref 5–17)
BUN SERPL-MCNC: 70 MG/DL — HIGH (ref 7–23)
CALCIUM SERPL-MCNC: 8.5 MG/DL — SIGNIFICANT CHANGE UP (ref 8.4–10.5)
CHLORIDE SERPL-SCNC: 105 MMOL/L — SIGNIFICANT CHANGE UP (ref 96–108)
CO2 SERPL-SCNC: 23 MMOL/L — SIGNIFICANT CHANGE UP (ref 22–31)
CREAT SERPL-MCNC: 3.15 MG/DL — HIGH (ref 0.5–1.3)
GLUCOSE SERPL-MCNC: 77 MG/DL — SIGNIFICANT CHANGE UP (ref 70–99)
HCT VFR BLD CALC: 31.3 % — LOW (ref 34.5–45)
HGB BLD-MCNC: 10.4 G/DL — LOW (ref 11.5–15.5)
INR BLD: 2.74 RATIO — HIGH (ref 0.88–1.16)
MCHC RBC-ENTMCNC: 30.4 PG — SIGNIFICANT CHANGE UP (ref 27–34)
MCHC RBC-ENTMCNC: 33 GM/DL — SIGNIFICANT CHANGE UP (ref 32–36)
MCV RBC AUTO: 92 FL — SIGNIFICANT CHANGE UP (ref 80–100)
PLATELET # BLD AUTO: 146 K/UL — LOW (ref 150–400)
POTASSIUM SERPL-MCNC: 4 MMOL/L — SIGNIFICANT CHANGE UP (ref 3.5–5.3)
POTASSIUM SERPL-SCNC: 4 MMOL/L — SIGNIFICANT CHANGE UP (ref 3.5–5.3)
PROTHROM AB SERPL-ACNC: 30.2 SEC — HIGH (ref 9.8–12.7)
RBC # BLD: 3.41 M/UL — LOW (ref 3.8–5.2)
RBC # FLD: 14.4 % — SIGNIFICANT CHANGE UP (ref 10.3–14.5)
SODIUM SERPL-SCNC: 143 MMOL/L — SIGNIFICANT CHANGE UP (ref 135–145)
WBC # BLD: 5.6 K/UL — SIGNIFICANT CHANGE UP (ref 3.8–10.5)
WBC # FLD AUTO: 5.6 K/UL — SIGNIFICANT CHANGE UP (ref 3.8–10.5)

## 2017-12-08 RX ORDER — ISOSORBIDE MONONITRATE 60 MG/1
1 TABLET, EXTENDED RELEASE ORAL
Qty: 30 | Refills: 0 | OUTPATIENT
Start: 2017-12-08 | End: 2018-01-07

## 2017-12-08 RX ORDER — HYDRALAZINE HCL 50 MG
25 TABLET ORAL ONCE
Qty: 0 | Refills: 0 | Status: COMPLETED | OUTPATIENT
Start: 2017-12-08 | End: 2017-12-08

## 2017-12-08 RX ORDER — ACETAMINOPHEN 500 MG
650 TABLET ORAL ONCE
Qty: 0 | Refills: 0 | Status: COMPLETED | OUTPATIENT
Start: 2017-12-08 | End: 2017-12-08

## 2017-12-08 RX ORDER — AMLODIPINE BESYLATE 2.5 MG/1
1 TABLET ORAL
Qty: 0 | Refills: 0 | COMMUNITY

## 2017-12-08 RX ORDER — HYDRALAZINE HCL 50 MG
50 TABLET ORAL
Qty: 0 | Refills: 0 | Status: DISCONTINUED | OUTPATIENT
Start: 2017-12-08 | End: 2017-12-09

## 2017-12-08 RX ORDER — WARFARIN SODIUM 2.5 MG/1
4 TABLET ORAL ONCE
Qty: 0 | Refills: 0 | Status: COMPLETED | OUTPATIENT
Start: 2017-12-08 | End: 2017-12-08

## 2017-12-08 RX ORDER — HYDRALAZINE HCL 50 MG
75 TABLET ORAL
Qty: 0 | Refills: 0 | Status: DISCONTINUED | OUTPATIENT
Start: 2017-12-08 | End: 2017-12-09

## 2017-12-08 RX ORDER — HYDRALAZINE HCL 50 MG
3 TABLET ORAL
Qty: 0 | Refills: 0 | COMMUNITY

## 2017-12-08 RX ORDER — ISOSORBIDE MONONITRATE 60 MG/1
30 TABLET, EXTENDED RELEASE ORAL DAILY
Qty: 0 | Refills: 0 | Status: DISCONTINUED | OUTPATIENT
Start: 2017-12-08 | End: 2017-12-09

## 2017-12-08 RX ORDER — WARFARIN SODIUM 2.5 MG/1
1 TABLET ORAL
Qty: 30 | Refills: 0 | OUTPATIENT
Start: 2017-12-08 | End: 2018-01-07

## 2017-12-08 RX ORDER — WARFARIN SODIUM 2.5 MG/1
1 TABLET ORAL
Qty: 0 | Refills: 0 | COMMUNITY

## 2017-12-08 RX ORDER — HYDRALAZINE HCL 50 MG
2 TABLET ORAL
Qty: 210 | Refills: 0 | OUTPATIENT
Start: 2017-12-08 | End: 2018-01-07

## 2017-12-08 RX ADMIN — Medication 50 MILLIGRAM(S): at 21:13

## 2017-12-08 RX ADMIN — Medication 325 MILLIGRAM(S): at 13:21

## 2017-12-08 RX ADMIN — Medication 25 MILLIGRAM(S): at 09:52

## 2017-12-08 RX ADMIN — CARVEDILOL PHOSPHATE 25 MILLIGRAM(S): 80 CAPSULE, EXTENDED RELEASE ORAL at 21:13

## 2017-12-08 RX ADMIN — ISOSORBIDE MONONITRATE 30 MILLIGRAM(S): 60 TABLET, EXTENDED RELEASE ORAL at 13:26

## 2017-12-08 RX ADMIN — Medication 1 APPLICATION(S): at 21:13

## 2017-12-08 RX ADMIN — Medication 10 MILLIGRAM(S): at 09:46

## 2017-12-08 RX ADMIN — Medication 1 SPRAY(S): at 05:23

## 2017-12-08 RX ADMIN — Medication 300 MILLIGRAM(S): at 05:23

## 2017-12-08 RX ADMIN — Medication 1 TABLET(S): at 18:12

## 2017-12-08 RX ADMIN — WARFARIN SODIUM 4 MILLIGRAM(S): 2.5 TABLET ORAL at 21:14

## 2017-12-08 RX ADMIN — CARVEDILOL PHOSPHATE 25 MILLIGRAM(S): 80 CAPSULE, EXTENDED RELEASE ORAL at 09:46

## 2017-12-08 RX ADMIN — Medication 50 MILLIGRAM(S): at 13:21

## 2017-12-08 RX ADMIN — CALCITRIOL 0.25 MICROGRAM(S): 0.5 CAPSULE ORAL at 13:21

## 2017-12-08 RX ADMIN — Medication 1 APPLICATION(S): at 09:46

## 2017-12-08 RX ADMIN — Medication 300 MILLIGRAM(S): at 14:04

## 2017-12-08 RX ADMIN — Medication 1 SPRAY(S): at 18:12

## 2017-12-08 RX ADMIN — Medication 50 MILLIGRAM(S): at 05:23

## 2017-12-08 RX ADMIN — Medication 500 MILLIGRAM(S): at 13:21

## 2017-12-08 RX ADMIN — Medication 0.6 MILLIGRAM(S): at 13:21

## 2017-12-08 RX ADMIN — PANTOPRAZOLE SODIUM 40 MILLIGRAM(S): 20 TABLET, DELAYED RELEASE ORAL at 05:23

## 2017-12-08 RX ADMIN — Medication 300 MILLIGRAM(S): at 22:15

## 2017-12-08 NOTE — PROGRESS NOTE ADULT - ASSESSMENT
dvt lle  nsvt  ckd 3  htn  vaginal bleeding - resolved    continue current care  continue a/c  check fobt  bp meds adjusted by cardio  if fobt neg may dc to home

## 2017-12-09 LAB
ANION GAP SERPL CALC-SCNC: 12 MMOL/L — SIGNIFICANT CHANGE UP (ref 5–17)
BUN SERPL-MCNC: 66 MG/DL — HIGH (ref 7–23)
CALCIUM SERPL-MCNC: 9.1 MG/DL — SIGNIFICANT CHANGE UP (ref 8.4–10.5)
CHLORIDE SERPL-SCNC: 106 MMOL/L — SIGNIFICANT CHANGE UP (ref 96–108)
CO2 SERPL-SCNC: 27 MMOL/L — SIGNIFICANT CHANGE UP (ref 22–31)
CREAT SERPL-MCNC: 3.18 MG/DL — HIGH (ref 0.5–1.3)
GLUCOSE SERPL-MCNC: 98 MG/DL — SIGNIFICANT CHANGE UP (ref 70–99)
HCT VFR BLD CALC: 31.7 % — LOW (ref 34.5–45)
HGB BLD-MCNC: 10.5 G/DL — LOW (ref 11.5–15.5)
INR BLD: 2.87 RATIO — HIGH (ref 0.88–1.16)
MCHC RBC-ENTMCNC: 30.5 PG — SIGNIFICANT CHANGE UP (ref 27–34)
MCHC RBC-ENTMCNC: 33 GM/DL — SIGNIFICANT CHANGE UP (ref 32–36)
MCV RBC AUTO: 92.2 FL — SIGNIFICANT CHANGE UP (ref 80–100)
PLATELET # BLD AUTO: 138 K/UL — LOW (ref 150–400)
POTASSIUM SERPL-MCNC: 3.9 MMOL/L — SIGNIFICANT CHANGE UP (ref 3.5–5.3)
POTASSIUM SERPL-SCNC: 3.9 MMOL/L — SIGNIFICANT CHANGE UP (ref 3.5–5.3)
PROTHROM AB SERPL-ACNC: 31.7 SEC — HIGH (ref 9.8–12.7)
RBC # BLD: 3.43 M/UL — LOW (ref 3.8–5.2)
RBC # FLD: 14.4 % — SIGNIFICANT CHANGE UP (ref 10.3–14.5)
SODIUM SERPL-SCNC: 145 MMOL/L — SIGNIFICANT CHANGE UP (ref 135–145)
WBC # BLD: 5.2 K/UL — SIGNIFICANT CHANGE UP (ref 3.8–10.5)
WBC # FLD AUTO: 5.2 K/UL — SIGNIFICANT CHANGE UP (ref 3.8–10.5)

## 2017-12-09 RX ORDER — WARFARIN SODIUM 2.5 MG/1
3 TABLET ORAL ONCE
Qty: 0 | Refills: 0 | Status: COMPLETED | OUTPATIENT
Start: 2017-12-09 | End: 2017-12-09

## 2017-12-09 RX ORDER — AMLODIPINE BESYLATE 2.5 MG/1
10 TABLET ORAL DAILY
Qty: 0 | Refills: 0 | Status: DISCONTINUED | OUTPATIENT
Start: 2017-12-09 | End: 2017-12-13

## 2017-12-09 RX ORDER — HYDRALAZINE HCL 50 MG
75 TABLET ORAL EVERY 8 HOURS
Qty: 0 | Refills: 0 | Status: DISCONTINUED | OUTPATIENT
Start: 2017-12-09 | End: 2017-12-09

## 2017-12-09 RX ORDER — HYDRALAZINE HCL 50 MG
5 TABLET ORAL ONCE
Qty: 0 | Refills: 0 | Status: COMPLETED | OUTPATIENT
Start: 2017-12-09 | End: 2017-12-09

## 2017-12-09 RX ORDER — HYDRALAZINE HCL 50 MG
50 TABLET ORAL THREE TIMES A DAY
Qty: 0 | Refills: 0 | Status: DISCONTINUED | OUTPATIENT
Start: 2017-12-09 | End: 2017-12-13

## 2017-12-09 RX ADMIN — CARVEDILOL PHOSPHATE 25 MILLIGRAM(S): 80 CAPSULE, EXTENDED RELEASE ORAL at 20:15

## 2017-12-09 RX ADMIN — Medication 5 MILLIGRAM(S): at 03:02

## 2017-12-09 RX ADMIN — Medication 500 MILLIGRAM(S): at 13:28

## 2017-12-09 RX ADMIN — CALCITRIOL 0.25 MICROGRAM(S): 0.5 CAPSULE ORAL at 13:28

## 2017-12-09 RX ADMIN — Medication 1 APPLICATION(S): at 08:54

## 2017-12-09 RX ADMIN — Medication 325 MILLIGRAM(S): at 13:28

## 2017-12-09 RX ADMIN — Medication 1 APPLICATION(S): at 22:05

## 2017-12-09 RX ADMIN — Medication 1 SPRAY(S): at 06:29

## 2017-12-09 RX ADMIN — Medication 75 MILLIGRAM(S): at 11:32

## 2017-12-09 RX ADMIN — Medication 300 MILLIGRAM(S): at 06:28

## 2017-12-09 RX ADMIN — Medication 10 MILLIGRAM(S): at 08:53

## 2017-12-09 RX ADMIN — Medication 300 MILLIGRAM(S): at 22:05

## 2017-12-09 RX ADMIN — PANTOPRAZOLE SODIUM 40 MILLIGRAM(S): 20 TABLET, DELAYED RELEASE ORAL at 06:28

## 2017-12-09 RX ADMIN — AMLODIPINE BESYLATE 10 MILLIGRAM(S): 2.5 TABLET ORAL at 17:49

## 2017-12-09 RX ADMIN — Medication 50 MILLIGRAM(S): at 22:05

## 2017-12-09 RX ADMIN — WARFARIN SODIUM 3 MILLIGRAM(S): 2.5 TABLET ORAL at 22:05

## 2017-12-09 RX ADMIN — CARVEDILOL PHOSPHATE 25 MILLIGRAM(S): 80 CAPSULE, EXTENDED RELEASE ORAL at 08:53

## 2017-12-09 RX ADMIN — Medication 300 MILLIGRAM(S): at 13:28

## 2017-12-09 RX ADMIN — Medication 1 TABLET(S): at 17:49

## 2017-12-09 NOTE — PROGRESS NOTE ADULT - ASSESSMENT
dvt lle  nsvt  ckd 3  htn  vaginal bleeding - resolved  uncontrolled htn    continue current care  continue a/c  bp meds per cardio and renal   monitor bp

## 2017-12-09 NOTE — PROVIDER CONTACT NOTE (OTHER) - ASSESSMENT
Pt A+O x4, /95 pt currently due for labetolol PO 300mg. Pt sates headache is in the occipital region and is at an 5.

## 2017-12-09 NOTE — PROVIDER CONTACT NOTE (OTHER) - BACKGROUND
Pt admitted with failure to thrive in adult. pmh of pressure ulcer stage 4 and dvt which pt is treated with warfarin

## 2017-12-09 NOTE — PROGRESS NOTE ADULT - ASSESSMENT
58 yo female with extensive Pmhx admitted with SOB in setting of acute on chronic systolic heart failure

## 2017-12-09 NOTE — PROVIDER CONTACT NOTE (OTHER) - SITUATION
Pt had a complaint of headache pt states "I feel like this when my blood pressure medications are changed".

## 2017-12-09 NOTE — PROVIDER CONTACT NOTE (OTHER) - ACTION/TREATMENT ORDERED:
Mg and Phos added to AM labs.  Continue to monitor.
KRISTINE Fenton, assessed pt at bedside. sent CBC Q6h. will continue to monitor.
NP aware will continue to monitor.
NP aware will reassess BP in an hour, Tylenol PO ordered, pt refusing tylenol.
NP ordered RN to administer hydralazine PO 75mg at 0400, RN attempted to administer Hydralazine 75mg PO at 0400 pt refused d/t throat pain associated with large doses of hydralazine, NP aware.

## 2017-12-09 NOTE — CHART NOTE - NSCHARTNOTEFT_GEN_A_CORE
Medicine NP  Pt with on going hypertension, systolic 180s last night, discharge cancelled yesterday with day team for uncontrolled BP. Pt does c/o moderate HA refused Tylenol, informs pain is 2 out of 10 after taking scheduled Labetalol and Hydralazine 5mg IVP x 1 given. B/P however remains elevated, pt refused taking p.o. Hydralazine earlier at 4 am this morning. Pt with h/o of eye blindness with Nitroglycerin, Imdur ordered for HTN held d/t possible risk w/ medication allergy. Pt also allergic to ARBs losartan. Will give day meds at 6 am as scheduled and repeat vital signs.   Pt is a 57F w/ hx of SLE, systolic CHF (EF 32%) s/p AICD, HTN, CKD III- IV, PE / left leg DVT dx 2009, HIT, CAD (no stents), stage IV sacral ulcer, chronic fox, hx of recurrent pericardial effusion, AAA ~14cm in 12/2016 s/p repair in 2010 w/ aortoiliac stent c/b post-operatively with development of left lower extremity compartment syndrome.  Presented with one day history of nausea, decreased po intake and dizziness on standing. Pt denies any numbness or tingling to extremity or any other neuro deficits, CP, SOB.     Clair Peters, KRISTINE  [phone] Medicine NP  Pt with on going hypertension, systolic 180s last night, discharge cancelled yesterday with day team for uncontrolled BP. Pt does c/o moderate HA refused Tylenol, informs pain is 2 out of 10 after taking scheduled Labetalol and Hydralazine 5mg IVP x 1 given. Pt verbalizes HA is stress induced pointing to the cerebellum region and back of neck region. B/P however remains elevated, pt refused  p.o. Hydralazine earlier at 4 am this morning and 75 mg indicating she only takes 50 mg. Pt with h/o of eye blindness with Nitroglycerin, Imdur ordered for HTN held d/t possible risk w/ medication allergy. Pt was given Imdur, informs her HA is d/t the medication. Pt also allergic to ARBs losartan. Will give day meds at 6 am as scheduled and repeat vital signs.   Pt is a 57F w/ hx of SLE, systolic CHF (EF 32%) s/p AICD, HTN, CKD III- IV, PE / left leg DVT dx 2009, HIT, CAD (no stents), stage IV sacral ulcer, chronic fox, hx of recurrent pericardial effusion, AAA ~14cm in 12/2016 s/p repair in 2010 w/ aortoiliac stent c/b post-operatively with development of left lower extremity compartment syndrome.  Presented with one day history of nausea, decreased po intake and dizziness on standing. Pt denies any numbness or tingling to extremity or any other neuro deficits, CP, SOB.   Pt received 6 am medication of Labetalol, refused Hydralazine p.o., B/P manually unchanged. Pt to receive Coreg now at 8 am. Dr. Chicas service called, still awaiting call back. Sign out given to day NP to follow up.    Clair Peters, KRISTINE  [phone]

## 2017-12-09 NOTE — PROGRESS NOTE ADULT - ASSESSMENT
58 yo F w/ hx of SLE, systolic CHF (EF 32%) s/p AICD, HTN, CKD III- IV, PE / left leg DVT dx 2009, HIT, CAD (no stents), stage IV sacral ulcer, chronic fox, hx of recurrent pericardial effusion, AAA ~14cm in 12/2016 s/p repair in 2010 w/ aortoiliac stent c/b post-operatively with development of left lower extremity   lymphedema December 2016  pericardial effusion requiring drainage/pericardiocentesis. She had another similar admission in 1/2017 and at that time also had pericardial drain placed. diagnosed with SLE. recent admission for UTI and chf treated. discharged now readmitted with dizziness and lightheadedness/fatigue/weakness       1- ckd IV likely now new baseline   2- chf   3- htn   4- hx gout  5- pericardial effusion    resume lasix at 40mg daily in am   check cr and lytes   trend hb   cont with coreg and labetalol along hydralazine   resume norvasc 10 mg dialy

## 2017-12-10 LAB
ANION GAP SERPL CALC-SCNC: 13 MMOL/L — SIGNIFICANT CHANGE UP (ref 5–17)
BUN SERPL-MCNC: 67 MG/DL — HIGH (ref 7–23)
CALCIUM SERPL-MCNC: 9 MG/DL — SIGNIFICANT CHANGE UP (ref 8.4–10.5)
CHLORIDE SERPL-SCNC: 106 MMOL/L — SIGNIFICANT CHANGE UP (ref 96–108)
CO2 SERPL-SCNC: 26 MMOL/L — SIGNIFICANT CHANGE UP (ref 22–31)
CREAT SERPL-MCNC: 3.05 MG/DL — HIGH (ref 0.5–1.3)
GLUCOSE SERPL-MCNC: 77 MG/DL — SIGNIFICANT CHANGE UP (ref 70–99)
HCT VFR BLD CALC: 33.2 % — LOW (ref 34.5–45)
HGB BLD-MCNC: 10.8 G/DL — LOW (ref 11.5–15.5)
INR BLD: 3.08 RATIO — HIGH (ref 0.88–1.16)
MCHC RBC-ENTMCNC: 30 PG — SIGNIFICANT CHANGE UP (ref 27–34)
MCHC RBC-ENTMCNC: 32.4 GM/DL — SIGNIFICANT CHANGE UP (ref 32–36)
MCV RBC AUTO: 92.5 FL — SIGNIFICANT CHANGE UP (ref 80–100)
PLATELET # BLD AUTO: 149 K/UL — LOW (ref 150–400)
POTASSIUM SERPL-MCNC: 4.2 MMOL/L — SIGNIFICANT CHANGE UP (ref 3.5–5.3)
POTASSIUM SERPL-SCNC: 4.2 MMOL/L — SIGNIFICANT CHANGE UP (ref 3.5–5.3)
PROTHROM AB SERPL-ACNC: 34.4 SEC — HIGH (ref 9.8–12.7)
RBC # BLD: 3.59 M/UL — LOW (ref 3.8–5.2)
RBC # FLD: 14.5 % — SIGNIFICANT CHANGE UP (ref 10.3–14.5)
SODIUM SERPL-SCNC: 145 MMOL/L — SIGNIFICANT CHANGE UP (ref 135–145)
WBC # BLD: 5.8 K/UL — SIGNIFICANT CHANGE UP (ref 3.8–10.5)
WBC # FLD AUTO: 5.8 K/UL — SIGNIFICANT CHANGE UP (ref 3.8–10.5)

## 2017-12-10 RX ORDER — WARFARIN SODIUM 2.5 MG/1
0.5 TABLET ORAL ONCE
Qty: 0 | Refills: 0 | Status: DISCONTINUED | OUTPATIENT
Start: 2017-12-10 | End: 2017-12-10

## 2017-12-10 RX ADMIN — Medication 1 APPLICATION(S): at 20:38

## 2017-12-10 RX ADMIN — Medication 1 TABLET(S): at 17:05

## 2017-12-10 RX ADMIN — Medication 325 MILLIGRAM(S): at 13:44

## 2017-12-10 RX ADMIN — Medication 0.6 MILLIGRAM(S): at 13:44

## 2017-12-10 RX ADMIN — Medication 300 MILLIGRAM(S): at 22:48

## 2017-12-10 RX ADMIN — CARVEDILOL PHOSPHATE 25 MILLIGRAM(S): 80 CAPSULE, EXTENDED RELEASE ORAL at 20:38

## 2017-12-10 RX ADMIN — PANTOPRAZOLE SODIUM 40 MILLIGRAM(S): 20 TABLET, DELAYED RELEASE ORAL at 05:31

## 2017-12-10 RX ADMIN — CARVEDILOL PHOSPHATE 25 MILLIGRAM(S): 80 CAPSULE, EXTENDED RELEASE ORAL at 08:43

## 2017-12-10 RX ADMIN — Medication 1 APPLICATION(S): at 08:44

## 2017-12-10 RX ADMIN — Medication 500 MILLIGRAM(S): at 13:44

## 2017-12-10 RX ADMIN — Medication 50 MILLIGRAM(S): at 22:48

## 2017-12-10 RX ADMIN — Medication 300 MILLIGRAM(S): at 05:31

## 2017-12-10 RX ADMIN — Medication 10 MILLIGRAM(S): at 08:43

## 2017-12-10 RX ADMIN — CALCITRIOL 0.25 MICROGRAM(S): 0.5 CAPSULE ORAL at 13:44

## 2017-12-10 RX ADMIN — Medication 50 MILLIGRAM(S): at 05:31

## 2017-12-10 RX ADMIN — AMLODIPINE BESYLATE 10 MILLIGRAM(S): 2.5 TABLET ORAL at 05:31

## 2017-12-10 RX ADMIN — Medication 50 MILLIGRAM(S): at 13:44

## 2017-12-10 RX ADMIN — Medication 300 MILLIGRAM(S): at 13:44

## 2017-12-10 RX ADMIN — Medication 1 SPRAY(S): at 05:30

## 2017-12-11 LAB
ANION GAP SERPL CALC-SCNC: 13 MMOL/L — SIGNIFICANT CHANGE UP (ref 5–17)
BUN SERPL-MCNC: 66 MG/DL — HIGH (ref 7–23)
CALCIUM SERPL-MCNC: 9 MG/DL — SIGNIFICANT CHANGE UP (ref 8.4–10.5)
CHLORIDE SERPL-SCNC: 108 MMOL/L — SIGNIFICANT CHANGE UP (ref 96–108)
CO2 SERPL-SCNC: 25 MMOL/L — SIGNIFICANT CHANGE UP (ref 22–31)
CREAT SERPL-MCNC: 3.01 MG/DL — HIGH (ref 0.5–1.3)
GLUCOSE SERPL-MCNC: 104 MG/DL — HIGH (ref 70–99)
HCT VFR BLD CALC: 33.6 % — LOW (ref 34.5–45)
HGB BLD-MCNC: 10.7 G/DL — LOW (ref 11.5–15.5)
INR BLD: 2.86 RATIO — HIGH (ref 0.88–1.16)
MCHC RBC-ENTMCNC: 29.7 PG — SIGNIFICANT CHANGE UP (ref 27–34)
MCHC RBC-ENTMCNC: 32 GM/DL — SIGNIFICANT CHANGE UP (ref 32–36)
MCV RBC AUTO: 92.7 FL — SIGNIFICANT CHANGE UP (ref 80–100)
PLATELET # BLD AUTO: 157 K/UL — SIGNIFICANT CHANGE UP (ref 150–400)
POTASSIUM SERPL-MCNC: 3.8 MMOL/L — SIGNIFICANT CHANGE UP (ref 3.5–5.3)
POTASSIUM SERPL-SCNC: 3.8 MMOL/L — SIGNIFICANT CHANGE UP (ref 3.5–5.3)
PROTHROM AB SERPL-ACNC: 31.6 SEC — HIGH (ref 9.8–12.7)
RBC # BLD: 3.62 M/UL — LOW (ref 3.8–5.2)
RBC # FLD: 14.5 % — SIGNIFICANT CHANGE UP (ref 10.3–14.5)
SODIUM SERPL-SCNC: 146 MMOL/L — HIGH (ref 135–145)
WBC # BLD: 5.6 K/UL — SIGNIFICANT CHANGE UP (ref 3.8–10.5)
WBC # FLD AUTO: 5.6 K/UL — SIGNIFICANT CHANGE UP (ref 3.8–10.5)

## 2017-12-11 RX ORDER — WARFARIN SODIUM 2.5 MG/1
1 TABLET ORAL ONCE
Qty: 0 | Refills: 0 | Status: COMPLETED | OUTPATIENT
Start: 2017-12-11 | End: 2017-12-11

## 2017-12-11 RX ORDER — LABETALOL HCL 100 MG
400 TABLET ORAL THREE TIMES A DAY
Qty: 0 | Refills: 0 | Status: DISCONTINUED | OUTPATIENT
Start: 2017-12-11 | End: 2017-12-13

## 2017-12-11 RX ADMIN — AMLODIPINE BESYLATE 10 MILLIGRAM(S): 2.5 TABLET ORAL at 06:41

## 2017-12-11 RX ADMIN — CARVEDILOL PHOSPHATE 25 MILLIGRAM(S): 80 CAPSULE, EXTENDED RELEASE ORAL at 20:43

## 2017-12-11 RX ADMIN — Medication 50 MILLIGRAM(S): at 14:57

## 2017-12-11 RX ADMIN — CARVEDILOL PHOSPHATE 25 MILLIGRAM(S): 80 CAPSULE, EXTENDED RELEASE ORAL at 09:42

## 2017-12-11 RX ADMIN — WARFARIN SODIUM 1 MILLIGRAM(S): 2.5 TABLET ORAL at 21:48

## 2017-12-11 RX ADMIN — CALCITRIOL 0.25 MICROGRAM(S): 0.5 CAPSULE ORAL at 14:57

## 2017-12-11 RX ADMIN — Medication 300 MILLIGRAM(S): at 06:41

## 2017-12-11 RX ADMIN — Medication 400 MILLIGRAM(S): at 21:47

## 2017-12-11 RX ADMIN — Medication 50 MILLIGRAM(S): at 21:48

## 2017-12-11 RX ADMIN — Medication 1 SPRAY(S): at 06:42

## 2017-12-11 RX ADMIN — Medication 500 MILLIGRAM(S): at 14:58

## 2017-12-11 RX ADMIN — Medication 1 APPLICATION(S): at 20:44

## 2017-12-11 RX ADMIN — Medication 10 MILLIGRAM(S): at 09:42

## 2017-12-11 RX ADMIN — Medication 1 SPRAY(S): at 18:12

## 2017-12-11 RX ADMIN — Medication 400 MILLIGRAM(S): at 14:58

## 2017-12-11 RX ADMIN — Medication 1 APPLICATION(S): at 09:41

## 2017-12-11 RX ADMIN — PANTOPRAZOLE SODIUM 40 MILLIGRAM(S): 20 TABLET, DELAYED RELEASE ORAL at 06:40

## 2017-12-11 RX ADMIN — Medication 50 MILLIGRAM(S): at 06:40

## 2017-12-11 RX ADMIN — Medication 1 TABLET(S): at 20:44

## 2017-12-11 RX ADMIN — Medication 325 MILLIGRAM(S): at 14:57

## 2017-12-11 NOTE — PROGRESS NOTE ADULT - ASSESSMENT
uncontrolled hypertension  ckd 3  LLE dvt  chroinc lle edema    continue current care  appreciate cardio input  if bp stable on current regimen then can d/c home tiffanie with services.  plan to change fox tiffanie prior to d/c home.

## 2017-12-12 LAB
ANION GAP SERPL CALC-SCNC: 13 MMOL/L — SIGNIFICANT CHANGE UP (ref 5–17)
BUN SERPL-MCNC: 66 MG/DL — HIGH (ref 7–23)
CALCIUM SERPL-MCNC: 9.1 MG/DL — SIGNIFICANT CHANGE UP (ref 8.4–10.5)
CHLORIDE SERPL-SCNC: 107 MMOL/L — SIGNIFICANT CHANGE UP (ref 96–108)
CO2 SERPL-SCNC: 26 MMOL/L — SIGNIFICANT CHANGE UP (ref 22–31)
CREAT SERPL-MCNC: 3.11 MG/DL — HIGH (ref 0.5–1.3)
GLUCOSE SERPL-MCNC: 108 MG/DL — HIGH (ref 70–99)
INR BLD: 2.26 RATIO — HIGH (ref 0.88–1.16)
POTASSIUM SERPL-MCNC: 3.9 MMOL/L — SIGNIFICANT CHANGE UP (ref 3.5–5.3)
POTASSIUM SERPL-SCNC: 3.9 MMOL/L — SIGNIFICANT CHANGE UP (ref 3.5–5.3)
PROTHROM AB SERPL-ACNC: 25 SEC — HIGH (ref 9.8–12.7)
SODIUM SERPL-SCNC: 146 MMOL/L — HIGH (ref 135–145)

## 2017-12-12 RX ORDER — WARFARIN SODIUM 2.5 MG/1
4 TABLET ORAL ONCE
Qty: 0 | Refills: 0 | Status: COMPLETED | OUTPATIENT
Start: 2017-12-12 | End: 2017-12-12

## 2017-12-12 RX ORDER — WARFARIN SODIUM 2.5 MG/1
1 TABLET ORAL
Qty: 15 | Refills: 0 | OUTPATIENT
Start: 2017-12-12 | End: 2018-01-10

## 2017-12-12 RX ORDER — WARFARIN SODIUM 2.5 MG/1
1 TABLET ORAL
Qty: 0 | Refills: 0 | COMMUNITY
Start: 2017-12-12 | End: 2018-01-10

## 2017-12-12 RX ORDER — FUROSEMIDE 40 MG
40 TABLET ORAL DAILY
Qty: 0 | Refills: 0 | Status: DISCONTINUED | OUTPATIENT
Start: 2017-12-12 | End: 2017-12-13

## 2017-12-12 RX ADMIN — Medication 1 SPRAY(S): at 17:37

## 2017-12-12 RX ADMIN — Medication 400 MILLIGRAM(S): at 14:02

## 2017-12-12 RX ADMIN — Medication 1 TABLET(S): at 17:37

## 2017-12-12 RX ADMIN — Medication 0.6 MILLIGRAM(S): at 14:02

## 2017-12-12 RX ADMIN — AMLODIPINE BESYLATE 10 MILLIGRAM(S): 2.5 TABLET ORAL at 06:56

## 2017-12-12 RX ADMIN — Medication 50 MILLIGRAM(S): at 06:56

## 2017-12-12 RX ADMIN — Medication 10 MILLIGRAM(S): at 09:38

## 2017-12-12 RX ADMIN — Medication 400 MILLIGRAM(S): at 22:59

## 2017-12-12 RX ADMIN — Medication 50 MILLIGRAM(S): at 21:55

## 2017-12-12 RX ADMIN — Medication 325 MILLIGRAM(S): at 14:02

## 2017-12-12 RX ADMIN — Medication 1 APPLICATION(S): at 09:37

## 2017-12-12 RX ADMIN — Medication 400 MILLIGRAM(S): at 06:59

## 2017-12-12 RX ADMIN — CALCITRIOL 0.25 MICROGRAM(S): 0.5 CAPSULE ORAL at 14:02

## 2017-12-12 RX ADMIN — CARVEDILOL PHOSPHATE 25 MILLIGRAM(S): 80 CAPSULE, EXTENDED RELEASE ORAL at 09:38

## 2017-12-12 RX ADMIN — Medication 1 APPLICATION(S): at 21:55

## 2017-12-12 RX ADMIN — Medication 50 MILLIGRAM(S): at 14:02

## 2017-12-12 RX ADMIN — WARFARIN SODIUM 4 MILLIGRAM(S): 2.5 TABLET ORAL at 22:59

## 2017-12-12 RX ADMIN — CARVEDILOL PHOSPHATE 25 MILLIGRAM(S): 80 CAPSULE, EXTENDED RELEASE ORAL at 21:54

## 2017-12-12 RX ADMIN — Medication 500 MILLIGRAM(S): at 14:02

## 2017-12-12 RX ADMIN — PANTOPRAZOLE SODIUM 40 MILLIGRAM(S): 20 TABLET, DELAYED RELEASE ORAL at 06:56

## 2017-12-12 RX ADMIN — Medication 1 SPRAY(S): at 06:57

## 2017-12-12 NOTE — PROGRESS NOTE ADULT - ASSESSMENT
uncontrolled hypertension  ckd 3  LLE dvt  chronic lle edema    continue current care  discussed with cardio and renal   pt stable for d/c home on current regimen

## 2017-12-13 VITALS
DIASTOLIC BLOOD PRESSURE: 88 MMHG | SYSTOLIC BLOOD PRESSURE: 166 MMHG | HEART RATE: 84 BPM | WEIGHT: 249.78 LBS | TEMPERATURE: 98 F | RESPIRATION RATE: 18 BRPM | OXYGEN SATURATION: 96 %

## 2017-12-13 LAB
ANION GAP SERPL CALC-SCNC: 13 MMOL/L — SIGNIFICANT CHANGE UP (ref 5–17)
BUN SERPL-MCNC: 66 MG/DL — HIGH (ref 7–23)
CALCIUM SERPL-MCNC: 9 MG/DL — SIGNIFICANT CHANGE UP (ref 8.4–10.5)
CHLORIDE SERPL-SCNC: 108 MMOL/L — SIGNIFICANT CHANGE UP (ref 96–108)
CO2 SERPL-SCNC: 26 MMOL/L — SIGNIFICANT CHANGE UP (ref 22–31)
CREAT SERPL-MCNC: 3.03 MG/DL — HIGH (ref 0.5–1.3)
GLUCOSE SERPL-MCNC: 90 MG/DL — SIGNIFICANT CHANGE UP (ref 70–99)
INR BLD: 2.19 RATIO — HIGH (ref 0.88–1.16)
POTASSIUM SERPL-MCNC: 4.3 MMOL/L — SIGNIFICANT CHANGE UP (ref 3.5–5.3)
POTASSIUM SERPL-SCNC: 4.3 MMOL/L — SIGNIFICANT CHANGE UP (ref 3.5–5.3)
PROTHROM AB SERPL-ACNC: 24.3 SEC — HIGH (ref 9.8–12.7)
SODIUM SERPL-SCNC: 147 MMOL/L — HIGH (ref 135–145)

## 2017-12-13 RX ORDER — LABETALOL HCL 100 MG
2 TABLET ORAL
Qty: 180 | Refills: 0 | OUTPATIENT
Start: 2017-12-13 | End: 2018-01-11

## 2017-12-13 RX ORDER — AMLODIPINE BESYLATE 2.5 MG/1
1 TABLET ORAL
Qty: 0 | Refills: 0 | COMMUNITY
Start: 2017-12-13

## 2017-12-13 RX ORDER — FUROSEMIDE 40 MG
1 TABLET ORAL
Qty: 30 | Refills: 0 | OUTPATIENT
Start: 2017-12-13 | End: 2018-01-11

## 2017-12-13 RX ADMIN — Medication 40 MILLIGRAM(S): at 06:20

## 2017-12-13 RX ADMIN — PANTOPRAZOLE SODIUM 40 MILLIGRAM(S): 20 TABLET, DELAYED RELEASE ORAL at 06:16

## 2017-12-13 RX ADMIN — Medication 1 APPLICATION(S): at 08:54

## 2017-12-13 RX ADMIN — Medication 1 SPRAY(S): at 06:17

## 2017-12-13 RX ADMIN — AMLODIPINE BESYLATE 10 MILLIGRAM(S): 2.5 TABLET ORAL at 06:17

## 2017-12-13 RX ADMIN — Medication 400 MILLIGRAM(S): at 06:17

## 2017-12-13 RX ADMIN — Medication 50 MILLIGRAM(S): at 06:20

## 2017-12-13 RX ADMIN — Medication 10 MILLIGRAM(S): at 08:53

## 2017-12-13 RX ADMIN — CARVEDILOL PHOSPHATE 25 MILLIGRAM(S): 80 CAPSULE, EXTENDED RELEASE ORAL at 08:53

## 2017-12-13 NOTE — PROGRESS NOTE ADULT - PROBLEM SELECTOR PLAN 5
IV lasix as above
better compensated at present time

## 2017-12-13 NOTE — PROGRESS NOTE ADULT - PROBLEM SELECTOR PROBLEM 2
Pericardial effusion without cardiac tamponade

## 2017-12-13 NOTE — PROGRESS NOTE ADULT - PROBLEM SELECTOR PLAN 2
essentially unchanged   No temponade physiology
essentially unchanged   No temponade physiology at present time  outpatient follow up for repeat TTE. Pt is high risk for tamponade and/or hemorrhagic conversion
essentially unchanged   No temponade physiology

## 2017-12-13 NOTE — PROGRESS NOTE ADULT - PROVIDER SPECIALTY LIST ADULT
Cardiology
Internal Medicine
Nephrology
Wound Care
Cardiology
Internal Medicine
Internal Medicine

## 2017-12-13 NOTE — PROGRESS NOTE ADULT - SUBJECTIVE AND OBJECTIVE BOX
MEDICINE, PROGRESS NOTE [phone]    COSTEN, SANDRA 57y [id number]    Patient seen and examined.  Patient is a 57y old  Female who presents with a chief complaint of Dizziness on standing. (28 Nov 2017 09:59)  Pt has no current complaints.     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)  Chronic Gout (ICD9 274.02)  LBBB (Left Bundle Branch Block) (ICD9 426.3)  CHF (Congestive Heart Failure) (ICD9 428.0)  Hx of aorta-iliac-femoral bypass  TOP (Termination of Pregnancy)  S/P Dilatation and Curettage  History of Tubal Ligation  s/p AAA (Abdominal Aortic Aneurysm) Repair: 2009 2010  History of Cholecystectomy  S/P Partial Hysterectomy    MEDICATIONS  (STANDING):  AQUAPHOR (petrolatum Ointment) 1 Application(s) Topical two times a day  ascorbic acid 500 milliGRAM(s) Oral daily  calcitriol   Capsule 0.25 MICROGram(s) Oral daily  carvedilol 12.5 milliGRAM(s) Oral every 12 hours  colchicine 0.6 milliGRAM(s) Oral every other day  ferrous    sulfate 325 milliGRAM(s) Oral daily  furosemide    Tablet 80 milliGRAM(s) Oral daily  hydrALAZINE 50 milliGRAM(s) Oral every 8 hours  labetalol 300 milliGRAM(s) Oral three times a day  Nephro-alyse 1 Tablet(s) Oral daily  pantoprazole    Tablet 40 milliGRAM(s) Oral before breakfast  predniSONE   Tablet 10 milliGRAM(s) Oral daily  sodium chloride 0.65% Nasal 1 Spray(s) Both Nostrils two times a day    MEDICATIONS  (PRN):    Allergies    heparin (Unknown)  losartan (Anaphylaxis)  nitroglycerin (Other)    Intolerances        PHYSICAL EXAM:  Constitutional: NAD  HEENT: Normocephalic, EOMI  Neck:  No JVD  Respiratory: CTA B/L, No wheezes  Cardiovascular: S1, S2, RRR, + systolic murmur  Gastrointestinal: BS+, soft, NT/ND  Extremities: + peripheral edema le b/l  Neurological: AAOX3, no focal deficits  Psychiatric: Normal mood, normal affect  : No Hook    Vital Signs Last 24 Hrs  T(C): 37.6 (02 Dec 2017 13:27), Max: 37.6 (02 Dec 2017 13:27)  T(F): 99.7 (02 Dec 2017 13:27), Max: 99.7 (02 Dec 2017 13:27)  HR: 89 (02 Dec 2017 13:27) (75 - 93)  BP: 142/80 (02 Dec 2017 13:27) (142/80 - 169/72)  BP(mean): --  RR: 18 (02 Dec 2017 13:27) (18 - 18)  SpO2: 97% (02 Dec 2017 13:27) (95% - 97%)  I&O's Summary    01 Dec 2017 07:01  -  02 Dec 2017 07:00  --------------------------------------------------------  IN: 1040 mL / OUT: 2500 mL / NET: -1460 mL    02 Dec 2017 07:01  -  02 Dec 2017 16:04  --------------------------------------------------------  IN: 460 mL / OUT: 2250 mL / NET: -1790 mL        LABS:                        10.7   5.9   )-----------( 143      ( 02 Dec 2017 11:41 )             33.0     12-02    144  |  104  |  71<H>  ----------------------------<  97  3.8   |  26  |  3.23<H>    Ca    9.0      02 Dec 2017 11:41  Mg     1.9     12-02          Magnesium, Serum: 1.9 mg/dL (12-02 @ 11:41)  PT/INR - ( 02 Dec 2017 11:41 )   PT: 33.4 sec;   INR: 3.00 ratio
MEDICINE, PROGRESS NOTE [phone]    COSTEN, SANDRA 57y [id number]    Patient seen and examined.  Patient is a 57y old  Female who presents with a chief complaint of Dizziness on standing. (28 Nov 2017 09:59)  Pt feels ok. No further bleeding.    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)  Chronic Gout (ICD9 274.02)  LBBB (Left Bundle Branch Block) (ICD9 426.3)  CHF (Congestive Heart Failure) (ICD9 428.0)  Hx of aorta-iliac-femoral bypass  TOP (Termination of Pregnancy)  S/P Dilatation and Curettage  History of Tubal Ligation  s/p AAA (Abdominal Aortic Aneurysm) Repair: 2009 2010  History of Cholecystectomy  S/P Partial Hysterectomy    MEDICATIONS  (STANDING):  AQUAPHOR (petrolatum Ointment) 1 Application(s) Topical two times a day  ascorbic acid 500 milliGRAM(s) Oral daily  calcitriol   Capsule 0.25 MICROGram(s) Oral daily  carvedilol 25 milliGRAM(s) Oral every 12 hours  colchicine 0.6 milliGRAM(s) Oral every other day  ferrous    sulfate 325 milliGRAM(s) Oral daily  hydrALAZINE 50 milliGRAM(s) Oral every 8 hours  labetalol 300 milliGRAM(s) Oral three times a day  Nephro-alyse 1 Tablet(s) Oral daily  pantoprazole    Tablet 40 milliGRAM(s) Oral before breakfast  predniSONE   Tablet 10 milliGRAM(s) Oral daily  sodium chloride 0.65% Nasal 1 Spray(s) Both Nostrils two times a day  warfarin 4 milliGRAM(s) Oral once    MEDICATIONS  (PRN):    Allergies    heparin (Unknown)  losartan (Anaphylaxis)  nitroglycerin (Other)    Intolerances        PHYSICAL EXAM:  Constitutional: NAD  HEENT: Normocephalic, EOMI  Neck:  No JVD  Respiratory: CTA B/L, No wheezes  Cardiovascular: S1, S2, RRR, + systolic murmur  Gastrointestinal: BS+, soft, NT/ND  Extremities: No peripheral edema  Neurological: AAOX3, no focal deficits  Psychiatric: Normal mood, normal affect  : No Hook    Vital Signs Last 24 Hrs  T(C): 36.6 (07 Dec 2017 14:20), Max: 36.8 (07 Dec 2017 12:44)  T(F): 97.9 (07 Dec 2017 14:20), Max: 98.2 (07 Dec 2017 12:44)  HR: 76 (07 Dec 2017 14:20) (71 - 89)  BP: 174/91 (07 Dec 2017 14:20) (162/95 - 175/97)  BP(mean): --  RR: 18 (07 Dec 2017 14:20) (18 - 18)  SpO2: 96% (07 Dec 2017 14:20) (93% - 96%)  I&O's Summary    06 Dec 2017 07:01  -  07 Dec 2017 07:00  --------------------------------------------------------  IN: 180 mL / OUT: 1750 mL / NET: -1570 mL    07 Dec 2017 07:01  -  07 Dec 2017 19:18  --------------------------------------------------------  IN: 360 mL / OUT: 1200 mL / NET: -840 mL        LABS:                        10.4   5.6   )-----------( 139      ( 07 Dec 2017 06:27 )             32.2     12-07    144  |  106  |  70<H>  ----------------------------<  93  4.0   |  25  |  3.23<H>    Ca    9.1      07 Dec 2017 06:27  Mg     2.2     12-06
[facility]   [phone]  RENAL FOLLOW UP NOTE  --------------------------------------------------------------------------------  Chief Complaint:    24 hour events/subjective:    c/o LBP       PAST HISTORY  --------------------------------------------------------------------------------  No significant changes to PMH, PSH, FHx, SHx, unless otherwise noted    ALLERGIES & MEDICATIONS  --------------------------------------------------------------------------------  Allergies    heparin (Unknown)  losartan (Anaphylaxis)  nitroglycerin (Other)    Intolerances      Standing Inpatient Medications  AQUAPHOR (petrolatum Ointment) 1 Application(s) Topical two times a day  ascorbic acid 500 milliGRAM(s) Oral daily  calcitriol   Capsule 0.25 MICROGram(s) Oral daily  carvedilol 12.5 milliGRAM(s) Oral every 12 hours  colchicine 0.6 milliGRAM(s) Oral every other day  ferrous    sulfate 325 milliGRAM(s) Oral daily  hydrALAZINE 50 milliGRAM(s) Oral every 8 hours  labetalol 300 milliGRAM(s) Oral three times a day  Nephro-alyse 1 Tablet(s) Oral daily  pantoprazole    Tablet 40 milliGRAM(s) Oral before breakfast  predniSONE   Tablet 10 milliGRAM(s) Oral daily  sodium chloride 0.65% Nasal 1 Spray(s) Both Nostrils two times a day  warfarin 4 milliGRAM(s) Oral once    PRN Inpatient Medications      REVIEW OF SYSTEMS  --------------------------------------------------------------------------------    Gen: denies fevers/chills,  CVS: denies chest pain/palpitations  Resp: denies SOB/Cough  GI: Denies N/V/Abd pain  : Denies dysuria      VITALS/PHYSICAL EXAM  --------------------------------------------------------------------------------  T(C): 37.1 (12-04-17 @ 13:55), Max: 37.1 (12-04-17 @ 13:55)  HR: 81 (12-04-17 @ 13:55) (81 - 93)  BP: 158/82 (12-04-17 @ 13:55) (156/88 - 176/94)  RR: 18 (12-04-17 @ 13:55) (18 - 18)  SpO2: 97% (12-04-17 @ 13:55) (97% - 98%)  Wt(kg): --        12-03-17 @ 07:01  -  12-04-17 @ 07:00  --------------------------------------------------------  IN: 840 mL / OUT: 1850 mL / NET: -1010 mL    12-04-17 @ 07:01  -  12-04-17 @ 14:57  --------------------------------------------------------  IN: 540 mL / OUT: 800 mL / NET: -260 mL      Physical Exam:  	    Physical Exam:  	Gen: alert oriented place person and date   	Pulm: . no rales or ronchi or wheezing  	CV: RRR, S1/S2. no rub  	Back: No CVA tenderness; no sacral edema  	Abd: +BS, soft, nontender/nondistended  	: No suprapubic tenderness.               Extremity: No cyanosis, no edema left lymphedema improved as well.  	  LABS/STUDIES  --------------------------------------------------------------------------------              10.7   5.7   >-----------<  148      [12-04-17 @ 06:27]              32.9     144  |  105  |  70  ----------------------------<  83      [12-04-17 @ 06:27]  3.8   |  24  |  3.11        Ca     8.6     [12-04-17 @ 06:27]      Mg     2.1     [12-03-17 @ 06:49]      PT/INR: PT 29.8 , INR 2.70       [12-04-17 @ 06:27]  PTT: 38.5       [12-04-17 @ 06:27]      Creatinine Trend:  SCr 3.11 [12-04 @ 06:27]  SCr 3.21 [12-03 @ 06:49]  SCr 3.23 [12-02 @ 11:41]  SCr 3.16 [12-01 @ 07:19]  SCr 3.06 [11-30 @ 07:03]              Urinalysis - [11-25-17 @ 17:48]      Color Yellow / Appearance SL Turbid / SG 1.017 / pH 7.0      Gluc Negative / Ketone Negative  / Bili Negative / Urobili 2       Blood Negative / Protein >600 / Leuk Est Moderate / Nitrite Negative      RBC  / WBC 26-50 / Hyaline  / Gran  / Sq Epi  / Non Sq Epi OCC / Bacteria Moderate      Iron 48, TIBC --, %sat --      [10-12-17 @ 07:14]  Ferritin 847.0      [10-12-17 @ 07:14]  PTH -- (Ca 9.0)      [11-09-17 @ 08:51]   387  Vitamin D (25OH) 7.8      [11-09-17 @ 08:51]  HbA1c 4.5      [08-09-16 @ 06:49]  TSH 0.33      [10-22-17 @ 08:40]
Called by Dr. Chicas and asked to facilitate argatroban gtt for patient with known HIT positive and subtheraputic INR and hx of DVT. Argatroban to begin at 2mcg/kg/min to titrate to PTT 60-90. Follow closely for bleeding, bridging with coumadin
[facility]   [phone]  RENAL FOLLOW UP NOTE  --------------------------------------------------------------------------------  Chief Complaint:    24 hour events/subjective:    c/o HA     PAST HISTORY  --------------------------------------------------------------------------------  No significant changes to PMH, PSH, FHx, SHx, unless otherwise noted    ALLERGIES & MEDICATIONS  --------------------------------------------------------------------------------  Allergies    heparin (Unknown)  losartan (Anaphylaxis)  nitroglycerin (Other)    Intolerances      Standing Inpatient Medications  amLODIPine   Tablet 10 milliGRAM(s) Oral daily  AQUAPHOR (petrolatum Ointment) 1 Application(s) Topical two times a day  ascorbic acid 500 milliGRAM(s) Oral daily  calcitriol   Capsule 0.25 MICROGram(s) Oral daily  carvedilol 25 milliGRAM(s) Oral every 12 hours  colchicine 0.6 milliGRAM(s) Oral every other day  ferrous    sulfate 325 milliGRAM(s) Oral daily  hydrALAZINE 50 milliGRAM(s) Oral three times a day  isosorbide   mononitrate ER Tablet (IMDUR) 30 milliGRAM(s) Oral daily  labetalol 300 milliGRAM(s) Oral three times a day  Nephro-alyse 1 Tablet(s) Oral daily  pantoprazole    Tablet 40 milliGRAM(s) Oral before breakfast  predniSONE   Tablet 10 milliGRAM(s) Oral daily  sodium chloride 0.65% Nasal 1 Spray(s) Both Nostrils two times a day    PRN Inpatient Medications      REVIEW OF SYSTEMS  --------------------------------------------------------------------------------    Gen: denies fevers/chills,  CVS: denies chest pain/palpitations  Resp: denies SOB/Cough  GI: Denies N/V/Abd pain  : Denies dysuria/  All other systems were reviewed and are negative, except as noted.    VITALS/PHYSICAL EXAM  --------------------------------------------------------------------------------  T(C): 36.5 (12-09-17 @ 06:27), Max: 37.1 (12-08-17 @ 12:45)  HR: 80 (12-09-17 @ 12:10) (80 - 90)  BP: 170/79 (12-09-17 @ 12:10) (165/96 - 186/96)  RR: 18 (12-09-17 @ 06:27) (18 - 18)  SpO2: 96% (12-09-17 @ 06:27) (96% - 98%)  Wt(kg): --        12-08-17 @ 07:01  -  12-09-17 @ 07:00  --------------------------------------------------------  IN: 520 mL / OUT: 600 mL / NET: -80 mL    12-09-17 @ 07:01  -  12-09-17 @ 12:34  --------------------------------------------------------  IN: 0 mL / OUT: 925 mL / NET: -925 mL        Physical Exam:  	Gen: alert oriented place person and date   	Pulm:  no rales or ronchi or wheezing  	CV: RRR, S1/S2. no rub  	Back: No CVA tenderness; no sacral edema  	Abd: +BS, soft, nontender/nondistended  	: No suprapubic tenderness.               Extremity: No cyanosis, no edema no clubbing  	    LABS/STUDIES  --------------------------------------------------------------------------------              10.5   5.2   >-----------<  138      [12-09-17 @ 10:29]              31.7     145  |  106  |  66  ----------------------------<  98      [12-09-17 @ 10:29]  3.9   |  27  |  3.18        Ca     9.1     [12-09-17 @ 10:29]      PT/INR: PT 31.7 , INR 2.87       [12-09-17 @ 10:29]      Creatinine Trend:  SCr 3.18 [12-09 @ 10:29]  SCr 3.15 [12-08 @ 07:25]  SCr 3.23 [12-07 @ 06:27]  SCr 3.04 [12-06 @ 09:48]  SCr 2.95 [12-05 @ 06:58]              Urinalysis - [11-25-17 @ 17:48]      Color Yellow / Appearance SL Turbid / SG 1.017 / pH 7.0      Gluc Negative / Ketone Negative  / Bili Negative / Urobili 2       Blood Negative / Protein >600 / Leuk Est Moderate / Nitrite Negative      RBC  / WBC 26-50 / Hyaline  / Gran  / Sq Epi  / Non Sq Epi OCC / Bacteria Moderate      Iron 48, TIBC --, %sat --      [10-12-17 @ 07:14]  Ferritin 847.0      [10-12-17 @ 07:14]  PTH -- (Ca 9.0)      [11-09-17 @ 08:51]   387  Vitamin D (25OH) 7.8      [11-09-17 @ 08:51]  HbA1c 4.5      [08-09-16 @ 06:49]  TSH 0.33      [10-22-17 @ 08:40]
[facility]   [phone]  Dr. Queen (covering for Dr. Bennett)  RENAL FOLLOW UP NOTE  --------------------------------------------------------------------------------  Patient seen and examined.  Denies chest pain / palpitations / SOB.  c/o left sided back pain today    PAST HISTORY  --------------------------------------------------------------------------------  No significant changes to PMH, PSH, FHx, SHx, unless otherwise noted    ALLERGIES & MEDICATIONS  --------------------------------------------------------------------------------  Allergies    heparin (Unknown)  losartan (Anaphylaxis)  nitroglycerin (Other)    Intolerances      Standing Inpatient Medications  AQUAPHOR (petrolatum Ointment) 1 Application(s) Topical two times a day  ascorbic acid 500 milliGRAM(s) Oral daily  calcitriol   Capsule 0.25 MICROGram(s) Oral daily  carvedilol 12.5 milliGRAM(s) Oral every 12 hours  colchicine 0.6 milliGRAM(s) Oral every other day  ferrous    sulfate 325 milliGRAM(s) Oral daily  hydrALAZINE 50 milliGRAM(s) Oral every 8 hours  labetalol 300 milliGRAM(s) Oral three times a day  Nephro-alyse 1 Tablet(s) Oral daily  pantoprazole    Tablet 40 milliGRAM(s) Oral before breakfast  predniSONE   Tablet 10 milliGRAM(s) Oral daily  sodium chloride 0.65% Nasal 1 Spray(s) Both Nostrils two times a day    PRN Inpatient Medications      FOCUSED REVIEW OF SYSTEMS  --------------------------------------------------------------------------------  General: Denies fevers/rigors  CVS: Denies CP/palpitations  Resp: Denies SOB/cough  MSK: Reports left sided back pain      VITALS/PHYSICAL EXAM  --------------------------------------------------------------------------------  T(C): 36.7 (12-03-17 @ 03:55), Max: 37.6 (12-02-17 @ 13:27)  HR: 87 (12-03-17 @ 03:55) (82 - 89)  BP: 160/88 (12-03-17 @ 06:06) (142/80 - 166/86)  RR: 17 (12-03-17 @ 03:55) (17 - 18)  SpO2: 96% (12-03-17 @ 03:55) (96% - 97%)  Wt(kg): --        12-02-17 @ 07:01  -  12-03-17 @ 07:00  --------------------------------------------------------  IN: 880 mL / OUT: 3775 mL / NET: -2895 mL      Physical Exam:  	Gen: NAD, well-appearing  	Pulm: CTA B/L  	CV: RRR, S1S2  	Abd: +BS, soft, nontender/nondistended  	: No suprapubic tenderness.  + left CVA tenderness.  + fox          Extremity: No cyanosis, chronic LLE edema improved since yesterday  	Neuro: A&O x 3      LABS/STUDIES  --------------------------------------------------------------------------------              10.6   5.7   >-----------<  145      [12-03-17 @ 06:49]              33.0     144  |  104  |  73  ----------------------------<  78      [12-03-17 @ 06:49]  3.7   |  26  |  3.21        Ca     9.0     [12-03-17 @ 06:49]      Mg     2.1     [12-03-17 @ 06:49]      PT/INR: PT 35.7 , INR 3.20       [12-03-17 @ 06:49]      eGFR if Non African American: 15 mL/min/1.73M2 (12-03 @ 06:49)  eGFR if : 17 mL/min/1.73M2 (12-03 @ 06:49)    Creatinine Trend:  SCr 3.21 [12-03 @ 06:49]  SCr 3.23 [12-02 @ 11:41]  SCr 3.16 [12-01 @ 07:19]  SCr 3.06 [11-30 @ 07:03]  SCr 2.79 [11-29 @ 07:08]              Urinalysis - [11-25-17 @ 17:48]      Color Yellow / Appearance SL Turbid / SG 1.017 / pH 7.0      Gluc Negative / Ketone Negative  / Bili Negative / Urobili 2       Blood Negative / Protein >600 / Leuk Est Moderate / Nitrite Negative      RBC  / WBC 26-50 / Hyaline  / Gran  / Sq Epi  / Non Sq Epi OCC / Bacteria Moderate      Iron 48, TIBC --, %sat --      [10-12-17 @ 07:14]  Ferritin 847.0      [10-12-17 @ 07:14]  PTH -- (Ca 9.0)      [11-09-17 @ 08:51]   387  Vitamin D (25OH) 7.8      [11-09-17 @ 08:51]  HbA1c 4.5      [08-09-16 @ 06:49]  TSH 0.33      [10-22-17 @ 08:40]
MEDICINE, PROGRESS NOTE [phone]    COSTEN, SANDRA 57y [id number]    Patient seen and examined.  Patient is a 57y old  Female who presents with a chief complaint of Dizziness on standing. 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)  Chronic Gout (ICD9 274.02)  LBBB (Left Bundle Branch Block) (ICD9 426.3)  CHF (Congestive Heart Failure) (ICD9 428.0)  Hx of aorta-iliac-femoral bypass  TOP (Termination of Pregnancy)  S/P Dilatation and Curettage  History of Tubal Ligation  s/p AAA (Abdominal Aortic Aneurysm) Repair: 2009 2010  History of Cholecystectomy  S/P Partial Hysterectomy    MEDICATIONS  (STANDING):  amLODIPine   Tablet 10 milliGRAM(s) Oral daily  AQUAPHOR (petrolatum Ointment) 1 Application(s) Topical two times a day  argatroban Infusion 2 MICROgram(s)/kG/Min (13.572 mL/Hr) IV Continuous <Continuous>  ascorbic acid 500 milliGRAM(s) Oral daily  calcitriol   Capsule 0.25 MICROGram(s) Oral daily  carvedilol 12.5 milliGRAM(s) Oral every 12 hours  colchicine 0.6 milliGRAM(s) Oral every other day  ferrous    sulfate 325 milliGRAM(s) Oral daily  furosemide   Injectable 80 milliGRAM(s) IV Push daily  hydrALAZINE 50 milliGRAM(s) Oral every 8 hours  labetalol 300 milliGRAM(s) Oral three times a day  Nephro-alyse 1 Tablet(s) Oral daily  pantoprazole    Tablet 40 milliGRAM(s) Oral before breakfast  predniSONE   Tablet 10 milliGRAM(s) Oral daily  sodium chloride 0.65% Nasal 1 Spray(s) Both Nostrils two times a day    MEDICATIONS  (PRN):    Allergies    heparin (Unknown)  losartan (Anaphylaxis)  nitroglycerin (Other)    Intolerances        PHYSICAL EXAM:  Constitutional: NAD  HEENT: Normocephalic, EOMI  Neck:  No JVD  Respiratory: CTA B/L, No wheezes  Cardiovascular: S1, S2, RRR, + systolic murmur  Gastrointestinal: BS+, soft, NT/ND  Extremities: No peripheral edema  Neurological: AAOX3, no focal deficits  Psychiatric: Normal mood, normal affect  : No Hook    Vital Signs Last 24 Hrs  T(C): 36.8 (29 Nov 2017 05:04), Max: 36.8 (28 Nov 2017 20:36)  T(F): 98.2 (29 Nov 2017 05:04), Max: 98.2 (28 Nov 2017 20:36)  HR: 87 (29 Nov 2017 05:04) (82 - 87)  BP: 164/95 (29 Nov 2017 05:04) (157/85 - 164/95)  BP(mean): --  RR: 17 (29 Nov 2017 05:04) (17 - 18)  SpO2: 97% (29 Nov 2017 05:04) (96% - 97%)  I&O's Summary    28 Nov 2017 07:01  -  29 Nov 2017 07:00  --------------------------------------------------------  IN: 1136.8 mL / OUT: 2350 mL / NET: -1213.2 mL        LABS:                        9.9    5.85  )-----------( 157      ( 29 Nov 2017 07:08 )             32.0     11-29    143  |  107  |  69<H>  ----------------------------<  89  4.2   |  23  |  2.79<H>    Ca    8.9      29 Nov 2017 07:08
MEDICINE, PROGRESS NOTE [phone]    COSTEN, SANDRA 57y [id number]    Patient seen and examined.  Patient is a 57y old  Female who presents with a chief complaint of Dizziness on standing. (2017 18:43)  Pt has no current complaints, feels ok.    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)  Calf DVT (Deep Venous Thrombosis) (ICD9 453.42)  Adenomatous Goiter (ICD9 241.9)  Chronic Gout (ICD9 274.02)  LBBB (Left Bundle Branch Block) (ICD9 426.3)  CHF (Congestive Heart Failure) (ICD9 428.0)  Hx of aorta-iliac-femoral bypass  TOP (Termination of Pregnancy)  S/P Dilatation and Curettage  History of Tubal Ligation  s/p AAA (Abdominal Aortic Aneurysm) Repair: 2009  History of Cholecystectomy  S/P Partial Hysterectomy    MEDICATIONS  (STANDING):  amLODIPine   Tablet 10 milliGRAM(s) Oral daily  AQUAPHOR (petrolatum Ointment) 1 Application(s) Topical two times a day  argatroban Infusion 2 MICROgram(s)/kG/Min (13.572 mL/Hr) IV Continuous <Continuous>  ascorbic acid 500 milliGRAM(s) Oral daily  calcitriol   Capsule 0.25 MICROGram(s) Oral daily  carvedilol 12.5 milliGRAM(s) Oral every 12 hours  colchicine 0.6 milliGRAM(s) Oral every other day  ferrous    sulfate 325 milliGRAM(s) Oral daily  hydrALAZINE 50 milliGRAM(s) Oral every 8 hours  labetalol 300 milliGRAM(s) Oral three times a day  Nephro-alyse 1 Tablet(s) Oral daily  pantoprazole    Tablet 40 milliGRAM(s) Oral before breakfast  predniSONE   Tablet 10 milliGRAM(s) Oral daily  sodium chloride 0.65% Nasal 1 Spray(s) Both Nostrils two times a day  warfarin 5 milliGRAM(s) Oral once    MEDICATIONS  (PRN):    Allergies    heparin (Unknown)  losartan (Anaphylaxis)  nitroglycerin (Other)    Intolerances        PHYSICAL EXAM:  Constitutional: NAD  HEENT: Normocephalic, EOMI  Neck:  No JVD  Respiratory: CTA B/L, No wheezes  Cardiovascular: S1, S2, RRR, + systolic murmur  Gastrointestinal: BS+, soft, NT/ND  Extremities: +  peripheral edema le/ b/l  Neurological: AAOX3, no focal deficits  Psychiatric: Normal mood, normal affect  : + Hook    Vital Signs Last 24 Hrs  T(C): 37 (2017 13:41), Max: 37.1 (2017 22:38)  T(F): 98.6 (2017 13:41), Max: 98.7 (2017 22:38)  HR: 84 (2017 17:41) (74 - 92)  BP: 183/91 (2017 17:41) (152/78 - 183/91)  BP(mean): --  RR: 18 (2017 13:41) (18 - 20)  SpO2: 95% (2017 13:41) (95% - 100%)  I&O's Summary    2017 07:01  -  2017 18:17  --------------------------------------------------------  IN: 200 mL / OUT: 400 mL / NET: -200 mL        LABS:                        10.5   5.3   )-----------( 125      ( 2017 12:33 )             32.3         144  |  106  |  73<H>  ----------------------------<  108<H>  3.9   |  26  |  2.98<H>    Ca    9.2      2017 12:33    TPro  6.9  /  Alb  4.0  /  TBili  0.3  /  DBili  x   /  AST  28  /  ALT  19  /  [facility]os  71  -          Urinalysis Basic - ( 2017 17:48 )    Color: Yellow / Appearance: SL Turbid / S.017 / pH: x  Gluc: x / Ketone: Negative  / Bili: Negative / Urobili: 2   Blood: x / Protein: >600 mg/dL / Nitrite: Negative   Leuk Esterase: Moderate / RBC: x / WBC 26-50 /HPF   Sq Epi: x / Non Sq Epi: OCC /HPF / Bacteria: Moderate /HPF
MEDICINE, PROGRESS NOTE [phone]    COSTEN, SANDRA 57y [id number]    Patient seen and examined.  Patient is a 57y old  Female who presents with a chief complaint of Dizziness on standing. (25 Nov 2017 18:43)  Pt c/o of leg swelling and pain.    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)  Chronic Gout (ICD9 274.02)  LBBB (Left Bundle Branch Block) (ICD9 426.3)  CHF (Congestive Heart Failure) (ICD9 428.0)  Hx of aorta-iliac-femoral bypass  TOP (Termination of Pregnancy)  S/P Dilatation and Curettage  History of Tubal Ligation  s/p AAA (Abdominal Aortic Aneurysm) Repair: 2009 2010  History of Cholecystectomy  S/P Partial Hysterectomy    MEDICATIONS  (STANDING):  amLODIPine   Tablet 10 milliGRAM(s) Oral daily  AQUAPHOR (petrolatum Ointment) 1 Application(s) Topical two times a day  argatroban Infusion 2 MICROgram(s)/kG/Min (13.572 mL/Hr) IV Continuous <Continuous>  ascorbic acid 500 milliGRAM(s) Oral daily  calcitriol   Capsule 0.25 MICROGram(s) Oral daily  carvedilol 12.5 milliGRAM(s) Oral every 12 hours  colchicine 0.6 milliGRAM(s) Oral every other day  ferrous    sulfate 325 milliGRAM(s) Oral daily  hydrALAZINE 50 milliGRAM(s) Oral every 8 hours  labetalol 300 milliGRAM(s) Oral three times a day  Nephro-alyse 1 Tablet(s) Oral daily  pantoprazole    Tablet 40 milliGRAM(s) Oral before breakfast  predniSONE   Tablet 10 milliGRAM(s) Oral daily  sodium chloride 0.65% Nasal 1 Spray(s) Both Nostrils two times a day  warfarin 5 milliGRAM(s) Oral once    MEDICATIONS  (PRN):    Allergies    heparin (Unknown)  losartan (Anaphylaxis)  nitroglycerin (Other)    Intolerances        PHYSICAL EXAM:  Constitutional: NAD  HEENT: Normocephalic, EOMI  Neck:  No JVD  Respiratory: CTA B/L, No wheezes  Cardiovascular: S1, S2, RRR, + systolic murmur  Gastrointestinal: BS+, soft, NT/ND  Extremities: No peripheral edema  Neurological: AAOX3, no focal deficits  Psychiatric: Normal mood, normal affect  : No Hook    Vital Signs Last 24 Hrs  T(C): 36.5 (27 Nov 2017 13:48), Max: 36.9 (26 Nov 2017 20:08)  T(F): 97.7 (27 Nov 2017 13:48), Max: 98.5 (26 Nov 2017 20:08)  HR: 77 (27 Nov 2017 13:48) (77 - 86)  BP: 154/83 (27 Nov 2017 13:48) (154/83 - 173/93)  BP(mean): --  RR: 18 (27 Nov 2017 13:48) (18 - 18)  SpO2: 96% (27 Nov 2017 13:48) (94% - 96%)  I&O's Summary    26 Nov 2017 07:01  -  27 Nov 2017 07:00  --------------------------------------------------------  IN: 300 mL / OUT: 1850 mL / NET: -1550 mL    27 Nov 2017 07:01  -  27 Nov 2017 18:54  --------------------------------------------------------  IN: 1202 mL / OUT: 900 mL / NET: 302 mL        LABS:                        10.3   5.89  )-----------( 159      ( 27 Nov 2017 07:23 )             33.1     11-27    144  |  107  |  71<H>  ----------------------------<  92  4.2   |  22  |  3.03<H>    Ca    9.3      27 Nov 2017 07:18
MEDICINE, PROGRESS NOTE [phone]    COSTEN, SANDRA 57y [id number]    Patient seen and examined.  Patient is a 57y old  Female who presents with a chief complaint of Dizziness on standing. 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)  Chronic Gout (ICD9 274.02)  LBBB (Left Bundle Branch Block) (ICD9 426.3)  CHF (Congestive Heart Failure) (ICD9 428.0)  Hx of aorta-iliac-femoral bypass  TOP (Termination of Pregnancy)  S/P Dilatation and Curettage  History of Tubal Ligation  s/p AAA (Abdominal Aortic Aneurysm) Repair: 2009 2010  History of Cholecystectomy  S/P Partial Hysterectomy    MEDICATIONS  (STANDING):  AQUAPHOR (petrolatum Ointment) 1 Application(s) Topical two times a day  ascorbic acid 500 milliGRAM(s) Oral daily  calcitriol   Capsule 0.25 MICROGram(s) Oral daily  carvedilol 12.5 milliGRAM(s) Oral every 12 hours  colchicine 0.6 milliGRAM(s) Oral every other day  ferrous    sulfate 325 milliGRAM(s) Oral daily  furosemide    Tablet 80 milliGRAM(s) Oral daily  hydrALAZINE 50 milliGRAM(s) Oral every 8 hours  labetalol 300 milliGRAM(s) Oral three times a day  Nephro-alyse 1 Tablet(s) Oral daily  pantoprazole    Tablet 40 milliGRAM(s) Oral before breakfast  predniSONE   Tablet 10 milliGRAM(s) Oral daily  sodium chloride 0.65% Nasal 1 Spray(s) Both Nostrils two times a day  warfarin 5 milliGRAM(s) Oral once    MEDICATIONS  (PRN):    Allergies    heparin (Unknown)  losartan (Anaphylaxis)  nitroglycerin (Other)    Intolerances        PHYSICAL EXAM:  Constitutional: NAD  HEENT: Normocephalic, EOMI  Neck:  No JVD  Respiratory: CTA B/L, No wheezes  Cardiovascular: S1, S2, RRR, + systolic murmur  Gastrointestinal: BS+, soft, NT/ND  Extremities: + peripheral edema le b/l L>R  Neurological: AAOX3, no focal deficits  Psychiatric: Normal mood, normal affect  : + Hook    Vital Signs Last 24 Hrs  T(C): 36.7 (30 Nov 2017 12:12), Max: 36.7 (30 Nov 2017 12:12)  T(F): 98 (30 Nov 2017 12:12), Max: 98 (30 Nov 2017 12:12)  HR: 82 (30 Nov 2017 12:12) (82 - 92)  BP: 136/78 (30 Nov 2017 12:12) (136/78 - 165/90)  BP(mean): --  RR: 18 (30 Nov 2017 12:12) (18 - 18)  SpO2: 98% (30 Nov 2017 12:12) (96% - 98%)  I&O's Summary    29 Nov 2017 07:01  -  30 Nov 2017 07:00  --------------------------------------------------------  IN: 790 mL / OUT: 2500 mL / NET: -1710 mL    30 Nov 2017 07:01  -  30 Nov 2017 18:29  --------------------------------------------------------  IN: 460 mL / OUT: 2250 mL / NET: -1790 mL        LABS:                        9.9    5.85  )-----------( 157      ( 29 Nov 2017 07:08 )             32.0     11-30    142  |  105  |  74<H>  ----------------------------<  76  4.2   |  21<L>  |  3.06<H>    Ca    8.5      30 Nov 2017 07:03
MEDICINE, PROGRESS NOTE [phone]    COSTEN, SANDRA 57y [id number]    Patient seen and examined.  Patient is a 57y old  Female who presents with a chief complaint of Dizziness on standing. (28 Nov 2017 09:59)  pt has no new complaints.    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)  Chronic Gout (ICD9 274.02)  LBBB (Left Bundle Branch Block) (ICD9 426.3)  CHF (Congestive Heart Failure) (ICD9 428.0)  Hx of aorta-iliac-femoral bypass  TOP (Termination of Pregnancy)  S/P Dilatation and Curettage  History of Tubal Ligation  s/p AAA (Abdominal Aortic Aneurysm) Repair: 2009 2010  History of Cholecystectomy  S/P Partial Hysterectomy    MEDICATIONS  (STANDING):  AQUAPHOR (petrolatum Ointment) 1 Application(s) Topical two times a day  ascorbic acid 500 milliGRAM(s) Oral daily  calcitriol   Capsule 0.25 MICROGram(s) Oral daily  carvedilol 12.5 milliGRAM(s) Oral every 12 hours  colchicine 0.6 milliGRAM(s) Oral every other day  ferrous    sulfate 325 milliGRAM(s) Oral daily  hydrALAZINE 50 milliGRAM(s) Oral every 8 hours  labetalol 300 milliGRAM(s) Oral three times a day  Nephro-alyse 1 Tablet(s) Oral daily  pantoprazole    Tablet 40 milliGRAM(s) Oral before breakfast  predniSONE   Tablet 10 milliGRAM(s) Oral daily  sodium chloride 0.65% Nasal 1 Spray(s) Both Nostrils two times a day  warfarin 4 milliGRAM(s) Oral once    MEDICATIONS  (PRN):    Allergies    heparin (Unknown)  losartan (Anaphylaxis)  nitroglycerin (Other)    Intolerances        PHYSICAL EXAM:  Constitutional: NAD  HEENT: Normocephalic, EOMI  Neck:  No JVD  Respiratory: CTA B/L, No wheezes  Cardiovascular: S1, S2, RRR, + systolic murmur  Gastrointestinal: BS+, soft, NT/ND  Extremities: + peripheral edema L>R  Neurological: AAOX3, no focal deficits  Psychiatric: Normal mood, normal affect  : + Hook    Vital Signs Last 24 Hrs  T(C): 36.7 (04 Dec 2017 21:35), Max: 37.1 (04 Dec 2017 13:55)  T(F): 98.1 (04 Dec 2017 21:35), Max: 98.7 (04 Dec 2017 13:55)  HR: 75 (04 Dec 2017 21:35) (75 - 93)  BP: 179/91 (04 Dec 2017 21:35) (156/88 - 179/91)  BP(mean): --  RR: 18 (04 Dec 2017 21:35) (18 - 18)  SpO2: 96% (04 Dec 2017 21:35) (96% - 97%)  I&O's Summary    03 Dec 2017 07:01  -  04 Dec 2017 07:00  --------------------------------------------------------  IN: 840 mL / OUT: 1850 mL / NET: -1010 mL    04 Dec 2017 07:01  -  04 Dec 2017 22:25  --------------------------------------------------------  IN: 540 mL / OUT: 800 mL / NET: -260 mL        LABS:                        10.7   5.7   )-----------( 148      ( 04 Dec 2017 06:27 )             32.9     12-04    144  |  105  |  70<H>  ----------------------------<  83  3.8   |  24  |  3.11<H>    Ca    8.6      04 Dec 2017 06:27  Mg     2.1     12-03  PT/INR - ( 04 Dec 2017 06:27 )   PT: 29.8 sec;   INR: 2.70 ratio         PTT - ( 04 Dec 2017 06:27 )  PTT:38.5 sec
MEDICINE, PROGRESS NOTE [phone]    COSTEN, SANDRA 57y [id number]    Patient seen and examined.  Patient is a 57y old  Female who presents with a chief complaint of Dizziness on standing. (28 Nov 2017 09:59)  pt has no current complaints.    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)  Chronic Gout (ICD9 274.02)  LBBB (Left Bundle Branch Block) (ICD9 426.3)  CHF (Congestive Heart Failure) (ICD9 428.0)  Hx of aorta-iliac-femoral bypass  TOP (Termination of Pregnancy)  S/P Dilatation and Curettage  History of Tubal Ligation  s/p AAA (Abdominal Aortic Aneurysm) Repair: 2009 2010  History of Cholecystectomy  S/P Partial Hysterectomy    MEDICATIONS  (STANDING):  amLODIPine   Tablet 10 milliGRAM(s) Oral daily  AQUAPHOR (petrolatum Ointment) 1 Application(s) Topical two times a day  ascorbic acid 500 milliGRAM(s) Oral daily  calcitriol   Capsule 0.25 MICROGram(s) Oral daily  carvedilol 25 milliGRAM(s) Oral every 12 hours  colchicine 0.6 milliGRAM(s) Oral every other day  ferrous    sulfate 325 milliGRAM(s) Oral daily  hydrALAZINE 50 milliGRAM(s) Oral three times a day  labetalol 400 milliGRAM(s) Oral three times a day  Nephro-alyse 1 Tablet(s) Oral daily  pantoprazole    Tablet 40 milliGRAM(s) Oral before breakfast  predniSONE   Tablet 10 milliGRAM(s) Oral daily  sodium chloride 0.65% Nasal 1 Spray(s) Both Nostrils two times a day  warfarin 1 milliGRAM(s) Oral once    MEDICATIONS  (PRN):    Allergies    heparin (Unknown)  losartan (Anaphylaxis)  nitroglycerin (Other)    Intolerances        PHYSICAL EXAM:  Constitutional: NAD  HEENT: Normocephalic, EOMI  Neck:  No JVD  Respiratory: CTA B/L, No wheezes  Cardiovascular: S1, S2, RRR, + systolic murmur  Gastrointestinal: BS+, soft, NT/ND  Extremities: +peripheral edema le b/l L>R  Neurological: AAOX3, no focal deficits  Psychiatric: Normal mood, normal affect  : + Hook    Vital Signs Last 24 Hrs  T(C): 36.7 (11 Dec 2017 12:25), Max: 36.7 (10 Dec 2017 21:21)  T(F): 98.1 (11 Dec 2017 12:25), Max: 98.1 (11 Dec 2017 12:25)  HR: 79 (11 Dec 2017 12:25) (78 - 88)  BP: 147/85 (11 Dec 2017 12:25) (147/85 - 176/84)  BP(mean): --  RR: 18 (11 Dec 2017 12:25) (18 - 18)  SpO2: 92% (11 Dec 2017 12:25) (92% - 96%)  I&O's Summary    10 Dec 2017 07:01  -  11 Dec 2017 07:00  --------------------------------------------------------  IN: 1080 mL / OUT: 1350 mL / NET: -270 mL    11 Dec 2017 07:01  -  11 Dec 2017 19:29  --------------------------------------------------------  IN: 460 mL / OUT: 0 mL / NET: 460 mL        LABS:                        10.7   5.6   )-----------( 157      ( 11 Dec 2017 11:07 )             33.6     12-11    146<H>  |  108  |  66<H>  ----------------------------<  104<H>  3.8   |  25  |  3.01<H>    Ca    9.0      11 Dec 2017 11:07
MEDICINE, PROGRESS NOTE [phone]    COSTEN, SANDRA 57y [id number]    Patient seen and examined.  Patient is a 57y old  Female who presents with a chief complaint of Dizziness on standing. 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)  Chronic Gout (ICD9 274.02)  LBBB (Left Bundle Branch Block) (ICD9 426.3)  CHF (Congestive Heart Failure) (ICD9 428.0)  Hx of aorta-iliac-femoral bypass  TOP (Termination of Pregnancy)  S/P Dilatation and Curettage  History of Tubal Ligation  s/p AAA (Abdominal Aortic Aneurysm) Repair: 2009 2010  History of Cholecystectomy  S/P Partial Hysterectomy    MEDICATIONS  (STANDING):  amLODIPine   Tablet 10 milliGRAM(s) Oral daily  AQUAPHOR (petrolatum Ointment) 1 Application(s) Topical two times a day  ascorbic acid 500 milliGRAM(s) Oral daily  calcitriol   Capsule 0.25 MICROGram(s) Oral daily  carvedilol 25 milliGRAM(s) Oral every 12 hours  colchicine 0.6 milliGRAM(s) Oral every other day  ferrous    sulfate 325 milliGRAM(s) Oral daily  furosemide    Tablet 40 milliGRAM(s) Oral daily  hydrALAZINE 50 milliGRAM(s) Oral three times a day  labetalol 400 milliGRAM(s) Oral three times a day  Nephro-alyse 1 Tablet(s) Oral daily  pantoprazole    Tablet 40 milliGRAM(s) Oral before breakfast  predniSONE   Tablet 10 milliGRAM(s) Oral daily  sodium chloride 0.65% Nasal 1 Spray(s) Both Nostrils two times a day  warfarin 4 milliGRAM(s) Oral once    MEDICATIONS  (PRN):    Allergies    heparin (Unknown)  losartan (Anaphylaxis)  nitroglycerin (Other)    Intolerances        PHYSICAL EXAM:  Constitutional: NAD  HEENT: Normocephalic, EOMI  Neck:  No JVD  Respiratory: CTA B/L, No wheezes  Cardiovascular: S1, S2, RRR, + systolic murmur  Gastrointestinal: BS+, soft, NT/ND  Extremities: + peripheral edema le b/l L>R  Neurological: AAOX3, no focal deficits  Psychiatric: Normal mood, normal affect  : + Hook    Vital Signs Last 24 Hrs  T(C): 37.1 (12 Dec 2017 14:03), Max: 37.1 (12 Dec 2017 14:03)  T(F): 98.8 (12 Dec 2017 14:03), Max: 98.8 (12 Dec 2017 14:03)  HR: 80 (12 Dec 2017 14:03) (76 - 89)  BP: 169/75 (12 Dec 2017 14:03) (165/92 - 169/75)  BP(mean): --  RR: 17 (12 Dec 2017 14:03) (17 - 18)  SpO2: 97% (12 Dec 2017 14:03) (94% - 97%)  I&O's Summary    11 Dec 2017 07:01  -  12 Dec 2017 07:00  --------------------------------------------------------  IN: 920 mL / OUT: 1250 mL / NET: -330 mL    12 Dec 2017 07:01  -  12 Dec 2017 19:39  --------------------------------------------------------  IN: 480 mL / OUT: 0 mL / NET: 480 mL        LABS:                        10.7   5.6   )-----------( 157      ( 11 Dec 2017 11:07 )             33.6     12-12    146<H>  |  107  |  66<H>  ----------------------------<  108<H>  3.9   |  26  |  3.11<H>    Ca    9.1      12 Dec 2017 11:44
MEDICINE, PROGRESS NOTE [phone]    COSTEN, SANDRA 57y [id number]    Patient seen and examined.  Patient is a 57y old  Female who presents with a chief complaint of Dizziness on standing. (28 Nov 2017 09:59)  Pt had vaginal bleeding overnight.    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)  Chronic Gout (ICD9 274.02)  LBBB (Left Bundle Branch Block) (ICD9 426.3)  CHF (Congestive Heart Failure) (ICD9 428.0)  Hx of aorta-iliac-femoral bypass  TOP (Termination of Pregnancy)  S/P Dilatation and Curettage  History of Tubal Ligation  s/p AAA (Abdominal Aortic Aneurysm) Repair: 2009 2010  History of Cholecystectomy  S/P Partial Hysterectomy    MEDICATIONS  (STANDING):  AQUAPHOR (petrolatum Ointment) 1 Application(s) Topical two times a day  ascorbic acid 500 milliGRAM(s) Oral daily  calcitriol   Capsule 0.25 MICROGram(s) Oral daily  carvedilol 25 milliGRAM(s) Oral every 12 hours  colchicine 0.6 milliGRAM(s) Oral every other day  ferrous    sulfate 325 milliGRAM(s) Oral daily  hydrALAZINE 50 milliGRAM(s) Oral every 8 hours  labetalol 300 milliGRAM(s) Oral three times a day  Nephro-alyse 1 Tablet(s) Oral daily  pantoprazole    Tablet 40 milliGRAM(s) Oral before breakfast  predniSONE   Tablet 10 milliGRAM(s) Oral daily  sodium chloride 0.65% Nasal 1 Spray(s) Both Nostrils two times a day  warfarin 5 milliGRAM(s) Oral once    MEDICATIONS  (PRN):    Allergies    heparin (Unknown)  losartan (Anaphylaxis)  nitroglycerin (Other)    Intolerances        PHYSICAL EXAM:  Constitutional: NAD  HEENT: Normocephalic, EOMI  Neck:  No JVD  Respiratory: CTA B/L, No wheezes  Cardiovascular: S1, S2, RRR, + systolic murmur  Gastrointestinal: BS+, soft, NT/ND  Extremities: + peripheral edema L>R  Neurological: AAOX3, no focal deficits  Psychiatric: Normal mood, normal affect  : + Hook    Vital Signs Last 24 Hrs  T(C): 36.7 (05 Dec 2017 14:12), Max: 37.1 (05 Dec 2017 00:16)  T(F): 98.1 (05 Dec 2017 14:12), Max: 98.8 (05 Dec 2017 00:16)  HR: 75 (05 Dec 2017 14:12) (75 - 92)  BP: 165/93 (05 Dec 2017 14:12) (150/90 - 179/103)  BP(mean): --  RR: 18 (05 Dec 2017 14:12) (18 - 18)  SpO2: 97% (05 Dec 2017 14:12) (95% - 97%)  I&O's Summary    04 Dec 2017 07:01  -  05 Dec 2017 07:00  --------------------------------------------------------  IN: 720 mL / OUT: 1800 mL / NET: -1080 mL    05 Dec 2017 07:01  -  05 Dec 2017 19:50  --------------------------------------------------------  IN: 500 mL / OUT: 600 mL / NET: -100 mL        LABS:                        10.4   5.3   )-----------( 142      ( 05 Dec 2017 06:58 )             31.8     12-05    145  |  107  |  72<H>  ----------------------------<  78  3.8   |  24  |  2.95<H>    Ca    9.0      05 Dec 2017 06:58
MEDICINE, PROGRESS NOTE [phone]    COSTEN, SANDRA 57y [id number]    Patient seen and examined.  Patient is a 57y old  Female who presents with a chief complaint of Dizziness on standing. (28 Nov 2017 09:59)  Pt c/o leg discomfort left leg.    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)  Chronic Gout (ICD9 274.02)  LBBB (Left Bundle Branch Block) (ICD9 426.3)  CHF (Congestive Heart Failure) (ICD9 428.0)  Hx of aorta-iliac-femoral bypass  TOP (Termination of Pregnancy)  S/P Dilatation and Curettage  History of Tubal Ligation  s/p AAA (Abdominal Aortic Aneurysm) Repair: 2009 2010  History of Cholecystectomy  S/P Partial Hysterectomy    MEDICATIONS  (STANDING):  amLODIPine   Tablet 10 milliGRAM(s) Oral daily  AQUAPHOR (petrolatum Ointment) 1 Application(s) Topical two times a day  ascorbic acid 500 milliGRAM(s) Oral daily  calcitriol   Capsule 0.25 MICROGram(s) Oral daily  carvedilol 25 milliGRAM(s) Oral every 12 hours  colchicine 0.6 milliGRAM(s) Oral every other day  ferrous    sulfate 325 milliGRAM(s) Oral daily  hydrALAZINE 50 milliGRAM(s) Oral three times a day  labetalol 300 milliGRAM(s) Oral three times a day  Nephro-alyse 1 Tablet(s) Oral daily  pantoprazole    Tablet 40 milliGRAM(s) Oral before breakfast  predniSONE   Tablet 10 milliGRAM(s) Oral daily  sodium chloride 0.65% Nasal 1 Spray(s) Both Nostrils two times a day  warfarin 3 milliGRAM(s) Oral once    MEDICATIONS  (PRN):    Allergies    heparin (Unknown)  losartan (Anaphylaxis)  nitroglycerin (Other)    Intolerances        PHYSICAL EXAM:  Constitutional: NAD  HEENT: Normocephalic, EOMI  Neck:  No JVD  Respiratory: CTA B/L, No wheezes  Cardiovascular: S1, S2, RRR, + systolic murmur  Gastrointestinal: BS+, soft, NT/ND  Extremities: + peripheral edema LE B/L L>R  Neurological: AAOX3, no focal deficits  Psychiatric: Normal mood, normal affect  : + Hook    Vital Signs Last 24 Hrs  T(C): 36.5 (09 Dec 2017 06:27), Max: 36.7 (08 Dec 2017 20:30)  T(F): 97.7 (09 Dec 2017 06:27), Max: 98.1 (08 Dec 2017 20:30)  HR: 80 (09 Dec 2017 12:10) (80 - 90)  BP: 170/79 (09 Dec 2017 12:10) (165/96 - 186/96)  BP(mean): --  RR: 18 (09 Dec 2017 06:27) (18 - 18)  SpO2: 96% (09 Dec 2017 06:27) (96% - 98%)  I&O's Summary    08 Dec 2017 07:01  -  09 Dec 2017 07:00  --------------------------------------------------------  IN: 520 mL / OUT: 600 mL / NET: -80 mL    09 Dec 2017 07:01  -  09 Dec 2017 14:36  --------------------------------------------------------  IN: 0 mL / OUT: 925 mL / NET: -925 mL        LABS:                        10.5   5.2   )-----------( 138      ( 09 Dec 2017 10:29 )             31.7     12-09    145  |  106  |  66<H>  ----------------------------<  98  3.9   |  27  |  3.18<H>    Ca    9.1      09 Dec 2017 10:29
MEDICINE, PROGRESS NOTE [phone]    COSTEN, SANDRA 57y [id number]    Patient seen and examined.  Patient is a 57y old  Female who presents with a chief complaint of Dizziness on standing. (28 Nov 2017 09:59)  Pt feels ok.    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)  Chronic Gout (ICD9 274.02)  LBBB (Left Bundle Branch Block) (ICD9 426.3)  CHF (Congestive Heart Failure) (ICD9 428.0)  Hx of aorta-iliac-femoral bypass  TOP (Termination of Pregnancy)  S/P Dilatation and Curettage  History of Tubal Ligation  s/p AAA (Abdominal Aortic Aneurysm) Repair: 2009 2010  History of Cholecystectomy  S/P Partial Hysterectomy    MEDICATIONS  (STANDING):  AQUAPHOR (petrolatum Ointment) 1 Application(s) Topical two times a day  ascorbic acid 500 milliGRAM(s) Oral daily  calcitriol   Capsule 0.25 MICROGram(s) Oral daily  carvedilol 25 milliGRAM(s) Oral every 12 hours  colchicine 0.6 milliGRAM(s) Oral every other day  ferrous    sulfate 325 milliGRAM(s) Oral daily  furosemide   Injectable 20 milliGRAM(s) IV Push daily  hydrALAZINE 50 milliGRAM(s) Oral every 8 hours  labetalol 300 milliGRAM(s) Oral three times a day  Nephro-alyse 1 Tablet(s) Oral daily  pantoprazole    Tablet 40 milliGRAM(s) Oral before breakfast  predniSONE   Tablet 10 milliGRAM(s) Oral daily  sodium chloride 0.65% Nasal 1 Spray(s) Both Nostrils two times a day  warfarin 5 milliGRAM(s) Oral once    MEDICATIONS  (PRN):    Allergies    heparin (Unknown)  losartan (Anaphylaxis)  nitroglycerin (Other)    Intolerances        PHYSICAL EXAM:  Constitutional: NAD  HEENT: Normocephalic, EOMI  Neck:  No JVD  Respiratory: CTA B/L, No wheezes  Cardiovascular: S1, S2, RRR, + systolic murmur  Gastrointestinal: BS+, soft, NT/ND  Extremities: + peripheral edema le b/l L>R  Neurological: AAOX3, no focal deficits  Psychiatric: Normal mood, normal affect  : + Hook    Vital Signs Last 24 Hrs  T(C): 36.8 (06 Dec 2017 13:42), Max: 36.8 (05 Dec 2017 20:50)  T(F): 98.3 (06 Dec 2017 13:42), Max: 98.3 (06 Dec 2017 13:42)  HR: 77 (06 Dec 2017 15:05) (76 - 89)  BP: 182/96 (06 Dec 2017 15:05) (164/79 - 182/96)  BP(mean): --  RR: 18 (06 Dec 2017 15:05) (18 - 18)  SpO2: 96% (06 Dec 2017 15:05) (95% - 98%)  I&O's Summary    05 Dec 2017 07:01  -  06 Dec 2017 07:00  --------------------------------------------------------  IN: 1340 mL / OUT: 1400 mL / NET: -60 mL    06 Dec 2017 07:01  -  06 Dec 2017 18:19  --------------------------------------------------------  IN: 180 mL / OUT: 350 mL / NET: -170 mL        LABS:                        10.4   5.4   )-----------( 138      ( 06 Dec 2017 09:48 )             32.3     12-06    146<H>  |  107  |  68<H>  ----------------------------<  86  4.1   |  26  |  3.04<H>    Ca    9.3      06 Dec 2017 09:48  Mg     2.2     12-06          Magnesium, Serum: 2.2 mg/dL (12-06 @ 09:48)
MEDICINE, PROGRESS NOTE [phone]    COSTEN, SANDRA 57y [id number]    Patient seen and examined.  Patient is a 57y old  Female who presents with a chief complaint of Dizziness on standing. (28 Nov 2017 09:59)  Pt has no new complaints.    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)  Chronic Gout (ICD9 274.02)  LBBB (Left Bundle Branch Block) (ICD9 426.3)  CHF (Congestive Heart Failure) (ICD9 428.0)  Hx of aorta-iliac-femoral bypass  TOP (Termination of Pregnancy)  S/P Dilatation and Curettage  History of Tubal Ligation  s/p AAA (Abdominal Aortic Aneurysm) Repair: 2009 2010  History of Cholecystectomy  S/P Partial Hysterectomy    MEDICATIONS  (STANDING):  AQUAPHOR (petrolatum Ointment) 1 Application(s) Topical two times a day  ascorbic acid 500 milliGRAM(s) Oral daily  calcitriol   Capsule 0.25 MICROGram(s) Oral daily  carvedilol 25 milliGRAM(s) Oral every 12 hours  colchicine 0.6 milliGRAM(s) Oral every other day  ferrous    sulfate 325 milliGRAM(s) Oral daily  hydrALAZINE 50 milliGRAM(s) Oral every 8 hours  isosorbide   mononitrate ER Tablet (IMDUR) 30 milliGRAM(s) Oral daily  labetalol 300 milliGRAM(s) Oral three times a day  Nephro-alyse 1 Tablet(s) Oral daily  pantoprazole    Tablet 40 milliGRAM(s) Oral before breakfast  predniSONE   Tablet 10 milliGRAM(s) Oral daily  sodium chloride 0.65% Nasal 1 Spray(s) Both Nostrils two times a day  warfarin 4 milliGRAM(s) Oral once    MEDICATIONS  (PRN):    Allergies    heparin (Unknown)  losartan (Anaphylaxis)  nitroglycerin (Other)    Intolerances        PHYSICAL EXAM:  Constitutional: NAD  HEENT: Normocephalic, EOMI  Neck:  No JVD  Respiratory: CTA B/L, No wheezes  Cardiovascular: S1, S2, RRR, + systolic murmur  Gastrointestinal: BS+, soft, NT/ND  Extremities: No peripheral edema  Neurological: AAOX3, no focal deficits  Psychiatric: Normal mood, normal affect  : No Hook    Vital Signs Last 24 Hrs  T(C): 37.1 (08 Dec 2017 12:45), Max: 37.1 (08 Dec 2017 12:45)  T(F): 98.7 (08 Dec 2017 12:45), Max: 98.7 (08 Dec 2017 12:45)  HR: 80 (08 Dec 2017 12:45) (80 - 87)  BP: 168/86 (08 Dec 2017 12:45) (168/86 - 179/93)  BP(mean): --  RR: 18 (08 Dec 2017 12:45) (18 - 18)  SpO2: 96% (08 Dec 2017 12:45) (94% - 97%)  I&O's Summary    07 Dec 2017 07:01  -  08 Dec 2017 07:00  --------------------------------------------------------  IN: 360 mL / OUT: 2350 mL / NET: -1990 mL    08 Dec 2017 07:01  -  08 Dec 2017 17:16  --------------------------------------------------------  IN: 520 mL / OUT: 600 mL / NET: -80 mL        LABS:                        10.4   5.6   )-----------( 146      ( 08 Dec 2017 07:25 )             31.3     12-08    143  |  105  |  70<H>  ----------------------------<  77  4.0   |  23  |  3.15<H>    Ca    8.5      08 Dec 2017 07:25
MEDICINE, PROGRESS NOTE [phone]    COSTEN, SANDRA 57y [id number]    Patient seen and examined.  Patient is a 57y old  Female who presents with a chief complaint of Dizziness on standing. (28 Nov 2017 09:59)  Pt has no current compaints. 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)  Chronic Gout (ICD9 274.02)  LBBB (Left Bundle Branch Block) (ICD9 426.3)  CHF (Congestive Heart Failure) (ICD9 428.0)  Hx of aorta-iliac-femoral bypass  TOP (Termination of Pregnancy)  S/P Dilatation and Curettage  History of Tubal Ligation  s/p AAA (Abdominal Aortic Aneurysm) Repair: 2009 2010  History of Cholecystectomy  S/P Partial Hysterectomy    MEDICATIONS  (STANDING):  AQUAPHOR (petrolatum Ointment) 1 Application(s) Topical two times a day  ascorbic acid 500 milliGRAM(s) Oral daily  calcitriol   Capsule 0.25 MICROGram(s) Oral daily  carvedilol 12.5 milliGRAM(s) Oral every 12 hours  colchicine 0.6 milliGRAM(s) Oral every other day  ferrous    sulfate 325 milliGRAM(s) Oral daily  hydrALAZINE 50 milliGRAM(s) Oral every 8 hours  labetalol 300 milliGRAM(s) Oral three times a day  Nephro-alyse 1 Tablet(s) Oral daily  pantoprazole    Tablet 40 milliGRAM(s) Oral before breakfast  predniSONE   Tablet 10 milliGRAM(s) Oral daily  sodium chloride 0.65% Nasal 1 Spray(s) Both Nostrils two times a day  warfarin 0.5 milliGRAM(s) Oral once    MEDICATIONS  (PRN):    Allergies    heparin (Unknown)  losartan (Anaphylaxis)  nitroglycerin (Other)    Intolerances        PHYSICAL EXAM:  Constitutional: NAD  HEENT: Normocephalic, EOMI  Neck:  No JVD  Respiratory: CTA B/L, No wheezes  Cardiovascular: S1, S2, RRR, + systolic murmur  Gastrointestinal: BS+, soft, NT/ND  Extremities: + peripheral edema le b/l L>R  Neurological: AAOX3, no focal deficits  Psychiatric: Normal mood, normal affect  : + Hook chronic    Vital Signs Last 24 Hrs  T(C): 36.9 (03 Dec 2017 14:38), Max: 36.9 (02 Dec 2017 22:08)  T(F): 98.5 (03 Dec 2017 14:38), Max: 98.5 (02 Dec 2017 22:08)  HR: 83 (03 Dec 2017 18:25) (82 - 87)  BP: 176/94 (03 Dec 2017 18:25) (154/89 - 176/94)  BP(mean): --  RR: 17 (03 Dec 2017 14:38) (17 - 18)  SpO2: 98% (03 Dec 2017 14:38) (96% - 98%)  I&O's Summary    02 Dec 2017 07:01  -  03 Dec 2017 07:00  --------------------------------------------------------  IN: 880 mL / OUT: 3775 mL / NET: -2895 mL    03 Dec 2017 07:01  -  03 Dec 2017 19:19  --------------------------------------------------------  IN: 360 mL / OUT: 700 mL / NET: -340 mL        LABS:                        10.6   5.7   )-----------( 145      ( 03 Dec 2017 06:49 )             33.0     12-03    144  |  104  |  73<H>  ----------------------------<  78  3.7   |  26  |  3.21<H>    Ca    9.0      03 Dec 2017 06:49  Mg     2.1     12-03          Magnesium, Serum: 2.1 mg/dL (12-03 @ 06:49)
MEDICINE, PROGRESS NOTE [phone]    COSTEN, SANDRA 57y [id number]    Patient seen and examined.  Patient is a 57y old  Female who presents with a chief complaint of Dizziness on standing. (28 Nov 2017 09:59)  Pt has no new complaints.    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)  Chronic Gout (ICD9 274.02)  LBBB (Left Bundle Branch Block) (ICD9 426.3)  CHF (Congestive Heart Failure) (ICD9 428.0)  Hx of aorta-iliac-femoral bypass  TOP (Termination of Pregnancy)  S/P Dilatation and Curettage  History of Tubal Ligation  s/p AAA (Abdominal Aortic Aneurysm) Repair: 2009 2010  History of Cholecystectomy  S/P Partial Hysterectomy    MEDICATIONS  (STANDING):  AQUAPHOR (petrolatum Ointment) 1 Application(s) Topical two times a day  ascorbic acid 500 milliGRAM(s) Oral daily  calcitriol   Capsule 0.25 MICROGram(s) Oral daily  carvedilol 12.5 milliGRAM(s) Oral every 12 hours  colchicine 0.6 milliGRAM(s) Oral every other day  ferrous    sulfate 325 milliGRAM(s) Oral daily  furosemide    Tablet 80 milliGRAM(s) Oral daily  hydrALAZINE 50 milliGRAM(s) Oral every 8 hours  labetalol 300 milliGRAM(s) Oral three times a day  Nephro-alyse 1 Tablet(s) Oral daily  pantoprazole    Tablet 40 milliGRAM(s) Oral before breakfast  predniSONE   Tablet 10 milliGRAM(s) Oral daily  sodium chloride 0.65% Nasal 1 Spray(s) Both Nostrils two times a day    MEDICATIONS  (PRN):    Allergies    heparin (Unknown)  losartan (Anaphylaxis)  nitroglycerin (Other)    Intolerances        PHYSICAL EXAM:  Constitutional: NAD  HEENT: Normocephalic, EOMI  Neck:  No JVD  Respiratory: CTA B/L, No wheezes  Cardiovascular: S1, S2, RRR, + systolic murmur  Gastrointestinal: BS+, soft, NT/ND  Extremities: + peripheral edema le b/l L>R  Neurological: AAOX3, no focal deficits  Psychiatric: Normal mood, normal affect  : + Hook    Vital Signs Last 24 Hrs  T(C): 36.7 (01 Dec 2017 12:37), Max: 37.2 (30 Nov 2017 21:00)  T(F): 98.1 (01 Dec 2017 12:37), Max: 98.9 (30 Nov 2017 21:00)  HR: 79 (01 Dec 2017 12:37) (79 - 92)  BP: 147/76 (01 Dec 2017 12:37) (146/83 - 177/90)  BP(mean): --  RR: 17 (01 Dec 2017 12:37) (17 - 18)  SpO2: 96% (01 Dec 2017 12:37) (96% - 98%)  I&O's Summary    30 Nov 2017 07:01  -  01 Dec 2017 07:00  --------------------------------------------------------  IN: 1260 mL / OUT: 2950 mL / NET: -1690 mL    01 Dec 2017 07:01  -  01 Dec 2017 18:24  --------------------------------------------------------  IN: 380 mL / OUT: 2000 mL / NET: -1620 mL        LABS:                        11.2   5.6   )-----------( 147      ( 01 Dec 2017 07:19 )             34.3     12-01    143  |  105  |  74<H>  ----------------------------<  78  3.9   |  25  |  3.16<H>    Ca    8.8      01 Dec 2017 07:19    PT/INR - ( 01 Dec 2017 07:19 )   PT: 28.7 sec;   INR: 2.58 ratio
MEDICINE, PROGRESS NOTE [phone]    COSTEN, SANDRA 57y [id number]    Patient seen and examined.  Patient is a 57y old  Female who presents with a chief complaint of Dizziness on standing. (28 Nov 2017 09:59)  pt has no new complaints. Pt feels ok.    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)  Chronic Gout (ICD9 274.02)  LBBB (Left Bundle Branch Block) (ICD9 426.3)  CHF (Congestive Heart Failure) (ICD9 428.0)  Hx of aorta-iliac-femoral bypass  TOP (Termination of Pregnancy)  S/P Dilatation and Curettage  History of Tubal Ligation  s/p AAA (Abdominal Aortic Aneurysm) Repair: 2009 2010  History of Cholecystectomy  S/P Partial Hysterectomy    MEDICATIONS  (STANDING):  amLODIPine   Tablet 10 milliGRAM(s) Oral daily  AQUAPHOR (petrolatum Ointment) 1 Application(s) Topical two times a day  argatroban Infusion 2 MICROgram(s)/kG/Min (13.572 mL/Hr) IV Continuous <Continuous>  ascorbic acid 500 milliGRAM(s) Oral daily  calcitriol   Capsule 0.25 MICROGram(s) Oral daily  carvedilol 12.5 milliGRAM(s) Oral every 12 hours  colchicine 0.6 milliGRAM(s) Oral every other day  ferrous    sulfate 325 milliGRAM(s) Oral daily  furosemide   Injectable 80 milliGRAM(s) IV Push daily  hydrALAZINE 50 milliGRAM(s) Oral every 8 hours  labetalol 300 milliGRAM(s) Oral three times a day  Nephro-alyse 1 Tablet(s) Oral daily  pantoprazole    Tablet 40 milliGRAM(s) Oral before breakfast  predniSONE   Tablet 10 milliGRAM(s) Oral daily  sodium chloride 0.65% Nasal 1 Spray(s) Both Nostrils two times a day  warfarin 6 milliGRAM(s) Oral once    MEDICATIONS  (PRN):    Allergies    heparin (Unknown)  losartan (Anaphylaxis)  nitroglycerin (Other)    Intolerances        PHYSICAL EXAM:  Constitutional: NAD  HEENT: Normocephalic, EOMI  Neck:  No JVD  Respiratory: CTA B/L, No wheezes  Cardiovascular: S1, S2, RRR, + systolic murmur  Gastrointestinal: BS+, soft, NT/ND  Extremities: + peripheral edema le b/l L>R  Neurological: AAOX3, no focal deficits  Psychiatric: Normal mood, normal affect  : + Hook    Vital Signs Last 24 Hrs  T(C): 36.7 (28 Nov 2017 14:00), Max: 36.8 (27 Nov 2017 20:39)  T(F): 98.1 (28 Nov 2017 14:00), Max: 98.3 (27 Nov 2017 20:39)  HR: 82 (28 Nov 2017 14:00) (82 - 85)  BP: 157/87 (28 Nov 2017 14:00) (157/87 - 174/91)  BP(mean): --  RR: 18 (28 Nov 2017 14:00) (16 - 18)  SpO2: 96% (28 Nov 2017 14:00) (96% - 97%)  I&O's Summary    27 Nov 2017 07:01  -  28 Nov 2017 07:00  --------------------------------------------------------  IN: 1365.2 mL / OUT: 1400 mL / NET: -34.8 mL    28 Nov 2017 07:01  -  28 Nov 2017 19:12  --------------------------------------------------------  IN: 860 mL / OUT: 350 mL / NET: 510 mL        LABS:                        9.7    5.82  )-----------( 163      ( 28 Nov 2017 07:48 )             30.8     11-28    145  |  107  |  71<H>  ----------------------------<  86  4.3   |  23  |  2.74<H>    Ca    8.7      28 Nov 2017 07:48
Patient is a 57y old  Female who presents with a chief complaint of Dizziness on standing. (28 Nov 2017 09:59)      SUBJECTIVE / OVERNIGHT EVENTS:   Feels better.  Denies CP/SOB/Palpitation/HA.    MEDICATIONS  (STANDING):  amLODIPine   Tablet 10 milliGRAM(s) Oral daily  AQUAPHOR (petrolatum Ointment) 1 Application(s) Topical two times a day  ascorbic acid 500 milliGRAM(s) Oral daily  calcitriol   Capsule 0.25 MICROGram(s) Oral daily  carvedilol 25 milliGRAM(s) Oral every 12 hours  colchicine 0.6 milliGRAM(s) Oral every other day  ferrous    sulfate 325 milliGRAM(s) Oral daily  hydrALAZINE 50 milliGRAM(s) Oral three times a day  labetalol 300 milliGRAM(s) Oral three times a day  Nephro-alyse 1 Tablet(s) Oral daily  pantoprazole    Tablet 40 milliGRAM(s) Oral before breakfast  predniSONE   Tablet 10 milliGRAM(s) Oral daily  sodium chloride 0.65% Nasal 1 Spray(s) Both Nostrils two times a day    MEDICATIONS  (PRN):        CAPILLARY BLOOD GLUCOSE        I&O's Summary    09 Dec 2017 07:01  -  10 Dec 2017 07:00  --------------------------------------------------------  IN: 840 mL / OUT: 1425 mL / NET: -585 mL    10 Dec 2017 07:01  -  10 Dec 2017 22:13  --------------------------------------------------------  IN: 600 mL / OUT: 0 mL / NET: 600 mL        PHYSICAL EXAM:  GENERAL: NAD, well-developed  HEAD:  Atraumatic, Normocephalic  NECK: Supple, No JVD  CHEST/LUNG: Clear to auscultation bilaterally; No wheezing.  HEART: Regular rate and rhythm; No murmurs, rubs, or gallops  ABDOMEN: Soft, Nontender, Nondistended; Bowel sounds present  EXTREMITIES:   No clubbing, cyanosis, or edema  NEUROLOGY: AAO X 3  SKIN: No rashes    LABS:                        10.8   5.8   )-----------( 149      ( 10 Dec 2017 07:02 )             33.2     12-10    145  |  106  |  67<H>  ----------------------------<  77  4.2   |  26  |  3.05<H>    Ca    9.0      10 Dec 2017 07:02      PT/INR - ( 10 Dec 2017 07:02 )   PT: 34.4 sec;   INR: 3.08 ratio                 CAPILLARY BLOOD GLUCOSE                    RADIOLOGY & ADDITIONAL TESTS:    Imaging Personally Reviewed:    Consultant(s) Notes Reviewed:      Care Discussed with Consultants/Other Providers:
[facility]   [phone]  Dr. Queen (covering for Dr. Bennett)  RENAL FOLLOW UP NOTE  --------------------------------------------------------------------------------  Patient seen and examined.  Denies chest pain / palpitations / SOB.  States that her LLE edema has improved somehwat.    PAST HISTORY  --------------------------------------------------------------------------------  No significant changes to PMH, PSH, FHx, SHx, unless otherwise noted    ALLERGIES & MEDICATIONS  --------------------------------------------------------------------------------  Allergies    heparin (Unknown)  losartan (Anaphylaxis)  nitroglycerin (Other)    Intolerances      Standing Inpatient Medications  AQUAPHOR (petrolatum Ointment) 1 Application(s) Topical two times a day  ascorbic acid 500 milliGRAM(s) Oral daily  calcitriol   Capsule 0.25 MICROGram(s) Oral daily  carvedilol 12.5 milliGRAM(s) Oral every 12 hours  colchicine 0.6 milliGRAM(s) Oral every other day  ferrous    sulfate 325 milliGRAM(s) Oral daily  furosemide    Tablet 80 milliGRAM(s) Oral daily  hydrALAZINE 50 milliGRAM(s) Oral every 8 hours  labetalol 300 milliGRAM(s) Oral three times a day  Nephro-alyse 1 Tablet(s) Oral daily  pantoprazole    Tablet 40 milliGRAM(s) Oral before breakfast  predniSONE   Tablet 10 milliGRAM(s) Oral daily  sodium chloride 0.65% Nasal 1 Spray(s) Both Nostrils two times a day    PRN Inpatient Medications      FOCUSED REVIEW OF SYSTEMS  --------------------------------------------------------------------------------  General: + hand cramps. denies fevers/rigors  CVS: Denies CP/palpitations. Reports improved LLE edema  SOB: Denies SOB/cough        VITALS/PHYSICAL EXAM  --------------------------------------------------------------------------------  T(C): 37.6 (12-02-17 @ 13:27), Max: 37.6 (12-02-17 @ 13:27)  HR: 87 (12-02-17 @ 16:06) (75 - 93)  BP: 152/82 (12-02-17 @ 16:06) (142/80 - 169/72)  RR: 18 (12-02-17 @ 13:27) (18 - 18)  SpO2: 97% (12-02-17 @ 13:27) (95% - 97%)  Wt(kg): --  Height (cm): 165.1 (12-01-17 @ 09:08)      12-01-17 @ 07:01  -  12-02-17 @ 07:00  --------------------------------------------------------  IN: 1040 mL / OUT: 2500 mL / NET: -1460 mL    12-02-17 @ 07:01  -  12-02-17 @ 16:10  --------------------------------------------------------  IN: 460 mL / OUT: 2250 mL / NET: -1790 mL      Physical Exam:  	Gen: NAD, well-appearing  	Pulm: CTA B/L  	CV: RRR, S1S2  	Abd: +BS, soft, nontender/nondistended  	: No suprapubic tenderness.  + fox          Extremity: No cyanosis, chronic LLE edema with ACE wrap in place  	Neuro: A&O x 3      LABS/STUDIES  --------------------------------------------------------------------------------              10.7   5.9   >-----------<  143      [12-02-17 @ 11:41]              33.0     144  |  104  |  71  ----------------------------<  97      [12-02-17 @ 11:41]  3.8   |  26  |  3.23        Ca     9.0     [12-02-17 @ 11:41]      Mg     1.9     [12-02-17 @ 11:41]      PT/INR: PT 33.4 , INR 3.00       [12-02-17 @ 11:41]      eGFR if Non African American: 15 mL/min/1.73M2 (12-02 @ 11:41)  eGFR if : 17 mL/min/1.73M2 (12-02 @ 11:41)    Creatinine Trend:  SCr 3.23 [12-02 @ 11:41]  SCr 3.16 [12-01 @ 07:19]  SCr 3.06 [11-30 @ 07:03]  SCr 2.79 [11-29 @ 07:08]  SCr 2.74 [11-28 @ 07:48]              Urinalysis - [11-25-17 @ 17:48]      Color Yellow / Appearance SL Turbid / SG 1.017 / pH 7.0      Gluc Negative / Ketone Negative  / Bili Negative / Urobili 2       Blood Negative / Protein >600 / Leuk Est Moderate / Nitrite Negative      RBC  / WBC 26-50 / Hyaline  / Gran  / Sq Epi  / Non Sq Epi OCC / Bacteria Moderate      Iron 48, TIBC --, %sat --      [10-12-17 @ 07:14]  Ferritin 847.0      [10-12-17 @ 07:14]  PTH -- (Ca 9.0)      [11-09-17 @ 08:51]   387  Vitamin D (25OH) 7.8      [11-09-17 @ 08:51]  HbA1c 4.5      [08-09-16 @ 06:49]  TSH 0.33      [10-22-17 @ 08:40]
SUBJECTIVE: Pt seen, chart reviewed.  Pt moving around a little more.  No c/o odor, redness, warmth, f/c/s- RN noticed odor when she last changed dressing  Pt tolerating ACE wrap  leg still swollen no wound or pain    ROS skin/MSK see HPI  All other systems negatve    warfarin 4 milliGRAM(s) Oral once      Physical Exam:  Vital Signs Last 24 Hrs  T(C): 37.1 (04 Dec 2017 13:55), Max: 37.1 (04 Dec 2017 13:55)  T(F): 98.7 (04 Dec 2017 13:55), Max: 98.7 (04 Dec 2017 13:55)  HR: 81 (04 Dec 2017 13:55) (81 - 93)  BP: 158/82 (04 Dec 2017 13:55) (156/88 - 176/94)  BP(mean): --  RR: 18 (04 Dec 2017 13:55) (18 - 18)  SpO2: 97% (04 Dec 2017 13:55) (97% - 98%)    General Appearance:  NAD, A&Ox3, MO  Versa Care P500    Musculoskeletal/Vascular:  Limited FROM 2/2 LLE >>>>RLE edema- improving  BLE equally warm w/ no acute ischemia noted  no contractures    Skin:  dry w/ good cap refill  Rt Ischium chronic stage iv w/ calloused macerated wound edge    moist & granular base w/o palpable bone,  mild tender, no erythema, induration, fluctuance, nor increased warmth  faint musty odor  moderate serosanguinous drainage    LABS:                        10.7   5.7   )-----------( 148      ( 04 Dec 2017 06:27 )             32.9     PT/INR - ( 04 Dec 2017 06:27 )   PT: 29.8 sec;   INR: 2.70 ratio    PTT - ( 04 Dec 2017 06:27 )  PTT:38.5 sec
Subjective: Patient seen and examined. No new events except as noted.     REVIEW OF SYSTEMS:    CONSTITUTIONAL: + weakness, No fevers or chills  EYES/ENT: No visual changes;  No vertigo or throat pain   NECK: No pain or stiffness  RESPIRATORY: No cough, wheezing, hemoptysis; No shortness of breath  CARDIOVASCULAR: No chest pain or palpitations  GASTROINTESTINAL: No abdominal or epigastric pain. No nausea, vomiting, or hematemesis; No diarrhea or constipation. No melena or hematochezia.  GENITOURINARY: No dysuria, frequency or hematuria  NEUROLOGICAL: No numbness or weakness  SKIN: No itching, burning, rashes, or lesions   All other review of systems is negative unless indicated above.    MEDICATIONS:  MEDICATIONS  (STANDING):  AQUAPHOR (petrolatum Ointment) 1 Application(s) Topical two times a day  ascorbic acid 500 milliGRAM(s) Oral daily  calcitriol   Capsule 0.25 MICROGram(s) Oral daily  carvedilol 25 milliGRAM(s) Oral every 12 hours  colchicine 0.6 milliGRAM(s) Oral every other day  ferrous    sulfate 325 milliGRAM(s) Oral daily  hydrALAZINE 50 milliGRAM(s) Oral every 8 hours  labetalol 300 milliGRAM(s) Oral three times a day  Nephro-alyse 1 Tablet(s) Oral daily  pantoprazole    Tablet 40 milliGRAM(s) Oral before breakfast  predniSONE   Tablet 10 milliGRAM(s) Oral daily  sodium chloride 0.65% Nasal 1 Spray(s) Both Nostrils two times a day  warfarin 4 milliGRAM(s) Oral once      PHYSICAL EXAM:  T(C): 36.6 (12-07-17 @ 14:20), Max: 36.8 (12-07-17 @ 12:44)  HR: 76 (12-07-17 @ 14:20) (71 - 89)  BP: 174/91 (12-07-17 @ 14:20) (162/95 - 175/97)  RR: 18 (12-07-17 @ 14:20) (18 - 18)  SpO2: 96% (12-07-17 @ 14:20) (95% - 96%)  Wt(kg): --  I&O's Summary    06 Dec 2017 07:01  -  07 Dec 2017 07:00  --------------------------------------------------------  IN: 180 mL / OUT: 1750 mL / NET: -1570 mL    07 Dec 2017 07:01  -  07 Dec 2017 20:23  --------------------------------------------------------  IN: 360 mL / OUT: 1200 mL / NET: -840 mL          Appearance: Normal	  HEENT:   Normal oral mucosa, PERRL, EOMI	  Lymphatic: No lymphadenopathy , no edema  Cardiovascular: Normal S1 S2, No JVD, No murmurs , Peripheral pulses palpable 2+ bilaterally  Respiratory: Lungs clear to auscultation, normal effort 	  Gastrointestinal:  Soft, Non-tender, + BS	  Skin: No rashes, No ecchymoses, No cyanosis, warm to touch  Musculoskeletal: Normal range of motion, normal strength  Psychiatry:  Mood & affect appropriate  Ext: 4+ LLE edema  +fox     LABS:    CARDIAC MARKERS:                                10.4   5.6   )-----------( 139      ( 07 Dec 2017 06:27 )             32.2     12-07    144  |  106  |  70<H>  ----------------------------<  93  4.0   |  25  |  3.23<H>    Ca    9.1      07 Dec 2017 06:27  Mg     2.2     12-06      proBNP:   Lipid Profile:   HgA1c:   TSH:             TELEMETRY: 	    ECG:  	  RADIOLOGY:   DIAGNOSTIC TESTING:  [ ] Echocardiogram:  [ ]  Catheterization:  [ ] Stress Test:    OTHER:
Subjective: Patient seen and examined. No new events except as noted.     REVIEW OF SYSTEMS:    CONSTITUTIONAL: + weakness, no fevers or chills  EYES/ENT: No visual changes;  No vertigo or throat pain   NECK: No pain or stiffness  RESPIRATORY: No cough, wheezing, hemoptysis; No shortness of breath  CARDIOVASCULAR: No chest pain or palpitations  GASTROINTESTINAL: No abdominal or epigastric pain. No nausea, vomiting, or hematemesis; No diarrhea or constipation. No melena or hematochezia.  GENITOURINARY: No dysuria, frequency or hematuria  NEUROLOGICAL: No numbness or weakness  SKIN: No itching, burning, rashes, or lesions   All other review of systems is negative unless indicated above.    MEDICATIONS:  MEDICATIONS  (STANDING):  AQUAPHOR (petrolatum Ointment) 1 Application(s) Topical two times a day  ascorbic acid 500 milliGRAM(s) Oral daily  calcitriol   Capsule 0.25 MICROGram(s) Oral daily  carvedilol 12.5 milliGRAM(s) Oral every 12 hours  colchicine 0.6 milliGRAM(s) Oral every other day  ferrous    sulfate 325 milliGRAM(s) Oral daily  hydrALAZINE 50 milliGRAM(s) Oral every 8 hours  labetalol 300 milliGRAM(s) Oral three times a day  Nephro-alyse 1 Tablet(s) Oral daily  pantoprazole    Tablet 40 milliGRAM(s) Oral before breakfast  predniSONE   Tablet 10 milliGRAM(s) Oral daily  sodium chloride 0.65% Nasal 1 Spray(s) Both Nostrils two times a day      PHYSICAL EXAM:  T(C): 36.6 (12-05-17 @ 04:20), Max: 37.1 (12-04-17 @ 13:55)  HR: 84 (12-05-17 @ 04:20) (75 - 92)  BP: 150/90 (12-05-17 @ 07:12) (150/90 - 179/103)  RR: 18 (12-05-17 @ 04:20) (18 - 18)  SpO2: 95% (12-05-17 @ 04:20) (95% - 97%)  Wt(kg): --  I&O's Summary    04 Dec 2017 07:01  -  05 Dec 2017 07:00  --------------------------------------------------------  IN: 720 mL / OUT: 1800 mL / NET: -1080 mL          Appearance: Normal	  HEENT:   Normal oral mucosa, PERRL, EOMI	  Lymphatic: No lymphadenopathy , no edema  Cardiovascular: Normal S1 S2, No JVD, No murmurs , Peripheral pulses palpable 2+ bilaterally  Respiratory: Lungs clear to auscultation, normal effort 	  Gastrointestinal:  Soft, Non-tender, + BS	  Skin: No rashes, No ecchymoses, No cyanosis, warm to touch  Musculoskeletal: Normal range of motion, normal strength  Psychiatry:  Mood & affect appropriate  Ext: Chronic 3+ LLE edema  +fox     LABS:    CARDIAC MARKERS:                                10.4   5.3   )-----------( 142      ( 05 Dec 2017 06:58 )             31.8     12-05    145  |  107  |  72<H>  ----------------------------<  78  3.8   |  24  |  2.95<H>    Ca    9.0      05 Dec 2017 06:58      proBNP:   Lipid Profile:   HgA1c:   TSH:             TELEMETRY: 	    ECG:  	  RADIOLOGY:   DIAGNOSTIC TESTING:  [ ] Echocardiogram:  [ ]  Catheterization:  [ ] Stress Test:    OTHER:
Subjective: Patient seen and examined. No new events except as noted.     REVIEW OF SYSTEMS:    CONSTITUTIONAL: + weakness, no fevers or chills  EYES/ENT: No visual changes;  No vertigo or throat pain   NECK: No pain or stiffness  RESPIRATORY: No cough, wheezing, hemoptysis; No shortness of breath  CARDIOVASCULAR: No chest pain or palpitations  GASTROINTESTINAL: No abdominal or epigastric pain. No nausea, vomiting, or hematemesis; No diarrhea or constipation. No melena or hematochezia.  GENITOURINARY: No dysuria, frequency or hematuria  NEUROLOGICAL: No numbness or weakness  SKIN: No itching, burning, rashes, or lesions   All other review of systems is negative unless indicated above.    MEDICATIONS:  MEDICATIONS  (STANDING):  AQUAPHOR (petrolatum Ointment) 1 Application(s) Topical two times a day  ascorbic acid 500 milliGRAM(s) Oral daily  calcitriol   Capsule 0.25 MICROGram(s) Oral daily  carvedilol 12.5 milliGRAM(s) Oral every 12 hours  colchicine 0.6 milliGRAM(s) Oral every other day  ferrous    sulfate 325 milliGRAM(s) Oral daily  hydrALAZINE 50 milliGRAM(s) Oral every 8 hours  labetalol 300 milliGRAM(s) Oral three times a day  Nephro-alyse 1 Tablet(s) Oral daily  pantoprazole    Tablet 40 milliGRAM(s) Oral before breakfast  predniSONE   Tablet 10 milliGRAM(s) Oral daily  sodium chloride 0.65% Nasal 1 Spray(s) Both Nostrils two times a day      PHYSICAL EXAM:  T(C): 36.7 (12-03-17 @ 03:55), Max: 37.6 (12-02-17 @ 13:27)  HR: 87 (12-03-17 @ 03:55) (82 - 89)  BP: 160/88 (12-03-17 @ 06:06) (142/80 - 166/86)  RR: 17 (12-03-17 @ 03:55) (17 - 18)  SpO2: 96% (12-03-17 @ 03:55) (96% - 97%)  Wt(kg): --  I&O's Summary    02 Dec 2017 07:01  -  03 Dec 2017 07:00  --------------------------------------------------------  IN: 880 mL / OUT: 3775 mL / NET: -2895 mL          Appearance: Normal	  HEENT:   Normal oral mucosa, PERRL, EOMI	  Lymphatic: No lymphadenopathy , no edema  Cardiovascular: Normal S1 S2, No JVD, No murmurs , Peripheral pulses palpable 2+ bilaterally  Respiratory: Lungs clear to auscultation, normal effort 	  Gastrointestinal:  Soft, Non-tender, + BS	  Skin: No rashes, No ecchymoses, No cyanosis, warm to touch  Musculoskeletal: Normal range of motion, normal strength  Psychiatry:  Mood & affect appropriate  Ext: 4+ LLE edema, 2+ RLE   +fox       LABS:    CARDIAC MARKERS:                                10.6   5.7   )-----------( 145      ( 03 Dec 2017 06:49 )             33.0     12-03    144  |  104  |  73<H>  ----------------------------<  78  3.7   |  26  |  3.21<H>    Ca    9.0      03 Dec 2017 06:49  Mg     2.1     12-03      proBNP:   Lipid Profile:   HgA1c:   TSH:             TELEMETRY: 	  VPaced  ECG:  	  RADIOLOGY:   DIAGNOSTIC TESTING:  [ ] Echocardiogram:  [ ]  Catheterization:  [ ] Stress Test:    OTHER:
Subjective: Patient seen and examined. No new events except as noted.     REVIEW OF SYSTEMS:    CONSTITUTIONAL: + weakness, no fevers or chills  EYES/ENT: No visual changes;  No vertigo or throat pain   NECK: No pain or stiffness  RESPIRATORY: No cough, wheezing, hemoptysis; No shortness of breath  CARDIOVASCULAR: No chest pain or palpitations  GASTROINTESTINAL: No abdominal or epigastric pain. No nausea, vomiting, or hematemesis; No diarrhea or constipation. No melena or hematochezia.  GENITOURINARY: No dysuria, frequency or hematuria  NEUROLOGICAL: No numbness or weakness  SKIN: No itching, burning, rashes, or lesions   All other review of systems is negative unless indicated above.    MEDICATIONS:  MEDICATIONS  (STANDING):  AQUAPHOR (petrolatum Ointment) 1 Application(s) Topical two times a day  ascorbic acid 500 milliGRAM(s) Oral daily  calcitriol   Capsule 0.25 MICROGram(s) Oral daily  carvedilol 12.5 milliGRAM(s) Oral every 12 hours  colchicine 0.6 milliGRAM(s) Oral every other day  ferrous    sulfate 325 milliGRAM(s) Oral daily  hydrALAZINE 50 milliGRAM(s) Oral every 8 hours  labetalol 300 milliGRAM(s) Oral three times a day  Nephro-alyse 1 Tablet(s) Oral daily  pantoprazole    Tablet 40 milliGRAM(s) Oral before breakfast  predniSONE   Tablet 10 milliGRAM(s) Oral daily  sodium chloride 0.65% Nasal 1 Spray(s) Both Nostrils two times a day  warfarin 1 milliGRAM(s) Oral once      PHYSICAL EXAM:  T(C): 37.6 (12-02-17 @ 13:27), Max: 37.6 (12-02-17 @ 13:27)  HR: 87 (12-02-17 @ 16:06) (75 - 93)  BP: 152/82 (12-02-17 @ 16:06) (142/80 - 169/72)  RR: 18 (12-02-17 @ 13:27) (18 - 18)  SpO2: 97% (12-02-17 @ 13:27) (95% - 97%)  Wt(kg): --  I&O's Summary    01 Dec 2017 07:01  -  02 Dec 2017 07:00  --------------------------------------------------------  IN: 1040 mL / OUT: 2500 mL / NET: -1460 mL    02 Dec 2017 07:01  -  02 Dec 2017 19:11  --------------------------------------------------------  IN: 460 mL / OUT: 2250 mL / NET: -1790 mL          Appearance: Normal	  HEENT:   Normal oral mucosa, PERRL, EOMI	  Lymphatic: No lymphadenopathy , no edema  Cardiovascular: Normal S1 S2, No JVD, No murmurs , Peripheral pulses palpable 2+ bilaterally  Respiratory: Lungs clear to auscultation, normal effort 	  Gastrointestinal:  Soft, Non-tender, + BS	  Skin: No rashes, No ecchymoses, No cyanosis, warm to touch  Musculoskeletal: Normal range of motion, normal strength  Psychiatry:  Mood & affect appropriate  Ext: 4+ chronic LLE edema  +fox       LABS:    CARDIAC MARKERS:                                10.7   5.9   )-----------( 143      ( 02 Dec 2017 11:41 )             33.0     12-02    144  |  104  |  71<H>  ----------------------------<  97  3.8   |  26  |  3.23<H>    Ca    9.0      02 Dec 2017 11:41  Mg     1.9     12-02      proBNP:   Lipid Profile:   HgA1c:   TSH:             TELEMETRY: V paced 	    ECG:  	  RADIOLOGY:   DIAGNOSTIC TESTING:  [ ] Echocardiogram:  [ ]  Catheterization:  [ ] Stress Test:    OTHER:
Subjective: Patient seen and examined. No new events except as noted.     REVIEW OF SYSTEMS:    CONSTITUTIONAL: + weakness, no fevers or chills  EYES/ENT: No visual changes;  No vertigo or throat pain   NECK: No pain or stiffness  RESPIRATORY: No cough, wheezing, hemoptysis; No shortness of breath  CARDIOVASCULAR: No chest pain or palpitations  GASTROINTESTINAL: No abdominal or epigastric pain. No nausea, vomiting, or hematemesis; No diarrhea or constipation. No melena or hematochezia.  GENITOURINARY: No dysuria, frequency or hematuria  NEUROLOGICAL: No numbness or weakness  SKIN: No itching, burning, rashes, or lesions   All other review of systems is negative unless indicated above.    MEDICATIONS:  MEDICATIONS  (STANDING):  amLODIPine   Tablet 10 milliGRAM(s) Oral daily  AQUAPHOR (petrolatum Ointment) 1 Application(s) Topical two times a day  argatroban Infusion 2 MICROgram(s)/kG/Min (13.572 mL/Hr) IV Continuous <Continuous>  ascorbic acid 500 milliGRAM(s) Oral daily  calcitriol   Capsule 0.25 MICROGram(s) Oral daily  carvedilol 12.5 milliGRAM(s) Oral every 12 hours  colchicine 0.6 milliGRAM(s) Oral every other day  ferrous    sulfate 325 milliGRAM(s) Oral daily  furosemide   Injectable 80 milliGRAM(s) IV Push daily  hydrALAZINE 50 milliGRAM(s) Oral every 8 hours  labetalol 300 milliGRAM(s) Oral three times a day  Nephro-alyse 1 Tablet(s) Oral daily  pantoprazole    Tablet 40 milliGRAM(s) Oral before breakfast  predniSONE   Tablet 10 milliGRAM(s) Oral daily  sodium chloride 0.65% Nasal 1 Spray(s) Both Nostrils two times a day      PHYSICAL EXAM:  T(C): 36.8 (11-28-17 @ 05:01), Max: 36.8 (11-27-17 @ 20:39)  HR: 85 (11-28-17 @ 05:01) (77 - 85)  BP: 174/91 (11-28-17 @ 05:01) (154/83 - 174/91)  RR: 17 (11-28-17 @ 05:01) (16 - 18)  SpO2: 97% (11-28-17 @ 05:01) (96% - 97%)  Wt(kg): --  I&O's Summary    27 Nov 2017 07:01  -  28 Nov 2017 07:00  --------------------------------------------------------  IN: 1365.2 mL / OUT: 1400 mL / NET: -34.8 mL    28 Nov 2017 07:01  -  28 Nov 2017 12:06  --------------------------------------------------------  IN: 260 mL / OUT: 0 mL / NET: 260 mL          Appearance: Normal	  HEENT:   Normal oral mucosa, PERRL, EOMI	  Lymphatic: No lymphadenopathy , no edema  Cardiovascular: Normal S1 S2, No JVD, No murmurs , Peripheral pulses palpable 2+ bilaterally  Respiratory: decreased bs , normal effort 	  Gastrointestinal:  Soft, Non-tender, + BS	  Skin: No rashes, No ecchymoses, No cyanosis, warm to touch  Musculoskeletal: decreased  range of motion and  strength  Psychiatry:  Mood & affect appropriate  Ext: 4+ chronic  edema LLE, + RLE edema   +fox       LABS:    CARDIAC MARKERS:                                9.7    5.82  )-----------( 163      ( 28 Nov 2017 07:48 )             30.8     11-28    145  |  107  |  71<H>  ----------------------------<  86  4.3   |  23  |  2.74<H>    Ca    8.7      28 Nov 2017 07:48      proBNP:   Lipid Profile:   HgA1c:   TSH:     Negative          TELEMETRY: 	  VPaced  ECG:  	  RADIOLOGY:   DIAGNOSTIC TESTING:  [ ] Echocardiogram:  [ ]  Catheterization:  [ ] Stress Test:    OTHER:
Subjective: Patient seen and examined. No new events except as noted.     REVIEW OF SYSTEMS:    CONSTITUTIONAL: + weakness, no fevers or chills  EYES/ENT: No visual changes;  No vertigo or throat pain   NECK: No pain or stiffness  RESPIRATORY: No cough, wheezing, hemoptysis; No shortness of breath  CARDIOVASCULAR: No chest pain or palpitations  GASTROINTESTINAL: No abdominal or epigastric pain. No nausea, vomiting, or hematemesis; No diarrhea or constipation. No melena or hematochezia.  GENITOURINARY: No dysuria, frequency or hematuria  NEUROLOGICAL: No numbness or weakness  SKIN: No itching, burning, rashes, or lesions   All other review of systems is negative unless indicated above.    MEDICATIONS:  MEDICATIONS  (STANDING):  amLODIPine   Tablet 10 milliGRAM(s) Oral daily  AQUAPHOR (petrolatum Ointment) 1 Application(s) Topical two times a day  ascorbic acid 500 milliGRAM(s) Oral daily  calcitriol   Capsule 0.25 MICROGram(s) Oral daily  carvedilol 12.5 milliGRAM(s) Oral every 12 hours  colchicine 0.6 milliGRAM(s) Oral every other day  ferrous    sulfate 325 milliGRAM(s) Oral daily  furosemide   Injectable 80 milliGRAM(s) IV Push daily  hydrALAZINE 50 milliGRAM(s) Oral every 8 hours  labetalol 300 milliGRAM(s) Oral three times a day  Nephro-alyse 1 Tablet(s) Oral daily  pantoprazole    Tablet 40 milliGRAM(s) Oral before breakfast  predniSONE   Tablet 10 milliGRAM(s) Oral daily  sodium chloride 0.65% Nasal 1 Spray(s) Both Nostrils two times a day      PHYSICAL EXAM:  T(C): 36.8 (11-29-17 @ 14:40), Max: 36.8 (11-28-17 @ 20:36)  HR: 80 (11-29-17 @ 14:40) (80 - 87)  BP: 155/80 (11-29-17 @ 14:40) (155/80 - 164/95)  RR: 18 (11-29-17 @ 14:40) (17 - 18)  SpO2: 98% (11-29-17 @ 14:40) (96% - 98%)  Wt(kg): --  I&O's Summary    28 Nov 2017 07:01  -  29 Nov 2017 07:00  --------------------------------------------------------  IN: 1136.8 mL / OUT: 2350 mL / NET: -1213.2 mL    29 Nov 2017 07:01  -  29 Nov 2017 18:19  --------------------------------------------------------  IN: 430 mL / OUT: 1300 mL / NET: -870 mL          Appearance: Normal	  HEENT:   Normal oral mucosa, PERRL, EOMI	  Lymphatic: No lymphadenopathy , no edema  Cardiovascular: Normal S1 S2, No JVD, No murmurs , Peripheral pulses palpable 2+ bilaterally  Respiratory: Lungs clear to auscultation, normal effort 	  Gastrointestinal:  Soft, Non-tender, + BS	  Skin: No rashes, No ecchymoses, No cyanosis, warm to touch  Musculoskeletal: decreased range of motion and  strength  Psychiatry:  Mood & affect appropriate  Ext: 3+ LLE edema, 1+ RLE   +fox     LABS:    CARDIAC MARKERS:                                9.9    5.85  )-----------( 157      ( 29 Nov 2017 07:08 )             32.0     11-29    143  |  107  |  69<H>  ----------------------------<  89  4.2   |  23  |  2.79<H>    Ca    8.9      29 Nov 2017 07:08      proBNP:   Lipid Profile:   HgA1c:   TSH:             TELEMETRY: Vpaced 	    ECG:  	  RADIOLOGY:   DIAGNOSTIC TESTING:  [ ] Echocardiogram:  [ ]  Catheterization:  [ ] Stress Test:    OTHER:
Subjective: Patient seen and examined. No new events except as noted.     REVIEW OF SYSTEMS:    CONSTITUTIONAL: + weakness, no fevers or chills  EYES/ENT: No visual changes;  No vertigo or throat pain   NECK: No pain or stiffness  RESPIRATORY: No cough, wheezing, hemoptysis; No shortness of breath  CARDIOVASCULAR: No chest pain or palpitations  GASTROINTESTINAL: No abdominal or epigastric pain. No nausea, vomiting, or hematemesis; No diarrhea or constipation. No melena or hematochezia.  GENITOURINARY: No dysuria, frequency or hematuria  NEUROLOGICAL: No numbness or weakness  SKIN: No itching, burning, rashes, or lesions   All other review of systems is negative unless indicated above.    MEDICATIONS:  MEDICATIONS  (STANDING):  amLODIPine   Tablet 10 milliGRAM(s) Oral daily  AQUAPHOR (petrolatum Ointment) 1 Application(s) Topical two times a day  ascorbic acid 500 milliGRAM(s) Oral daily  calcitriol   Capsule 0.25 MICROGram(s) Oral daily  carvedilol 25 milliGRAM(s) Oral every 12 hours  colchicine 0.6 milliGRAM(s) Oral every other day  ferrous    sulfate 325 milliGRAM(s) Oral daily  furosemide    Tablet 40 milliGRAM(s) Oral daily  hydrALAZINE 50 milliGRAM(s) Oral three times a day  labetalol 400 milliGRAM(s) Oral three times a day  Nephro-alyse 1 Tablet(s) Oral daily  pantoprazole    Tablet 40 milliGRAM(s) Oral before breakfast  predniSONE   Tablet 10 milliGRAM(s) Oral daily  sodium chloride 0.65% Nasal 1 Spray(s) Both Nostrils two times a day      PHYSICAL EXAM:  T(C): 36.7 (12-13-17 @ 05:01), Max: 37.1 (12-12-17 @ 14:03)  HR: 84 (12-13-17 @ 05:01) (70 - 84)  BP: 166/88 (12-13-17 @ 05:01) (166/88 - 169/92)  RR: 18 (12-13-17 @ 05:01) (17 - 18)  SpO2: 96% (12-13-17 @ 05:01) (95% - 97%)  Wt(kg): --  I&O's Summary    12 Dec 2017 07:01  -  13 Dec 2017 07:00  --------------------------------------------------------  IN: 600 mL / OUT: 1450 mL / NET: -850 mL          Appearance: Normal	  HEENT:   Normal oral mucosa, PERRL, EOMI	  Lymphatic: No lymphadenopathy , no edema  Cardiovascular: Normal S1 S2, No JVD, No murmurs , Peripheral pulses palpable 2+ bilaterally  Respiratory: Lungs clear to auscultation, normal effort 	  Gastrointestinal:  Soft, Non-tender, + BS	  Skin: No rashes, No ecchymoses, No cyanosis, warm to touch  Musculoskeletal: Normal range of motion, normal strength  Psychiatry:  Mood & affect appropriate  Ext: Chronic 4+ LLE edema  +fox       LABS:    CARDIAC MARKERS:                                10.7   5.6   )-----------( 157      ( 11 Dec 2017 11:07 )             33.6     12-13    147<H>  |  108  |  66<H>  ----------------------------<  90  4.3   |  26  |  3.03<H>    Ca    9.0      13 Dec 2017 06:39      proBNP:   Lipid Profile:   HgA1c:   TSH:             TELEMETRY: 	    ECG:  	  RADIOLOGY:   DIAGNOSTIC TESTING:  [ ] Echocardiogram:  [ ]  Catheterization:  [ ] Stress Test:    OTHER:
Subjective: Patient seen and examined. No new events except as noted.     REVIEW OF SYSTEMS:    CONSTITUTIONAL: + weakness, no fevers or chills  EYES/ENT: No visual changes;  No vertigo or throat pain   NECK: No pain or stiffness  RESPIRATORY: No cough, wheezing, hemoptysis; No shortness of breath  CARDIOVASCULAR: No chest pain or palpitations  GASTROINTESTINAL: No abdominal or epigastric pain. No nausea, vomiting, or hematemesis; No diarrhea or constipation. No melena or hematochezia.  GENITOURINARY: No dysuria, frequency or hematuria  NEUROLOGICAL: No numbness or weakness  SKIN: No itching, burning, rashes, or lesions   All other review of systems is negative unless indicated above.    MEDICATIONS:  MEDICATIONS  (STANDING):  amLODIPine   Tablet 10 milliGRAM(s) Oral daily  AQUAPHOR (petrolatum Ointment) 1 Application(s) Topical two times a day  ascorbic acid 500 milliGRAM(s) Oral daily  calcitriol   Capsule 0.25 MICROGram(s) Oral daily  carvedilol 25 milliGRAM(s) Oral every 12 hours  colchicine 0.6 milliGRAM(s) Oral every other day  ferrous    sulfate 325 milliGRAM(s) Oral daily  hydrALAZINE 50 milliGRAM(s) Oral three times a day  labetalol 300 milliGRAM(s) Oral three times a day  Nephro-alyse 1 Tablet(s) Oral daily  pantoprazole    Tablet 40 milliGRAM(s) Oral before breakfast  predniSONE   Tablet 10 milliGRAM(s) Oral daily  sodium chloride 0.65% Nasal 1 Spray(s) Both Nostrils two times a day      PHYSICAL EXAM:  T(C): 36.5 (12-10-17 @ 12:58), Max: 37 (12-10-17 @ 04:21)  HR: 80 (12-10-17 @ 12:58) (80 - 87)  BP: 168/91 (12-10-17 @ 12:58) (157/86 - 178/96)  RR: 17 (12-10-17 @ 12:58) (17 - 19)  SpO2: 97% (12-10-17 @ 12:58) (94% - 97%)  Wt(kg): --  I&O's Summary    09 Dec 2017 07:01  -  10 Dec 2017 07:00  --------------------------------------------------------  IN: 840 mL / OUT: 1425 mL / NET: -585 mL          Appearance: Normal	  HEENT:   Normal oral mucosa, PERRL, EOMI	  Lymphatic: No lymphadenopathy , no edema  Cardiovascular: Normal S1 S2, No JVD, No murmurs , Peripheral pulses palpable 2+ bilaterally  Respiratory: Lungs clear to auscultation, normal effort 	  Gastrointestinal:  Soft, Non-tender, + BS	  Skin: No rashes, No ecchymoses, No cyanosis, warm to touch  Musculoskeletal: Normal range of motion, normal strength  Psychiatry:  Mood & affect appropriate  Ext: chronic 4+ LLE  edema  +fox     LABS:    CARDIAC MARKERS:                                10.8   5.8   )-----------( 149      ( 10 Dec 2017 07:02 )             33.2     12-10    145  |  106  |  67<H>  ----------------------------<  77  4.2   |  26  |  3.05<H>    Ca    9.0      10 Dec 2017 07:02      proBNP:   Lipid Profile:   HgA1c:   TSH:             TELEMETRY: 	 Vpaced    ECG:  	  RADIOLOGY:   DIAGNOSTIC TESTING:  [ ] Echocardiogram:  [ ]  Catheterization:  [ ] Stress Test:    OTHER:
Subjective: Patient seen and examined. No new events except as noted.     REVIEW OF SYSTEMS:    CONSTITUTIONAL: +weakness, no fevers or chills  EYES/ENT: No visual changes;  No vertigo or throat pain   NECK: No pain or stiffness  RESPIRATORY: No cough, wheezing, hemoptysis; No shortness of breath  CARDIOVASCULAR: No chest pain or palpitations  GASTROINTESTINAL: No abdominal or epigastric pain. No nausea, vomiting, or hematemesis; No diarrhea or constipation. No melena or hematochezia.  GENITOURINARY: No dysuria, frequency or hematuria  NEUROLOGICAL: No numbness or weakness  SKIN: No itching, burning, rashes, or lesions   All other review of systems is negative unless indicated above.    MEDICATIONS:  MEDICATIONS  (STANDING):  amLODIPine   Tablet 10 milliGRAM(s) Oral daily  AQUAPHOR (petrolatum Ointment) 1 Application(s) Topical two times a day  ascorbic acid 500 milliGRAM(s) Oral daily  calcitriol   Capsule 0.25 MICROGram(s) Oral daily  carvedilol 25 milliGRAM(s) Oral every 12 hours  colchicine 0.6 milliGRAM(s) Oral every other day  ferrous    sulfate 325 milliGRAM(s) Oral daily  hydrALAZINE 50 milliGRAM(s) Oral three times a day  labetalol 300 milliGRAM(s) Oral three times a day  Nephro-alyse 1 Tablet(s) Oral daily  pantoprazole    Tablet 40 milliGRAM(s) Oral before breakfast  predniSONE   Tablet 10 milliGRAM(s) Oral daily  sodium chloride 0.65% Nasal 1 Spray(s) Both Nostrils two times a day  warfarin 3 milliGRAM(s) Oral once      PHYSICAL EXAM:  T(C): 36.9 (12-09-17 @ 13:10), Max: 36.9 (12-09-17 @ 13:10)  HR: 82 (12-09-17 @ 17:45) (78 - 90)  BP: 178/96 (12-09-17 @ 17:45) (157/80 - 186/96)  RR: 19 (12-09-17 @ 13:10) (18 - 19)  SpO2: 97% (12-09-17 @ 13:10) (96% - 97%)  Wt(kg): --  I&O's Summary    08 Dec 2017 07:01  -  09 Dec 2017 07:00  --------------------------------------------------------  IN: 520 mL / OUT: 600 mL / NET: -80 mL    09 Dec 2017 07:01  -  09 Dec 2017 18:52  --------------------------------------------------------  IN: 600 mL / OUT: 925 mL / NET: -325 mL          Appearance: obese 	  HEENT:   Normal oral mucosa, PERRL, EOMI	  Lymphatic: No lymphadenopathy , no edema  Cardiovascular: Normal S1 S2, No JVD, No murmurs , Peripheral pulses palpable 2+ bilaterally  Respiratory: Lungs clear to auscultation, normal effort 	  Gastrointestinal:  Soft, Non-tender, + BS	  Skin: No rashes, No ecchymoses, No cyanosis, warm to touch  Musculoskeletal: Normal range of motion, normal strength  Psychiatry:  Mood & affect appropriate  Ext: chronic 4+ LLE edema  +fox     LABS:    CARDIAC MARKERS:                                10.5   5.2   )-----------( 138      ( 09 Dec 2017 10:29 )             31.7     12-09    145  |  106  |  66<H>  ----------------------------<  98  3.9   |  27  |  3.18<H>    Ca    9.1      09 Dec 2017 10:29      proBNP:   Lipid Profile:   HgA1c:   TSH:             TELEMETRY: 	    ECG:  	  RADIOLOGY:   DIAGNOSTIC TESTING:  [ ] Echocardiogram:  [ ]  Catheterization:  [ ] Stress Test:    OTHER:
Subjective: Patient seen and examined. No new events except as noted.     REVIEW OF SYSTEMS:    CONSTITUTIONAL: No weakness, fevers or chills  EYES/ENT: No visual changes;  No vertigo or throat pain   NECK: No pain or stiffness  RESPIRATORY: No cough, wheezing, hemoptysis; No shortness of breath  CARDIOVASCULAR: No chest pain or palpitations  GASTROINTESTINAL: No abdominal or epigastric pain. No nausea, vomiting, or hematemesis; No diarrhea or constipation. No melena or hematochezia.  GENITOURINARY: No dysuria, frequency or hematuria  NEUROLOGICAL: No numbness or weakness  SKIN: No itching, burning, rashes, or lesions   All other review of systems is negative unless indicated above.    MEDICATIONS:  MEDICATIONS  (STANDING):  amLODIPine   Tablet 10 milliGRAM(s) Oral daily  AQUAPHOR (petrolatum Ointment) 1 Application(s) Topical two times a day  ascorbic acid 500 milliGRAM(s) Oral daily  calcitriol   Capsule 0.25 MICROGram(s) Oral daily  carvedilol 25 milliGRAM(s) Oral every 12 hours  colchicine 0.6 milliGRAM(s) Oral every other day  ferrous    sulfate 325 milliGRAM(s) Oral daily  hydrALAZINE 50 milliGRAM(s) Oral three times a day  labetalol 300 milliGRAM(s) Oral three times a day  Nephro-alyse 1 Tablet(s) Oral daily  pantoprazole    Tablet 40 milliGRAM(s) Oral before breakfast  predniSONE   Tablet 10 milliGRAM(s) Oral daily  sodium chloride 0.65% Nasal 1 Spray(s) Both Nostrils two times a day      PHYSICAL EXAM:  T(C): 36.7 (12-11-17 @ 04:20), Max: 36.7 (12-10-17 @ 21:21)  HR: 88 (12-11-17 @ 04:20) (78 - 88)  BP: 159/89 (12-11-17 @ 04:20) (159/89 - 176/84)  RR: 18 (12-11-17 @ 04:20) (17 - 18)  SpO2: 94% (12-11-17 @ 04:20) (94% - 97%)  Wt(kg): --  I&O's Summary    10 Dec 2017 07:01  -  11 Dec 2017 07:00  --------------------------------------------------------  IN: 1080 mL / OUT: 1350 mL / NET: -270 mL          Appearance: Normal	  HEENT:   Normal oral mucosa, PERRL, EOMI	  Lymphatic: No lymphadenopathy , no edema  Cardiovascular: Normal S1 S2, No JVD, No murmurs , Peripheral pulses palpable 2+ bilaterally  Respiratory: Lungs clear to auscultation, normal effort 	  Gastrointestinal:  Soft, Non-tender, + BS	  Skin: No rashes, No ecchymoses, No cyanosis, warm to touch  Musculoskeletal: Normal range of motion, normal strength  Psychiatry:  Mood & affect appropriate  Ext: Chronic 4+ LLE edema  +fox     LABS:    CARDIAC MARKERS:                                10.8   5.8   )-----------( 149      ( 10 Dec 2017 07:02 )             33.2     12-10    145  |  106  |  67<H>  ----------------------------<  77  4.2   |  26  |  3.05<H>    Ca    9.0      10 Dec 2017 07:02      proBNP:   Lipid Profile:   HgA1c:   TSH:             TELEMETRY: 	  VPaced   ECG:  	  RADIOLOGY:   DIAGNOSTIC TESTING:  [ ] Echocardiogram:  [ ]  Catheterization:  [ ] Stress Test:    OTHER:
Subjective: Patient seen and examined. No new events except as noted.     REVIEW OF SYSTEMS:    CONSTITUTIONAL: No weakness, fevers or chills  EYES/ENT: No visual changes;  No vertigo or throat pain   NECK: No pain or stiffness  RESPIRATORY: No cough, wheezing, hemoptysis; No shortness of breath  CARDIOVASCULAR: No chest pain or palpitations  GASTROINTESTINAL: No abdominal or epigastric pain. No nausea, vomiting, or hematemesis; No diarrhea or constipation. No melena or hematochezia.  GENITOURINARY: No dysuria, frequency or hematuria  NEUROLOGICAL: No numbness or weakness  SKIN: No itching, burning, rashes, or lesions   All other review of systems is negative unless indicated above.    MEDICATIONS:  MEDICATIONS  (STANDING):  amLODIPine   Tablet 10 milliGRAM(s) Oral daily  AQUAPHOR (petrolatum Ointment) 1 Application(s) Topical two times a day  ascorbic acid 500 milliGRAM(s) Oral daily  calcitriol   Capsule 0.25 MICROGram(s) Oral daily  carvedilol 25 milliGRAM(s) Oral every 12 hours  colchicine 0.6 milliGRAM(s) Oral every other day  ferrous    sulfate 325 milliGRAM(s) Oral daily  hydrALAZINE 50 milliGRAM(s) Oral three times a day  labetalol 400 milliGRAM(s) Oral three times a day  Nephro-alyse 1 Tablet(s) Oral daily  pantoprazole    Tablet 40 milliGRAM(s) Oral before breakfast  predniSONE   Tablet 10 milliGRAM(s) Oral daily  sodium chloride 0.65% Nasal 1 Spray(s) Both Nostrils two times a day      PHYSICAL EXAM:  T(C): 36.7 (12-12-17 @ 04:54), Max: 36.8 (12-11-17 @ 20:43)  HR: 89 (12-12-17 @ 04:54) (76 - 89)  BP: 165/92 (12-12-17 @ 04:54) (147/85 - 168/86)  RR: 18 (12-12-17 @ 04:54) (18 - 18)  SpO2: 94% (12-12-17 @ 04:54) (92% - 95%)  Wt(kg): --  I&O's Summary    11 Dec 2017 07:01  -  12 Dec 2017 07:00  --------------------------------------------------------  IN: 920 mL / OUT: 1250 mL / NET: -330 mL          Appearance: Normal	  HEENT:   Normal oral mucosa, PERRL, EOMI	  Lymphatic: No lymphadenopathy , no edema  Cardiovascular: Normal S1 S2, No JVD, No murmurs , Peripheral pulses palpable 2+ bilaterally  Respiratory: Lungs clear to auscultation, normal effort 	  Gastrointestinal:  Soft, Non-tender, + BS	  Skin: No rashes, No ecchymoses, No cyanosis, warm to touch  Musculoskeletal: Normal range of motion, normal strength  Psychiatry:  Mood & affect appropriate  Ext: chronic 4+ LLE edema  +fox     LABS:    CARDIAC MARKERS:                                10.7   5.6   )-----------( 157      ( 11 Dec 2017 11:07 )             33.6     12-11    146<H>  |  108  |  66<H>  ----------------------------<  104<H>  3.8   |  25  |  3.01<H>    Ca    9.0      11 Dec 2017 11:07      proBNP:   Lipid Profile:   HgA1c:   TSH:             TELEMETRY: 	    ECG:  	  RADIOLOGY:   DIAGNOSTIC TESTING:  [ ] Echocardiogram:  [ ]  Catheterization:  [ ] Stress Test:    OTHER:
Subjective: Patient seen and examined. No new events except as noted.   +vaginal bleeding     REVIEW OF SYSTEMS:    CONSTITUTIONAL: + weakness, no fevers or chills  EYES/ENT: No visual changes;  No vertigo or throat pain   NECK: No pain or stiffness  RESPIRATORY: No cough, wheezing, hemoptysis; No shortness of breath  CARDIOVASCULAR: No chest pain or palpitations  GASTROINTESTINAL: No abdominal or epigastric pain. No nausea, vomiting, or hematemesis; No diarrhea or constipation. No melena or hematochezia.  GENITOURINARY: No dysuria, frequency or hematuria  NEUROLOGICAL: No numbness or weakness  SKIN: No itching, burning, rashes, or lesions   All other review of systems is negative unless indicated above.    MEDICATIONS:  MEDICATIONS  (STANDING):  AQUAPHOR (petrolatum Ointment) 1 Application(s) Topical two times a day  ascorbic acid 500 milliGRAM(s) Oral daily  calcitriol   Capsule 0.25 MICROGram(s) Oral daily  carvedilol 25 milliGRAM(s) Oral every 12 hours  colchicine 0.6 milliGRAM(s) Oral every other day  ferrous    sulfate 325 milliGRAM(s) Oral daily  hydrALAZINE 50 milliGRAM(s) Oral every 8 hours  labetalol 300 milliGRAM(s) Oral three times a day  Nephro-alyse 1 Tablet(s) Oral daily  pantoprazole    Tablet 40 milliGRAM(s) Oral before breakfast  predniSONE   Tablet 10 milliGRAM(s) Oral daily  sodium chloride 0.65% Nasal 1 Spray(s) Both Nostrils two times a day      PHYSICAL EXAM:  T(C): 36.8 (12-06-17 @ 09:25), Max: 36.8 (12-05-17 @ 20:50)  HR: 76 (12-06-17 @ 09:25) (75 - 89)  BP: 175/97 (12-06-17 @ 09:25) (164/79 - 175/97)  RR: 18 (12-06-17 @ 09:25) (18 - 18)  SpO2: 96% (12-06-17 @ 09:25) (95% - 98%)  Wt(kg): --  I&O's Summary    05 Dec 2017 07:01  -  06 Dec 2017 07:00  --------------------------------------------------------  IN: 1340 mL / OUT: 1400 mL / NET: -60 mL          Appearance: Normal	  HEENT:   Normal oral mucosa, PERRL, EOMI	  Lymphatic: No lymphadenopathy , no edema  Cardiovascular: Normal S1 S2, No JVD, No murmurs , Peripheral pulses palpable 2+ bilaterally  Respiratory: Lungs clear to auscultation, normal effort 	  Gastrointestinal:  Soft, Non-tender, + BS	  Skin: No rashes, No ecchymoses, No cyanosis, warm to touch  Musculoskeletal: Normal range of motion, normal strength  Psychiatry:  Mood & affect appropriate  Ext: chronic LLE 4+ edema, wrapped  +fox       LABS:    CARDIAC MARKERS:                                10.4   5.4   )-----------( 138      ( 06 Dec 2017 09:48 )             32.3     12-06    146<H>  |  107  |  68<H>  ----------------------------<  86  4.1   |  26  |  3.04<H>    Ca    9.3      06 Dec 2017 09:48  Mg     2.2     12-06      proBNP:   Lipid Profile:   HgA1c:   TSH:             TELEMETRY: 	    ECG:  	  RADIOLOGY:   DIAGNOSTIC TESTING:  [ ] Echocardiogram:  [ ]  Catheterization:  [ ] Stress Test:    OTHER:
Subjective: Patient seen and examined. No new events except as noted.   feels weak   REVIEW OF SYSTEMS:    CONSTITUTIONAL: = weakness, no fevers or chills  EYES/ENT: No visual changes;  No vertigo or throat pain   NECK: No pain or stiffness  RESPIRATORY: No cough, wheezing, hemoptysis; No shortness of breath  CARDIOVASCULAR: No chest pain or palpitations  GASTROINTESTINAL: No abdominal or epigastric pain. No nausea, vomiting, or hematemesis; No diarrhea or constipation. No melena or hematochezia.  GENITOURINARY: No dysuria, frequency or hematuria  NEUROLOGICAL: No numbness or weakness  SKIN: No itching, burning, rashes, or lesions   All other review of systems is negative unless indicated above.    MEDICATIONS:  MEDICATIONS  (STANDING):  amLODIPine   Tablet 10 milliGRAM(s) Oral daily  AQUAPHOR (petrolatum Ointment) 1 Application(s) Topical two times a day  ascorbic acid 500 milliGRAM(s) Oral daily  calcitriol   Capsule 0.25 MICROGram(s) Oral daily  carvedilol 12.5 milliGRAM(s) Oral every 12 hours  colchicine 0.6 milliGRAM(s) Oral every other day  ferrous    sulfate 325 milliGRAM(s) Oral daily  furosemide   Injectable 80 milliGRAM(s) IV Push daily  hydrALAZINE 50 milliGRAM(s) Oral every 8 hours  labetalol 300 milliGRAM(s) Oral three times a day  Nephro-alyse 1 Tablet(s) Oral daily  pantoprazole    Tablet 40 milliGRAM(s) Oral before breakfast  predniSONE   Tablet 10 milliGRAM(s) Oral daily  sodium chloride 0.65% Nasal 1 Spray(s) Both Nostrils two times a day      PHYSICAL EXAM:  T(C): 36.6 (11-30-17 @ 04:56), Max: 36.8 (11-29-17 @ 14:40)  HR: 92 (11-30-17 @ 04:56) (80 - 92)  BP: 165/90 (11-30-17 @ 04:56) (155/80 - 165/90)  RR: 18 (11-30-17 @ 04:56) (18 - 18)  SpO2: 96% (11-30-17 @ 04:56) (96% - 98%)  Wt(kg): --  I&O's Summary    29 Nov 2017 07:01 - 30 Nov 2017 07:00  --------------------------------------------------------  IN: 790 mL / OUT: 2500 mL / NET: -1710 mL          Appearance: Normal	  HEENT:   Normal oral mucosa, PERRL, EOMI	  Lymphatic: No lymphadenopathy , no edema  Cardiovascular: Normal S1 S2, No JVD, No murmurs , Peripheral pulses palpable 2+ bilaterally  Respiratory: Lungs clear to auscultation, normal effort 	  Gastrointestinal:  Soft, Non-tender, + BS	  Skin: No rashes, No ecchymoses, No cyanosis, warm to touch  Musculoskeletal: decreased range of motion and strength  Psychiatry:  Mood & affect appropriate  Ext: chronic 3+ LLE edema, 1+ RLE   +fox     LABS:    CARDIAC MARKERS:                                9.9    5.85  )-----------( 157      ( 29 Nov 2017 07:08 )             32.0     11-30    142  |  105  |  74<H>  ----------------------------<  76  4.2   |  21<L>  |  3.06<H>    Ca    8.5      30 Nov 2017 07:03      proBNP:   Lipid Profile:   HgA1c:   TSH:             TELEMETRY: 	    ECG:  	  RADIOLOGY:   DIAGNOSTIC TESTING:  [ ] Echocardiogram:  [ ]  Catheterization:  [ ] Stress Test:    OTHER:
Subjective: Patient seen and examined. No new events except as noted.   nausea this am  REVIEW OF SYSTEMS:    CONSTITUTIONAL: + weakness, no fevers or chills  EYES/ENT: No visual changes;  No vertigo or throat pain   NECK: No pain or stiffness  RESPIRATORY: No cough, wheezing, hemoptysis; No shortness of breath  CARDIOVASCULAR: No chest pain or palpitations  GASTROINTESTINAL: No abdominal or epigastric pain. + nausea, no vomiting, or hematemesis; No diarrhea or constipation. No melena or hematochezia.  GENITOURINARY: No dysuria, frequency or hematuria  NEUROLOGICAL: No numbness or weakness  SKIN: No itching, burning, rashes, or lesions   All other review of systems is negative unless indicated above.    MEDICATIONS:  MEDICATIONS  (STANDING):  AQUAPHOR (petrolatum Ointment) 1 Application(s) Topical two times a day  ascorbic acid 500 milliGRAM(s) Oral daily  calcitriol   Capsule 0.25 MICROGram(s) Oral daily  carvedilol 12.5 milliGRAM(s) Oral every 12 hours  colchicine 0.6 milliGRAM(s) Oral every other day  ferrous    sulfate 325 milliGRAM(s) Oral daily  hydrALAZINE 50 milliGRAM(s) Oral every 8 hours  labetalol 300 milliGRAM(s) Oral three times a day  Nephro-alyse 1 Tablet(s) Oral daily  pantoprazole    Tablet 40 milliGRAM(s) Oral before breakfast  predniSONE   Tablet 10 milliGRAM(s) Oral daily  sodium chloride 0.65% Nasal 1 Spray(s) Both Nostrils two times a day  warfarin 1 milliGRAM(s) Oral once      PHYSICAL EXAM:  T(C): 36.8 (12-04-17 @ 04:34), Max: 36.9 (12-03-17 @ 14:38)  HR: 93 (12-04-17 @ 04:34) (82 - 93)  BP: 156/88 (12-04-17 @ 04:34) (156/88 - 176/94)  RR: 18 (12-04-17 @ 04:34) (17 - 18)  SpO2: 97% (12-04-17 @ 04:34) (97% - 98%)  Wt(kg): --  I&O's Summary    03 Dec 2017 07:01  -  04 Dec 2017 07:00  --------------------------------------------------------  IN: 840 mL / OUT: 1850 mL / NET: -1010 mL    04 Dec 2017 07:01  -  04 Dec 2017 10:56  --------------------------------------------------------  IN: 300 mL / OUT: 0 mL / NET: 300 mL          Appearance: Normal	  HEENT:   Normal oral mucosa, PERRL, EOMI	  Lymphatic: No lymphadenopathy , no edema  Cardiovascular: Normal S1 S2, No JVD, No murmurs , Peripheral pulses palpable 2+ bilaterally  Respiratory: Lungs clear to auscultation, normal effort 	  Gastrointestinal:  Soft, Non-tender, + BS	  Skin: No rashes, No ecchymoses, No cyanosis, warm to touch  Musculoskeletal: decreased range of motion and strength  Psychiatry:  Mood & affect appropriate  Ext: 4+ chronic LLE edema wrapped, 2+ RLE   +fox     LABS:    CARDIAC MARKERS:                                10.7   5.7   )-----------( 148      ( 04 Dec 2017 06:27 )             32.9     12-04    144  |  105  |  70<H>  ----------------------------<  83  3.8   |  24  |  3.11<H>    Ca    8.6      04 Dec 2017 06:27  Mg     2.1     12-03      proBNP:   Lipid Profile:   HgA1c:   TSH:             TELEMETRY: 	V paced     ECG:  	  RADIOLOGY:   DIAGNOSTIC TESTING:  [ ] Echocardiogram:  [ ]  Catheterization:  [ ] Stress Test:    OTHER:
Subjective: Patient seen and examined. No new events except as noted.   resting in bed   REVIEW OF SYSTEMS:    CONSTITUTIONAL: + weakness, no fevers or chills  EYES/ENT: No visual changes;  No vertigo or throat pain   NECK: No pain or stiffness  RESPIRATORY: No cough, wheezing, hemoptysis; No shortness of breath  CARDIOVASCULAR: No chest pain or palpitations  GASTROINTESTINAL: No abdominal or epigastric pain. No nausea, vomiting, or hematemesis; No diarrhea or constipation. No melena or hematochezia.  GENITOURINARY: No dysuria, frequency or hematuria  NEUROLOGICAL: No numbness or weakness  SKIN: No itching, burning, rashes, or lesions   All other review of systems is negative unless indicated above.    MEDICATIONS:  MEDICATIONS  (STANDING):  AQUAPHOR (petrolatum Ointment) 1 Application(s) Topical two times a day  ascorbic acid 500 milliGRAM(s) Oral daily  calcitriol   Capsule 0.25 MICROGram(s) Oral daily  carvedilol 12.5 milliGRAM(s) Oral every 12 hours  colchicine 0.6 milliGRAM(s) Oral every other day  ferrous    sulfate 325 milliGRAM(s) Oral daily  furosemide    Tablet 80 milliGRAM(s) Oral daily  hydrALAZINE 50 milliGRAM(s) Oral every 8 hours  labetalol 300 milliGRAM(s) Oral three times a day  Nephro-alyse 1 Tablet(s) Oral daily  pantoprazole    Tablet 40 milliGRAM(s) Oral before breakfast  predniSONE   Tablet 10 milliGRAM(s) Oral daily  sodium chloride 0.65% Nasal 1 Spray(s) Both Nostrils two times a day      PHYSICAL EXAM:  T(C): 36.8 (12-01-17 @ 05:01), Max: 37.2 (11-30-17 @ 21:00)  HR: 92 (12-01-17 @ 05:01) (82 - 92)  BP: 146/83 (12-01-17 @ 05:01) (136/78 - 177/90)  RR: 18 (12-01-17 @ 05:01) (18 - 18)  SpO2: 96% (12-01-17 @ 05:01) (96% - 98%)  Wt(kg): --  I&O's Summary    30 Nov 2017 07:01  -  01 Dec 2017 07:00  --------------------------------------------------------  IN: 1260 mL / OUT: 2950 mL / NET: -1690 mL      Height (cm): 165.1 (12-01 @ 09:08)    Appearance: Normal	  HEENT:   Normal oral mucosa, PERRL, EOMI	  Lymphatic: No lymphadenopathy , no edema  Cardiovascular: Normal S1 S2, No JVD, No murmurs , Peripheral pulses palpable 2+ bilaterally  Respiratory: Lungs clear to auscultation, normal effort 	  Gastrointestinal:  Soft, Non-tender, + BS	  Skin: No rashes, No ecchymoses, No cyanosis, warm to touch  Musculoskeletal: Normal range of motion, normal strength  Psychiatry:  Mood & affect appropriate  Ext: 4+ chronic LLE edema wrapped, 2+ RLE edema   +fox       LABS:    CARDIAC MARKERS:                                11.2   5.6   )-----------( 147      ( 01 Dec 2017 07:19 )             34.3     12-01    143  |  105  |  74<H>  ----------------------------<  78  3.9   |  25  |  3.16<H>    Ca    8.8      01 Dec 2017 07:19      proBNP:   Lipid Profile:   HgA1c:   TSH:             TELEMETRY: V-paced 	    ECG:  	  RADIOLOGY:   DIAGNOSTIC TESTING:  [ ] Echocardiogram:  [ ]  Catheterization:  [ ] Stress Test:    OTHER:
[facility]   [phone]  RENAL FOLLOW UP NOTE  --------------------------------------------------------------------------------  Chief Complaint:    24 hour events/subjective:    was with rios earlier + c/o vaginal bleeding earlier as well     PAST HISTORY  --------------------------------------------------------------------------------  No significant changes to PMH, PSH, FHx, SHx, unless otherwise noted    ALLERGIES & MEDICATIONS  --------------------------------------------------------------------------------  Allergies    heparin (Unknown)  losartan (Anaphylaxis)  nitroglycerin (Other)    Intolerances      Standing Inpatient Medications  AQUAPHOR (petrolatum Ointment) 1 Application(s) Topical two times a day  ascorbic acid 500 milliGRAM(s) Oral daily  calcitriol   Capsule 0.25 MICROGram(s) Oral daily  carvedilol 25 milliGRAM(s) Oral every 12 hours  colchicine 0.6 milliGRAM(s) Oral every other day  ferrous    sulfate 325 milliGRAM(s) Oral daily  furosemide   Injectable 20 milliGRAM(s) IV Push daily  hydrALAZINE 50 milliGRAM(s) Oral every 8 hours  labetalol 300 milliGRAM(s) Oral three times a day  Nephro-alyse 1 Tablet(s) Oral daily  pantoprazole    Tablet 40 milliGRAM(s) Oral before breakfast  predniSONE   Tablet 10 milliGRAM(s) Oral daily  sodium chloride 0.65% Nasal 1 Spray(s) Both Nostrils two times a day  warfarin 5 milliGRAM(s) Oral once    PRN Inpatient Medications      REVIEW OF SYSTEMS  --------------------------------------------------------------------------------    Gen: deniesfevers/chills,  CVS: denies chest pain/palpitations  Resp: denies worsening  SOB/Cough  GI: Denies N/V/Abd pain  : Denies dysuria/    All other systems were reviewed and are negative, except as noted.    VITALS/PHYSICAL EXAM  --------------------------------------------------------------------------------  T(C): 36.6 (12-06-17 @ 20:09), Max: 36.8 (12-06-17 @ 09:25)  HR: 84 (12-06-17 @ 20:09) (76 - 89)  BP: 165/83 (12-06-17 @ 20:09) (165/83 - 182/96)  RR: 18 (12-06-17 @ 20:09) (18 - 18)  SpO2: 93% (12-06-17 @ 20:09) (93% - 96%)  Wt(kg): --        12-05-17 @ 07:01  -  12-06-17 @ 07:00  --------------------------------------------------------  IN: 1340 mL / OUT: 1400 mL / NET: -60 mL    12-06-17 @ 07:01  -  12-06-17 @ 21:55  --------------------------------------------------------  IN: 180 mL / OUT: 350 mL / NET: -170 mL          Physical Exam:  	Gen: alert oriented place person and date   	Pulm: Decreased breath sounds b/l bases. no rales or ronchi or wheezing  	CV: RRR, S1/S2. no rub  	Back: No CVA tenderness; no sacral edema  	Abd: +BS, soft, nontender/nondistended  	: No suprapubic tenderness.               Extremity: No cyanosis, trace RLE edema + left lymphedema  	Neuro: No focal deficits  	Psych: Normal affect and mood      LABS/STUDIES  --------------------------------------------------------------------------------              10.4   5.4   >-----------<  138      [12-06-17 @ 09:48]              32.3     146  |  107  |  68  ----------------------------<  86      [12-06-17 @ 09:48]  4.1   |  26  |  3.04        Ca     9.3     [12-06-17 @ 09:48]      Mg     2.2     [12-06-17 @ 09:48]      PT/INR: PT 25.6 , INR 2.31       [12-06-17 @ 09:48]      Creatinine Trend:  SCr 3.04 [12-06 @ 09:48]  SCr 2.95 [12-05 @ 06:58]  SCr 3.11 [12-04 @ 06:27]  SCr 3.21 [12-03 @ 06:49]  SCr 3.23 [12-02 @ 11:41]              Urinalysis - [11-25-17 @ 17:48]      Color Yellow / Appearance SL Turbid / SG 1.017 / pH 7.0      Gluc Negative / Ketone Negative  / Bili Negative / Urobili 2       Blood Negative / Protein >600 / Leuk Est Moderate / Nitrite Negative      RBC  / WBC 26-50 / Hyaline  / Gran  / Sq Epi  / Non Sq Epi OCC / Bacteria Moderate      Iron 48, TIBC --, %sat --      [10-12-17 @ 07:14]  Ferritin 847.0      [10-12-17 @ 07:14]  PTH -- (Ca 9.0)      [11-09-17 @ 08:51]   387  Vitamin D (25OH) 7.8      [11-09-17 @ 08:51]  HbA1c 4.5      [08-09-16 @ 06:49]  TSH 0.33      [10-22-17 @ 08:40]
Subjective: Patient seen and examined. No new events except as noted.     REVIEW OF SYSTEMS:    CONSTITUTIONAL: + weakness, no fevers or chills  EYES/ENT: No visual changes;  No vertigo or throat pain   NECK: No pain or stiffness  RESPIRATORY: No cough, wheezing, hemoptysis; No shortness of breath  CARDIOVASCULAR: No chest pain or palpitations  GASTROINTESTINAL: No abdominal or epigastric pain. No nausea, vomiting, or hematemesis; No diarrhea or constipation. No melena or hematochezia.  GENITOURINARY: No dysuria, frequency or hematuria  NEUROLOGICAL: No numbness or weakness  SKIN: No itching, burning, rashes, or lesions   All other review of systems is negative unless indicated above.    MEDICATIONS:  MEDICATIONS  (STANDING):  AQUAPHOR (petrolatum Ointment) 1 Application(s) Topical two times a day  ascorbic acid 500 milliGRAM(s) Oral daily  calcitriol   Capsule 0.25 MICROGram(s) Oral daily  carvedilol 25 milliGRAM(s) Oral every 12 hours  colchicine 0.6 milliGRAM(s) Oral every other day  ferrous    sulfate 325 milliGRAM(s) Oral daily  hydrALAZINE 50 milliGRAM(s) Oral every 8 hours  labetalol 300 milliGRAM(s) Oral three times a day  Nephro-alyse 1 Tablet(s) Oral daily  pantoprazole    Tablet 40 milliGRAM(s) Oral before breakfast  predniSONE   Tablet 10 milliGRAM(s) Oral daily  sodium chloride 0.65% Nasal 1 Spray(s) Both Nostrils two times a day      PHYSICAL EXAM:  T(C): 37.1 (12-08-17 @ 12:45), Max: 37.1 (12-08-17 @ 12:45)  HR: 80 (12-08-17 @ 12:45) (76 - 87)  BP: 168/86 (12-08-17 @ 12:45) (168/86 - 179/93)  RR: 18 (12-08-17 @ 12:45) (18 - 18)  SpO2: 96% (12-08-17 @ 12:45) (94% - 97%)  Wt(kg): --  I&O's Summary    07 Dec 2017 07:01  -  08 Dec 2017 07:00  --------------------------------------------------------  IN: 360 mL / OUT: 2350 mL / NET: -1990 mL          Appearance: Normal	  HEENT:   Normal oral mucosa, PERRL, EOMI	  Lymphatic: No lymphadenopathy , no edema  Cardiovascular: Normal S1 S2, No JVD, No murmurs , Peripheral pulses palpable 2+ bilaterally  Respiratory: Lungs clear to auscultation, normal effort 	  Gastrointestinal:  Soft, Non-tender, + BS	  Skin: No rashes, No ecchymoses, No cyanosis, warm to touch  Musculoskeletal: Normal range of motion, normal strength  Psychiatry:  Mood & affect appropriate  Ext: No edema      LABS:    CARDIAC MARKERS:                                10.4   5.6   )-----------( 146      ( 08 Dec 2017 07:25 )             31.3     12-08    143  |  105  |  70<H>  ----------------------------<  77  4.0   |  23  |  3.15<H>    Ca    8.5      08 Dec 2017 07:25      proBNP:   Lipid Profile:   HgA1c:   TSH:             TELEMETRY: 	  V-paced   ECG:  	  RADIOLOGY:   DIAGNOSTIC TESTING:  [ ] Echocardiogram:  [ ]  Catheterization:  [ ] Stress Test:    OTHER:

## 2017-12-13 NOTE — PROGRESS NOTE ADULT - PROBLEM SELECTOR PROBLEM 1
CKD (chronic kidney disease) stage 4, GFR 15-29 ml/min
CKD (chronic kidney disease) stage 4, GFR 15-29 ml/min
Dyspnea

## 2017-12-13 NOTE — PROGRESS NOTE ADULT - PROBLEM SELECTOR PLAN 1
Improved  Dc Lasix given rising Cr     discussed with nephrology
Improved  off  Lasix given rising Cr     discussed with nephrology
Improved  off Lasix for now.
Improved  off Lasix for now.  Cr improving     discussed with nephrology
Improved  off Lasix for now.  discussed with nephrology
Improved  off Lasix for now.  discussed with nephrology
Volume overload  Lasix 80 mg IV daily
Volume overload  Lasix 80 mg IV daily
Volume overload  change to PO lasix 80 mg daily
Volume overload  cont PO lasix 80 mg daily  Monitor Cr. If worsens, will dc lasix for couple days then restart at lower dose   discussed with nephrology
cont with  Lasix as ordered
worsening azotemia  Patient with chronic LLE lymphedema.  It is difficult to assess her volume status  lasix had been converted from IV to PO on 11/30, but creatinine now >3.2  Hold lasix tomorrow morning (patient already received her dose today)  Repeat TTE shows persistent pericardial effusion without evidence of tamponade  Continue calcitriol for bone/mineral metabolism  dose meds for eGFR ~15-20  Avoid NSAIDs and nephrotoxins as able
worsening azotemia  Patient with chronic LLE lymphedema.  It is difficult to assess her volume status  lasix had been converted from IV to PO on 11/30, but creatinine now >3.2  Holding oral lasix (patient received her IV dose yesterday, which is reflected in today's BMP)  Repeat TTE shows persistent pericardial effusion without evidence of tamponade  Continue calcitriol for bone/mineral metabolism  dose meds for eGFR ~15-20  Avoid NSAIDs and nephrotoxins as able    will order renal sono for left CVA tenderness
Improved  off Lasix for now.  discussed with nephrology

## 2017-12-13 NOTE — PROGRESS NOTE ADULT - PROBLEM SELECTOR PROBLEM 4
CKD (chronic kidney disease), stage III

## 2017-12-13 NOTE — PROGRESS NOTE ADULT - PROBLEM SELECTOR PROBLEM 5
Chronic systolic heart failure

## 2017-12-13 NOTE — PROGRESS NOTE ADULT - PROBLEM SELECTOR PLAN 3
on Coumadin

## 2017-12-13 NOTE — PROGRESS NOTE ADULT - PROBLEM SELECTOR PLAN 4
Cr improving    Now off lasix   nephrology to follow up
Cr rising. Now off lasix   nephrology to follow up
Cr rising. Now off lasix   nephrology to follow up
Cr up again    Now off lasix   nephrology to follow up
stable   nephrology to follow up
stable   restarted  lasix   nephrology to follow up
Cr up again    Now off lasix   nephrology to follow up

## 2017-12-13 NOTE — PROGRESS NOTE ADULT - PROBLEM SELECTOR PROBLEM 3
DVT (deep venous thrombosis)

## 2017-12-19 PROCEDURE — 97116 GAIT TRAINING THERAPY: CPT

## 2017-12-19 PROCEDURE — 86160 COMPLEMENT ANTIGEN: CPT

## 2017-12-19 PROCEDURE — 84156 ASSAY OF PROTEIN URINE: CPT

## 2017-12-19 PROCEDURE — 86147 CARDIOLIPIN ANTIBODY EA IG: CPT

## 2017-12-19 PROCEDURE — 97530 THERAPEUTIC ACTIVITIES: CPT

## 2017-12-19 PROCEDURE — 76856 US EXAM PELVIC COMPLETE: CPT

## 2017-12-19 PROCEDURE — 83540 ASSAY OF IRON: CPT

## 2017-12-19 PROCEDURE — 84300 ASSAY OF URINE SODIUM: CPT

## 2017-12-19 PROCEDURE — 99285 EMERGENCY DEPT VISIT HI MDM: CPT | Mod: 25

## 2017-12-19 PROCEDURE — 97162 PT EVAL MOD COMPLEX 30 MIN: CPT

## 2017-12-19 PROCEDURE — 86225 DNA ANTIBODY NATIVE: CPT

## 2017-12-19 PROCEDURE — 83735 ASSAY OF MAGNESIUM: CPT

## 2017-12-19 PROCEDURE — 83880 ASSAY OF NATRIURETIC PEPTIDE: CPT

## 2017-12-19 PROCEDURE — 84132 ASSAY OF SERUM POTASSIUM: CPT

## 2017-12-19 PROCEDURE — 85045 AUTOMATED RETICULOCYTE COUNT: CPT

## 2017-12-19 PROCEDURE — 83935 ASSAY OF URINE OSMOLALITY: CPT

## 2017-12-19 PROCEDURE — 76770 US EXAM ABDO BACK WALL COMP: CPT

## 2017-12-19 PROCEDURE — 80053 COMPREHEN METABOLIC PANEL: CPT

## 2017-12-19 PROCEDURE — 85610 PROTHROMBIN TIME: CPT

## 2017-12-19 PROCEDURE — 83605 ASSAY OF LACTIC ACID: CPT

## 2017-12-19 PROCEDURE — 97602 WOUND(S) CARE NON-SELECTIVE: CPT

## 2017-12-19 PROCEDURE — 82803 BLOOD GASES ANY COMBINATION: CPT

## 2017-12-19 PROCEDURE — 82550 ASSAY OF CK (CPK): CPT

## 2017-12-19 PROCEDURE — 93005 ELECTROCARDIOGRAM TRACING: CPT

## 2017-12-19 PROCEDURE — 93971 EXTREMITY STUDY: CPT

## 2017-12-19 PROCEDURE — 87086 URINE CULTURE/COLONY COUNT: CPT

## 2017-12-19 PROCEDURE — 80048 BASIC METABOLIC PNL TOTAL CA: CPT

## 2017-12-19 PROCEDURE — 81001 URINALYSIS AUTO W/SCOPE: CPT

## 2017-12-19 PROCEDURE — 83010 ASSAY OF HAPTOGLOBIN QUANT: CPT

## 2017-12-19 PROCEDURE — 93306 TTE W/DOPPLER COMPLETE: CPT

## 2017-12-19 PROCEDURE — 84100 ASSAY OF PHOSPHORUS: CPT

## 2017-12-19 PROCEDURE — 71045 X-RAY EXAM CHEST 1 VIEW: CPT

## 2017-12-19 PROCEDURE — 82652 VIT D 1 25-DIHYDROXY: CPT

## 2017-12-19 PROCEDURE — 71250 CT THORAX DX C-: CPT

## 2017-12-19 PROCEDURE — 82435 ASSAY OF BLOOD CHLORIDE: CPT

## 2017-12-19 PROCEDURE — 93308 TTE F-UP OR LMTD: CPT

## 2017-12-19 PROCEDURE — 85014 HEMATOCRIT: CPT

## 2017-12-19 PROCEDURE — 82306 VITAMIN D 25 HYDROXY: CPT

## 2017-12-19 PROCEDURE — 83615 LACTATE (LD) (LDH) ENZYME: CPT

## 2017-12-19 PROCEDURE — 85027 COMPLETE CBC AUTOMATED: CPT

## 2017-12-19 PROCEDURE — 82272 OCCULT BLD FECES 1-3 TESTS: CPT

## 2017-12-19 PROCEDURE — 82947 ASSAY GLUCOSE BLOOD QUANT: CPT

## 2017-12-19 PROCEDURE — 85730 THROMBOPLASTIN TIME PARTIAL: CPT

## 2017-12-19 PROCEDURE — 84439 ASSAY OF FREE THYROXINE: CPT

## 2017-12-19 PROCEDURE — 84443 ASSAY THYROID STIM HORMONE: CPT

## 2017-12-19 PROCEDURE — 74176 CT ABD & PELVIS W/O CONTRAST: CPT

## 2017-12-19 PROCEDURE — 86901 BLOOD TYPING SEROLOGIC RH(D): CPT

## 2017-12-19 PROCEDURE — 76881 US COMPL JOINT R-T W/IMG: CPT

## 2017-12-19 PROCEDURE — 86022 PLATELET ANTIBODIES: CPT

## 2017-12-19 PROCEDURE — 87186 SC STD MICRODIL/AGAR DIL: CPT

## 2017-12-19 PROCEDURE — 86900 BLOOD TYPING SEROLOGIC ABO: CPT

## 2017-12-19 PROCEDURE — 82330 ASSAY OF CALCIUM: CPT

## 2017-12-19 PROCEDURE — 86146 BETA-2 GLYCOPROTEIN ANTIBODY: CPT

## 2017-12-19 PROCEDURE — 84295 ASSAY OF SERUM SODIUM: CPT

## 2017-12-19 PROCEDURE — 97110 THERAPEUTIC EXERCISES: CPT

## 2017-12-19 PROCEDURE — 84540 ASSAY OF URINE/UREA-N: CPT

## 2017-12-19 PROCEDURE — 86850 RBC ANTIBODY SCREEN: CPT

## 2017-12-19 PROCEDURE — 86880 COOMBS TEST DIRECT: CPT

## 2017-12-19 PROCEDURE — 84484 ASSAY OF TROPONIN QUANT: CPT

## 2017-12-19 PROCEDURE — 82553 CREATINE MB FRACTION: CPT

## 2017-12-19 PROCEDURE — 82310 ASSAY OF CALCIUM: CPT

## 2017-12-19 PROCEDURE — 84550 ASSAY OF BLOOD/URIC ACID: CPT

## 2017-12-19 PROCEDURE — 82728 ASSAY OF FERRITIN: CPT

## 2017-12-19 PROCEDURE — 83970 ASSAY OF PARATHORMONE: CPT

## 2017-12-19 PROCEDURE — 97161 PT EVAL LOW COMPLEX 20 MIN: CPT

## 2018-01-31 ENCOUNTER — APPOINTMENT (OUTPATIENT)
Dept: WOUND CARE | Facility: CLINIC | Age: 58
End: 2018-01-31
Payer: MEDICARE

## 2018-01-31 DIAGNOSIS — L89.313 PRESSURE ULCER OF RIGHT BUTTOCK, STAGE 3: ICD-10-CM

## 2018-01-31 PROCEDURE — 99214 OFFICE O/P EST MOD 30 MIN: CPT

## 2018-02-01 PROBLEM — L89.313: Status: ACTIVE | Noted: 2018-02-01

## 2018-03-14 ENCOUNTER — APPOINTMENT (OUTPATIENT)
Dept: WOUND CARE | Facility: CLINIC | Age: 58
End: 2018-03-14

## 2018-04-18 ENCOUNTER — INPATIENT (INPATIENT)
Facility: HOSPITAL | Age: 58
LOS: 8 days | Discharge: HOME | DRG: 603 | End: 2018-04-27
Attending: INTERNAL MEDICINE | Admitting: INTERNAL MEDICINE
Payer: MEDICARE

## 2018-04-18 VITALS
DIASTOLIC BLOOD PRESSURE: 82 MMHG | WEIGHT: 179.9 LBS | SYSTOLIC BLOOD PRESSURE: 126 MMHG | RESPIRATION RATE: 20 BRPM | HEART RATE: 88 BPM | TEMPERATURE: 99 F | OXYGEN SATURATION: 97 %

## 2018-04-18 DIAGNOSIS — Z29.9 ENCOUNTER FOR PROPHYLACTIC MEASURES, UNSPECIFIED: ICD-10-CM

## 2018-04-18 DIAGNOSIS — L03.116 CELLULITIS OF LEFT LOWER LIMB: ICD-10-CM

## 2018-04-18 DIAGNOSIS — L98.429 NON-PRESSURE CHRONIC ULCER OF BACK WITH UNSPECIFIED SEVERITY: ICD-10-CM

## 2018-04-18 DIAGNOSIS — I10 ESSENTIAL (PRIMARY) HYPERTENSION: ICD-10-CM

## 2018-04-18 DIAGNOSIS — I82.90 ACUTE EMBOLISM AND THROMBOSIS OF UNSPECIFIED VEIN: ICD-10-CM

## 2018-04-18 DIAGNOSIS — M25.551 PAIN IN RIGHT HIP: ICD-10-CM

## 2018-04-18 DIAGNOSIS — M32.9 SYSTEMIC LUPUS ERYTHEMATOSUS, UNSPECIFIED: ICD-10-CM

## 2018-04-18 DIAGNOSIS — N18.4 CHRONIC KIDNEY DISEASE, STAGE 4 (SEVERE): ICD-10-CM

## 2018-04-18 DIAGNOSIS — I50.22 CHRONIC SYSTOLIC (CONGESTIVE) HEART FAILURE: ICD-10-CM

## 2018-04-18 DIAGNOSIS — N39.0 URINARY TRACT INFECTION, SITE NOT SPECIFIED: ICD-10-CM

## 2018-04-18 LAB
ALBUMIN SERPL ELPH-MCNC: 3.2 G/DL — LOW (ref 3.3–5)
ALP SERPL-CCNC: 51 U/L — SIGNIFICANT CHANGE UP (ref 40–120)
ALT FLD-CCNC: 10 U/L — SIGNIFICANT CHANGE UP (ref 10–45)
ANION GAP SERPL CALC-SCNC: 14 MMOL/L — SIGNIFICANT CHANGE UP (ref 5–17)
APPEARANCE UR: ABNORMAL
APTT BLD: 44.2 SEC — HIGH (ref 27.5–37.4)
AST SERPL-CCNC: 11 U/L — SIGNIFICANT CHANGE UP (ref 10–40)
BACTERIA # UR AUTO: ABNORMAL /HPF
BASOPHILS # BLD AUTO: 0 K/UL — SIGNIFICANT CHANGE UP (ref 0–0.2)
BASOPHILS NFR BLD AUTO: 0 % — SIGNIFICANT CHANGE UP (ref 0–2)
BILIRUB SERPL-MCNC: 0.4 MG/DL — SIGNIFICANT CHANGE UP (ref 0.2–1.2)
BILIRUB UR-MCNC: NEGATIVE — SIGNIFICANT CHANGE UP
BUN SERPL-MCNC: 77 MG/DL — HIGH (ref 7–23)
CALCIUM SERPL-MCNC: 9 MG/DL — SIGNIFICANT CHANGE UP (ref 8.4–10.5)
CHLORIDE SERPL-SCNC: 104 MMOL/L — SIGNIFICANT CHANGE UP (ref 96–108)
CO2 SERPL-SCNC: 23 MMOL/L — SIGNIFICANT CHANGE UP (ref 22–31)
COLOR SPEC: YELLOW — SIGNIFICANT CHANGE UP
CREAT SERPL-MCNC: 3.66 MG/DL — HIGH (ref 0.5–1.3)
DIFF PNL FLD: ABNORMAL
EOSINOPHIL # BLD AUTO: 0.1 K/UL — SIGNIFICANT CHANGE UP (ref 0–0.5)
EOSINOPHIL NFR BLD AUTO: 1.2 % — SIGNIFICANT CHANGE UP (ref 0–6)
EPI CELLS # UR: SIGNIFICANT CHANGE UP /HPF
GLUCOSE SERPL-MCNC: 93 MG/DL — SIGNIFICANT CHANGE UP (ref 70–99)
GLUCOSE UR QL: NEGATIVE — SIGNIFICANT CHANGE UP
HCT VFR BLD CALC: 28.8 % — LOW (ref 34.5–45)
HGB BLD-MCNC: 8.8 G/DL — LOW (ref 11.5–15.5)
INR BLD: 4.13 RATIO — HIGH (ref 0.88–1.16)
KETONES UR-MCNC: NEGATIVE — SIGNIFICANT CHANGE UP
LEUKOCYTE ESTERASE UR-ACNC: ABNORMAL
LYMPHOCYTES # BLD AUTO: 0.6 K/UL — LOW (ref 1–3.3)
LYMPHOCYTES # BLD AUTO: 6.7 % — LOW (ref 13–44)
MCHC RBC-ENTMCNC: 27.9 PG — SIGNIFICANT CHANGE UP (ref 27–34)
MCHC RBC-ENTMCNC: 30.4 GM/DL — LOW (ref 32–36)
MCV RBC AUTO: 91.9 FL — SIGNIFICANT CHANGE UP (ref 80–100)
MONOCYTES # BLD AUTO: 0.5 K/UL — SIGNIFICANT CHANGE UP (ref 0–0.9)
MONOCYTES NFR BLD AUTO: 5.5 % — SIGNIFICANT CHANGE UP (ref 2–14)
NEUTROPHILS # BLD AUTO: 7.3 K/UL — SIGNIFICANT CHANGE UP (ref 1.8–7.4)
NEUTROPHILS NFR BLD AUTO: 86.6 % — HIGH (ref 43–77)
NITRITE UR-MCNC: NEGATIVE — SIGNIFICANT CHANGE UP
PH UR: 6 — SIGNIFICANT CHANGE UP (ref 5–8)
PLATELET # BLD AUTO: 160 K/UL — SIGNIFICANT CHANGE UP (ref 150–400)
POTASSIUM SERPL-MCNC: 4.3 MMOL/L — SIGNIFICANT CHANGE UP (ref 3.5–5.3)
POTASSIUM SERPL-SCNC: 4.3 MMOL/L — SIGNIFICANT CHANGE UP (ref 3.5–5.3)
PROT SERPL-MCNC: 6.3 G/DL — SIGNIFICANT CHANGE UP (ref 6–8.3)
PROT UR-MCNC: 100 MG/DL
PROTHROM AB SERPL-ACNC: 46.3 SEC — HIGH (ref 9.8–12.7)
RBC # BLD: 3.13 M/UL — LOW (ref 3.8–5.2)
RBC # FLD: 14.8 % — HIGH (ref 10.3–14.5)
RBC CASTS # UR COMP ASSIST: ABNORMAL /HPF (ref 0–2)
SODIUM SERPL-SCNC: 141 MMOL/L — SIGNIFICANT CHANGE UP (ref 135–145)
SP GR SPEC: 1.01 — SIGNIFICANT CHANGE UP (ref 1.01–1.02)
UROBILINOGEN FLD QL: NEGATIVE — SIGNIFICANT CHANGE UP
WBC # BLD: 8.4 K/UL — SIGNIFICANT CHANGE UP (ref 3.8–10.5)
WBC # FLD AUTO: 8.4 K/UL — SIGNIFICANT CHANGE UP (ref 3.8–10.5)
WBC UR QL: SIGNIFICANT CHANGE UP /HPF (ref 0–5)

## 2018-04-18 PROCEDURE — 99223 1ST HOSP IP/OBS HIGH 75: CPT

## 2018-04-18 PROCEDURE — 71045 X-RAY EXAM CHEST 1 VIEW: CPT | Mod: 26

## 2018-04-18 PROCEDURE — 73700 CT LOWER EXTREMITY W/O DYE: CPT | Mod: 26,RT

## 2018-04-18 PROCEDURE — 99285 EMERGENCY DEPT VISIT HI MDM: CPT | Mod: 25

## 2018-04-18 PROCEDURE — 93010 ELECTROCARDIOGRAM REPORT: CPT

## 2018-04-18 PROCEDURE — 76377 3D RENDER W/INTRP POSTPROCES: CPT | Mod: 26

## 2018-04-18 RX ORDER — FERROUS SULFATE 325(65) MG
325 TABLET ORAL DAILY
Qty: 0 | Refills: 0 | Status: DISCONTINUED | OUTPATIENT
Start: 2018-04-18 | End: 2018-04-27

## 2018-04-18 RX ORDER — AMLODIPINE BESYLATE 2.5 MG/1
10 TABLET ORAL DAILY
Qty: 0 | Refills: 0 | Status: DISCONTINUED | OUTPATIENT
Start: 2018-04-18 | End: 2018-04-27

## 2018-04-18 RX ORDER — PANTOPRAZOLE SODIUM 20 MG/1
1 TABLET, DELAYED RELEASE ORAL
Qty: 0 | Refills: 0 | COMMUNITY

## 2018-04-18 RX ORDER — HYDRALAZINE HCL 50 MG
50 TABLET ORAL THREE TIMES A DAY
Qty: 0 | Refills: 0 | Status: DISCONTINUED | OUTPATIENT
Start: 2018-04-18 | End: 2018-04-27

## 2018-04-18 RX ORDER — VANCOMYCIN HCL 1 G
1750 VIAL (EA) INTRAVENOUS ONCE
Qty: 0 | Refills: 0 | Status: DISCONTINUED | OUTPATIENT
Start: 2018-04-18 | End: 2018-04-18

## 2018-04-18 RX ORDER — FUROSEMIDE 40 MG
40 TABLET ORAL DAILY
Qty: 0 | Refills: 0 | Status: DISCONTINUED | OUTPATIENT
Start: 2018-04-18 | End: 2018-04-20

## 2018-04-18 RX ORDER — VANCOMYCIN HCL 1 G
1000 VIAL (EA) INTRAVENOUS ONCE
Qty: 0 | Refills: 0 | Status: COMPLETED | OUTPATIENT
Start: 2018-04-18 | End: 2018-04-18

## 2018-04-18 RX ORDER — CARVEDILOL PHOSPHATE 80 MG/1
25 CAPSULE, EXTENDED RELEASE ORAL EVERY 12 HOURS
Qty: 0 | Refills: 0 | Status: DISCONTINUED | OUTPATIENT
Start: 2018-04-18 | End: 2018-04-27

## 2018-04-18 RX ORDER — CEFEPIME 1 G/1
INJECTION, POWDER, FOR SOLUTION INTRAMUSCULAR; INTRAVENOUS
Qty: 0 | Refills: 0 | Status: DISCONTINUED | OUTPATIENT
Start: 2018-04-18 | End: 2018-04-18

## 2018-04-18 RX ORDER — CALCITRIOL 0.5 UG/1
0.25 CAPSULE ORAL DAILY
Qty: 0 | Refills: 0 | Status: DISCONTINUED | OUTPATIENT
Start: 2018-04-18 | End: 2018-04-27

## 2018-04-18 RX ORDER — COLCHICINE 0.6 MG
1 TABLET ORAL
Qty: 0 | Refills: 0 | COMMUNITY

## 2018-04-18 RX ORDER — LABETALOL HCL 100 MG
200 TABLET ORAL THREE TIMES A DAY
Qty: 0 | Refills: 0 | Status: DISCONTINUED | OUTPATIENT
Start: 2018-04-18 | End: 2018-04-27

## 2018-04-18 RX ORDER — ASCORBIC ACID 60 MG
500 TABLET,CHEWABLE ORAL DAILY
Qty: 0 | Refills: 0 | Status: DISCONTINUED | OUTPATIENT
Start: 2018-04-18 | End: 2018-04-27

## 2018-04-18 RX ORDER — SODIUM CHLORIDE 0.65 %
2 AEROSOL, SPRAY (ML) NASAL
Qty: 0 | Refills: 0 | COMMUNITY

## 2018-04-18 RX ORDER — CEFEPIME 1 G/1
1000 INJECTION, POWDER, FOR SOLUTION INTRAMUSCULAR; INTRAVENOUS ONCE
Qty: 0 | Refills: 0 | Status: COMPLETED | OUTPATIENT
Start: 2018-04-18 | End: 2018-04-18

## 2018-04-18 RX ORDER — PIPERACILLIN AND TAZOBACTAM 4; .5 G/20ML; G/20ML
3.38 INJECTION, POWDER, LYOPHILIZED, FOR SOLUTION INTRAVENOUS EVERY 12 HOURS
Qty: 0 | Refills: 0 | Status: DISCONTINUED | OUTPATIENT
Start: 2018-04-18 | End: 2018-04-20

## 2018-04-18 RX ORDER — SODIUM CHLORIDE 0.65 %
1 AEROSOL, SPRAY (ML) NASAL
Qty: 0 | Refills: 0 | COMMUNITY

## 2018-04-18 RX ADMIN — CEFEPIME 100 MILLIGRAM(S): 1 INJECTION, POWDER, FOR SOLUTION INTRAMUSCULAR; INTRAVENOUS at 19:01

## 2018-04-18 RX ADMIN — Medication 250 MILLIGRAM(S): at 21:41

## 2018-04-18 NOTE — H&P ADULT - NSHPREVIEWOFSYSTEMS_GEN_ALL_CORE
REVIEW OF SYSTEMS:  CONSTITUTIONAL: +weakness. resolved fevers. No chills. No weight loss. Good appetite.  EYES: No visual changes. No eye pain.  ENT: No hearing difficulty. No vertigo. No dysphagia. No Sinusitis/rhinorrhea.  NECK: No pain. No stiffness/rigidity.  CARDIAC: No chest pain. No palpitations.  RESPIRATORY: No cough. No SOB. No hemoptysis.  GASTROINTESTINAL: No abdominal pain. No nausea. No vomiting. No hematemesis. No diarrhea. No constipation. No melena. No hematochezia.  GENITOURINARY: No dysuria. No frequency. No hesitancy. No hematuria.  NEUROLOGICAL: No numbness. No focal weakness. No incontinence. No headache.  BACK: No back pain.  EXTREMITIES: No lower extremity edema. Full ROM.  SKIN: No rashes. No itching. No other lesions.  PSYCHIATRIC: No depression. No anxiety. No SI/HI.  ALLERGIC: No lip swelling. No hives.  All other review of systems is negative unless indicated above.

## 2018-04-18 NOTE — H&P ADULT - PROBLEM SELECTOR PLAN 1
-ordered for vanco/cefepime in ER  -c/w vanco/zosyn for now  -f/u bcx, not sent in ER will order now.   -wound care c/s  -consider LATHA glaser in am -ordered for vanco/cefepime in ER  -c/w vanco by level/zosyn renally dosed for now  -f/u bcx, not sent in ER will order now.   -wound care c/s  -consider ID eval in am

## 2018-04-18 NOTE — ED ADULT NURSE REASSESSMENT NOTE - NS ED NURSE REASSESS COMMENT FT1
Hook catheter replaced  as ordered. Foul smell noted after Hook from home was removed. Pt was cleaned with soap and water and sterile technique used to replace prior Hook; tolerated well.
PT resting comfortably in bed awaiting a bed up stairs. PT a/ox3 with diffuse L leg swelling and 4/10 aching pain. Will continue to reassess.

## 2018-04-18 NOTE — H&P ADULT - PROBLEM SELECTOR PLAN 7
-roughly stable ckd4/5  -avoid nephrotoxins  -renally dose meds for gfr <15  -c/w nephrovite, calcitriol  -c/w ferrous sulfate for iron def/anemia of ckd

## 2018-04-18 NOTE — ED PROVIDER NOTE - MEDICAL DECISION MAKING DETAILS
Patient with left lower extremity swelling and punctured leg with a grabber that she has at home, also complaining of urinary symptoms of frequency, and cloudy urine in fox, will get iv, labs, ekg, cxr, and antibiotics will admit.

## 2018-04-18 NOTE — ED PROVIDER NOTE - PROGRESS NOTE DETAILS
Abx ordered for uti and for left lower extremity cellulitis in compromised patient  Dr. Patino covering for Dr. Chicas is aware of patient and will accept care at this time.

## 2018-04-18 NOTE — H&P ADULT - NSHPSOCIALHISTORY_GEN_ALL_CORE
Social History:    Marital Status:  (   )    (  x ) Single    (   )    (  )   Occupation:  former human rights activist  Lives with: (  ) alone  ( x ) children   (  ) spouse   (  ) parents  (  ) other    Substance Use (street drugs): (  x) never used  (  ) other:  Tobacco Usage:  ( x  ) never smoked   (   ) former smoker   (   ) current smoker  (     ) pack year  (        ) last cigarette date  Alcohol Usage: occasional

## 2018-04-18 NOTE — ED PROVIDER NOTE - PHYSICAL EXAMINATION
GEN: NAD, awake, eyes open spontaneously  HEENT: NCAT, MMM, Trachea midline, normal conjunctiva, perrl  CHEST/LUNGS: Non-tachypneic, CTAB, bilateral breath sounds  CARDIAC: Non-tachycardic, normal perfusion  ABDOMEN: Soft, NTND, No rebound/guarding  MSK: No edema, no gross deformity of extremities  SKIN: No rashes, no petechiae, no vesicles  NEURO: CN grossly intact, normal coordination, no focal motor or sensory deficits  PSYCH: Alert, appropriate, cooperative, with capacity and insight GEN: NAD, awake, eyes open spontaneously, drowsy  HEENT: NCAT, MMM, Trachea midline, normal conjunctiva, perrl  CHEST/LUNGS: Non-tachypneic, CTAB, bilateral breath sounds  CARDIAC: Non-tachycardic, normal perfusion  ABDOMEN: Soft, NTND, No rebound/guarding, obese by bmi  MSK: Chronic severe left lower extremity lymphedema, no gross deformity of extremities, wound to left distal medial calf with mild purulence and drainage from wound, + surrounding warmth and redness to the region  SKIN: No other rashes, no petechiae, no vesicles  NEURO: CN grossly intact, normal coordination, no focal motor or sensory deficits  PSYCH: Alert, appropriate, cooperative, with capacity and insight

## 2018-04-18 NOTE — H&P ADULT - PROBLEM SELECTOR PLAN 3
-pain been going on for few days worse with movement; given body habitus and lack of range of motion exam is difficult but pt with significant tenderness with movement, radiating down R hip to R femur and R groin  -plan was for o/p CT per pt's d/w wound care team  -will order CT R hip/femur and reassess -pain been going on for few days worse with movement; given body habitus and lack of range of motion exam is difficult but pt with significant tenderness with movement, radiating down R hip to R femur and R groin  -plan was for o/p CT per pt's d/w wound care team, ?occult fx vs radiculopathy vs. infection  -will order CT R hip/femur and reassess to start eval, will need to be noncon study given severe ckd

## 2018-04-18 NOTE — H&P ADULT - NSHPLABSRESULTS_GEN_ALL_CORE
Labs personally reviewed  no leukocytosis, slightly downtrended anemia, coags c/w a/c, cmp showing stable ckd4/5, UA with blood/LE/carmelina neg nitrites.      Imaging personally reviewed CXR prelim no emergent findings.    EKG personally reviewed v paced

## 2018-04-18 NOTE — H&P ADULT - ATTENDING COMMENTS
Care to be assumed by Dr. Patino at 8 am.  This patient was assigned to me by the hospitalist in charge; my involvement in this case has consisted of the initial history, physical, chart review, and management plan.  This patient was previously unknown to me.  Case endorsed to NP ______ at ____. Care to be assumed by Dr. Patino at 8 am.  This patient was assigned to me by the hospitalist in charge; my involvement in this case has consisted of the initial history, physical, chart review, and management plan.  This patient was previously unknown to me.  Case endorsed to KRISTINE [id number] Romero at 945 pm. He is aware to f/u the stat INR and dose coumadin accordingly.  He is aware that she is to avoid heparin products.

## 2018-04-18 NOTE — ED ADULT NURSE NOTE - OBJECTIVE STATEMENT
58 year old female with pmhx of DVT, CHF, Hypertension, cellulitis, PE, lupus presents with increased swelling in the left leg and pain/discomfort to right leg.  Pt reported that she has wound care done 3 times a week and today the nurse noticed worsening of   swelling  to the left thigh and leg.  Pt was sent patient to ED for further evaluation. Per patient, had punctured her left leg on Sunday and is warm to touch. Had fever of 99.9 at home. Patient also c/o concern for UTI and notes cloudy and foul smelling urine. Denies cough or SOB. Denies chest pain or difficulty breathing.

## 2018-04-18 NOTE — H&P ADULT - HISTORY OF PRESENT ILLNESS
58 F PMH SLE, systolic HFrEF(EF 32%) s/p AICD, HTN, CKD III- IV, PE / left leg DVT dx 2009, HIT, CAD (no stents), stage IV sacral ulcer, chronic Hook, recurrent pericardial effusion, AAA ~14cm in 12/2016 s/p repair in 2010 w/ aortoiliac stent complicated post-operatively with development of left lower extremity compartment syndrome, now presents with dysuria and LLE redness.     VS: Tm 98.8, 87, 141/73, 18, 95% RA.  Labs: no leukocytosis, slightly downtrended anemia, coags c/w a/c, cmp showing stable ckd4/5, UA with blood/LE/carmelina neg nitrites.  CXR prelim no emergent findings. Given vanco, cefepime in ER. 58 F PMH SLE, systolic HFrEF(EF 32%) s/p AICD, HTN, CKD III- IV, PE / left leg DVT dx 2009, HIT, CAD (no stents), stage IV sacral ulcer, chronic Fox, recurrent pericardial effusion, AAA ~14cm in 12/2016 s/p repair in 2010 w/ aortoiliac stent complicated post-operatively with development of left lower extremity compartment syndrome, now presents with dysuria and LLE redness.  3-4 d PTA, pt was in bed using an extendable grabber that slipped and hit her leg.  At first she didn't notice anything, then soon realized she had punctured the skin on the medial aspect of her L thigh, with some bleeding.  Starting today, pt has noticed increasing warmth and redness of the L leg; she notes her L leg has always been very large and malformed looking since her prior fasciotomy for compartment syndrome, but the warmth/redness is new and overall is more swollen then usual. +low grade fever to around 100.0 at home; pt took tylenol over last few days and is now afebrile. Denies purulent drainage. Pt also c/o of some urinary changes; she has chronic fox (placed in setting of her nonhealing sacral ulcer) so does not experience dysuria, but has noticed increasing pubic pressure and darkness/cloudiness of urine.  Pt also is c/o R thigh severe pain; extending down her R femur; she notes that her wound vac on her R sacrum was removed today and packed, which exacerbated the pain.  She was already scheduled to get a CT of her hip/leg to evaluate these complaints but has not yet been able to get it as an o/p.  Pt nonambulatory at baseline, but can still stand on her legs. Denies CP/SOB, denies n/v/d.     VS: Tm 98.8, 87, 141/73, 18, 95% RA.  Labs: no leukocytosis, slightly downtrended anemia, coags c/w a/c, cmp showing stable ckd4/5, UA with blood/LE/carmelina neg nitrites.  CXR prelim no emergent findings. Given vanco, cefepime in ER.

## 2018-04-18 NOTE — H&P ADULT - NSHPPHYSICALEXAM_GEN_ALL_CORE
PHYSICAL EXAM:  GENERAL: mild distress 2/2 pain, morbidly obese  HEAD:  Atraumatic, Normocephalic  EYES: EOMI, PERRLA, conjunctiva and sclera clear  ENMT: No tonsillar erythema, exudates, or enlargement; Moist mucous membranes, Good dentition, No lesions  NECK: Supple, No JVD, Normal thyroid  CHEST/LUNG: Decreased BS at bases; No rales, rhonchi, wheezing, or rubs no accessory musc usage  HEART: Regular rate and rhythm; No murmurs, rubs, or gallops + b/l edema LE  ABDOMEN: Soft, Nontender, Nondistended; Bowel sounds present No sig suprapubic tenderness at this time.   EXTREMITIES:  2+ Peripheral Pulses, No clubbing, cyanosis.  RLE with pain on passive rotation and flexion of R hip, with referred pain down R femur and R groin.   LYMPH: No lymphadenopathy noted  SKIN: No rashes or lesions  NERVOUS SYSTEM:  Alert & Oriented X3, Good concentration; Motor Strength 5/5 B/L upper and lower extremities

## 2018-04-18 NOTE — H&P ADULT - PROBLEM SELECTOR PLAN 9
- pt takes coumadin 4 mg qd  -no PT/INR sent in ER; will send stat now  -pending dosage of nightly coumadin for stat INR  -avoid heparin products given dx of HIT in past

## 2018-04-18 NOTE — H&P ADULT - PROBLEM SELECTOR PLAN 2
-pt with +UA in setting of chronic fox with some sx  -above abx will cover urinary source as well  -f/u ucx, bcx.

## 2018-04-18 NOTE — H&P ADULT - ASSESSMENT
58 F PMH SLE, systolic HFrEF(EF 32%) s/p AICD, HTN, CKD III- IV, PE / left leg DVT dx 2009, HIT, CAD (no stents), stage IV sacral ulcer, chronic Hook, recurrent pericardial effusion, AAA ~14cm in 12/2016 s/p repair in 2010 w/ aortoiliac stent complicated post-operatively with development of left lower extremity compartment syndrome, now presents with dysuria and LLE redness.

## 2018-04-18 NOTE — ED ADULT NURSE NOTE - ADDITIONAL LOCATION
additional location...
Partially impaired: cannot see medication labels or newsprint, but can see obstacles in path, and the surrounding layout; can count fingers at arm's length

## 2018-04-18 NOTE — ED PROVIDER NOTE - OBJECTIVE STATEMENT
58 year old female with pmhx of DVT, CHF, Hypertension, PE, lupus presents 58 year old female with pmhx of DVT, CHF, Hypertension, cellulitis, PE, lupus presents with increased swelling in the left leg and pain/discomfort to right leg. Home nurse noted the swelling and sent patient to ED for further evaluation. Per patient, had punctured her left leg on Sunday and is warm to touch. Had fever of 99.9 at home. Patient also c/o concern for UTI and notes cloudy urine. Denies cough or SOB. Denies chest pain or difficulty breathing. 58 year old female with pmhx of DVT, CHF, Hypertension, cellulitis, PE, lupus presents with increased swelling in the left leg and pain/discomfort to the leg leg. Home nurse noted the swelling and sent patient to ED for further evaluation for an increase in rudeness and warmth. Per patient, had punctured her left leg on Sunday and is warm to touch. Had fever of 99.9 at home. Patient also c/o concern for UTI and notes cloudy urine. Denies cough or SOB. Denies chest pain or difficulty breathing.

## 2018-04-19 DIAGNOSIS — L03.116 CELLULITIS OF LEFT LOWER LIMB: ICD-10-CM

## 2018-04-19 DIAGNOSIS — M79.89 OTHER SPECIFIED SOFT TISSUE DISORDERS: ICD-10-CM

## 2018-04-19 DIAGNOSIS — N18.9 CHRONIC KIDNEY DISEASE, UNSPECIFIED: ICD-10-CM

## 2018-04-19 LAB
ALBUMIN SERPL ELPH-MCNC: 3.1 G/DL — LOW (ref 3.3–5)
ALP SERPL-CCNC: 51 U/L — SIGNIFICANT CHANGE UP (ref 40–120)
ALT FLD-CCNC: 7 U/L — LOW (ref 10–45)
ANION GAP SERPL CALC-SCNC: 13 MMOL/L — SIGNIFICANT CHANGE UP (ref 5–17)
APTT BLD: 42.9 SEC — HIGH (ref 27.5–37.4)
AST SERPL-CCNC: 10 U/L — SIGNIFICANT CHANGE UP (ref 10–40)
BASOPHILS # BLD AUTO: 0 K/UL — SIGNIFICANT CHANGE UP (ref 0–0.2)
BASOPHILS NFR BLD AUTO: 0 % — SIGNIFICANT CHANGE UP (ref 0–2)
BILIRUB SERPL-MCNC: 0.4 MG/DL — SIGNIFICANT CHANGE UP (ref 0.2–1.2)
BUN SERPL-MCNC: 79 MG/DL — HIGH (ref 7–23)
C3 SERPL-MCNC: 97 MG/DL — SIGNIFICANT CHANGE UP (ref 80–180)
C4 SERPL-MCNC: 27 MG/DL — SIGNIFICANT CHANGE UP (ref 10–45)
CALCIUM SERPL-MCNC: 9 MG/DL — SIGNIFICANT CHANGE UP (ref 8.4–10.5)
CHLORIDE SERPL-SCNC: 105 MMOL/L — SIGNIFICANT CHANGE UP (ref 96–108)
CK SERPL-CCNC: 68 U/L — SIGNIFICANT CHANGE UP (ref 25–170)
CO2 SERPL-SCNC: 23 MMOL/L — SIGNIFICANT CHANGE UP (ref 22–31)
CREAT SERPL-MCNC: 3.73 MG/DL — HIGH (ref 0.5–1.3)
EOSINOPHIL # BLD AUTO: 0.08 K/UL — SIGNIFICANT CHANGE UP (ref 0–0.5)
EOSINOPHIL NFR BLD AUTO: 0.9 % — SIGNIFICANT CHANGE UP (ref 0–6)
GLUCOSE SERPL-MCNC: 100 MG/DL — HIGH (ref 70–99)
HCT VFR BLD CALC: 27.6 % — LOW (ref 34.5–45)
HGB BLD-MCNC: 8.2 G/DL — LOW (ref 11.5–15.5)
IMM GRANULOCYTES NFR BLD AUTO: 0.3 % — SIGNIFICANT CHANGE UP (ref 0–1.5)
INR BLD: 3.76 RATIO — HIGH (ref 0.88–1.16)
LYMPHOCYTES # BLD AUTO: 0.61 K/UL — LOW (ref 1–3.3)
LYMPHOCYTES # BLD AUTO: 6.6 % — LOW (ref 13–44)
MAGNESIUM SERPL-MCNC: 2.2 MG/DL — SIGNIFICANT CHANGE UP (ref 1.6–2.6)
MCHC RBC-ENTMCNC: 26.8 PG — LOW (ref 27–34)
MCHC RBC-ENTMCNC: 29.7 GM/DL — LOW (ref 32–36)
MCV RBC AUTO: 90.2 FL — SIGNIFICANT CHANGE UP (ref 80–100)
MONOCYTES # BLD AUTO: 0.59 K/UL — SIGNIFICANT CHANGE UP (ref 0–0.9)
MONOCYTES NFR BLD AUTO: 6.4 % — SIGNIFICANT CHANGE UP (ref 2–14)
NEUTROPHILS # BLD AUTO: 7.91 K/UL — HIGH (ref 1.8–7.4)
NEUTROPHILS NFR BLD AUTO: 85.8 % — HIGH (ref 43–77)
PHOSPHATE SERPL-MCNC: 3.7 MG/DL — SIGNIFICANT CHANGE UP (ref 2.5–4.5)
PLATELET # BLD AUTO: 163 K/UL — SIGNIFICANT CHANGE UP (ref 150–400)
POTASSIUM SERPL-MCNC: 4.4 MMOL/L — SIGNIFICANT CHANGE UP (ref 3.5–5.3)
POTASSIUM SERPL-SCNC: 4.4 MMOL/L — SIGNIFICANT CHANGE UP (ref 3.5–5.3)
PROT SERPL-MCNC: 6.4 G/DL — SIGNIFICANT CHANGE UP (ref 6–8.3)
PROTHROM AB SERPL-ACNC: 43.7 SEC — HIGH (ref 10–13.1)
RBC # BLD: 3.06 M/UL — LOW (ref 3.8–5.2)
RBC # FLD: 16.1 % — HIGH (ref 10.3–14.5)
SODIUM SERPL-SCNC: 141 MMOL/L — SIGNIFICANT CHANGE UP (ref 135–145)
VANCOMYCIN FLD-MCNC: 9.8 UG/ML — SIGNIFICANT CHANGE UP
WBC # BLD: 9.22 K/UL — SIGNIFICANT CHANGE UP (ref 3.8–10.5)
WBC # FLD AUTO: 9.22 K/UL — SIGNIFICANT CHANGE UP (ref 3.8–10.5)

## 2018-04-19 PROCEDURE — 99222 1ST HOSP IP/OBS MODERATE 55: CPT

## 2018-04-19 RX ORDER — VANCOMYCIN HCL 1 G
1000 VIAL (EA) INTRAVENOUS ONCE
Qty: 0 | Refills: 0 | Status: COMPLETED | OUTPATIENT
Start: 2018-04-19 | End: 2018-04-19

## 2018-04-19 RX ADMIN — AMLODIPINE BESYLATE 10 MILLIGRAM(S): 2.5 TABLET ORAL at 10:02

## 2018-04-19 RX ADMIN — Medication 200 MILLIGRAM(S): at 06:15

## 2018-04-19 RX ADMIN — Medication 50 MILLIGRAM(S): at 00:19

## 2018-04-19 RX ADMIN — CALCITRIOL 0.25 MICROGRAM(S): 0.5 CAPSULE ORAL at 16:58

## 2018-04-19 RX ADMIN — Medication 10 MILLIGRAM(S): at 10:02

## 2018-04-19 RX ADMIN — PIPERACILLIN AND TAZOBACTAM 25 GRAM(S): 4; .5 INJECTION, POWDER, LYOPHILIZED, FOR SOLUTION INTRAVENOUS at 19:23

## 2018-04-19 RX ADMIN — Medication 40 MILLIGRAM(S): at 06:15

## 2018-04-19 RX ADMIN — Medication 50 MILLIGRAM(S): at 06:15

## 2018-04-19 RX ADMIN — Medication 250 MILLIGRAM(S): at 16:58

## 2018-04-19 RX ADMIN — Medication 50 MILLIGRAM(S): at 14:49

## 2018-04-19 RX ADMIN — Medication 200 MILLIGRAM(S): at 00:19

## 2018-04-19 RX ADMIN — Medication 500 MILLIGRAM(S): at 12:38

## 2018-04-19 RX ADMIN — Medication 200 MILLIGRAM(S): at 22:25

## 2018-04-19 RX ADMIN — Medication 200 MILLIGRAM(S): at 14:49

## 2018-04-19 RX ADMIN — Medication 50 MILLIGRAM(S): at 22:25

## 2018-04-19 RX ADMIN — Medication 325 MILLIGRAM(S): at 12:38

## 2018-04-19 RX ADMIN — CARVEDILOL PHOSPHATE 25 MILLIGRAM(S): 80 CAPSULE, EXTENDED RELEASE ORAL at 18:19

## 2018-04-19 RX ADMIN — Medication 1 TABLET(S): at 18:19

## 2018-04-19 RX ADMIN — CARVEDILOL PHOSPHATE 25 MILLIGRAM(S): 80 CAPSULE, EXTENDED RELEASE ORAL at 10:02

## 2018-04-19 RX ADMIN — PIPERACILLIN AND TAZOBACTAM 25 GRAM(S): 4; .5 INJECTION, POWDER, LYOPHILIZED, FOR SOLUTION INTRAVENOUS at 02:55

## 2018-04-19 NOTE — CONSULT NOTE ADULT - SUBJECTIVE AND OBJECTIVE BOX
[facility]  [phone]  NEPHROLOGY      INITIAL CONSULT NOTE  --------------------------------------------------------------------------------    HPI:  58 F PMH SLE, systolic HFrEF(EF 32%) s/p AICD, HTN, CKD III- IV, PE / left leg DVT dx 2009, HIT, CAD (no stents), stage IV sacral ulcer, chronic Fox, recurrent pericardial effusion, AAA ~14cm in 12/2016 s/p repair in 2010 w/ aortoiliac stent complicated post-operatively with development of left lower extremity compartment syndrome, now presents with dysuria and LLE redness.  3-4 d PTA, pt was in bed using an extendable grabber that slipped and hit her leg.  had punctured the skin on the medial aspect of her L thigh, with some bleeding.  Starting yesterday , pt noticed increasing warmth and redness of the L leg;. +low grade fever to around 100.0 at home;  has noticed increasing pubic pressure and darkness/cloudiness of urine.  Pt also is c/o R thigh severe pain; extending down her R femur; she notes that her wound vac on her R sacrum was removed today and packed, which exacerbated the pain.  She was already scheduled to get a CT of her hip/leg to evaluate these complaints but has not yet been able to get it as an o/p.  Pt nonambulatory at baseline, but can still stand on her legs. her creatinine was noticed to be abnormal this prompted her renal consultation     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)  Calf DVT (Deep Venous Thrombosis) (ICD9 453.42)  Adenomatous Goiter (ICD9 241.9)  Chronic Gout (ICD9 274.02)  LBBB (Left Bundle Branch Block) (ICD9 426.3)  CHF (Congestive Heart Failure) (ICD9 428.0)  Hx of aorta-iliac-femoral bypass  TOP (Termination of Pregnancy)  S/P Dilatation and Curettage  History of Tubal Ligation  s/p AAA (Abdominal Aortic Aneurysm) Repair: 2009 2010  History of Cholecystectomy  S/P Partial Hysterectomy    FAMILY HISTORY:  No pertinent family history in first degree relatives    PAST SOCIAL HISTORY: lives with daughter. no alcohol no tobacco use    ALLERGIES & MEDICATIONS  --------------------------------------------------------------------------------  Allergies    heparin (Unknown)  losartan (Anaphylaxis)  nitroglycerin (Other)    Intolerances      Standing Inpatient Medications  amLODIPine   Tablet 10 milliGRAM(s) Oral daily  ascorbic acid 500 milliGRAM(s) Oral daily  calcitriol   Capsule 0.25 MICROGram(s) Oral daily  carvedilol 25 milliGRAM(s) Oral every 12 hours  ferrous    sulfate 325 milliGRAM(s) Oral daily  furosemide    Tablet 40 milliGRAM(s) Oral daily  hydrALAZINE 50 milliGRAM(s) Oral three times a day  labetalol 200 milliGRAM(s) Oral three times a day  Nephro-alyse 1 Tablet(s) Oral daily  piperacillin/tazobactam IVPB. 3.375 Gram(s) IV Intermittent every 12 hours  predniSONE   Tablet 10 milliGRAM(s) Oral daily    PRN Inpatient Medications      REVIEW OF SYSTEMS  --------------------------------------------------------------------------------  Gen: low grade fevers/chills  +   Skin: LLE redness   Head/Eyes/Ears/Mouth: No headache; Normal hearing;  No sinus pain/discomfort, sore throat  Respiratory: No dyspnea, cough, wheezin  CV: No chest pain, no palp   GI: No abdominal pain, diarrhea, nausea, vomiting  : No dysuria, decrease urination or hesitancy urinating  hematuria, has chronic fox   MSK: No joint pain/ + left leg swelling; has sacral wound   Neuro: No dizziness/lightheadedness,   All other systems were reviewed and are negative, except as noted.    VITALS/PHYSICAL EXAM  --------------------------------------------------------------------------------  T(C): 36.7 (04-19-18 @ 20:34), Max: 37.5 (04-19-18 @ 07:31)  HR: 80 (04-19-18 @ 20:34) (80 - 100)  BP: 113/67 (04-19-18 @ 20:34) (113/67 - 149/78)  RR: 18 (04-19-18 @ 20:34) (18 - 18)  SpO2: 95% (04-19-18 @ 20:34) (95% - 97%)  Wt(kg): --  Height (cm): 165.1 (04-19-18 @ 14:19)  Weight (kg): 129.9 (04-19-18 @ 14:19)  BMI (kg/m2): 47.7 (04-19-18 @ 14:19)  BSA (m2): 2.3 (04-19-18 @ 14:19)      04-19-18 @ 07:01  -  04-19-18 @ 22:16  --------------------------------------------------------  IN: 490 mL / OUT: 1750 mL / NET: -1260 mL      Physical Exam:  	Gen: Non toxic; comfortable appearing   	no jvd , supple neck,   	Pulm: decrease bs  no rales or ronchi or wheezing  	CV: RRR, S1S2; no rub  	Abd: +BS, soft, nontender/nondistended  	: No suprapubic tenderness + chronic fox   	UE: Warm, no cyanosis  no clubbing,  no edema;  	LE: LLE 4+ warm redness left leg 1+ RLE   	Neuro: alert and oriented. speech coherent   	Psych: Normal affect and mood  	Skin: Warm LLE erythema   LABS/STUDIES  --------------------------------------------------------------------------------              8.2    9.22  >-----------<  163      [04-19-18 @ 07:30]              27.6     141  |  105  |  79  ----------------------------<  100      [04-19-18 @ 06:08]  4.4   |  23  |  3.73        Ca     9.0     [04-19-18 @ 06:08]      Mg     2.2     [04-19-18 @ 06:08]      Phos  3.7     [04-19-18 @ 06:08]    TPro  6.4  /  Alb  3.1  /  TBili  0.4  /  DBili  x   /  AST  10  /  ALT  7   /  AlkPhos  51  [04-19-18 @ 06:08]    PT/INR: PT 43.7 , INR 3.76       [04-19-18 @ 07:34]  PTT: 42.9       [04-19-18 @ 07:34]    CK 68      [04-19-18 @ 00:32]    Creatinine Trend:  SCr 3.73 [04-19 @ 06:08]  SCr 3.66 [04-18 @ 15:15]    Urinalysis - [04-18-18 @ 17:50]      Color Yellow / Appearance SL Turbid / SG 1.012 / pH 6.0      Gluc Negative / Ketone Negative  / Bili Negative / Urobili Negative       Blood Moderate / Protein 100 / Leuk Est Large / Nitrite Negative      RBC 10-25 / WBC 26-50 / Hyaline  / Gran  / Sq Epi  / Non Sq Epi Few / Bacteria Moderate      Iron 48, TIBC --, %sat --      [10-12-17 @ 07:14]  Ferritin 847.0      [10-12-17 @ 07:14]  PTH -- (Ca 9.0)      [11-09-17 @ 08:51]   387  Vitamin D (25OH) 7.8      [11-09-17 @ 08:51]  HbA1c 4.5      [08-09-16 @ 06:49]  TSH 0.33      [10-22-17 @ 08:40]      KRISTOPHER: titer 1:320, pattern Homogeneous      [01-10-17 @ 12:14]  dsDNA 44      [10-17-17 @ 08:26]  C3 Complement 97      [04-19-18 @ 09:31]  C4 Complement 27      [04-19-18 @ 09:31]

## 2018-04-19 NOTE — CONSULT NOTE ADULT - EXTREMITIES COMMENTS
Left LE edema+, tense, improving erythema left upper leg area, medial calf area with puncture wound, no drainage noted

## 2018-04-19 NOTE — CONSULT NOTE ADULT - ATTENDING COMMENTS
Jim Syed  Attending Physician   Division of Infectious Disease  Pager #[phone]  After 5pm/weekend or no response, call #[phone]

## 2018-04-19 NOTE — CONSULT NOTE ADULT - ASSESSMENT
58 F PMH SLE, systolic HFrEF(EF 32%) s/p AICD, HTN, CKD III- IV, PE / left leg DVT dx 2009, HIT, CAD (no stents), stage IV sacral ulcer, chronic Fox, recurrent pericardial effusion, AAA ~14cm in 12/2016 s/p repair in 2010 w/ aortoiliac stent complicated post-operatively with development of left lower extremity compartment syndrome, with cellulitis and worsening ckd     1- CKD IV   2- cellulitis   3- chf  4- HTN   5- neurogenic bladder  6- lupus   7- anemia  8- shpt    creatinine worsening   cont with fox   increase lasix to 80 mg po daily   cont with norvasc and hydralazine  to have pth and phos  zosyn to cont as above  prednisone 10 mg daily   keep O > I  to have retic count   to have dsdna   to have iron profile

## 2018-04-19 NOTE — CONSULT NOTE ADULT - SUBJECTIVE AND OBJECTIVE BOX
CHIEF COMPLAINT:  Leg edema     HISTORY OF PRESENT ILLNESS:  59 yo F well known to me with extensive past medical history presents with dysuria and LLE redness.  3-4 d PTA, pt was in bed using an extendable grabber that slipped and hit her leg.  At first she didn't notice anything, then soon realized she had punctured the skin on the medial aspect of her L thigh, with some bleeding.  Starting today, pt has noticed increasing warmth and redness of the L leg; she notes her L leg has always been very large and malformed looking since her prior fasciotomy for compartment syndrome, but the warmth/redness is new and overall is more swollen then usual. +low grade fever to around 100.0 at home; pt took tylenol over last few days and is now afebrile. Denies purulent drainage. Pt also c/o of some urinary changes; she has chronic fox (placed in setting of her nonhealing sacral ulcer) so does not experience dysuria, but has noticed increasing pubic pressure and darkness/cloudiness of urine.  Pt also is c/o R thigh severe pain; extending down her R femur; she notes that her wound vac on her R sacrum was removed today and packed, which exacerbated the pain.  She was already scheduled to get a CT of her hip/leg to evaluate these complaints but has not yet been able to get it as an o/p.  Pt nonambulatory at baseline, but can still stand on her legs. Denies CP/SOB, denies n/v/d.         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)  Chronic Gout (ICD9 274.02)  LBBB (Left Bundle Branch Block) (ICD9 426.3)  CHF (Congestive Heart Failure) (ICD9 428.0)  Hx of aorta-iliac-femoral bypass  TOP (Termination of Pregnancy)  S/P Dilatation and Curettage  History of Tubal Ligation  s/p AAA (Abdominal Aortic Aneurysm) Repair: 2009 2010  History of Cholecystectomy  S/P Partial Hysterectomy          MEDICATIONS:  amLODIPine   Tablet 10 milliGRAM(s) Oral daily  carvedilol 25 milliGRAM(s) Oral every 12 hours  furosemide    Tablet 40 milliGRAM(s) Oral daily  hydrALAZINE 50 milliGRAM(s) Oral three times a day  labetalol 200 milliGRAM(s) Oral three times a day    piperacillin/tazobactam IVPB. 3.375 Gram(s) IV Intermittent every 12 hours          predniSONE   Tablet 10 milliGRAM(s) Oral daily    ascorbic acid 500 milliGRAM(s) Oral daily  calcitriol   Capsule 0.25 MICROGram(s) Oral daily  ferrous    sulfate 325 milliGRAM(s) Oral daily  Nephro-alyse 1 Tablet(s) Oral daily      FAMILY HISTORY:  No pertinent family history in first degree relatives      SOCIAL HISTORY:    [ ] Non-smoker  [ ] Smoker  [ ] Alcohol    Allergies    heparin (Unknown)  losartan (Anaphylaxis)  nitroglycerin (Other)    Intolerances    	    REVIEW OF SYSTEMS:  CONSTITUTIONAL: No fever, weight loss, + fatigue  EYES: No eye pain, visual disturbances, or discharge  ENMT:  No difficulty hearing, tinnitus, vertigo; No sinus or throat pain  NECK: No pain or stiffness  RESPIRATORY: No cough, wheezing, chills or hemoptysis; No Shortness of Breath  CARDIOVASCULAR: No chest pain, palpitations, passing out, dizziness, or leg swelling  GASTROINTESTINAL: No abdominal or epigastric pain. No nausea, vomiting, or hematemesis; No diarrhea or constipation. No melena or hematochezia.  GENITOURINARY: No dysuria, frequency, hematuria, or incontinence  NEUROLOGICAL: No headaches, memory loss, loss of strength, numbness, or tremors  SKIN: No itching, burning, rashes, or lesions   LYMPH Nodes: No enlarged glands  ENDOCRINE: No heat or cold intolerance; No hair loss  MUSCULOSKELETAL: + joint pain or swelling; No muscle, back, or extremity pain  PSYCHIATRIC: No depression, anxiety, mood swings, or difficulty sleeping  HEME/LYMPH: No easy bruising, or bleeding gums  ALLERY AND IMMUNOLOGIC: No hives or eczema	    [ ] All others negative	  [ ] Unable to obtain    PHYSICAL EXAM:  T(C): 37.5 (04-19-18 @ 07:31), Max: 37.5 (04-19-18 @ 07:31)  HR: 86 (04-19-18 @ 07:31) (79 - 100)  BP: 135/69 (04-19-18 @ 07:31) (126/82 - 149/78)  RR: 18 (04-19-18 @ 07:31) (18 - 20)  SpO2: 95% (04-19-18 @ 07:31) (95% - 99%)  Wt(kg): --  I&O's Summary      Appearance: Normal	  HEENT:   Normal oral mucosa, PERRL, EOMI	  Lymphatic: No lymphadenopathy  Cardiovascular: Normal S1 S2, No JVD, No murmurs, No edema  Respiratory: Lungs clear to auscultation	  Psychiatry: A & O x 3, Mood & affect appropriate  Gastrointestinal:  Soft, Non-tender, + BS	  Skin: No rashes, No ecchymoses, No cyanosis	  Neurologic: Non-focal  Extremities: massive edema LLE   Vascular: Peripheral pulses palpable 2+ bilaterally    TELEMETRY: 	    ECG:  	Vpaced   RADIOLOGY:      < from: CT 3D Reconstruct w/ Workstation (04.18.18 @ 22:25) >    EXAM:  CT FEMUR ONLY RT                          EXAM:  CT 3D RECONSTRUCT [facility]                            PROCEDURE DATE:  04/18/2018            INTERPRETATION:    CT OF THE RIGHT FEMUR    CLINICAL INFORMATION: Right thigh pain extending from the hip down the   femur. Question fracture or infection.  TECHNIQUE: Multidetector CT of the right thigh was performed from the   level of the hip to the level of the knee without the use of intravenous   or intra-articular contrast. Multiplanar reformats were generated for   review. Three dimensional reconstructions were obtained on an independent   workstation. The interpretation of these images is included in the body   of the report of the main portion of the study.     COMPARISON: CT of the abdomen and pelvis 29 October 2017.    FINDINGS:    BONE: Decubitus ulcer is identified with air tracking to the level of the   posterior cortex of the right ischium (series 2 image 133). There is mild   sclerosis and periosteal reaction in the right posterior ischium, similar   in appearance to CT of October 2017. No new cortical destruction is   identified.  No acute fracture or dislocation. Cartilage space narrowing with   subchondral cystic change and marginal osteophyte formation of the right   hip. Cartilage space narrowing and marginal osteophyte formation of the   right knee. Mild degenerative change of the right SI joint and pubic   symphysis.    SOFT TISSUE: As mentioned above, there is a right decubitus ulcer with   air tracking to the level of the posterior cortex of the right ischium.   No additional soft tissue ulceration is identified. No subcutaneous   treatable fluid collections identified in the right lower extremity.   Trace, physiologic fluid in the right hip and right knee. Endovascular   graft is identified in the abdomen. Fox catheter is seen in the bladder.    MISCELLANEOUS: Partially imaged skin thickening and edema in the   subcutaneous fat of the left lower extremity.      IMPRESSION:  1.  No acute fracture or dislocation. No drainable fluid collections   identified in the right thigh.  2.  Decubitus ulcer is identified with air tracking to the posterior   cortex of the right ischium. The ischium is similar in appearance to   prior CT of October 2017.  3.  Partially imaged skin thickening and subcutaneous edema in the left   lower extremity.                    SUKUMAR WREN M.D., ATTENDING RADIOLOGIST  This document has been electronically signed. Apr 19 2018 11:51AM                < end of copied text >      < from: Xray Chest 1 View AP/PA (04.18.18 @ 16:28) >    EXAM:  XR CHEST AP OR PA 1V                            PROCEDURE DATE:  04/18/2018            INTERPRETATION:    CLINICAL INDICATION: Fever and chills.    TECHNIQUE: Portable frontal view of the chest.    COMPARISON: Frontal chest radiograph from 11/25/2017. Chest CT from   10/18/2017.    FINDINGS:     An AICD device overlies left hemithorax.  Cardiac size is enlarged.  There is persistent obscuration of the left hemidiaphragm, which may be   secondary to massive cardiomegaly.  No pleural effusions or pneumothorax.  No acute osseous abnormality.      IMPRESSION:    Persistent obscuration of the left hemidiaphragm may be due to   cardiomegaly, however underlying atelectasis or infection cannot be   definitively ruled out.                CARMEN OCONNOR M.D., RADIOLOGY RESIDENT  This document has been electronically signed.  ALEXIS KILGORE M.D., ATTENDING RADIOLOGIST  This document has been electronically signed. Apr 19 2018  9:16AM                < end of copied text >    OTHER: 	  	  LABS:	 	    CARDIAC MARKERS:                                  8.2    9.22  )-----------( 163      ( 19 Apr 2018 07:30 )             27.6     04-19    141  |  105  |  79<H>  ----------------------------<  100<H>  4.4   |  23  |  3.73<H>    Ca    9.0      19 Apr 2018 06:08  Phos  3.7     04-19  Mg     2.2     04-19    TPro  6.4  /  Alb  3.1<L>  /  TBili  0.4  /  DBili  x   /  AST  10  /  ALT  7<L>  /  AlkPhos  51  04-19    proBNP:   Lipid Profile:   HgA1c:   TSH:

## 2018-04-19 NOTE — CONSULT NOTE ADULT - ASSESSMENT
F PMH SLE, systolic HFrEF(EF 32%) s/p AICD, HTN, CKD III- IV, PE / left leg DVT dx 2009, HIT, CAD (no stents), stage IV sacral ulcer, chronic Hook, recurrent pericardial effusion, AAA ~14cm in 12/2016 s/p repair in 2010 w/ aortoiliac stent complicated with left LE swelling/edema/cellulitis/puncture wound with possible UTI

## 2018-04-19 NOTE — PROGRESS NOTE ADULT - SUBJECTIVE AND OBJECTIVE BOX
Patient is a 58y old  Female who presents with a chief complaint of dysuria, redness of foot (2018 20:22)      SUBJECTIVE / OVERNIGHT EVENTS:   Feels better.  Denies CP/SOB/Palpitation/HA.    MEDICATIONS  (STANDING):  amLODIPine   Tablet 10 milliGRAM(s) Oral daily  ascorbic acid 500 milliGRAM(s) Oral daily  calcitriol   Capsule 0.25 MICROGram(s) Oral daily  carvedilol 25 milliGRAM(s) Oral every 12 hours  ferrous    sulfate 325 milliGRAM(s) Oral daily  furosemide    Tablet 40 milliGRAM(s) Oral daily  hydrALAZINE 50 milliGRAM(s) Oral three times a day  labetalol 200 milliGRAM(s) Oral three times a day  Nephro-alyse 1 Tablet(s) Oral daily  piperacillin/tazobactam IVPB. 3.375 Gram(s) IV Intermittent every 12 hours  predniSONE   Tablet 10 milliGRAM(s) Oral daily    MEDICATIONS  (PRN):        CAPILLARY BLOOD GLUCOSE        I&O's Summary      PHYSICAL EXAM:  GENERAL: NAD, well-developed  HEAD:  Atraumatic, Normocephalic  NECK: Supple, No JVD  CHEST/LUNG: Clear to auscultation bilaterally; No wheezing.  HEART: Regular rate and rhythm; No murmurs, rubs, or gallops  ABDOMEN: Soft, Nontender, Nondistended; Bowel sounds present  EXTREMITIES:   No clubbing, cyanosis, or edema  NEUROLOGY: AAO X 3  SKIN: No rashes    LABS:                        8.2    9.22  )-----------( 163      ( 2018 07:30 )             27.6     -    141  |  105  |  79<H>  ----------------------------<  100<H>  4.4   |  23  |  3.73<H>    Ca    9.0      2018 06:08  Phos  3.7     -  Mg     2.2     -    TPro  6.4  /  Alb  3.1<L>  /  TBili  0.4  /  DBili  x   /  AST  10  /  ALT  7<L>  /  AlkPhos  51  -    PT/INR - ( 2018 07:34 )   PT: 43.7 sec;   INR: 3.76 ratio         PTT - ( 2018 07:34 )  PTT:42.9 sec  CARDIAC MARKERS ( 2018 00:32 )  x     / x     / 68 U/L / x     / x          Urinalysis Basic - ( 2018 17:50 )    Color: Yellow / Appearance: SL Turbid / S.012 / pH: x  Gluc: x / Ketone: Negative  / Bili: Negative / Urobili: Negative   Blood: x / Protein: 100 mg/dL / Nitrite: Negative   Leuk Esterase: Large / RBC: 10-25 /HPF / WBC 26-50 /HPF   Sq Epi: x / Non Sq Epi: Few /HPF / Bacteria: Moderate /HPF      CAPILLARY BLOOD GLUCOSE                    RADIOLOGY & ADDITIONAL TESTS:    Imaging Personally Reviewed:    Consultant(s) Notes Reviewed:      Care Discussed with Consultants/Other Providers:

## 2018-04-20 ENCOUNTER — TRANSCRIPTION ENCOUNTER (OUTPATIENT)
Age: 58
End: 2018-04-20

## 2018-04-20 LAB
-  AMIKACIN: SIGNIFICANT CHANGE UP
-  AMOXICILLIN/CLAVULANIC ACID: SIGNIFICANT CHANGE UP
-  AMPICILLIN/SULBACTAM: SIGNIFICANT CHANGE UP
-  AMPICILLIN: SIGNIFICANT CHANGE UP
-  AZTREONAM: SIGNIFICANT CHANGE UP
-  CEFAZOLIN: SIGNIFICANT CHANGE UP
-  CEFEPIME: SIGNIFICANT CHANGE UP
-  CEFOXITIN: SIGNIFICANT CHANGE UP
-  CEFTRIAXONE: SIGNIFICANT CHANGE UP
-  CIPROFLOXACIN: SIGNIFICANT CHANGE UP
-  ERTAPENEM: SIGNIFICANT CHANGE UP
-  GENTAMICIN: SIGNIFICANT CHANGE UP
-  IMIPENEM: SIGNIFICANT CHANGE UP
-  LEVOFLOXACIN: SIGNIFICANT CHANGE UP
-  MEROPENEM: SIGNIFICANT CHANGE UP
-  NITROFURANTOIN: SIGNIFICANT CHANGE UP
-  PIPERACILLIN/TAZOBACTAM: SIGNIFICANT CHANGE UP
-  TIGECYCLINE: SIGNIFICANT CHANGE UP
-  TOBRAMYCIN: SIGNIFICANT CHANGE UP
-  TRIMETHOPRIM/SULFAMETHOXAZOLE: SIGNIFICANT CHANGE UP
ANION GAP SERPL CALC-SCNC: 16 MMOL/L — SIGNIFICANT CHANGE UP (ref 5–17)
BUN SERPL-MCNC: 80 MG/DL — HIGH (ref 7–23)
CALCIUM SERPL-MCNC: 8.8 MG/DL — SIGNIFICANT CHANGE UP (ref 8.4–10.5)
CALCIUM SERPL-MCNC: 8.9 MG/DL — SIGNIFICANT CHANGE UP (ref 8.4–10.5)
CHLORIDE SERPL-SCNC: 106 MMOL/L — SIGNIFICANT CHANGE UP (ref 96–108)
CO2 SERPL-SCNC: 23 MMOL/L — SIGNIFICANT CHANGE UP (ref 22–31)
CREAT SERPL-MCNC: 3.82 MG/DL — HIGH (ref 0.5–1.3)
CULTURE RESULTS: SIGNIFICANT CHANGE UP
DSDNA AB SER-ACNC: 29 IU/ML — SIGNIFICANT CHANGE UP
FERRITIN SERPL-MCNC: 692 NG/ML — HIGH (ref 15–150)
GLUCOSE SERPL-MCNC: 94 MG/DL — SIGNIFICANT CHANGE UP (ref 70–99)
HCT VFR BLD CALC: 25.9 % — LOW (ref 34.5–45)
HGB BLD-MCNC: 8.1 G/DL — LOW (ref 11.5–15.5)
INR BLD: 3.12 RATIO — HIGH (ref 0.88–1.16)
IRON SATN MFR SERPL: 21 % — SIGNIFICANT CHANGE UP (ref 14–50)
IRON SATN MFR SERPL: 36 UG/DL — SIGNIFICANT CHANGE UP (ref 30–160)
MCHC RBC-ENTMCNC: 27.6 PG — SIGNIFICANT CHANGE UP (ref 27–34)
MCHC RBC-ENTMCNC: 31.3 GM/DL — LOW (ref 32–36)
MCV RBC AUTO: 88.1 FL — SIGNIFICANT CHANGE UP (ref 80–100)
METHOD TYPE: SIGNIFICANT CHANGE UP
ORGANISM # SPEC MICROSCOPIC CNT: SIGNIFICANT CHANGE UP
ORGANISM # SPEC MICROSCOPIC CNT: SIGNIFICANT CHANGE UP
PLATELET # BLD AUTO: 157 K/UL — SIGNIFICANT CHANGE UP (ref 150–400)
POTASSIUM SERPL-MCNC: 4.3 MMOL/L — SIGNIFICANT CHANGE UP (ref 3.5–5.3)
POTASSIUM SERPL-SCNC: 4.3 MMOL/L — SIGNIFICANT CHANGE UP (ref 3.5–5.3)
PROTHROM AB SERPL-ACNC: 36.1 SEC — HIGH (ref 10–13.1)
PTH-INTACT FLD-MCNC: 217 PG/ML — HIGH (ref 15–65)
RBC # BLD: 2.94 M/UL — LOW (ref 3.8–5.2)
RBC # BLD: 2.94 M/UL — LOW (ref 3.8–5.2)
RBC # FLD: 16.1 % — HIGH (ref 10.3–14.5)
RETICS #: 77.3 K/UL — SIGNIFICANT CHANGE UP (ref 25–125)
RETICS/RBC NFR: 2.6 % — HIGH (ref 0.5–2.5)
SODIUM SERPL-SCNC: 145 MMOL/L — SIGNIFICANT CHANGE UP (ref 135–145)
SPECIMEN SOURCE: SIGNIFICANT CHANGE UP
TIBC SERPL-MCNC: 168 UG/DL — LOW (ref 220–430)
UIBC SERPL-MCNC: 132 UG/DL — SIGNIFICANT CHANGE UP (ref 110–370)
VANCOMYCIN FLD-MCNC: 12.3 UG/ML — SIGNIFICANT CHANGE UP
WBC # BLD: 9.49 K/UL — SIGNIFICANT CHANGE UP (ref 3.8–10.5)
WBC # FLD AUTO: 9.49 K/UL — SIGNIFICANT CHANGE UP (ref 3.8–10.5)

## 2018-04-20 PROCEDURE — 99232 SBSQ HOSP IP/OBS MODERATE 35: CPT

## 2018-04-20 PROCEDURE — 99222 1ST HOSP IP/OBS MODERATE 55: CPT

## 2018-04-20 RX ORDER — VANCOMYCIN HCL 1 G
500 VIAL (EA) INTRAVENOUS EVERY 24 HOURS
Qty: 0 | Refills: 0 | Status: DISCONTINUED | OUTPATIENT
Start: 2018-04-20 | End: 2018-04-23

## 2018-04-20 RX ORDER — ERTAPENEM SODIUM 1 G/1
500 INJECTION, POWDER, LYOPHILIZED, FOR SOLUTION INTRAMUSCULAR; INTRAVENOUS
Qty: 0 | Refills: 0 | Status: DISCONTINUED | OUTPATIENT
Start: 2018-04-20 | End: 2018-04-20

## 2018-04-20 RX ORDER — ERTAPENEM SODIUM 1 G/1
500 INJECTION, POWDER, LYOPHILIZED, FOR SOLUTION INTRAMUSCULAR; INTRAVENOUS
Qty: 0 | Refills: 0 | Status: DISCONTINUED | OUTPATIENT
Start: 2018-04-20 | End: 2018-04-23

## 2018-04-20 RX ORDER — FUROSEMIDE 40 MG
80 TABLET ORAL DAILY
Qty: 0 | Refills: 0 | Status: DISCONTINUED | OUTPATIENT
Start: 2018-04-21 | End: 2018-04-24

## 2018-04-20 RX ORDER — PETROLATUM,WHITE
1 JELLY (GRAM) TOPICAL DAILY
Qty: 0 | Refills: 0 | Status: DISCONTINUED | OUTPATIENT
Start: 2018-04-20 | End: 2018-04-27

## 2018-04-20 RX ORDER — FUROSEMIDE 40 MG
80 TABLET ORAL DAILY
Qty: 0 | Refills: 0 | Status: DISCONTINUED | OUTPATIENT
Start: 2018-04-20 | End: 2018-04-20

## 2018-04-20 RX ORDER — NYSTATIN CREAM 100000 [USP'U]/G
1 CREAM TOPICAL
Qty: 0 | Refills: 0 | Status: DISCONTINUED | OUTPATIENT
Start: 2018-04-20 | End: 2018-04-27

## 2018-04-20 RX ORDER — FUROSEMIDE 40 MG
40 TABLET ORAL ONCE
Qty: 0 | Refills: 0 | Status: COMPLETED | OUTPATIENT
Start: 2018-04-20 | End: 2018-04-20

## 2018-04-20 RX ADMIN — Medication 50 MILLIGRAM(S): at 13:58

## 2018-04-20 RX ADMIN — PIPERACILLIN AND TAZOBACTAM 25 GRAM(S): 4; .5 INJECTION, POWDER, LYOPHILIZED, FOR SOLUTION INTRAVENOUS at 05:41

## 2018-04-20 RX ADMIN — CALCITRIOL 0.25 MICROGRAM(S): 0.5 CAPSULE ORAL at 12:21

## 2018-04-20 RX ADMIN — Medication 50 MILLIGRAM(S): at 05:42

## 2018-04-20 RX ADMIN — CARVEDILOL PHOSPHATE 25 MILLIGRAM(S): 80 CAPSULE, EXTENDED RELEASE ORAL at 22:49

## 2018-04-20 RX ADMIN — Medication 40 MILLIGRAM(S): at 05:42

## 2018-04-20 RX ADMIN — Medication 200 MILLIGRAM(S): at 13:58

## 2018-04-20 RX ADMIN — Medication 100 MILLIGRAM(S): at 17:00

## 2018-04-20 RX ADMIN — Medication 325 MILLIGRAM(S): at 12:21

## 2018-04-20 RX ADMIN — Medication 50 MILLIGRAM(S): at 22:49

## 2018-04-20 RX ADMIN — Medication 200 MILLIGRAM(S): at 22:49

## 2018-04-20 RX ADMIN — Medication 200 MILLIGRAM(S): at 05:41

## 2018-04-20 RX ADMIN — Medication 10 MILLIGRAM(S): at 10:51

## 2018-04-20 RX ADMIN — Medication 1 TABLET(S): at 12:21

## 2018-04-20 RX ADMIN — PIPERACILLIN AND TAZOBACTAM 25 GRAM(S): 4; .5 INJECTION, POWDER, LYOPHILIZED, FOR SOLUTION INTRAVENOUS at 18:16

## 2018-04-20 RX ADMIN — AMLODIPINE BESYLATE 10 MILLIGRAM(S): 2.5 TABLET ORAL at 10:51

## 2018-04-20 RX ADMIN — NYSTATIN CREAM 1 APPLICATION(S): 100000 CREAM TOPICAL at 22:48

## 2018-04-20 RX ADMIN — Medication 1 APPLICATION(S): at 22:49

## 2018-04-20 RX ADMIN — Medication 500 MILLIGRAM(S): at 12:21

## 2018-04-20 RX ADMIN — Medication 40 MILLIGRAM(S): at 17:00

## 2018-04-20 RX ADMIN — CARVEDILOL PHOSPHATE 25 MILLIGRAM(S): 80 CAPSULE, EXTENDED RELEASE ORAL at 10:51

## 2018-04-20 RX ADMIN — ERTAPENEM SODIUM 100 MILLIGRAM(S): 1 INJECTION, POWDER, LYOPHILIZED, FOR SOLUTION INTRAMUSCULAR; INTRAVENOUS at 22:50

## 2018-04-20 NOTE — DIETITIAN INITIAL EVALUATION ADULT. - ETIOLOGY
suspected excessive caloric intakes and decreased physical activity lack of prior exposure to nutrition-related information

## 2018-04-20 NOTE — DIETITIAN INITIAL EVALUATION ADULT. - NS AS NUTRI INTERV MEDICAL AND FOOD SUPPLEMENTS
Pt with stage 4 pressure ulcer - declines supplements and understands food sources of high biological proteins

## 2018-04-20 NOTE — PROGRESS NOTE ADULT - SUBJECTIVE AND OBJECTIVE BOX
Patient is a 58y old  Female who presents with a chief complaint of dysuria, redness of foot (2018 11:20)      SUBJECTIVE / OVERNIGHT EVENTS:   Feels better.  Denies CP/SOB/Palpitation/HA.    MEDICATIONS  (STANDING):  amLODIPine   Tablet 10 milliGRAM(s) Oral daily  ascorbic acid 500 milliGRAM(s) Oral daily  calcitriol   Capsule 0.25 MICROGram(s) Oral daily  carvedilol 25 milliGRAM(s) Oral every 12 hours  ferrous    sulfate 325 milliGRAM(s) Oral daily  furosemide   Injectable 80 milliGRAM(s) IV Push daily  hydrALAZINE 50 milliGRAM(s) Oral three times a day  labetalol 200 milliGRAM(s) Oral three times a day  Nephro-alyse 1 Tablet(s) Oral daily  piperacillin/tazobactam IVPB. 3.375 Gram(s) IV Intermittent every 12 hours  predniSONE   Tablet 10 milliGRAM(s) Oral daily  vancomycin  IVPB 500 milliGRAM(s) IV Intermittent every 24 hours    MEDICATIONS  (PRN):        CAPILLARY BLOOD GLUCOSE        I&O's Summary    2018 07:01  -  2018 07:00  --------------------------------------------------------  IN: 490 mL / OUT: 2350 mL / NET: -1860 mL    2018 07:  -  2018 16:04  --------------------------------------------------------  IN: 0 mL / OUT: 350 mL / NET: -350 mL        PHYSICAL EXAM:  GENERAL: NAD, well-developed  HEAD:  Atraumatic, Normocephalic  NECK: Supple, No JVD  CHEST/LUNG: Clear to auscultation bilaterally; No wheezing.  HEART: Regular rate and rhythm; No murmurs, rubs, or gallops  ABDOMEN: Soft, Nontender, Nondistended; Bowel sounds present  EXTREMITIES:   No clubbing, cyanosis, or edema  NEUROLOGY: AAO X 3  SKIN: No rashes    LABS:                        8.1    9.49  )-----------( 157      ( 2018 07:59 )             25.9     04-    145  |  106  |  80<H>  ----------------------------<  94  4.3   |  23  |  3.82<H>    Ca    8.8      2018 06:10  Phos  3.7       Mg     2.2         TPro  6.4  /  Alb  3.1<L>  /  TBili  0.4  /  DBili  x   /  AST  10  /  ALT  7<L>  /  AlkPhos  51      PT/INR - ( 2018 08:07 )   PT: 36.1 sec;   INR: 3.12 ratio         PTT - ( 2018 07:34 )  PTT:42.9 sec  CARDIAC MARKERS ( 2018 00:32 )  x     / x     / 68 U/L / x     / x          Urinalysis Basic - ( 2018 17:50 )    Color: Yellow / Appearance: SL Turbid / S.012 / pH: x  Gluc: x / Ketone: Negative  / Bili: Negative / Urobili: Negative   Blood: x / Protein: 100 mg/dL / Nitrite: Negative   Leuk Esterase: Large / RBC: 10-25 /HPF / WBC 26-50 /HPF   Sq Epi: x / Non Sq Epi: Few /HPF / Bacteria: Moderate /HPF      CAPILLARY BLOOD GLUCOSE         @ 02:09  Culture-urine --  Culture results   No growth to date.  method type --  Organism --  Organism Identification --  Specimen source .Blood Blood   @ 21:14  Culture-urine --  Culture results   >100,000 CFU/ml Escherichia coli  method type --  Organism --  Organism Identification --  Specimen source .Urine Clean Catch (Midstream)            @ 02:09  Culture blood --  Culture results   No growth to date.  Gram stain --  Gram stain blood --  Method type --  Organism --  Organism identification --  Specimen source .Blood Blood    @ 21:14  Culture blood --  Culture results   >100,000 CFU/ml Escherichia coli  Gram stain --  Gram stain blood --  Method type --  Organism --  Organism identification --  Specimen source .Urine Clean Catch (Midstream)      RADIOLOGY & ADDITIONAL TESTS:    Imaging Personally Reviewed:    Consultant(s) Notes Reviewed:      Care Discussed with Consultants/Other Providers: Patient is a 58y old  Female who presents with a chief complaint of dysuria, redness of foot (2018 11:20)      SUBJECTIVE / OVERNIGHT EVENTS:   Feels better.  Denies CP/SOB/Palpitation/HA.    MEDICATIONS  (STANDING):  amLODIPine   Tablet 10 milliGRAM(s) Oral daily  ascorbic acid 500 milliGRAM(s) Oral daily  calcitriol   Capsule 0.25 MICROGram(s) Oral daily  carvedilol 25 milliGRAM(s) Oral every 12 hours  ferrous    sulfate 325 milliGRAM(s) Oral daily  furosemide   Injectable 80 milliGRAM(s) IV Push daily  hydrALAZINE 50 milliGRAM(s) Oral three times a day  labetalol 200 milliGRAM(s) Oral three times a day  Nephro-alyse 1 Tablet(s) Oral daily  piperacillin/tazobactam IVPB. 3.375 Gram(s) IV Intermittent every 12 hours  predniSONE   Tablet 10 milliGRAM(s) Oral daily  vancomycin  IVPB 500 milliGRAM(s) IV Intermittent every 24 hours    MEDICATIONS  (PRN):        CAPILLARY BLOOD GLUCOSE        I&O's Summary    2018 07:01  -  2018 07:00  --------------------------------------------------------  IN: 490 mL / OUT: 2350 mL / NET: -1860 mL    2018 07:01  -  2018 16:04  --------------------------------------------------------  IN: 0 mL / OUT: 350 mL / NET: -350 mL        PHYSICAL EXAM:    NECK: Supple, No JVD  CHEST/LUNG: Clear to auscultation bilaterally; No wheezing.  HEART: Regular rate and rhythm; No murmurs, rubs, or gallops  ABDOMEN: Soft, Nontender, Nondistended; Bowel sounds present  EXTREMITIES:   No clubbing, cyanosis, or edema  NEUROLOGY: AAO   SKIN: No rashes    LABS:                        8.1    9.49  )-----------( 157      ( 2018 07:59 )             25.9         145  |  106  |  80<H>  ----------------------------<  94  4.3   |  23  |  3.82<H>    Ca    8.8      2018 06:10  Phos  3.7       Mg     2.2         TPro  6.4  /  Alb  3.1<L>  /  TBili  0.4  /  DBili  x   /  AST  10  /  ALT  7<L>  /  AlkPhos  51      PT/INR - ( 2018 08:07 )   PT: 36.1 sec;   INR: 3.12 ratio         PTT - ( 2018 07:34 )  PTT:42.9 sec  CARDIAC MARKERS ( 2018 00:32 )  x     / x     / 68 U/L / x     / x          Urinalysis Basic - ( 2018 17:50 )    Color: Yellow / Appearance: SL Turbid / S.012 / pH: x  Gluc: x / Ketone: Negative  / Bili: Negative / Urobili: Negative   Blood: x / Protein: 100 mg/dL / Nitrite: Negative   Leuk Esterase: Large / RBC: 10-25 /HPF / WBC 26-50 /HPF   Sq Epi: x / Non Sq Epi: Few /HPF / Bacteria: Moderate /HPF      CAPILLARY BLOOD GLUCOSE         @ 02:09  Culture-urine --  Culture results   No growth to date.  method type --  Organism --  Organism Identification --  Specimen source .Blood Blood   @ 21:14  Culture-urine --  Culture results   >100,000 CFU/ml Escherichia coli  method type --  Organism --  Organism Identification --  Specimen source .Urine Clean Catch (Midstream)            @ 02:09  Culture blood --  Culture results   No growth to date.  Gram stain --  Gram stain blood --  Method type --  Organism --  Organism identification --  Specimen source .Blood Blood    @ 21:14  Culture blood --  Culture results   >100,000 CFU/ml Escherichia coli  Gram stain --  Gram stain blood --  Method type --  Organism --  Organism identification --  Specimen source .Urine Clean Catch (Midstream)      RADIOLOGY & ADDITIONAL TESTS:    Imaging Personally Reviewed:    Consultant(s) Notes Reviewed:      Care Discussed with Consultants/Other Providers:

## 2018-04-20 NOTE — PROGRESS NOTE ADULT - SUBJECTIVE AND OBJECTIVE BOX
[facility]   [phone]  RENAL FOLLOW UP NOTE  --------------------------------------------------------------------------------  Chief Complaint:    24 hour events/subjective:    c/o left leg swellinga and edema     PAST HISTORY  --------------------------------------------------------------------------------  No significant changes to PMH, PSH, FHx, SHx, unless otherwise noted    ALLERGIES & MEDICATIONS  --------------------------------------------------------------------------------  Allergies    heparin (Unknown)  losartan (Anaphylaxis)  nitroglycerin (Other)    Intolerances      Standing Inpatient Medications  amLODIPine   Tablet 10 milliGRAM(s) Oral daily  ascorbic acid 500 milliGRAM(s) Oral daily  calcitriol   Capsule 0.25 MICROGram(s) Oral daily  carvedilol 25 milliGRAM(s) Oral every 12 hours  ferrous    sulfate 325 milliGRAM(s) Oral daily  furosemide   Injectable 80 milliGRAM(s) IV Push daily  hydrALAZINE 50 milliGRAM(s) Oral three times a day  labetalol 200 milliGRAM(s) Oral three times a day  Nephro-alyse 1 Tablet(s) Oral daily  piperacillin/tazobactam IVPB. 3.375 Gram(s) IV Intermittent every 12 hours  predniSONE   Tablet 10 milliGRAM(s) Oral daily    PRN Inpatient Medications      REVIEW OF SYSTEMS  --------------------------------------------------------------------------------    Gen: denies fevers/chills,  CVS: denies chest pain/palpitations  Resp: denies SOB/Cough  GI: Denies N/V/Abd pain  : Denies dysuria [ has fox]        VITALS/PHYSICAL EXAM  --------------------------------------------------------------------------------  T(C): 36.8 (04-20-18 @ 05:08), Max: 37.5 (04-19-18 @ 14:19)  HR: 84 (04-20-18 @ 10:45) (80 - 88)  BP: 132/68 (04-20-18 @ 10:45) (113/67 - 139/71)  RR: 1 (04-20-18 @ 10:45) (1 - 18)  SpO2: 95% (04-20-18 @ 05:08) (95% - 96%)  Wt(kg): --  Height (cm): 165.1 (04-19-18 @ 14:19)  Weight (kg): 129.9 (04-19-18 @ 14:19)  BMI (kg/m2): 47.7 (04-19-18 @ 14:19)  BSA (m2): 2.3 (04-19-18 @ 14:19)      04-19-18 @ 07:01  -  04-20-18 @ 07:00  --------------------------------------------------------  IN: 490 mL / OUT: 2350 mL / NET: -1860 mL      Physical Exam:  	58 F PMH SLE, systolic HFrEF(EF 32%) s/p AICD, HTN, CKD III- IV, PE / left leg DVT dx 2009, HIT, CAD (no stents), stage IV sacral ulcer, chronic Fox, recurrent pericardial effusion, AAA ~14cm in 12/2016 s/p repair in 2010 w/ aortoiliac stent complicated post-operatively with development of left lower extremity compartment syndrome, with cellulitis and worsening ckd     1- CKD IV   2- cellulitis   3- chf  4- HTN   5- neurogenic bladder  6- lupus   7- anemia  8- shpt    creatinine worsening   cont with fox   increase lasix 80 mg iv daily   has hx of gout monitor closely  cont with norvasc and hydralazine  zosyn to cont as above  prednisone 10 mg daily   keep O > I  i have discussed with her regarding overall worsening renal function and in future need for hd as well.         LABS/STUDIES  --------------------------------------------------------------------------------              8.1    9.49  >-----------<  157      [04-20-18 @ 07:59]              25.9     145  |  106  |  80  ----------------------------<  94      [04-20-18 @ 06:10]  4.3   |  23  |  3.82        Ca     8.8     [04-20-18 @ 06:10]      Mg     2.2     [04-19-18 @ 06:08]      Phos  3.7     [04-19-18 @ 06:08]    TPro  6.4  /  Alb  3.1  /  TBili  0.4  /  DBili  x   /  AST  10  /  ALT  7   /  AlkPhos  51  [04-19-18 @ 06:08]    PT/INR: PT 36.1 , INR 3.12       [04-20-18 @ 08:07]  PTT: 42.9       [04-19-18 @ 07:34]    CK 68      [04-19-18 @ 00:32]    Creatinine Trend:  SCr 3.82 [04-20 @ 06:10]  SCr 3.73 [04-19 @ 06:08]  SCr 3.66 [04-18 @ 15:15]              Urinalysis - [04-18-18 @ 17:50]      Color Yellow / Appearance SL Turbid / SG 1.012 / pH 6.0      Gluc Negative / Ketone Negative  / Bili Negative / Urobili Negative       Blood Moderate / Protein 100 / Leuk Est Large / Nitrite Negative      RBC 10-25 / WBC 26-50 / Hyaline  / Gran  / Sq Epi  / Non Sq Epi Few / Bacteria Moderate      Iron 36, TIBC 168, %sat 21      [04-20-18 @ 08:15]  Ferritin 847.0      [10-12-17 @ 07:14]  PTH -- (Ca 9.0)      [11-09-17 @ 08:51]   387  Vitamin D (25OH) 7.8      [11-09-17 @ 08:51]  HbA1c 4.5      [08-09-16 @ 06:49]  TSH 0.33      [10-22-17 @ 08:40]    dsDNA 29      [04-19-18 @ 09:31]  C3 Complement 97      [04-19-18 @ 09:31]  C4 Complement 27      [04-19-18 @ 09:31]

## 2018-04-20 NOTE — DISCHARGE NOTE ADULT - SECONDARY DIAGNOSIS.
Skin ulcer of sacrum, unspecified ulcer stage CHF (congestive heart failure) Acute cystitis with hematuria CKD (chronic kidney disease) stage 4, GFR 15-29 ml/min DVT (deep venous thrombosis)

## 2018-04-20 NOTE — PROGRESS NOTE ADULT - SUBJECTIVE AND OBJECTIVE BOX
COSTEN, SANDRA 58y [id number]    Patient is a 58y old  Female who presents with a chief complaint of dysuria, redness of foot (2018 11:20)      Follow Up/CC:  ID following for cellulitis    Interval History/ROS: no acute issues    Allergies    heparin (Unknown)  losartan (Anaphylaxis)  nitroglycerin (Other)    Intolerances        ANTIMICROBIALS:  piperacillin/tazobactam IVPB. 3.375 every 12 hours      MEDICATIONS  (STANDING):  amLODIPine   Tablet 10 milliGRAM(s) Oral daily  ascorbic acid 500 milliGRAM(s) Oral daily  calcitriol   Capsule 0.25 MICROGram(s) Oral daily  carvedilol 25 milliGRAM(s) Oral every 12 hours  ferrous    sulfate 325 milliGRAM(s) Oral daily  furosemide   Injectable 80 milliGRAM(s) IV Push daily  hydrALAZINE 50 milliGRAM(s) Oral three times a day  labetalol 200 milliGRAM(s) Oral three times a day  Nephro-alyse 1 Tablet(s) Oral daily  piperacillin/tazobactam IVPB. 3.375 Gram(s) IV Intermittent every 12 hours  predniSONE   Tablet 10 milliGRAM(s) Oral daily    MEDICATIONS  (PRN):        Vital Signs Last 24 Hrs  T(C): 37.1 (2018 13:58), Max: 37.1 (2018 13:58)  T(F): 98.8 (2018 13:58), Max: 98.8 (2018 13:58)  HR: 80 (2018 13:58) (80 - 88)  BP: 134/75 (2018 13:58) (113/67 - 136/73)  BP(mean): --  RR: 18 (2018 13:58) (18 - 18)  SpO2: 97% (2018 13:58) (95% - 97%)    CBC Full  -  ( 2018 07:59 )  WBC Count : 9.49 K/uL  Hemoglobin : 8.1 g/dL  Hematocrit : 25.9 %  Platelet Count - Automated : 157 K/uL  Mean Cell Volume : 88.1 fl  Mean Cell Hemoglobin : 27.6 pg  Mean Cell Hemoglobin Concentration : 31.3 gm/dL  Auto Neutrophil # : x  Auto Lymphocyte # : x  Auto Monocyte # : x  Auto Eosinophil # : x  Auto Basophil # : x  Auto Neutrophil % : x  Auto Lymphocyte % : x  Auto Monocyte % : x  Auto Eosinophil % : x  Auto Basophil % : x        145  |  106  |  80<H>  ----------------------------<  94  4.3   |  23  |  3.82<H>    Ca    8.8      2018 06:10  Phos  3.7       Mg     2.2         TPro  6.4  /  Alb  3.1<L>  /  TBili  0.4  /  DBili  x   /  AST  10  /  ALT  7<L>  /  AlkPhos  51      LIVER FUNCTIONS - ( 2018 06:08 )  Alb: 3.1 g/dL / Pro: 6.4 g/dL / ALK PHOS: 51 U/L / ALT: 7 U/L / AST: 10 U/L / GGT: x           Urinalysis Basic - ( 2018 17:50 )    Color: Yellow / Appearance: SL Turbid / S.012 / pH: x  Gluc: x / Ketone: Negative  / Bili: Negative / Urobili: Negative   Blood: x / Protein: 100 mg/dL / Nitrite: Negative   Leuk Esterase: Large / RBC: 10-25 /HPF / WBC 26-50 /HPF   Sq Epi: x / Non Sq Epi: Few /HPF / Bacteria: Moderate /HPF        MICROBIOLOGY:  .Blood Blood  18   No growth to date.  --  --      .Urine Clean Catch (Midstream)  18   >100,000 CFU/ml Escherichia coli  --  --              Vancomycin Level, Random: 12.3 ug/mL (18 @ 06:10)  v            RADIOLOGY

## 2018-04-20 NOTE — DISCHARGE NOTE ADULT - PATIENT PORTAL LINK FT
You can access the [facility] Patient Portal, offered by [facility], by registering with the following website: [email or web address]

## 2018-04-20 NOTE — DISCHARGE NOTE ADULT - ADDITIONAL INSTRUCTIONS
Follow-up with your primary care physician within 1 week. Follow-up with your primary care physician within 1 week.  You will need to have your INR checked in 2 days by your physician and have your dose adjusted accordingly. Follow-up with your primary care physician within 1 week.  You will need to have your INR checked on Monday, 4/30/18 by your physician and have your dose adjusted accordingly.

## 2018-04-20 NOTE — DIETITIAN INITIAL EVALUATION ADULT. - PERTINENT LABORATORY DATA
Na 145 [135 - 145], K+ 4.3 [3.5 - 5.3], BUN 80 [7 - 23], Cr 3.82 [0.50 - 1.30], BG 94 [70 - 99], Ca 8.8 [8.4 - 10.5]

## 2018-04-20 NOTE — DISCHARGE NOTE ADULT - CARE PLAN
Principal Discharge DX:	Cellulitis of left leg  Secondary Diagnosis:	Acute cystitis with hematuria  Secondary Diagnosis:	CKD (chronic kidney disease) stage 4, GFR 15-29 ml/min  Assessment and plan of treatment:	Avoid taking (NSAIDs) - (ex: Ibuprofen, Advil, Celebrex, Naprosyn)  Avoid taking any nephrotoxic agents (can harm kidneys) - Intravenous contrast for diagnostic testing, combination cold medications.  Have all medications adjusted for your renal function by your Health Care Provider.  Blood pressure control is important.  Take all medication as prescribed.  Secondary Diagnosis:	Skin ulcer of sacrum, unspecified ulcer stage  Secondary Diagnosis:	CHF (congestive heart failure)  Assessment and plan of treatment:	Weigh yourself daily.  If you gain 3lbs in 3 days, or 5lbs in a week call your Health Care Provider.  Do not eat or drink foods containing more than 2000mg of salt (sodium) in your diet every day.  Call your Health Care Provider if you have any swelling or increased swelling in your feet, ankles, and/or stomach.  Take all of your medication as directed.  If you become dizzy call your Health Care Provider. Principal Discharge DX:	Cellulitis of left leg  Assessment and plan of treatment:	Take all of your antibiotics as ordered.  Call your Health Care Provider within two days of arriving home to make a follow up appointment within one week.  If the affected cellulitic area increases in redness, warmth, pain or swelling call your Health Care Provider.  If you develop fever, chills, and/or malaise, call your Health Care Provider.  Secondary Diagnosis:	Acute cystitis with hematuria  Secondary Diagnosis:	CKD (chronic kidney disease) stage 4, GFR 15-29 ml/min  Assessment and plan of treatment:	Avoid taking (NSAIDs) - (ex: Ibuprofen, Advil, Celebrex, Naprosyn)  Avoid taking any nephrotoxic agents (can harm kidneys) - Intravenous contrast for diagnostic testing, combination cold medications.  Have all medications adjusted for your renal function by your Health Care Provider.  Blood pressure control is important.  Take all medication as prescribed.  Secondary Diagnosis:	Skin ulcer of sacrum, unspecified ulcer stage  Secondary Diagnosis:	CHF (congestive heart failure)  Assessment and plan of treatment:	Weigh yourself daily.  If you gain 3lbs in 3 days, or 5lbs in a week call your Health Care Provider.  Do not eat or drink foods containing more than 2000mg of salt (sodium) in your diet every day.  Call your Health Care Provider if you have any swelling or increased swelling in your feet, ankles, and/or stomach.  Take all of your medication as directed.  If you become dizzy call your Health Care Provider. Principal Discharge DX:	Cellulitis of left leg  Goal:	Improved  Assessment and plan of treatment:	Take all of your antibiotics as ordered.  Call your Health Care Provider within two days of arriving home to make a follow up appointment within one week.  If the affected cellulitic area increases in redness, warmth, pain or swelling call your Health Care Provider.  If you develop fever, chills, and/or malaise, call your Health Care Provider.  Secondary Diagnosis:	Acute cystitis with hematuria  Goal:	Improved  Assessment and plan of treatment:	Continue antibiotics as prescribed by your doctor.  Follow-up with your primary care physician within 1 week.  Follow-up with your doctor for continued management of your chronic indwelling fox catheter.  Secondary Diagnosis:	CKD (chronic kidney disease) stage 4, GFR 15-29 ml/min  Assessment and plan of treatment:	Avoid taking (NSAIDs) - (ex: Ibuprofen, Advil, Celebrex, Naprosyn)  Avoid taking any nephrotoxic agents (can harm kidneys) - Intravenous contrast for diagnostic testing, combination cold medications.  Have all medications adjusted for your renal function by your Health Care Provider.  Blood pressure control is important.  Take all medication as prescribed.  Secondary Diagnosis:	Skin ulcer of sacrum, unspecified ulcer stage  Assessment and plan of treatment:	Continue with Wound VAC as prescribed by your doctor.  Follow-up with your primary care physician within 1 week.  Secondary Diagnosis:	CHF (congestive heart failure)  Assessment and plan of treatment:	Weigh yourself daily.  If you gain 3lbs in 3 days, or 5lbs in a week call your Health Care Provider.  Do not eat or drink foods containing more than 2000mg of salt (sodium) in your diet every day.  Call your Health Care Provider if you have any swelling or increased swelling in your feet, ankles, and/or stomach.  Take all of your medication as directed.  If you become dizzy call your Health Care Provider. Principal Discharge DX:	Cellulitis of left leg  Goal:	Improved  Assessment and plan of treatment:	Continue with wound VAC as prescribed .   Call your Health Care Provider within two days of arriving home to make a follow up appointment within one week.  If the affected cellulitic area increases in redness, warmth, pain or swelling call your Health Care Provider.  If you develop fever, chills, and/or malaise, call your Health Care Provider.  Secondary Diagnosis:	Acute cystitis with hematuria  Goal:	Improved  Assessment and plan of treatment:	Continue antibiotics as prescribed by your doctor.  Follow-up with your primary care physician within 1 week.  Follow-up with your doctor for continued management of your chronic indwelling fxo catheter.  Secondary Diagnosis:	CKD (chronic kidney disease) stage 4, GFR 15-29 ml/min  Assessment and plan of treatment:	Avoid taking (NSAIDs) - (ex: Ibuprofen, Advil, Celebrex, Naprosyn)  Avoid taking any nephrotoxic agents (can harm kidneys) - Intravenous contrast for diagnostic testing, combination cold medications.  Have all medications adjusted for your renal function by your Health Care Provider.  Blood pressure control is important.  Take all medication as prescribed.  Secondary Diagnosis:	Skin ulcer of sacrum, unspecified ulcer stage  Assessment and plan of treatment:	Continue with Wound care :        Cleanse w/ NS  Pat dry  CAVILON to periwound skin  Pack w/ AQUACEL rope  Cover w/ gauze and tegaderm    Follow-up with your primary care physician within 1 week.  Secondary Diagnosis:	CHF (congestive heart failure)  Assessment and plan of treatment:	Weigh yourself daily.  If you gain 3lbs in 3 days, or 5lbs in a week call your Health Care Provider.  Do not eat or drink foods containing more than 2000mg of salt (sodium) in your diet every day.  Call your Health Care Provider if you have any swelling or increased swelling in your feet, ankles, and/or stomach.  Take all of your medication as directed.  If you become dizzy call your Health Care Provider. Principal Discharge DX:	Cellulitis of left leg  Goal:	Improved  Assessment and plan of treatment:	Continue with wound VAC as prescribed .   Call your Health Care Provider within two days of arriving home to make a follow up appointment within one week.  If the affected cellulitic area increases in redness, warmth, pain or swelling call your Health Care Provider.  If you develop fever, chills, and/or malaise, call your Health Care Provider.  Secondary Diagnosis:	Acute cystitis with hematuria  Goal:	Improved  Assessment and plan of treatment:	Continue antibiotics as prescribed by your doctor.  Follow-up with your primary care physician within 1 week.  Follow-up with your doctor for continued management of your chronic indwelling fox catheter.  Secondary Diagnosis:	CKD (chronic kidney disease) stage 4, GFR 15-29 ml/min  Assessment and plan of treatment:	Avoid taking (NSAIDs) - (ex: Ibuprofen, Advil, Celebrex, Naprosyn)  Avoid taking any nephrotoxic agents (can harm kidneys) - Intravenous contrast for diagnostic testing, combination cold medications.  Have all medications adjusted for your renal function by your Health Care Provider.  Blood pressure control is important.  Take all medication as prescribed.  Secondary Diagnosis:	Skin ulcer of sacrum, unspecified ulcer stage  Assessment and plan of treatment:	Continue with Wound care :        Cleanse w/ NS  Pat dry  CAVILON to periwound skin  Pack w/ AQUACEL rope  Cover w/ Aleyvn    Follow-up with your primary care physician within 1 week.  Secondary Diagnosis:	CHF (congestive heart failure)  Assessment and plan of treatment:	Weigh yourself daily.  If you gain 3lbs in 3 days, or 5lbs in a week call your Health Care Provider.  Do not eat or drink foods containing more than 2000mg of salt (sodium) in your diet every day.  Call your Health Care Provider if you have any swelling or increased swelling in your feet, ankles, and/or stomach.  Take all of your medication as directed.  If you become dizzy call your Health Care Provider. Principal Discharge DX:	Cellulitis of left leg  Goal:	Improved  Assessment and plan of treatment:	Continue with wound VAC as prescribed .   Call your Health Care Provider within two days of arriving home to make a follow up appointment within one week.  If the affected cellulitic area increases in redness, warmth, pain or swelling call your Health Care Provider.  If you develop fever, chills, and/or malaise, call your Health Care Provider.  Secondary Diagnosis:	Acute cystitis with hematuria  Goal:	Improved  Assessment and plan of treatment:	Continue antibiotics as prescribed by your doctor.  Follow-up with your primary care physician within 1 week.  Follow-up with your doctor for continued management of your chronic indwelling fox catheter.  Secondary Diagnosis:	CKD (chronic kidney disease) stage 4, GFR 15-29 ml/min  Assessment and plan of treatment:	Avoid taking (NSAIDs) - (ex: Ibuprofen, Advil, Celebrex, Naprosyn)  Avoid taking any nephrotoxic agents (can harm kidneys) - Intravenous contrast for diagnostic testing, combination cold medications.  Have all medications adjusted for your renal function by your Health Care Provider.  Blood pressure control is important.  Take all medication as prescribed.  Secondary Diagnosis:	Skin ulcer of sacrum, unspecified ulcer stage  Assessment and plan of treatment:	Continue with Wound care :        Cleanse w/ NS  Pat dry  CAVILON to periwound skin  Pack w/ AQUACEL rope  Cover w/ Aleyvn    Follow-up with your primary care physician within 1 week.  Secondary Diagnosis:	CHF (congestive heart failure)  Assessment and plan of treatment:	Weigh yourself daily.  If you gain 3lbs in 3 days, or 5lbs in a week call your Health Care Provider.  Do not eat or drink foods containing more than 2000mg of salt (sodium) in your diet every day.  Call your Health Care Provider if you have any swelling or increased swelling in your feet, ankles, and/or stomach.  Take all of your medication as directed.  If you become dizzy call your Health Care Provider.  Secondary Diagnosis:	DVT (deep venous thrombosis)  Assessment and plan of treatment:	You are taking coumadin for a chronic DVT and PE.  You will continue taking Coumadin 4mg oral at bedtime.  You will need to have your INR checked in 2 days by your physician and have your dose adjusted accordingly. Principal Discharge DX:	Cellulitis of left leg  Goal:	Improved  Assessment and plan of treatment:	Continue with wound VAC as prescribed .   Call your Health Care Provider within two days of arriving home to make a follow up appointment within one week.  If the affected cellulitic area increases in redness, warmth, pain or swelling call your Health Care Provider.  If you develop fever, chills, and/or malaise, call your Health Care Provider.  Secondary Diagnosis:	Acute cystitis with hematuria  Goal:	Improved  Assessment and plan of treatment:	Continue antibiotics as prescribed by your doctor.  Follow-up with your primary care physician within 1 week.  Follow-up with your doctor for continued management of your chronic indwelling fox catheter.  Secondary Diagnosis:	CKD (chronic kidney disease) stage 4, GFR 15-29 ml/min  Assessment and plan of treatment:	Avoid taking (NSAIDs) - (ex: Ibuprofen, Advil, Celebrex, Naprosyn)  Avoid taking any nephrotoxic agents (can harm kidneys) - Intravenous contrast for diagnostic testing, combination cold medications.  Have all medications adjusted for your renal function by your Health Care Provider.  Blood pressure control is important.  Take all medication as prescribed.  Secondary Diagnosis:	Skin ulcer of sacrum, unspecified ulcer stage  Assessment and plan of treatment:	Continue with Wound care :        Cleanse w/ NS  Pat dry  CAVILON to periwound skin  Pack w/ AQUACEL rope  Cover w/ Aleyvn    Follow-up with your primary care physician within 1 week.  Secondary Diagnosis:	CHF (congestive heart failure)  Assessment and plan of treatment:	Weigh yourself daily.  If you gain 3lbs in 3 days, or 5lbs in a week call your Health Care Provider.  Do not eat or drink foods containing more than 2000mg of salt (sodium) in your diet every day.  Call your Health Care Provider if you have any swelling or increased swelling in your feet, ankles, and/or stomach.  Take all of your medication as directed.  If you become dizzy call your Health Care Provider.  Secondary Diagnosis:	DVT (deep venous thrombosis)  Assessment and plan of treatment:	You are taking coumadin for a chronic DVT and PE.  You will continue taking Coumadin 4mg oral at bedtime.  You will need to have your INR checked on Monday, 4/30/28, by your physician and have your dose adjusted accordingly.

## 2018-04-20 NOTE — DISCHARGE NOTE ADULT - HOSPITAL COURSE
58 F PMH SLE, systolic HFrEF(EF 32%) s/p AICD, HTN, CKD III- IV, PE / left leg DVT dx 2009, HIT, CAD (no stents), stage IV sacral ulcer, chronic Fox, recurrent pericardial effusion, AAA ~14cm in 12/2016 s/p repair in 2010 w/ aortoiliac stent complicated post-operatively with development of left lower extremity compartment syndrome, now presents with dysuria and LLE redness.  3-4 d PTA, pt was in bed using an extendable grabber that slipped and hit her leg.  At first she didn't notice anything, then soon realized she had punctured the skin on the medial aspect of her L thigh, with some bleeding.  Starting today, pt has noticed increasing warmth and redness of the L leg; she notes her L leg has always been very large and malformed looking since her prior fasciotomy for compartment syndrome, but the warmth/redness is new and overall is more swollen then usual. +low grade fever to around 100.0 at home.  Pt also c/o of some urinary changes; she has chronic fox (placed in setting of her nonhealing sacral ulcer) so does not experience dysuria, but has noticed increasing pubic pressure and darkness/cloudiness of urine.  Pt also is c/o R thigh severe pain; extending down her R femur; she notes that her wound vac on her R sacrum was removed today and packed, which exacerbated the pain.   Treated with antibiotics for cellulitis and ESBL UTI  Wound vac to left leg and sacrum 58 F PMH SLE, systolic HFrEF(EF 32%) s/p AICD, HTN, CKD III- IV, PE / left leg DVT dx 2009, HIT, CAD (no stents), stage IV sacral ulcer, chronic Hook, recurrent pericardial effusion, AAA ~14cm in 12/2016 s/p repair in 2010 w/ aortoiliac stent complicated post-operatively with development of left lower extremity compartment syndrome, now presents with dysuria and LLE redness, and  R thigh severe pain; extending down her R femur since Right sacral wound VAC removed and changed to pack dressing. Pt received course of IV antibiotics  for cellulitis and ESBL UTI. LLE doppler showed Interval resolution of DVT in the left common femoral and femoral vein. Pt received Argatroban bridge to coumadin. Wound VACs placed to both Right sacral wound and Left leg wound. Pt improved. She will continue with Wound VAC to LLE wound, dressing to right sacral ulcer.   Pt is stable for discharge with homecare.

## 2018-04-20 NOTE — DIETITIAN INITIAL EVALUATION ADULT. - ENERGY NEEDS
Height: 65 inches, Weight: 286 pounds  BMI: 47.6 kg/m2 IBW: 125 pounds (+/-10%), %IBW: 229%  Pertinent Info: Per chart, 59 y/o female with PMH of CHF s/p AICD, PE and DVT on Coumadin, CKD4, AAA w/ aortoiliac stent c/b development of LE compartment syndrome, morbid obesity and bedbound. Pt is admitted with LLE cellulitis and UTI, wound care following for stage 4 sacrum pressure ulcer. +3 bilateral legs, ankles, knees edema noted at this time.

## 2018-04-20 NOTE — CHART NOTE - NSCHARTNOTEFT_GEN_A_CORE
Upon Nutritional Assessment by the Registered Dietitian your patient was determined to meet criteria / has evidence of the following diagnosis/diagnoses:          [ ]  Mild Protein Calorie Malnutrition        [ ]  Moderate Protein Calorie Malnutrition        [ ] Severe Protein Calorie Malnutrition        [ ] Unspecified Protein Calorie Malnutrition        [ ] Underweight / BMI <19        [x ] Morbid Obesity / BMI > 40      Findings as based on:  [ x] Comprehensive nutrition assessment   [ ] Nutrition Focused Physical Exam  [x ] Other: BMI 47.6      Nutrition Plan/Recommendations:  Weight management education        PROVIDER Section:     By signing this assessment you are acknowledging and agree with the diagnosis/diagnoses assigned by the Registered Dietitian    Comments:

## 2018-04-20 NOTE — CONSULT NOTE ADULT - ASSESSMENT
A/P:58 F PMH SLE, systolic HFrEF(EF 32%) s/p AICD, HTN, CKD III- IV, PE / left leg DVT dx 2009, HIT, CAD (no stents), stage IV sacral ulcer, chronic Hook, recurrent pericardial effusion, AAA ~14cm in 12/2016 s/p repair in 2010 w/ aortoiliac stent complicated post-operatively with development of left lower extremity compartment syndrome, now admitted with LLE cellulitis & UTI      LLE swelling - h/o DVT w/ draining wounds  Consider repeat duplex to assess DVT  Compression BLE w/ ACE   BLE Elevation  Rt Ischium AQUACEL==>VAC tx eval  Abx per Medicine  con't Nutrition (as tolerated)  con't Offloading   con't Pericare  Care as per medicine will follow w/ you  Upon discharge f/u as outpatient at [facility] [address] [place] [address] [phone]  Seen w/ attng and D/w team  Thank you for this consult  Tanisha Weathers PA-C [id number]

## 2018-04-20 NOTE — PROGRESS NOTE ADULT - SUBJECTIVE AND OBJECTIVE BOX
Subjective: Patient seen and examined. No new events except as noted.   resting comfortably in bed     REVIEW OF SYSTEMS:    CONSTITUTIONAL: + weakness, fevers or chills  EYES/ENT: No visual changes;  No vertigo or throat pain   NECK: No pain or stiffness  RESPIRATORY: No cough, wheezing, hemoptysis; No shortness of breath  CARDIOVASCULAR: No chest pain or palpitations  GASTROINTESTINAL: No abdominal or epigastric pain. No nausea, vomiting, or hematemesis; No diarrhea or constipation. No melena or hematochezia.  GENITOURINARY: No dysuria, frequency or hematuria  NEUROLOGICAL: No numbness or weakness  SKIN: + itching, burning, rashes, or lesions   All other review of systems is negative unless indicated above.    MEDICATIONS:  MEDICATIONS  (STANDING):  amLODIPine   Tablet 10 milliGRAM(s) Oral daily  ascorbic acid 500 milliGRAM(s) Oral daily  calcitriol   Capsule 0.25 MICROGram(s) Oral daily  carvedilol 25 milliGRAM(s) Oral every 12 hours  ferrous    sulfate 325 milliGRAM(s) Oral daily  furosemide   Injectable 80 milliGRAM(s) IV Push daily  hydrALAZINE 50 milliGRAM(s) Oral three times a day  labetalol 200 milliGRAM(s) Oral three times a day  Nephro-alyse 1 Tablet(s) Oral daily  piperacillin/tazobactam IVPB. 3.375 Gram(s) IV Intermittent every 12 hours  predniSONE   Tablet 10 milliGRAM(s) Oral daily      PHYSICAL EXAM:  T(C): 36.8 (04-20-18 @ 05:08), Max: 37.5 (04-19-18 @ 14:19)  HR: 84 (04-20-18 @ 10:45) (80 - 88)  BP: 132/68 (04-20-18 @ 10:45) (113/67 - 139/71)  RR: 1 (04-20-18 @ 10:45) (1 - 18)  SpO2: 95% (04-20-18 @ 05:08) (95% - 96%)  Wt(kg): --  I&O's Summary    19 Apr 2018 07:01  -  20 Apr 2018 07:00  --------------------------------------------------------  IN: 490 mL / OUT: 2350 mL / NET: -1860 mL      Height (cm): 165.1 (04-19 @ 14:19)  Weight (kg): 129.9 (04-19 @ 14:19)  BMI (kg/m2): 47.7 (04-19 @ 14:19)  BSA (m2): 2.3 (04-19 @ 14:19)    Appearance: obese 	  HEENT:   Normal oral mucosa, PERRL, EOMI	  Lymphatic: No lymphadenopathy , no edema  Cardiovascular: Normal S1 S2, No JVD, No murmurs , Peripheral pulses palpable 2+ bilaterally  Respiratory: decreased bs and  effort 	  Gastrointestinal:  Soft, Non-tender, + BS	  Skin: No rashes, No ecchymoses, No cyanosis, warm to touch  Musculoskeletal: decreased range of motion and  strength  Psychiatry:  Mood & affect appropriate  Ext: massive LLE edema  +fox     LABS:    CARDIAC MARKERS:  CARDIAC MARKERS ( 19 Apr 2018 00:32 )  x     / x     / 68 U/L / x     / x                                    8.1    9.49  )-----------( 157      ( 20 Apr 2018 07:59 )             25.9     04-20    145  |  106  |  80<H>  ----------------------------<  94  4.3   |  23  |  3.82<H>    Ca    8.8      20 Apr 2018 06:10  Phos  3.7     04-19  Mg     2.2     04-19    TPro  6.4  /  Alb  3.1<L>  /  TBili  0.4  /  DBili  x   /  AST  10  /  ALT  7<L>  /  AlkPhos  51  04-19    proBNP:   Lipid Profile:   HgA1c:   TSH:     Negative          TELEMETRY: 	    ECG:  	  RADIOLOGY:   DIAGNOSTIC TESTING:  [ ] Echocardiogram:  [ ]  Catheterization:  [ ] Stress Test:    OTHER:

## 2018-04-20 NOTE — DISCHARGE NOTE ADULT - MEDICATION SUMMARY - MEDICATIONS TO CHANGE
I will SWITCH the dose or number of times a day I take the medications listed below when I get home from the hospital:    hydrALAZINE 25 mg oral tablet  -- It is very important that you take or use this exactly as directed.  Do not skip doses or discontinue unless directed by your doctor.  Some non-prescription drugs may aggravate your condition.  Read all labels carefully.  If a warning appears, check with your doctor before taking.    furosemide 40 mg oral tablet  -- 1 tab(s) by mouth once a day MDD:1    Lasix 40 mg oral tablet  -- 1 tab(s) by mouth once a day

## 2018-04-20 NOTE — DISCHARGE NOTE ADULT - NS AS DC FOLLOWUP STROKE INST
Influenza vaccination (VIS Pub Date: August 7, 2015)/Heart Failure/Smoking Cessation Heart Failure/Smoking Cessation Smoking Cessation/Heart Failure/Coumadin/Warfarin

## 2018-04-20 NOTE — DIETITIAN INITIAL EVALUATION ADULT. - PROBLEM SELECTOR PLAN 1
-ordered for vanco/cefepime in ER  -c/w vanco by level/zosyn renally dosed for now  -f/u bcx, not sent in ER will order now.   -wound care c/s  -consider ID eval in am

## 2018-04-20 NOTE — DIETITIAN INITIAL EVALUATION ADULT. - NS AS NUTRI INTERV ED CONTENT3
Educated pt on Renal and wt management diet per pt's request. Identified food sources high in sodium, potassium and phosphorus. Encouraged pt to consume adequate amount of high biological value proteins, healthy balanced meals, nutrition label reading techniques, not skipping meals, monitoring caloric intakes for wt management, healthy snack options and cooking techniques discussed.

## 2018-04-20 NOTE — PROGRESS NOTE ADULT - SUBJECTIVE AND OBJECTIVE BOX
SANDRA [id number]  58y  Female  Cellulitis of left lower extremity  H/o or current diagnosis of HF- Contraindication to ACEI/ARBs  H/o or current diagnosis of HF- ACEI/ARB contraindication unknown  H/o or current diagnosis of HF- Contraindication to ACEI/ARBs  H/o or current diagnosis of HF- no contraindication to ACEI/ARBs  H/o or current diagnosis of HF- ACEI/ARB contraindication unknown  H/o or current diagnosis of HF- Contraindication to ACEI/ARBs  H/o or current diagnosis of HF- ACEI/ARB contraindication unknown  H/o or current diagnosis of HF- Contraindication to ACEI/ARBs  H/o or current diagnosis of HF- ACEI/ARB contraindication unknown  H/o or current diagnosis of HF- Contraindication to ACEI/ARBs  H/o or current diagnosis of HF- no contraindication to ACEI/ARBs  H/o or current diagnosis of HF- Contraindication to ACEI/ARBs  H/o or current diagnosis of HF- no contraindication to ACEI/ARBs  H/o or current diagnosis of HF- ACEI/ARB contraindication unknown  H/o or current diagnosis of HF- Contraindication to ACEI/ARBs  H/o or current diagnosis of HF- ACEI/ARB contraindication unknown  H/o or current diagnosis of HF- Contraindication to ACEI/ARBs  H/o or current diagnosis of HF- ACEI/ARB contraindication unknown  No pertinent family history in first degree relatives  Handoff  MEWS Score  Anemia  Morbid obesity  HTN (Hypertension)  Drop Foot Gait  Compartment Syndrome  HIT (Heparin-Induced Thrombocytopenia)  History of Pulmonary Embolism  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)  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)  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)  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)  Calf DVT (Deep Venous Thrombosis) (ICD9 453.42)  Adenomatous Goiter (ICD9 241.9)  Chronic Gout (ICD9 274.02)  LBBB (Left Bundle Branch Block) (ICD9 426.3)  CHF (Congestive Heart Failure) (ICD9 428.0)  Cellulitis of left leg  Left leg swelling  Cellulitis of left leg  CKD (chronic kidney disease)  Prophylactic measure  VTE (venous thromboembolism)  Chronic systolic heart failure  CKD (chronic kidney disease) stage 4, GFR 15-29 ml/min  Systemic lupus erythematosus, unspecified SLE type, unspecified organ involvement status  Essential hypertension  Skin ulcer of sacrum, unspecified ulcer stage  Pain of right hip joint  Urinary tract infection without hematuria, site unspecified  Cellulitis of left lower extremity  Hx of aorta-iliac-femoral bypass  TOP (Termination of Pregnancy)  S/P Dilatation and Curettage  History of Tubal Ligation  s/p AAA (Abdominal Aortic Aneurysm) Repair  History of Cholecystectomy  S/P Partial Hysterectomy  ULCER LEFT LEG  90+  Acute cystitis with hematuria    amLODIPine   Tablet 10 milliGRAM(s) Oral daily  AQUAPHOR (petrolatum Ointment) 1 Application(s) Topical daily  ascorbic acid 500 milliGRAM(s) Oral daily  calcitriol   Capsule 0.25 MICROGram(s) Oral daily  carvedilol 25 milliGRAM(s) Oral every 12 hours  ertapenem  IVPB 500 milliGRAM(s) IV Intermittent <User Schedule>  ferrous    sulfate 325 milliGRAM(s) Oral daily  hydrALAZINE 50 milliGRAM(s) Oral three times a day  labetalol 200 milliGRAM(s) Oral three times a day  Nephro-alyse 1 Tablet(s) Oral daily  nystatin Powder 1 Application(s) Topical two times a day  predniSONE   Tablet 10 milliGRAM(s) Oral daily  vancomycin  IVPB 500 milliGRAM(s) IV Intermittent every 24 hours  heparin (Unknown)  losartan (Anaphylaxis)  nitroglycerin (Other)    CBC Full  -  ( 20 Apr 2018 07:59 )  WBC Count : 9.49 K/uL  Hemoglobin : 8.1 g/dL  Hematocrit : 25.9 %  Platelet Count - Automated : 157 K/uL  Mean Cell Volume : 88.1 fl  Mean Cell Hemoglobin : 27.6 pg  Mean Cell Hemoglobin Concentration : 31.3 gm/dL  Patient visited at bedside inad with cellulitis left leg. No signs of ulcerations both feet  DP and PT non-palpable both feet with 3+ edema left foot and leg  Thick, dystrophic discolored nails 1,2,3,4,5 both feet  Debride all nails 1,2,3,4,5 both feet with sterile nail clipper apply alcohol to all nails and interspaces  No additional podiatric care needed  reconsult podiatry as needed

## 2018-04-20 NOTE — DISCHARGE NOTE ADULT - INSTRUCTIONS
see discharge instruction for wound care chronic fox catheter changed 4/18/18 at emergency room chronic fox catheter changed 4/18/18 at emergency room--right ischium--cleanse with normal saline pat dry-- cavilon to periwound skin--pack with aquacel rope --cover with allevyn

## 2018-04-20 NOTE — DIETITIAN INITIAL EVALUATION ADULT. - OTHER INFO
Nutrition consult received for pressure ulcer > stage 2. Pt unable to provide UBW, states she is unable to check wt regularly 2/2 difficulty ambulating. Per previous RD note, pt wt was fluctuating greatly from 173 pounds (reported UBW 2 years ago) to 279.1 pounds 6 months ago, current dosing wt (286 pounds ) is fairly stable from previous wt. Pt reports good appetite and >75% po intakes of meals, flowsheet reviewed. No GI distress, +BM today. Pt denies chewing/swallowing difficulties. NKFA. PTA takes vitamin C, calcium and Nephrovite supplement.

## 2018-04-20 NOTE — DISCHARGE NOTE ADULT - DURABLE MEDICAL EQUIPMENT AGENCY
[facility] supplied wound [facility]- [phone]  [facility] supplied wheelchair [phone]

## 2018-04-20 NOTE — DIETITIAN INITIAL EVALUATION ADULT. - ORAL INTAKE PTA
good/Pt lives with daughter who prepares meals for pt at home. Usual breakfast - yogurt, eggs, fruit; Lunch and dinner -steamed vegetables, baked chicken, starch; Pt enjoys snacking on fruits or cheery tomato

## 2018-04-20 NOTE — DISCHARGE NOTE ADULT - MEDICATION SUMMARY - MEDICATIONS TO TAKE
I will START or STAY ON the medications listed below when I get home from the hospital:    Ben lift  -- Use as directed.  Dx: Cellulitis of left lower extremity. ICD10: L03.116  dispense: 1  Length of need: #99  -- Indication: For Assistive device    Standard wheelchair with elevating leg rests  -- Use as directed . Dx: Cellulitis of left lower extremity. ICD10: L03.116  Dispense: 1  Length of need: #99  -- Indication: For Assistive device    predniSONE 10 mg oral tablet  -- 1 tab(s) by mouth once a day  -- Indication: For Systemic lupus erythematosus, unspecified SLE type, unspecified organ involvement status    Coumadin 4 mg oral tablet  -- 1 tab(s) by mouth once a day  -- Do not take this drug if you are pregnant.  It is very important that you take or use this exactly as directed.  Do not skip doses or discontinue unless directed by your doctor.  Obtain medical advice before taking any non-prescription drugs as some may affect the action of this medication.    -- Indication: For VTE (venous thromboembolism)    allopurinol 100 mg oral tablet  -- 1 tab(s) by mouth once a day  -- Indication: For Chronic gout, high uric acid    labetalol 200 mg oral tablet  -- 1 tab(s) by mouth 3 times a day  -- Indication: For high blood pressure    carvedilol 25 mg oral tablet  -- 1 tab(s) by mouth every 12 hours  -- Indication: For CHF (congestive heart failure)    amLODIPine 10 mg oral tablet  -- 1 tab(s) by mouth once a day  -- Indication: For high blood pressure    petrolatum topical ointment  -- 1 application on skin 2 times a day  -- Indication: For dry skin    nystatin 100,000 units/g topical powder  -- 1 application on skin 2 times a day  -- Indication: For Protect from fungal rash    furosemide 40 mg oral tablet  -- 2 tab(s) by mouth once a day MDD:1   -- Indication: For CHF (congestive heart failure)    ferrous sulfate 325 mg (65 mg elemental iron) oral tablet  -- 1 tab(s) by mouth once a day  -- Indication: For Anemia    sodium chloride 0.65% nasal spray  -- 1 spray(s) into nose every 8 hours  -- Indication: For dry nares, nasal congestion,    hydrALAZINE 50 mg oral tablet  -- 1 tab(s) by mouth 3 times a day  -- Indication: For high blood pressure    Nephro-Riddhi oral tablet  -- 1 tab(s) by mouth once a day  -- Indication: For Supplement    Vitamin C 500 mg oral tablet  -- 1 tab(s) by mouth once a day  -- Indication: For Supplement    calcitriol 0.25 mcg oral capsule  -- 1 cap(s) by mouth once a day  -- Indication: For Hypocalcemia associated with CKD    ergocalciferol 50,000 intl units (1.25 mg) oral capsule  -- 1 cap(s) by mouth once a week  -- Indication: For Vitamin D deficiency I will START or STAY ON the medications listed below when I get home from the hospital:    Ben lift  -- Use as directed.  Dx: Cellulitis of left lower extremity. ICD10: L03.116  dispense: 1  Length of need: #99  -- Indication: For Assistive device    Standard wheelchair with elevating leg rests  -- Use as directed . Dx: Cellulitis of left lower extremity. ICD10: L03.116  Dispense: 1  Length of need: #99  -- Indication: For Assistive device    predniSONE 10 mg oral tablet  -- 1 tab(s) by mouth once a day  -- Indication: For Systemic lupus erythematosus, unspecified SLE type, unspecified organ involvement status    Coumadin 4 mg oral tablet  -- 1 tab(s) by mouth once a day(at bedtime)  You must have your INR checked on mon. 4/30/18 and your coumadin dose will be adjusted accordingly   -- Do not take this drug if you are pregnant.  It is very important that you take or use this exactly as directed.  Do not skip doses or discontinue unless directed by your doctor.  Obtain medical advice before taking any non-prescription drugs as some may affect the action of this medication.    -- Indication: For DVT/PE    allopurinol 100 mg oral tablet  -- 1 tab(s) by mouth once a day  -- Indication: For Chronic gout, high uric acid    labetalol 200 mg oral tablet  -- 1 tab(s) by mouth 3 times a day  -- Indication: For high blood pressure    carvedilol 25 mg oral tablet  -- 1 tab(s) by mouth every 12 hours  -- Indication: For CHF (congestive heart failure)    amLODIPine 10 mg oral tablet  -- 1 tab(s) by mouth once a day  -- Indication: For high blood pressure    petrolatum topical ointment  -- 1 application on skin 2 times a day  -- Indication: For dry skin    nystatin 100,000 units/g topical powder  -- 1 application on skin 2 times a day  -- Indication: For Protect from fungal rash    furosemide 40 mg oral tablet  -- 2 tab(s) by mouth once a day MDD:1   -- Indication: For CHF (congestive heart failure)    ferrous sulfate 325 mg (65 mg elemental iron) oral tablet  -- 1 tab(s) by mouth once a day  -- Indication: For Anemia    sodium chloride 0.65% nasal spray  -- 1 spray(s) into nose every 8 hours  -- Indication: For dry nares, nasal congestion,    hydrALAZINE 50 mg oral tablet  -- 1 tab(s) by mouth 3 times a day  -- Indication: For high blood pressure    Nephro-Riddhi oral tablet  -- 1 tab(s) by mouth once a day  -- Indication: For Supplement    ergocalciferol 50,000 intl units (1.25 mg) oral capsule  -- 1 cap(s) by mouth once a week  -- Indication: For Vitamin D deficiency    Vitamin C 500 mg oral tablet  -- 1 tab(s) by mouth once a day  -- Indication: For Supplement    calcitriol 0.25 mcg oral capsule  -- 1 cap(s) by mouth once a day  -- Indication: For Hypocalcemia associated with CKD I will START or STAY ON the medications listed below when I get home from the hospital:    Ben lift  -- Use as directed.  Dx: Cellulitis of left lower extremity. ICD10: L03.116  dispense: 1  Length of need: #99  -- Indication: For Assistive device    Standard wheelchair with elevating leg rests  -- Use as directed . Dx: Cellulitis of left lower extremity. ICD10: L03.116  Dispense: 1  Length of need: #99  -- Indication: For Assistive device    predniSONE 10 mg oral tablet  -- 1 tab(s) by mouth once a day  -- Indication: For Systemic lupus erythematosus, unspecified SLE type, unspecified organ involvement status    Coumadin 4 mg oral tablet  -- 1 tab(s) by mouth once a day(at bedtime)  You must have your INR checked on mon. 4/30/18 and your coumadin dose will be adjusted accordingly   -- Do not take this drug if you are pregnant.  It is very important that you take or use this exactly as directed.  Do not skip doses or discontinue unless directed by your doctor.  Obtain medical advice before taking any non-prescription drugs as some may affect the action of this medication.    -- Indication: For DVT/PE    allopurinol 100 mg oral tablet  -- 1 tab(s) by mouth once a day  -- Indication: For Chronic gout, high uric acid    carvedilol 25 mg oral tablet  -- 1 tab(s) by mouth every 12 hours  -- Indication: For CHF (congestive heart failure)    labetalol 200 mg oral tablet  -- 1 tab(s) by mouth 3 times a day  -- Indication: For high blood pressure    amLODIPine 10 mg oral tablet  -- 1 tab(s) by mouth once a day  -- Indication: For high blood pressure    petrolatum topical ointment  -- 1 application on skin 2 times a day  -- Indication: For dry skin    nystatin 100,000 units/g topical powder  -- 1 application on skin 2 times a day  -- Indication: For Protect from fungal rash    furosemide 40 mg oral tablet  -- 2 tab(s) by mouth once a day MDD:1   -- Indication: For CHF (congestive heart failure)    ferrous sulfate 325 mg (65 mg elemental iron) oral tablet  -- 1 tab(s) by mouth once a day  -- Indication: For Anemia    sodium chloride 0.65% nasal spray  -- 1 spray(s) into nose every 8 hours  -- Indication: For dry nares, nasal congestion,    hydrALAZINE 50 mg oral tablet  -- 1 tab(s) by mouth 3 times a day  -- Indication: For high blood pressure    Nephro-Riddhi oral tablet  -- 1 tab(s) by mouth once a day  -- Indication: For Supplement    calcitriol 0.25 mcg oral capsule  -- 1 cap(s) by mouth once a day  -- Indication: For Supplement    ergocalciferol 50,000 intl units (1.25 mg) oral capsule  -- 1 cap(s) by mouth once a week  -- Indication: For Vitamin D deficiency    Vitamin C 500 mg oral tablet  -- 1 tab(s) by mouth once a day  -- Indication: For Supplement

## 2018-04-20 NOTE — CONSULT NOTE ADULT - SUBJECTIVE AND OBJECTIVE BOX
Wound SURGERY CONSULT NOTE    HPI:  58 F PMH SLE, systolic HFrEF(EF 32%) s/p AICD, HTN, CKD III- IV, PE / left leg DVT dx , HIT, CAD (no stents), stage IV sacral ulcer, chronic Fox, recurrent pericardial effusion, AAA ~14cm in 2016 s/p repair in  w/ aortoiliac stent complicated post-operatively with development of left lower extremity compartment syndrome, now presents with dysuria and LLE redness.  3-4 d PTA, pt was in bed using an extendable grabber that slipped and hit her leg.  At first she didn't notice anything, then soon realized she had punctured the skin on the medial aspect of her L thigh, with some bleeding.  Starting today, pt has noticed increasing warmth and redness of the L leg; she notes her L leg has always been very large and malformed looking since her prior fasciotomy for compartment syndrome, but the warmth/redness is new and overall is more swollen then usual. +low grade fever to around 100.0 at home; pt took tylenol over last few days and is now afebrile. Denies purulent drainage. Pt also c/o of some urinary changes; she has chronic fox (placed in setting of her nonhealing sacral ulcer) so does not experience dysuria, but has noticed increasing pubic pressure and darkness/cloudiness of urine.  Pt also is c/o R thigh severe pain; extending down her R femur; she notes that her wound vac on her R sacrum was removed today and packed, which exacerbated the pain.  She was already scheduled to get a CT of her hip/leg to evaluate these complaints but has not yet been able to get it as an o/p.  Pt nonambulatory at baseline, but can still stand on her legs. Denies CP/SOB, denies n/v/d.   Pt well known to team.  Pt w/ chronic Rt ischial wound tx w/ aquacel and now has VAC tx at home.  Pt has outpt f/u w/ our wound center.  Pt was feeling better and was more mobile.  No odor, increased drainage, redness, or warmth from Rt ischial wound.  pt noted new "twitching" like pain in wound when cleaned.  no f/c/s.  LLE swelling had been  improving.  Calf almost "normal" but Thigh still very edematous and limiting mobility 2/2 heaviness-- until she banged her leg w/ grasper.    PAST MEDICAL & SURGICAL HISTORY:  Anemia  Morbid obesity  SLE  HTN (Hypertension)  Drop Foot Gait  Compartment Syndrome: fasciotomy LLE  HIT (Heparin-Induced Thrombocytopenia)  Pulmonary Embolism:   Iliac Aneurysm  left iliac aneurysm repair  Iliac Aneurysm  endoleak l iliac aneurysm repair  Aneurysm of Iliac Artery  Calf DVT (Deep Venous Thrombosis)  Adenomatous Goiter  Chronic Gout   LBBB (Left Bundle Branch Block)   CHF (Congestive Heart Failure)   s/p aorta-iliac-femoral bypass  s/p TOP (Termination of Pregnancy)  S/P Dilatation and Curettage  s/p Tubal Ligation  s/p AAA (Abdominal Aortic Aneurysm) Repair: 2009      REVIEW OF SYSTEMS    Skin/ Musculoskeletal:	 see HPI  All other systems negative      MEDICATIONS  (STANDING):  amLODIPine   Tablet 10 milliGRAM(s) Oral daily  ascorbic acid 500 milliGRAM(s) Oral daily  calcitriol   Capsule 0.25 MICROGram(s) Oral daily  carvedilol 25 milliGRAM(s) Oral every 12 hours  ferrous    sulfate 325 milliGRAM(s) Oral daily  furosemide   Injectable 80 milliGRAM(s) IV Push daily  hydrALAZINE 50 milliGRAM(s) Oral three times a day  labetalol 200 milliGRAM(s) Oral three times a day  Nephro-alyse 1 Tablet(s) Oral daily  piperacillin/tazobactam IVPB. 3.375 Gram(s) IV Intermittent every 12 hours  predniSONE   Tablet 10 milliGRAM(s) Oral daily    MEDICATIONS  (PRN):      Allergies    heparin (Unknown)  losartan (Anaphylaxis)  nitroglycerin (Other)    SOCIAL HISTORY:  single; lives w/ daughter, retired (+)HHA; Denies smoking, ETOH, drugs    FAMILY HISTORY:  No pertinent family history in first degree relatives      Vital Signs Last 24 Hrs  T(C): 36.8 (2018 05:08), Max: 37.5 (2018 14:19)  T(F): 98.2 (2018 05:08), Max: 99.5 (2018 14:19)  HR: 84 (2018 10:45) (80 - 88)  BP: 132/68 (2018 10:45) (113/67 - 139/71)  BP(mean): --  RR: 1 (2018 10:45) (1 - 18)  SpO2: 95% (2018 05:08) (95% - 96%)    NAD /  A&Ox3/ MO  WD/ WN/ WG  Versa Care P500 bed    Cardiovascular: RRR (+)m    Respiratory: CTA    Gastrointestinal soft NT/ND (+)BS    Neurology  weakened strength & sensation grossly intact/ paraesthesias    Musculoskeletal/Vascular: limited ROM BLE 2/2 LLE edema  FROM BUE   L>>RLE edema  Difficult to assess DP/PT 2/2 edema  (+)hemosiderin staining    Skin: Dry, frail,  ecchymosis w/? hematoma LLE anterior shin- no discrete lesion noted  Instep / anterior ankle skin crease w/ crusted material easily cleaned off wound  Fibrinous tissue w/o drainage  (+)tender to palpation  Medial LLE w/ 0.5cm x 1cm x 1.5cm wound w/ fibrinous exudate  (+)serous drainage (+)very tender- can barely touch  No odor, erythema, increased warmth, induration, fluctuance    Rt Ischium w/ moist & granular healing STAGE 4 Pressure ucler  (+)serosanginous drainage   No odor, erythema, increased warmth, tenderness, induration, fluctuance      LABS:                        8.1    9.49  )-----------( 157      ( 2018 07:59 )             25.9     04-20    145  |  106  |  80<H>  ----------------------------<  94  4.3   |  23  |  3.82<H>    Ca    8.8      2018 06:10  Phos  3.7     -19  Mg     2.2     -19    TPro  6.4  /  Alb  3.1<L>  /  TBili  0.4  /  DBili  x   /  AST  10  /  ALT  7<L>  /  AlkPhos  51  04-19    PT/INR - ( 2018 08:07 )   PT: 36.1 sec;   INR: 3.12 ratio    PTT - ( 2018 07:34 )  PTT:42.9 sec    Urinalysis Basic - ( 2018 17:50 )    Color: Yellow / Appearance: SL Turbid / S.012 / pH: x  Gluc: x / Ketone: Negative  / Bili: Negative / Urobili: Negative   Blood: x / Protein: 100 mg/dL / Nitrite: Negative   Leuk Esterase: Large / RBC: 10-25 /HPF / WBC 26-50 /HPF   Sq Epi: x / Non Sq Epi: Few /HPF / Bacteria: Moderate /HPF      RADIOLOGY & ADDITIONAL STUDIES:  < from: CT 3D Reconstruct w/ Workstation (18 @ 22:25) >  FINDINGS:    BONE: Decubitus ulcer is identified with air tracking to the level of the   posterior cortex of the right ischium (series 2 image 133). There is mild   sclerosis and periosteal reaction in the right posterior ischium, similar   in appearance to CT of 2017. No new cortical destruction is   identified.  No acute fracture or dislocation. Cartilage space narrowing with   subchondral cystic change and marginal osteophyte formation of the right   hip. Cartilage space narrowing and marginal osteophyte formation of the   right knee. Mild degenerative change of the right SI joint and pubic   symphysis.    SOFT TISSUE: As mentioned above, there is a right decubitus ulcer with   air tracking to the level of the posterior cortex of the right ischium.   No additional soft tissue ulceration is identified. No subcutaneous   treatable fluid collections identified in the right lower extremity.   Trace, physiologic fluid in the right hip and right knee. Endovascular   graft is identified in the abdomen. Fox catheter is seen in the bladder.    MISCELLANEOUS: Partially imaged skin thickening and edema in the   subcutaneous fat of the left lower extremity.      IMPRESSION:  1.  No acute fracture or dislocation. No drainable fluid collections   identified in the right thigh.  2.  Decubitus ulcer is identified with air tracking to the posterior   cortex of the right ischium. The ischium is similar in appearance to   prior CT of 2017.  3.  Partially imaged skin thickening and subcutaneous edema in the left   lower extremity.          Cultures:  Culture - Urine (18 @ 21:14)    Specimen Source: .Urine Clean Catch (Midstream)    Culture Results:   >100,000 CFU/ml Escherichia coli    Culture - Blood (18 @ 02:09)    Specimen Source: .Blood Blood-Venous    Culture Results:   No growth to date.

## 2018-04-20 NOTE — DIETITIAN INITIAL EVALUATION ADULT. - PROBLEM SELECTOR PLAN 3
-pain been going on for few days worse with movement; given body habitus and lack of range of motion exam is difficult but pt with significant tenderness with movement, radiating down R hip to R femur and R groin  -plan was for o/p CT per pt's d/w wound care team, ?occult fx vs radiculopathy vs. infection  -will order CT R hip/femur and reassess to start eval, will need to be noncon study given severe ckd

## 2018-04-20 NOTE — DISCHARGE NOTE ADULT - [id number]_GEN_ALL_CORE
observation and assessment/teaching and training/rehabilitation services/wound care and assessment/medication teaching and assessment

## 2018-04-21 DIAGNOSIS — R04.0 EPISTAXIS: ICD-10-CM

## 2018-04-21 LAB
ANION GAP SERPL CALC-SCNC: 16 MMOL/L — SIGNIFICANT CHANGE UP (ref 5–17)
APTT BLD: 40.2 SEC — HIGH (ref 27.5–37.4)
BUN SERPL-MCNC: 81 MG/DL — HIGH (ref 7–23)
CALCIUM SERPL-MCNC: 9.2 MG/DL — SIGNIFICANT CHANGE UP (ref 8.4–10.5)
CHLORIDE SERPL-SCNC: 107 MMOL/L — SIGNIFICANT CHANGE UP (ref 96–108)
CO2 SERPL-SCNC: 24 MMOL/L — SIGNIFICANT CHANGE UP (ref 22–31)
CREAT SERPL-MCNC: 3.88 MG/DL — HIGH (ref 0.5–1.3)
GLUCOSE SERPL-MCNC: 90 MG/DL — SIGNIFICANT CHANGE UP (ref 70–99)
HCT VFR BLD CALC: 27.8 % — LOW (ref 34.5–45)
HGB BLD-MCNC: 8.5 G/DL — LOW (ref 11.5–15.5)
INR BLD: 2.48 RATIO — HIGH (ref 0.88–1.16)
MAGNESIUM SERPL-MCNC: 2.2 MG/DL — SIGNIFICANT CHANGE UP (ref 1.6–2.6)
MCHC RBC-ENTMCNC: 27.2 PG — SIGNIFICANT CHANGE UP (ref 27–34)
MCHC RBC-ENTMCNC: 30.6 GM/DL — LOW (ref 32–36)
MCV RBC AUTO: 89.1 FL — SIGNIFICANT CHANGE UP (ref 80–100)
PLATELET # BLD AUTO: 189 K/UL — SIGNIFICANT CHANGE UP (ref 150–400)
POTASSIUM SERPL-MCNC: 4.3 MMOL/L — SIGNIFICANT CHANGE UP (ref 3.5–5.3)
POTASSIUM SERPL-SCNC: 4.3 MMOL/L — SIGNIFICANT CHANGE UP (ref 3.5–5.3)
PROTHROM AB SERPL-ACNC: 28.5 SEC — HIGH (ref 10–13.1)
RBC # BLD: 3.12 M/UL — LOW (ref 3.8–5.2)
RBC # FLD: 16.3 % — HIGH (ref 10.3–14.5)
SODIUM SERPL-SCNC: 147 MMOL/L — HIGH (ref 135–145)
WBC # BLD: 7.31 K/UL — SIGNIFICANT CHANGE UP (ref 3.8–10.5)
WBC # FLD AUTO: 7.31 K/UL — SIGNIFICANT CHANGE UP (ref 3.8–10.5)

## 2018-04-21 RX ORDER — SODIUM CHLORIDE 0.65 %
1 AEROSOL, SPRAY (ML) NASAL
Qty: 0 | Refills: 0 | Status: DISCONTINUED | OUTPATIENT
Start: 2018-04-21 | End: 2018-04-27

## 2018-04-21 RX ADMIN — Medication 325 MILLIGRAM(S): at 12:55

## 2018-04-21 RX ADMIN — ERTAPENEM SODIUM 100 MILLIGRAM(S): 1 INJECTION, POWDER, LYOPHILIZED, FOR SOLUTION INTRAMUSCULAR; INTRAVENOUS at 23:38

## 2018-04-21 RX ADMIN — Medication 50 MILLIGRAM(S): at 23:06

## 2018-04-21 RX ADMIN — Medication 80 MILLIGRAM(S): at 06:10

## 2018-04-21 RX ADMIN — Medication 1 TABLET(S): at 12:55

## 2018-04-21 RX ADMIN — CARVEDILOL PHOSPHATE 25 MILLIGRAM(S): 80 CAPSULE, EXTENDED RELEASE ORAL at 23:06

## 2018-04-21 RX ADMIN — Medication 1 APPLICATION(S): at 12:55

## 2018-04-21 RX ADMIN — AMLODIPINE BESYLATE 10 MILLIGRAM(S): 2.5 TABLET ORAL at 06:09

## 2018-04-21 RX ADMIN — NYSTATIN CREAM 1 APPLICATION(S): 100000 CREAM TOPICAL at 06:10

## 2018-04-21 RX ADMIN — Medication 200 MILLIGRAM(S): at 14:59

## 2018-04-21 RX ADMIN — Medication 200 MILLIGRAM(S): at 06:09

## 2018-04-21 RX ADMIN — CALCITRIOL 0.25 MICROGRAM(S): 0.5 CAPSULE ORAL at 12:55

## 2018-04-21 RX ADMIN — CARVEDILOL PHOSPHATE 25 MILLIGRAM(S): 80 CAPSULE, EXTENDED RELEASE ORAL at 12:51

## 2018-04-21 RX ADMIN — Medication 500 MILLIGRAM(S): at 12:55

## 2018-04-21 RX ADMIN — Medication 50 MILLIGRAM(S): at 14:59

## 2018-04-21 RX ADMIN — Medication 50 MILLIGRAM(S): at 06:09

## 2018-04-21 RX ADMIN — Medication 100 MILLIGRAM(S): at 17:40

## 2018-04-21 RX ADMIN — Medication 200 MILLIGRAM(S): at 23:06

## 2018-04-21 RX ADMIN — NYSTATIN CREAM 1 APPLICATION(S): 100000 CREAM TOPICAL at 17:32

## 2018-04-21 RX ADMIN — Medication 10 MILLIGRAM(S): at 08:24

## 2018-04-21 NOTE — PHYSICAL THERAPY INITIAL EVALUATION ADULT - PERTINENT HX OF CURRENT PROBLEM, REHAB EVAL
58 F PMH SLE, systolic HFrEF(EF 32%) s/p AICD, HTN, CKD III- IV, PE / left leg DVT dx 2009, HIT, CAD (no stents), stage IV sacral ulcer, chronic Hook, recurrent pericardial effusion, AAA ~14cm in 12/2016 s/p repair in 2010 w/ aortoiliac stent complicated post-operatively with development of left lower extremity compartment syndrome, now admitted with LLE cellulitis & UTI

## 2018-04-21 NOTE — PROGRESS NOTE ADULT - SUBJECTIVE AND OBJECTIVE BOX
Subjective: Patient seen and examined. No new events except as noted.   Resting comfortably in bed     REVIEW OF SYSTEMS:    CONSTITUTIONAL: + weakness, fevers or chills  EYES/ENT: No visual changes;  No vertigo or throat pain   NECK: No pain or stiffness  RESPIRATORY: No cough, wheezing, hemoptysis; No shortness of breath  CARDIOVASCULAR: No chest pain or palpitations  GASTROINTESTINAL: No abdominal or epigastric pain. No nausea, vomiting, or hematemesis; No diarrhea or constipation. No melena or hematochezia.  GENITOURINARY: No dysuria, frequency or hematuria  NEUROLOGICAL: No numbness or weakness  SKIN: No itching, burning, rashes, or lesions   All other review of systems is negative unless indicated above.    MEDICATIONS:  MEDICATIONS  (STANDING):  amLODIPine   Tablet 10 milliGRAM(s) Oral daily  AQUAPHOR (petrolatum Ointment) 1 Application(s) Topical daily  ascorbic acid 500 milliGRAM(s) Oral daily  calcitriol   Capsule 0.25 MICROGram(s) Oral daily  carvedilol 25 milliGRAM(s) Oral every 12 hours  ertapenem  IVPB 500 milliGRAM(s) IV Intermittent <User Schedule>  ferrous    sulfate 325 milliGRAM(s) Oral daily  furosemide   Injectable 80 milliGRAM(s) IV Push daily  hydrALAZINE 50 milliGRAM(s) Oral three times a day  labetalol 200 milliGRAM(s) Oral three times a day  Nephro-alyse 1 Tablet(s) Oral daily  nystatin Powder 1 Application(s) Topical two times a day  predniSONE   Tablet 10 milliGRAM(s) Oral daily  vancomycin  IVPB 500 milliGRAM(s) IV Intermittent every 24 hours      PHYSICAL EXAM:  T(C): 37.1 (04-21-18 @ 20:49), Max: 37.2 (04-21-18 @ 12:52)  HR: 86 (04-21-18 @ 20:49) (73 - 86)  BP: 152/78 (04-21-18 @ 20:49) (138/73 - 152/78)  RR: 18 (04-21-18 @ 20:49) (18 - 18)  SpO2: 98% (04-21-18 @ 20:49) (95% - 98%)  Wt(kg): --  I&O's Summary    20 Apr 2018 07:01  -  21 Apr 2018 07:00  --------------------------------------------------------  IN: 340 mL / OUT: 2150 mL / NET: -1810 mL    21 Apr 2018 07:01  -  21 Apr 2018 22:07  --------------------------------------------------------  IN: 580 mL / OUT: 1700 mL / NET: -1120 mL          Appearance: Normal	  HEENT:   Normal oral mucosa, PERRL, EOMI	  Lymphatic: No lymphadenopathy , no edema  Cardiovascular: Normal S1 S2, No JVD, No murmurs , Peripheral pulses palpable 2+ bilaterally  Respiratory: Lungs clear to auscultation, normal effort 	  Gastrointestinal:  Soft, Non-tender, + BS	  Skin: No rashes, No ecchymoses, No cyanosis, warm to touch  Musculoskeletal: decreased range of motion and  strength  Psychiatry:  Mood & affect appropriate  Ext: massive LLE edema  +fox     LABS:    CARDIAC MARKERS:  CARDIAC MARKERS ( 19 Apr 2018 00:32 )  x     / x     / 68 U/L / x     / x                                    8.5    7.31  )-----------( 189      ( 21 Apr 2018 09:09 )             27.8     04-21    147<H>  |  107  |  81<H>  ----------------------------<  90  4.3   |  24  |  3.88<H>    Ca    9.2      21 Apr 2018 07:28  Mg     2.2     04-21      proBNP:   Lipid Profile:   HgA1c:   TSH:             TELEMETRY: 	    ECG:  	  RADIOLOGY:   DIAGNOSTIC TESTING:  [ ] Echocardiogram:  [ ]  Catheterization:  [ ] Stress Test:    OTHER:

## 2018-04-21 NOTE — CONSULT NOTE ADULT - PROBLEM SELECTOR RECOMMENDATION 9
Continue Nasal Salyn/ans Saline Gel as per regimen  Avoid Nasal Trauma  Baci b/l 2x/day  Care per primary team
IV abx   Check LE venous US. has known chronic LLE dvt  on coumadin
-cover for strep and Staph  -redose vanco 1 gm iv x1  -Zosyn 3.375 gm iv q12  -f/u bcx and urine cx  -if purulence noted from wound, check wound cx  -check vanco level in AM

## 2018-04-21 NOTE — CONSULT NOTE ADULT - SUBJECTIVE AND OBJECTIVE BOX
CC: Epistaxis    HPI:58 F PMH intermittent recurrent epistaxis, SLE, systolic HFrEF(EF 32%) s/p AICD, HTN, CKD III- IV, PE / left leg DVT dx 2009 on Coumadin recent INR, HIT, CAD (no stents), stage IV sacral ulcer, chronic Hook, recurrent pericardial effusion, AAA ~14cm in 12/2016 s/p repair in 2010 w/ aortoiliac stent complicated post-operatively with development of left lower extremity compartment syndrome, now presents with dysuria and LLE redness. We are asked to evaluate the patient for epistaxis. Pt denies any active bleeding, however requesting someone examine her nose to make sure she is not bleeding. No Modifying factors. Pt denies any Fevers/chills. Dysphagia/odynophagia/hemoptysis/unintentional weight loss. Any other relevant history/symptoms.    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)  Calf DVT (Deep Venous Thrombosis) (ICD9 453.42)  Adenomatous Goiter (ICD9 241.9)  Chronic Gout (ICD9 274.02)  LBBB (Left Bundle Branch Block) (ICD9 426.3)  CHF (Congestive Heart Failure) (ICD9 428.0)  Hx of aorta-iliac-femoral bypass  TOP (Termination of Pregnancy)  S/P Dilatation and Curettage  History of Tubal Ligation  s/p AAA (Abdominal Aortic Aneurysm) Repair: 2009 2010  History of Cholecystectomy  S/P Partial Hysterectomy    Allergies    heparin (Unknown)  losartan (Anaphylaxis)  nitroglycerin (Other)    Intolerances      MEDICATIONS  (STANDING):  amLODIPine   Tablet 10 milliGRAM(s) Oral daily  AQUAPHOR (petrolatum Ointment) 1 Application(s) Topical daily  ascorbic acid 500 milliGRAM(s) Oral daily  calcitriol   Capsule 0.25 MICROGram(s) Oral daily  carvedilol 25 milliGRAM(s) Oral every 12 hours  ertapenem  IVPB 500 milliGRAM(s) IV Intermittent <User Schedule>  ferrous    sulfate 325 milliGRAM(s) Oral daily  furosemide   Injectable 80 milliGRAM(s) IV Push daily  hydrALAZINE 50 milliGRAM(s) Oral three times a day  labetalol 200 milliGRAM(s) Oral three times a day  Nephro-alyse 1 Tablet(s) Oral daily  nystatin Powder 1 Application(s) Topical two times a day  predniSONE   Tablet 10 milliGRAM(s) Oral daily  vancomycin  IVPB 500 milliGRAM(s) IV Intermittent every 24 hours    MEDICATIONS  (PRN):  sodium chloride 0.65% Nasal 1 Spray(s) Both Nostrils four times a day PRN Nasal Congestion    Social History: denies etoh/recreational drug use/ tobacco    ROS: ENT, GI, , CV, Pulm, Neuro, Psych, MS, Heme, Endo, Constitutional all negative except as noted in HPI    Vital Signs Last 24 Hrs  T(C): 37.2 (21 Apr 2018 12:52), Max: 37.4 (20 Apr 2018 21:21)  T(F): 98.9 (21 Apr 2018 12:52), Max: 99.4 (20 Apr 2018 21:21)  HR: 73 (21 Apr 2018 14:59) (73 - 92)  BP: 146/70 (21 Apr 2018 14:59) (138/73 - 146/70)  BP(mean): --  RR: 18 (21 Apr 2018 12:52) (18 - 18)  SpO2: 96% (21 Apr 2018 12:52) (95% - 96%)                          8.5    7.31  )-----------( 189      ( 21 Apr 2018 09:09 )             27.8    04-21    147<H>  |  107  |  81<H>  ----------------------------<  90  4.3   |  24  |  3.88<H>    Ca    9.2      21 Apr 2018 07:28  Mg     2.2     04-21     PT/INR - ( 21 Apr 2018 09:20 )   PT: 28.5 sec;   INR: 2.48 ratio         PTT - ( 21 Apr 2018 09:20 )  PTT:40.2 sec    PHYSICAL EXAM:  Gen: NAD, well-developed  Head: Normocephalic, Atraumatic  Face: no edema/erythema/fluctuance, parotid glands soft without mass  Eyes: PERRL, EOMI, no scleral injection  Nose:  Right Nare, partially occluded due to surgical scar c/d/i no erythema/drainage or purulence Left Nostril with old dry blood no active bleeding  Mouth: Mucosa moist, tongue/uvula midline, oropharynx clear  Neck: Flat, supple, no lymphadenopathy, trachea midline, no masses

## 2018-04-21 NOTE — PHYSICAL THERAPY INITIAL EVALUATION ADULT - PLANNED THERAPY INTERVENTIONS, PT EVAL
transfer training/bed mobility training transfer training/gait training/strengthening/bed mobility training

## 2018-04-21 NOTE — CONSULT NOTE ADULT - ASSESSMENT
58yoF with Epistaxis-resolving, no bleeding on exam Pt on Coumadin for h/o PE/L Legt DVT 2009 INR 2.48 today. No acute ENT intervention at this time, continue nasal sprays per regimen

## 2018-04-21 NOTE — PHYSICAL THERAPY INITIAL EVALUATION ADULT - MODALITIES TREATMENT COMMENTS
Stage IV R IT ulcer - 2.5cmx0.7cmx2.0cm granular tissue, +maceration at wound margins ; L medial tibia (trauma) 1.0cmx0.5cmx1.5cm -copious serous drainage 50% granular and 25% fibrinous adheared

## 2018-04-21 NOTE — PHYSICAL THERAPY INITIAL EVALUATION ADULT - ADDITIONAL COMMENTS
Pt lives w/ dtr in a pvt house w/ no steps to enter. PTA pt non amb since Feb 2018. Reports ability to stand w/ assist and pivot to chair

## 2018-04-21 NOTE — PROGRESS NOTE ADULT - SUBJECTIVE AND OBJECTIVE BOX
Patient is a 58y old  Female who presents with a chief complaint of dysuria, redness of foot (20 Apr 2018 11:20)      SUBJECTIVE / OVERNIGHT EVENTS:  T max 99.2  No new symptoms  Review of Systems:   CONSTITUTIONAL: No fever, weight loss, or fatigue  EYES: No eye pain, visual disturbances, or discharge  ENMT:  No difficulty hearing, tinnitus, vertigo; No sinus or throat pain  NECK: No pain or stiffness  BREASTS: No pain, masses, or nipple discharge  RESPIRATORY: No cough, wheezing, chills or hemoptysis; No shortness of breath  CARDIOVASCULAR: No chest pain, palpitations, dizziness, or leg swelling  GASTROINTESTINAL: No abdominal or epigastric pain. No nausea, vomiting, or hematemesis; No diarrhea or constipation. No melena or hematochezia.  GENITOURINARY: No dysuria, frequency, hematuria, or incontinence  NEUROLOGICAL: No headaches, memory loss, loss of strength, numbness, or tremors  SKIN: No itching, burning, rashes, or lesions   LYMPH NODES: No enlarged glands  ENDOCRINE: No heat or cold intolerance; No hair loss  MUSCULOSKELETAL: No joint pain or swelling; No muscle, back, or extremity pain  PSYCHIATRIC: No depression, anxiety, mood swings, or difficulty sleeping  HEME/LYMPH: No easy bruising, or bleeding gums  ALLERY AND IMMUNOLOGIC: No hives or eczema    MEDICATIONS  (STANDING):  amLODIPine   Tablet 10 milliGRAM(s) Oral daily  AQUAPHOR (petrolatum Ointment) 1 Application(s) Topical daily  ascorbic acid 500 milliGRAM(s) Oral daily  calcitriol   Capsule 0.25 MICROGram(s) Oral daily  carvedilol 25 milliGRAM(s) Oral every 12 hours  ertapenem  IVPB 500 milliGRAM(s) IV Intermittent <User Schedule>  ferrous    sulfate 325 milliGRAM(s) Oral daily  furosemide   Injectable 80 milliGRAM(s) IV Push daily  hydrALAZINE 50 milliGRAM(s) Oral three times a day  labetalol 200 milliGRAM(s) Oral three times a day  Nephro-alyse 1 Tablet(s) Oral daily  nystatin Powder 1 Application(s) Topical two times a day  predniSONE   Tablet 10 milliGRAM(s) Oral daily  vancomycin  IVPB 500 milliGRAM(s) IV Intermittent every 24 hours    MEDICATIONS  (PRN):  sodium chloride 0.65% Nasal 1 Spray(s) Both Nostrils four times a day PRN Nasal Congestion      PHYSICAL EXAM:  Vital Signs Last 24 Hrs  T(C): 37.2 (21 Apr 2018 12:52), Max: 37.4 (20 Apr 2018 21:21)  T(F): 98.9 (21 Apr 2018 12:52), Max: 99.4 (20 Apr 2018 21:21)  HR: 73 (21 Apr 2018 14:59) (73 - 92)  BP: 146/70 (21 Apr 2018 14:59) (138/73 - 146/70)  BP(mean): --  RR: 18 (21 Apr 2018 12:52) (18 - 18)  SpO2: 96% (21 Apr 2018 12:52) (95% - 96%)  I&O's Summary    20 Apr 2018 07:01  -  21 Apr 2018 07:00  --------------------------------------------------------  IN: 340 mL / OUT: 2150 mL / NET: -1810 mL    21 Apr 2018 07:01  -  21 Apr 2018 19:13  --------------------------------------------------------  IN: 580 mL / OUT: 1700 mL / NET: -1120 mL      GENERAL: NAD, well-developed  HEAD:  Atraumatic, Normocephalic  EYES: EOMI, PERRLA, conjunctiva and sclera clear  NECK: Supple, No JVD  CHEST/LUNG: Clear to auscultation bilaterally; No wheeze  HEART: Regular rate and rhythm; No murmurs, rubs, or gallops  ABDOMEN: Soft, Nontender, Nondistended; Bowel sounds present  EXTREMITIES:  2+ Peripheral Pulses, No clubbing, cyanosis, or edema  PSYCH: AAOx3  NEUROLOGY: non-focal  SKIN: Left foot redness +    LABS:  CAPILLARY BLOOD GLUCOSE                              8.5    7.31  )-----------( 189      ( 21 Apr 2018 09:09 )             27.8     04-21    147<H>  |  107  |  81<H>  ----------------------------<  90  4.3   |  24  |  3.88<H>    Ca    9.2      21 Apr 2018 07:28  Mg     2.2     04-21      PT/INR - ( 21 Apr 2018 09:20 )   PT: 28.5 sec;   INR: 2.48 ratio         PTT - ( 21 Apr 2018 09:20 )  PTT:40.2 sec          RADIOLOGY & ADDITIONAL TESTS:    Imaging Personally Reviewed:    Consultant(s) Notes Reviewed:      Care Discussed with Consultants/Other Providers:

## 2018-04-21 NOTE — PROGRESS NOTE ADULT - SUBJECTIVE AND OBJECTIVE BOX
[facility]   [phone]  RENAL FOLLOW UP NOTE  --------------------------------------------------------------------------------  Chief Complaint:    24 hour events/subjective:    c/o left leg increase size.     PAST HISTORY  --------------------------------------------------------------------------------  No significant changes to PMH, PSH, FHx, SHx, unless otherwise noted    ALLERGIES & MEDICATIONS  --------------------------------------------------------------------------------  Allergies    heparin (Unknown)  losartan (Anaphylaxis)  nitroglycerin (Other)    Intolerances      Standing Inpatient Medications  amLODIPine   Tablet 10 milliGRAM(s) Oral daily  AQUAPHOR (petrolatum Ointment) 1 Application(s) Topical daily  ascorbic acid 500 milliGRAM(s) Oral daily  calcitriol   Capsule 0.25 MICROGram(s) Oral daily  carvedilol 25 milliGRAM(s) Oral every 12 hours  ertapenem  IVPB 500 milliGRAM(s) IV Intermittent <User Schedule>  ferrous    sulfate 325 milliGRAM(s) Oral daily  furosemide   Injectable 80 milliGRAM(s) IV Push daily  hydrALAZINE 50 milliGRAM(s) Oral three times a day  labetalol 200 milliGRAM(s) Oral three times a day  Nephro-alyse 1 Tablet(s) Oral daily  nystatin Powder 1 Application(s) Topical two times a day  predniSONE   Tablet 10 milliGRAM(s) Oral daily  vancomycin  IVPB 500 milliGRAM(s) IV Intermittent every 24 hours    PRN Inpatient Medications  sodium chloride 0.65% Nasal 1 Spray(s) Both Nostrils four times a day PRN      REVIEW OF SYSTEMS  --------------------------------------------------------------------------------    Gen: denies fatigue, fevers/chills,  CVS: denies chest pain/palpitations  Resp: denies SOB/Cough  GI: Denies N/V/Abd pain  : Denies dysuria    All other systems were reviewed and are negative, except as noted.    VITALS/PHYSICAL EXAM  --------------------------------------------------------------------------------  T(C): 37.2 (04-21-18 @ 12:52), Max: 37.4 (04-20-18 @ 21:21)  HR: 73 (04-21-18 @ 14:59) (73 - 92)  BP: 146/70 (04-21-18 @ 14:59) (138/73 - 146/70)  RR: 18 (04-21-18 @ 12:52) (18 - 18)  SpO2: 96% (04-21-18 @ 12:52) (95% - 96%)  Wt(kg): --        04-20-18 @ 07:01  -  04-21-18 @ 07:00  --------------------------------------------------------  IN: 340 mL / OUT: 2150 mL / NET: -1810 mL    04-21-18 @ 07:01  -  04-21-18 @ 16:01  --------------------------------------------------------  IN: 480 mL / OUT: 1700 mL / NET: -1220 mL        Physical Exam:    Gen: Non toxic; comfortable appearing   	no jvd , supple neck,   	Pulm: decrease bs  no rales or ronchi or wheezing  	CV: RRR, S1S2; no rub  	Abd: +BS, soft, nontender/nondistended  	: No suprapubic tenderness + chronic fox   	UE: Warm, no cyanosis  no clubbing,  no edema;  	LE: LLE 4+ warm redness but decreaseing   leg 1+ RLE   LABS/STUDIES	  --------------------------------------------------------------------------------              8.5    7.31  >-----------<  189      [04-21-18 @ 09:09]              27.8     147  |  107  |  81  ----------------------------<  90      [04-21-18 @ 07:28]  4.3   |  24  |  3.88        Ca     9.2     [04-21-18 @ 07:28]      Mg     2.2     [04-21-18 @ 07:28]      PT/INR: PT 28.5 , INR 2.48       [04-21-18 @ 09:20]  PTT: 40.2       [04-21-18 @ 09:20]      Creatinine Trend:  SCr 3.88 [04-21 @ 07:28]  SCr 3.82 [04-20 @ 06:10]  SCr 3.73 [04-19 @ 06:08]  SCr 3.66 [04-18 @ 15:15]    Urinalysis - [04-18-18 @ 17:50]      Color Yellow / Appearance SL Turbid / SG 1.012 / pH 6.0      Gluc Negative / Ketone Negative  / Bili Negative / Urobili Negative       Blood Moderate / Protein 100 / Leuk Est Large / Nitrite Negative      RBC 10-25 / WBC 26-50 / Hyaline  / Gran  / Sq Epi  / Non Sq Epi Few / Bacteria Moderate      Iron 36, TIBC 168, %sat 21      [04-20-18 @ 08:15]  Ferritin 692      [04-20-18 @ 08:14]  PTH -- (Ca 8.9)      [04-20-18 @ 08:14]   217  PTH -- (Ca 9.0)      [11-09-17 @ 08:51]   387  Vitamin D (25OH) 7.8      [11-09-17 @ 08:51]  HbA1c 4.5      [08-09-16 @ 06:49]  TSH 0.33      [10-22-17 @ 08:40]    dsDNA 29      [04-19-18 @ 09:31]  C3 Complement 97      [04-19-18 @ 09:31]  C4 Complement 27      [04-19-18 @ 09:31]

## 2018-04-22 LAB
ANION GAP SERPL CALC-SCNC: 16 MMOL/L — SIGNIFICANT CHANGE UP (ref 5–17)
BUN SERPL-MCNC: 82 MG/DL — HIGH (ref 7–23)
CALCIUM SERPL-MCNC: 8.8 MG/DL — SIGNIFICANT CHANGE UP (ref 8.4–10.5)
CHLORIDE SERPL-SCNC: 104 MMOL/L — SIGNIFICANT CHANGE UP (ref 96–108)
CO2 SERPL-SCNC: 23 MMOL/L — SIGNIFICANT CHANGE UP (ref 22–31)
CREAT SERPL-MCNC: 3.63 MG/DL — HIGH (ref 0.5–1.3)
GLUCOSE SERPL-MCNC: 83 MG/DL — SIGNIFICANT CHANGE UP (ref 70–99)
HCT VFR BLD CALC: 26.7 % — LOW (ref 34.5–45)
HGB BLD-MCNC: 8 G/DL — LOW (ref 11.5–15.5)
INR BLD: 2.15 RATIO — HIGH (ref 0.88–1.16)
MAGNESIUM SERPL-MCNC: 2.1 MG/DL — SIGNIFICANT CHANGE UP (ref 1.6–2.6)
MCHC RBC-ENTMCNC: 26.6 PG — LOW (ref 27–34)
MCHC RBC-ENTMCNC: 30 GM/DL — LOW (ref 32–36)
MCV RBC AUTO: 88.7 FL — SIGNIFICANT CHANGE UP (ref 80–100)
PLATELET # BLD AUTO: 200 K/UL — SIGNIFICANT CHANGE UP (ref 150–400)
POTASSIUM SERPL-MCNC: 3.8 MMOL/L — SIGNIFICANT CHANGE UP (ref 3.5–5.3)
POTASSIUM SERPL-SCNC: 3.8 MMOL/L — SIGNIFICANT CHANGE UP (ref 3.5–5.3)
PROTHROM AB SERPL-ACNC: 23.8 SEC — HIGH (ref 9.8–12.7)
RBC # BLD: 3.01 M/UL — LOW (ref 3.8–5.2)
RBC # FLD: 16.3 % — HIGH (ref 10.3–14.5)
SODIUM SERPL-SCNC: 143 MMOL/L — SIGNIFICANT CHANGE UP (ref 135–145)
WBC # BLD: 6.76 K/UL — SIGNIFICANT CHANGE UP (ref 3.8–10.5)
WBC # FLD AUTO: 6.76 K/UL — SIGNIFICANT CHANGE UP (ref 3.8–10.5)

## 2018-04-22 RX ORDER — WARFARIN SODIUM 2.5 MG/1
4 TABLET ORAL ONCE
Qty: 0 | Refills: 0 | Status: COMPLETED | OUTPATIENT
Start: 2018-04-22 | End: 2018-04-22

## 2018-04-22 RX ADMIN — CALCITRIOL 0.25 MICROGRAM(S): 0.5 CAPSULE ORAL at 13:40

## 2018-04-22 RX ADMIN — Medication 1 APPLICATION(S): at 11:58

## 2018-04-22 RX ADMIN — ERTAPENEM SODIUM 100 MILLIGRAM(S): 1 INJECTION, POWDER, LYOPHILIZED, FOR SOLUTION INTRAMUSCULAR; INTRAVENOUS at 23:03

## 2018-04-22 RX ADMIN — Medication 500 MILLIGRAM(S): at 11:58

## 2018-04-22 RX ADMIN — AMLODIPINE BESYLATE 10 MILLIGRAM(S): 2.5 TABLET ORAL at 08:36

## 2018-04-22 RX ADMIN — WARFARIN SODIUM 4 MILLIGRAM(S): 2.5 TABLET ORAL at 23:03

## 2018-04-22 RX ADMIN — Medication 10 MILLIGRAM(S): at 08:36

## 2018-04-22 RX ADMIN — NYSTATIN CREAM 1 APPLICATION(S): 100000 CREAM TOPICAL at 17:54

## 2018-04-22 RX ADMIN — Medication 1 TABLET(S): at 11:58

## 2018-04-22 RX ADMIN — Medication 325 MILLIGRAM(S): at 11:58

## 2018-04-22 RX ADMIN — Medication 50 MILLIGRAM(S): at 06:23

## 2018-04-22 RX ADMIN — Medication 80 MILLIGRAM(S): at 06:23

## 2018-04-22 RX ADMIN — NYSTATIN CREAM 1 APPLICATION(S): 100000 CREAM TOPICAL at 06:23

## 2018-04-22 RX ADMIN — CARVEDILOL PHOSPHATE 25 MILLIGRAM(S): 80 CAPSULE, EXTENDED RELEASE ORAL at 11:59

## 2018-04-22 RX ADMIN — Medication 100 MILLIGRAM(S): at 17:57

## 2018-04-22 RX ADMIN — Medication 200 MILLIGRAM(S): at 13:39

## 2018-04-22 RX ADMIN — Medication 200 MILLIGRAM(S): at 23:03

## 2018-04-22 RX ADMIN — Medication 50 MILLIGRAM(S): at 23:03

## 2018-04-22 RX ADMIN — Medication 50 MILLIGRAM(S): at 13:39

## 2018-04-22 RX ADMIN — Medication 200 MILLIGRAM(S): at 06:23

## 2018-04-22 RX ADMIN — CARVEDILOL PHOSPHATE 25 MILLIGRAM(S): 80 CAPSULE, EXTENDED RELEASE ORAL at 23:03

## 2018-04-22 NOTE — PROGRESS NOTE ADULT - SUBJECTIVE AND OBJECTIVE BOX
Patient is a 58y old  Female who presents with a chief complaint of dysuria, redness of foot (20 Apr 2018 11:20)      SUBJECTIVE / OVERNIGHT EVENTS:  Awake  Denies CP/SOB/Palpitation/HA.    MEDICATIONS  (STANDING):  amLODIPine   Tablet 10 milliGRAM(s) Oral daily  AQUAPHOR (petrolatum Ointment) 1 Application(s) Topical daily  ascorbic acid 500 milliGRAM(s) Oral daily  calcitriol   Capsule 0.25 MICROGram(s) Oral daily  carvedilol 25 milliGRAM(s) Oral every 12 hours  ertapenem  IVPB 500 milliGRAM(s) IV Intermittent <User Schedule>  ferrous    sulfate 325 milliGRAM(s) Oral daily  furosemide   Injectable 80 milliGRAM(s) IV Push daily  hydrALAZINE 50 milliGRAM(s) Oral three times a day  labetalol 200 milliGRAM(s) Oral three times a day  Nephro-alyse 1 Tablet(s) Oral daily  nystatin Powder 1 Application(s) Topical two times a day  predniSONE   Tablet 10 milliGRAM(s) Oral daily  vancomycin  IVPB 500 milliGRAM(s) IV Intermittent every 24 hours  warfarin 4 milliGRAM(s) Oral once    MEDICATIONS  (PRN):  sodium chloride 0.65% Nasal 1 Spray(s) Both Nostrils four times a day PRN Nasal Congestion        CAPILLARY BLOOD GLUCOSE        I&O's Summary    21 Apr 2018 07:01  -  22 Apr 2018 07:00  --------------------------------------------------------  IN: 780 mL / OUT: 3300 mL / NET: -2520 mL    22 Apr 2018 07:01  -  22 Apr 2018 22:36  --------------------------------------------------------  IN: 840 mL / OUT: 2550 mL / NET: -1710 mL        PHYSICAL EXAM:  GENERAL: NAD, well-developed  HEAD:  Atraumatic, Normocephalic  NECK: Supple, No JVD  CHEST/LUNG: Clear to auscultation bilaterally; No wheezing.  HEART: Regular rate and rhythm; No murmurs, rubs, or gallops  ABDOMEN: Soft, Nontender, Nondistended; Bowel sounds present  EXTREMITIES:   No clubbing, cyanosis, or edema  NEUROLOGY: AAO X 3  SKIN: No rashes    LABS:                        8.0    6.76  )-----------( 200      ( 22 Apr 2018 08:19 )             26.7     04-22    143  |  104  |  82<H>  ----------------------------<  83  3.8   |  23  |  3.63<H>    Ca    8.8      22 Apr 2018 06:05  Mg     2.1     04-22      PT/INR - ( 22 Apr 2018 19:38 )   PT: 23.8 sec;   INR: 2.15 ratio         PTT - ( 21 Apr 2018 09:20 )  PTT:40.2 sec        CAPILLARY BLOOD GLUCOSE        04-19 @ 02:09  Culture-urine --  Culture results   No growth to date.  method type --  Organism --  Organism Identification --  Specimen source .Blood Blood  04-18 @ 21:14  Culture-urine --  Culture results   >100,000 CFU/ml Escherichia coli ESBL  <10,000 CFU/ml Normal Urogenital brandon present  method type ARSALAN  Organism Escherichia coli ESBL  Organism Identification Escherichia coli ESBL  Specimen source .Urine Clean Catch (Midstream)           04-19 @ 02:09  Culture blood --  Culture results   No growth to date.  Gram stain --  Gram stain blood --  Method type --  Organism --  Organism identification --  Specimen source .Blood Blood   04-18 @ 21:14  Culture blood --  Culture results   >100,000 CFU/ml Escherichia coli ESBL  <10,000 CFU/ml Normal Urogenital brandon present  Gram stain --  Gram stain blood --  Method type ARSALAN  Organism Escherichia coli ESBL  Organism identification Escherichia coli ESBL  Specimen source .Urine Clean Catch (Midstream)      RADIOLOGY & ADDITIONAL TESTS:    Imaging Personally Reviewed:    Consultant(s) Notes Reviewed:      Care Discussed with Consultants/Other Providers:

## 2018-04-22 NOTE — PROGRESS NOTE ADULT - SUBJECTIVE AND OBJECTIVE BOX
Subjective: Patient seen and examined. No new events except as noted.   Feels ok   no cp or sob     REVIEW OF SYSTEMS:    CONSTITUTIONAL: + weakness, fevers or chills  EYES/ENT: No visual changes;  No vertigo or throat pain   NECK: No pain or stiffness  RESPIRATORY: No cough, wheezing, hemoptysis; No shortness of breath  CARDIOVASCULAR: No chest pain or palpitations  GASTROINTESTINAL: No abdominal or epigastric pain. No nausea, vomiting, or hematemesis; No diarrhea or constipation. No melena or hematochezia.  GENITOURINARY: No dysuria, frequency or hematuria  NEUROLOGICAL: No numbness or weakness  SKIN: No itching, burning, rashes, or lesions   All other review of systems is negative unless indicated above.    MEDICATIONS:  MEDICATIONS  (STANDING):  amLODIPine   Tablet 10 milliGRAM(s) Oral daily  AQUAPHOR (petrolatum Ointment) 1 Application(s) Topical daily  ascorbic acid 500 milliGRAM(s) Oral daily  calcitriol   Capsule 0.25 MICROGram(s) Oral daily  carvedilol 25 milliGRAM(s) Oral every 12 hours  ertapenem  IVPB 500 milliGRAM(s) IV Intermittent <User Schedule>  ferrous    sulfate 325 milliGRAM(s) Oral daily  furosemide   Injectable 80 milliGRAM(s) IV Push daily  hydrALAZINE 50 milliGRAM(s) Oral three times a day  labetalol 200 milliGRAM(s) Oral three times a day  Nephro-alyse 1 Tablet(s) Oral daily  nystatin Powder 1 Application(s) Topical two times a day  predniSONE   Tablet 10 milliGRAM(s) Oral daily  vancomycin  IVPB 500 milliGRAM(s) IV Intermittent every 24 hours      PHYSICAL EXAM:  T(C): 36.8 (04-22-18 @ 12:23), Max: 37.1 (04-21-18 @ 20:49)  HR: 89 (04-22-18 @ 12:23) (73 - 89)  BP: 144/71 (04-22-18 @ 12:23) (136/75 - 152/78)  RR: 18 (04-22-18 @ 12:23) (18 - 18)  SpO2: 95% (04-22-18 @ 12:23) (95% - 98%)  Wt(kg): --  I&O's Summary    21 Apr 2018 07:01  -  22 Apr 2018 07:00  --------------------------------------------------------  IN: 780 mL / OUT: 3300 mL / NET: -2520 mL    22 Apr 2018 07:01  -  22 Apr 2018 12:59  --------------------------------------------------------  IN: 240 mL / OUT: 500 mL / NET: -260 mL          Appearance: Normal	  HEENT:   Normal oral mucosa, PERRL, EOMI	  Lymphatic: No lymphadenopathy , no edema  Cardiovascular: Normal S1 S2, No JVD, No murmurs , Peripheral pulses palpable 2+ bilaterally  Respiratory: Lungs clear to auscultation, normal effort 	  Gastrointestinal:  Soft, Non-tender, + BS	  Skin: No rashes, No ecchymoses, No cyanosis, warm to touch  Musculoskeletal: Normal range of motion, normal strength  Psychiatry:  Mood & affect appropriate  Ext: massive LLE +VAC   +fox       LABS:    CARDIAC MARKERS:                                8.0    6.76  )-----------( 200      ( 22 Apr 2018 08:19 )             26.7     04-22    143  |  104  |  82<H>  ----------------------------<  83  3.8   |  23  |  3.63<H>    Ca    8.8      22 Apr 2018 06:05  Mg     2.1     04-22      proBNP:   Lipid Profile:   HgA1c:   TSH:             TELEMETRY: 	    ECG:  	  RADIOLOGY:   DIAGNOSTIC TESTING:  [ ] Echocardiogram:  [ ]  Catheterization:  [ ] Stress Test:    OTHER:

## 2018-04-23 LAB
ANION GAP SERPL CALC-SCNC: 14 MMOL/L — SIGNIFICANT CHANGE UP (ref 5–17)
BUN SERPL-MCNC: 83 MG/DL — HIGH (ref 7–23)
CALCIUM SERPL-MCNC: 9.2 MG/DL — SIGNIFICANT CHANGE UP (ref 8.4–10.5)
CHLORIDE SERPL-SCNC: 105 MMOL/L — SIGNIFICANT CHANGE UP (ref 96–108)
CO2 SERPL-SCNC: 24 MMOL/L — SIGNIFICANT CHANGE UP (ref 22–31)
CREAT SERPL-MCNC: 3.83 MG/DL — HIGH (ref 0.5–1.3)
GLUCOSE SERPL-MCNC: 86 MG/DL — SIGNIFICANT CHANGE UP (ref 70–99)
HCT VFR BLD CALC: 26.7 % — LOW (ref 34.5–45)
HGB BLD-MCNC: 8.2 G/DL — LOW (ref 11.5–15.5)
INR BLD: 1.81 RATIO — HIGH (ref 0.88–1.16)
MCHC RBC-ENTMCNC: 27.3 PG — SIGNIFICANT CHANGE UP (ref 27–34)
MCHC RBC-ENTMCNC: 30.7 GM/DL — LOW (ref 32–36)
MCV RBC AUTO: 89 FL — SIGNIFICANT CHANGE UP (ref 80–100)
PLATELET # BLD AUTO: 216 K/UL — SIGNIFICANT CHANGE UP (ref 150–400)
POTASSIUM SERPL-MCNC: 3.9 MMOL/L — SIGNIFICANT CHANGE UP (ref 3.5–5.3)
POTASSIUM SERPL-SCNC: 3.9 MMOL/L — SIGNIFICANT CHANGE UP (ref 3.5–5.3)
PROTHROM AB SERPL-ACNC: 20 SEC — HIGH (ref 9.8–12.7)
RBC # BLD: 3 M/UL — LOW (ref 3.8–5.2)
RBC # FLD: 16.5 % — HIGH (ref 10.3–14.5)
SODIUM SERPL-SCNC: 143 MMOL/L — SIGNIFICANT CHANGE UP (ref 135–145)
WBC # BLD: 6.7 K/UL — SIGNIFICANT CHANGE UP (ref 3.8–10.5)
WBC # FLD AUTO: 6.7 K/UL — SIGNIFICANT CHANGE UP (ref 3.8–10.5)

## 2018-04-23 PROCEDURE — 99232 SBSQ HOSP IP/OBS MODERATE 35: CPT

## 2018-04-23 RX ORDER — ERGOCALCIFEROL 1.25 MG/1
50000 CAPSULE ORAL
Qty: 0 | Refills: 0 | Status: DISCONTINUED | OUTPATIENT
Start: 2018-04-23 | End: 2018-04-27

## 2018-04-23 RX ORDER — WARFARIN SODIUM 2.5 MG/1
5 TABLET ORAL ONCE
Qty: 0 | Refills: 0 | Status: COMPLETED | OUTPATIENT
Start: 2018-04-23 | End: 2018-04-23

## 2018-04-23 RX ORDER — ERYTHROPOIETIN 10000 [IU]/ML
10000 INJECTION, SOLUTION INTRAVENOUS; SUBCUTANEOUS
Qty: 0 | Refills: 0 | Status: DISCONTINUED | OUTPATIENT
Start: 2018-04-23 | End: 2018-04-24

## 2018-04-23 RX ADMIN — Medication 1 TABLET(S): at 12:33

## 2018-04-23 RX ADMIN — Medication 1 TABLET(S): at 18:38

## 2018-04-23 RX ADMIN — Medication 500 MILLIGRAM(S): at 12:33

## 2018-04-23 RX ADMIN — Medication 200 MILLIGRAM(S): at 13:53

## 2018-04-23 RX ADMIN — Medication 50 MILLIGRAM(S): at 13:53

## 2018-04-23 RX ADMIN — AMLODIPINE BESYLATE 10 MILLIGRAM(S): 2.5 TABLET ORAL at 06:30

## 2018-04-23 RX ADMIN — ERGOCALCIFEROL 50000 UNIT(S): 1.25 CAPSULE ORAL at 13:53

## 2018-04-23 RX ADMIN — Medication 10 MILLIGRAM(S): at 09:00

## 2018-04-23 RX ADMIN — Medication 200 MILLIGRAM(S): at 23:24

## 2018-04-23 RX ADMIN — Medication 50 MILLIGRAM(S): at 23:23

## 2018-04-23 RX ADMIN — CARVEDILOL PHOSPHATE 25 MILLIGRAM(S): 80 CAPSULE, EXTENDED RELEASE ORAL at 09:00

## 2018-04-23 RX ADMIN — WARFARIN SODIUM 5 MILLIGRAM(S): 2.5 TABLET ORAL at 23:24

## 2018-04-23 RX ADMIN — Medication 200 MILLIGRAM(S): at 06:30

## 2018-04-23 RX ADMIN — Medication 1 APPLICATION(S): at 12:33

## 2018-04-23 RX ADMIN — Medication 325 MILLIGRAM(S): at 12:33

## 2018-04-23 RX ADMIN — Medication 50 MILLIGRAM(S): at 06:30

## 2018-04-23 RX ADMIN — CALCITRIOL 0.25 MICROGRAM(S): 0.5 CAPSULE ORAL at 12:33

## 2018-04-23 RX ADMIN — Medication 1 TABLET(S): at 18:41

## 2018-04-23 RX ADMIN — CARVEDILOL PHOSPHATE 25 MILLIGRAM(S): 80 CAPSULE, EXTENDED RELEASE ORAL at 23:24

## 2018-04-23 RX ADMIN — NYSTATIN CREAM 1 APPLICATION(S): 100000 CREAM TOPICAL at 18:38

## 2018-04-23 RX ADMIN — ERYTHROPOIETIN 10000 UNIT(S): 10000 INJECTION, SOLUTION INTRAVENOUS; SUBCUTANEOUS at 18:38

## 2018-04-23 RX ADMIN — Medication 80 MILLIGRAM(S): at 06:30

## 2018-04-23 RX ADMIN — NYSTATIN CREAM 1 APPLICATION(S): 100000 CREAM TOPICAL at 06:30

## 2018-04-23 NOTE — PROGRESS NOTE ADULT - SUBJECTIVE AND OBJECTIVE BOX
Subjective: Patient seen and examined. No new events except as noted.   Feels depressed   no cp or sob     REVIEW OF SYSTEMS:    CONSTITUTIONAL: + weakness, fevers or chills  EYES/ENT: No visual changes;  No vertigo or throat pain   NECK: No pain or stiffness  RESPIRATORY: No cough, wheezing, hemoptysis; No shortness of breath  CARDIOVASCULAR: No chest pain or palpitations  GASTROINTESTINAL: No abdominal or epigastric pain. No nausea, vomiting, or hematemesis; No diarrhea or constipation. No melena or hematochezia.  GENITOURINARY: No dysuria, frequency or hematuria  NEUROLOGICAL: No numbness or weakness  SKIN: No itching, burning, rashes, or lesions   All other review of systems is negative unless indicated above.    MEDICATIONS:  MEDICATIONS  (STANDING):  amLODIPine   Tablet 10 milliGRAM(s) Oral daily  AQUAPHOR (petrolatum Ointment) 1 Application(s) Topical daily  ascorbic acid 500 milliGRAM(s) Oral daily  calcitriol   Capsule 0.25 MICROGram(s) Oral daily  carvedilol 25 milliGRAM(s) Oral every 12 hours  epoetin agata Injectable 56880 Unit(s) SubCutaneous <User Schedule>  ergocalciferol 34908 Unit(s) Oral every week  ertapenem  IVPB 500 milliGRAM(s) IV Intermittent <User Schedule>  ferrous    sulfate 325 milliGRAM(s) Oral daily  furosemide   Injectable 80 milliGRAM(s) IV Push daily  hydrALAZINE 50 milliGRAM(s) Oral three times a day  labetalol 200 milliGRAM(s) Oral three times a day  Nephro-alyse 1 Tablet(s) Oral daily  nystatin Powder 1 Application(s) Topical two times a day  predniSONE   Tablet 10 milliGRAM(s) Oral daily  vancomycin  IVPB 500 milliGRAM(s) IV Intermittent every 24 hours      PHYSICAL EXAM:  T(C): 36.8 (04-23-18 @ 04:09), Max: 37.2 (04-22-18 @ 20:38)  HR: 88 (04-23-18 @ 04:09) (84 - 89)  BP: 127/71 (04-23-18 @ 04:09) (127/71 - 146/72)  RR: 18 (04-23-18 @ 04:09) (18 - 18)  SpO2: 96% (04-23-18 @ 04:09) (95% - 97%)  Wt(kg): --  I&O's Summary    22 Apr 2018 07:01  -  23 Apr 2018 07:00  --------------------------------------------------------  IN: 1060 mL / OUT: 3150 mL / NET: -2090 mL    23 Apr 2018 07:01  -  23 Apr 2018 12:00  --------------------------------------------------------  IN: 240 mL / OUT: 0 mL / NET: 240 mL          Appearance: Obese 	  HEENT:   Normal oral mucosa, PERRL, EOMI	  Lymphatic: No lymphadenopathy , no edema  Cardiovascular: Normal S1 S2, No JVD, No murmurs , Peripheral pulses palpable 2+ bilaterally  Respiratory: Lungs clear to auscultation, normal effort 	  Gastrointestinal:  Soft, Non-tender, + BS	  Skin: No rashes, No ecchymoses, No cyanosis, warm to touch  Musculoskeletal: decreased  range of motion and  strength  Psychiatry:  Mood & affect appropriate  Ext: Massive LLE , +VAC   +fox     LABS:    CARDIAC MARKERS:                                8.2    6.70  )-----------( 216      ( 23 Apr 2018 06:26 )             26.7     04-23    143  |  105  |  83<H>  ----------------------------<  86  3.9   |  24  |  3.83<H>    Ca    9.2      23 Apr 2018 05:41  Mg     2.1     04-22      proBNP:   Lipid Profile:   HgA1c:   TSH:             TELEMETRY: 	    ECG:  	  RADIOLOGY:   DIAGNOSTIC TESTING:  [ ] Echocardiogram:  [ ]  Catheterization:  [ ] Stress Test:    OTHER:

## 2018-04-23 NOTE — PROGRESS NOTE ADULT - SUBJECTIVE AND OBJECTIVE BOX
[facility]   [phone]  RENAL FOLLOW UP NOTE  --------------------------------------------------------------------------------  Chief Complaint:    24 hour events/subjective:    states swelling is better in left leg. no sob     PAST HISTORY  --------------------------------------------------------------------------------  No significant changes to PMH, PSH, FHx, SHx, unless otherwise noted    ALLERGIES & MEDICATIONS  --------------------------------------------------------------------------------  Allergies    heparin (Unknown)  losartan (Anaphylaxis)  nitroglycerin (Other)    Intolerances      Standing Inpatient Medications  amLODIPine   Tablet 10 milliGRAM(s) Oral daily  AQUAPHOR (petrolatum Ointment) 1 Application(s) Topical daily  ascorbic acid 500 milliGRAM(s) Oral daily  calcitriol   Capsule 0.25 MICROGram(s) Oral daily  carvedilol 25 milliGRAM(s) Oral every 12 hours  ertapenem  IVPB 500 milliGRAM(s) IV Intermittent <User Schedule>  ferrous    sulfate 325 milliGRAM(s) Oral daily  furosemide   Injectable 80 milliGRAM(s) IV Push daily  hydrALAZINE 50 milliGRAM(s) Oral three times a day  labetalol 200 milliGRAM(s) Oral three times a day  Nephro-alyse 1 Tablet(s) Oral daily  nystatin Powder 1 Application(s) Topical two times a day  predniSONE   Tablet 10 milliGRAM(s) Oral daily  vancomycin  IVPB 500 milliGRAM(s) IV Intermittent every 24 hours    PRN Inpatient Medications  sodium chloride 0.65% Nasal 1 Spray(s) Both Nostrils four times a day PRN      REVIEW OF SYSTEMS  --------------------------------------------------------------------------------    Gen: denies fevers/chills,  CVS: denies chest pain/palpitations  Resp: denies SOB/Cough  GI: Denies N/V/Abd pain  : Denies dysuria  leg swelling +     All other systems were reviewed and are negative, except as noted.    VITALS/PHYSICAL EXAM  --------------------------------------------------------------------------------  T(C): 36.8 (04-23-18 @ 04:09), Max: 37.2 (04-22-18 @ 20:38)  HR: 88 (04-23-18 @ 04:09) (84 - 89)  BP: 127/71 (04-23-18 @ 04:09) (127/71 - 146/72)  RR: 18 (04-23-18 @ 04:09) (18 - 18)  SpO2: 96% (04-23-18 @ 04:09) (95% - 97%)  Wt(kg): --        04-22-18 @ 07:01  -  04-23-18 @ 07:00  --------------------------------------------------------  IN: 1060 mL / OUT: 3150 mL / NET: -2090 mL    04-23-18 @ 07:01  -  04-23-18 @ 10:37  --------------------------------------------------------  IN: 240 mL / OUT: 0 mL / NET: 240 mL      Physical Exam:  	  Gen: Non toxic; comfortable appearing   	no jvd , supple neck,   	Pulm: decrease bs  no rales or ronchi or wheezing  	CV: RRR, S1S2; no rub  	Abd: +BS, soft, nontender/nondistended  	: No suprapubic tenderness + chronic fox   	UE: Warm, no cyanosis  no clubbing,  no edema;  	LE: LLE 4+ warm redness but decreaseing   leg 1+ RLE     	  LABS/STUDIES  --------------------------------------------------------------------------------              8.2    6.70  >-----------<  216      [04-23-18 @ 06:26]              26.7     143  |  105  |  83  ----------------------------<  86      [04-23-18 @ 05:41]  3.9   |  24  |  3.83        Ca     9.2     [04-23-18 @ 05:41]      Mg     2.1     [04-22-18 @ 06:05]      PT/INR: PT 23.8 , INR 2.15       [04-22-18 @ 19:38]      Creatinine Trend:  SCr 3.83 [04-23 @ 05:41]  SCr 3.63 [04-22 @ 06:05]  SCr 3.88 [04-21 @ 07:28]  SCr 3.82 [04-20 @ 06:10]  SCr 3.73 [04-19 @ 06:08]      Urinalysis - [04-18-18 @ 17:50]      Color Yellow / Appearance SL Turbid / SG 1.012 / pH 6.0      Gluc Negative / Ketone Negative  / Bili Negative / Urobili Negative       Blood Moderate / Protein 100 / Leuk Est Large / Nitrite Negative      RBC 10-25 / WBC 26-50 / Hyaline  / Gran  / Sq Epi  / Non Sq Epi Few / Bacteria Moderate      Iron 36, TIBC 168, %sat 21      [04-20-18 @ 08:15]  Ferritin 692      [04-20-18 @ 08:14]  PTH -- (Ca 8.9)      [04-20-18 @ 08:14]   217  PTH -- (Ca 9.0)      [11-09-17 @ 08:51]   387  Vitamin D (25OH) 7.8      [11-09-17 @ 08:51]  HbA1c 4.5      [08-09-16 @ 06:49]  TSH 0.33      [10-22-17 @ 08:40]    dsDNA 29      [04-19-18 @ 09:31]  C3 Complement 97      [04-19-18 @ 09:31]  C4 Complement 27      [04-19-18 @ 09:31]

## 2018-04-23 NOTE — PROGRESS NOTE ADULT - SUBJECTIVE AND OBJECTIVE BOX
COSTEN, SANDRA 58y [id number]    Patient is a 58y old  Female who presents with a chief complaint of dysuria, redness of foot (20 Apr 2018 11:20)      Follow Up/CC:  ID following for cellulitis    Interval History/ROS: feels ok, VAC placed on back and left leg    Allergies    heparin (Unknown)  losartan (Anaphylaxis)  nitroglycerin (Other)    Intolerances        ANTIMICROBIALS:  ertapenem  IVPB 500 <User Schedule>  vancomycin  IVPB 500 every 24 hours      MEDICATIONS  (STANDING):  amLODIPine   Tablet 10 milliGRAM(s) Oral daily  AQUAPHOR (petrolatum Ointment) 1 Application(s) Topical daily  ascorbic acid 500 milliGRAM(s) Oral daily  calcitriol   Capsule 0.25 MICROGram(s) Oral daily  carvedilol 25 milliGRAM(s) Oral every 12 hours  epoetin agata Injectable 47079 Unit(s) SubCutaneous <User Schedule>  ergocalciferol 82029 Unit(s) Oral every week  ertapenem  IVPB 500 milliGRAM(s) IV Intermittent <User Schedule>  ferrous    sulfate 325 milliGRAM(s) Oral daily  furosemide   Injectable 80 milliGRAM(s) IV Push daily  hydrALAZINE 50 milliGRAM(s) Oral three times a day  labetalol 200 milliGRAM(s) Oral three times a day  Nephro-alsye 1 Tablet(s) Oral daily  nystatin Powder 1 Application(s) Topical two times a day  predniSONE   Tablet 10 milliGRAM(s) Oral daily  vancomycin  IVPB 500 milliGRAM(s) IV Intermittent every 24 hours  warfarin 5 milliGRAM(s) Oral once    MEDICATIONS  (PRN):  sodium chloride 0.65% Nasal 1 Spray(s) Both Nostrils four times a day PRN Nasal Congestion        Vital Signs Last 24 Hrs  T(C): 36.8 (23 Apr 2018 04:09), Max: 37.2 (22 Apr 2018 20:38)  T(F): 98.3 (23 Apr 2018 04:09), Max: 99 (22 Apr 2018 20:38)  HR: 88 (23 Apr 2018 04:09) (84 - 88)  BP: 127/71 (23 Apr 2018 04:09) (127/71 - 146/72)  BP(mean): --  RR: 18 (23 Apr 2018 04:09) (18 - 18)  SpO2: 96% (23 Apr 2018 04:09) (96% - 97%)    CBC Full  -  ( 23 Apr 2018 06:26 )  WBC Count : 6.70 K/uL  Hemoglobin : 8.2 g/dL  Hematocrit : 26.7 %  Platelet Count - Automated : 216 K/uL  Mean Cell Volume : 89.0 fl  Mean Cell Hemoglobin : 27.3 pg  Mean Cell Hemoglobin Concentration : 30.7 gm/dL  Auto Neutrophil # : x  Auto Lymphocyte # : x  Auto Monocyte # : x  Auto Eosinophil # : x  Auto Basophil # : x  Auto Neutrophil % : x  Auto Lymphocyte % : x  Auto Monocyte % : x  Auto Eosinophil % : x  Auto Basophil % : x    04-23    143  |  105  |  83<H>  ----------------------------<  86  3.9   |  24  |  3.83<H>    Ca    9.2      23 Apr 2018 05:41  Mg     2.1     04-22            MICROBIOLOGY:  .Blood Blood  04-19-18   No growth to date.  --  --      .Urine Clean Catch (Midstream)  04-18-18   >100,000 CFU/ml Escherichia coli ESBL  <10,000 CFU/ml Normal Urogenital brandon present  --  Escherichia coli ESBL    RADIOLOGY

## 2018-04-23 NOTE — PROGRESS NOTE ADULT - PROBLEM SELECTOR PLAN 1
-limb elevation, better  -DC IV abx  -augmentin 500 mg po bid + bactrim SS 1 tab daily x 2 more days -limb elevation, better  -DC IV abx  -augmentin 500 mg po daily + bactrim SS 1 tab daily x 2 more days

## 2018-04-23 NOTE — PROGRESS NOTE ADULT - SUBJECTIVE AND OBJECTIVE BOX
Patient is a 58y old  Female who presents with a chief complaint of dysuria, redness of foot (20 Apr 2018 11:20)      SUBJECTIVE / OVERNIGHT EVENTS:   Feels better.  Denies CP/SOB/Palpitation/HA.    MEDICATIONS  (STANDING):  amLODIPine   Tablet 10 milliGRAM(s) Oral daily  amoxicillin  500 milliGRAM(s)/clavulanate 1 Tablet(s) Oral daily  AQUAPHOR (petrolatum Ointment) 1 Application(s) Topical daily  ascorbic acid 500 milliGRAM(s) Oral daily  calcitriol   Capsule 0.25 MICROGram(s) Oral daily  carvedilol 25 milliGRAM(s) Oral every 12 hours  epoetin agata Injectable 10331 Unit(s) SubCutaneous <User Schedule>  ergocalciferol 22925 Unit(s) Oral every week  ferrous    sulfate 325 milliGRAM(s) Oral daily  furosemide   Injectable 80 milliGRAM(s) IV Push daily  hydrALAZINE 50 milliGRAM(s) Oral three times a day  labetalol 200 milliGRAM(s) Oral three times a day  Nephro-alyse 1 Tablet(s) Oral daily  nystatin Powder 1 Application(s) Topical two times a day  predniSONE   Tablet 10 milliGRAM(s) Oral daily  trimethoprim   80 mG/sulfamethoxazole 400 mG 1 Tablet(s) Oral daily  warfarin 5 milliGRAM(s) Oral once    MEDICATIONS  (PRN):  sodium chloride 0.65% Nasal 1 Spray(s) Both Nostrils four times a day PRN Nasal Congestion        CAPILLARY BLOOD GLUCOSE        I&O's Summary    22 Apr 2018 07:01  -  23 Apr 2018 07:00  --------------------------------------------------------  IN: 1060 mL / OUT: 3150 mL / NET: -2090 mL    23 Apr 2018 07:01  -  23 Apr 2018 21:27  --------------------------------------------------------  IN: 720 mL / OUT: 1200 mL / NET: -480 mL        PHYSICAL EXAM:    NECK: Supple, No JVD  CHEST/LUNG: Clear to auscultation bilaterally; No wheezing.  HEART: Regular rate and rhythm; No murmurs, rubs, or gallops  ABDOMEN: Soft, Nontender, Nondistended; Bowel sounds present  EXTREMITIES:   No clubbing, cyanosis, or edema  NEUROLOGY: AAO X 3  SKIN: BL LE wound.    LABS:                        8.2    6.70  )-----------( 216      ( 23 Apr 2018 06:26 )             26.7     04-23    143  |  105  |  83<H>  ----------------------------<  86  3.9   |  24  |  3.83<H>    Ca    9.2      23 Apr 2018 05:41  Mg     2.1     04-22      PT/INR - ( 23 Apr 2018 11:28 )   PT: 20.0 sec;   INR: 1.81 ratio                 CAPILLARY BLOOD GLUCOSE        04-19 @ 02:09  Culture-urine --  Culture results   No growth to date.  method type --  Organism --  Organism Identification --  Specimen source .Blood Blood  04-18 @ 21:14  Culture-urine --  Culture results   >100,000 CFU/ml Escherichia coli ESBL  <10,000 CFU/ml Normal Urogenital brandon present  method type ARSALAN  Organism Escherichia coli ESBL  Organism Identification Escherichia coli ESBL  Specimen source .Urine Clean Catch (Midstream)           04-19 @ 02:09  Culture blood --  Culture results   No growth to date.  Gram stain --  Gram stain blood --  Method type --  Organism --  Organism identification --  Specimen source .Blood Blood   04-18 @ 21:14  Culture blood --  Culture results   >100,000 CFU/ml Escherichia coli ESBL  <10,000 CFU/ml Normal Urogenital brandon present  Gram stain --  Gram stain blood --  Method type ARSALAN  Organism Escherichia coli ESBL  Organism identification Escherichia coli ESBL  Specimen source .Urine Clean Catch (Midstream)      RADIOLOGY & ADDITIONAL TESTS:    Imaging Personally Reviewed:    Consultant(s) Notes Reviewed:      Care Discussed with Consultants/Other Providers:

## 2018-04-24 LAB
ALBUMIN SERPL ELPH-MCNC: 3 G/DL — LOW (ref 3.3–5)
ALP SERPL-CCNC: 49 U/L — SIGNIFICANT CHANGE UP (ref 40–120)
ALT FLD-CCNC: 7 U/L — LOW (ref 10–45)
ANION GAP SERPL CALC-SCNC: 14 MMOL/L — SIGNIFICANT CHANGE UP (ref 5–17)
AST SERPL-CCNC: 12 U/L — SIGNIFICANT CHANGE UP (ref 10–40)
BILIRUB SERPL-MCNC: 0.3 MG/DL — SIGNIFICANT CHANGE UP (ref 0.2–1.2)
BUN SERPL-MCNC: 86 MG/DL — HIGH (ref 7–23)
CALCIUM SERPL-MCNC: 9 MG/DL — SIGNIFICANT CHANGE UP (ref 8.4–10.5)
CHLORIDE SERPL-SCNC: 103 MMOL/L — SIGNIFICANT CHANGE UP (ref 96–108)
CO2 SERPL-SCNC: 25 MMOL/L — SIGNIFICANT CHANGE UP (ref 22–31)
CREAT SERPL-MCNC: 3.84 MG/DL — HIGH (ref 0.5–1.3)
CULTURE RESULTS: SIGNIFICANT CHANGE UP
CULTURE RESULTS: SIGNIFICANT CHANGE UP
GLUCOSE SERPL-MCNC: 90 MG/DL — SIGNIFICANT CHANGE UP (ref 70–99)
HCT VFR BLD CALC: 27.3 % — LOW (ref 34.5–45)
HGB BLD-MCNC: 8.2 G/DL — LOW (ref 11.5–15.5)
INR BLD: 1.56 RATIO — HIGH (ref 0.88–1.16)
MCHC RBC-ENTMCNC: 26.5 PG — LOW (ref 27–34)
MCHC RBC-ENTMCNC: 30 GM/DL — LOW (ref 32–36)
MCV RBC AUTO: 88.3 FL — SIGNIFICANT CHANGE UP (ref 80–100)
PLATELET # BLD AUTO: 210 K/UL — SIGNIFICANT CHANGE UP (ref 150–400)
POTASSIUM SERPL-MCNC: 4 MMOL/L — SIGNIFICANT CHANGE UP (ref 3.5–5.3)
POTASSIUM SERPL-SCNC: 4 MMOL/L — SIGNIFICANT CHANGE UP (ref 3.5–5.3)
PROT SERPL-MCNC: 6.4 G/DL — SIGNIFICANT CHANGE UP (ref 6–8.3)
PROTHROM AB SERPL-ACNC: 17.8 SEC — HIGH (ref 10–13.1)
RBC # BLD: 3.09 M/UL — LOW (ref 3.8–5.2)
RBC # FLD: 16.3 % — HIGH (ref 10.3–14.5)
SODIUM SERPL-SCNC: 142 MMOL/L — SIGNIFICANT CHANGE UP (ref 135–145)
SPECIMEN SOURCE: SIGNIFICANT CHANGE UP
SPECIMEN SOURCE: SIGNIFICANT CHANGE UP
TSH SERPL-MCNC: 0.47 UIU/ML — SIGNIFICANT CHANGE UP (ref 0.27–4.2)
URATE SERPL-MCNC: 9.2 MG/DL — HIGH (ref 2.5–7)
WBC # BLD: 8.19 K/UL — SIGNIFICANT CHANGE UP (ref 3.8–10.5)
WBC # FLD AUTO: 8.19 K/UL — SIGNIFICANT CHANGE UP (ref 3.8–10.5)

## 2018-04-24 PROCEDURE — 99232 SBSQ HOSP IP/OBS MODERATE 35: CPT

## 2018-04-24 RX ORDER — SODIUM CHLORIDE 0.65 %
1 AEROSOL, SPRAY (ML) NASAL
Qty: 0 | Refills: 0 | COMMUNITY
Start: 2018-04-24

## 2018-04-24 RX ORDER — ERYTHROPOIETIN 10000 [IU]/ML
20000 INJECTION, SOLUTION INTRAVENOUS; SUBCUTANEOUS ONCE
Qty: 0 | Refills: 0 | Status: COMPLETED | OUTPATIENT
Start: 2018-04-25 | End: 2018-04-25

## 2018-04-24 RX ORDER — FUROSEMIDE 40 MG
80 TABLET ORAL DAILY
Qty: 0 | Refills: 0 | Status: DISCONTINUED | OUTPATIENT
Start: 2018-04-25 | End: 2018-04-25

## 2018-04-24 RX ORDER — ERYTHROPOIETIN 10000 [IU]/ML
10000 INJECTION, SOLUTION INTRAVENOUS; SUBCUTANEOUS
Qty: 6 | Refills: 0 | OUTPATIENT
Start: 2018-04-24 | End: 2018-05-07

## 2018-04-24 RX ORDER — ALLOPURINOL 300 MG
100 TABLET ORAL DAILY
Qty: 0 | Refills: 0 | Status: DISCONTINUED | OUTPATIENT
Start: 2018-04-24 | End: 2018-04-27

## 2018-04-24 RX ORDER — WARFARIN SODIUM 2.5 MG/1
5 TABLET ORAL ONCE
Qty: 0 | Refills: 0 | Status: COMPLETED | OUTPATIENT
Start: 2018-04-24 | End: 2018-04-24

## 2018-04-24 RX ADMIN — Medication 1 TABLET(S): at 11:28

## 2018-04-24 RX ADMIN — Medication 200 MILLIGRAM(S): at 06:35

## 2018-04-24 RX ADMIN — WARFARIN SODIUM 5 MILLIGRAM(S): 2.5 TABLET ORAL at 22:12

## 2018-04-24 RX ADMIN — Medication 10 MILLIGRAM(S): at 08:41

## 2018-04-24 RX ADMIN — Medication 200 MILLIGRAM(S): at 14:42

## 2018-04-24 RX ADMIN — Medication 200 MILLIGRAM(S): at 23:07

## 2018-04-24 RX ADMIN — Medication 80 MILLIGRAM(S): at 06:35

## 2018-04-24 RX ADMIN — Medication 325 MILLIGRAM(S): at 11:28

## 2018-04-24 RX ADMIN — Medication 1 APPLICATION(S): at 11:31

## 2018-04-24 RX ADMIN — NYSTATIN CREAM 1 APPLICATION(S): 100000 CREAM TOPICAL at 06:37

## 2018-04-24 RX ADMIN — Medication 100 MILLIGRAM(S): at 11:28

## 2018-04-24 RX ADMIN — CALCITRIOL 0.25 MICROGRAM(S): 0.5 CAPSULE ORAL at 11:28

## 2018-04-24 RX ADMIN — AMLODIPINE BESYLATE 10 MILLIGRAM(S): 2.5 TABLET ORAL at 06:35

## 2018-04-24 RX ADMIN — Medication 500 MILLIGRAM(S): at 11:28

## 2018-04-24 RX ADMIN — CARVEDILOL PHOSPHATE 25 MILLIGRAM(S): 80 CAPSULE, EXTENDED RELEASE ORAL at 10:13

## 2018-04-24 RX ADMIN — Medication 50 MILLIGRAM(S): at 14:42

## 2018-04-24 RX ADMIN — Medication 50 MILLIGRAM(S): at 22:12

## 2018-04-24 RX ADMIN — CARVEDILOL PHOSPHATE 25 MILLIGRAM(S): 80 CAPSULE, EXTENDED RELEASE ORAL at 22:12

## 2018-04-24 RX ADMIN — Medication 50 MILLIGRAM(S): at 06:35

## 2018-04-24 NOTE — PROGRESS NOTE ADULT - SUBJECTIVE AND OBJECTIVE BOX
COSTEN, SANDRA 58y [id number]    Patient is a 58y old  Female who presents with a chief complaint of dysuria, redness of foot (20 Apr 2018 11:20)      Follow Up/CC:  ID following for cellulitis    Interval History/ROS: no acute issues    Allergies    heparin (Unknown)  losartan (Anaphylaxis)  nitroglycerin (Other)    Intolerances        ANTIMICROBIALS:  amoxicillin  500 milliGRAM(s)/clavulanate 1 daily  trimethoprim   80 mG/sulfamethoxazole 400 mG 1 daily      MEDICATIONS  (STANDING):  allopurinol 100 milliGRAM(s) Oral daily  amLODIPine   Tablet 10 milliGRAM(s) Oral daily  amoxicillin  500 milliGRAM(s)/clavulanate 1 Tablet(s) Oral daily  AQUAPHOR (petrolatum Ointment) 1 Application(s) Topical daily  ascorbic acid 500 milliGRAM(s) Oral daily  calcitriol   Capsule 0.25 MICROGram(s) Oral daily  carvedilol 25 milliGRAM(s) Oral every 12 hours  epoetin agata Injectable 89669 Unit(s) SubCutaneous <User Schedule>  ergocalciferol 17942 Unit(s) Oral every week  ferrous    sulfate 325 milliGRAM(s) Oral daily  hydrALAZINE 50 milliGRAM(s) Oral three times a day  labetalol 200 milliGRAM(s) Oral three times a day  Nephro-alyse 1 Tablet(s) Oral daily  nystatin Powder 1 Application(s) Topical two times a day  predniSONE   Tablet 10 milliGRAM(s) Oral daily  trimethoprim   80 mG/sulfamethoxazole 400 mG 1 Tablet(s) Oral daily  warfarin 5 milliGRAM(s) Oral once    MEDICATIONS  (PRN):  sodium chloride 0.65% Nasal 1 Spray(s) Both Nostrils four times a day PRN Nasal Congestion        Vital Signs Last 24 Hrs  T(C): 37.1 (24 Apr 2018 14:21), Max: 37.4 (23 Apr 2018 21:10)  T(F): 98.8 (24 Apr 2018 14:21), Max: 99.4 (23 Apr 2018 21:10)  HR: 69 (24 Apr 2018 14:21) (69 - 88)  BP: 124/65 (24 Apr 2018 14:21) (124/65 - 148/78)  BP(mean): --  RR: 18 (24 Apr 2018 14:21) (17 - 18)  SpO2: 96% (24 Apr 2018 14:21) (95% - 96%)    CBC Full  -  ( 24 Apr 2018 07:57 )  WBC Count : 8.19 K/uL  Hemoglobin : 8.2 g/dL  Hematocrit : 27.3 %  Platelet Count - Automated : 210 K/uL  Mean Cell Volume : 88.3 fl  Mean Cell Hemoglobin : 26.5 pg  Mean Cell Hemoglobin Concentration : 30.0 gm/dL  Auto Neutrophil # : x  Auto Lymphocyte # : x  Auto Monocyte # : x  Auto Eosinophil # : x  Auto Basophil # : x  Auto Neutrophil % : x  Auto Lymphocyte % : x  Auto Monocyte % : x  Auto Eosinophil % : x  Auto Basophil % : x    04-24    142  |  103  |  86<H>  ----------------------------<  90  4.0   |  25  |  3.84<H>    Ca    9.0      24 Apr 2018 06:31    TPro  6.4  /  Alb  3.0<L>  /  TBili  0.3  /  DBili  x   /  AST  12  /  ALT  7<L>  /  AlkPhos  49  04-24    LIVER FUNCTIONS - ( 24 Apr 2018 06:31 )  Alb: 3.0 g/dL / Pro: 6.4 g/dL / ALK PHOS: 49 U/L / ALT: 7 U/L / AST: 12 U/L / GGT: x               MICROBIOLOGY:  .Blood Blood  04-19-18   No growth at 5 days.  --  --      .Urine Clean Catch (Midstream)  04-18-18   >100,000 CFU/ml Escherichia coli ESBL  <10,000 CFU/ml Normal Urogenital brandon present  --  Escherichia coli ESBL              v            RADIOLOGY

## 2018-04-24 NOTE — PROGRESS NOTE ADULT - SUBJECTIVE AND OBJECTIVE BOX
COSTEN, SANDRA  58y  Female      Patient is a 58y old  Female who presents with a chief complaint of dysuria, redness of foot (20 Apr 2018 11:20)  Patinet seen and examind/covering .Patient is comfortable,denies acute pain on lt.leg.no fever,no sob,no cp.wound VAC  in place.    REVIEW OF SYSTEMS:   as above  INTERVAL HPI/OVERNIGHT EVENTS:  T(C): 37.1 (04-24-18 @ 14:21), Max: 37.4 (04-23-18 @ 21:10)  HR: 69 (04-24-18 @ 14:21) (69 - 88)  BP: 124/65 (04-24-18 @ 14:21) (124/65 - 148/78)  RR: 18 (04-24-18 @ 14:21) (17 - 18)  SpO2: 96% (04-24-18 @ 14:21) (95% - 96%)  Wt(kg): --  I&O's Summary    23 Apr 2018 07:01  -  24 Apr 2018 07:00  --------------------------------------------------------  IN: 720 mL / OUT: 2450 mL / NET: -1730 mL    24 Apr 2018 07:01  -  24 Apr 2018 21:04  --------------------------------------------------------  IN: 720 mL / OUT: 900 mL / NET: -180 mL      T(C): 37.1 (04-24-18 @ 14:21), Max: 37.4 (04-23-18 @ 21:10)  HR: 69 (04-24-18 @ 14:21) (69 - 88)  BP: 124/65 (04-24-18 @ 14:21) (124/65 - 148/78)  RR: 18 (04-24-18 @ 14:21) (17 - 18)  SpO2: 96% (04-24-18 @ 14:21) (95% - 96%)  Wt(kg): --Vital Signs Last 24 Hrs  T(C): 37.1 (24 Apr 2018 14:21), Max: 37.4 (23 Apr 2018 21:10)  T(F): 98.8 (24 Apr 2018 14:21), Max: 99.4 (23 Apr 2018 21:10)  HR: 69 (24 Apr 2018 14:21) (69 - 88)  BP: 124/65 (24 Apr 2018 14:21) (124/65 - 148/78)  BP(mean): --  RR: 18 (24 Apr 2018 14:21) (17 - 18)  SpO2: 96% (24 Apr 2018 14:21) (95% - 96%)    LABS:                        8.2    8.19  )-----------( 210      ( 24 Apr 2018 07:57 )             27.3     04-24    142  |  103  |  86<H>  ----------------------------<  90  4.0   |  25  |  3.84<H>    Ca    9.0      24 Apr 2018 06:31    TPro  6.4  /  Alb  3.0<L>  /  TBili  0.3  /  DBili  x   /  AST  12  /  ALT  7<L>  /  AlkPhos  49  04-24    PT/INR - ( 24 Apr 2018 07:57 )   PT: 17.8 sec;   INR: 1.56 ratio             CAPILLARY BLOOD GLUCOSE                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)  Calf DVT (Deep Venous Thrombosis) (ICD9 453.42)  Adenomatous Goiter (ICD9 241.9)  Chronic Gout (ICD9 274.02)  LBBB (Left Bundle Branch Block) (ICD9 426.3)  CHF (Congestive Heart Failure) (ICD9 428.0)  Hx of aorta-iliac-femoral bypass  TOP (Termination of Pregnancy)  S/P Dilatation and Curettage  History of Tubal Ligation  s/p AAA (Abdominal Aortic Aneurysm) Repair: 2009 2010  History of Cholecystectomy  S/P Partial Hysterectomy      MEDICATIONS  (STANDING):  allopurinol 100 milliGRAM(s) Oral daily  amLODIPine   Tablet 10 milliGRAM(s) Oral daily  amoxicillin  500 milliGRAM(s)/clavulanate 1 Tablet(s) Oral daily  AQUAPHOR (petrolatum Ointment) 1 Application(s) Topical daily  ascorbic acid 500 milliGRAM(s) Oral daily  calcitriol   Capsule 0.25 MICROGram(s) Oral daily  carvedilol 25 milliGRAM(s) Oral every 12 hours  ergocalciferol 33911 Unit(s) Oral every week  ferrous    sulfate 325 milliGRAM(s) Oral daily  hydrALAZINE 50 milliGRAM(s) Oral three times a day  labetalol 200 milliGRAM(s) Oral three times a day  Nephro-alyse 1 Tablet(s) Oral daily  nystatin Powder 1 Application(s) Topical two times a day  predniSONE   Tablet 10 milliGRAM(s) Oral daily  trimethoprim   80 mG/sulfamethoxazole 400 mG 1 Tablet(s) Oral daily  warfarin 5 milliGRAM(s) Oral once    MEDICATIONS  (PRN):  sodium chloride 0.65% Nasal 1 Spray(s) Both Nostrils four times a day PRN Nasal Congestion        RADIOLOGY & ADDITIONAL TESTS:    Imaging Personally Reviewed:  [ ] YES  [ ] NO    Consultant(s) Notes Reviewed:  [x ] YES  [ ] NO    PHYSICAL EXAM:  GENERAL: NAD, well-groomed, well-developed  HEAD:  Atraumatic, Normocephalic  EYES: EOMI, PERRLA, conjunctiva and sclera clear  ENMT: No tonsillar erythema, exudates, or enlargement; Moist mucous membranes, Good dentition, No lesions  NECK: Supple, No JVD, Normal thyroid  NERVOUS SYSTEM:  Alert & Oriented X3, Good concentration; Motor Strength 5/5 B/L upper and lower extremities; DTRs 2+ intact and symmetric  CHEST/LUNG: Clear to percussion bilaterally; No rales, rhonchi, wheezing, or rubs  HEART: Regular rate and rhythm; No murmurs, rubs, or gallops  ABDOMEN: Soft, Nontender, Nondistended; Bowel sounds present  EXTREMITIES:  2+ Peripheral Pulses, No clubbing, cyanosis, lt.leg wound with wound VAC  LYMPH: No lymphadenopathy noted  SKIN: Sacral Decubitus with wound vac    Care Discussed with Consultants/Other Providers [x ] YES  [ ] NO      Code Status: [] Full Code [] DNR [] DNI [] Goals of Care:   Disposition: [] ICU [] Stroke Unit [] RCU []PCU []Floor [] Discharge Home         DEE [email or web address]

## 2018-04-24 NOTE — PROGRESS NOTE ADULT - SUBJECTIVE AND OBJECTIVE BOX
[facility]   [phone]  RENAL FOLLOW UP NOTE  --------------------------------------------------------------------------------  Chief Complaint:    24 hour events/subjective:    still with c/o edema in left leg.      PAST HISTORY  --------------------------------------------------------------------------------  No significant changes to PMH, PSH, FHx, SHx, unless otherwise noted    ALLERGIES & MEDICATIONS  --------------------------------------------------------------------------------  Allergies    heparin (Unknown)  losartan (Anaphylaxis)  nitroglycerin (Other)    Intolerances      Standing Inpatient Medications  allopurinol 100 milliGRAM(s) Oral daily  amLODIPine   Tablet 10 milliGRAM(s) Oral daily  amoxicillin  500 milliGRAM(s)/clavulanate 1 Tablet(s) Oral daily  AQUAPHOR (petrolatum Ointment) 1 Application(s) Topical daily  ascorbic acid 500 milliGRAM(s) Oral daily  calcitriol   Capsule 0.25 MICROGram(s) Oral daily  carvedilol 25 milliGRAM(s) Oral every 12 hours  epoetin agata Injectable 24032 Unit(s) SubCutaneous <User Schedule>  ergocalciferol 51489 Unit(s) Oral every week  ferrous    sulfate 325 milliGRAM(s) Oral daily  hydrALAZINE 50 milliGRAM(s) Oral three times a day  labetalol 200 milliGRAM(s) Oral three times a day  Nephro-alyse 1 Tablet(s) Oral daily  nystatin Powder 1 Application(s) Topical two times a day  predniSONE   Tablet 10 milliGRAM(s) Oral daily  trimethoprim   80 mG/sulfamethoxazole 400 mG 1 Tablet(s) Oral daily  warfarin 5 milliGRAM(s) Oral once    PRN Inpatient Medications  sodium chloride 0.65% Nasal 1 Spray(s) Both Nostrils four times a day PRN      REVIEW OF SYSTEMS  --------------------------------------------------------------------------------    Gen: denies  fevers/chills,  CVS: denies chest pain/palpitations  Resp: denies SOB/Cough  GI: Denies N/V/Abd pain  : Denies dysuria no pain left leg   All other systems were reviewed and are negative, except as noted.    VITALS/PHYSICAL EXAM  --------------------------------------------------------------------------------  T(C): 37.1 (04-24-18 @ 14:21), Max: 37.4 (04-23-18 @ 21:10)  HR: 69 (04-24-18 @ 14:21) (69 - 88)  BP: 124/65 (04-24-18 @ 14:21) (124/65 - 148/78)  RR: 18 (04-24-18 @ 14:21) (17 - 18)  SpO2: 96% (04-24-18 @ 14:21) (95% - 96%)  Wt(kg): --        04-23-18 @ 07:01  -  04-24-18 @ 07:00  --------------------------------------------------------  IN: 720 mL / OUT: 2450 mL / NET: -1730 mL    04-24-18 @ 07:01  -  04-24-18 @ 17:33  --------------------------------------------------------  IN: 480 mL / OUT: 900 mL / NET: -420 mL      Physical Exam:  	Gen: Non toxic; comfortable appearing   	no jvd , supple neck,   	Pulm: decrease bs  no rales or ronchi or wheezing  	CV: RRR, S1S2; no rub  	Abd: +BS, soft, nontender/nondistended  	: No suprapubic tenderness + chronic fox   	UE: Warm, no cyanosis  no clubbing,  no edema;  	LE: LLE 4+ edema     1+ RLE  redness dissipated     LABS/STUDIES  --------------------------------------------------------------------------------              8.2    8.19  >-----------<  210      [04-24-18 @ 07:57]              27.3     142  |  103  |  86  ----------------------------<  90      [04-24-18 @ 06:31]  4.0   |  25  |  3.84        Ca     9.0     [04-24-18 @ 06:31]    TPro  6.4  /  Alb  3.0  /  TBili  0.3  /  DBili  x   /  AST  12  /  ALT  7   /  AlkPhos  49  [04-24-18 @ 06:31]    PT/INR: PT 17.8 , INR 1.56       [04-24-18 @ 07:57]    Uric acid 9.2      [04-24-18 @ 06:31]    Creatinine Trend:  SCr 3.84 [04-24 @ 06:31]  SCr 3.83 [04-23 @ 05:41]  SCr 3.63 [04-22 @ 06:05]  SCr 3.88 [04-21 @ 07:28]  SCr 3.82 [04-20 @ 06:10]              Urinalysis - [04-18-18 @ 17:50]      Color Yellow / Appearance SL Turbid / SG 1.012 / pH 6.0      Gluc Negative / Ketone Negative  / Bili Negative / Urobili Negative       Blood Moderate / Protein 100 / Leuk Est Large / Nitrite Negative      RBC 10-25 / WBC 26-50 / Hyaline  / Gran  / Sq Epi  / Non Sq Epi Few / Bacteria Moderate      Iron 36, TIBC 168, %sat 21      [04-20-18 @ 08:15]  Ferritin 692      [04-20-18 @ 08:14]  PTH -- (Ca 8.9)      [04-20-18 @ 08:14]   217  PTH -- (Ca 9.0)      [11-09-17 @ 08:51]   387  Vitamin D (25OH) 7.8      [11-09-17 @ 08:51]  HbA1c 4.5      [08-09-16 @ 06:49]  TSH 0.47      [04-24-18 @ 07:51]    dsDNA 29      [04-19-18 @ 09:31]  C3 Complement 97      [04-19-18 @ 09:31]  C4 Complement 27      [04-19-18 @ 09:31]

## 2018-04-24 NOTE — PROGRESS NOTE ADULT - SUBJECTIVE AND OBJECTIVE BOX
Subjective: Patient seen and examined. No new events except as noted.   no cp or sob       REVIEW OF SYSTEMS:    CONSTITUTIONAL: + weakness, fevers or chills  EYES/ENT: No visual changes;  No vertigo or throat pain   NECK: No pain or stiffness  RESPIRATORY: No cough, wheezing, hemoptysis; No shortness of breath  CARDIOVASCULAR: No chest pain or palpitations  GASTROINTESTINAL: No abdominal or epigastric pain. No nausea, vomiting, or hematemesis; No diarrhea or constipation. No melena or hematochezia.  GENITOURINARY: No dysuria, frequency or hematuria  NEUROLOGICAL: No numbness or weakness  SKIN: No itching, burning, rashes, or lesions   All other review of systems is negative unless indicated above.    MEDICATIONS:  MEDICATIONS  (STANDING):  allopurinol 100 milliGRAM(s) Oral daily  amLODIPine   Tablet 10 milliGRAM(s) Oral daily  amoxicillin  500 milliGRAM(s)/clavulanate 1 Tablet(s) Oral daily  AQUAPHOR (petrolatum Ointment) 1 Application(s) Topical daily  ascorbic acid 500 milliGRAM(s) Oral daily  calcitriol   Capsule 0.25 MICROGram(s) Oral daily  carvedilol 25 milliGRAM(s) Oral every 12 hours  ergocalciferol 32191 Unit(s) Oral every week  ferrous    sulfate 325 milliGRAM(s) Oral daily  hydrALAZINE 50 milliGRAM(s) Oral three times a day  labetalol 200 milliGRAM(s) Oral three times a day  Nephro-alyse 1 Tablet(s) Oral daily  nystatin Powder 1 Application(s) Topical two times a day  predniSONE   Tablet 10 milliGRAM(s) Oral daily  trimethoprim   80 mG/sulfamethoxazole 400 mG 1 Tablet(s) Oral daily  warfarin 5 milliGRAM(s) Oral once      PHYSICAL EXAM:  T(C): 37.1 (04-24-18 @ 14:21), Max: 37.4 (04-23-18 @ 21:10)  HR: 69 (04-24-18 @ 14:21) (69 - 88)  BP: 124/65 (04-24-18 @ 14:21) (124/65 - 148/78)  RR: 18 (04-24-18 @ 14:21) (17 - 18)  SpO2: 96% (04-24-18 @ 14:21) (95% - 96%)  Wt(kg): --  I&O's Summary    23 Apr 2018 07:01  -  24 Apr 2018 07:00  --------------------------------------------------------  IN: 720 mL / OUT: 2450 mL / NET: -1730 mL    24 Apr 2018 07:01  -  24 Apr 2018 20:39  --------------------------------------------------------  IN: 720 mL / OUT: 900 mL / NET: -180 mL          Appearance: Normal	  HEENT:   Normal oral mucosa, PERRL, EOMI	  Lymphatic: No lymphadenopathy , no edema  Cardiovascular: Normal S1 S2, No JVD, No murmurs , Peripheral pulses palpable 2+ bilaterally  Respiratory: Lungs clear to auscultation, normal effort 	  Gastrointestinal:  Soft, Non-tender, + BS	  Skin: No rashes, No ecchymoses, No cyanosis, warm to touch  Musculoskeletal: decreased  range of motion and strength  Psychiatry:  Mood & affect appropriate  Ext: massive LLE, +VAC  +fox       LABS:    CARDIAC MARKERS:                                8.2    8.19  )-----------( 210      ( 24 Apr 2018 07:57 )             27.3     04-24    142  |  103  |  86<H>  ----------------------------<  90  4.0   |  25  |  3.84<H>    Ca    9.0      24 Apr 2018 06:31    TPro  6.4  /  Alb  3.0<L>  /  TBili  0.3  /  DBili  x   /  AST  12  /  ALT  7<L>  /  AlkPhos  49  04-24    proBNP:   Lipid Profile:   HgA1c:   TSH: Thyroid Stimulating Hormone, Serum: 0.47 uIU/mL (04-24 @ 07:51)              TELEMETRY: 	    ECG:  	  RADIOLOGY:   DIAGNOSTIC TESTING:  [ ] Echocardiogram:  [ ]  Catheterization:  [ ] Stress Test:    OTHER:

## 2018-04-24 NOTE — PROGRESS NOTE ADULT - PROBLEM SELECTOR PLAN 1
-limb elevation, better  -DC IV abx  -augmentin 500 mg po daily + bactrim SS 1 tab daily x 2 more days

## 2018-04-25 LAB
ANION GAP SERPL CALC-SCNC: 17 MMOL/L — SIGNIFICANT CHANGE UP (ref 5–17)
APTT BLD: 36.1 SEC — SIGNIFICANT CHANGE UP (ref 27.5–37.4)
APTT BLD: 70.4 SEC — HIGH (ref 27.5–37.4)
BUN SERPL-MCNC: 88 MG/DL — HIGH (ref 7–23)
CALCIUM SERPL-MCNC: 9.3 MG/DL — SIGNIFICANT CHANGE UP (ref 8.4–10.5)
CHLORIDE SERPL-SCNC: 104 MMOL/L — SIGNIFICANT CHANGE UP (ref 96–108)
CO2 SERPL-SCNC: 23 MMOL/L — SIGNIFICANT CHANGE UP (ref 22–31)
CREAT SERPL-MCNC: 3.83 MG/DL — HIGH (ref 0.5–1.3)
GLUCOSE SERPL-MCNC: 86 MG/DL — SIGNIFICANT CHANGE UP (ref 70–99)
HCT VFR BLD CALC: 27.1 % — LOW (ref 34.5–45)
HGB BLD-MCNC: 8.5 G/DL — LOW (ref 11.5–15.5)
INR BLD: 1.66 RATIO — HIGH (ref 0.88–1.16)
MCHC RBC-ENTMCNC: 28.5 PG — SIGNIFICANT CHANGE UP (ref 27–34)
MCHC RBC-ENTMCNC: 31.4 GM/DL — LOW (ref 32–36)
MCV RBC AUTO: 90.6 FL — SIGNIFICANT CHANGE UP (ref 80–100)
PLATELET # BLD AUTO: 206 K/UL — SIGNIFICANT CHANGE UP (ref 150–400)
POTASSIUM SERPL-MCNC: 4 MMOL/L — SIGNIFICANT CHANGE UP (ref 3.5–5.3)
POTASSIUM SERPL-SCNC: 4 MMOL/L — SIGNIFICANT CHANGE UP (ref 3.5–5.3)
PROTHROM AB SERPL-ACNC: 19 SEC — HIGH (ref 10–13.1)
RBC # BLD: 2.99 M/UL — LOW (ref 3.8–5.2)
RBC # FLD: 15.1 % — HIGH (ref 10.3–14.5)
SODIUM SERPL-SCNC: 144 MMOL/L — SIGNIFICANT CHANGE UP (ref 135–145)
WBC # BLD: 8.3 K/UL — SIGNIFICANT CHANGE UP (ref 3.8–10.5)
WBC # FLD AUTO: 8.3 K/UL — SIGNIFICANT CHANGE UP (ref 3.8–10.5)

## 2018-04-25 PROCEDURE — 97606 NEG PRS WND THER DME>50 SQCM: CPT

## 2018-04-25 PROCEDURE — 99232 SBSQ HOSP IP/OBS MODERATE 35: CPT | Mod: 25

## 2018-04-25 PROCEDURE — 93971 EXTREMITY STUDY: CPT | Mod: 26

## 2018-04-25 PROCEDURE — 99232 SBSQ HOSP IP/OBS MODERATE 35: CPT

## 2018-04-25 RX ORDER — NYSTATIN CREAM 100000 [USP'U]/G
1 CREAM TOPICAL
Qty: 0 | Refills: 0 | COMMUNITY
Start: 2018-04-25

## 2018-04-25 RX ORDER — WARFARIN SODIUM 2.5 MG/1
7 TABLET ORAL ONCE
Qty: 0 | Refills: 0 | Status: COMPLETED | OUTPATIENT
Start: 2018-04-25 | End: 2018-04-25

## 2018-04-25 RX ORDER — ARGATROBAN 50 MG/50ML
2 INJECTION, SOLUTION INTRAVENOUS
Qty: 250 | Refills: 0 | Status: DISCONTINUED | OUTPATIENT
Start: 2018-04-25 | End: 2018-04-26

## 2018-04-25 RX ORDER — ARGATROBAN 50 MG/50ML
2 INJECTION, SOLUTION INTRAVENOUS
Qty: 250 | Refills: 0 | Status: DISCONTINUED | OUTPATIENT
Start: 2018-04-25 | End: 2018-04-25

## 2018-04-25 RX ORDER — FUROSEMIDE 40 MG
80 TABLET ORAL DAILY
Qty: 0 | Refills: 0 | Status: DISCONTINUED | OUTPATIENT
Start: 2018-04-26 | End: 2018-04-27

## 2018-04-25 RX ADMIN — CARVEDILOL PHOSPHATE 25 MILLIGRAM(S): 80 CAPSULE, EXTENDED RELEASE ORAL at 23:05

## 2018-04-25 RX ADMIN — Medication 325 MILLIGRAM(S): at 11:34

## 2018-04-25 RX ADMIN — Medication 50 MILLIGRAM(S): at 14:20

## 2018-04-25 RX ADMIN — NYSTATIN CREAM 1 APPLICATION(S): 100000 CREAM TOPICAL at 05:42

## 2018-04-25 RX ADMIN — CALCITRIOL 0.25 MICROGRAM(S): 0.5 CAPSULE ORAL at 11:34

## 2018-04-25 RX ADMIN — Medication 50 MILLIGRAM(S): at 05:39

## 2018-04-25 RX ADMIN — ARGATROBAN 14.16 MICROGRAM(S)/KG/MIN: 50 INJECTION, SOLUTION INTRAVENOUS at 21:01

## 2018-04-25 RX ADMIN — Medication 1 TABLET(S): at 14:20

## 2018-04-25 RX ADMIN — Medication 500 MILLIGRAM(S): at 11:34

## 2018-04-25 RX ADMIN — ERYTHROPOIETIN 20000 UNIT(S): 10000 INJECTION, SOLUTION INTRAVENOUS; SUBCUTANEOUS at 05:42

## 2018-04-25 RX ADMIN — NYSTATIN CREAM 1 APPLICATION(S): 100000 CREAM TOPICAL at 16:20

## 2018-04-25 RX ADMIN — CARVEDILOL PHOSPHATE 25 MILLIGRAM(S): 80 CAPSULE, EXTENDED RELEASE ORAL at 11:34

## 2018-04-25 RX ADMIN — Medication 1 TABLET(S): at 11:34

## 2018-04-25 RX ADMIN — Medication 200 MILLIGRAM(S): at 05:39

## 2018-04-25 RX ADMIN — Medication 50 MILLIGRAM(S): at 23:05

## 2018-04-25 RX ADMIN — Medication 100 MILLIGRAM(S): at 11:34

## 2018-04-25 RX ADMIN — Medication 1 APPLICATION(S): at 11:34

## 2018-04-25 RX ADMIN — WARFARIN SODIUM 7 MILLIGRAM(S): 2.5 TABLET ORAL at 23:05

## 2018-04-25 RX ADMIN — AMLODIPINE BESYLATE 10 MILLIGRAM(S): 2.5 TABLET ORAL at 05:38

## 2018-04-25 RX ADMIN — Medication 10 MILLIGRAM(S): at 08:48

## 2018-04-25 RX ADMIN — Medication 80 MILLIGRAM(S): at 05:42

## 2018-04-25 RX ADMIN — ARGATROBAN 14.16 MICROGRAM(S)/KG/MIN: 50 INJECTION, SOLUTION INTRAVENOUS at 16:33

## 2018-04-25 RX ADMIN — Medication 200 MILLIGRAM(S): at 14:20

## 2018-04-25 NOTE — PROGRESS NOTE ADULT - NEGATIVE ENMT SYMPTOMS
no nasal obstruction/no nasal discharge

## 2018-04-25 NOTE — PROGRESS NOTE ADULT - MS EXT PE MLT D E PC
pedal edema/no cyanosis
no cyanosis/pedal edema
left LE swelling/edema +, redness better/no cyanosis
pedal edema/left leg/no clubbing

## 2018-04-25 NOTE — CHART NOTE - NSCHARTNOTEFT_GEN_A_CORE
D/w attending Dr. Patino re: Wound care team recs for US Duplex LLE to r-evaluate DVT. INR 1.66 today, s/p coumadin . Per attending: agree with US duplex LLE and start argatroban drip. Will obtain baseline PTT     Samantha Powell NP Medicine [id number]

## 2018-04-25 NOTE — PROGRESS NOTE ADULT - GASTROINTESTINAL DETAILS
nontender/soft/no rebound tenderness/no guarding
soft/no rebound tenderness/nontender/no distention
nontender/no rebound tenderness/no distention/soft
no distention/soft/no rebound tenderness/nontender

## 2018-04-25 NOTE — PROGRESS NOTE ADULT - ATTENDING COMMENTS
-ADD  -chronic DVT-coumadin
Jim Syed  Attending Physician   Division of Infectious Disease  Pager #[phone]  After 5pm/weekend or no response, call #[phone]
Jim Syed  Attending Physician   Division of Infectious Disease  Pager #[phone]  After 5pm/weekend or no response, call #[phone]    Will sign off, recall ID if needed #[phone].
Jim Syed  Attending Physician   Division of Infectious Disease  Pager #[phone]  After 5pm/weekend or no response, call #[phone]
Jim Syed  Attending Physician   Division of Infectious Disease  Pager #[phone]  After 5pm/weekend or no response, call #[phone]    Please call the ID service [phone] with questions or concerns over the weekend.

## 2018-04-25 NOTE — PROGRESS NOTE ADULT - PROBLEM SELECTOR PLAN 1
-limb elevation, better  -augmentin 500 mg po daily + bactrim SS 1 tab daily x 2 more days  -VAC per wound care

## 2018-04-25 NOTE — CHART NOTE - NSCHARTNOTEFT_GEN_A_CORE
59 yo female with PMH SLE, systolic HF, s/p AICD, HTN, CKD, PE/DVT on coumadin, CAD , ST IV sacral ulcer, left leg swelling and cellulitis, with wound VAC. Due to patient's weakness and deconditioning , will require maria elena lift for transfer from bed to chair and vice versa.     Samantha Powell NP Medicine [id number] 57 yo female with PMH SLE, systolic HF, s/p AICD, HTN, CKD, PE/DVT on coumadin, CAD , ST IV sacral ulcer, left leg swelling and cellulitis, with wound VAC. Due to patient's generalized weakness and deconditioning  secondary to left leg swelling /left leg cellulitis, pt will  will require wheelchair . This is necessary to achieve daily tasks and therapies which cannot be achieved with the use of a cane or rolling walker. Patient and family are in agreement with wheelchair use at home and assistance will be provided if needed.     Samantha Powell, [facility] [id number]

## 2018-04-25 NOTE — CONSULT NOTE ADULT - ASSESSMENT
Imp: Imp:  57 yo female with SLE and chronic LE lymphedema.  She is admitted with worsening of LE wound; being treated with Abx and wound drainage.  Her SLE has been fairly stable on prednisone 10mg/day    Rec:  Can lower steroids to prednisone 7.5mg/day  Continue wound care

## 2018-04-25 NOTE — PROGRESS NOTE ADULT - NEUROLOGICAL DETAILS
responds to verbal commands/alert and oriented x 3
alert and oriented x 3/responds to verbal commands
responds to verbal commands/alert and oriented x 3
alert and oriented x 3/responds to verbal commands

## 2018-04-25 NOTE — PROGRESS NOTE ADULT - NEGATIVE GASTROINTESTINAL SYMPTOMS
no abdominal pain/no diarrhea/no vomiting
no diarrhea/no abdominal pain/no vomiting
no diarrhea/no abdominal pain/no vomiting
no abdominal pain/no diarrhea/no vomiting

## 2018-04-25 NOTE — PROGRESS NOTE ADULT - SUBJECTIVE AND OBJECTIVE BOX
SUBJECTIVE: Pt seen, chart reviewed.  Pt feeling anxious about discharge- we discussed this & directed pt to speak in detail with case management as they will be able to make appropriate arrangements  Pt w/ less pain.  drainage is lessening   tolerating VAC and compression wrap  no odor, redness, warmth, f/c/s.  all questions asked and answered to pt satisfaction.    REVIEW OF SYSTEMS    Skin/ Musculoskeletal:	 see HPI  All other systems negative      argatroban Infusion 2 MICROgram(s)/kG/Min IV Continuous <Continuous>  warfarin 7 milliGRAM(s) Oral once      Physical Exam:  Vital Signs Last 24 Hrs  T(C): 36.8 (25 Apr 2018 11:43), Max: 37.2 (25 Apr 2018 04:40)  T(F): 98.3 (25 Apr 2018 11:43), Max: 98.9 (25 Apr 2018 04:40)  HR: 73 (25 Apr 2018 14:21) (73 - 89)  BP: 128/70 (25 Apr 2018 14:21) (118/70 - 154/72)  BP(mean): --  RR: 18 (25 Apr 2018 11:43) (17 - 18)  SpO2: 97% (25 Apr 2018 11:43) (96% - 97%)    General Appearance:  NAD, A&Ox3, MO  Versa Care P500    Musculoskeletal/Vascular: limited ROM BLE 2/2 LLE edema  FROM BUE   L>>RLE edema  equally warm  no acute ischemia noted  Difficult to assess DP/PT 2/2 edema  (+)hemosiderin staining    Skin: Dry, frail,  ecchymosis w/? hematoma LLE anterior shin- no discrete lesion noted  Instep / anterior ankle skin crease w/ crusted material easily cleaned off wound  Fibrinous tissue w/o drainage  (+)tender to palpation  Medial LLE w/ 1.2cm x 0.5cm x 0.7cm wound moist & granular  (+)moderated serous drainage   no longer tender   No odor, erythema, increased warmth, induration, fluctuance    Rt Ischium w/ moist & granular healing STAGE 4 Pressure ucler  1.5cm x 1 cmx 3cm  moderate serosanguinous drainage   No odor, erythema, increased warmth, tenderness, induration, fluctuance      LABS:                        8.5    8.3   )-----------( 206      ( 25 Apr 2018 06:49 )             27.1     PT/INR - ( 25 Apr 2018 08:00 )   PT: 19.0 sec;   INR: 1.66 ratio    PTT - ( 25 Apr 2018 14:19 )  PTT:36.1 sec    RADIOLOGY & ADDITIONAL STUDIES:      CULTURES:    Culture - Blood (04.19.18 @ 02:09)    Specimen Source: .Blood Blood-Venous    Culture Results:   No growth at 5 days.    Culture - Urine (04.18.18 @ 21:14)    -  Amoxicillin/Clavulanic Acid: S <=8/4    -  Levofloxacin: R >4    -  Meropenem: S <=1    -  Nitrofurantoin: S <=32 Should not be used to treat pyelonephritis    -  Ampicillin/Sulbactam: R 16/8    -  Ceftriaxone: R >32 Enterobacter, Citrobacter, and Serratia may develop resistance during prolonged therapy    -  Cefepime: R >16    -  Piperacillin/Tazobactam: R <=8    -  Tobramycin: S <=2    -  Amikacin: S <=8    -  Ampicillin: R >16 These ampicillin results predict results for amoxicillin    -  Aztreonam: R >16    -  Cefazolin: R >16 This predicts results for oral agents cefaclor, cefdinir, cefpodoxime, cefprozil, cefuroxime axetil, cephalexin and locarbef for uncomplicated UTI. Note that some isolates may be susceptible to these agents while testing resistant to cefazolin.    -  Cefoxitin: S <=4    -  Ciprofloxacin: R >2    -  Gentamicin: S <=1    -  Ertapenem: S <=0.5    -  Imipenem: S <=1    -  Tigecycline: S <=1    -  Trimethoprim/Sulfamethoxazole: S <=0.5/9.5    Specimen Source: .Urine Clean Catch (Midstream)    Culture Results:   >100,000 CFU/ml Escherichia coli ESBL  <10,000 CFU/ml Normal Urogenital brandon present    Organism Identification: Escherichia coli ESBL    Organism: Escherichia coli ESBL    Method Type: ARSALAN

## 2018-04-25 NOTE — PROGRESS NOTE ADULT - RS GEN PE MLT RESP DETAILS PC
good air movement/breath sounds equal/clear to auscultation bilaterally/respirations non-labored
clear to auscultation bilaterally/good air movement/respirations non-labored
respirations non-labored/good air movement/clear to auscultation bilaterally
respirations non-labored/diminished breath sounds, R/good air movement/diminished breath sounds, L

## 2018-04-25 NOTE — PROGRESS NOTE ADULT - SUBJECTIVE AND OBJECTIVE BOX
COSTEN, SANDRA 58y [id number]    Patient is a 58y old  Female who presents with a chief complaint of dysuria, redness of foot (20 Apr 2018 11:20)      Follow Up/CC:  ID following for leg cellulitis    Interval History/ROS: feels ok, no acute issues    Allergies    heparin (Unknown)  losartan (Anaphylaxis)  nitroglycerin (Other)    Intolerances        ANTIMICROBIALS:  amoxicillin  500 milliGRAM(s)/clavulanate 1 daily  trimethoprim   80 mG/sulfamethoxazole 400 mG 1 daily      MEDICATIONS  (STANDING):  allopurinol 100 milliGRAM(s) Oral daily  amLODIPine   Tablet 10 milliGRAM(s) Oral daily  amoxicillin  500 milliGRAM(s)/clavulanate 1 Tablet(s) Oral daily  AQUAPHOR (petrolatum Ointment) 1 Application(s) Topical daily  ascorbic acid 500 milliGRAM(s) Oral daily  calcitriol   Capsule 0.25 MICROGram(s) Oral daily  carvedilol 25 milliGRAM(s) Oral every 12 hours  ergocalciferol 34174 Unit(s) Oral every week  ferrous    sulfate 325 milliGRAM(s) Oral daily  furosemide    Tablet 80 milliGRAM(s) Oral daily  hydrALAZINE 50 milliGRAM(s) Oral three times a day  labetalol 200 milliGRAM(s) Oral three times a day  Nephro-alyse 1 Tablet(s) Oral daily  nystatin Powder 1 Application(s) Topical two times a day  predniSONE   Tablet 10 milliGRAM(s) Oral daily  trimethoprim   80 mG/sulfamethoxazole 400 mG 1 Tablet(s) Oral daily    MEDICATIONS  (PRN):  sodium chloride 0.65% Nasal 1 Spray(s) Both Nostrils four times a day PRN Nasal Congestion        Vital Signs Last 24 Hrs  T(C): 37.2 (25 Apr 2018 04:40), Max: 37.2 (25 Apr 2018 04:40)  T(F): 98.9 (25 Apr 2018 04:40), Max: 98.9 (25 Apr 2018 04:40)  HR: 89 (25 Apr 2018 04:40) (69 - 89)  BP: 138/71 (25 Apr 2018 04:40) (124/65 - 154/72)  BP(mean): --  RR: 17 (25 Apr 2018 04:40) (17 - 18)  SpO2: 96% (25 Apr 2018 04:40) (96% - 97%)    CBC Full  -  ( 25 Apr 2018 06:49 )  WBC Count : 8.3 K/uL  Hemoglobin : 8.5 g/dL  Hematocrit : 27.1 %  Platelet Count - Automated : 206 K/uL  Mean Cell Volume : 90.6 fl  Mean Cell Hemoglobin : 28.5 pg  Mean Cell Hemoglobin Concentration : 31.4 gm/dL  Auto Neutrophil # : x  Auto Lymphocyte # : x  Auto Monocyte # : x  Auto Eosinophil # : x  Auto Basophil # : x  Auto Neutrophil % : x  Auto Lymphocyte % : x  Auto Monocyte % : x  Auto Eosinophil % : x  Auto Basophil % : x    04-25    144  |  104  |  88<H>  ----------------------------<  86  4.0   |  23  |  3.83<H>    Ca    9.3      25 Apr 2018 06:49    TPro  6.4  /  Alb  3.0<L>  /  TBili  0.3  /  DBili  x   /  AST  12  /  ALT  7<L>  /  AlkPhos  49  04-24    LIVER FUNCTIONS - ( 24 Apr 2018 06:31 )  Alb: 3.0 g/dL / Pro: 6.4 g/dL / ALK PHOS: 49 U/L / ALT: 7 U/L / AST: 12 U/L / GGT: x               MICROBIOLOGY:  .Blood Blood  04-19-18   No growth at 5 days.  --  --      .Urine Clean Catch (Midstream)  04-18-18   >100,000 CFU/ml Escherichia coli ESBL  <10,000 CFU/ml Normal Urogenital brandon present  --  Escherichia coli ESBL    RADIOLOGY

## 2018-04-25 NOTE — PROGRESS NOTE ADULT - NEGATIVE OPHTHALMOLOGIC SYMPTOMS
no blurred vision L/no blurred vision R
no blurred vision R/no blurred vision L

## 2018-04-25 NOTE — PROGRESS NOTE ADULT - SUBJECTIVE AND OBJECTIVE BOX
Patient is a 58y old  Female who presents with a chief complaint of dysuria, redness of foot (20 Apr 2018 11:20)      SUBJECTIVE / OVERNIGHT EVENTS:   Feels better.  Denies CP/SOB/Palpitation/HA.    MEDICATIONS  (STANDING):  allopurinol 100 milliGRAM(s) Oral daily  amLODIPine   Tablet 10 milliGRAM(s) Oral daily  AQUAPHOR (petrolatum Ointment) 1 Application(s) Topical daily  argatroban Infusion 2 MICROgram(s)/kG/Min (14.16 mL/Hr) IV Continuous <Continuous>  ascorbic acid 500 milliGRAM(s) Oral daily  calcitriol   Capsule 0.25 MICROGram(s) Oral daily  carvedilol 25 milliGRAM(s) Oral every 12 hours  ergocalciferol 12397 Unit(s) Oral every week  ferrous    sulfate 325 milliGRAM(s) Oral daily  hydrALAZINE 50 milliGRAM(s) Oral three times a day  labetalol 200 milliGRAM(s) Oral three times a day  Nephro-alyse 1 Tablet(s) Oral daily  nystatin Powder 1 Application(s) Topical two times a day  predniSONE   Tablet 10 milliGRAM(s) Oral daily    MEDICATIONS  (PRN):  sodium chloride 0.65% Nasal 1 Spray(s) Both Nostrils four times a day PRN Nasal Congestion        CAPILLARY BLOOD GLUCOSE        I&O's Summary    24 Apr 2018 07:01  -  25 Apr 2018 07:00  --------------------------------------------------------  IN: 820 mL / OUT: 2075 mL / NET: -1255 mL    25 Apr 2018 07:01  -  25 Apr 2018 23:21  --------------------------------------------------------  IN: 0 mL / OUT: 1950 mL / NET: -1950 mL        PHYSICAL EXAM:  GENERAL: NAD, well-developed  HEAD:  Atraumatic, Normocephalic  NECK: Supple, No JVD  CHEST/LUNG: Clear to auscultation bilaterally; No wheezing.  HEART: Regular rate and rhythm; No murmurs, rubs, or gallops  ABDOMEN: Soft, Nontender, Nondistended; Bowel sounds present  EXTREMITIES:   No clubbing, cyanosis, or edema  NEUROLOGY: AAO X 3  SKIN: No rashes    LABS:                        8.5    8.3   )-----------( 206      ( 25 Apr 2018 06:49 )             27.1     04-25    144  |  104  |  88<H>  ----------------------------<  86  4.0   |  23  |  3.83<H>    Ca    9.3      25 Apr 2018 06:49    TPro  6.4  /  Alb  3.0<L>  /  TBili  0.3  /  DBili  x   /  AST  12  /  ALT  7<L>  /  AlkPhos  49  04-24    PT/INR - ( 25 Apr 2018 08:00 )   PT: 19.0 sec;   INR: 1.66 ratio         PTT - ( 25 Apr 2018 20:07 )  PTT:70.4 sec        CAPILLARY BLOOD GLUCOSE        04-19 @ 02:09  Culture-urine --  Culture results   No growth at 5 days.  method type --  Organism --  Organism Identification --  Specimen source .Blood Blood           04-19 @ 02:09  Culture blood --  Culture results   No growth at 5 days.  Gram stain --  Gram stain blood --  Method type --  Organism --  Organism identification --  Specimen source .Blood Blood      RADIOLOGY & ADDITIONAL TESTS:    Imaging Personally Reviewed:    Consultant(s) Notes Reviewed:      Care Discussed with Consultants/Other Providers:

## 2018-04-25 NOTE — CONSULT NOTE ADULT - SUBJECTIVE AND OBJECTIVE BOX
HISTORY OF PRESENT ILLNESS:  57 yo female with a h/o SLE-serositis, joint pains, +dsDNA, she has CKD, CHF, chronic LE edema.  She was a/w worsening redness and swelling of the LLE, as well as dysuria.  Lupus has been stable on prednisone 10mg/day; she has not f/u with me as outpatients since last July.    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)  Calf DVT (Deep Venous Thrombosis) (ICD9 453.42)  Adenomatous Goiter (ICD9 241.9)  Chronic Gout (ICD9 274.02)  LBBB (Left Bundle Branch Block) (ICD9 426.3)  CHF (Congestive Heart Failure) (ICD9 428.0)  Hx of aorta-iliac-femoral bypass  TOP (Termination of Pregnancy)  S/P Dilatation and Curettage  History of Tubal Ligation  s/p AAA (Abdominal Aortic Aneurysm) Repair: 2009  History of Cholecystectomy  S/P Partial Hysterectomy        MEDICATIONS  (STANDING):  allopurinol 100 milliGRAM(s) Oral daily  amLODIPine   Tablet 10 milliGRAM(s) Oral daily  AQUAPHOR (petrolatum Ointment) 1 Application(s) Topical daily  ascorbic acid 500 milliGRAM(s) Oral daily  calcitriol   Capsule 0.25 MICROGram(s) Oral daily  carvedilol 25 milliGRAM(s) Oral every 12 hours  ergocalciferol 48226 Unit(s) Oral every week  ferrous    sulfate 325 milliGRAM(s) Oral daily  hydrALAZINE 50 milliGRAM(s) Oral three times a day  labetalol 200 milliGRAM(s) Oral three times a day  Nephro-alyse 1 Tablet(s) Oral daily  nystatin Powder 1 Application(s) Topical two times a day  predniSONE   Tablet 10 milliGRAM(s) Oral daily  warfarin 7 milliGRAM(s) Oral once    MEDICATIONS  (PRN):  sodium chloride 0.65% Nasal 1 Spray(s) Both Nostrils four times a day PRN Nasal Congestion      Allergies    heparin (Unknown)  losartan (Anaphylaxis)  nitroglycerin (Other)    Intolerances        PERTINENT MEDICATION HISTORY:    SOCIAL HISTORY:    FAMILY HISTORY:  No pertinent family history in first degree relatives      Vital Signs Last 24 Hrs  T(C): 36.8 (2018 11:43), Max: 37.2 (2018 04:40)  T(F): 98.3 (2018 11:43), Max: 98.9 (2018 04:40)  HR: 84 (2018 11:43) (69 - 89)  BP: 118/70 (2018 11:43) (118/70 - 154/72)  BP(mean): --  RR: 18 (2018 11:43) (17 - 18)  SpO2: 97% (2018 11:43) (96% - 97%)    Daily     Daily Weight in k.1 (2018 05:30)    PHYSICAL EXAM:      Constitutional:  Fair appearing    Eyes:  EOMI, no redness    ENMT:    Neck:  supple no masses    Breasts:    Back:    Respiratory:    Cardiovascular:    Gastrointestinal:  abd-soft nt, nd    Genitourinary:    Rectal:    Extremities:  LLE with severe swelling, the lower calf and ankle is bandaged and wound vac is in place with drainage of yellow fluid.  There is redness and blistering    Vascular:    Neurological:    Skin:  LLE with significant redness    Lymph Nodes:    Musculoskeletal:  No active synovitis; pain with ROM of R knee  Decreased ROM of R shoulder    Psychiatric:  appropriate      LABS:                        8.5    8.3   )-----------( 206      ( 2018 06:49 )             27.1     -    144  |  104  |  88<H>  ----------------------------<  86  4.0   |  23  |  3.83<H>    Ca    9.3      2018 06:49    TPro  6.4  /  Alb  3.0<L>  /  TBili  0.3  /  DBili  x   /  AST  12  /  ALT  7<L>  /  AlkPhos  49  04-24    PT/INR - ( 2018 08:00 )   PT: 19.0 sec;   INR: 1.66 ratio               RADIOLOGY & ADDITIONAL STUDIES:

## 2018-04-25 NOTE — PROGRESS NOTE ADULT - SUBJECTIVE AND OBJECTIVE BOX
Subjective: Patient seen and examined. No new events except as noted.   resting/sleeping comfortably in bed     REVIEW OF SYSTEMS:    CONSTITUTIONAL: + weakness, fevers or chills  EYES/ENT: No visual changes;  No vertigo or throat pain   NECK: No pain or stiffness  RESPIRATORY: No cough, wheezing, hemoptysis; No shortness of breath  CARDIOVASCULAR: No chest pain or palpitations  GASTROINTESTINAL: No abdominal or epigastric pain. No nausea, vomiting, or hematemesis; No diarrhea or constipation. No melena or hematochezia.  GENITOURINARY: No dysuria, frequency or hematuria  NEUROLOGICAL: No numbness or weakness  SKIN: No itching, burning, rashes, or lesions   All other review of systems is negative unless indicated above.    MEDICATIONS:  MEDICATIONS  (STANDING):  allopurinol 100 milliGRAM(s) Oral daily  amLODIPine   Tablet 10 milliGRAM(s) Oral daily  amoxicillin  500 milliGRAM(s)/clavulanate 1 Tablet(s) Oral daily  AQUAPHOR (petrolatum Ointment) 1 Application(s) Topical daily  ascorbic acid 500 milliGRAM(s) Oral daily  calcitriol   Capsule 0.25 MICROGram(s) Oral daily  carvedilol 25 milliGRAM(s) Oral every 12 hours  ergocalciferol 02227 Unit(s) Oral every week  ferrous    sulfate 325 milliGRAM(s) Oral daily  furosemide    Tablet 80 milliGRAM(s) Oral daily  hydrALAZINE 50 milliGRAM(s) Oral three times a day  labetalol 200 milliGRAM(s) Oral three times a day  Nephro-alyse 1 Tablet(s) Oral daily  nystatin Powder 1 Application(s) Topical two times a day  predniSONE   Tablet 10 milliGRAM(s) Oral daily  trimethoprim   80 mG/sulfamethoxazole 400 mG 1 Tablet(s) Oral daily      PHYSICAL EXAM:  T(C): 37.2 (04-25-18 @ 04:40), Max: 37.2 (04-25-18 @ 04:40)  HR: 89 (04-25-18 @ 04:40) (69 - 89)  BP: 138/71 (04-25-18 @ 04:40) (124/65 - 154/72)  RR: 17 (04-25-18 @ 04:40) (17 - 18)  SpO2: 96% (04-25-18 @ 04:40) (96% - 97%)  Wt(kg): --  I&O's Summary    24 Apr 2018 07:01  -  25 Apr 2018 07:00  --------------------------------------------------------  IN: 820 mL / OUT: 2075 mL / NET: -1255 mL          Appearance: NAD	  HEENT:   Normal oral mucosa, PERRL, EOMI	  Lymphatic: No lymphadenopathy , no edema  Cardiovascular: Normal S1 S2, No JVD, No murmurs , Peripheral pulses palpable 2+ bilaterally  Respiratory: Lungs clear to auscultation, normal effort 	  Gastrointestinal:  Soft, Non-tender, + BS	  Skin: No rashes, No ecchymoses, No cyanosis, warm to touch  Musculoskeletal: decreased  range of motion and strength  Psychiatry:  Mood & affect appropriate  Ext: Massive LLE edema, +VAC  +fox       LABS:    CARDIAC MARKERS:                                8.5    8.3   )-----------( 206      ( 25 Apr 2018 06:49 )             27.1     04-25    144  |  104  |  88<H>  ----------------------------<  86  4.0   |  23  |  3.83<H>    Ca    9.3      25 Apr 2018 06:49    TPro  6.4  /  Alb  3.0<L>  /  TBili  0.3  /  DBili  x   /  AST  12  /  ALT  7<L>  /  AlkPhos  49  04-24    proBNP:   Lipid Profile:   HgA1c:   TSH:             TELEMETRY: 	    ECG:  	  RADIOLOGY:   DIAGNOSTIC TESTING:  [ ] Echocardiogram:  [ ]  Catheterization:  [ ] Stress Test:    OTHER:

## 2018-04-25 NOTE — PROGRESS NOTE ADULT - SUBJECTIVE AND OBJECTIVE BOX
[facility]   [phone]  RENAL FOLLOW UP NOTE  --------------------------------------------------------------------------------  Chief Complaint:    24 hour events/subjective:    still with c/o edema in leg no worsening sob no cough    PAST HISTORY  --------------------------------------------------------------------------------  No significant changes to PMH, PSH, FHx, SHx, unless otherwise noted    ALLERGIES & MEDICATIONS  --------------------------------------------------------------------------------  Allergies    heparin (Unknown)  losartan (Anaphylaxis)  nitroglycerin (Other)    Intolerances      Standing Inpatient Medications  allopurinol 100 milliGRAM(s) Oral daily  amLODIPine   Tablet 10 milliGRAM(s) Oral daily  AQUAPHOR (petrolatum Ointment) 1 Application(s) Topical daily  ascorbic acid 500 milliGRAM(s) Oral daily  calcitriol   Capsule 0.25 MICROGram(s) Oral daily  carvedilol 25 milliGRAM(s) Oral every 12 hours  ergocalciferol 37323 Unit(s) Oral every week  ferrous    sulfate 325 milliGRAM(s) Oral daily  hydrALAZINE 50 milliGRAM(s) Oral three times a day  labetalol 200 milliGRAM(s) Oral three times a day  Nephro-alyse 1 Tablet(s) Oral daily  nystatin Powder 1 Application(s) Topical two times a day  predniSONE   Tablet 10 milliGRAM(s) Oral daily  warfarin 7 milliGRAM(s) Oral once    PRN Inpatient Medications  sodium chloride 0.65% Nasal 1 Spray(s) Both Nostrils four times a day PRN      REVIEW OF SYSTEMS  --------------------------------------------------------------------------------    Gen: denies fevers/chills,  CVS: denies chest pain/palpitations  Resp: denies SOB/Cough  GI: Denies N/V/Abd pain  : Denies dysuria   Left lower Extremity pain Has improved    All other systems were reviewed and are negative, except as noted.    VITALS/PHYSICAL EXAM  --------------------------------------------------------------------------------  T(C): 36.8 (04-25-18 @ 11:43), Max: 37.2 (04-25-18 @ 04:40)  HR: 73 (04-25-18 @ 14:21) (73 - 89)  BP: 128/70 (04-25-18 @ 14:21) (118/70 - 154/72)  RR: 18 (04-25-18 @ 11:43) (17 - 18)  SpO2: 97% (04-25-18 @ 11:43) (96% - 97%)  Wt(kg): --        04-24-18 @ 07:01  -  04-25-18 @ 07:00  --------------------------------------------------------  IN: 820 mL / OUT: 2075 mL / NET: -1255 mL    04-25-18 @ 07:01  -  04-25-18 @ 14:33  --------------------------------------------------------  IN: 0 mL / OUT: 1150 mL / NET: -1150 mL      Physical Exam:  	Gen: Non toxic; comfortable appearing   	no jvd , supple neck,   	Pulm: decrease bs  no rales or ronchi or wheezing  	CV: RRR, S1S2; no rub  	Abd: +BS, soft, nontender/nondistended  	: No suprapubic tenderness + chronic fox   	UE: Warm, no cyanosis  no clubbing,  no edema;  	LE: LLE 4+ edema     Trace RLE    LABS/STUDIES  --------------------------------------------------------------------------------              8.5    8.3   >-----------<  206      [04-25-18 @ 06:49]              27.1     144  |  104  |  88  ----------------------------<  86      [04-25-18 @ 06:49]  4.0   |  23  |  3.83        Ca     9.3     [04-25-18 @ 06:49]    TPro  6.4  /  Alb  3.0  /  TBili  0.3  /  DBili  x   /  AST  12  /  ALT  7   /  AlkPhos  49  [04-24-18 @ 06:31]    PT/INR: PT 19.0 , INR 1.66       [04-25-18 @ 08:00]    Uric acid 9.2      [04-24-18 @ 06:31]    Creatinine Trend:  SCr 3.83 [04-25 @ 06:49]  SCr 3.84 [04-24 @ 06:31]  SCr 3.83 [04-23 @ 05:41]  SCr 3.63 [04-22 @ 06:05]  SCr 3.88 [04-21 @ 07:28]      Urinalysis - [04-18-18 @ 17:50]      Color Yellow / Appearance SL Turbid / SG 1.012 / pH 6.0      Gluc Negative / Ketone Negative  / Bili Negative / Urobili Negative       Blood Moderate / Protein 100 / Leuk Est Large / Nitrite Negative      RBC 10-25 / WBC 26-50 / Hyaline  / Gran  / Sq Epi  / Non Sq Epi Few / Bacteria Moderate      Iron 36, TIBC 168, %sat 21      [04-20-18 @ 08:15]  Ferritin 692      [04-20-18 @ 08:14]  PTH -- (Ca 8.9)      [04-20-18 @ 08:14]   217  PTH -- (Ca 9.0)      [11-09-17 @ 08:51]   387  Vitamin D (25OH) 7.8      [11-09-17 @ 08:51]  HbA1c 4.5      [08-09-16 @ 06:49]  TSH 0.47      [04-24-18 @ 07:51]    dsDNA 29      [04-19-18 @ 09:31]  C3 Complement 97      [04-19-18 @ 09:31]  C4 Complement 27      [04-19-18 @ 09:31]

## 2018-04-26 DIAGNOSIS — I10 ESSENTIAL (PRIMARY) HYPERTENSION: ICD-10-CM

## 2018-04-26 LAB
ANION GAP SERPL CALC-SCNC: 17 MMOL/L — SIGNIFICANT CHANGE UP (ref 5–17)
APTT BLD: 57.8 SEC — HIGH (ref 27.5–37.4)
APTT BLD: 88.4 SEC — HIGH (ref 27.5–37.4)
BUN SERPL-MCNC: 88 MG/DL — HIGH (ref 7–23)
CALCIUM SERPL-MCNC: 9.2 MG/DL — SIGNIFICANT CHANGE UP (ref 8.4–10.5)
CHLORIDE SERPL-SCNC: 102 MMOL/L — SIGNIFICANT CHANGE UP (ref 96–108)
CO2 SERPL-SCNC: 23 MMOL/L — SIGNIFICANT CHANGE UP (ref 22–31)
CREAT SERPL-MCNC: 3.7 MG/DL — HIGH (ref 0.5–1.3)
GLUCOSE SERPL-MCNC: 87 MG/DL — SIGNIFICANT CHANGE UP (ref 70–99)
HCT VFR BLD CALC: 26.1 % — LOW (ref 34.5–45)
HGB BLD-MCNC: 8 G/DL — LOW (ref 11.5–15.5)
INR BLD: 2.67 RATIO — HIGH (ref 0.88–1.16)
INR BLD: 4.64 RATIO — HIGH (ref 0.88–1.16)
MAGNESIUM SERPL-MCNC: 2.3 MG/DL — SIGNIFICANT CHANGE UP (ref 1.6–2.6)
MCHC RBC-ENTMCNC: 26.7 PG — LOW (ref 27–34)
MCHC RBC-ENTMCNC: 30.7 GM/DL — LOW (ref 32–36)
MCV RBC AUTO: 87 FL — SIGNIFICANT CHANGE UP (ref 80–100)
PLATELET # BLD AUTO: 218 K/UL — SIGNIFICANT CHANGE UP (ref 150–400)
POTASSIUM SERPL-MCNC: 3.9 MMOL/L — SIGNIFICANT CHANGE UP (ref 3.5–5.3)
POTASSIUM SERPL-SCNC: 3.9 MMOL/L — SIGNIFICANT CHANGE UP (ref 3.5–5.3)
PROTHROM AB SERPL-ACNC: 29.7 SEC — HIGH (ref 9.8–12.7)
PROTHROM AB SERPL-ACNC: 54.1 SEC — HIGH (ref 10–13.1)
RBC # BLD: 3 M/UL — LOW (ref 3.8–5.2)
RBC # FLD: 16.5 % — HIGH (ref 10.3–14.5)
SODIUM SERPL-SCNC: 142 MMOL/L — SIGNIFICANT CHANGE UP (ref 135–145)
WBC # BLD: 6.46 K/UL — SIGNIFICANT CHANGE UP (ref 3.8–10.5)
WBC # FLD AUTO: 6.46 K/UL — SIGNIFICANT CHANGE UP (ref 3.8–10.5)

## 2018-04-26 RX ORDER — FUROSEMIDE 40 MG
1 TABLET ORAL
Qty: 0 | Refills: 0 | COMMUNITY

## 2018-04-26 RX ORDER — ARGATROBAN 50 MG/50ML
2 INJECTION, SOLUTION INTRAVENOUS
Qty: 250 | Refills: 0 | Status: DISCONTINUED | OUTPATIENT
Start: 2018-04-26 | End: 2018-04-26

## 2018-04-26 RX ORDER — WARFARIN SODIUM 2.5 MG/1
4 TABLET ORAL ONCE
Qty: 0 | Refills: 0 | Status: COMPLETED | OUTPATIENT
Start: 2018-04-26 | End: 2018-04-26

## 2018-04-26 RX ORDER — ERGOCALCIFEROL 1.25 MG/1
1 CAPSULE ORAL
Qty: 4 | Refills: 0 | OUTPATIENT
Start: 2018-04-26 | End: 2018-05-25

## 2018-04-26 RX ORDER — FUROSEMIDE 40 MG
2 TABLET ORAL
Qty: 60 | Refills: 0 | OUTPATIENT
Start: 2018-04-26

## 2018-04-26 RX ADMIN — Medication 50 MILLIGRAM(S): at 13:44

## 2018-04-26 RX ADMIN — Medication 100 MILLIGRAM(S): at 10:38

## 2018-04-26 RX ADMIN — Medication 200 MILLIGRAM(S): at 23:04

## 2018-04-26 RX ADMIN — ARGATROBAN 14.16 MICROGRAM(S)/KG/MIN: 50 INJECTION, SOLUTION INTRAVENOUS at 01:59

## 2018-04-26 RX ADMIN — Medication 10 MILLIGRAM(S): at 10:37

## 2018-04-26 RX ADMIN — AMLODIPINE BESYLATE 10 MILLIGRAM(S): 2.5 TABLET ORAL at 05:32

## 2018-04-26 RX ADMIN — Medication 1 APPLICATION(S): at 10:38

## 2018-04-26 RX ADMIN — Medication 50 MILLIGRAM(S): at 21:45

## 2018-04-26 RX ADMIN — CALCITRIOL 0.25 MICROGRAM(S): 0.5 CAPSULE ORAL at 13:44

## 2018-04-26 RX ADMIN — Medication 1 TABLET(S): at 10:38

## 2018-04-26 RX ADMIN — CARVEDILOL PHOSPHATE 25 MILLIGRAM(S): 80 CAPSULE, EXTENDED RELEASE ORAL at 21:45

## 2018-04-26 RX ADMIN — WARFARIN SODIUM 4 MILLIGRAM(S): 2.5 TABLET ORAL at 21:45

## 2018-04-26 RX ADMIN — NYSTATIN CREAM 1 APPLICATION(S): 100000 CREAM TOPICAL at 17:01

## 2018-04-26 RX ADMIN — Medication 200 MILLIGRAM(S): at 05:32

## 2018-04-26 RX ADMIN — Medication 200 MILLIGRAM(S): at 00:34

## 2018-04-26 RX ADMIN — Medication 50 MILLIGRAM(S): at 05:32

## 2018-04-26 RX ADMIN — Medication 325 MILLIGRAM(S): at 10:39

## 2018-04-26 RX ADMIN — NYSTATIN CREAM 1 APPLICATION(S): 100000 CREAM TOPICAL at 05:33

## 2018-04-26 RX ADMIN — Medication 500 MILLIGRAM(S): at 10:38

## 2018-04-26 RX ADMIN — CARVEDILOL PHOSPHATE 25 MILLIGRAM(S): 80 CAPSULE, EXTENDED RELEASE ORAL at 10:37

## 2018-04-26 RX ADMIN — Medication 200 MILLIGRAM(S): at 13:43

## 2018-04-26 RX ADMIN — Medication 80 MILLIGRAM(S): at 05:32

## 2018-04-26 NOTE — PROGRESS NOTE ADULT - PROBLEM SELECTOR PROBLEM 4
CKD (chronic kidney disease)
Skin ulcer of sacrum, unspecified ulcer stage

## 2018-04-26 NOTE — PROGRESS NOTE ADULT - PROBLEM SELECTOR PLAN 2
Increased diuretics  Monitor UO and Cr
lasix 80 mg IV daily  Monitor UO and Cr
-as above
-as above  -f/u cx

## 2018-04-26 NOTE — PROGRESS NOTE ADULT - PROBLEM SELECTOR PROBLEM 3
Skin ulcer of sacrum, unspecified ulcer stage
Left leg swelling

## 2018-04-26 NOTE — PROGRESS NOTE ADULT - SUBJECTIVE AND OBJECTIVE BOX
Patient is a 58y old  Female who presents with a chief complaint of dysuria, redness of foot (20 Apr 2018 11:20)      SUBJECTIVE / OVERNIGHT EVENTS:   Feels better.  Denies CP/SOB/Palpitation/HA.    MEDICATIONS  (STANDING):  allopurinol 100 milliGRAM(s) Oral daily  amLODIPine   Tablet 10 milliGRAM(s) Oral daily  AQUAPHOR (petrolatum Ointment) 1 Application(s) Topical daily  ascorbic acid 500 milliGRAM(s) Oral daily  calcitriol   Capsule 0.25 MICROGram(s) Oral daily  carvedilol 25 milliGRAM(s) Oral every 12 hours  ergocalciferol 39344 Unit(s) Oral every week  ferrous    sulfate 325 milliGRAM(s) Oral daily  furosemide   Injectable 80 milliGRAM(s) IV Push daily  hydrALAZINE 50 milliGRAM(s) Oral three times a day  labetalol 200 milliGRAM(s) Oral three times a day  Nephro-alyse 1 Tablet(s) Oral daily  nystatin Powder 1 Application(s) Topical two times a day  predniSONE   Tablet 10 milliGRAM(s) Oral daily    MEDICATIONS  (PRN):  sodium chloride 0.65% Nasal 1 Spray(s) Both Nostrils four times a day PRN Nasal Congestion        CAPILLARY BLOOD GLUCOSE        I&O's Summary    25 Apr 2018 07:01  -  26 Apr 2018 07:00  --------------------------------------------------------  IN: 0 mL / OUT: 2550 mL / NET: -2550 mL    26 Apr 2018 07:01  -  26 Apr 2018 23:18  --------------------------------------------------------  IN: 840 mL / OUT: 1300 mL / NET: -460 mL        PHYSICAL EXAM:  GENERAL: NAD, well-developed  HEAD:  Atraumatic, Normocephalic  NECK: Supple, No JVD  CHEST/LUNG: Clear to auscultation bilaterally; No wheezing.  HEART: Regular rate and rhythm; No murmurs, rubs, or gallops  ABDOMEN: Soft, Nontender, Nondistended; Bowel sounds present  EXTREMITIES:   No clubbing, cyanosis, or edema  NEUROLOGY: AAO X 3  SKIN: No rashes    LABS:                        8.0    6.46  )-----------( 218      ( 26 Apr 2018 07:35 )             26.1     04-26    142  |  102  |  88<H>  ----------------------------<  87  3.9   |  23  |  3.70<H>    Ca    9.2      26 Apr 2018 06:53  Mg     2.3     04-26      PT/INR - ( 26 Apr 2018 13:48 )   PT: 29.7 sec;   INR: 2.67 ratio         PTT - ( 26 Apr 2018 13:48 )  PTT:57.8 sec        CAPILLARY BLOOD GLUCOSE                    RADIOLOGY & ADDITIONAL TESTS:    Imaging Personally Reviewed:    Consultant(s) Notes Reviewed:      Care Discussed with Consultants/Other Providers:

## 2018-04-26 NOTE — PROGRESS NOTE ADULT - PROBLEM SELECTOR PROBLEM 1
Cellulitis of left lower extremity
Cellulitis of left leg

## 2018-04-26 NOTE — PROGRESS NOTE ADULT - ASSESSMENT
· Assessment	  58 F PMH SLE, systolic HFrEF(EF 32%) s/p AICD, HTN, CKD III- IV, PE / left leg DVT dx 2009, HIT, CAD (no stents), stage IV sacral ulcer, chronic Hook, recurrent pericardial effusion, AAA ~14cm in 12/2016 s/p repair in 2010 w/ aortoiliac stent complicated post-operatively with development of left lower extremity compartment syndrome, now presents with dysuria and LLE redness.      Problem/Plan - 1:  ·  Problem: Cellulitis of left lower extremity.  Plan: -   - ID f/up noted.off IV antibiotics/on po augmentin plus Bactrim.wound VAC     Problem/Plan - 2:  ·  Problem: Urinary tract infection without hematuria, site unspecified.  Plan:  - po antibiotics.     Problem/Plan - 3:  ·  Problem: Pain of right hip joint.  Plan: - Pain control     Problem/Plan - 4:  ·  Problem: Skin ulcer of sacrum, unspecified ulcer stage.  Plan: -  -wound care f/up.     Problem/Plan - 5:  ·  Problem: Essential hypertension.  Plan: -c/w labetalol, hydralazine, coreg, amlodipine, lasix.       Problem/Plan - 6:  Problem: Systemic lupus erythematosus, unspecified SLE type, unspecified organ involvement status. Plan: -c/w prednisone 10 qd       Problem/Plan - 7:  ·  Problem: CKD (chronic kidney disease) stage 4, GFR 15-29 ml/min.  Plan: -  -avoid nephrotoxins  -renally dose meds for gfr <15  -c/w nephrovite, calcitriol  -c/w ferrous sulfate for iron def/anemia of ckd.      Problem/Plan - 8:  ·  Problem: Chronic systolic heart failure.  Plan: -c/w bb, lasix;  No arb/ace for ckd  -s/p aicd.
· Assessment	  58 F PMH SLE, systolic HFrEF(EF 32%) s/p AICD, HTN, CKD III- IV, PE / left leg DVT dx 2009, HIT, CAD (no stents), stage IV sacral ulcer, chronic Hook, recurrent pericardial effusion, AAA ~14cm in 12/2016 s/p repair in 2010 w/ aortoiliac stent complicated post-operatively with development of left lower extremity compartment syndrome, now presents with dysuria and LLE redness.      Problem/Plan - 1:  ·  Problem: Cellulitis of left lower extremity.  Plan: -   -wound care c/s noted  - ID f/up noted. IV vanco/Zosyn     Problem/Plan - 2:  ·  Problem: Urinary tract infection without hematuria, site unspecified.  Plan:  -f/u ucx, bcx.      Problem/Plan - 3:  ·  Problem: Pain of right hip joint.  Plan: -pain been going on for few days worse with movement; given body habitus and lack of range of motion exam is difficult but pt with significant tenderness with movement, radiating down R hip to R femur and R groin  -CT R hip/femur w/o     Problem/Plan - 4:  ·  Problem: Skin ulcer of sacrum, unspecified ulcer stage.  Plan: -c/w abx as above; ulcer reportedly packed today by wound care team as o/p  -wound care consult.      Problem/Plan - 5:  ·  Problem: Essential hypertension.  Plan: -c/w labetalol, hydralazine, coreg, amlodipine, lasix.       Problem/Plan - 6:  Problem: Systemic lupus erythematosus, unspecified SLE type, unspecified organ involvement status. Plan: -c/w prednisone 10 qd       Problem/Plan - 7:  ·  Problem: CKD (chronic kidney disease) stage 4, GFR 15-29 ml/min.  Plan: -roughly stable ckd4/5  -avoid nephrotoxins  -renally dose meds for gfr <15  -c/w nephrovite, calcitriol  -c/w ferrous sulfate for iron def/anemia of ckd.      Problem/Plan - 8:  ·  Problem: Chronic systolic heart failure.  Plan: -c/w bb, lasix;  No arb/ace for ckd  -s/p aicd.
· Assessment	  58 F PMH SLE, systolic HFrEF(EF 32%) s/p AICD, HTN, CKD III- IV, PE / left leg DVT dx 2009, HIT, CAD (no stents), stage IV sacral ulcer, chronic Hook, recurrent pericardial effusion, AAA ~14cm in 12/2016 s/p repair in 2010 w/ aortoiliac stent complicated post-operatively with development of left lower extremity compartment syndrome, now presents with dysuria and LLE redness.      Problem/Plan - 1:  ·  Problem: Cellulitis of left lower extremity.  Plan: - vanco/cefepime i  -f/u bcx, n  -wound care c/s  - ID eval called.      Problem/Plan - 2:  ·  Problem: Urinary tract infection without hematuria, site unspecified.  Plan:  -f/u ucx, bcx.      Problem/Plan - 3:  ·  Problem: Pain of right hip joint.  Plan: -pain been going on for few days worse with movement; given body habitus and lack of range of motion exam is difficult but pt with significant tenderness with movement, radiating down R hip to R femur and R groin  -CT R hip/femur w/o     Problem/Plan - 4:  ·  Problem: Skin ulcer of sacrum, unspecified ulcer stage.  Plan: -c/w abx as above; ulcer reportedly packed today by wound care team as o/p  -wound care consult.      Problem/Plan - 5:  ·  Problem: Essential hypertension.  Plan: -c/w labetalol, hydralazine, coreg, amlodipine, lasix.       Problem/Plan - 6:  Problem: Systemic lupus erythematosus, unspecified SLE type, unspecified organ involvement status. Plan: -c/w prednisone 10 qd       Problem/Plan - 7:  ·  Problem: CKD (chronic kidney disease) stage 4, GFR 15-29 ml/min.  Plan: -roughly stable ckd4/5  -avoid nephrotoxins  -renally dose meds for gfr <15  -c/w nephrovite, calcitriol  -c/w ferrous sulfate for iron def/anemia of ckd.      Problem/Plan - 8:  ·  Problem: Chronic systolic heart failure.  Plan: -c/w bb, lasix;  No arb/ace for ckd  -s/p aicd.
57 yo female admitted with UTI and severe LLE edema and possible cellulitis
58 F PMH SLE, systolic HFrEF(EF 32%) s/p AICD, HTN, CKD III- IV, PE / left leg DVT dx 2009, HIT, CAD (no stents), stage IV sacral ulcer, chronic Fox, recurrent pericardial effusion, AAA ~14cm in 12/2016 s/p repair in 2010 w/ aortoiliac stent complicated post-operatively with development of left lower extremity compartment syndrome, with cellulitis and worsening ckd     1- CKD IV   2- cellulitis   3- chf  4- HTN   5- neurogenic bladder  6- lupus   7- anemia  8- shpt  9- hypernatremia     creatinine worsening over time in this pt. i have discussed with pt regarding worsening renal function and hd in future.    cont with fox   Lasix 80 mg iv daily. pt now with increase na level increase po fluid intake for now and cont lasix   has hx of gout monitor closely  to have retic ct
58 F PMH SLE, systolic HFrEF(EF 32%) s/p AICD, HTN, CKD III- IV, PE / left leg DVT dx 2009, HIT, CAD (no stents), stage IV sacral ulcer, chronic Fox, recurrent pericardial effusion, AAA ~14cm in 12/2016 s/p repair in 2010 w/ aortoiliac stent complicated post-operatively with development of left lower extremity compartment syndrome, with cellulitis and worsening ckd     1- CKD IV   2- cellulitis   3- chf  4- HTN   5- neurogenic bladder  6- lupus   7- anemia  8- shpt  9- hypernatremia na better     creatinine worsening over time in this pt. i have discussed with pt regarding worsening renal function and hd in future.    cont with fox   Lasix 80 mg iv daily.   edema left leg seems to be improving  has hx of gout monitor closely  start epogen 88873 units tiw  check uric acid  check TSH   cont with calcitriol and add ergocalciferol 60963 u weekly   dsdna 29
58 F PMH SLE, systolic HFrEF(EF 32%) s/p AICD, HTN, CKD III- IV, PE / left leg DVT dx 2009, HIT, CAD (no stents), stage IV sacral ulcer, chronic Fox, recurrent pericardial effusion, AAA ~14cm in 12/2016 s/p repair in 2010 w/ aortoiliac stent complicated post-operatively with development of left lower extremity compartment syndrome, with cellulitis and worsening ckd     1- CKD IV   2- cellulitis   3- chf  4- HTN   5- neurogenic bladder  6- lupus   7- anemia  8- shpt  9- hyperuricemia     creatinine steady   cont with fox   Lasix 80 mg  po on discharge   Continue carvedilol  cont hydralazine for now given limited options in this pt for anti-htn meds and arb she has not tolerated in past   edema left leg seems to be improving slowly   has hx of gout monitor closely allopurinol 200mg daily . was unable to afford uloric in past   epogen 66311 units sq yesterday next dose in 2 weeks if outpt   cont with calcitriol and  ergocalciferol 59245 u weekly
58 F PMH SLE, systolic HFrEF(EF 32%) s/p AICD, HTN, CKD III- IV, PE / left leg DVT dx 2009, HIT, CAD (no stents), stage IV sacral ulcer, chronic Fox, recurrent pericardial effusion, AAA ~14cm in 12/2016 s/p repair in 2010 w/ aortoiliac stent complicated post-operatively with development of left lower extremity compartment syndrome, with cellulitis and worsening ckd     1- CKD IV   2- cellulitis   3- chf  4- HTN   5- neurogenic bladder  6- lupus   7- anemia  8- shpt  9- hyperuricemia     creatinine steady past few days   cont with fox   Lasix 80 mg  po on discharge   Continue carvedilol  edema left leg seems to be improving slowly  RLE dissipated   has hx of gout monitor closely allopurinol 200mg daily   epogen 28891 units sq Today  cont with calcitriol and add ergocalciferol 38072 u weekly
58 F PMH SLE, systolic HFrEF(EF 32%) s/p AICD, HTN, CKD III- IV, PE / left leg DVT dx 2009, HIT, CAD (no stents), stage IV sacral ulcer, chronic Fox, recurrent pericardial effusion, AAA ~14cm in 12/2016 s/p repair in 2010 w/ aortoiliac stent complicated post-operatively with development of left lower extremity compartment syndrome, with cellulitis and worsening ckd     1- CKD IV   2- cellulitis   3- chf  4- HTN   5- neurogenic bladder  6- lupus   7- anemia  8- shpt  9- hyperuricemia     creatinine steady past few days   cont with fox   Lasix 80 mg iv daily. change to po on discharge   edema left leg seems to be improving slowly  RLE dissipated   has hx of gout monitor closely increase allopurinol 200mg daily   epogen 07414 units sq am     cont with calcitriol and add ergocalciferol 96864 u weekly
59 yo female admitted with UTI and severe LLE edema and possible cellulitis
A/P:58 F PMH SLE, systolic HFrEF(EF 32%) s/p AICD, HTN, CKD III- IV, PE / left leg DVT dx 2009, HIT, CAD (no stents), stage IV sacral ulcer, chronic Hook, recurrent pericardial effusion, AAA ~14cm in 12/2016 s/p repair in 2010 w/ aortoiliac stent complicated post-operatively with development of left lower extremity compartment syndrome, now admitted with LLE cellulitis & UTI      LLE swelling - h/o DVT w/ draining wounds- VAC placed per protocol   Consider repeat duplex to assess DVT LLE resolution  Compression BLE w/ ACE   BLE Elevation  Rt Ischium -VAC placed per protocol  Abx per Medicine  con't Nutrition (as tolerated)  con't Offloading   con't Pericare  Care as per medicine will follow w/ you  Upon [facility] [address] [place], [place] [phone]  D/w attng and team  Tanisha Weathers PA-C [facility] [id number]  I spent 30minutes w/ this pt of which more than 50% of the time was spent counseling & coordinating care of this pt.
addendum:       physical exam:   Gen: Non toxic; comfortable appearing   	no jvd , supple neck,   	Pulm: decrease bs  no rales or ronchi or wheezing  	CV: RRR, S1S2; no rub  	Abd: +BS, soft, nontender/nondistended  	: No suprapubic tenderness + chronic fox   	UE: Warm, no cyanosis  no clubbing,  no edema;  	LE: LLE 4+ warm redness but decreaseing   leg 1+ RLE     58 F PMH SLE, systolic HFrEF(EF 32%) s/p AICD, HTN, CKD III- IV, PE / left leg DVT dx 2009, HIT, CAD (no stents), stage IV sacral ulcer, chronic Fox, recurrent pericardial effusion, AAA ~14cm in 12/2016 s/p repair in 2010 w/ aortoiliac stent complicated post-operatively with development of left lower extremity compartment syndrome, with cellulitis and worsening ckd     1- CKD IV   2- cellulitis   3- chf  4- HTN   5- neurogenic bladder  6- lupus   7- anemia  8- shpt    creatinine worsening   cont with fox   increase lasix 80 mg iv daily   has hx of gout monitor closely
· Assessment	  58 F PMH SLE, systolic HFrEF(EF 32%) s/p AICD, HTN, CKD III- IV, PE / left leg DVT dx 2009, HIT, CAD (no stents), stage IV sacral ulcer, chronic Hook, recurrent pericardial effusion, AAA ~14cm in 12/2016 s/p repair in 2010 w/ aortoiliac stent complicated post-operatively with development of left lower extremity compartment syndrome, now presents with dysuria and LLE redness.      Problem/Plan - 1:  ·  Problem: Cellulitis of left lower extremity.  Plan: -   - ID f/up noted. IV vanco/Azactam     Problem/Plan - 2:  ·  Problem: Urinary tract infection without hematuria, site unspecified.  Plan:  - IV Abx.     Problem/Plan - 3:  ·  Problem: Pain of right hip joint.  Plan: - Pain control     Problem/Plan - 4:  ·  Problem: Skin ulcer of sacrum, unspecified ulcer stage.  Plan: -  -wound care f/up.     Problem/Plan - 5:  ·  Problem: Essential hypertension.  Plan: -c/w labetalol, hydralazine, coreg, amlodipine, lasix.       Problem/Plan - 6:  Problem: Systemic lupus erythematosus, unspecified SLE type, unspecified organ involvement status. Plan: -c/w prednisone 10 qd       Problem/Plan - 7:  ·  Problem: CKD (chronic kidney disease) stage 4, GFR 15-29 ml/min.  Plan: -  -avoid nephrotoxins  -renally dose meds for gfr <15  -c/w nephrovite, calcitriol  -c/w ferrous sulfate for iron def/anemia of ckd.      Problem/Plan - 8:  ·  Problem: Chronic systolic heart failure.  Plan: -c/w bb, lasix;  No arb/ace for ckd  -s/p aicd.
· Assessment	  58 F PMH SLE, systolic HFrEF(EF 32%) s/p AICD, HTN, CKD III- IV, PE / left leg DVT dx 2009, HIT, CAD (no stents), stage IV sacral ulcer, chronic Hook, recurrent pericardial effusion, AAA ~14cm in 12/2016 s/p repair in 2010 w/ aortoiliac stent complicated post-operatively with development of left lower extremity compartment syndrome, now presents with dysuria and LLE redness.      Problem/Plan - 1:  ·  Problem: Cellulitis of left lower extremity.  Plan: -   -wound care c/s noted  - ID f/up noted. IV vanco/Azactam     Problem/Plan - 2:  ·  Problem: Urinary tract infection without hematuria, site unspecified.  Plan:  -f/u ucx, bcx.      Problem/Plan - 3:  ·  Problem: Pain of right hip joint.  Plan: -pain been going on for few days worse with movement; given body habitus and lack of range of motion exam is difficult but pt with significant tenderness with movement, radiating down R hip to R femur and R groin  -CT R hip/femur w/o     Problem/Plan - 4:  ·  Problem: Skin ulcer of sacrum, unspecified ulcer stage.  Plan: -c/w abx as above; ulcer reportedly packed today by wound care team as o/p  -wound care consult.      Problem/Plan - 5:  ·  Problem: Essential hypertension.  Plan: -c/w labetalol, hydralazine, coreg, amlodipine, lasix.       Problem/Plan - 6:  Problem: Systemic lupus erythematosus, unspecified SLE type, unspecified organ involvement status. Plan: -c/w prednisone 10 qd       Problem/Plan - 7:  ·  Problem: CKD (chronic kidney disease) stage 4, GFR 15-29 ml/min.  Plan: -roughly stable ckd4/5  -avoid nephrotoxins  -renally dose meds for gfr <15  -c/w nephrovite, calcitriol  -c/w ferrous sulfate for iron def/anemia of ckd.      Problem/Plan - 8:  ·  Problem: Chronic systolic heart failure.  Plan: -c/w bb, lasix;  No arb/ace for ckd  -s/p aicd.
· Assessment	  58 F PMH SLE, systolic HFrEF(EF 32%) s/p AICD, HTN, CKD III- IV, PE / left leg DVT dx 2009, HIT, CAD (no stents), stage IV sacral ulcer, chronic Hook, recurrent pericardial effusion, AAA ~14cm in 12/2016 s/p repair in 2010 w/ aortoiliac stent complicated post-operatively with development of left lower extremity compartment syndrome, now presents with dysuria and LLE redness.      Problem/Plan - 1:  ·  Problem: Cellulitis of left lower extremity.  Plan: -   -wound care c/s noted  - ID f/up noted. IV vanco/Zosyn.     Problem/Plan - 2:  ·  Problem: Urinary tract infection without hematuria, site unspecified.  Plan:  -f/u ucx, bcx.      Problem/Plan - 3:  ·  Problem: Pain of right hip joint.  Plan: -pain been going on for few days worse with movement; given body habitus and lack of range of motion exam is difficult but pt with significant tenderness with movement, radiating down R hip to R femur and R groin  -CT R hip/femur w/o     Problem/Plan - 4:  ·  Problem: Skin ulcer of sacrum, unspecified ulcer stage.  Plan: -c/w abx as above; ulcer reportedly packed today by wound care team as o/p  -wound care consult.      Problem/Plan - 5:  ·  Problem: Essential hypertension.  Plan: -c/w labetalol, hydralazine, coreg, amlodipine, lasix.       Problem/Plan - 6:  Problem: Systemic lupus erythematosus, unspecified SLE type, unspecified organ involvement status. Plan: -c/w prednisone 10 qd       Problem/Plan - 7:  ·  Problem: CKD (chronic kidney disease) stage 4, GFR 15-29 ml/min.  Plan: -roughly stable ckd4/5  -avoid nephrotoxins  -renally dose meds for gfr <15  -c/w nephrovite, calcitriol  -c/w ferrous sulfate for iron def/anemia of ckd.      Problem/Plan - 8:  ·  Problem: Chronic systolic heart failure.  Plan: -c/w bb, lasix;  No arb/ace for ckd  -s/p aicd.
· Assessment	  · Assessment	  58 F PMH SLE, systolic HFrEF(EF 32%) s/p AICD, HTN, CKD III- IV, PE / left leg DVT dx 2009, HIT, CAD (no stents), stage IV sacral ulcer, chronic Hook, recurrent pericardial effusion, AAA ~14cm in 12/2016 s/p repair in 2010 w/ aortoiliac stent complicated post-operatively with development of left lower extremity compartment syndrome, now presents with dysuria and LLE redness.      Problem/Plan - 1:  ·  Problem: Cellulitis of left lower extremity.  Plan: -   ID f/up noted. Off abx.     Problem/Plan - 2:  ·  Problem: Urinary tract infection without hematuria, site unspecified.  Plan:  - Off abx.     Problem/Plan - 3:  ·  Problem: Pain of right hip joint.  Plan: - Pain control     Problem/Plan - 4:  ·  Problem: Skin ulcer of sacrum, unspecified ulcer stage.  Plan: -  -wound care f/up.     Problem/Plan - 5:  ·  Problem: Essential hypertension.  Plan: -c/w labetalol, hydralazine, coreg, amlodipine, lasix.       Problem/Plan - 6:  Problem: Systemic lupus erythematosus, unspecified SLE type, unspecified organ involvement status. Plan: -c/w prednisone 10 qd       Problem/Plan - 7:  ·  Problem: CKD (chronic kidney disease) stage 4, GFR 15-29 ml/min.  Plan: -  -avoid nephrotoxins  -renally dose meds for gfr <15  -c/w nephrovite, calcitriol  -c/w ferrous sulfate for iron def/anemia of ckd.      Problem/Plan - 8:  ·  Problem: Chronic systolic heart failure.  Plan: -c/w bb, lasix;  No arb/ace for ckd  -s/p aicd.   Dc planning.
· Assessment	  · Assessment	  58 F PMH SLE, systolic HFrEF(EF 32%) s/p AICD, HTN, CKD III- IV, PE / left leg DVT dx 2009, HIT, CAD (no stents), stage IV sacral ulcer, chronic Hook, recurrent pericardial effusion, AAA ~14cm in 12/2016 s/p repair in 2010 w/ aortoiliac stent complicated post-operatively with development of left lower extremity compartment syndrome, now presents with dysuria and LLE redness.      Problem/Plan - 1:  ·  Problem: Cellulitis of left lower extremity.  Plan: -   ID f/up noted. Off abx.     Problem/Plan - 2:  ·  Problem: Urinary tract infection without hematuria, site unspecified.  Plan:  - Off abx.     Problem/Plan - 3:  ·  Problem: Pain of right hip joint.  Plan: - Pain control     Problem/Plan - 4:  ·  Problem: Skin ulcer of sacrum, unspecified ulcer stage.  Plan: -  -wound care f/up.     Problem/Plan - 5:  ·  Problem: Essential hypertension.  Plan: -c/w labetalol, hydralazine, coreg, amlodipine, lasix.       Problem/Plan - 6:  Problem: Systemic lupus erythematosus, unspecified SLE type, unspecified organ involvement status. Plan: -c/w prednisone 10 qd       Problem/Plan - 7:  ·  Problem: CKD (chronic kidney disease) stage 4, GFR 15-29 ml/min.  Plan: -  -avoid nephrotoxins  -renally dose meds for gfr <15  -c/w nephrovite, calcitriol  -c/w ferrous sulfate for iron def/anemia of ckd.      Problem/Plan - 8:  ·  Problem: Chronic systolic heart failure.  Plan: -c/w bb, lasix;  No arb/ace for ckd  -s/p aicd.   Dc planning.
F PMH SLE, systolic HFrEF(EF 32%) s/p AICD, HTN, CKD III- IV, PE / left leg DVT dx 2009, HIT, CAD (no stents), stage IV sacral ulcer, chronic Hook, recurrent pericardial effusion, AAA ~14cm in 12/2016 s/p repair in 2010 w/ aortoiliac stent complicated with left LE swelling/edema/cellulitis/puncture wound with possible UTI

## 2018-04-26 NOTE — PROGRESS NOTE ADULT - SUBJECTIVE AND OBJECTIVE BOX
[facility]   [phone]  RENAL FOLLOW UP NOTE  --------------------------------------------------------------------------------  Chief Complaint:    24 hour events/subjective:    still with c/o edema     PAST HISTORY  --------------------------------------------------------------------------------  No significant changes to PMH, PSH, FHx, SHx, unless otherwise noted    ALLERGIES & MEDICATIONS  --------------------------------------------------------------------------------  Allergies    heparin (Unknown)  losartan (Anaphylaxis)  nitroglycerin (Other)    Intolerances      Standing Inpatient Medications  allopurinol 100 milliGRAM(s) Oral daily  amLODIPine   Tablet 10 milliGRAM(s) Oral daily  AQUAPHOR (petrolatum Ointment) 1 Application(s) Topical daily  ascorbic acid 500 milliGRAM(s) Oral daily  calcitriol   Capsule 0.25 MICROGram(s) Oral daily  carvedilol 25 milliGRAM(s) Oral every 12 hours  ergocalciferol 52971 Unit(s) Oral every week  ferrous    sulfate 325 milliGRAM(s) Oral daily  furosemide   Injectable 80 milliGRAM(s) IV Push daily  hydrALAZINE 50 milliGRAM(s) Oral three times a day  labetalol 200 milliGRAM(s) Oral three times a day  Nephro-alyse 1 Tablet(s) Oral daily  nystatin Powder 1 Application(s) Topical two times a day  predniSONE   Tablet 10 milliGRAM(s) Oral daily  warfarin 4 milliGRAM(s) Oral once    PRN Inpatient Medications  sodium chloride 0.65% Nasal 1 Spray(s) Both Nostrils four times a day PRN      REVIEW OF SYSTEMS  --------------------------------------------------------------------------------  Gen: denies fevers/chills,  CVS: denies chest pain/palpitations  Resp: denies SOB/Cough  GI: Denies N/V/Abd pain  : Denies dysuria   Left lower Extremity pain Has improved  All other systems were reviewed and are negative, except as noted.    VITALS/PHYSICAL EXAM  --------------------------------------------------------------------------------  T(C): 36.4 (04-26-18 @ 11:50), Max: 37.3 (04-25-18 @ 20:34)  HR: 78 (04-26-18 @ 13:45) (78 - 89)  BP: 128/70 (04-26-18 @ 13:45) (128/70 - 148/81)  RR: 18 (04-26-18 @ 11:50) (18 - 18)  SpO2: 97% (04-26-18 @ 11:50) (97% - 98%)  Wt(kg): --  Height (cm): 165.1 (04-25-18 @ 15:23)  Weight (kg): 118 (04-25-18 @ 15:23)  BMI (kg/m2): 43.3 (04-25-18 @ 15:23)  BSA (m2): 2.21 (04-25-18 @ 15:23)      04-25-18 @ 07:01  -  04-26-18 @ 07:00  --------------------------------------------------------  IN: 0 mL / OUT: 2550 mL / NET: -2550 mL    04-26-18 @ 07:01  -  04-26-18 @ 19:12  --------------------------------------------------------  IN: 840 mL / OUT: 650 mL / NET: 190 mL      Physical Exam:  	Gen: Non toxic; comfortable appearing   	no jvd , supple neck,   	Pulm: decrease bs  no rales or ronchi or wheezing  	CV: RRR, S1S2; no rub  	Abd: +BS, soft, nontender/nondistended  	: No suprapubic tenderness + chronic fox   	UE: Warm, no cyanosis  no clubbing,  no edema;  	LE: LLE 4+ edema     Trace RLE  	    LABS/STUDIES  --------------------------------------------------------------------------------              8.0    6.46  >-----------<  218      [04-26-18 @ 07:35]              26.1     142  |  102  |  88  ----------------------------<  87      [04-26-18 @ 06:53]  3.9   |  23  |  3.70        Ca     9.2     [04-26-18 @ 06:53]      Mg     2.3     [04-26-18 @ 06:53]      PT/INR: PT 29.7 , INR 2.67       [04-26-18 @ 13:48]  PTT: 57.8       [04-26-18 @ 13:48]      Creatinine Trend:  SCr 3.70 [04-26 @ 06:53]  SCr 3.83 [04-25 @ 06:49]  SCr 3.84 [04-24 @ 06:31]  SCr 3.83 [04-23 @ 05:41]  SCr 3.63 [04-22 @ 06:05]              Urinalysis - [04-18-18 @ 17:50]      Color Yellow / Appearance SL Turbid / SG 1.012 / pH 6.0      Gluc Negative / Ketone Negative  / Bili Negative / Urobili Negative       Blood Moderate / Protein 100 / Leuk Est Large / Nitrite Negative      RBC 10-25 / WBC 26-50 / Hyaline  / Gran  / Sq Epi  / Non Sq Epi Few / Bacteria Moderate      Iron 36, TIBC 168, %sat 21      [04-20-18 @ 08:15]  Ferritin 692      [04-20-18 @ 08:14]  PTH -- (Ca 8.9)      [04-20-18 @ 08:14]   217  PTH -- (Ca 9.0)      [11-09-17 @ 08:51]   387  Vitamin D (25OH) 7.8      [11-09-17 @ 08:51]  HbA1c 4.5      [08-09-16 @ 06:49]  TSH 0.47      [04-24-18 @ 07:51]    dsDNA 29      [04-19-18 @ 09:31]  C3 Complement 97      [04-19-18 @ 09:31]  C4 Complement 27      [04-19-18 @ 09:31]

## 2018-04-26 NOTE — PROGRESS NOTE ADULT - PROBLEM SELECTOR PLAN 4
Renal following
-appreciate wound eval

## 2018-04-26 NOTE — PROGRESS NOTE ADULT - SUBJECTIVE AND OBJECTIVE BOX
Subjective: Patient seen and examined. No new events except as noted.   feels well     REVIEW OF SYSTEMS:    CONSTITUTIONAL: + weakness, fevers or chills  EYES/ENT: No visual changes;  No vertigo or throat pain   NECK: No pain or stiffness  RESPIRATORY: No cough, wheezing, hemoptysis; No shortness of breath  CARDIOVASCULAR: No chest pain or palpitations  GASTROINTESTINAL: No abdominal or epigastric pain. No nausea, vomiting, or hematemesis; No diarrhea or constipation. No melena or hematochezia.  GENITOURINARY: No dysuria, frequency or hematuria  NEUROLOGICAL: No numbness or weakness  SKIN: No itching, burning, rashes, or lesions   All other review of systems is negative unless indicated above.    MEDICATIONS:  MEDICATIONS  (STANDING):  allopurinol 100 milliGRAM(s) Oral daily  amLODIPine   Tablet 10 milliGRAM(s) Oral daily  AQUAPHOR (petrolatum Ointment) 1 Application(s) Topical daily  ascorbic acid 500 milliGRAM(s) Oral daily  calcitriol   Capsule 0.25 MICROGram(s) Oral daily  carvedilol 25 milliGRAM(s) Oral every 12 hours  ergocalciferol 17770 Unit(s) Oral every week  ferrous    sulfate 325 milliGRAM(s) Oral daily  furosemide   Injectable 80 milliGRAM(s) IV Push daily  hydrALAZINE 50 milliGRAM(s) Oral three times a day  labetalol 200 milliGRAM(s) Oral three times a day  Nephro-alyse 1 Tablet(s) Oral daily  nystatin Powder 1 Application(s) Topical two times a day  predniSONE   Tablet 10 milliGRAM(s) Oral daily  warfarin 4 milliGRAM(s) Oral once      PHYSICAL EXAM:  T(C): 36.4 (04-26-18 @ 11:50), Max: 36.8 (04-26-18 @ 05:00)  HR: 78 (04-26-18 @ 13:45) (78 - 89)  BP: 128/70 (04-26-18 @ 13:45) (128/70 - 147/78)  RR: 18 (04-26-18 @ 11:50) (18 - 18)  SpO2: 97% (04-26-18 @ 11:50) (97% - 97%)  Wt(kg): --  I&O's Summary    25 Apr 2018 07:01  -  26 Apr 2018 07:00  --------------------------------------------------------  IN: 0 mL / OUT: 2550 mL / NET: -2550 mL    26 Apr 2018 07:01  -  26 Apr 2018 20:35  --------------------------------------------------------  IN: 840 mL / OUT: 650 mL / NET: 190 mL          Appearance: Normal	  HEENT:   Normal oral mucosa, PERRL, EOMI	  Lymphatic: No lymphadenopathy , no edema  Cardiovascular: Normal S1 S2, No JVD, No murmurs , Peripheral pulses palpable 2+ bilaterally  Respiratory: Lungs clear to auscultation, normal effort 	  Gastrointestinal:  Soft, Non-tender, + BS	  Skin: No rashes, No ecchymoses, No cyanosis, warm to touch  Musculoskeletal: decreased range of motion and strength  Psychiatry:  Mood & affect appropriate  Ext: massive LLE edema, +VAC   +fox       LABS:    CARDIAC MARKERS:                                8.0    6.46  )-----------( 218      ( 26 Apr 2018 07:35 )             26.1     04-26    142  |  102  |  88<H>  ----------------------------<  87  3.9   |  23  |  3.70<H>    Ca    9.2      26 Apr 2018 06:53  Mg     2.3     04-26      proBNP:   Lipid Profile:   HgA1c:   TSH:             TELEMETRY: 	    ECG:  	  RADIOLOGY:   DIAGNOSTIC TESTING:  [ ] Echocardiogram:  [ ]  Catheterization:  [ ] Stress Test:    OTHER:

## 2018-04-26 NOTE — PROGRESS NOTE ADULT - PROBLEM SELECTOR PLAN 3
wound care
wound care  Wound vac dced.
-as above

## 2018-04-26 NOTE — PROGRESS NOTE ADULT - PROVIDER SPECIALTY LIST ADULT
Cardiology
Infectious Disease
Internal Medicine
Nephrology
Wound Care
Podiatry
Internal Medicine
Infectious Disease

## 2018-04-27 VITALS
HEART RATE: 72 BPM | SYSTOLIC BLOOD PRESSURE: 144 MMHG | TEMPERATURE: 98 F | RESPIRATION RATE: 18 BRPM | DIASTOLIC BLOOD PRESSURE: 76 MMHG

## 2018-04-27 LAB
INR BLD: 2.28 RATIO — HIGH (ref 0.88–1.16)
PROTHROM AB SERPL-ACNC: 25.3 SEC — HIGH (ref 9.8–12.7)

## 2018-04-27 PROCEDURE — 97605 NEG PRS WND THER DME<=50SQCM: CPT

## 2018-04-27 PROCEDURE — 83735 ASSAY OF MAGNESIUM: CPT

## 2018-04-27 PROCEDURE — 84443 ASSAY THYROID STIM HORMONE: CPT

## 2018-04-27 PROCEDURE — 83970 ASSAY OF PARATHORMONE: CPT

## 2018-04-27 PROCEDURE — 82728 ASSAY OF FERRITIN: CPT

## 2018-04-27 PROCEDURE — 87086 URINE CULTURE/COLONY COUNT: CPT

## 2018-04-27 PROCEDURE — 87040 BLOOD CULTURE FOR BACTERIA: CPT

## 2018-04-27 PROCEDURE — 85610 PROTHROMBIN TIME: CPT

## 2018-04-27 PROCEDURE — 73700 CT LOWER EXTREMITY W/O DYE: CPT

## 2018-04-27 PROCEDURE — 93971 EXTREMITY STUDY: CPT

## 2018-04-27 PROCEDURE — 71045 X-RAY EXAM CHEST 1 VIEW: CPT

## 2018-04-27 PROCEDURE — 86225 DNA ANTIBODY NATIVE: CPT

## 2018-04-27 PROCEDURE — 97162 PT EVAL MOD COMPLEX 30 MIN: CPT

## 2018-04-27 PROCEDURE — 97110 THERAPEUTIC EXERCISES: CPT

## 2018-04-27 PROCEDURE — 80053 COMPREHEN METABOLIC PANEL: CPT

## 2018-04-27 PROCEDURE — 97606 NEG PRS WND THER DME>50 SQCM: CPT

## 2018-04-27 PROCEDURE — 85045 AUTOMATED RETICULOCYTE COUNT: CPT

## 2018-04-27 PROCEDURE — 83550 IRON BINDING TEST: CPT

## 2018-04-27 PROCEDURE — 84100 ASSAY OF PHOSPHORUS: CPT

## 2018-04-27 PROCEDURE — 80048 BASIC METABOLIC PNL TOTAL CA: CPT

## 2018-04-27 PROCEDURE — 99285 EMERGENCY DEPT VISIT HI MDM: CPT | Mod: 25

## 2018-04-27 PROCEDURE — 86160 COMPLEMENT ANTIGEN: CPT

## 2018-04-27 PROCEDURE — 81001 URINALYSIS AUTO W/SCOPE: CPT

## 2018-04-27 PROCEDURE — 82550 ASSAY OF CK (CPK): CPT

## 2018-04-27 PROCEDURE — 76377 3D RENDER W/INTRP POSTPROCES: CPT

## 2018-04-27 PROCEDURE — 97530 THERAPEUTIC ACTIVITIES: CPT

## 2018-04-27 PROCEDURE — 84550 ASSAY OF BLOOD/URIC ACID: CPT

## 2018-04-27 PROCEDURE — 85730 THROMBOPLASTIN TIME PARTIAL: CPT

## 2018-04-27 PROCEDURE — 87186 SC STD MICRODIL/AGAR DIL: CPT

## 2018-04-27 PROCEDURE — 93005 ELECTROCARDIOGRAM TRACING: CPT

## 2018-04-27 PROCEDURE — 96374 THER/PROPH/DIAG INJ IV PUSH: CPT

## 2018-04-27 PROCEDURE — 82310 ASSAY OF CALCIUM: CPT

## 2018-04-27 PROCEDURE — 85027 COMPLETE CBC AUTOMATED: CPT

## 2018-04-27 RX ORDER — CALCITRIOL 0.5 UG/1
1 CAPSULE ORAL
Qty: 30 | Refills: 0 | OUTPATIENT
Start: 2018-04-27

## 2018-04-27 RX ORDER — CALCITRIOL 0.5 UG/1
1 CAPSULE ORAL
Qty: 0 | Refills: 0 | COMMUNITY

## 2018-04-27 RX ORDER — WARFARIN SODIUM 2.5 MG/1
1 TABLET ORAL
Qty: 4 | Refills: 0 | OUTPATIENT
Start: 2018-04-27 | End: 2018-04-30

## 2018-04-27 RX ADMIN — Medication 50 MILLIGRAM(S): at 05:20

## 2018-04-27 RX ADMIN — Medication 100 MILLIGRAM(S): at 11:39

## 2018-04-27 RX ADMIN — Medication 500 MILLIGRAM(S): at 11:39

## 2018-04-27 RX ADMIN — Medication 80 MILLIGRAM(S): at 05:20

## 2018-04-27 RX ADMIN — Medication 200 MILLIGRAM(S): at 05:20

## 2018-04-27 RX ADMIN — AMLODIPINE BESYLATE 10 MILLIGRAM(S): 2.5 TABLET ORAL at 05:20

## 2018-04-27 RX ADMIN — NYSTATIN CREAM 1 APPLICATION(S): 100000 CREAM TOPICAL at 05:20

## 2018-04-27 RX ADMIN — Medication 10 MILLIGRAM(S): at 08:39

## 2018-04-27 RX ADMIN — Medication 325 MILLIGRAM(S): at 11:39

## 2018-04-27 RX ADMIN — Medication 1 TABLET(S): at 11:39

## 2018-04-27 RX ADMIN — CARVEDILOL PHOSPHATE 25 MILLIGRAM(S): 80 CAPSULE, EXTENDED RELEASE ORAL at 09:17

## 2018-04-27 NOTE — CHART NOTE - NSCHARTNOTEFT_GEN_A_CORE
Source: Patient [ x]    Family [ ]     other [ ] Medical records; Pt seen for LOS follow up. Chart reviewed and events noted - pt admitted with LLE cellulitis, wound vac in place    Diet : Low Sodium No Concentrated Potassium      Patient reports [ ] nausea  [ ] vomiting [ ] diarrhea [ ] constipation  [ ]chewing problems [ ] swallowing issues  [ ] other: No GI distress, +BM yesterday     PO intake:  < 50% [ ] 50-75% [ ]   % [x ]  other :     Source for PO intake [x ] Patient [ ] family [x ] chart [ ] staff [ ] other          Current Weight: Weight (kg): 118 (04-25 @ 15:23)  % Weight Change    Pertinent Medications: MEDICATIONS  (STANDING):  allopurinol 100 milliGRAM(s) Oral daily  amLODIPine   Tablet 10 milliGRAM(s) Oral daily  AQUAPHOR (petrolatum Ointment) 1 Application(s) Topical daily  ascorbic acid 500 milliGRAM(s) Oral daily  calcitriol   Capsule 0.25 MICROGram(s) Oral daily  carvedilol 25 milliGRAM(s) Oral every 12 hours  ergocalciferol 51333 Unit(s) Oral every week  ferrous    sulfate 325 milliGRAM(s) Oral daily  furosemide   Injectable 80 milliGRAM(s) IV Push daily  hydrALAZINE 50 milliGRAM(s) Oral three times a day  labetalol 200 milliGRAM(s) Oral three times a day  Nephro-alyse 1 Tablet(s) Oral daily  nystatin Powder 1 Application(s) Topical two times a day  predniSONE   Tablet 10 milliGRAM(s) Oral daily    MEDICATIONS  (PRN):  sodium chloride 0.65% Nasal 1 Spray(s) Both Nostrils four times a day PRN Nasal Congestion    Pertinent Labs:  04-26 Na142 mmol/L Glu 87 mg/dL K+ 3.9 mmol/L Cr  3.70 mg/dL<H> BUN 88 mg/dL<H> 04-24 Alb 3.0 g/dL<L>      Skin: +3 right leg and non pitting edema noted on left leg, stage 4 right ischium    Estimated Needs:   [ x] no change since previous assessment  [ ] recalculated:       Previous Nutrition Diagnosis: BMI > 40    [ ] Inadequate Energy Intake [ ]Inadequate Oral Intake [ ] Excessive Energy Intake     [ ] Underweight [ ] Increased Nutrient Needs [ ] Overweight/Obesity     [ ] Altered GI Function [ ] Unintended Weight Loss [x ] Food & Nutrition Related Knowledge Deficit [ ] Malnutrition          Nutrition Diagnosis is [x ] ongoing  [ ] resolved [ ] not applicable - diet education reviewed         New Nutrition Diagnosis: [x ] not applicable    [ ] Inadequate Protein Energy Intake [ ]Inadequate Oral Intake [ ] Excessive Energy Intake     [ ] Underweight [ ] Increased Nutrient Needs [ ] Overweight/Obesity     [ ] Altered GI Function [ ] Unintended Weight Loss [ ] Food & Nutrition Related Knowledge Deficit[ ] Limited Adherence to nutrition related recommendations [ ] Malnutrition  [ ] other: Free text       Related to:      As evidenced by:      Interventions:     Recommend    [ ] Change Diet To:    [ ] Nutrition Supplement    [ ] Nutrition Support    [x ] Other: CKD 4 and wt management diet education reviewed       Monitoring and Evaluation:     [x ] PO intake [x ] Tolerance to diet prescription [x ] weights [ ] follow up per protocol    [ ] other:

## 2018-07-06 ENCOUNTER — INPATIENT (INPATIENT)
Facility: HOSPITAL | Age: 58
LOS: 17 days | Discharge: HOME | DRG: 698 | End: 2018-07-24
Attending: INTERNAL MEDICINE | Admitting: INTERNAL MEDICINE
Payer: MEDICARE

## 2018-07-06 VITALS
OXYGEN SATURATION: 97 % | SYSTOLIC BLOOD PRESSURE: 108 MMHG | WEIGHT: 259.93 LBS | HEIGHT: 65 IN | RESPIRATION RATE: 19 BRPM | DIASTOLIC BLOOD PRESSURE: 66 MMHG | TEMPERATURE: 99 F | HEART RATE: 86 BPM

## 2018-07-06 DIAGNOSIS — L93.0 DISCOID LUPUS ERYTHEMATOSUS: ICD-10-CM

## 2018-07-06 DIAGNOSIS — T83.511A INFECTION AND INFLAMMATORY REACTION DUE TO INDWELLING URETHRAL CATHETER, INITIAL ENCOUNTER: ICD-10-CM

## 2018-07-06 DIAGNOSIS — Z29.9 ENCOUNTER FOR PROPHYLACTIC MEASURES, UNSPECIFIED: ICD-10-CM

## 2018-07-06 DIAGNOSIS — N17.9 ACUTE KIDNEY FAILURE, UNSPECIFIED: ICD-10-CM

## 2018-07-06 DIAGNOSIS — I10 ESSENTIAL (PRIMARY) HYPERTENSION: ICD-10-CM

## 2018-07-06 DIAGNOSIS — I71.2 THORACIC AORTIC ANEURYSM, WITHOUT RUPTURE: ICD-10-CM

## 2018-07-06 DIAGNOSIS — I26.99 OTHER PULMONARY EMBOLISM WITHOUT ACUTE COR PULMONALE: ICD-10-CM

## 2018-07-06 DIAGNOSIS — I50.9 HEART FAILURE, UNSPECIFIED: ICD-10-CM

## 2018-07-06 LAB
ALBUMIN SERPL ELPH-MCNC: 3.5 G/DL — SIGNIFICANT CHANGE UP (ref 3.3–5)
ALP SERPL-CCNC: 70 U/L — SIGNIFICANT CHANGE UP (ref 40–120)
ALT FLD-CCNC: 10 U/L — SIGNIFICANT CHANGE UP (ref 10–45)
ANION GAP SERPL CALC-SCNC: 17 MMOL/L — SIGNIFICANT CHANGE UP (ref 5–17)
APPEARANCE UR: ABNORMAL
AST SERPL-CCNC: 18 U/L — SIGNIFICANT CHANGE UP (ref 10–40)
BACTERIA # UR AUTO: ABNORMAL /HPF
BASOPHILS # BLD AUTO: 0 K/UL — SIGNIFICANT CHANGE UP (ref 0–0.2)
BASOPHILS NFR BLD AUTO: 0.2 % — SIGNIFICANT CHANGE UP (ref 0–2)
BILIRUB SERPL-MCNC: 0.9 MG/DL — SIGNIFICANT CHANGE UP (ref 0.2–1.2)
BILIRUB UR-MCNC: NEGATIVE — SIGNIFICANT CHANGE UP
BUN SERPL-MCNC: 84 MG/DL — HIGH (ref 7–23)
CALCIUM SERPL-MCNC: 8.7 MG/DL — SIGNIFICANT CHANGE UP (ref 8.4–10.5)
CHLORIDE SERPL-SCNC: 96 MMOL/L — SIGNIFICANT CHANGE UP (ref 96–108)
CO2 SERPL-SCNC: 24 MMOL/L — SIGNIFICANT CHANGE UP (ref 22–31)
COLOR SPEC: YELLOW — SIGNIFICANT CHANGE UP
CREAT SERPL-MCNC: 4.83 MG/DL — HIGH (ref 0.5–1.3)
DIFF PNL FLD: ABNORMAL
EOSINOPHIL # BLD AUTO: 0.1 K/UL — SIGNIFICANT CHANGE UP (ref 0–0.5)
EOSINOPHIL NFR BLD AUTO: 1.3 % — SIGNIFICANT CHANGE UP (ref 0–6)
GAS PNL BLDV: SIGNIFICANT CHANGE UP
GLUCOSE SERPL-MCNC: 86 MG/DL — SIGNIFICANT CHANGE UP (ref 70–99)
GLUCOSE UR QL: NEGATIVE — SIGNIFICANT CHANGE UP
HCT VFR BLD CALC: 26.7 % — LOW (ref 34.5–45)
HGB BLD-MCNC: 9 G/DL — LOW (ref 11.5–15.5)
KETONES UR-MCNC: NEGATIVE — SIGNIFICANT CHANGE UP
LEUKOCYTE ESTERASE UR-ACNC: ABNORMAL
LYMPHOCYTES # BLD AUTO: 0.9 K/UL — LOW (ref 1–3.3)
LYMPHOCYTES # BLD AUTO: 15.4 % — SIGNIFICANT CHANGE UP (ref 13–44)
MCHC RBC-ENTMCNC: 29.5 PG — SIGNIFICANT CHANGE UP (ref 27–34)
MCHC RBC-ENTMCNC: 33.7 GM/DL — SIGNIFICANT CHANGE UP (ref 32–36)
MCV RBC AUTO: 87.7 FL — SIGNIFICANT CHANGE UP (ref 80–100)
MONOCYTES # BLD AUTO: 0.7 K/UL — SIGNIFICANT CHANGE UP (ref 0–0.9)
MONOCYTES NFR BLD AUTO: 11.6 % — SIGNIFICANT CHANGE UP (ref 2–14)
NEUTROPHILS # BLD AUTO: 4.3 K/UL — SIGNIFICANT CHANGE UP (ref 1.8–7.4)
NEUTROPHILS NFR BLD AUTO: 71.5 % — SIGNIFICANT CHANGE UP (ref 43–77)
NITRITE UR-MCNC: NEGATIVE — SIGNIFICANT CHANGE UP
PH UR: 8.5 — HIGH (ref 5–8)
PLATELET # BLD AUTO: 125 K/UL — LOW (ref 150–400)
POTASSIUM SERPL-MCNC: 3.8 MMOL/L — SIGNIFICANT CHANGE UP (ref 3.5–5.3)
POTASSIUM SERPL-SCNC: 3.8 MMOL/L — SIGNIFICANT CHANGE UP (ref 3.5–5.3)
PROT SERPL-MCNC: 6.5 G/DL — SIGNIFICANT CHANGE UP (ref 6–8.3)
PROT UR-MCNC: 300 MG/DL
RBC # BLD: 3.05 M/UL — LOW (ref 3.8–5.2)
RBC # FLD: 14.9 % — HIGH (ref 10.3–14.5)
RBC CASTS # UR COMP ASSIST: ABNORMAL /HPF (ref 0–2)
SODIUM SERPL-SCNC: 137 MMOL/L — SIGNIFICANT CHANGE UP (ref 135–145)
SP GR SPEC: 1.01 — SIGNIFICANT CHANGE UP (ref 1.01–1.02)
UROBILINOGEN FLD QL: NEGATIVE — SIGNIFICANT CHANGE UP
WBC # BLD: 6 K/UL — SIGNIFICANT CHANGE UP (ref 3.8–10.5)
WBC # FLD AUTO: 6 K/UL — SIGNIFICANT CHANGE UP (ref 3.8–10.5)
WBC UR QL: SIGNIFICANT CHANGE UP /HPF (ref 0–5)

## 2018-07-06 PROCEDURE — 93306 TTE W/DOPPLER COMPLETE: CPT | Mod: 26

## 2018-07-06 PROCEDURE — 74176 CT ABD & PELVIS W/O CONTRAST: CPT | Mod: 26

## 2018-07-06 PROCEDURE — 93010 ELECTROCARDIOGRAM REPORT: CPT

## 2018-07-06 PROCEDURE — 99223 1ST HOSP IP/OBS HIGH 75: CPT

## 2018-07-06 PROCEDURE — 99284 EMERGENCY DEPT VISIT MOD MDM: CPT | Mod: 25

## 2018-07-06 RX ORDER — SODIUM CHLORIDE 9 MG/ML
1000 INJECTION INTRAMUSCULAR; INTRAVENOUS; SUBCUTANEOUS ONCE
Qty: 0 | Refills: 0 | Status: COMPLETED | OUTPATIENT
Start: 2018-07-06 | End: 2018-07-06

## 2018-07-06 RX ORDER — WARFARIN SODIUM 2.5 MG/1
4 TABLET ORAL ONCE
Qty: 0 | Refills: 0 | Status: COMPLETED | OUTPATIENT
Start: 2018-07-06 | End: 2018-07-06

## 2018-07-06 RX ORDER — CEFTRIAXONE 500 MG/1
1 INJECTION, POWDER, FOR SOLUTION INTRAMUSCULAR; INTRAVENOUS EVERY 24 HOURS
Qty: 0 | Refills: 0 | Status: DISCONTINUED | OUTPATIENT
Start: 2018-07-06 | End: 2018-07-09

## 2018-07-06 RX ORDER — LABETALOL HCL 100 MG
200 TABLET ORAL THREE TIMES A DAY
Qty: 0 | Refills: 0 | Status: DISCONTINUED | OUTPATIENT
Start: 2018-07-06 | End: 2018-07-08

## 2018-07-06 RX ORDER — CALCITRIOL 0.5 UG/1
0.25 CAPSULE ORAL DAILY
Qty: 0 | Refills: 0 | Status: DISCONTINUED | OUTPATIENT
Start: 2018-07-06 | End: 2018-07-24

## 2018-07-06 RX ORDER — ALLOPURINOL 300 MG
100 TABLET ORAL DAILY
Qty: 0 | Refills: 0 | Status: DISCONTINUED | OUTPATIENT
Start: 2018-07-06 | End: 2018-07-24

## 2018-07-06 RX ORDER — ACETAMINOPHEN 500 MG
975 TABLET ORAL ONCE
Qty: 0 | Refills: 0 | Status: COMPLETED | OUTPATIENT
Start: 2018-07-06 | End: 2018-07-06

## 2018-07-06 RX ORDER — HYDRALAZINE HCL 50 MG
50 TABLET ORAL THREE TIMES A DAY
Qty: 0 | Refills: 0 | Status: DISCONTINUED | OUTPATIENT
Start: 2018-07-06 | End: 2018-07-08

## 2018-07-06 RX ORDER — CARVEDILOL PHOSPHATE 80 MG/1
25 CAPSULE, EXTENDED RELEASE ORAL EVERY 12 HOURS
Qty: 0 | Refills: 0 | Status: DISCONTINUED | OUTPATIENT
Start: 2018-07-06 | End: 2018-07-08

## 2018-07-06 RX ORDER — CEFTRIAXONE 500 MG/1
1 INJECTION, POWDER, FOR SOLUTION INTRAMUSCULAR; INTRAVENOUS ONCE
Qty: 0 | Refills: 0 | Status: COMPLETED | OUTPATIENT
Start: 2018-07-06 | End: 2018-07-06

## 2018-07-06 RX ORDER — AMLODIPINE BESYLATE 2.5 MG/1
10 TABLET ORAL DAILY
Qty: 0 | Refills: 0 | Status: DISCONTINUED | OUTPATIENT
Start: 2018-07-06 | End: 2018-07-24

## 2018-07-06 RX ADMIN — WARFARIN SODIUM 4 MILLIGRAM(S): 2.5 TABLET ORAL at 23:34

## 2018-07-06 RX ADMIN — CARVEDILOL PHOSPHATE 25 MILLIGRAM(S): 80 CAPSULE, EXTENDED RELEASE ORAL at 23:34

## 2018-07-06 RX ADMIN — Medication 975 MILLIGRAM(S): at 15:33

## 2018-07-06 RX ADMIN — SODIUM CHLORIDE 1000 MILLILITER(S): 9 INJECTION INTRAMUSCULAR; INTRAVENOUS; SUBCUTANEOUS at 15:33

## 2018-07-06 RX ADMIN — CEFTRIAXONE 100 GRAM(S): 500 INJECTION, POWDER, FOR SOLUTION INTRAMUSCULAR; INTRAVENOUS at 15:35

## 2018-07-06 NOTE — ED PROVIDER NOTE - OBJECTIVE STATEMENT
Patient is 58 F w/ reported PMHx SLE, systolic HFrEF(EF 32%) s/p AICD, HTN, CKD III- IV, PE / left leg DVT dx 2009, HIT, CAD (no stents), stage IV sacral ulcer, chronic Hook, recurrent pericardial effusion, AAA ~14cm in 12/2016 s/p repair in 2010 w/ aortoiliac stent complicated post-operatively with development of left lower extremity compartment syndrome presents to ED c/o Patient is 58 F w/ reported PMHx SLE, systolic HFrEF(EF 32%) s/p AICD, HTN, CKD III- IV, PE / left leg DVT dx 2009, HIT, CAD (no stents), stage IV sacral ulcer, neurogenic bladder w/ chronic Hook,  AAA ~14cm in 12/2016 s/p repair in 2010 w/ aortoiliac stent complicated post-operatively with development of left lower extremity compartment syndrome presents to ED c/o decreased urine output x yesterday w/ blood in urine. Pt states her daughter noticed her Hook bag did not have urine inside, and became worried. Pt states she has been with chills and generalized body aches x few days. Pt denies fever, nausea, vomiting, chest pain, sob, dyspnea, abdominal pain, back pain or other associated symptoms.

## 2018-07-06 NOTE — ED ADULT NURSE NOTE - PMH
Adenomatous Goiter (ICD9 241.9)    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)    Calf DVT (Deep Venous Thrombosis) (ICD9 453.42)    CHF (Congestive Heart Failure) (ICD9 428.0)    Chronic Gout (ICD9 274.02)    Compartment Syndrome  fasciotomy LLE  History of Pulmonary Embolism  2009  HIT (Heparin-Induced Thrombocytopenia)    HTN (Hypertension)    Iliac Aneurysm (ICD9 442.2)  repair  Iliac Aneurysm (ICD9 442.2)  left iliac aneurysm repair  Iliac Aneurysm (ICD9 442.2)  endoleak l iliac aneurysm repair  LBBB (Left Bundle Branch Block) (ICD9 426.3)    Morbid obesity

## 2018-07-06 NOTE — H&P ADULT - PROBLEM SELECTOR PLAN 2
Unclear etiology, associated with decreased urine output. Pt with fox in place with occasional leakage noted on bedside exam with RN by bedside. no hydro seen on CT abd. SLE flare contributing?  -Trend BMP  -Will hold home diuretics/furosemide given ILANA  -Would consult nephrology Dr. Stanley in AM

## 2018-07-06 NOTE — ED PROVIDER NOTE - PHYSICAL EXAMINATION
On exam appears comfortable. VSs noted, neck supple, lungs CTA, cardiac sounds s/ audible m/r/g, abdomen soft, ND c/ some non-focal discomfort on palpation s/ rebound or guarding, extremities c/ asymmetry: LLE much > RLE in size, skin s/ rash c/ gluteal wound that is s/ erythema, edema or drainage and neurologically at her baseline.

## 2018-07-06 NOTE — H&P ADULT - PROBLEM SELECTOR PLAN 1
UA positive. Unclear if decreased urine output associated with leakage from fox. Pt also on blood thinners which may have contributed to hematuria? Urine is currently dark appearing.  -Cont. ceftriaxone  -F/u UCx, BCxs  -Unclear if pt would benefit from fox exchange  -Trend Strict I/Os  -See below regarding possible further work up

## 2018-07-06 NOTE — H&P ADULT - PROBLEM SELECTOR PLAN 3
New thoracic aorta aneurysm seen on CT chest. TTE was performed which was reportedly stable compared to prior. Will defer CTA for now given lack of symptoms and new ILANA.  -Consider Vascular surgery consult vs possibly CT surgery?

## 2018-07-06 NOTE — H&P ADULT - PROBLEM SELECTOR PLAN 5
Severely reduced EF. S/p ICD due to severe cardiomyopathy. Paced rhythm on EKG  -Cont. coreg 25mg BID  -Holding furosemide 40mg BID due to ILANA for now Severely reduced EF. S/p ICD due to severe cardiomyopathy. Paced rhythm on EKG. Pt states her LE edema is at baseline and her LLE has been bigger than her RLE since prior vascular surgery.   -Cont. coreg 25mg BID  -Holding furosemide 40mg BID due to ILANA for now

## 2018-07-06 NOTE — H&P ADULT - HISTORY OF PRESENT ILLNESS
58F w/ SLE on prednisone, HFrEF s/p ICD, CKD III-IV, Hx PE/HIT, HTN, CAD no stents, Stage IV decub p/w decreased urine output and blood in urine. Pt states she was not in significant distress this week until today. She states her daughter checked her fox and noticed that there as a lack of urine output and the urine had a red shade to it. Given these symptoms, pt returned to ER for further evaluation. Pt also complains of generalized chills and back aches this week. Backaches were noted to be worse today. Pt denies any cough, abdominal pain, suprapubic pain, sick contacts, fevers, chills. Has not changed her medications recently. Pt endorses swelling of her hands yesterday bilaterally which has resolved today. Did not have any arthralgia of her knees which she usually gets in SLE flares.     In ER: Given ceftriaxone 1gm, NS 1L, Tylenol 975mg 58F w/ SLE on prednisone, HFrEF s/p ICD, CKD III-IV, Hx PE/HIT, HTN, CAD no stents, Stage IV decub p/w decreased urine output and blood in urine. Pt states she was not in significant distress this week until today. She states her daughter checked her fox and noticed that there as a lack of urine output and the urine had a red shade to it. Given these symptoms, pt returned to ER for further evaluation. Pt also complains of generalized chills and back aches this week. Backaches were noted to be worse today. Pt denies any cough, abdominal pain, suprapubic pain, sick contacts, fevers, chills. Has not changed her medications recently. Pt endorses swelling of her hands yesterday bilaterally which has resolved today. Did not have any arthralgia of her knees which she usually gets in SLE flares. Pt has fox exchanged 2 weeks ago without reported issues.     In ER: Given ceftriaxone 1gm, NS 1L, Tylenol 975mg

## 2018-07-06 NOTE — H&P ADULT - ASSESSMENT
58F w/ SLE on prednisone, HFrEF s/p ICD, CKD III-IV, Hx PE/HIT, HTN, CAD no stents, Stage IV decub p/w decreased urine output, hematuria and concurrent UTI

## 2018-07-06 NOTE — ED ADULT NURSE NOTE - NS ED NURSE REPORT GIVEN TO FT
Report given to on nurse Beulah [id number]. Understands pmh, medications given and plan of care for patient. Patient in stable condition, vital signs updated, has no complaints at this time and has been updated on care plan. Explained to patient that it is change of shift and new nurse is taking over, pt verbalized understanding.

## 2018-07-06 NOTE — ED ADULT NURSE NOTE - OBJECTIVE STATEMENT
57 y/o female BIBA for decreased urine output. Pt states she has chronic Hook's, last placed two weeks ago d/t right buttock stage 4 wound. 20 cc or urine present in Hook at time of arrival. Denies chest pain, sob, ha, n/v/d, abdominal pain, f/c, urinary symptoms, hematuria. A&Ox4, vss, skin warm dry and intact, MAEx4, lungs CTA, abd obese and tender to touch lower abd area, left LE edema, right buttocks stage 4 pressure ulcer. Pt resting comfortably with VSS, no complaints at this time. Patient's bed in the lowest position, explained plan of care to patient and family members. Will continue to reassess.

## 2018-07-06 NOTE — ED PROVIDER NOTE - CARE PLAN
Principal Discharge DX:	Urinary tract infection associated with catheterization of urinary tract, unspecified indwelling urinary catheter type, initial encounter  Secondary Diagnosis:	Thoracic aortic aneurysm without rupture

## 2018-07-06 NOTE — H&P ADULT - PROBLEM SELECTOR PLAN 4
Unclear of SLE exacerbation. Some swelling in hands yesterday, diffuse body aches and kidney symptoms.  -Cont. prednisone 10mg daily  -Would consult rheumatology in AM, Dr. Goldberg is pt's rheum  -F/u Anti-smith antibody, anti DS DNA

## 2018-07-06 NOTE — H&P ADULT - NSHPPHYSICALEXAM_GEN_ALL_CORE
Vital Signs Last 24 Hrs  T(C): 37.4 (07-06-18 @ 21:56), Max: 38 (07-06-18 @ 14:43)  T(F): 99.4 (07-06-18 @ 21:56), Max: 100.4 (07-06-18 @ 14:43)  HR: 84 (07-06-18 @ 21:56) (84 - 90)  BP: 121/76 (07-06-18 @ 21:56) (105/56 - 121/76)  BP(mean): --  RR: 18 (07-06-18 @ 21:56) (17 - 19)  SpO2: 96% (07-06-18 @ 21:56) (96% - 98%)

## 2018-07-06 NOTE — H&P ADULT - PROBLEM SELECTOR PLAN 6
-Trend vital signs, BP meds with hold parameters  -Cont. labetalol 200mg TID  -Cont. hydralazine 50mg TID  -Cont. amlodipine

## 2018-07-07 DIAGNOSIS — I77.810 THORACIC AORTIC ECTASIA: ICD-10-CM

## 2018-07-07 LAB
ANION GAP SERPL CALC-SCNC: 22 MMOL/L — HIGH (ref 5–17)
BASOPHILS # BLD AUTO: 0.01 K/UL — SIGNIFICANT CHANGE UP (ref 0–0.2)
BASOPHILS NFR BLD AUTO: 0.2 % — SIGNIFICANT CHANGE UP (ref 0–2)
BUN SERPL-MCNC: 83 MG/DL — HIGH (ref 7–23)
CALCIUM SERPL-MCNC: 8.6 MG/DL — SIGNIFICANT CHANGE UP (ref 8.4–10.5)
CHLORIDE SERPL-SCNC: 96 MMOL/L — SIGNIFICANT CHANGE UP (ref 96–108)
CO2 SERPL-SCNC: 20 MMOL/L — LOW (ref 22–31)
CREAT SERPL-MCNC: 4.71 MG/DL — HIGH (ref 0.5–1.3)
EOSINOPHIL # BLD AUTO: 0.1 K/UL — SIGNIFICANT CHANGE UP (ref 0–0.5)
EOSINOPHIL NFR BLD AUTO: 1.9 % — SIGNIFICANT CHANGE UP (ref 0–6)
ERYTHROCYTE [SEDIMENTATION RATE] IN BLOOD: 55 MM/HR — HIGH (ref 0–20)
GLUCOSE SERPL-MCNC: 75 MG/DL — SIGNIFICANT CHANGE UP (ref 70–99)
HCT VFR BLD CALC: 27.5 % — LOW (ref 34.5–45)
HGB BLD-MCNC: 8.6 G/DL — LOW (ref 11.5–15.5)
IMM GRANULOCYTES NFR BLD AUTO: 0.2 % — SIGNIFICANT CHANGE UP (ref 0–1.5)
INR BLD: 3.27 RATIO — HIGH (ref 0.88–1.16)
LYMPHOCYTES # BLD AUTO: 0.72 K/UL — LOW (ref 1–3.3)
LYMPHOCYTES # BLD AUTO: 13.5 % — SIGNIFICANT CHANGE UP (ref 13–44)
MAGNESIUM SERPL-MCNC: 2.1 MG/DL — SIGNIFICANT CHANGE UP (ref 1.6–2.6)
MCHC RBC-ENTMCNC: 27.1 PG — SIGNIFICANT CHANGE UP (ref 27–34)
MCHC RBC-ENTMCNC: 31.3 GM/DL — LOW (ref 32–36)
MCV RBC AUTO: 86.8 FL — SIGNIFICANT CHANGE UP (ref 80–100)
MONOCYTES # BLD AUTO: 0.45 K/UL — SIGNIFICANT CHANGE UP (ref 0–0.9)
MONOCYTES NFR BLD AUTO: 8.4 % — SIGNIFICANT CHANGE UP (ref 2–14)
NEUTROPHILS # BLD AUTO: 4.06 K/UL — SIGNIFICANT CHANGE UP (ref 1.8–7.4)
NEUTROPHILS NFR BLD AUTO: 75.8 % — SIGNIFICANT CHANGE UP (ref 43–77)
PLATELET # BLD AUTO: 161 K/UL — SIGNIFICANT CHANGE UP (ref 150–400)
POTASSIUM SERPL-MCNC: 3.7 MMOL/L — SIGNIFICANT CHANGE UP (ref 3.5–5.3)
POTASSIUM SERPL-SCNC: 3.7 MMOL/L — SIGNIFICANT CHANGE UP (ref 3.5–5.3)
PROTHROM AB SERPL-ACNC: 37.8 SEC — HIGH (ref 10–13.1)
RBC # BLD: 3.17 M/UL — LOW (ref 3.8–5.2)
RBC # FLD: 15.9 % — HIGH (ref 10.3–14.5)
SODIUM SERPL-SCNC: 138 MMOL/L — SIGNIFICANT CHANGE UP (ref 135–145)
WBC # BLD: 5.35 K/UL — SIGNIFICANT CHANGE UP (ref 3.8–10.5)
WBC # FLD AUTO: 5.35 K/UL — SIGNIFICANT CHANGE UP (ref 3.8–10.5)

## 2018-07-07 RX ORDER — WARFARIN SODIUM 2.5 MG/1
4 TABLET ORAL ONCE
Qty: 0 | Refills: 0 | Status: DISCONTINUED | OUTPATIENT
Start: 2018-07-07 | End: 2018-07-07

## 2018-07-07 RX ORDER — ASCORBIC ACID 60 MG
500 TABLET,CHEWABLE ORAL DAILY
Qty: 0 | Refills: 0 | Status: DISCONTINUED | OUTPATIENT
Start: 2018-07-07 | End: 2018-07-24

## 2018-07-07 RX ORDER — PETROLATUM,WHITE
1 JELLY (GRAM) TOPICAL THREE TIMES A DAY
Qty: 0 | Refills: 0 | Status: DISCONTINUED | OUTPATIENT
Start: 2018-07-07 | End: 2018-07-24

## 2018-07-07 RX ORDER — WARFARIN SODIUM 2.5 MG/1
2 TABLET ORAL ONCE
Qty: 0 | Refills: 0 | Status: COMPLETED | OUTPATIENT
Start: 2018-07-07 | End: 2018-07-07

## 2018-07-07 RX ORDER — ZINC SULFATE TAB 220 MG (50 MG ZINC EQUIVALENT) 220 (50 ZN) MG
220 TAB ORAL DAILY
Qty: 0 | Refills: 0 | Status: DISCONTINUED | OUTPATIENT
Start: 2018-07-07 | End: 2018-07-24

## 2018-07-07 RX ADMIN — Medication 10 MILLIGRAM(S): at 09:26

## 2018-07-07 RX ADMIN — Medication 1 APPLICATION(S): at 14:03

## 2018-07-07 RX ADMIN — Medication 50 MILLIGRAM(S): at 14:03

## 2018-07-07 RX ADMIN — AMLODIPINE BESYLATE 10 MILLIGRAM(S): 2.5 TABLET ORAL at 09:26

## 2018-07-07 RX ADMIN — Medication 50 MILLIGRAM(S): at 05:40

## 2018-07-07 RX ADMIN — Medication 200 MILLIGRAM(S): at 05:40

## 2018-07-07 RX ADMIN — Medication 500 MILLIGRAM(S): at 17:40

## 2018-07-07 RX ADMIN — Medication 200 MILLIGRAM(S): at 14:03

## 2018-07-07 RX ADMIN — CEFTRIAXONE 100 GRAM(S): 500 INJECTION, POWDER, FOR SOLUTION INTRAMUSCULAR; INTRAVENOUS at 15:37

## 2018-07-07 RX ADMIN — Medication 1 TABLET(S): at 11:29

## 2018-07-07 RX ADMIN — CARVEDILOL PHOSPHATE 25 MILLIGRAM(S): 80 CAPSULE, EXTENDED RELEASE ORAL at 09:26

## 2018-07-07 RX ADMIN — CALCITRIOL 0.25 MICROGRAM(S): 0.5 CAPSULE ORAL at 11:29

## 2018-07-07 RX ADMIN — Medication 1 APPLICATION(S): at 21:32

## 2018-07-07 RX ADMIN — Medication 100 MILLIGRAM(S): at 11:29

## 2018-07-07 RX ADMIN — WARFARIN SODIUM 2 MILLIGRAM(S): 2.5 TABLET ORAL at 21:33

## 2018-07-07 RX ADMIN — ZINC SULFATE TAB 220 MG (50 MG ZINC EQUIVALENT) 220 MILLIGRAM(S): 220 (50 ZN) TAB at 17:40

## 2018-07-07 NOTE — DIETITIAN INITIAL EVALUATION ADULT. - NS AS NUTRI INTERV MEALS SNACK
1) Recommend DASH diet. Defer potassium restriction given level WDL since admission & pt knowledgeable of high potassium foods 2) Provide food preferences within dietary restrictions when feasible 3) Encouraged good PO intake of meals

## 2018-07-07 NOTE — PHYSICAL THERAPY INITIAL EVALUATION ADULT - PLANNED THERAPY INTERVENTIONS, PT EVAL
balance training/bed mobility training/strengthening/transfer training/local wound care bed mobility training/local wound care/transfer training/strengthening

## 2018-07-07 NOTE — DIETITIAN INITIAL EVALUATION ADULT. - NS AS NUTRI INTERV ED CONTENT
Provided diet education regarding coumadin + vitamin K interaction, food sources high/low in vitamin K, and importance of consistent daily intake. Provided coumadin booklet. Reviewed food sources high/low in potassium as per patient's request as well as written lists from [employer]. Provided diet education regarding heart healthy eating with focus on low sodium diet. Encouraged patient to monitor fluid intake and check for swelling of extremities daily if unable to weigh herself. Provided written materials regarding heart healthy nutrition therapy from [employer]

## 2018-07-07 NOTE — DIETITIAN INITIAL EVALUATION ADULT. - ADHERENCE
Pt with extensive PMH including HFrEF, CKD stage III/IV, HTN, CAD. States she follows a low sodium, low potassium diet at home. States her daughter cooks with no added salt, does not give her mother table salt, and that she reads all labels for sodium content. Pt avoids foods high in potassium with good teach back noted. Pt also take coumadin daily. States she tries to keep a consistent intake of foods sources of vitamin K from day-to-day with good teach back noted. Amenable to diet education review & written materials/fair fair/Pt with extensive PMH including HFrEF, CKD stage III/IV, HTN, CAD. States she follows a low sodium, low potassium diet at home. States her daughter cooks with no added salt, does not give her mother table salt, and that she reads all labels for sodium content. States her daughter monitors her fluid intake daily to prevent fluid overloading. States she cannot weigh herself daily for wt changes but does check for LE swelling. Pt avoids foods high in potassium with good teach back noted. Pt also take coumadin daily. States she tries to keep a consistent intake of foods sources of vitamin K from day-to-day with good teach back noted. Amenable to diet education review & written materials

## 2018-07-07 NOTE — DIETITIAN INITIAL EVALUATION ADULT. - OTHER INFO
Nutrition consult received for stage II or greater pressure ulcer. Reports good appetite & PO intakes during this admission. Reports NKFA. States she takes renal MVI, ferrous sulfate, vitamin C, and vitamin D daily at home. Denies chewing/swallowing difficulty. Reports intermittent nausea without emesis for which she sips on tea/ginger ale. Last BM 7/7 per chart

## 2018-07-07 NOTE — PHYSICAL THERAPY INITIAL EVALUATION ADULT - LEVEL OF INDEPENDENCE: SCOOT/BRIDGE, REHAB EVAL
supervision/minimum assist (75% patients effort)/In sitting- able to perform with supervision, requiring min assist when fatigued

## 2018-07-07 NOTE — DIETITIAN INITIAL EVALUATION ADULT. - PROBLEM SELECTOR PLAN 5
Severely reduced EF. S/p ICD due to severe cardiomyopathy. Paced rhythm on EKG. Pt states her LE edema is at baseline and her LLE has been bigger than her RLE since prior vascular surgery.   -Cont. coreg 25mg BID  -Holding furosemide 40mg BID due to ILANA for now

## 2018-07-07 NOTE — CHART NOTE - NSCHARTNOTEFT_GEN_A_CORE
Upon Nutritional Assessment by the Registered Dietitian your patient was determined to meet criteria / has evidence of the following diagnosis/diagnoses:          [x] Morbid Obesity / BMI > 40      Findings as based on:  [x] Comprehensive nutrition assessment   [ ] Nutrition Focused Physical Exam  [x] Other: medical record    BMI 43.3 Kg/m^2      Nutrition Plan/Recommendations:      See dietitian initial assessment (7/7/18) for recommendations    PROVIDER Section:     By signing this assessment you are acknowledging and agree with the diagnosis/diagnoses assigned by the Registered Dietitian    Comments:

## 2018-07-07 NOTE — PHYSICAL THERAPY INITIAL EVALUATION ADULT - RANGE OF MOTION EXAMINATION, REHAB EVAL
bilateral upper extremity ROM was WFL (within functional limits)/bilateral lower extremity ROM was WFL (within functional limits)/L knee limited flexion

## 2018-07-07 NOTE — DIETITIAN INITIAL EVALUATION ADULT. - NS FNS REASON FOR WEIGHT CHANG
Pt reports variable weight history ranging from 177 to 300 pounds over the past years. States she believes she is currently Pt reports variable weight history ranging from 177 to 300 pounds over the past years. Confirmed variable wt history with previous RD note (4/20/18) where RD recorded consistent weight history & current weight of 286 pounds at that time. States she believes she is currently ~262 pounds. Noted admit weight of 259.4 pounds during this admission- ?fluid shifts vs wt loss since previous admission

## 2018-07-07 NOTE — CONSULT NOTE ADULT - ASSESSMENT
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 +UTI. On CT abd/pelvis, incidental finding of new dilatation of asc thoracic aorta.  BP well controlled, patient denies chest pain.       D/w on call attending Dr. Mohamud

## 2018-07-07 NOTE — CONSULT NOTE ADULT - SUBJECTIVE AND OBJECTIVE BOX
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 Pt states she was not in significant distress this week until yesterday, 7/6. She states her daughter checked her Fox and noticed that there as a lack of urine output and the urine had a red shade to it. Given these symptoms, pt returned to ER for further evaluation. Pt also complains of generalized chills and back aches this week. Pt denies any cough, abdominal pain, suprapubic pain, sick contacts, fevers, chills. Has not changed her medications recently. Pt endorses swelling of her hands yesterday bilaterally which has resolved today. Did not have any arthralgia of her knees which she usually gets in SLE flares. Pt has fox exchanged 2 weeks ago without reported issues. Found to have UTI, admitted for treatment. CT abd/pelvis performed revealed thoracic ascending aortic aneurysm. CT Surgery consulted. Patient seen and examined, in NAD. Denies any chest pain, palpitations, SOB, HERNANDEZ, abdominal pain, dizziness, at this time.     REVIEW OF SYSTEMS:  CONSTITUTIONAL: Denies fever, weight loss, or fatigue  EYES: Denies eye pain, visual disturbances, or discharge  ENMT:  Denies difficulty hearing, tinnitus, vertigo, sinus or throat pain  NECK: Denies pain or stiffness  RESPIRATORY: Denies SOB, cough, wheezing, chills or hemoptysis  CARDIOVASCULAR: Denies chest pain, palpitations, dizziness, or leg swelling  GASTROINTESTINAL: Denies abdominal pain, nausea, vomiting, hematemesis, diarrhea, melena  GENITOURINARY: Denies dysuria, frequency, hematuria, or incontinence  NEUROLOGICAL: Denies headaches, memory loss, loss of strength, numbness, or tremors  SKIN: Denies itching, burning, rashes, or lesions   LYMPHATIC: Denies lymphadenopathy  ENDOCRINE: Denies heat or cold intolerance, hair loss  MUSCULOSKELETAL: Denies joint pain or swelling, muscle, back, or extremity pain  PSYCHIATRIC: Denies depression, anxiety, mood swings, or difficulty sleeping  HEME/LYMPH: Denies easy bruising, denies bleeding gums and mucous membranes   ALLERGY: Denies hives or eczema    General: NAD  HEENT:  NC/AT  Neuro: A&Ox4, gait steady, speech clear, no focal deficits noted  Respiratory: B/L BS CTA, no wheeze, no rhonchi, no crackles noted  Cardiovascular: RRR, normal S1S2, no murmur noted  GI: Abd soft, NT/ND, +BSx4Q +BM  Peripheral Vascular:  B/L LE trace edema, 2+ peripheral pulses, no clubbing, cyanosis, varicosities/PVD noted  Musculoskeletal: B/L UE and LE 5/5 strength   Psychiatric: Normal mood, normal affect observed  Skin: Normal exam to inspection and palpation    Results:                         8.6    5.35  )-----------( 161      ( 07 Jul 2018 08:28 )             27.5     07-07    138  |  96  |  83<H>  ----------------------------<  75  3.7   |  20<L>  |  4.71<H>    Ca    8.6      07 Jul 2018 07:00  Mg     2.1     07-07    TPro  6.5  /  Alb  3.5  /  TBili  0.9  /  DBili  x   /  AST  18  /  ALT  10  /  AlkPhos  70  07-06    PT/INR - ( 07 Jul 2018 08:28 )   PT: 37.8 sec;   INR: 3.27 ratio         < from: CT Abdomen and Pelvis No Cont (07.06.18 @ 15:21) >  IMPRESSION:   Patient is status post left internal iliac endovascular aortic repair   with interval decrease in large aneurysm.    Moderate-sized pericardial effusion unchanged.  New dilatation of the ascending thoracic aorta since 10/11/2017. Further   evaluation first with echocardiography is advised.A contrast-enhanced   CTA of the chest can also be considered, depending on results of   echocardiography.    < end of copied text >    < from: Transthoracic Echocardiogram (07.06.18 @ 16:13) >  Conclusions:  1. Mitral annular calcification and calcified mitral  leaflets with normal diastolic opening. Mild-moderate  mitral regurgitation.  2. Moderate concentric left ventricular hypertrophy.  3.  Overall severely reduced  left ventricular systolic  function.  4. A device wire is noted in the right heart.  5. Right ventricular cavity is small  with decreased right  ventricular systolic function.  6. Estimated pulmonary artery systolic pressure equals 42  mm Hg, assuming right atrial pressure equals 8 mm Hg,  consistent with mild pulmonary pressures.  7. Large 2.3cm pericardial effusion posterior to the LV,  2.2cm at Right atrium, 1.4cm at mid- distal RV.  RV  pressure is 42mmHg, RV cavity underfilled. No RA or RV  collapse seen.  HR 80s  *** Compared with echocardiogram of 11/26/2017, no  significant changes noted. Large effusion also seen on  previous studies (11/26/ 2017 and 10/12/17)  Discussed with Lucas Retana in ER    < end of copied text >    Urinalysis + Microscopic Examination (07.06.18 @ 14:45)    Glucose Qualitative, Urine: Negative    Color: Yellow    Blood, Urine: Small    Urine Appearance: Turbid    Urobilinogen: Negative    Specific Gravity: 1.015    Protein, Urine: 300 mg/dL    Leukocyte Esterase Concentration: Large    Nitrite: Negative    Bilirubin: Negative    Ketone - Urine: Negative    pH Urine: 8.5    Red Blood Cell - Urine: 5-10 /HPF    White Blood Cell - Urine: 26-50 /HPF    Bacteria: Moderate /HPF

## 2018-07-07 NOTE — PHYSICAL THERAPY INITIAL EVALUATION ADULT - LEVEL OF INDEPENDENCE: BED TO CHAIR, REHAB EVAL
supervision/partial transfer using seated popover technique, not completed 2/2 back pain and fatigue

## 2018-07-07 NOTE — DIETITIAN INITIAL EVALUATION ADULT. - ORAL INTAKE PTA
Patient endorsed a good appetite & PO intake PTA. Lives with her daughter who does the food shopping/meal preparation at home. Also has a HHA who works 7 days a week either from 1:30-4:30pm or 2:30-5:30pm. Usual Diet Recall: Breakfast- vanilla yogurt with granola, fresh fruit, herbal tea Lunch- tuna packed in water, fruit Dinner- chicken/fish/occasionally steak, vegetables, (carrots, squash, corn, peppers) Snacks- vanilla wafers, granola bars. Pt states her daughter has been limiting her portion sizes at meals and states she rarely has take-out foods but if she does it will be Chinese (steamed broccoli, grilled chicken)/good

## 2018-07-07 NOTE — PHYSICAL THERAPY INITIAL EVALUATION ADULT - ADDITIONAL COMMENTS
Pt lives with daughter in apartment with 2 steps to enter. Pt uses wheelchair for locomotion. Is able to transfer bed to chair independently either using rolling walker. Has patient lift device at home, has not been needing to use it. Pt states is trying to relocate to an apartment with no steps. Currently is bumped down and up the steps in her chair with assist. Has HHA 3 hrs 5 days/wk.

## 2018-07-07 NOTE — DIETITIAN INITIAL EVALUATION ADULT. - ENERGY NEEDS
Ht 65 inches Wt 259.4 pounds BMI 43.2 Kg/m^2   pounds +/- 10%; 208% IBW  Edema: 2+ right leg, 3+ left leg, 4+ left foot edema Skin: stage IV right ischium pressure injury  Other pertinent information: 57 yo female with PMH of SLE on prednisone, HFrEF S/P ICD, CKD stage III/IV, PE, HTN, CAD, stage IV decubitus ulcer admitted with decreased urinary output, blood in urine, ?UTI

## 2018-07-08 DIAGNOSIS — I95.9 HYPOTENSION, UNSPECIFIED: ICD-10-CM

## 2018-07-08 DIAGNOSIS — I50.9 HEART FAILURE, UNSPECIFIED: ICD-10-CM

## 2018-07-08 DIAGNOSIS — I31.3 PERICARDIAL EFFUSION (NONINFLAMMATORY): ICD-10-CM

## 2018-07-08 DIAGNOSIS — N39.0 URINARY TRACT INFECTION, SITE NOT SPECIFIED: ICD-10-CM

## 2018-07-08 LAB
ANION GAP SERPL CALC-SCNC: 17 MMOL/L — SIGNIFICANT CHANGE UP (ref 5–17)
BUN SERPL-MCNC: 83 MG/DL — HIGH (ref 7–23)
CALCIUM SERPL-MCNC: 8.6 MG/DL — SIGNIFICANT CHANGE UP (ref 8.4–10.5)
CHLORIDE SERPL-SCNC: 96 MMOL/L — SIGNIFICANT CHANGE UP (ref 96–108)
CO2 SERPL-SCNC: 22 MMOL/L — SIGNIFICANT CHANGE UP (ref 22–31)
CREAT SERPL-MCNC: 5.07 MG/DL — HIGH (ref 0.5–1.3)
GLUCOSE SERPL-MCNC: 113 MG/DL — HIGH (ref 70–99)
HCT VFR BLD CALC: 25.2 % — LOW (ref 34.5–45)
HGB BLD-MCNC: 8.2 G/DL — LOW (ref 11.5–15.5)
INR BLD: 4.56 RATIO — HIGH (ref 0.88–1.16)
MCHC RBC-ENTMCNC: 27.4 PG — SIGNIFICANT CHANGE UP (ref 27–34)
MCHC RBC-ENTMCNC: 32.5 GM/DL — SIGNIFICANT CHANGE UP (ref 32–36)
MCV RBC AUTO: 84.3 FL — SIGNIFICANT CHANGE UP (ref 80–100)
PLATELET # BLD AUTO: 161 K/UL — SIGNIFICANT CHANGE UP (ref 150–400)
POTASSIUM SERPL-MCNC: 3.7 MMOL/L — SIGNIFICANT CHANGE UP (ref 3.5–5.3)
POTASSIUM SERPL-SCNC: 3.7 MMOL/L — SIGNIFICANT CHANGE UP (ref 3.5–5.3)
PROTHROM AB SERPL-ACNC: 53.1 SEC — HIGH (ref 10–13.1)
RBC # BLD: 2.99 M/UL — LOW (ref 3.8–5.2)
RBC # FLD: 15.9 % — HIGH (ref 10.3–14.5)
SODIUM SERPL-SCNC: 135 MMOL/L — SIGNIFICANT CHANGE UP (ref 135–145)
WBC # BLD: 6.32 K/UL — SIGNIFICANT CHANGE UP (ref 3.8–10.5)
WBC # FLD AUTO: 6.32 K/UL — SIGNIFICANT CHANGE UP (ref 3.8–10.5)

## 2018-07-08 PROCEDURE — 51702 INSERT TEMP BLADDER CATH: CPT

## 2018-07-08 RX ORDER — SODIUM CHLORIDE 9 MG/ML
1000 INJECTION INTRAMUSCULAR; INTRAVENOUS; SUBCUTANEOUS
Qty: 0 | Refills: 0 | Status: DISCONTINUED | OUTPATIENT
Start: 2018-07-08 | End: 2018-07-09

## 2018-07-08 RX ORDER — SODIUM CHLORIDE 9 MG/ML
500 INJECTION INTRAMUSCULAR; INTRAVENOUS; SUBCUTANEOUS
Qty: 0 | Refills: 0 | Status: DISCONTINUED | OUTPATIENT
Start: 2018-07-08 | End: 2018-07-08

## 2018-07-08 RX ORDER — CARVEDILOL PHOSPHATE 80 MG/1
25 CAPSULE, EXTENDED RELEASE ORAL EVERY 12 HOURS
Qty: 0 | Refills: 0 | Status: DISCONTINUED | OUTPATIENT
Start: 2018-07-08 | End: 2018-07-24

## 2018-07-08 RX ADMIN — Medication 10 MILLIGRAM(S): at 10:35

## 2018-07-08 RX ADMIN — Medication 500 MILLIGRAM(S): at 11:20

## 2018-07-08 RX ADMIN — CALCITRIOL 0.25 MICROGRAM(S): 0.5 CAPSULE ORAL at 11:20

## 2018-07-08 RX ADMIN — Medication 1 TABLET(S): at 11:20

## 2018-07-08 RX ADMIN — Medication 1 APPLICATION(S): at 13:23

## 2018-07-08 RX ADMIN — Medication 1 APPLICATION(S): at 21:30

## 2018-07-08 RX ADMIN — ZINC SULFATE TAB 220 MG (50 MG ZINC EQUIVALENT) 220 MILLIGRAM(S): 220 (50 ZN) TAB at 11:20

## 2018-07-08 RX ADMIN — SODIUM CHLORIDE 50 MILLILITER(S): 9 INJECTION INTRAMUSCULAR; INTRAVENOUS; SUBCUTANEOUS at 11:31

## 2018-07-08 RX ADMIN — Medication 200 MILLIGRAM(S): at 05:11

## 2018-07-08 RX ADMIN — CEFTRIAXONE 100 GRAM(S): 500 INJECTION, POWDER, FOR SOLUTION INTRAMUSCULAR; INTRAVENOUS at 13:24

## 2018-07-08 RX ADMIN — Medication 1 APPLICATION(S): at 05:11

## 2018-07-08 RX ADMIN — Medication 100 MILLIGRAM(S): at 11:20

## 2018-07-08 RX ADMIN — SODIUM CHLORIDE 40 MILLILITER(S): 9 INJECTION INTRAMUSCULAR; INTRAVENOUS; SUBCUTANEOUS at 18:04

## 2018-07-08 RX ADMIN — CARVEDILOL PHOSPHATE 25 MILLIGRAM(S): 80 CAPSULE, EXTENDED RELEASE ORAL at 17:53

## 2018-07-08 RX ADMIN — Medication 50 MILLIGRAM(S): at 05:11

## 2018-07-08 RX ADMIN — CARVEDILOL PHOSPHATE 25 MILLIGRAM(S): 80 CAPSULE, EXTENDED RELEASE ORAL at 10:34

## 2018-07-08 NOTE — PROCEDURE NOTE - ADDITIONAL PROCEDURE DETAILS
Patient with chronic fox due to stage 4 ischial ulcer, here for UTI, due for fox catheter change.  Placed 16F fox catheter under sterile technique. RN and PCA present for procedure. Patient with chronic fox due to urinary incontinence and stage 4 ischial ulcer, admitted for UTI, due for fox catheter change.  Placed 16F fox catheter under sterile technique. RN and PCA present for procedure.

## 2018-07-08 NOTE — CONSULT NOTE ADULT - PROBLEM SELECTOR RECOMMENDATION 3
relative hypotension  d/c hydralazine, d/c labetalol (?double beta blocker therapy)  RN asked to hold AM amlodipine but give carvedilol.  Hold parameters changed for carvedilol for SBP<100, HR<60

## 2018-07-08 NOTE — CONSULT NOTE ADULT - PROBLEM SELECTOR RECOMMENDATION 9
C/w BP control, on antihypertensives  Continue to monitor hemodynamics, assess for any changes in symptoms or new onset chest pain  C/w current management of UTI as per primary team
Chronic   No clinical or echocardiographic evidence of tamponade physiology   CTS consulted   No acute intervention at present time
on underlying CKD3/4  Patient reports decreased appetite 2/2 constipation for several days PTA  possible prerenal etiology, but renal parameters not improving with diuretics held  Will give trial of IVF, NS @50ml/hr for 10 hours, then stop  Also, could this be lupus flare? (See below)  Continue ceftriaxone for possible UTI s/p difficult fox change (see history above)  Dose meds for eGFR <15  Patient mentating and oxygenating well, K+ and serum bicarb WNL.  No overt uremic symptoms  No need for urgent HD but patient will need close monitoring

## 2018-07-08 NOTE — PROGRESS NOTE ADULT - SUBJECTIVE AND OBJECTIVE BOX
Patient is a 58y old  Female who presents with a chief complaint of Lack of urine, change in color. (2018 22:14)      SUBJECTIVE / OVERNIGHT EVENTS:  No SOB etc. No dizziness. CT Surgery eval noted.  Review of Systems:   CONSTITUTIONAL: No fever, weight loss, or fatigue  EYES: No eye pain, visual disturbances, or discharge  ENMT:  No difficulty hearing, tinnitus, vertigo; No sinus or throat pain  NECK: No pain or stiffness  BREASTS: No pain, masses, or nipple discharge  RESPIRATORY: No cough, wheezing, chills or hemoptysis; No shortness of breath  CARDIOVASCULAR: No chest pain, palpitations, dizziness, or leg swelling  GASTROINTESTINAL: No abdominal or epigastric pain. No nausea, vomiting, or hematemesis; No diarrhea or constipation. No melena or hematochezia.  GENITOURINARY: No dysuria, frequency, hematuria, or incontinence  NEUROLOGICAL: No headaches, memory loss, loss of strength, numbness, or tremors  SKIN: No itching, burning, rashes, or lesions   LYMPH NODES: No enlarged glands  ENDOCRINE: No heat or cold intolerance; No hair loss  MUSCULOSKELETAL: No joint pain or swelling; No muscle, back, or extremity pain  PSYCHIATRIC: No depression, anxiety, mood swings, or difficulty sleeping  HEME/LYMPH: No easy bruising, or bleeding gums  ALLERY AND IMMUNOLOGIC: No hives or eczema    MEDICATIONS  (STANDING):  allopurinol 100 milliGRAM(s) Oral daily  amLODIPine   Tablet 10 milliGRAM(s) Oral daily  AQUAPHOR (petrolatum Ointment) 1 Application(s) Topical three times a day  ascorbic acid 500 milliGRAM(s) Oral daily  calcitriol   Capsule 0.25 MICROGram(s) Oral daily  carvedilol 25 milliGRAM(s) Oral every 12 hours  cefTRIAXone   IVPB 1 Gram(s) IV Intermittent every 24 hours  Nephro-alyse 1 Tablet(s) Oral daily  predniSONE   Tablet 10 milliGRAM(s) Oral daily  sodium chloride 0.9%. 500 milliLiter(s) (50 mL/Hr) IV Continuous <Continuous>  zinc sulfate 220 milliGRAM(s) Oral daily    MEDICATIONS  (PRN):      PHYSICAL EXAM:  Vital Signs Last 24 Hrs  T(C): 36.7 (2018 10:31), Max: 37.3 (2018 14:37)  T(F): 98 (2018 10:31), Max: 99.2 (2018 14:37)  HR: 63 (2018 10:31) (63 - 92)  BP: 104/59 (2018 10:31) (104/59 - 122/70)  BP(mean): --  RR: 18 (2018 04:24) (18 - 18)  SpO2: 94% (2018 04:24) (90% - 97%)  I&O's Summary    2018 07:01  -  2018 07:00  --------------------------------------------------------  IN: 780 mL / OUT: 550 mL / NET: 230 mL    2018 07:01  -  2018 14:15  --------------------------------------------------------  IN: 440 mL / OUT: 0 mL / NET: 440 mL      GENERAL: NAD, well-developed  HEAD:  Atraumatic, Normocephalic  EYES: EOMI, PERRLA, conjunctiva and sclera clear  NECK: Supple, No JVD  CHEST/LUNG: Clear to auscultation bilaterally; No wheeze  HEART: Regular rate and rhythm; No murmurs, rubs, or gallops  ABDOMEN: Soft, Nontender, Nondistended; Bowel sounds present  EXTREMITIES:  2+ Peripheral Pulses, No clubbing, cyanosis, or edema  PSYCH: AAOx3  NEUROLOGY: non-focal  SKIN: No rashes or lesions    LABS:  CAPILLARY BLOOD GLUCOSE                              8.2    6.32  )-----------( 161      ( 2018 07:01 )             25.2     07-08    135  |  96  |  83<H>  ----------------------------<  113<H>  3.7   |  22  |  5.07<H>    Ca    8.6      2018 05:18  Mg     2.1     07-07    TPro  6.5  /  Alb  3.5  /  TBili  0.9  /  DBili  x   /  AST  18  /  ALT  10  /  AlkPhos  70      PT/INR - ( 2018 07:01 )   PT: 53.1 sec;   INR: 4.56 ratio               Urinalysis Basic - ( 2018 14:45 )    Color: Yellow / Appearance: Turbid / S.015 / pH: x  Gluc: x / Ketone: Negative  / Bili: Negative / Urobili: Negative   Blood: x / Protein: 300 mg/dL / Nitrite: Negative   Leuk Esterase: Large / RBC: 5-10 /HPF / WBC 26-50 /HPF   Sq Epi: x / Non Sq Epi: x / Bacteria: Moderate /HPF        RADIOLOGY & ADDITIONAL TESTS:    Imaging Personally Reviewed:    Consultant(s) Notes Reviewed:      Care Discussed with Consultants/Other Providers:

## 2018-07-08 NOTE — CONSULT NOTE ADULT - PROBLEM SELECTOR RECOMMENDATION 2
Patient reports that she had run out of prednisone for ~10 days prior to admission and was unable to get refill from pharmacy despite multiple attempts  ?Lupus flare.  AMIRAH and dsDNA pending  Rheumatology consult pending

## 2018-07-08 NOTE — CONSULT NOTE ADULT - ASSESSMENT
57 y/o female with ILANA on CKD3/4 in setting of relative hypotension, chronic fox s/p difficulty fox reinsertion upon exchange, h/o SLE on prednisone, HFrEF s/p ICD, chronic pericardial effusion previously requiring drainage, Hx PE/HIT, HTN, CAD no stents, Stage IV decub, chronic LLE lymphedema

## 2018-07-08 NOTE — CONSULT NOTE ADULT - SUBJECTIVE AND OBJECTIVE BOX
[facility]  [phone]  Dr. Queen (covering for Dr. Bennett)  NEPHROLOGY  INITIAL CONSULT NOTE  --------------------------------------------------------------------------------  HPI: 57 y/o female, well known to my associate Dr. Bennett, with a PMHx including CKD3/4, chronic fox, SLE on prednisone, HFrEF s/p ICD, chronic pericardial effusion previously requiring drainage, Hx PE/HIT, HTN, CAD no stents, Stage IV decub p/w 2 week history of increasing fatigue, and 2-3 day h/o decreasing urine output/dark urine.  The patient reports that she had her fox exchanged ~2.5 weeks PTA; initially upon removal of fox, it could not be reinserted until 2 days later due to inability to reinsert, but patient states she was voiding freely for those two days.  In addition, patient reports that she ran out of her prednisone on or around June 28th, and the pharmacist was unable to refill due to inability to reach prescribing doctor.  She has been constipated for ~1week and states she did not have an appetite due to constipation; however, +BM with increased hydration and high fiber foods 2 days PTA.  Renal evaluation requested in setting of ILANA on CKD.      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)  Calf DVT (Deep Venous Thrombosis) (ICD9 453.42)  Adenomatous Goiter (ICD9 241.9)  Chronic Gout (ICD9 274.02)  LBBB (Left Bundle Branch Block) (ICD9 426.3)  CHF (Congestive Heart Failure) (ICD9 428.0)  Hx of aorta-iliac-femoral bypass  TOP (Termination of Pregnancy)  S/P Dilatation and Curettage  History of Tubal Ligation  s/p AAA (Abdominal Aortic Aneurysm) Repair: 2009 2010  History of Cholecystectomy  S/P Partial Hysterectomy    FAMILY HISTORY:  Multiple relatives with autoimmune disease (sarcoidosis, gout)    PAST SOCIAL HISTORY:  denies Tobacco/EtOH/illicit drug    ALLERGIES & MEDICATIONS  --------------------------------------------------------------------------------  Allergies    heparin (Unknown)  losartan (Anaphylaxis)  nitroglycerin (Other)    Intolerances      Standing Inpatient Medications  allopurinol 100 milliGRAM(s) Oral daily  amLODIPine   Tablet 10 milliGRAM(s) Oral daily  AQUAPHOR (petrolatum Ointment) 1 Application(s) Topical three times a day  ascorbic acid 500 milliGRAM(s) Oral daily  calcitriol   Capsule 0.25 MICROGram(s) Oral daily  carvedilol 25 milliGRAM(s) Oral every 12 hours  cefTRIAXone   IVPB 1 Gram(s) IV Intermittent every 24 hours  hydrALAZINE 50 milliGRAM(s) Oral three times a day  labetalol 200 milliGRAM(s) Oral three times a day  Nephro-alyse 1 Tablet(s) Oral daily  predniSONE   Tablet 10 milliGRAM(s) Oral daily  zinc sulfate 220 milliGRAM(s) Oral daily    PRN Inpatient Medications      REVIEW OF SYSTEMS  --------------------------------------------------------------------------------  General: + fatigue.  Denies fevers/rigors  CVS: denies CP/palpitations  Respiratory: denies SOB/cough  GI: + constipation but last BM after admission.  +decreased appetite. denies N/V  : +oliguria and dark red/brown urine, now clearing  Neuro: +episodic confusion    All other systems were reviewed and are negative, except as noted.    VITALS/PHYSICAL EXAM  --------------------------------------------------------------------------------  T(C): 36.7 (07-08-18 @ 10:31), Max: 37.3 (07-07-18 @ 14:37)  HR: 63 (07-08-18 @ 10:31) (63 - 94)  BP: 104/59 (07-08-18 @ 10:31) (104/59 - 128/74)  RR: 18 (07-08-18 @ 04:24) (18 - 18)  SpO2: 94% (07-08-18 @ 04:24) (90% - 97%)  Wt(kg): --  Height (cm): 165.1 (07-06-18 @ 13:30)  Weight (kg): 117.9 (07-06-18 @ 13:30)  BMI (kg/m2): 43.3 (07-06-18 @ 13:30)  BSA (m2): 2.21 (07-06-18 @ 13:30)      07-07-18 @ 07:01  -  07-08-18 @ 07:00  --------------------------------------------------------  IN: 780 mL / OUT: 550 mL / NET: 230 mL      Physical Exam:  	Gen: lying in bed, no acute distress  	Pulm: left base dry crackles that clear with deep inspiration.  Otherwise clear lungs  	CV: RRR, S1S2  	Abd: +BS, obese, soft, nontender/nondistended  	: No suprapubic tenderness, + fox draining yellow urine with debris  	Extremity: chronic LLE lymphedema.  No RLE edema  	Neuro: A&Ox3. no asterixis      LABS/STUDIES  --------------------------------------------------------------------------------              8.2    6.32  >-----------<  161      [07-08-18 @ 07:01]              25.2     135  |  96  |  83  ----------------------------<  113      [07-08-18 @ 05:18]  3.7   |  22  |  5.07        Ca     8.6     [07-08-18 @ 05:18]      Mg     2.1     [07-07-18 @ 07:00]    TPro  6.5  /  Alb  3.5  /  TBili  0.9  /  DBili  x   /  AST  18  /  ALT  10  /  AlkPhos  70  [07-06-18 @ 14:45]    PT/INR: PT 53.1 , INR 4.56       [07-08-18 @ 07:01]      eGFR if African American: 10 mL/min/1.73M2 (07-08 @ 05:18)    eGFR if Non African American: 9 mL/min/1.73M2 (07-08 @ 05:18)    Creatinine Trend:  SCr 5.07 [07-08 @ 05:18]  SCr 4.71 [07-07 @ 07:00]  SCr 4.83 [07-06 @ 14:45]    Urinalysis - [07-06-18 @ 14:45]      Color Yellow / Appearance Turbid / SG 1.015 / pH 8.5      Gluc Negative / Ketone Negative  / Bili Negative / Urobili Negative       Blood Small / Protein 300 / Leuk Est Large / Nitrite Negative      RBC 5-10 / WBC 26-50 / Hyaline  / Gran  / Sq Epi  / Non Sq Epi  / Bacteria Moderate      Iron 36, TIBC 168, %sat 21      [04-20-18 @ 08:15]  Ferritin 692      [04-20-18 @ 08:14]  PTH -- (Ca 8.9)      [04-20-18 @ 08:14]   217  PTH -- (Ca 9.0)      [11-09-17 @ 08:51]   387  Vitamin D (25OH) 7.8      [11-09-17 @ 08:51]  HbA1c 4.5      [08-09-16 @ 06:49]  TSH 0.47      [04-24-18 @ 07:51]      KRISTOPHER: titer 1:320, pattern Homogeneous      [01-10-17 @ 12:14]  dsDNA 29      [04-19-18 @ 09:31]  C3 Complement 97      [04-19-18 @ 09:31]  C4 Complement 27      [04-19-18 @ 09:31]

## 2018-07-08 NOTE — CONSULT NOTE ADULT - PROBLEM SELECTOR RECOMMENDATION 2
s/p EVAR   thoracic aneurysm is an incidental finding. CTS consulted. NO acute intervention. Only Mild AI

## 2018-07-08 NOTE — CONSULT NOTE ADULT - PROBLEM SELECTOR RECOMMENDATION 4
APpears compensated at present time. In fact, likely on the dryer side.   Gentle iVF as discussed with nephrology Dr. Queen.

## 2018-07-08 NOTE — PROCEDURE NOTE - NSURITECHNIQUE_GU_A_CORE
Proper hand hygiene was performed/A sterile drape was used to cover all adjacent areas/The catheter was secured with a securement device (e.g. StatLock)/The urinary drainage system is closed at the end of the procedure/The collection bag is below the level of the patient and urinary bladder/The catheter was appropriately lubricated/Sterile gloves were worn for the duration of the procedure/The site was cleaned with soap/water and sterile solution (betadine)

## 2018-07-08 NOTE — CONSULT NOTE ADULT - SUBJECTIVE AND OBJECTIVE BOX
CHIEF COMPLAINT:  Pericardial Effusion     HISTORY OF PRESENT ILLNESS:   57 yo F well known to me from multiple previous hospitalizations p/w decreased urine output and blood in urine. Pt states she was not in significant distress this week until yesterday, 7/6. She states her daughter checked her Fox and noticed that there as a lack of urine output and the urine had a red shade to it. Given these symptoms, pt returned to ER for further evaluation. Pt also complains of generalized chills and back aches this week. Pt denies any cough, abdominal pain, suprapubic pain, sick contacts, fevers, chills. Has not changed her medications recently. Pt endorses swelling of her hands yesterday bilaterally which has resolved today. Did not have any arthralgia of her knees which she usually gets in SLE flares. Pt has fox exchanged 2 weeks ago without reported issues. Found to have UTI, admitted for treatment. CT abd/pelvis performed revealed thoracic ascending aortic aneurysm. CT Surgery consulted. Patient seen and examined, in NAD. Denies any chest pain, palpitations, SOB, HERNANDEZ, abdominal pain, dizziness, at this time.       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)  Calf DVT (Deep Venous Thrombosis) (ICD9 453.42)  Adenomatous Goiter (ICD9 241.9)  Chronic Gout (ICD9 274.02)  LBBB (Left Bundle Branch Block) (ICD9 426.3)  CHF (Congestive Heart Failure) (ICD9 428.0)  Hx of aorta-iliac-femoral bypass  TOP (Termination of Pregnancy)  S/P Dilatation and Curettage  History of Tubal Ligation  s/p AAA (Abdominal Aortic Aneurysm) Repair: 2009 2010  History of Cholecystectomy  S/P Partial Hysterectomy          MEDICATIONS:  amLODIPine   Tablet 10 milliGRAM(s) Oral daily  carvedilol 25 milliGRAM(s) Oral every 12 hours    cefTRIAXone   IVPB 1 Gram(s) IV Intermittent every 24 hours          allopurinol 100 milliGRAM(s) Oral daily  predniSONE   Tablet 10 milliGRAM(s) Oral daily    AQUAPHOR (petrolatum Ointment) 1 Application(s) Topical three times a day  ascorbic acid 500 milliGRAM(s) Oral daily  calcitriol   Capsule 0.25 MICROGram(s) Oral daily  Nephro-alyse 1 Tablet(s) Oral daily  sodium chloride 0.9%. 500 milliLiter(s) IV Continuous <Continuous>  zinc sulfate 220 milliGRAM(s) Oral daily      FAMILY HISTORY:  No pertinent family history in first degree relatives      SOCIAL HISTORY:    [ ] Non-smoker  [ ] Smoker  [ ] Alcohol    Allergies    heparin (Unknown)  losartan (Anaphylaxis)  nitroglycerin (Other)    Intolerances    	    REVIEW OF SYSTEMS:  CONSTITUTIONAL: No fever, weight loss, + fatigue  EYES: No eye pain, visual disturbances, or discharge  ENMT:  No difficulty hearing, tinnitus, vertigo; No sinus or throat pain  NECK: No pain or stiffness  RESPIRATORY: No cough, wheezing, chills or hemoptysis;  Shortness of Breath  CARDIOVASCULAR: No chest pain, palpitations, passing out, dizziness, or leg swelling  GASTROINTESTINAL: No abdominal or epigastric pain. No nausea, vomiting, or hematemesis; No diarrhea or constipation. No melena or hematochezia.  GENITOURINARY: No dysuria, frequency, hematuria, or incontinence  NEUROLOGICAL: No headaches, memory loss, loss of strength, numbness, or tremors  SKIN: No itching, burning, rashes, or lesions   LYMPH Nodes: No enlarged glands  ENDOCRINE: No heat or cold intolerance; No hair loss  MUSCULOSKELETAL: + joint pain or swelling; No muscle, back, or extremity pain  PSYCHIATRIC: No depression, anxiety, mood swings, or difficulty sleeping  HEME/LYMPH: No easy bruising, or bleeding gums  ALLERY AND IMMUNOLOGIC: No hives or eczema	    [ ] All others negative	  [ ] Unable to obtain    PHYSICAL EXAM:  T(C): 36.7 (07-08-18 @ 10:31), Max: 37.3 (07-07-18 @ 14:37)  HR: 63 (07-08-18 @ 10:31) (63 - 94)  BP: 104/59 (07-08-18 @ 10:31) (104/59 - 128/74)  RR: 18 (07-08-18 @ 04:24) (18 - 18)  SpO2: 94% (07-08-18 @ 04:24) (90% - 97%)  Wt(kg): --  I&O's Summary    07 Jul 2018 07:01  -  08 Jul 2018 07:00  --------------------------------------------------------  IN: 780 mL / OUT: 550 mL / NET: 230 mL        Appearance: NAD	  HEENT:   Normal oral mucosa, PERRL, EOMI	  Lymphatic: No lymphadenopathy  Cardiovascular: Normal S1 S2, No JVD, No murmurs, No edema  Respiratory: Lungs clear to auscultation	  Psychiatry: A & O x 3, Mood & affect appropriate  Gastrointestinal:  Soft, Non-tender, + BS	  Skin: No rashes, No ecchymoses, No cyanosis	  Neurologic: Non-focal  Extremities: Massive LLE edema, chronic   Vascular: Peripheral pulses palpable 2+ bilaterally  +fox         TELEMETRY: 	    ECG: V paced  	  RADIOLOGY:  OTHER: 	  	  LABS:	 	      Culture - Urine (07.07.18 @ 08:27)    Specimen Source: .Urine Catheterized    Culture Results:   >100,000 CFU/ml Escherichia coli      Culture - Urine (07.07.18 @ 08:27)    Specimen Source: .Urine Catheterized    Culture Results:   >100,000 CFU/ml Escherichia coli        CARDIAC MARKERS:    < from: Transthoracic Echocardiogram (07.06.18 @ 16:13) >    Patient name: COSTEN, SANDRA  YOB: 1960   Age: 58 (F)   MR#: [id number]  Study Date: 7/6/2018  Location: O/PSonographer: [facility]  Study quality: Technically fair  Referring Physician: Jerry Turk MD  Blood Pressure: 110/63 mmHg  Height: 165 cm  Weight: 117 kg  BSA: 2.2 m2  ------------------------------------------------------------------------  PROCEDURE: Transthoracic echocardiogram with 2-D, M-Mode  and complete spectral and color flow Doppler.  INDICATION: Aortic aneurysm of unspecified site, without  rupture (I71.9)  ------------------------------------------------------------------------  Dimensions:    Normal Values:  LA:     4.3    2.0 - 4.0 cm  Ao:     3.0    2.0 - 3.8 cm  SEPTUM: 2.0    0.6 - 1.2 cm  PWT:    1.8    0.6 - 1.1 cm  LVIDd:  4.6    3.0 - 5.6 cm  LVIDs:         1.8 - 4.0 cm  Derived variables:  LVMI: 187 g/m2  RWT: 0.78  Doppler Peak Velocity (m/sec): AoV=1.9  ------------------------------------------------------------------------  Observations:  Mitral Valve: Mitral annular calcification and calcified  mitral leaflets with normal diastolic opening.  Mild-moderate mitral regurgitation.  Aortic Valve/Aorta: Calcified trileaflet aortic valve with  normal opening. Mild aortic regurgitation.  Aortic Root: 3 cm.  Left Atrium: Left atrium not well visualized.  Left Ventricle:  Overall severely reduced  left ventricular  systolic function. Moderate concentric left ventricular  hypertrophy.  Right Heart: A device wire is noted in the right heart.  Right ventricular cavity is small  with decreased right  ventricular systolic function. Normal tricuspid valve. Mild  tricuspid regurgitation. Normal pulmonic valve. Mild  pulmonic regurgitation.  Pericardium/Pleura: Large 2.3cm pericardial effusion  posterior to the LV, 2.2cm at Right atrium, 1.4cm at mid-  distal RV.  RV pressure is 42mmHg, RV cavity underfilled.  No RA or RV collapse seen.  Hemodynamic: Estimated right atrial pressure is 8 mm Hg.  Estimated right ventricularsystolic pressure equals 42 mm  Hg, assuming right atrial pressure equals 8 mm Hg,  consistent with mild pulmonary hypertension.  ------------------------------------------------------------------------  Conclusions:  1. Mitral annular calcification and calcified mitral  leaflets with normal diastolic opening. Mild-moderate  mitral regurgitation.  2. Moderate concentric left ventricular hypertrophy.  3.  Overall severely reduced  left ventricular systolic  function.  4. A device wire is noted in the right heart.  5. Right ventricular cavity is small  with decreased right  ventricular systolic function.  6. Estimated pulmonary artery systolic pressure equals 42  mm Hg, assuming right atrial pressure equals 8 mm Hg,  consistent with mild pulmonary pressures.  7. Large 2.3cm pericardial effusion posterior to the LV,  2.2cm at Right atrium, 1.4cm at mid- distal RV.  RV  pressure is 42mmHg, RV cavity underfilled. No RA or RV  collapse seen.  HR 80s  *** Compared with echocardiogram of 11/26/2017, no  significant changes noted. Large effusion also seen on  previous studies (11/26/ 2017 and 10/12/17)  Discussed with Lucas Retana in ER  ------------------------------------------------------------------------  Confirmed on  7/6/2018 - 19:00:02 by Nighat Sears M.D.  ------------------------------------------------------------------------    < end of copied text >  < from: Transthoracic Echocardiogram (07.06.18 @ 16:13) >    Patient name: COSTEN, SANDRA  YOB: 1960   Age: 58 (F)   MR#: [id number]  Study Date: 7/6/2018  Location: O/PSonographer: [facility]  Study quality: Technically fair  Referring Physician: Jerry Turk MD  Blood Pressure: 110/63 mmHg  Height: 165 cm  Weight: 117 kg  BSA: 2.2 m2  ------------------------------------------------------------------------  PROCEDURE: Transthoracic echocardiogram with 2-D, M-Mode  and complete spectral and color flow Doppler.  INDICATION: Aortic aneurysm of unspecified site, without  rupture (I71.9)  ------------------------------------------------------------------------  Dimensions:    Normal Values:  LA:     4.3    2.0 - 4.0 cm  Ao:     3.0    2.0 - 3.8 cm  SEPTUM: 2.0    0.6 - 1.2 cm  PWT:    1.8    0.6 - 1.1 cm  LVIDd:  4.6    3.0 - 5.6 cm  LVIDs:         1.8 - 4.0 cm  Derived variables:  LVMI: 187 g/m2  RWT: 0.78  Doppler Peak Velocity (m/sec): AoV=1.9  ------------------------------------------------------------------------  Observations:  Mitral Valve: Mitral annular calcification and calcified  mitral leaflets with normal diastolic opening.  Mild-moderate mitral regurgitation.  Aortic Valve/Aorta: Calcified trileaflet aortic valve with  normal opening. Mild aortic regurgitation.  Aortic Root: 3 cm.  Left Atrium: Left atrium not well visualized.  Left Ventricle:  Overall severely reduced  left ventricular  systolic function. Moderate concentric left ventricular  hypertrophy.  Right Heart: A device wire is noted in the right heart.  Right ventricular cavity is small  with decreased right  ventricular systolic function. Normal tricuspid valve. Mild  tricuspid regurgitation. Normal pulmonic valve. Mild  pulmonic regurgitation.  Pericardium/Pleura: Large 2.3cm pericardial effusion  posterior to the LV, 2.2cm at Right atrium, 1.4cm at mid-  distal RV.  RV pressure is 42mmHg, RV cavity underfilled.  No RA or RV collapse seen.  Hemodynamic: Estimated right atrial pressure is 8 mm Hg.  Estimated right ventricularsystolic pressure equals 42 mm  Hg, assuming right atrial pressure equals 8 mm Hg,  consistent with mild pulmonary hypertension.  ------------------------------------------------------------------------  Conclusions:  1. Mitral annular calcification and calcified mitral  leaflets with normal diastolic opening. Mild-moderate  mitral regurgitation.  2. Moderate concentric left ventricular hypertrophy.  3.  Overall severely reduced  left ventricular systolic  function.  4. A device wire is noted in the right heart.  5. Right ventricular cavity is small  with decreased right  ventricular systolic function.  6. Estimated pulmonary artery systolic pressure equals 42  mm Hg, assuming right atrial pressure equals 8 mm Hg,  consistent with mild pulmonary pressures.  7. Large 2.3cm pericardial effusion posterior to the LV,  2.2cm at Right atrium, 1.4cm at mid- distal RV.  RV  pressure is 42mmHg, RV cavity underfilled. No RA or RV  collapse seen.  HR 80s  *** Compared with echocardiogram of 11/26/2017, no  significant changes noted. Large effusion also seen on  previous studies (11/26/ 2017 and 10/12/17)  Discussed with Lucas Retana in ER  ------------------------------------------------------------------------  Confirmed on  7/6/2018 - 19:00:02 by Nighat Sears M.D.  ------------------------------------------------------------------------    < end of copied text >                                8.2    6.32  )-----------( 161      ( 08 Jul 2018 07:01 )             25.2     07-08    135  |  96  |  83<H>  ----------------------------<  113<H>  3.7   |  22  |  5.07<H>    Ca    8.6      08 Jul 2018 05:18  Mg     2.1     07-07    TPro  6.5  /  Alb  3.5  /  TBili  0.9  /  DBili  x   /  AST  18  /  ALT  10  /  AlkPhos  70  07-06    proBNP:   Lipid Profile:   HgA1c:   TSH:

## 2018-07-08 NOTE — CONSULT NOTE ADULT - ASSESSMENT
59 yo female with complicated and extensive past medical medical history including chronic large pericardial effusion admitted with UTI and found to have recurrent large pericardial effusion

## 2018-07-09 LAB
-  AMIKACIN: SIGNIFICANT CHANGE UP
-  AMIKACIN: SIGNIFICANT CHANGE UP
-  AMOXICILLIN/CLAVULANIC ACID: SIGNIFICANT CHANGE UP
-  AMOXICILLIN/CLAVULANIC ACID: SIGNIFICANT CHANGE UP
-  AMPICILLIN/SULBACTAM: SIGNIFICANT CHANGE UP
-  AMPICILLIN/SULBACTAM: SIGNIFICANT CHANGE UP
-  AMPICILLIN: SIGNIFICANT CHANGE UP
-  AMPICILLIN: SIGNIFICANT CHANGE UP
-  AZTREONAM: SIGNIFICANT CHANGE UP
-  AZTREONAM: SIGNIFICANT CHANGE UP
-  CEFAZOLIN: SIGNIFICANT CHANGE UP
-  CEFAZOLIN: SIGNIFICANT CHANGE UP
-  CEFEPIME: SIGNIFICANT CHANGE UP
-  CEFEPIME: SIGNIFICANT CHANGE UP
-  CEFOXITIN: SIGNIFICANT CHANGE UP
-  CEFOXITIN: SIGNIFICANT CHANGE UP
-  CEFTRIAXONE: SIGNIFICANT CHANGE UP
-  CEFTRIAXONE: SIGNIFICANT CHANGE UP
-  CIPROFLOXACIN: SIGNIFICANT CHANGE UP
-  CIPROFLOXACIN: SIGNIFICANT CHANGE UP
-  ERTAPENEM: SIGNIFICANT CHANGE UP
-  ERTAPENEM: SIGNIFICANT CHANGE UP
-  GENTAMICIN: SIGNIFICANT CHANGE UP
-  GENTAMICIN: SIGNIFICANT CHANGE UP
-  IMIPENEM: SIGNIFICANT CHANGE UP
-  LEVOFLOXACIN: SIGNIFICANT CHANGE UP
-  LEVOFLOXACIN: SIGNIFICANT CHANGE UP
-  MEROPENEM: SIGNIFICANT CHANGE UP
-  MEROPENEM: SIGNIFICANT CHANGE UP
-  NITROFURANTOIN: SIGNIFICANT CHANGE UP
-  NITROFURANTOIN: SIGNIFICANT CHANGE UP
-  PIPERACILLIN/TAZOBACTAM: SIGNIFICANT CHANGE UP
-  PIPERACILLIN/TAZOBACTAM: SIGNIFICANT CHANGE UP
-  TIGECYCLINE: SIGNIFICANT CHANGE UP
-  TOBRAMYCIN: SIGNIFICANT CHANGE UP
-  TOBRAMYCIN: SIGNIFICANT CHANGE UP
-  TRIMETHOPRIM/SULFAMETHOXAZOLE: SIGNIFICANT CHANGE UP
-  TRIMETHOPRIM/SULFAMETHOXAZOLE: SIGNIFICANT CHANGE UP
ANION GAP SERPL CALC-SCNC: 15 MMOL/L — SIGNIFICANT CHANGE UP (ref 5–17)
ANTI-RIBONUCLEAR PROTEIN: 1.6 AI — HIGH
BUN SERPL-MCNC: 92 MG/DL — HIGH (ref 7–23)
CALCIUM SERPL-MCNC: 8.4 MG/DL — SIGNIFICANT CHANGE UP (ref 8.4–10.5)
CHLORIDE SERPL-SCNC: 100 MMOL/L — SIGNIFICANT CHANGE UP (ref 96–108)
CO2 SERPL-SCNC: 23 MMOL/L — SIGNIFICANT CHANGE UP (ref 22–31)
CREAT SERPL-MCNC: 5.1 MG/DL — HIGH (ref 0.5–1.3)
CULTURE RESULTS: SIGNIFICANT CHANGE UP
ENA SM AB FLD QL: <0.2 AI — SIGNIFICANT CHANGE UP
GLUCOSE SERPL-MCNC: 107 MG/DL — HIGH (ref 70–99)
HCT VFR BLD CALC: 26 % — LOW (ref 34.5–45)
HGB BLD-MCNC: 8.8 G/DL — LOW (ref 11.5–15.5)
INR BLD: 5.19 RATIO — CRITICAL HIGH (ref 0.88–1.16)
MCHC RBC-ENTMCNC: 29.7 PG — SIGNIFICANT CHANGE UP (ref 27–34)
MCHC RBC-ENTMCNC: 33.8 GM/DL — SIGNIFICANT CHANGE UP (ref 32–36)
MCV RBC AUTO: 87.8 FL — SIGNIFICANT CHANGE UP (ref 80–100)
METHOD TYPE: SIGNIFICANT CHANGE UP
METHOD TYPE: SIGNIFICANT CHANGE UP
ORGANISM # SPEC MICROSCOPIC CNT: SIGNIFICANT CHANGE UP
PLATELET # BLD AUTO: 200 K/UL — SIGNIFICANT CHANGE UP (ref 150–400)
POTASSIUM SERPL-MCNC: 3.8 MMOL/L — SIGNIFICANT CHANGE UP (ref 3.5–5.3)
POTASSIUM SERPL-SCNC: 3.8 MMOL/L — SIGNIFICANT CHANGE UP (ref 3.5–5.3)
PROTHROM AB SERPL-ACNC: 58 SEC — HIGH (ref 9.8–12.7)
RBC # BLD: 2.95 M/UL — LOW (ref 3.8–5.2)
RBC # FLD: 15.1 % — HIGH (ref 10.3–14.5)
SODIUM SERPL-SCNC: 138 MMOL/L — SIGNIFICANT CHANGE UP (ref 135–145)
SPECIMEN SOURCE: SIGNIFICANT CHANGE UP
WBC # BLD: 6.5 K/UL — SIGNIFICANT CHANGE UP (ref 3.8–10.5)
WBC # FLD AUTO: 6.5 K/UL — SIGNIFICANT CHANGE UP (ref 3.8–10.5)

## 2018-07-09 PROCEDURE — 99222 1ST HOSP IP/OBS MODERATE 55: CPT

## 2018-07-09 RX ORDER — ACETAMINOPHEN 500 MG
650 TABLET ORAL EVERY 6 HOURS
Qty: 0 | Refills: 0 | Status: DISCONTINUED | OUTPATIENT
Start: 2018-07-09 | End: 2018-07-24

## 2018-07-09 RX ORDER — ERTAPENEM SODIUM 1 G/1
500 INJECTION, POWDER, LYOPHILIZED, FOR SOLUTION INTRAMUSCULAR; INTRAVENOUS EVERY 24 HOURS
Qty: 0 | Refills: 0 | Status: DISCONTINUED | OUTPATIENT
Start: 2018-07-09 | End: 2018-07-09

## 2018-07-09 RX ORDER — ERTAPENEM SODIUM 1 G/1
500 INJECTION, POWDER, LYOPHILIZED, FOR SOLUTION INTRAMUSCULAR; INTRAVENOUS EVERY 24 HOURS
Qty: 0 | Refills: 0 | Status: DISCONTINUED | OUTPATIENT
Start: 2018-07-09 | End: 2018-07-18

## 2018-07-09 RX ADMIN — Medication 1 TABLET(S): at 11:52

## 2018-07-09 RX ADMIN — ERTAPENEM SODIUM 100 MILLIGRAM(S): 1 INJECTION, POWDER, LYOPHILIZED, FOR SOLUTION INTRAMUSCULAR; INTRAVENOUS at 13:30

## 2018-07-09 RX ADMIN — CALCITRIOL 0.25 MICROGRAM(S): 0.5 CAPSULE ORAL at 11:52

## 2018-07-09 RX ADMIN — Medication 10 MILLIGRAM(S): at 08:42

## 2018-07-09 RX ADMIN — Medication 1 APPLICATION(S): at 13:30

## 2018-07-09 RX ADMIN — Medication 500 MILLIGRAM(S): at 11:52

## 2018-07-09 RX ADMIN — ZINC SULFATE TAB 220 MG (50 MG ZINC EQUIVALENT) 220 MILLIGRAM(S): 220 (50 ZN) TAB at 11:51

## 2018-07-09 RX ADMIN — Medication 1 APPLICATION(S): at 05:10

## 2018-07-09 RX ADMIN — AMLODIPINE BESYLATE 10 MILLIGRAM(S): 2.5 TABLET ORAL at 08:42

## 2018-07-09 RX ADMIN — CARVEDILOL PHOSPHATE 25 MILLIGRAM(S): 80 CAPSULE, EXTENDED RELEASE ORAL at 05:10

## 2018-07-09 RX ADMIN — CARVEDILOL PHOSPHATE 25 MILLIGRAM(S): 80 CAPSULE, EXTENDED RELEASE ORAL at 17:00

## 2018-07-09 RX ADMIN — Medication 100 MILLIGRAM(S): at 11:51

## 2018-07-09 NOTE — CONSULT NOTE ADULT - SUBJECTIVE AND OBJECTIVE BOX
HISTORY OF PRESENT ILLNESS:  59 yo female with h/o SLE in the past, CHF, chronic LLE edema, PE who was stable since being hospitalized in April on prednisone 10mg.  About a week PTA she developed joint pains and then noticed blood in the urine and came to ER.  +fever and hallucinations noted in ER.  Now joint pain has improved to some degree though she c/o cramping in the hands.     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)  Calf DVT (Deep Venous Thrombosis) (ICD9 453.42)  Adenomatous Goiter (ICD9 241.9)  Chronic Gout (ICD9 274.02)  LBBB (Left Bundle Branch Block) (ICD9 426.3)  CHF (Congestive Heart Failure) (ICD9 428.0)  Hx of aorta-iliac-femoral bypass  TOP (Termination of Pregnancy)  S/P Dilatation and Curettage  History of Tubal Ligation  s/p AAA (Abdominal Aortic Aneurysm) Repair: 2009 2010  History of Cholecystectomy  S/P Partial Hysterectomy        MEDICATIONS  (STANDING):  allopurinol 100 milliGRAM(s) Oral daily  amLODIPine   Tablet 10 milliGRAM(s) Oral daily  AQUAPHOR (petrolatum Ointment) 1 Application(s) Topical three times a day  ascorbic acid 500 milliGRAM(s) Oral daily  calcitriol   Capsule 0.25 MICROGram(s) Oral daily  carvedilol 25 milliGRAM(s) Oral every 12 hours  ertapenem  IVPB 500 milliGRAM(s) IV Intermittent every 24 hours  Nephro-alyse 1 Tablet(s) Oral daily  predniSONE   Tablet 10 milliGRAM(s) Oral daily  zinc sulfate 220 milliGRAM(s) Oral daily    MEDICATIONS  (PRN):  acetaminophen   Tablet. 650 milliGRAM(s) Oral every 6 hours PRN Moderate Pain (4 - 6)      Allergies    heparin (Unknown)  losartan (Anaphylaxis)  nitroglycerin (Other)    Intolerances        PERTINENT MEDICATION HISTORY:    SOCIAL HISTORY:    FAMILY HISTORY:  No pertinent family history in first degree relatives      Vital Signs Last 24 Hrs  T(C): 36.6 (09 Jul 2018 14:06), Max: 37.1 (08 Jul 2018 20:12)  T(F): 97.8 (09 Jul 2018 14:06), Max: 98.7 (08 Jul 2018 20:12)  HR: 84 (09 Jul 2018 17:06) (81 - 92)  BP: 117/68 (09 Jul 2018 17:06) (92/53 - 125/75)  BP(mean): --  RR: 18 (09 Jul 2018 14:06) (18 - 18)  SpO2: 98% (09 Jul 2018 14:06) (95% - 98%)    Daily     Daily     PHYSICAL EXAM:      Constitutional:  fair appearing    Eyes:    ENMT:    Neck:  supple     Breasts:    Back:    Respiratory:  good AE     Cardiovascular:  s1s2 reg    Gastrointestinal:  abd soft and nt, no masses    Genitourinary:    Rectal:    Extremities:  Severe LLE edema (chronic); currently wrapped    Vascular:    Neurological:    Skin:    Lymph Nodes:    Musculoskeletal:  Limited use of LLE.  +cramping of the R hand, shivani in the 2nd MCP area.  No other active synovitis    Psychiatric:  appropriate and alert      LABS:                        8.8    6.5   )-----------( 200      ( 09 Jul 2018 08:37 )             26.0     07-09    138  |  100  |  92<H>  ----------------------------<  107<H>  3.8   |  23  |  5.10<H>    Ca    8.4      09 Jul 2018 08:37      PT/INR - ( 09 Jul 2018 08:37 )   PT: 58.0 sec;   INR: 5.19 ratio               RADIOLOGY & ADDITIONAL STUDIES:

## 2018-07-09 NOTE — CONSULT NOTE ADULT - ASSESSMENT
58 year old female w/ SLE on prednisone, HFrEF s/p ICD, CKD, Hx PE/HIT, HTN, CAD no stents, Stage IV decub p/w decreased urine output and blood in urine.    Febrile in ED  CT with pericardial effusion chronic - cardiology on board  Also with thoracic aortic aneurysm with vascular surgery on board  WBC WNL  UA with 26-50 WBC  Blood cxs NGTD  Urine cxs with ESBL E. coli and Proteus    Recommend:  -Continue ertapenem 500 mg IV q 24 hours. 58 year old female w/ SLE on prednisone, HFrEF s/p ICD, CKD, Hx PE/HIT, HTN, CAD no stents, Stage IV decub p/w decreased urine output and blood in urine.    Febrile in ED  CT with pericardial effusion chronic - cardiology on board  Also with thoracic aortic aneurysm with vascular surgery on board  WBC WNL  UA with 26-50 WBC  Blood cxs NGTD  Urine cxs with ESBL E. coli and Proteus    Recommend:  -Continue ertapenem 500 mg IV q 24 hours.  -Will most likely need a 10- day course.  -Would continue IV for now and can transition to oral towards the end of therapy.  -Will continue to follow along with you.

## 2018-07-09 NOTE — PROGRESS NOTE ADULT - ASSESSMENT
57 yo female with complicated and extensive past medical medical history including chronic large pericardial effusion admitted with UTI and found to have recurrent large pericardial effusion

## 2018-07-09 NOTE — PROGRESS NOTE ADULT - ASSESSMENT
58 F PMH SLE, systolic HFrEF(EF 32%) s/p AICD, HTN, CKD III- IV, PE / left leg DVT dx 2009, HIT, CAD (no stents), stage IV sacral ulcer, chronic Fox, recurrent pericardial effusion, AAA ~14cm in 12/2016 s/p repair in 2010 w/ aortoiliac stent complicated post-operatively with development of left lower extremity compartment syndrome, with recent cellulitis treated inpt now presents with uti and pericardial effusion      1- CKD IV with ILANA   2- pericardial effusion   3- chf  4- HTN   5- neurogenic bladder  6- lupus   7- anemia  8- shpt  9- hyperuricemia     her creatinine has worsened and concern is whether decrease perfusion due to her pericardial effusion vs. worsening of her renal function   ? drainage of her effusion. will d/w cards again  cont with b-b  cont fox   abx per ID  d/w pt regarding hd in near future  check pth and pshos  rheum consult appreciated   cont calcitriol   to have uric acid level checked  cont with allopurinol   evaluate thoracic aneurysm by vascular

## 2018-07-09 NOTE — PROGRESS NOTE ADULT - SUBJECTIVE AND OBJECTIVE BOX
Patient is a 58y old  Female who presents with a chief complaint of Lack of urine, change in color. (06 Jul 2018 22:14)      SUBJECTIVE / OVERNIGHT EVENTS:   Feels better.  Denies CP/SOB/Palpitation/HA.    MEDICATIONS  (STANDING):  allopurinol 100 milliGRAM(s) Oral daily  amLODIPine   Tablet 10 milliGRAM(s) Oral daily  AQUAPHOR (petrolatum Ointment) 1 Application(s) Topical three times a day  ascorbic acid 500 milliGRAM(s) Oral daily  calcitriol   Capsule 0.25 MICROGram(s) Oral daily  carvedilol 25 milliGRAM(s) Oral every 12 hours  ertapenem  IVPB 500 milliGRAM(s) IV Intermittent every 24 hours  Nephro-alyse 1 Tablet(s) Oral daily  predniSONE   Tablet 10 milliGRAM(s) Oral daily  zinc sulfate 220 milliGRAM(s) Oral daily    MEDICATIONS  (PRN):  acetaminophen   Tablet. 650 milliGRAM(s) Oral every 6 hours PRN Moderate Pain (4 - 6)        CAPILLARY BLOOD GLUCOSE        I&O's Summary    08 Jul 2018 07:01  -  09 Jul 2018 07:00  --------------------------------------------------------  IN: 680 mL / OUT: 450 mL / NET: 230 mL    09 Jul 2018 07:01  -  09 Jul 2018 23:39  --------------------------------------------------------  IN: 717 mL / OUT: 600 mL / NET: 117 mL        PHYSICAL EXAM:  GENERAL: NAD, well-developed  HEAD:  Atraumatic, Normocephalic  NECK: Supple, No JVD  CHEST/LUNG: Clear to auscultation bilaterally; No wheezing.  HEART: Regular rate and rhythm; No murmurs, rubs, or gallops  ABDOMEN: Soft, Nontender, Nondistended; Bowel sounds present  EXTREMITIES:   No clubbing, cyanosis, or edema  NEUROLOGY: AAO X 3  SKIN: No rashes    LABS:                        8.8    6.5   )-----------( 200      ( 09 Jul 2018 08:37 )             26.0     07-09    138  |  100  |  92<H>  ----------------------------<  107<H>  3.8   |  23  |  5.10<H>    Ca    8.4      09 Jul 2018 08:37      PT/INR - ( 09 Jul 2018 08:37 )   PT: 58.0 sec;   INR: 5.19 ratio                 CAPILLARY BLOOD GLUCOSE        07-07 @ 08:27  Culture-urine --  Culture results   >100,000 CFU/ml Escherichia coli ESBL  50,000 - 99,000 CFU/mL Proteus mirabilis  method type ARSALAN  Organism Escherichia coli ESBL  Organism Identification Escherichia coli ESBL  Proteus mirabilis  Specimen source .Urine Catheterized  07-06 @ 17:06  Culture-urine --  Culture results   No growth to date.  method type --  Organism --  Organism Identification --  Specimen source .Blood Blood-Peripheral           07-07 @ 08:27  Culture blood --  Culture results   >100,000 CFU/ml Escherichia coli ESBL  50,000 - 99,000 CFU/mL Proteus mirabilis  Gram stain --  Gram stain blood --  Method type ARSALAN  Organism Escherichia coli ESBL  Organism identification Escherichia coli ESBL  Proteus mirabilis  Specimen source .Urine Catheterized   07-06 @ 17:06  Culture blood --  Culture results   No growth to date.  Gram stain --  Gram stain blood --  Method type --  Organism --  Organism identification --  Specimen source .Blood Blood-Peripheral      RADIOLOGY & ADDITIONAL TESTS:    Imaging Personally Reviewed:    Consultant(s) Notes Reviewed:      Care Discussed with Consultants/Other Providers:

## 2018-07-09 NOTE — PROGRESS NOTE ADULT - SUBJECTIVE AND OBJECTIVE BOX
Subjective: Patient seen and examined. No new events except as noted.   feeling ok   no cp or sob     REVIEW OF SYSTEMS:    CONSTITUTIONAL: +weakness, fevers or chills  EYES/ENT: No visual changes;  No vertigo or throat pain   NECK: No pain or stiffness  RESPIRATORY: No cough, wheezing, hemoptysis; No shortness of breath  CARDIOVASCULAR: No chest pain or palpitations  GASTROINTESTINAL: No abdominal or epigastric pain. No nausea, vomiting, or hematemesis; No diarrhea or constipation. No melena or hematochezia.  GENITOURINARY: No dysuria, frequency or hematuria  NEUROLOGICAL: No numbness or weakness  SKIN: No itching, burning, rashes, or lesions   All other review of systems is negative unless indicated above.    MEDICATIONS:  MEDICATIONS  (STANDING):  allopurinol 100 milliGRAM(s) Oral daily  amLODIPine   Tablet 10 milliGRAM(s) Oral daily  AQUAPHOR (petrolatum Ointment) 1 Application(s) Topical three times a day  ascorbic acid 500 milliGRAM(s) Oral daily  calcitriol   Capsule 0.25 MICROGram(s) Oral daily  carvedilol 25 milliGRAM(s) Oral every 12 hours  cefTRIAXone   IVPB 1 Gram(s) IV Intermittent every 24 hours  Nephro-alyse 1 Tablet(s) Oral daily  predniSONE   Tablet 10 milliGRAM(s) Oral daily  sodium chloride 0.9%. 1000 milliLiter(s) (40 mL/Hr) IV Continuous <Continuous>  zinc sulfate 220 milliGRAM(s) Oral daily      PHYSICAL EXAM:  T(C): 36.8 (07-09-18 @ 04:43), Max: 37.1 (07-08-18 @ 20:12)  HR: 88 (07-09-18 @ 08:45) (85 - 92)  BP: 125/75 (07-09-18 @ 08:45) (92/53 - 125/75)  RR: 18 (07-09-18 @ 04:43) (18 - 18)  SpO2: 97% (07-09-18 @ 04:43) (95% - 97%)  Wt(kg): --  I&O's Summary    08 Jul 2018 07:01  -  09 Jul 2018 07:00  --------------------------------------------------------  IN: 680 mL / OUT: 450 mL / NET: 230 mL          Appearance: Normal	  HEENT:   Normal oral mucosa, PERRL, EOMI	  Lymphatic: No lymphadenopathy , no edema  Cardiovascular: Normal S1 S2, No JVD, No murmurs ,Respiratory: Lungs clear to auscultation, normal effort 	  Gastrointestinal:  Soft, Non-tender, + BS	  Skin: No rashes, No ecchymoses  Musculoskeletal: decreased  range of motion and  strength  Psychiatry:  Mood & affect appropriate  Ext: massive LLE edema  +fox     LABS:    CARDIAC MARKERS:                                8.8    6.5   )-----------( 200      ( 09 Jul 2018 08:37 )             26.0     07-09    138  |  100  |  92<H>  ----------------------------<  107<H>  3.8   |  23  |  5.10<H>    Ca    8.4      09 Jul 2018 08:37      proBNP:   Lipid Profile:   HgA1c:   TSH:     Negative          TELEMETRY: 	  Paced   ECG:  	  RADIOLOGY:   DIAGNOSTIC TESTING:  [ ] Echocardiogram:  [ ]  Catheterization:  [ ] Stress Test:    OTHER:

## 2018-07-09 NOTE — PROGRESS NOTE ADULT - SUBJECTIVE AND OBJECTIVE BOX
[facility]   [phone]  RENAL FOLLOW UP NOTE  --------------------------------------------------------------------------------  Chief Complaint:    24 hour events/subjective:  please note my earlier note has been discarded due to the sunrise system logging me out of the system.  seen earlier.   c/o edema in left leg.   states bp has  been lower recently   seen by cards for pericardial effusion     PAST HISTORY  --------------------------------------------------------------------------------  No significant changes to PMH, PSH, FHx, SHx, unless otherwise noted    ALLERGIES & MEDICATIONS  --------------------------------------------------------------------------------  Allergies    heparin (Unknown)  losartan (Anaphylaxis)  nitroglycerin (Other)    Intolerances      Standing Inpatient Medications  allopurinol 100 milliGRAM(s) Oral daily  amLODIPine   Tablet 10 milliGRAM(s) Oral daily  AQUAPHOR (petrolatum Ointment) 1 Application(s) Topical three times a day  ascorbic acid 500 milliGRAM(s) Oral daily  calcitriol   Capsule 0.25 MICROGram(s) Oral daily  carvedilol 25 milliGRAM(s) Oral every 12 hours  ertapenem  IVPB 500 milliGRAM(s) IV Intermittent every 24 hours  Nephro-alyse 1 Tablet(s) Oral daily  predniSONE   Tablet 10 milliGRAM(s) Oral daily  zinc sulfate 220 milliGRAM(s) Oral daily    PRN Inpatient Medications  acetaminophen   Tablet. 650 milliGRAM(s) Oral every 6 hours PRN      REVIEW OF SYSTEMS  --------------------------------------------------------------------------------    Gen: denies  fevers/chills,  CVS: denies chest pain/palpitations  Resp: denies SOB/Cough  GI: Denies N/V/Abd pain  : Denies dysuria  has edema     All other systems were reviewed and are negative, except as noted.    VITALS/PHYSICAL EXAM  --------------------------------------------------------------------------------  T(C): 36.9 (07-09-18 @ 20:54), Max: 36.9 (07-09-18 @ 20:54)  HR: 84 (07-09-18 @ 20:54) (81 - 89)  BP: 129/75 (07-09-18 @ 20:54) (117/68 - 129/75)  RR: 18 (07-09-18 @ 20:54) (18 - 18)  SpO2: 97% (07-09-18 @ 20:54) (97% - 98%)  Wt(kg): --        07-08-18 @ 07:01  -  07-09-18 @ 07:00  --------------------------------------------------------  IN: 680 mL / OUT: 450 mL / NET: 230 mL    07-09-18 @ 07:01  -  07-09-18 @ 23:07  --------------------------------------------------------  IN: 717 mL / OUT: 0 mL / NET: 717 mL      Physical Exam:  	    Physical Exam:  	Gen: alert oriented place person and date   	Pulm: CTA  no rales or ronchi or wheezing  	CV: RRR, S1/S2. no rub  	Abd: +BS, soft, nontender/nondistended  	: No suprapubic tenderness.               Extremity: No cyanosis, LLE 4+ pitting edema RLE no edema   	+ fox   LABS/STUDIES  --------------------------------------------------------------------------------              8.8    6.5   >-----------<  200      [07-09-18 @ 08:37]              26.0     138  |  100  |  92  ----------------------------<  107      [07-09-18 @ 08:37]  3.8   |  23  |  5.10        Ca     8.4     [07-09-18 @ 08:37]      PT/INR: PT 58.0 , INR 5.19       [07-09-18 @ 08:37]      Creatinine Trend:  SCr 5.10 [07-09 @ 08:37]  SCr 5.07 [07-08 @ 05:18]  SCr 4.71 [07-07 @ 07:00]  SCr 4.83 [07-06 @ 14:45]              Urinalysis - [07-06-18 @ 14:45]      Color Yellow / Appearance Turbid / SG 1.015 / pH 8.5      Gluc Negative / Ketone Negative  / Bili Negative / Urobili Negative       Blood Small / Protein 300 / Leuk Est Large / Nitrite Negative      RBC 5-10 / WBC 26-50 / Hyaline  / Gran  / Sq Epi  / Non Sq Epi  / Bacteria Moderate      Iron 36, TIBC 168, %sat 21      [04-20-18 @ 08:15]  Ferritin 692      [04-20-18 @ 08:14]  PTH -- (Ca 8.9)      [04-20-18 @ 08:14]   217  PTH -- (Ca 9.0)      [11-09-17 @ 08:51]   387  Vitamin D (25OH) 7.8      [11-09-17 @ 08:51]  HbA1c 4.5      [08-09-16 @ 06:49]  TSH 0.47      [04-24-18 @ 07:51]

## 2018-07-09 NOTE — CONSULT NOTE ADULT - SUBJECTIVE AND OBJECTIVE BOX
HPI:  58F w/ SLE on prednisone, HFrEF s/p ICD, CKD III-IV, Hx PE/HIT, HTN, CAD no stents, Stage IV decub p/w decreased urine output and blood in urine. Pt states she was not in significant distress this week until today. She states her daughter checked her fox and noticed that there as a lack of urine output and the urine had a red shade to it. Given these symptoms, pt returned to ER for further evaluation. Pt also complains of generalized chills and back aches this week. Backaches were noted to be worse today. Pt denies any cough, abdominal pain, suprapubic pain, sick contacts, fevers, chills. Has not changed her medications recently. Pt endorses swelling of her hands yesterday bilaterally which has resolved today. Did not have any arthralgia of her knees which she usually gets in SLE flares. Pt has fox exchanged 2 weeks ago without reported issues.       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)  Calf DVT (Deep Venous Thrombosis) (ICD9 453.42)  Adenomatous Goiter (ICD9 241.9)  Chronic Gout (ICD9 274.02)  LBBB (Left Bundle Branch Block) (ICD9 426.3)  CHF (Congestive Heart Failure) (ICD9 428.0)  Hx of aorta-iliac-femoral bypass  TOP (Termination of Pregnancy)  S/P Dilatation and Curettage  History of Tubal Ligation  s/p AAA (Abdominal Aortic Aneurysm) Repair: 2009 2010  History of Cholecystectomy  S/P Partial Hysterectomy      Allergies    heparin (Unknown)  losartan (Anaphylaxis)  nitroglycerin (Other)    Intolerances        ANTIMICROBIALS:  ertapenem  IVPB 500 every 24 hours      OTHER MEDS:  allopurinol 100 milliGRAM(s) Oral daily  amLODIPine   Tablet 10 milliGRAM(s) Oral daily  AQUAPHOR (petrolatum Ointment) 1 Application(s) Topical three times a day  ascorbic acid 500 milliGRAM(s) Oral daily  calcitriol   Capsule 0.25 MICROGram(s) Oral daily  carvedilol 25 milliGRAM(s) Oral every 12 hours  Nephro-alyse 1 Tablet(s) Oral daily  predniSONE   Tablet 10 milliGRAM(s) Oral daily  zinc sulfate 220 milliGRAM(s) Oral daily      SOCIAL HISTORY:    FAMILY HISTORY:  No pertinent family history in first degree relatives      Drug Dosing Weight  Height (cm): 165.1 (06 Jul 2018 13:30)  Weight (kg): 117.9 (06 Jul 2018 13:30)  BMI (kg/m2): 43.3 (06 Jul 2018 13:30)  BSA (m2): 2.21 (06 Jul 2018 13:30)    PE:    Vital Signs Last 24 Hrs  T(C): 36.8 (09 Jul 2018 04:43), Max: 37.1 (08 Jul 2018 20:12)  T(F): 98.2 (09 Jul 2018 04:43), Max: 98.7 (08 Jul 2018 20:12)  HR: 88 (09 Jul 2018 08:45) (85 - 92)  BP: 125/75 (09 Jul 2018 08:45) (92/53 - 125/75)  BP(mean): --  RR: 18 (09 Jul 2018 04:43) (18 - 18)  SpO2: 97% (09 Jul 2018 04:43) (95% - 97%)    Gen: AOx3, NAD, non-toxic, pleasant  CV: S1+S2 normal, no murmurs, nontachycardic  Resp: Clear bilat, no resp distress, no crackles/wheezes  Abd: Soft, nontender, +BS  Ext: No LE edema, no wounds  : No Fox  IV/Skin: No thrombophlebitis  Msk: No low back pain, no arthralgias, no joint swelling  Neuro: No sensory deficits, no motor deficits    LABS:                          8.8    6.5   )-----------( 200      ( 09 Jul 2018 08:37 )             26.0       07-09    138  |  100  |  92<H>  ----------------------------<  107<H>  3.8   |  23  |  5.10<H>    Ca    8.4      09 Jul 2018 08:37    MICROBIOLOGY:  v  .Urine Catheterized  07-07-18   >100,000 CFU/ml Escherichia coli ESBL  50,000 - 99,000 CFU/mL Proteus mirabilis  --  Escherichia coli ESBL  Proteus mirabilis      .Blood Blood-Peripheral  07-06-18   No growth to date.  --  --    RADIOLOGY:    < from: CT Abdomen and Pelvis No Cont (07.06.18 @ 15:21) >  IMPRESSION:   Patient is status post left internal iliac endovascular aortic repair   with interval decrease in large aneurysm.    Moderate-sized pericardial effusion unchanged.  New dilatation of the ascending thoracic aorta since 10/11/2017. Further   evaluation first with echocardiography is advised.A contrast-enhanced   CTA of the chest can also be considered, depending on results of   echocardiography.      < end of copied text > HPI:  58F w/ SLE on prednisone, HFrEF s/p ICD, CKD III-IV, Hx PE/HIT, HTN, CAD no stents, Stage IV decub p/w decreased urine output and blood in urine. Pt states she was not in significant distress this week until today. She states her daughter checked her fox and noticed that there as a lack of urine output and the urine had a red shade to it. Given these symptoms, pt returned to ER for further evaluation. Pt also complains of generalized chills and back aches this week. Backaches were noted to be worse today. States pain worse on left flank. Also, with chronic left leg swelling. Pt denies any cough, abdominal pain, suprapubic pain, sick contacts, fevers, chills. Has not changed her medications recently. Pt endorses swelling of her hands yesterday bilaterally which has resolved today. Did not have any arthralgia of her knees which she usually gets in SLE flares. Pt has fox exchanged 2 weeks ago without reported issues. Patient complaining of urinary distention.    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)  Calf DVT (Deep Venous Thrombosis) (ICD9 453.42)  Adenomatous Goiter (ICD9 241.9)  Chronic Gout (ICD9 274.02)  LBBB (Left Bundle Branch Block) (ICD9 426.3)  CHF (Congestive Heart Failure) (ICD9 428.0)  Hx of aorta-iliac-femoral bypass  TOP (Termination of Pregnancy)  S/P Dilatation and Curettage  History of Tubal Ligation  s/p AAA (Abdominal Aortic Aneurysm) Repair: 2009 2010  History of Cholecystectomy  S/P Partial Hysterectomy      Allergies    heparin (Unknown)  losartan (Anaphylaxis)  nitroglycerin (Other)    Intolerances    ANTIMICROBIALS:  ertapenem  IVPB 500 every 24 hours    OTHER MEDS:  allopurinol 100 milliGRAM(s) Oral daily  amLODIPine   Tablet 10 milliGRAM(s) Oral daily  AQUAPHOR (petrolatum Ointment) 1 Application(s) Topical three times a day  ascorbic acid 500 milliGRAM(s) Oral daily  calcitriol   Capsule 0.25 MICROGram(s) Oral daily  carvedilol 25 milliGRAM(s) Oral every 12 hours  Nephro-alyse 1 Tablet(s) Oral daily  predniSONE   Tablet 10 milliGRAM(s) Oral daily  zinc sulfate 220 milliGRAM(s) Oral daily    SOCIAL HISTORY: Nonsmoker    FAMILY HISTORY:  Brother sarcoidosis  Brother Testicular CA  Father testicular CA  Mother with DM    Drug Dosing Weight  Height (cm): 165.1 (06 Jul 2018 13:30)  Weight (kg): 117.9 (06 Jul 2018 13:30)  BMI (kg/m2): 43.3 (06 Jul 2018 13:30)  BSA (m2): 2.21 (06 Jul 2018 13:30)    PE:    Vital Signs Last 24 Hrs  T(C): 36.8 (09 Jul 2018 04:43), Max: 37.1 (08 Jul 2018 20:12)  T(F): 98.2 (09 Jul 2018 04:43), Max: 98.7 (08 Jul 2018 20:12)  HR: 88 (09 Jul 2018 08:45) (85 - 92)  BP: 125/75 (09 Jul 2018 08:45) (92/53 - 125/75)  BP(mean): --  RR: 18 (09 Jul 2018 04:43) (18 - 18)  SpO2: 97% (09 Jul 2018 04:43) (95% - 97%)    Gen: AOx3, NAD, non-toxic, pleasant  CV: S1+S2 normal, no murmurs  Resp: Clear bilat, no resp distress  Abd: Soft, nontender, +BS  Ext: left leg swelling, no erythema, surgical scar intact  : +Fox  IV/Skin: No thrombophlebitis, refused sacral exam at this time until she returns to bed.  Msk: Right flank tenderness  Neuro: No sensory deficits, no motor deficits    LABS:                          8.8    6.5   )-----------( 200      ( 09 Jul 2018 08:37 )             26.0       07-09    138  |  100  |  92<H>  ----------------------------<  107<H>  3.8   |  23  |  5.10<H>    Ca    8.4      09 Jul 2018 08:37    MICROBIOLOGY:  v  .Urine Catheterized  07-07-18   >100,000 CFU/ml Escherichia coli ESBL  50,000 - 99,000 CFU/mL Proteus mirabilis  --  Escherichia coli ESBL  Proteus mirabilis      .Blood Blood-Peripheral  07-06-18   No growth to date.  --  --    RADIOLOGY:    < from: CT Abdomen and Pelvis No Cont (07.06.18 @ 15:21) >  IMPRESSION:   Patient is status post left internal iliac endovascular aortic repair   with interval decrease in large aneurysm.    Moderate-sized pericardial effusion unchanged.  New dilatation of the ascending thoracic aorta since 10/11/2017. Further   evaluation first with echocardiography is advised.A contrast-enhanced   CTA of the chest can also be considered, depending on results of   echocardiography.      < end of copied text >

## 2018-07-09 NOTE — CONSULT NOTE ADULT - ASSESSMENT
Imp:  59 yo female with h/o SLE that has been fairly stable.  Now a/w blood and wBCs in urine, decreased Urine output, increase Cr level, and fever.   Seems to be related to infection.    Rec:  Pt on Abx as per ID  Check dsDNA, c3 c4  continue prednisone 10mg/day for now

## 2018-07-10 LAB
ANION GAP SERPL CALC-SCNC: 18 MMOL/L — HIGH (ref 5–17)
BUN SERPL-MCNC: 95 MG/DL — HIGH (ref 7–23)
C3 SERPL-MCNC: 114 MG/DL — SIGNIFICANT CHANGE UP (ref 81–157)
C4 SERPL-MCNC: 32 MG/DL — SIGNIFICANT CHANGE UP (ref 13–39)
CALCIUM SERPL-MCNC: 8.5 MG/DL — SIGNIFICANT CHANGE UP (ref 8.4–10.5)
CHLORIDE SERPL-SCNC: 98 MMOL/L — SIGNIFICANT CHANGE UP (ref 96–108)
CO2 SERPL-SCNC: 23 MMOL/L — SIGNIFICANT CHANGE UP (ref 22–31)
CREAT SERPL-MCNC: 5.18 MG/DL — HIGH (ref 0.5–1.3)
DSDNA AB SER-ACNC: 42 IU/ML — HIGH
GLUCOSE SERPL-MCNC: 104 MG/DL — HIGH (ref 70–99)
HCT VFR BLD CALC: 26.6 % — LOW (ref 34.5–45)
HGB BLD-MCNC: 8.2 G/DL — LOW (ref 11.5–15.5)
INR BLD: 4.03 RATIO — HIGH (ref 0.88–1.16)
MAGNESIUM SERPL-MCNC: 2.1 MG/DL — SIGNIFICANT CHANGE UP (ref 1.6–2.6)
MCHC RBC-ENTMCNC: 26.9 PG — LOW (ref 27–34)
MCHC RBC-ENTMCNC: 30.8 GM/DL — LOW (ref 32–36)
MCV RBC AUTO: 87.2 FL — SIGNIFICANT CHANGE UP (ref 80–100)
PHOSPHATE SERPL-MCNC: 3.8 MG/DL — SIGNIFICANT CHANGE UP (ref 2.5–4.5)
PLATELET # BLD AUTO: 251 K/UL — SIGNIFICANT CHANGE UP (ref 150–400)
POTASSIUM SERPL-MCNC: 3.6 MMOL/L — SIGNIFICANT CHANGE UP (ref 3.5–5.3)
POTASSIUM SERPL-SCNC: 3.6 MMOL/L — SIGNIFICANT CHANGE UP (ref 3.5–5.3)
PROTHROM AB SERPL-ACNC: 46.8 SEC — HIGH (ref 10–13.1)
RBC # BLD: 3.05 M/UL — LOW (ref 3.8–5.2)
RBC # FLD: 16 % — HIGH (ref 10.3–14.5)
SODIUM SERPL-SCNC: 139 MMOL/L — SIGNIFICANT CHANGE UP (ref 135–145)
WBC # BLD: 5.75 K/UL — SIGNIFICANT CHANGE UP (ref 3.8–10.5)
WBC # FLD AUTO: 5.75 K/UL — SIGNIFICANT CHANGE UP (ref 3.8–10.5)

## 2018-07-10 PROCEDURE — 99232 SBSQ HOSP IP/OBS MODERATE 35: CPT

## 2018-07-10 RX ADMIN — AMLODIPINE BESYLATE 10 MILLIGRAM(S): 2.5 TABLET ORAL at 08:09

## 2018-07-10 RX ADMIN — Medication 100 MILLIGRAM(S): at 11:51

## 2018-07-10 RX ADMIN — CARVEDILOL PHOSPHATE 25 MILLIGRAM(S): 80 CAPSULE, EXTENDED RELEASE ORAL at 05:42

## 2018-07-10 RX ADMIN — Medication 10 MILLIGRAM(S): at 08:09

## 2018-07-10 RX ADMIN — Medication 1 APPLICATION(S): at 13:18

## 2018-07-10 RX ADMIN — ZINC SULFATE TAB 220 MG (50 MG ZINC EQUIVALENT) 220 MILLIGRAM(S): 220 (50 ZN) TAB at 11:51

## 2018-07-10 RX ADMIN — Medication 1 TABLET(S): at 11:51

## 2018-07-10 RX ADMIN — CALCITRIOL 0.25 MICROGRAM(S): 0.5 CAPSULE ORAL at 11:51

## 2018-07-10 RX ADMIN — ERTAPENEM SODIUM 100 MILLIGRAM(S): 1 INJECTION, POWDER, LYOPHILIZED, FOR SOLUTION INTRAMUSCULAR; INTRAVENOUS at 11:51

## 2018-07-10 RX ADMIN — Medication 500 MILLIGRAM(S): at 11:51

## 2018-07-10 RX ADMIN — CARVEDILOL PHOSPHATE 25 MILLIGRAM(S): 80 CAPSULE, EXTENDED RELEASE ORAL at 17:03

## 2018-07-10 NOTE — PROGRESS NOTE ADULT - SUBJECTIVE AND OBJECTIVE BOX
CC: Patient is a 58y old  Female who presents with a chief complaint of Lack of urine, change in color. (06 Jul 2018 22:14)    Interval History/ROS: Patient remains with bilateral flank pain. Urinary pressure resolved. No fever, chills.    Allergies  heparin (Unknown)  losartan (Anaphylaxis)  nitroglycerin (Other)    ANTIMICROBIALS:  ertapenem  IVPB 500 every 24 hours    PE:    Vital Signs Last 24 Hrs  T(C): 36.6 (10 Jul 2018 05:10), Max: 36.9 (09 Jul 2018 20:54)  T(F): 97.8 (10 Jul 2018 05:10), Max: 98.4 (09 Jul 2018 20:54)  HR: 86 (10 Jul 2018 05:10) (81 - 86)  BP: 122/68 (10 Jul 2018 05:10) (117/68 - 129/75)  BP(mean): --  RR: 20 (10 Jul 2018 05:10) (18 - 20)  SpO2: 98% (10 Jul 2018 05:10) (96% - 98%)    Gen: AOx3, NAD, non-toxic, pleasant  CV: S1+S2 normal, no murmurs  Resp: Clear bilat, no resp distress  Abd: Soft, nontender, +BS  Ext: Left lower extremity edema  : +Hook, left flank pain  IV/Skin: No thrombophlebitis  Neuro: no focal deficits    LABS:                          8.8    6.5   )-----------( 200      ( 09 Jul 2018 08:37 )             26.0       07-10    139  |  98  |  95<H>  ----------------------------<  104<H>  3.6   |  23  |  5.18<H>    Ca    8.5      10 Jul 2018 09:20  Phos  3.8     07-10  Mg     2.1     07-10      MICROBIOLOGY:  v  .Urine Catheterized  07-07-18   >100,000 CFU/ml Escherichia coli ESBL  50,000 - 99,000 CFU/mL Proteus mirabilis  --  Escherichia coli ESBL  Proteus mirabilis      .Blood Blood-Peripheral  07-06-18   No growth to date.  --  --    RADIOLOGY:    No new images.

## 2018-07-10 NOTE — PROGRESS NOTE ADULT - SUBJECTIVE AND OBJECTIVE BOX
Subjective: Patient seen and examined. No new events except as noted.   resting comfortably in bed     REVIEW OF SYSTEMS:    CONSTITUTIONAL: + weakness, fevers or chills  EYES/ENT: No visual changes;  No vertigo or throat pain   NECK: No pain or stiffness  RESPIRATORY: No cough, wheezing, hemoptysis; No shortness of breath  CARDIOVASCULAR: No chest pain or palpitations  GASTROINTESTINAL: No abdominal or epigastric pain. No nausea, vomiting, or hematemesis; No diarrhea or constipation. No melena or hematochezia.  GENITOURINARY: No dysuria, frequency or hematuria  NEUROLOGICAL: No numbness or weakness  SKIN: No itching, burning, rashes, or lesions   All other review of systems is negative unless indicated above.    MEDICATIONS:  MEDICATIONS  (STANDING):  allopurinol 100 milliGRAM(s) Oral daily  amLODIPine   Tablet 10 milliGRAM(s) Oral daily  AQUAPHOR (petrolatum Ointment) 1 Application(s) Topical three times a day  ascorbic acid 500 milliGRAM(s) Oral daily  calcitriol   Capsule 0.25 MICROGram(s) Oral daily  carvedilol 25 milliGRAM(s) Oral every 12 hours  ertapenem  IVPB 500 milliGRAM(s) IV Intermittent every 24 hours  Nephro-alyse 1 Tablet(s) Oral daily  predniSONE   Tablet 10 milliGRAM(s) Oral daily  zinc sulfate 220 milliGRAM(s) Oral daily      PHYSICAL EXAM:  T(C): 36.6 (07-10-18 @ 05:10), Max: 36.9 (07-09-18 @ 20:54)  HR: 86 (07-10-18 @ 05:10) (81 - 86)  BP: 122/68 (07-10-18 @ 05:10) (117/68 - 129/75)  RR: 20 (07-10-18 @ 05:10) (18 - 20)  SpO2: 98% (07-10-18 @ 05:10) (96% - 98%)  Wt(kg): --  I&O's Summary    09 Jul 2018 07:01  -  10 Jul 2018 07:00  --------------------------------------------------------  IN: 717 mL / OUT: 900 mL / NET: -183 mL    10 Jul 2018 07:01  -  10 Jul 2018 10:17  --------------------------------------------------------  IN: 240 mL / OUT: 0 mL / NET: 240 mL          Appearance: NAD	  HEENT:   Normal oral mucosa, PERRL, EOMI	  Lymphatic: No lymphadenopathy , no edema  Cardiovascular: Normal S1 S2, No JVD, No murmurs , Peripheral pulses palpable 2+ bilaterally  Respiratory: Lungs clear to auscultation, normal effort 	  Gastrointestinal:  Soft, Non-tender, + BS	  Skin: No rashes, No ecchymoses, No cyanosis, warm to touch  Musculoskeletal: decreased  range of motion and  strength  Psychiatry:  Mood & affect appropriate  Ext: massive LLE edema  +fox           LABS:    CARDIAC MARKERS:                                8.8    6.5   )-----------( 200      ( 09 Jul 2018 08:37 )             26.0     07-10    139  |  98  |  95<H>  ----------------------------<  104<H>  3.6   |  23  |  5.18<H>    Ca    8.5      10 Jul 2018 09:20  Phos  3.8     07-10  Mg     2.1     07-10      proBNP:   Lipid Profile:   HgA1c:   TSH:             TELEMETRY: PAced 	    ECG:  	  RADIOLOGY:   DIAGNOSTIC TESTING:  [ ] Echocardiogram:  [ ]  Catheterization:  [ ] Stress Test:    OTHER:

## 2018-07-10 NOTE — PROGRESS NOTE ADULT - ASSESSMENT
58 F PMH SLE, systolic HFrEF(EF 32%) s/p AICD, HTN, CKD III- IV, PE / left leg DVT dx 2009, HIT, CAD (no stents), stage IV sacral ulcer, chronic Fox, recurrent pericardial effusion, AAA ~14cm in 12/2016 s/p repair in 2010 w/ aortoiliac stent complicated post-operatively with development of left lower extremity compartment syndrome, with recent cellulitis treated inpt now presents with uti and pericardial effusion      1- CKD IV with ILANA   2- pericardial effusion   3- chf  4- HTN   5- neurogenic bladder  6- lupus   7- anemia  8- shpt  9- hyperuricemia   10- UTI   11- thoracic aneurysm    cont with b-b  norvasc to cont   invanz per ID to cont for proteus infection   cont fox    cont calcitriol   uric acid level  cont with allopurinol   evaluate thoracic aneurysm by CTS noted.   if cr does not improve will need renal replacement therapy in near future   uo slowly improving in past 24 hr cont with strict I/O

## 2018-07-10 NOTE — PROGRESS NOTE ADULT - ASSESSMENT
58 year old female w/ SLE on prednisone, HFrEF s/p ICD, CKD, Hx PE/HIT, HTN, CAD no stents, Stage IV decub p/w decreased urine output and blood in urine.    Febrile in ED  CT with pericardial effusion chronic - cardiology on board  Also with thoracic aortic aneurysm with vascular surgery on board  WBC WNL  UA with 26-50 WBC  Blood cxs NGTD  Urine cxs with ESBL E. coli and Proteus  Feeling better  Aferbrile  WBC WNL    Recommend:  -Continue ertapenem 500 mg IV q 24 hours.  -Will most likely need a 10- day course.  -Would continue IV for now and can transition to oral fosfomycin towards the end of therapy.

## 2018-07-10 NOTE — PROGRESS NOTE ADULT - SUBJECTIVE AND OBJECTIVE BOX
Patient is a 58y old  Female who presents with a chief complaint of Lack of urine, change in color. (06 Jul 2018 22:14)      SUBJECTIVE / OVERNIGHT EVENTS:   Feels better.  Denies CP/SOB/Palpitation/HA.    MEDICATIONS  (STANDING):  allopurinol 100 milliGRAM(s) Oral daily  amLODIPine   Tablet 10 milliGRAM(s) Oral daily  AQUAPHOR (petrolatum Ointment) 1 Application(s) Topical three times a day  ascorbic acid 500 milliGRAM(s) Oral daily  calcitriol   Capsule 0.25 MICROGram(s) Oral daily  carvedilol 25 milliGRAM(s) Oral every 12 hours  ertapenem  IVPB 500 milliGRAM(s) IV Intermittent every 24 hours  Nephro-alyse 1 Tablet(s) Oral daily  predniSONE   Tablet 10 milliGRAM(s) Oral daily  zinc sulfate 220 milliGRAM(s) Oral daily    MEDICATIONS  (PRN):  acetaminophen   Tablet. 650 milliGRAM(s) Oral every 6 hours PRN Moderate Pain (4 - 6)        CAPILLARY BLOOD GLUCOSE        I&O's Summary    09 Jul 2018 07:01  -  10 Jul 2018 07:00  --------------------------------------------------------  IN: 717 mL / OUT: 900 mL / NET: -183 mL    10 Jul 2018 07:01  -  10 Jul 2018 21:35  --------------------------------------------------------  IN: 730 mL / OUT: 0 mL / NET: 730 mL        PHYSICAL EXAM:  GENERAL: NAD, well-developed  HEAD:  Atraumatic, Normocephalic  NECK: Supple, No JVD  CHEST/LUNG: Clear to auscultation bilaterally; No wheezing.  HEART: Regular rate and rhythm; No murmurs, rubs, or gallops  ABDOMEN: Soft, Nontender, Nondistended; Bowel sounds present  EXTREMITIES:   No clubbing, cyanosis, or edema  NEUROLOGY: AAO X 3  SKIN: No rashes    LABS:                        8.2    5.75  )-----------( 251      ( 10 Jul 2018 13:53 )             26.6     07-10    139  |  98  |  95<H>  ----------------------------<  104<H>  3.6   |  23  |  5.18<H>    Ca    8.5      10 Jul 2018 09:20  Phos  3.8     07-10  Mg     2.1     07-10      PT/INR - ( 10 Jul 2018 13:53 )   PT: 46.8 sec;   INR: 4.03 ratio                 CAPILLARY BLOOD GLUCOSE        07-07 @ 08:27  Culture-urine --  Culture results   >100,000 CFU/ml Escherichia coli ESBL  50,000 - 99,000 CFU/mL Proteus mirabilis  method type ARSALAN  Organism Escherichia coli ESBL  Organism Identification Escherichia coli ESBL  Proteus mirabilis  Specimen source .Urine Catheterized  07-06 @ 17:06  Culture-urine --  Culture results   No growth to date.  method type --  Organism --  Organism Identification --  Specimen source .Blood Blood-Peripheral           07-07 @ 08:27  Culture blood --  Culture results   >100,000 CFU/ml Escherichia coli ESBL  50,000 - 99,000 CFU/mL Proteus mirabilis  Gram stain --  Gram stain blood --  Method type ARSALAN  Organism Escherichia coli ESBL  Organism identification Escherichia coli ESBL  Proteus mirabilis  Specimen source .Urine Catheterized   07-06 @ 17:06  Culture blood --  Culture results   No growth to date.  Gram stain --  Gram stain blood --  Method type --  Organism --  Organism identification --  Specimen source .Blood Blood-Peripheral      RADIOLOGY & ADDITIONAL TESTS:    Imaging Personally Reviewed:    Consultant(s) Notes Reviewed:      Care Discussed with Consultants/Other Providers:

## 2018-07-10 NOTE — PROGRESS NOTE ADULT - SUBJECTIVE AND OBJECTIVE BOX
[facility]   [phone]  RENAL FOLLOW UP NOTE  --------------------------------------------------------------------------------  Chief Complaint:    24 hour events/subjective:  still with LE edema. no chills. + left intermittent flank pain       PAST HISTORY  --------------------------------------------------------------------------------  No significant changes to PMH, PSH, FHx, SHx, unless otherwise noted    ALLERGIES & MEDICATIONS  --------------------------------------------------------------------------------  Allergies    heparin (Unknown)  losartan (Anaphylaxis)  nitroglycerin (Other)    Intolerances      Standing Inpatient Medications  allopurinol 100 milliGRAM(s) Oral daily  amLODIPine   Tablet 10 milliGRAM(s) Oral daily  AQUAPHOR (petrolatum Ointment) 1 Application(s) Topical three times a day  ascorbic acid 500 milliGRAM(s) Oral daily  calcitriol   Capsule 0.25 MICROGram(s) Oral daily  carvedilol 25 milliGRAM(s) Oral every 12 hours  ertapenem  IVPB 500 milliGRAM(s) IV Intermittent every 24 hours  Nephro-alyse 1 Tablet(s) Oral daily  predniSONE   Tablet 10 milliGRAM(s) Oral daily  zinc sulfate 220 milliGRAM(s) Oral daily    PRN Inpatient Medications  acetaminophen   Tablet. 650 milliGRAM(s) Oral every 6 hours PRN      REVIEW OF SYSTEMS  --------------------------------------------------------------------------------    Gen: denies  fevers/chills,  CVS: denies chest pain/palpitations  Resp: denies SOB/Cough  GI: Denies N/V/Abd pain  : Denies dysuria    All other systems were reviewed and are negative, except as noted.    VITALS/PHYSICAL EXAM  --------------------------------------------------------------------------------  T(C): 36.4 (07-10-18 @ 12:35), Max: 36.9 (07-10-18 @ 01:41)  HR: 86 (07-10-18 @ 17:05) (76 - 86)  BP: 128/78 (07-10-18 @ 17:05) (122/68 - 135/72)  RR: 19 (07-10-18 @ 12:35) (18 - 20)  SpO2: 97% (07-10-18 @ 12:35) (96% - 98%)  Wt(kg): --        07-09-18 @ 07:01  -  07-10-18 @ 07:00  --------------------------------------------------------  IN: 717 mL / OUT: 900 mL / NET: -183 mL    07-10-18 @ 07:01  -  07-10-18 @ 21:11  --------------------------------------------------------  IN: 480 mL / OUT: 0 mL / NET: 480 mL      Physical Exam:  	  Gen: alert oriented place person and date   	Pulm: CTA  no rales or ronchi or wheezing  	CV: RRR, S1/S2. no rub  	Abd: +BS, soft, nontender/nondistended  	: No suprapubic tenderness.               Extremity: No cyanosis, LLE 4+ pitting edema RLE no edema   	+ fox   LABS/STUDIES	  --------------------------------------------------------------------------------              8.2    5.75  >-----------<  251      [07-10-18 @ 13:53]              26.6     139  |  98  |  95  ----------------------------<  104      [07-10-18 @ 09:20]  3.6   |  23  |  5.18        Ca     8.5     [07-10-18 @ 09:20]      Mg     2.1     [07-10-18 @ 09:20]      Phos  3.8     [07-10-18 @ 09:20]      PT/INR: PT 46.8 , INR 4.03       [07-10-18 @ 13:53]      Creatinine Trend:  SCr 5.18 [07-10 @ 09:20]  SCr 5.10 [07-09 @ 08:37]  SCr 5.07 [07-08 @ 05:18]  SCr 4.71 [07-07 @ 07:00]  SCr 4.83 [07-06 @ 14:45]              Urinalysis - [07-06-18 @ 14:45]      Color Yellow / Appearance Turbid / SG 1.015 / pH 8.5      Gluc Negative / Ketone Negative  / Bili Negative / Urobili Negative       Blood Small / Protein 300 / Leuk Est Large / Nitrite Negative      RBC 5-10 / WBC 26-50 / Hyaline  / Gran  / Sq Epi  / Non Sq Epi  / Bacteria Moderate      Iron 36, TIBC 168, %sat 21      [04-20-18 @ 08:15]  Ferritin 692      [04-20-18 @ 08:14]  PTH -- (Ca 8.9)      [04-20-18 @ 08:14]   217  PTH -- (Ca 9.0)      [11-09-17 @ 08:51]   387  Vitamin D (25OH) 7.8      [11-09-17 @ 08:51]  HbA1c 4.5      [08-09-16 @ 06:49]  TSH 0.47      [04-24-18 @ 07:51]    dsDNA 42      [07-07-18 @ 08:39]

## 2018-07-11 LAB
ANION GAP SERPL CALC-SCNC: 18 MMOL/L — HIGH (ref 5–17)
BUN SERPL-MCNC: 98 MG/DL — HIGH (ref 7–23)
CALCIUM SERPL-MCNC: 8.4 MG/DL — SIGNIFICANT CHANGE UP (ref 8.4–10.5)
CHLORIDE SERPL-SCNC: 99 MMOL/L — SIGNIFICANT CHANGE UP (ref 96–108)
CO2 SERPL-SCNC: 23 MMOL/L — SIGNIFICANT CHANGE UP (ref 22–31)
CREAT SERPL-MCNC: 4.74 MG/DL — HIGH (ref 0.5–1.3)
CULTURE RESULTS: SIGNIFICANT CHANGE UP
CULTURE RESULTS: SIGNIFICANT CHANGE UP
DSDNA AB SER-ACNC: 38 IU/ML — HIGH
GLUCOSE SERPL-MCNC: 128 MG/DL — HIGH (ref 70–99)
HCT VFR BLD CALC: 27.1 % — LOW (ref 34.5–45)
HGB BLD-MCNC: 8.6 G/DL — LOW (ref 11.5–15.5)
INR BLD: 3.54 RATIO — HIGH (ref 0.88–1.16)
MCHC RBC-ENTMCNC: 27.7 PG — SIGNIFICANT CHANGE UP (ref 27–34)
MCHC RBC-ENTMCNC: 31.6 GM/DL — LOW (ref 32–36)
MCV RBC AUTO: 87.6 FL — SIGNIFICANT CHANGE UP (ref 80–100)
PLATELET # BLD AUTO: 261 K/UL — SIGNIFICANT CHANGE UP (ref 150–400)
POTASSIUM SERPL-MCNC: 3.9 MMOL/L — SIGNIFICANT CHANGE UP (ref 3.5–5.3)
POTASSIUM SERPL-SCNC: 3.9 MMOL/L — SIGNIFICANT CHANGE UP (ref 3.5–5.3)
PROTHROM AB SERPL-ACNC: 39.2 SEC — HIGH (ref 9.8–12.7)
RBC # BLD: 3.09 M/UL — LOW (ref 3.8–5.2)
RBC # FLD: 15.4 % — HIGH (ref 10.3–14.5)
SODIUM SERPL-SCNC: 140 MMOL/L — SIGNIFICANT CHANGE UP (ref 135–145)
SPECIMEN SOURCE: SIGNIFICANT CHANGE UP
SPECIMEN SOURCE: SIGNIFICANT CHANGE UP
WBC # BLD: 6.5 K/UL — SIGNIFICANT CHANGE UP (ref 3.8–10.5)
WBC # FLD AUTO: 6.5 K/UL — SIGNIFICANT CHANGE UP (ref 3.8–10.5)

## 2018-07-11 PROCEDURE — 99232 SBSQ HOSP IP/OBS MODERATE 35: CPT

## 2018-07-11 RX ORDER — WARFARIN SODIUM 2.5 MG/1
2 TABLET ORAL ONCE
Qty: 0 | Refills: 0 | Status: COMPLETED | OUTPATIENT
Start: 2018-07-11 | End: 2018-07-11

## 2018-07-11 RX ADMIN — CARVEDILOL PHOSPHATE 25 MILLIGRAM(S): 80 CAPSULE, EXTENDED RELEASE ORAL at 05:24

## 2018-07-11 RX ADMIN — Medication 100 MILLIGRAM(S): at 11:49

## 2018-07-11 RX ADMIN — CALCITRIOL 0.25 MICROGRAM(S): 0.5 CAPSULE ORAL at 11:49

## 2018-07-11 RX ADMIN — Medication 1 TABLET(S): at 13:03

## 2018-07-11 RX ADMIN — Medication 1 APPLICATION(S): at 21:30

## 2018-07-11 RX ADMIN — Medication 10 MILLIGRAM(S): at 07:56

## 2018-07-11 RX ADMIN — Medication 500 MILLIGRAM(S): at 11:49

## 2018-07-11 RX ADMIN — ERTAPENEM SODIUM 100 MILLIGRAM(S): 1 INJECTION, POWDER, LYOPHILIZED, FOR SOLUTION INTRAMUSCULAR; INTRAVENOUS at 13:03

## 2018-07-11 RX ADMIN — CARVEDILOL PHOSPHATE 25 MILLIGRAM(S): 80 CAPSULE, EXTENDED RELEASE ORAL at 17:22

## 2018-07-11 RX ADMIN — ZINC SULFATE TAB 220 MG (50 MG ZINC EQUIVALENT) 220 MILLIGRAM(S): 220 (50 ZN) TAB at 11:49

## 2018-07-11 RX ADMIN — Medication 1 APPLICATION(S): at 13:03

## 2018-07-11 RX ADMIN — AMLODIPINE BESYLATE 10 MILLIGRAM(S): 2.5 TABLET ORAL at 07:56

## 2018-07-11 NOTE — PROGRESS NOTE ADULT - SUBJECTIVE AND OBJECTIVE BOX
[facility]   [phone]  RENAL FOLLOW UP NOTE  --------------------------------------------------------------------------------  Chief Complaint:    24 hour events/subjective:    states feels a little better. flank pain improving     PAST HISTORY  --------------------------------------------------------------------------------  No significant changes to PMH, PSH, FHx, SHx, unless otherwise noted    ALLERGIES & MEDICATIONS  --------------------------------------------------------------------------------  Allergies    heparin (Unknown)  losartan (Anaphylaxis)  nitroglycerin (Other)    Intolerances      Standing Inpatient Medications  allopurinol 100 milliGRAM(s) Oral daily  amLODIPine   Tablet 10 milliGRAM(s) Oral daily  AQUAPHOR (petrolatum Ointment) 1 Application(s) Topical three times a day  ascorbic acid 500 milliGRAM(s) Oral daily  calcitriol   Capsule 0.25 MICROGram(s) Oral daily  carvedilol 25 milliGRAM(s) Oral every 12 hours  ertapenem  IVPB 500 milliGRAM(s) IV Intermittent every 24 hours  Nephro-alyse 1 Tablet(s) Oral daily  predniSONE   Tablet 10 milliGRAM(s) Oral daily  warfarin 2 milliGRAM(s) Oral once  zinc sulfate 220 milliGRAM(s) Oral daily    PRN Inpatient Medications  acetaminophen   Tablet. 650 milliGRAM(s) Oral every 6 hours PRN      REVIEW OF SYSTEMS  --------------------------------------------------------------------------------    Gen: denies  fevers/chills,  CVS: denies chest pain/palpitations  Resp: denies SOB/Cough  GI: Denies N/V/Abd pain  : Denies dysuria    All other systems were reviewed and are negative, except as noted.    VITALS/PHYSICAL EXAM  --------------------------------------------------------------------------------  T(C): 36.7 (07-11-18 @ 20:46), Max: 37.3 (07-11-18 @ 14:23)  HR: 85 (07-11-18 @ 20:46) (82 - 97)  BP: 125/65 (07-11-18 @ 20:46) (120/51 - 142/75)  RR: 18 (07-11-18 @ 20:46) (18 - 20)  SpO2: 98% (07-11-18 @ 20:46) (97% - 98%)  Wt(kg): --        07-10-18 @ 07:01  -  07-11-18 @ 07:00  --------------------------------------------------------  IN: 730 mL / OUT: 300 mL / NET: 430 mL    07-11-18 @ 07:01  -  07-11-18 @ 21:00  --------------------------------------------------------  IN: 1020 mL / OUT: 200 mL / NET: 820 mL      Physical Exam:  	    Gen: alert oriented place person and date   	Pulm: CTA  no rales or ronchi or wheezing  	CV: RRR, S1/S2. no rub  	Abd: +BS, soft, nontender/nondistended  	: No suprapubic tenderness.               Extremity: No cyanosis, LLE 4+ pitting edema RLE no edema   	+ fox     LABS/STUDIES	  --------------------------------------------------------------------------------              8.6    6.5   >-----------<  261      [07-11-18 @ 11:39]              27.1     140  |  99  |  98  ----------------------------<  128      [07-11-18 @ 11:37]  3.9   |  23  |  4.74        Ca     8.4     [07-11-18 @ 11:37]      Mg     2.1     [07-10-18 @ 09:20]      Phos  3.8     [07-10-18 @ 09:20]      PT/INR: PT 39.2 , INR 3.54       [07-11-18 @ 11:40]      Creatinine Trend:  SCr 4.74 [07-11 @ 11:37]  SCr 5.18 [07-10 @ 09:20]  SCr 5.10 [07-09 @ 08:37]  SCr 5.07 [07-08 @ 05:18]  SCr 4.71 [07-07 @ 07:00]              Urinalysis - [07-06-18 @ 14:45]      Color Yellow / Appearance Turbid / SG 1.015 / pH 8.5      Gluc Negative / Ketone Negative  / Bili Negative / Urobili Negative       Blood Small / Protein 300 / Leuk Est Large / Nitrite Negative      RBC 5-10 / WBC 26-50 / Hyaline  / Gran  / Sq Epi  / Non Sq Epi  / Bacteria Moderate      Iron 36, TIBC 168, %sat 21      [04-20-18 @ 08:15]  Ferritin 692      [04-20-18 @ 08:14]  PTH -- (Ca 8.9)      [04-20-18 @ 08:14]   217  PTH -- (Ca 9.0)      [11-09-17 @ 08:51]   387  Vitamin D (25OH) 7.8      [11-09-17 @ 08:51]  HbA1c 4.5      [08-09-16 @ 06:49]  TSH 0.47      [04-24-18 @ 07:51]    dsDNA 38      [07-10-18 @ 13:55]  C3 Complement 114      [07-10-18 @ 13:54]  C4 Complement 32      [07-10-18 @ 13:54]

## 2018-07-11 NOTE — PROGRESS NOTE ADULT - SUBJECTIVE AND OBJECTIVE BOX
CC: Patient is a 58y old  Female who presents with a chief complaint of Lack of urine, change in color. (06 Jul 2018 22:14)    Interval History/ROS: Patient remains with left lower extremity swelling. No longer with urinary urgency. Overall feeling better.    Allergies  heparin (Unknown)  losartan (Anaphylaxis)  nitroglycerin (Other)    ANTIMICROBIALS:  ertapenem  IVPB 500 every 24 hours    PE:    Vital Signs Last 24 Hrs  T(C): 36.7 (11 Jul 2018 04:51), Max: 36.7 (11 Jul 2018 04:51)  T(F): 98.1 (11 Jul 2018 04:51), Max: 98.1 (11 Jul 2018 04:51)  HR: 92 (11 Jul 2018 07:55) (76 - 97)  BP: 120/51 (11 Jul 2018 07:55) (120/51 - 142/75)  BP(mean): --  RR: 20 (11 Jul 2018 04:51) (18 - 20)  SpO2: 97% (11 Jul 2018 04:51) (97% - 98%)    Gen: AOx3, NAD, non-toxic, pleasant  CV: S1+S2 normal, no murmurs  Resp: Clear bilat, no resp distress  Abd: Soft, nontender, +BS  Ext: Left lower extremity edema  : +Hook  IV/Skin: No thrombophlebitis  Neuro: no focal deficits      LABS:                          8.2    5.75  )-----------( 251      ( 10 Jul 2018 13:53 )             26.6       07-10    139  |  98  |  95<H>  ----------------------------<  104<H>  3.6   |  23  |  5.18<H>    Ca    8.5      10 Jul 2018 09:20  Phos  3.8     07-10  Mg     2.1     07-10    MICROBIOLOGY:  v  .Urine Catheterized  07-07-18   >100,000 CFU/ml Escherichia coli ESBL  50,000 - 99,000 CFU/mL Proteus mirabilis  --  Escherichia coli ESBL  Proteus mirabilis      .Blood Blood-Peripheral  07-06-18   No growth to date.  --  --    RADIOLOGY:    No new images.

## 2018-07-11 NOTE — PROGRESS NOTE ADULT - SUBJECTIVE AND OBJECTIVE BOX
Patient is a 58y old  Female who presents with a chief complaint of Lack of urine, change in color. (06 Jul 2018 22:14)      SUBJECTIVE / OVERNIGHT EVENTS:   Feels better.  Denies CP/SOB/Palpitation/HA.    MEDICATIONS  (STANDING):  allopurinol 100 milliGRAM(s) Oral daily  amLODIPine   Tablet 10 milliGRAM(s) Oral daily  AQUAPHOR (petrolatum Ointment) 1 Application(s) Topical three times a day  ascorbic acid 500 milliGRAM(s) Oral daily  calcitriol   Capsule 0.25 MICROGram(s) Oral daily  carvedilol 25 milliGRAM(s) Oral every 12 hours  ertapenem  IVPB 500 milliGRAM(s) IV Intermittent every 24 hours  Nephro-alyse 1 Tablet(s) Oral daily  predniSONE   Tablet 10 milliGRAM(s) Oral daily  warfarin 2 milliGRAM(s) Oral once  zinc sulfate 220 milliGRAM(s) Oral daily    MEDICATIONS  (PRN):  acetaminophen   Tablet. 650 milliGRAM(s) Oral every 6 hours PRN Moderate Pain (4 - 6)        CAPILLARY BLOOD GLUCOSE        I&O's Summary    10 Jul 2018 07:01  -  11 Jul 2018 07:00  --------------------------------------------------------  IN: 730 mL / OUT: 300 mL / NET: 430 mL    11 Jul 2018 07:01  -  11 Jul 2018 19:10  --------------------------------------------------------  IN: 480 mL / OUT: 200 mL / NET: 280 mL        PHYSICAL EXAM:  GENERAL: NAD, well-developed  HEAD:  Atraumatic, Normocephalic  NECK: Supple, No JVD  CHEST/LUNG: Clear to auscultation bilaterally; No wheezing.  HEART: Regular rate and rhythm; No murmurs, rubs, or gallops  ABDOMEN: Soft, Nontender, Nondistended; Bowel sounds present  EXTREMITIES:   No clubbing, cyanosis, or edema  NEUROLOGY: AAO X 3  SKIN: No rashes    LABS:                        8.6    6.5   )-----------( 261      ( 11 Jul 2018 11:39 )             27.1     07-11    140  |  99  |  98<H>  ----------------------------<  128<H>  3.9   |  23  |  4.74<H>    Ca    8.4      11 Jul 2018 11:37  Phos  3.8     07-10  Mg     2.1     07-10      PT/INR - ( 11 Jul 2018 11:40 )   PT: 39.2 sec;   INR: 3.54 ratio                 CAPILLARY BLOOD GLUCOSE        07-07 @ 08:27  Culture-urine --  Culture results   >100,000 CFU/ml Escherichia coli ESBL  50,000 - 99,000 CFU/mL Proteus mirabilis  method type ARSALAN  Organism Escherichia coli ESBL  Organism Identification Escherichia coli ESBL  Proteus mirabilis  Specimen source .Urine Catheterized  07-06 @ 17:06  Culture-urine --  Culture results   No growth at 5 days.  method type --  Organism --  Organism Identification --  Specimen source .Blood Blood-Peripheral           07-07 @ 08:27  Culture blood --  Culture results   >100,000 CFU/ml Escherichia coli ESBL  50,000 - 99,000 CFU/mL Proteus mirabilis  Gram stain --  Gram stain blood --  Method type ARSALAN  Organism Escherichia coli ESBL  Organism identification Escherichia coli ESBL  Proteus mirabilis  Specimen source .Urine Catheterized   07-06 @ 17:06  Culture blood --  Culture results   No growth at 5 days.  Gram stain --  Gram stain blood --  Method type --  Organism --  Organism identification --  Specimen source .Blood Blood-Peripheral      RADIOLOGY & ADDITIONAL TESTS:    Imaging Personally Reviewed:    Consultant(s) Notes Reviewed:      Care Discussed with Consultants/Other Providers:

## 2018-07-11 NOTE — PROGRESS NOTE ADULT - SUBJECTIVE AND OBJECTIVE BOX
Subjective: Patient seen and examined. No new events except as noted.   feels ok   no cp or sob   REVIEW OF SYSTEMS:    CONSTITUTIONAL: + weakness, fevers or chills  EYES/ENT: No visual changes;  No vertigo or throat pain   NECK: No pain or stiffness  RESPIRATORY: No cough, wheezing, hemoptysis; No shortness of breath  CARDIOVASCULAR: No chest pain or palpitations  GASTROINTESTINAL: No abdominal or epigastric pain. No nausea, vomiting, or hematemesis; No diarrhea or constipation. No melena or hematochezia.  GENITOURINARY: No dysuria, frequency or hematuria  NEUROLOGICAL: No numbness or weakness  SKIN: No itching, burning, rashes, or lesions   All other review of systems is negative unless indicated above.    MEDICATIONS:  MEDICATIONS  (STANDING):  allopurinol 100 milliGRAM(s) Oral daily  amLODIPine   Tablet 10 milliGRAM(s) Oral daily  AQUAPHOR (petrolatum Ointment) 1 Application(s) Topical three times a day  ascorbic acid 500 milliGRAM(s) Oral daily  calcitriol   Capsule 0.25 MICROGram(s) Oral daily  carvedilol 25 milliGRAM(s) Oral every 12 hours  ertapenem  IVPB 500 milliGRAM(s) IV Intermittent every 24 hours  Nephro-alyse 1 Tablet(s) Oral daily  predniSONE   Tablet 10 milliGRAM(s) Oral daily  zinc sulfate 220 milliGRAM(s) Oral daily      PHYSICAL EXAM:  T(C): 36.7 (07-11-18 @ 04:51), Max: 36.7 (07-11-18 @ 04:51)  HR: 92 (07-11-18 @ 07:55) (76 - 97)  BP: 120/51 (07-11-18 @ 07:55) (120/51 - 142/75)  RR: 20 (07-11-18 @ 04:51) (18 - 20)  SpO2: 97% (07-11-18 @ 04:51) (97% - 98%)  Wt(kg): --  I&O's Summary    10 Jul 2018 07:01  -  11 Jul 2018 07:00  --------------------------------------------------------  IN: 730 mL / OUT: 300 mL / NET: 430 mL    11 Jul 2018 07:01  -  11 Jul 2018 09:39  --------------------------------------------------------  IN: 240 mL / OUT: 0 mL / NET: 240 mL          Appearance: Normal	  HEENT:   Normal oral mucosa, PERRL, EOMI	  Lymphatic: No lymphadenopathy , no edema  Cardiovascular: Normal S1 S2, No JVD, No murmurs , Peripheral pulses palpable 2+ bilaterally  Respiratory: Lungs clear to auscultation, normal effort 	  Gastrointestinal:  Soft, Non-tender, + BS	  Skin: No rashes, No ecchymoses, No cyanosis, warm to touch  Musculoskeletal: decreased range of motion and strength  Psychiatry:  Mood & affect appropriate  Ext: massive LLE edema , wrapped   +fox     LABS:    CARDIAC MARKERS:        Prothrombin Time and INR, Plasma (07.10.18 @ 13:53)    Prothrombin Time, Plasma: 46.8 sec    INR: 4.03: Recommended ranges for therapeutic INR:    2.0-3.0 for most medical and surgical thromboembolic states    2.0-3.0 for atrial fibrillation    2.0-3.0 for bileaflet mechanical valve in aortic position    2.5-3.5 for mechanical heart valves    Chest 2004;[id number]  The presence of direct thrombin inhibitors (argatroban, refludan)  may falsely increase results. ratio                            8.2    5.75  )-----------( 251      ( 10 Jul 2018 13:53 )             26.6     07-10    139  |  98  |  95<H>  ----------------------------<  104<H>  3.6   |  23  |  5.18<H>    Ca    8.5      10 Jul 2018 09:20  Phos  3.8     07-10  Mg     2.1     07-10      proBNP:   Lipid Profile:   HgA1c:   TSH:             TELEMETRY: 	V paced     ECG:  	  RADIOLOGY:   DIAGNOSTIC TESTING:  [ ] Echocardiogram:  [ ]  Catheterization:  [ ] Stress Test:    OTHER:

## 2018-07-11 NOTE — PROGRESS NOTE ADULT - ASSESSMENT
58 F PMH SLE, systolic HFrEF(EF 32%) s/p AICD, HTN, CKD III- IV, PE / left leg DVT dx 2009, HIT, CAD (no stents), stage IV sacral ulcer, chronic Fox, recurrent pericardial effusion, AAA ~14cm in 12/2016 s/p repair in 2010 w/ aortoiliac stent complicated post-operatively with development of left lower extremity compartment syndrome, with recent cellulitis treated inpt now presents with uti and pericardial effusion      1- CKD IV with ILANA   2- pericardial effusion   3- chf  4- HTN   5- neurogenic bladder  6- lupus   7- anemia  8- shpt  9- hyperuricemia   10- UTI   11- thoracic aneurysm    cont with b-b  norvasc to cont   invanz per ID to cont for proteus infection   cont fox    cont calcitriol   cont with allopurinol   creatinine improving slowly   check iron profile   cont prednisone  10 mg daily  check retic count

## 2018-07-11 NOTE — PROGRESS NOTE ADULT - ASSESSMENT
58 year old female w/ SLE on prednisone, HFrEF s/p ICD, CKD, Hx PE/HIT, HTN, CAD no stents, Stage IV decub p/w decreased urine output and blood in urine.    Febrile in ED  CT with pericardial effusion chronic - cardiology on board  Also with thoracic aortic aneurysm with vascular surgery on board  WBC WNL  UA with 26-50 WBC  Blood cxs NGTD  Urine cxs with ESBL E. coli and Proteus  Feeling better  Aferbrile  WBC WNL    Recommend:  -Continue ertapenem 500 mg IV q 24 hours.  -Will most likely need a 10- day course.  -Would continue IV until Sunday and can transition to oral fosfomycin 3 gram po x 1 on Monday.    Will sign off. Please call with questions.

## 2018-07-12 ENCOUNTER — TRANSCRIPTION ENCOUNTER (OUTPATIENT)
Age: 58
End: 2018-07-12

## 2018-07-12 DIAGNOSIS — N18.9 CHRONIC KIDNEY DISEASE, UNSPECIFIED: ICD-10-CM

## 2018-07-12 LAB
ANION GAP SERPL CALC-SCNC: 14 MMOL/L — SIGNIFICANT CHANGE UP (ref 5–17)
BUN SERPL-MCNC: 98 MG/DL — HIGH (ref 7–23)
CALCIUM SERPL-MCNC: 8.5 MG/DL — SIGNIFICANT CHANGE UP (ref 8.4–10.5)
CHLORIDE SERPL-SCNC: 102 MMOL/L — SIGNIFICANT CHANGE UP (ref 96–108)
CO2 SERPL-SCNC: 26 MMOL/L — SIGNIFICANT CHANGE UP (ref 22–31)
CREAT SERPL-MCNC: 4.84 MG/DL — HIGH (ref 0.5–1.3)
GLUCOSE SERPL-MCNC: 118 MG/DL — HIGH (ref 70–99)
HCT VFR BLD CALC: 27.2 % — LOW (ref 34.5–45)
HGB BLD-MCNC: 9.1 G/DL — LOW (ref 11.5–15.5)
INR BLD: 2.85 RATIO — HIGH (ref 0.88–1.16)
IRON SATN MFR SERPL: 33 % — SIGNIFICANT CHANGE UP (ref 14–50)
IRON SATN MFR SERPL: 67 UG/DL — SIGNIFICANT CHANGE UP (ref 30–160)
MCHC RBC-ENTMCNC: 29.4 PG — SIGNIFICANT CHANGE UP (ref 27–34)
MCHC RBC-ENTMCNC: 33.4 GM/DL — SIGNIFICANT CHANGE UP (ref 32–36)
MCV RBC AUTO: 88 FL — SIGNIFICANT CHANGE UP (ref 80–100)
PHOSPHATE SERPL-MCNC: 4.2 MG/DL — SIGNIFICANT CHANGE UP (ref 2.5–4.5)
PLATELET # BLD AUTO: 315 K/UL — SIGNIFICANT CHANGE UP (ref 150–400)
POTASSIUM SERPL-MCNC: 3.8 MMOL/L — SIGNIFICANT CHANGE UP (ref 3.5–5.3)
POTASSIUM SERPL-SCNC: 3.8 MMOL/L — SIGNIFICANT CHANGE UP (ref 3.5–5.3)
PROTHROM AB SERPL-ACNC: 31.7 SEC — HIGH (ref 9.8–12.7)
RBC # BLD: 3.09 M/UL — LOW (ref 3.8–5.2)
RBC # BLD: 3.09 M/UL — LOW (ref 3.8–5.2)
RBC # FLD: 15.7 % — HIGH (ref 10.3–14.5)
RETICS #: 105 K/UL — SIGNIFICANT CHANGE UP (ref 25–125)
RETICS/RBC NFR: 3.4 % — HIGH (ref 0.5–2.5)
SODIUM SERPL-SCNC: 142 MMOL/L — SIGNIFICANT CHANGE UP (ref 135–145)
TIBC SERPL-MCNC: 203 UG/DL — LOW (ref 220–430)
UIBC SERPL-MCNC: 136 UG/DL — SIGNIFICANT CHANGE UP (ref 110–370)
WBC # BLD: 6.6 K/UL — SIGNIFICANT CHANGE UP (ref 3.8–10.5)
WBC # FLD AUTO: 6.6 K/UL — SIGNIFICANT CHANGE UP (ref 3.8–10.5)

## 2018-07-12 PROCEDURE — 99222 1ST HOSP IP/OBS MODERATE 55: CPT

## 2018-07-12 PROCEDURE — 93970 EXTREMITY STUDY: CPT | Mod: 26

## 2018-07-12 RX ORDER — WARFARIN SODIUM 2.5 MG/1
3 TABLET ORAL ONCE
Qty: 0 | Refills: 0 | Status: COMPLETED | OUTPATIENT
Start: 2018-07-12 | End: 2018-07-12

## 2018-07-12 RX ORDER — FOSFOMYCIN TROMETHAMINE 3 G/1
1 POWDER ORAL
Qty: 1 | Refills: 0 | OUTPATIENT
Start: 2018-07-12 | End: 2018-07-12

## 2018-07-12 RX ORDER — ERYTHROPOIETIN 10000 [IU]/ML
10000 INJECTION, SOLUTION INTRAVENOUS; SUBCUTANEOUS
Qty: 0 | Refills: 0 | Status: DISCONTINUED | OUTPATIENT
Start: 2018-07-12 | End: 2018-07-17

## 2018-07-12 RX ADMIN — ERTAPENEM SODIUM 100 MILLIGRAM(S): 1 INJECTION, POWDER, LYOPHILIZED, FOR SOLUTION INTRAMUSCULAR; INTRAVENOUS at 13:21

## 2018-07-12 RX ADMIN — Medication 100 MILLIGRAM(S): at 13:20

## 2018-07-12 RX ADMIN — Medication 1 APPLICATION(S): at 21:48

## 2018-07-12 RX ADMIN — Medication 500 MILLIGRAM(S): at 13:20

## 2018-07-12 RX ADMIN — Medication 1 TABLET(S): at 13:20

## 2018-07-12 RX ADMIN — CARVEDILOL PHOSPHATE 25 MILLIGRAM(S): 80 CAPSULE, EXTENDED RELEASE ORAL at 17:55

## 2018-07-12 RX ADMIN — AMLODIPINE BESYLATE 10 MILLIGRAM(S): 2.5 TABLET ORAL at 07:50

## 2018-07-12 RX ADMIN — CARVEDILOL PHOSPHATE 25 MILLIGRAM(S): 80 CAPSULE, EXTENDED RELEASE ORAL at 05:39

## 2018-07-12 RX ADMIN — WARFARIN SODIUM 3 MILLIGRAM(S): 2.5 TABLET ORAL at 21:48

## 2018-07-12 RX ADMIN — Medication 10 MILLIGRAM(S): at 08:40

## 2018-07-12 RX ADMIN — CALCITRIOL 0.25 MICROGRAM(S): 0.5 CAPSULE ORAL at 13:20

## 2018-07-12 RX ADMIN — Medication 1 APPLICATION(S): at 05:40

## 2018-07-12 RX ADMIN — Medication 1 APPLICATION(S): at 13:21

## 2018-07-12 RX ADMIN — ZINC SULFATE TAB 220 MG (50 MG ZINC EQUIVALENT) 220 MILLIGRAM(S): 220 (50 ZN) TAB at 13:20

## 2018-07-12 NOTE — DISCHARGE NOTE ADULT - HOSPITAL COURSE
Patient is 58 F w/ reported PMHx SLE, systolic HFrEF(EF 32%) s/p AICD, HTN, CKD III- IV, PE / left leg DVT dx 2009, HIT, CAD (no stents), stage IV sacral ulcer, neurogenic bladder w/ chronic Hook,  AAA ~14cm in 12/2016 s/p repair in 2010 w/ aortoiliac stent complicated post-operatively with development of left lower extremity compartment syndrome presents to ED c/o decreased urine output x yesterday . Pt. found to have UTI and have completed antibiotics. You were incidentally found to have a pericarial effusion ,s/p pericardial drain removal. Still with large pericardial effusion which most likely is loculated and not accessible with drain  No objection to DC from cardiac standpoint. Close Outpatient follow up. Patient is 58 F w/ reported PMHx SLE, systolic HFrEF(EF 32%) s/p AICD, HTN, CKD III- IV, PE / left leg DVT dx 2009, HIT, CAD (no stents), stage IV sacral ulcer, neurogenic bladder w/ chronic Hook,  AAA ~14cm in 12/2016 s/p repair in 2010 w/ aortoiliac stent complicated post-operatively with development of left lower extremity compartment syndrome presents to ED c/o decreased urine output x yesterday . Pt. found to have UTI and  completed antibiotics. You were incidentally found to have a pericarial effusion ,s/p pericardial drain removal. Still with large pericardial effusion which most likely is loculated and not accessible with drain  No objection to DC from cardiac standpoint. Close Outpatient follow up. Your creatinine function continues to decline . Please continue close follow with renal for placement of av fistula.

## 2018-07-12 NOTE — DISCHARGE NOTE ADULT - ADDITIONAL INSTRUCTIONS
stable   Please follow up with your PCP in 4 to 7 days stable   Please follow up with your PCP in 4 to 7 days  f/u as outpatient at [facility] [phone]

## 2018-07-12 NOTE — DISCHARGE NOTE ADULT - MEDICATION SUMMARY - MEDICATIONS TO TAKE
I will START or STAY ON the medications listed below when I get home from the hospital:    Ben lift  -- Use as directed.  Dx: Cellulitis of left lower extremity. ICD10: L03.116  dispense: 1  Length of need: #99  -- Indication: For Ben lift    Standard wheelchair with elevating leg rests  -- Use as directed . Dx: Cellulitis of left lower extremity. ICD10: L03.116  Dispense: 1  Length of need: #99  -- Indication: For wheelchair     predniSONE 10 mg oral tablet  -- 1 tab(s) by mouth once a day  -- Indication: For steroid     Coumadin 4 mg oral tablet  -- 1 tab(s) by mouth once a day(at bedtime)  You must have your INR checked on mon. 4/30/18 and your coumadin dose will be adjusted accordingly   -- Do not take this drug if you are pregnant.  It is very important that you take or use this exactly as directed.  Do not skip doses or discontinue unless directed by your doctor.  Obtain medical advice before taking any non-prescription drugs as some may affect the action of this medication.    -- Indication: For Pe     allopurinol 100 mg oral tablet  -- 1 tab(s) by mouth once a day  -- Indication: For gout     carvedilol 25 mg oral tablet  -- 1 tab(s) by mouth every 12 hours  -- Indication: For Htn     amLODIPine 10 mg oral tablet  -- 1 tab(s) by mouth once a day  -- Indication: For Htn     petrolatum topical ointment  -- 1 application on skin 2 times a day  -- Indication: For Petroleum     nystatin 100,000 units/g topical powder  -- 1 application on skin 2 times a day  -- Indication: For fungal     furosemide 40 mg oral tablet  -- 2 tab(s) by mouth once a day MDD:1   -- Indication: For Congestive heart failure    zinc sulfate 220 mg oral capsule  -- 1 cap(s) by mouth once a day  -- Indication: For zinc     fosfomycin 3 g oral powder for reconstitution  -- 1 each by mouth once   -- Dilute this medication with liquid before administration.  It is very important that you take or use this exactly as directed.  Do not skip doses or discontinue unless directed by your doctor.    -- Indication: For Antibiotic    lactobacillus acidophilus oral capsule  -- 1 cap(s) by mouth once a day   -- Indication: For Probiotic     Nephro-Riddhi oral tablet  -- 1 tab(s) by mouth once a day  -- Indication: For vitamin     calcitriol 0.25 mcg oral capsule  -- 1 cap(s) by mouth once a day  -- Indication: For vitamin     Vitamin C 500 mg oral tablet  -- 1 tab(s) by mouth once a day  -- Indication: For vitamin I will START or STAY ON the medications listed below when I get home from the hospital:    Ben lift  -- Use as directed.  Dx: Cellulitis of left lower extremity. ICD10: L03.116  dispense: 1  Length of need: #99  -- Indication: For Ben lift    Standard wheelchair with elevating leg rests  -- Use as directed . Dx: Cellulitis of left lower extremity. ICD10: L03.116  Dispense: 1  Length of need: #99  -- Indication: For wheelchair     predniSONE 10 mg oral tablet  -- 1 tab(s) by mouth once a day  -- Indication: For steroid     warfarin 3 mg oral tablet  -- 1 tab(s) by mouth once a day Please get INR checked on Wednesday 7/25   -- Do not take this drug if you are pregnant.  It is very important that you take or use this exactly as directed.  Do not skip doses or discontinue unless directed by your doctor.  Obtain medical advice before taking any non-prescription drugs as some may affect the action of this medication.    -- Indication: For Other pulmonary embolism without acute cor pulmonale, unspecified chronicity    allopurinol 100 mg oral tablet  -- 1 tab(s) by mouth once a day  -- Indication: For gout     carvedilol 25 mg oral tablet  -- 1 tab(s) by mouth every 12 hours  -- Indication: For Htn     amLODIPine 10 mg oral tablet  -- 1 tab(s) by mouth once a day  -- Indication: For Htn     petrolatum topical ointment  -- 1 application on skin 2 times a day  -- Indication: For Petroleum     nystatin 100,000 units/g topical powder  -- 1 application on skin 2 times a day  -- Indication: For fungal     furosemide 40 mg oral tablet  -- 2 tab(s) by mouth once a day MDD:1   -- Indication: For Congestive heart failure    zinc sulfate 220 mg oral capsule  -- 1 cap(s) by mouth once a day  -- Indication: For zinc     fosfomycin 3 g oral powder for reconstitution  -- 1 each by mouth once   -- Dilute this medication with liquid before administration.  It is very important that you take or use this exactly as directed.  Do not skip doses or discontinue unless directed by your doctor.    -- Indication: For Antibiotic    lactobacillus acidophilus oral capsule  -- 1 cap(s) by mouth once a day   -- Indication: For Probiotic     Nephro-Riddhi oral tablet  -- 1 tab(s) by mouth once a day  -- Indication: For vitamin     calcitriol 0.25 mcg oral capsule  -- 1 cap(s) by mouth once a day  -- Indication: For vitamin     Vitamin C 500 mg oral tablet  -- 1 tab(s) by mouth once a day  -- Indication: For vitamin I will START or STAY ON the medications listed below when I get home from the hospital:    Ben lift  -- Use as directed.  Dx: Cellulitis of left lower extremity. ICD10: L03.116  dispense: 1  Length of need: #99  -- Indication: For Ben lift    Standard wheelchair with elevating leg rests  -- Use as directed . Dx: Cellulitis of left lower extremity. ICD10: L03.116  Dispense: 1  Length of need: #99  -- Indication: For wheelchair     predniSONE 10 mg oral tablet  -- 1 tab(s) by mouth once a day  -- Indication: For steroid     warfarin 3 mg oral tablet  -- 1 tab(s) by mouth once a day Please get INR checked on Wednesday 7/25   -- Do not take this drug if you are pregnant.  It is very important that you take or use this exactly as directed.  Do not skip doses or discontinue unless directed by your doctor.  Obtain medical advice before taking any non-prescription drugs as some may affect the action of this medication.    -- Indication: For Other pulmonary embolism without acute cor pulmonale, unspecified chronicity    allopurinol 100 mg oral tablet  -- 1 tab(s) by mouth once a day  -- Indication: For gout     carvedilol 25 mg oral tablet  -- 1 tab(s) by mouth every 12 hours  -- Indication: For Htn     amLODIPine 10 mg oral tablet  -- 1 tab(s) by mouth once a day  -- Indication: For Htn     petrolatum topical ointment  -- 1 application on skin 2 times a day  -- Indication: For Petroleum     nystatin 100,000 units/g topical powder  -- 1 application on skin 2 times a day  -- Indication: For fungal     furosemide 40 mg oral tablet  -- 2 tab(s) by mouth once a day MDD:1   -- Indication: For Congestive heart failure    zinc sulfate 220 mg oral capsule  -- 1 cap(s) by mouth once a day  -- Indication: For zinc     lactobacillus acidophilus oral capsule  -- 1 cap(s) by mouth once a day   -- Indication: For Probiotic     Nephro-Riddhi oral tablet  -- 1 tab(s) by mouth once a day  -- Indication: For vitamin     calcitriol 0.25 mcg oral capsule  -- 1 cap(s) by mouth once a day  -- Indication: For vitamin     Vitamin C 500 mg oral tablet  -- 1 tab(s) by mouth once a day  -- Indication: For vitamin I will START or STAY ON the medications listed below when I get home from the hospital:    Ben lift  -- Use as directed.  Dx: Cellulitis of left lower extremity. ICD10: L03.116  dispense: 1  Length of need: #99  -- Indication: For Ben lift    Standard wheelchair with elevating leg rests  -- Use as directed . Dx: Cellulitis of left lower extremity. ICD10: L03.116  Dispense: 1  Length of need: #99  -- Indication: For wheelchair     predniSONE 10 mg oral tablet  -- 1 tab(s) by mouth once a day  -- Indication: For steroid     warfarin 3 mg oral tablet  -- 1 tab(s) by mouth once a day Please get INR checked on Wednesday 7/25   -- Do not take this drug if you are pregnant.  It is very important that you take or use this exactly as directed.  Do not skip doses or discontinue unless directed by your doctor.  Obtain medical advice before taking any non-prescription drugs as some may affect the action of this medication.    -- Indication: For Ac     allopurinol 100 mg oral tablet  -- 1 tab(s) by mouth once a day  -- Indication: For gout     carvedilol 25 mg oral tablet  -- 1 tab(s) by mouth every 12 hours  -- Indication: For Htn     amLODIPine 10 mg oral tablet  -- 1 tab(s) by mouth once a day  -- Indication: For Htn     petrolatum topical ointment  -- 1 application on skin 2 times a day  -- Indication: For Petroleum     nystatin 100,000 units/g topical powder  -- 1 application on skin 2 times a day  -- Indication: For fungal     furosemide 40 mg oral tablet  -- 2 tab(s) by mouth once a day MDD:1   -- Indication: For Congestive heart failure    zinc sulfate 220 mg oral capsule  -- 1 cap(s) by mouth once a day  -- Indication: For zinc     lactobacillus acidophilus oral capsule  -- 1 cap(s) by mouth once a day   -- Indication: For Probiotic     Nephro-Riddhi oral tablet  -- 1 tab(s) by mouth once a day  -- Indication: For vitamin     calcitriol 0.25 mcg oral capsule  -- 1 cap(s) by mouth once a day  -- Indication: For vitamin     Vitamin C 500 mg oral tablet  -- 1 tab(s) by mouth once a day  -- Indication: For vitamin

## 2018-07-12 NOTE — CONSULT NOTE ADULT - ASSESSMENT
A/P:  58F w/ SLE on prednisone, HFrEF s/p ICD, CKD III-IV, Hx PE/HIT, HTN, CAD no stents, Stage IV decub p/w decreased urine output, hematuria and concurrent UTI    Stage 4 Healing Rt Ischial pressure ulcer- Aquacel  Compression LLE-  BLE elevation  Abx per Medicine/ ID  Moisturize intact skin w/ SWEEN cream BID  con't Nutrition (as tolerated), Nutrition Consult  con't Offloading   con't Pericare  Care as per medicine will follow w/ you  Upon [facility] [address] [place] [address] [phone]  Seen w/ attng and D/w team  Thank you for this consult  Tanisha Weathers PA-C [id number] [id number]

## 2018-07-12 NOTE — PROGRESS NOTE ADULT - ASSESSMENT
58 F PMH SLE, systolic HFrEF(EF 32%) s/p AICD, HTN, CKD III- IV, PE / left leg DVT dx 2009, HIT, CAD (no stents), stage IV sacral ulcer, chronic Fox, recurrent pericardial effusion, AAA ~14cm in 12/2016 s/p repair in 2010 w/ aortoiliac stent complicated post-operatively with development of left lower extremity compartment syndrome, with recent cellulitis treated inpt now presents with uti and pericardial effusion      1- CKD IV with ILANA   2- pericardial effusion   3- chf  4- HTN   5- neurogenic bladder  6- lupus   7- anemia  8- shpt  9- hyperuricemia   10- UTI   11- thoracic aneurysm    cont with b-b  norvasc to cont   invanz per ID to cont   cont fox    cont calcitriol check pth   cont with allopurinol  check uric acid   creatinine still quite high may need renal replacement therapy in near future    iron profile in range   start procrit [id number] units tiw sq  cont prednisone  10 mg daily

## 2018-07-12 NOTE — PROGRESS NOTE ADULT - SUBJECTIVE AND OBJECTIVE BOX
Subjective: Patient seen and examined. No new events except as noted.   weak   no cp or sob     REVIEW OF SYSTEMS:    CONSTITUTIONAL: +weakness, fevers or chills  EYES/ENT: No visual changes;  No vertigo or throat pain   NECK: No pain or stiffness  RESPIRATORY: No cough, wheezing, hemoptysis; No shortness of breath  CARDIOVASCULAR: No chest pain or palpitations  GASTROINTESTINAL: No abdominal or epigastric pain. No nausea, vomiting, or hematemesis; No diarrhea or constipation. No melena or hematochezia.  GENITOURINARY: No dysuria, frequency or hematuria  NEUROLOGICAL: No numbness or weakness  SKIN: No itching, burning, rashes, or lesions   All other review of systems is negative unless indicated above.    MEDICATIONS:  MEDICATIONS  (STANDING):  allopurinol 100 milliGRAM(s) Oral daily  amLODIPine   Tablet 10 milliGRAM(s) Oral daily  AQUAPHOR (petrolatum Ointment) 1 Application(s) Topical three times a day  ascorbic acid 500 milliGRAM(s) Oral daily  calcitriol   Capsule 0.25 MICROGram(s) Oral daily  carvedilol 25 milliGRAM(s) Oral every 12 hours  ertapenem  IVPB 500 milliGRAM(s) IV Intermittent every 24 hours  Nephro-alyse 1 Tablet(s) Oral daily  predniSONE   Tablet 10 milliGRAM(s) Oral daily  zinc sulfate 220 milliGRAM(s) Oral daily      PHYSICAL EXAM:  T(C): 36.6 (07-12-18 @ 04:51), Max: 37.3 (07-11-18 @ 14:23)  HR: 91 (07-12-18 @ 07:48) (85 - 95)  BP: 111/67 (07-12-18 @ 07:48) (111/67 - 130/75)  RR: 18 (07-12-18 @ 04:51) (18 - 18)  SpO2: 95% (07-12-18 @ 04:51) (95% - 98%)  Wt(kg): --  I&O's Summary    11 Jul 2018 07:01  -  12 Jul 2018 07:00  --------------------------------------------------------  IN: 1140 mL / OUT: 950 mL / NET: 190 mL          Appearance: Normal	  HEENT:   Normal oral mucosa, PERRL, EOMI	  Lymphatic: No lymphadenopathy  Cardiovascular: Normal S1 S2, No JVD, No murmurs , Peripheral pulses palpable 2+ bilaterally  Respiratory: Lungs clear to auscultation, normal effort 	  Gastrointestinal:  Soft, Non-tender, + BS	  Skin: No rashes, No ecchymoses,   Musculoskeletal: decreased  range of motion and  strength  Psychiatry:  Mood & affect appropriate  Ext: massive LLE edema,   +fox       LABS:    CARDIAC MARKERS:                                8.6    6.5   )-----------( 261      ( 11 Jul 2018 11:39 )             27.1     07-11    140  |  99  |  98<H>  ----------------------------<  128<H>  3.9   |  23  |  4.74<H>    Ca    8.4      11 Jul 2018 11:37  Phos  3.8     07-10  Mg     2.1     07-10      proBNP:   Lipid Profile:   HgA1c:   TSH:             TELEMETRY: 	V paced     ECG:  	  RADIOLOGY:   DIAGNOSTIC TESTING:  [ ] Echocardiogram:  [ ]  Catheterization:  [ ] Stress Test:    OTHER:

## 2018-07-12 NOTE — DISCHARGE NOTE ADULT - PLAN OF CARE
stable HOME CARE INSTRUCTIONS  f you were prescribed antibiotics, take them exactly as your caregiver instructs you. Finish the medication even if you feel better after you have only taken some of the medication.  Drink enough water and fluids to keep your urine clear or pale yellow.  Avoid caffeine, tea, and carbonated beverages. They tend to irritate your bladder.  Empty your bladder often. Avoid holding urine for long periods of time.  Empty your bladder before and after sexual intercourse.  After a bowel movement, women should cleanse from front to back. Use each tissue only once.  SEEK MEDICAL CARE IF:  You have back pain.  You develop a fever.  Your symptoms do not begin to resolve within 3 days.  SEEK IMMEDIATE MEDICAL CARE IF:  You have severe back pain or lower abdominal pain.  You develop chills.  You have nausea or vomiting.  You have continued burning or discomfort with urination. cleared by ct sx  f/u repeat chest ct chris c/w prednisone Weigh yourself daily.  If you gain 3lbs in 3 days, or 5lbs in a week call your Health Care Provider.  Do not eat or drink foods containing more than 2000mg of salt (sodium) in your diet every day.  Call your Health Care Provider if you have any swelling or increased swelling in your feet, ankles, and/or stomach.  Take all of your medication as directed.  If you become dizzy call your Health Care Provider. Follow up with your medical doctor to establish long term blood pressure treatment goals. Take your anticoagulation ( coumadin) as directed.  Follow up with your health care provider within one week. Call for appointment.  If you develop shortness of breath or if your shortness of breath worsens call your Health Care Provider or go to the Emergency Department.

## 2018-07-12 NOTE — CONSULT NOTE ADULT - CONSULT REASON
Cardiac Management  Pericardial Effusion
Chronic Rt Ischial pressure ulcer  LLE edema
H/O SLE
ILANA on CKD
UTI
incidental finding of ascending thoracic aneurysm

## 2018-07-12 NOTE — CONSULT NOTE ADULT - CONSULT REQUESTED DATE/TIME
08-Jul-2018 10:48
08-Jul-2018 11:46
09-Jul-2018 12:53
09-Jul-2018 19:40
12-Jul-2018 14:41
07-Jul-2018 18:00

## 2018-07-12 NOTE — DISCHARGE NOTE ADULT - CARE PROVIDER_API CALL
Gerry Yoder (), Cardiology; Internal Medicine  [address] [place] [address]  [address]  [place] [address]  Phone: [phone]  Fax: [phone] Gerry Yoder (), Cardiology; Internal Medicine  [address]  [address]  [place] [address]  Phone: [phone]  Fax: [phone]    Cy Chicas), Medicine  [address] [place] [address]  [address]  [place] [address]  Phone: [phone]  Fax: [phone] Gerry Yoder (DO), Cardiology; Internal Medicine  [address] [facility]  [address]  [place] [address]  Phone: [phone]  Fax: [phone]    Cy Chicas (MD), Medicine  [address] [place] [address]  Phone: [phone]  Fax: [phone]    Kevin Bennett (DO), Nephrology  [address] [place] [address]  Phone: [phone]  Fax: [phone]

## 2018-07-12 NOTE — CHART NOTE - NSCHARTNOTEFT_GEN_A_CORE
Called by radiology to report results of LE duplex bilateral  : New, above the knee, left common femoral vein DVT compared to   prior study.    The examination is limited by body habitus and leg swelling. The left   femoral and calf veins were not visualized.    A right popliteal artery aneurysm, measuring 1.9 x 2.4 cm is noted with a   markedly narrowed lumen.    The left popliteal artery aneurysm is increased in size compared to prior   study, measuring 2.8 x 2.9 cm.      Results d/w Dr. Hernandez  - C/w coumadin  - Vascular consult called

## 2018-07-12 NOTE — CONSULT NOTE ADULT - SUBJECTIVE AND OBJECTIVE BOX
Wound SURGERY CONSULT NOTE    HPI:  58F w/ SLE on prednisone, HFrEF s/p ICD, CKD III-IV, Hx PE/HIT, HTN, CAD no stents, Stage IV decub p/w decreased urine output and blood in urine. Pt states she was not in significant distress this week until today. She states her daughter checked her fox and noticed that there as a lack of urine output and the urine had a red shade to it. Given these symptoms, pt returned to ER for further evaluation. Pt also complains of generalized chills and back aches this week. Backaches were noted to be worse today. Pt denies any cough, abdominal pain, suprapubic pain, sick contacts, fevers, chills. Has not changed her medications recently. Pt endorses swelling of her hands yesterday bilaterally which has resolved today. Did not have any arthralgia of her knees which she usually gets in SLE flares. Pt has fox exchanged 2 weeks ago without reported issues.     Pt well known to team.  Pt w/ chronic Rt ischial wound tx w/ aquacel and VAC in the past.  Pt has outpt f/u w/ our wound center.  Pt was feeling better and was more mobile.  No odor, increased drainage, redness, or warmth from Rt ischial wound.  no pain.  no f/c/s.  LLE swelling had been  stable. Calf almost "normal" but Thigh still very edematous and limiting mobility 2/2 heaviness-- much better w/ ACE wrapping daily.    PAST MEDICAL & SURGICAL HISTORY:  Anemia  Morbid obesity  SLE  HTN (Hypertension)  Drop Foot Gait  Compartment Syndrome: fasciotomy LLE  HIT (Heparin-Induced Thrombocytopenia)  Pulmonary Embolism: 2009  Iliac Aneurysm  left iliac aneurysm repair  Iliac Aneurysm  endoleak l iliac aneurysm repair  Aneurysm of Iliac Artery  Calf DVT (Deep Venous Thrombosis)  Adenomatous Goiter  Chronic Gout   LBBB (Left Bundle Branch Block)   CHF (Congestive Heart Failure)   s/p aorta-iliac-femoral bypass  s/p TOP (Termination of Pregnancy)  S/P Dilatation and Curettage  s/p Tubal Ligation  s/p AAA (Abdominal Aortic Aneurysm) Repair: 2009 2010      REVIEW OF SYSTEMS  Skin/ MSK: see HPI  All other systems negative    MEDICATIONS  (STANDING):  allopurinol 100 milliGRAM(s) Oral daily  amLODIPine   Tablet 10 milliGRAM(s) Oral daily  AQUAPHOR (petrolatum Ointment) 1 Application(s) Topical three times a day  ascorbic acid 500 milliGRAM(s) Oral daily  calcitriol   Capsule 0.25 MICROGram(s) Oral daily  carvedilol 25 milliGRAM(s) Oral every 12 hours  ertapenem  IVPB 500 milliGRAM(s) IV Intermittent every 24 hours  Nephro-alyse 1 Tablet(s) Oral daily  predniSONE   Tablet 10 milliGRAM(s) Oral daily  zinc sulfate 220 milliGRAM(s) Oral daily    MEDICATIONS  (PRN):  acetaminophen   Tablet. 650 milliGRAM(s) Oral every 6 hours PRN Moderate Pain (4 - 6)    Allergies    heparin (Unknown)  losartan (Anaphylaxis)  nitroglycerin (Other)    SOCIAL HISTORY:  single; lives w/ daughter, retired (+)HHA; Denies smoking, ETOH, drugs    FAMILY HISTORY:  No pertinent family history in first degree relatives      Vital Signs Last 24 Hrs  T(C): 36.6 (12 Jul 2018 12:41), Max: 36.7 (11 Jul 2018 20:46)  T(F): 97.9 (12 Jul 2018 12:41), Max: 98.1 (11 Jul 2018 20:46)  HR: 86 (12 Jul 2018 12:41) (85 - 95)  BP: 134/79 (12 Jul 2018 12:41) (111/67 - 134/79)  BP(mean): --  RR: 18 (12 Jul 2018 12:41) (18 - 18)  SpO2: 97% (12 Jul 2018 12:41) (95% - 98%)    NAD /  A&Ox3/ MO  WD/ WN/ WG  Versa Care P500 bed    Cardiovascular: RRR (+)m    Respiratory: CTA    Gastrointestinal soft NT/ND (+)BS      Neurology  weakened strength & sensation grossly intact/ paraesthesia    Musculoskeletal/Vascular: limited ROM BLE 2/2 LLE edema  FROM BUE   L>>RLE edema  Difficult to assess DP/PT 2/2 edema  (+)hemosiderin staining    Skin: Dry, frail, good turgor    Rt Ischium w/ moist & granular healing STAGE 4 Pressure ucler  2.5cm x 2cm x 0.5cm  w/moist & granular wound base  scant fibrinous exudate  (+)serosanginous drainage   No odor, erythema, increased warmth, tenderness, induration, fluctuance    LABS:  07-12    142  |  102  |  98<H>  ----------------------------<  118<H>  3.8   |  26  |  4.84<H>    Ca    8.5      12 Jul 2018 10:30  Phos  4.2     07-12    Protein Total, Serum: 6.5 g/dL (07.06.18 @ 14:45)  Albumin, Serum: 3.5 g/dL (07.06.18 @ 14:45)                           9.1    6.6   )-----------( 315      ( 12 Jul 2018 10:36 )             27.2     PT/INR - ( 12 Jul 2018 10:33 )   PT: 31.7 sec;   INR: 2.85 ratio         RADIOLOGY & ADDITIONAL STUDIES:      < from: CT Abdomen and Pelvis No Cont (07.06.18 @ 15:21) >  FINDINGS:    LOWER CHEST: Moderate-sized unchanged pericardial effusion. Coronary   calcifications. Cardiomegaly. ICD leads are partially imaged. Partially   imaged is a ascending thoracic aortic aneurysm measuring 5.0 cm.    LIVER: Within normal limits.  BILE DUCTS: Normalcaliber.  GALLBLADDER: Cholecystectomy.  SPLEEN: Within normal limits.  PANCREAS: Within normal limits.  ADRENALS: Within normal limits.  KIDNEYS/URETERS: Within normal limits.    BLADDER: Collapsed around Fox catheter.  REPRODUCTIVE ORGANS: The uterus and adnexa are within normal limits.    BOWEL: No bowel obstruction. Appendix is normal.  PERITONEUM: No ascites.  VESSELS: Status post EVAR left common iliac artery aneurysm, slightly   decreased since 12/15/2016. Femorofemoral bypass, unchanged.  Bilateral   inguinal and anterior abdominal wall collateral vessels.   RETROPERITONEUM: No lymphadenopathy.    ABDOMINAL WALL: Soft tissue Anasarca.  BONES: Degenerative changes of the spine.    IMPRESSION:   Patient is status post left internal iliac endovascular aortic repair   with interval decrease in large aneurysm.    Moderate-sized pericardial effusion unchanged.  New dilatation of the ascending thoracic aorta since 10/11/2017. Further   evaluation first with echocardiography is advised.A contrast-enhanced   CTA of the chest can also be considered, depending on results of   echocardiography.    Cultures:    Culture - Urine (07.07.18 @ 08:27)    -  Gentamicin: S 2    -  Gentamicin: S <=1    -  Tigecycline: S <=1    -  Trimethoprim/Sulfamethoxazole: R >2/38    -  Trimethoprim/Sulfamethoxazole: S <=0.5/9.5    -  Amikacin: S <=8    -  Amikacin: S <=8    -  Ampicillin/Sulbactam: R >16/8    -  Ceftriaxone: R >32 Enterobacter, Citrobacter, and Serratia may develop resistance during prolonged therapy    -  Ampicillin/Sulbactam: S <=4/2    -  Ceftriaxone: S <=1 Enterobacter, Citrobacter, and Serratia may develop resistance during prolonged therapy    -  Ampicillin: R >16 These ampicillin results predict results for amoxicillin    -  Ampicillin: S <=2 These ampicillin results predict results for amoxicillin    -  Cefazolin: R >16 This predicts results for oral agents cefaclor, cefdinir, cefpodoxime, cefprozil, cefuroxime axetil, cephalexin and locarbef for uncomplicated UTI. Note that some isolates may be susceptible to these agents while testing resistant to cefazolin.    -  Cefazolin: S <=2 This predicts results for oral agents cefaclor, cefdinir, cefpodoxime, cefprozil, cefuroxime axetil, cephalexin and locarbef for uncomplicated UTI. Note that some isolates may be susceptible to these agents while testing resistant to cefazolin.    -  Cefoxitin: S <=4    -  Cefoxitin: S <=4    -  Cefepime: R >16    -  Piperacillin/Tazobactam: R <=8    -  Tobramycin: R >8    -  Cefepime: S <=2    -  Piperacillin/Tazobactam: S <=8    -  Tobramycin: S <=2    -  Aztreonam: R >16    -  Ciprofloxacin: R >2    -  Ertapenem: S <=0.5    -  Imipenem: S <=1    -  Aztreonam: S <=4    -  Ciprofloxacin: S <=0.5    -  Ertapenem: S <=0.5    -  Amoxicillin/Clavulanic Acid: I 16/8    -  Levofloxacin: R >4    -  Meropenem: S <=1    -  Nitrofurantoin: S <=32 Should not be used to treat pyelonephritis    -  Amoxicillin/Clavulanic Acid: S <=8/4    -  Levofloxacin: S <=1    -  Meropenem: S <=1    -  Nitrofurantoin: R >64 Should not be used to treat pyelonephritis    Specimen Source: .Urine Catheterized    Culture Results:   >100,000 CFU/ml Escherichia coli ESBL  50,000 - 99,000 CFU/mL Proteus mirabilis    Organism Identification: Escherichia coli ESBL  Proteus mirabilis    Organism: Proteus mirabilis    Organism: Escherichia coli ESBL    Method Type: ARSALAN    Method Type: ARSALAN

## 2018-07-12 NOTE — CHART NOTE - NSCHARTNOTEFT_GEN_A_CORE
VASCULAR SURGERY NOTE    Patient seen and examined. Will discuss case with Fellow/Attending in AM. Full recommendations to follow.        Carter Guo  R4, General Surgery

## 2018-07-12 NOTE — DISCHARGE NOTE ADULT - SECONDARY DIAGNOSIS.
Thoracic aortic aneurysm without rupture Lupus erythematosus, unspecified form Congestive heart failure, severe Essential hypertension History of pulmonary embolism

## 2018-07-12 NOTE — DISCHARGE NOTE ADULT - PATIENT PORTAL LINK FT
You can access the [facility] Patient Portal, offered by [facility], by registering with the following website: [email or web address]

## 2018-07-12 NOTE — DISCHARGE NOTE ADULT - PROVIDER TOKENS
TOKEN:'[id number]' TOKEN:'[id number]:'[id number]' TOKEN:'[id number]:'[id number]:'[id number]'

## 2018-07-12 NOTE — DISCHARGE NOTE ADULT - MEDICATION SUMMARY - MEDICATIONS TO STOP TAKING
I will STOP taking the medications listed below when I get home from the hospital:    hydrALAZINE 50 mg oral tablet  -- 1 tab(s) by mouth 3 times a day    labetalol 200 mg oral tablet  -- 1 tab(s) by mouth 3 times a day I will STOP taking the medications listed below when I get home from the hospital:    hydrALAZINE 50 mg oral tablet  -- 1 tab(s) by mouth 3 times a day    labetalol 200 mg oral tablet  -- 1 tab(s) by mouth 3 times a day    Coumadin 4 mg oral tablet  -- 1 tab(s) by mouth once a day(at bedtime)  You must have your INR checked on mon. 4/30/18 and your coumadin dose will be adjusted accordingly   -- Do not take this drug if you are pregnant.  It is very important that you take or use this exactly as directed.  Do not skip doses or discontinue unless directed by your doctor.  Obtain medical advice before taking any non-prescription drugs as some may affect the action of this medication.

## 2018-07-12 NOTE — PROGRESS NOTE ADULT - SUBJECTIVE AND OBJECTIVE BOX
Patient is a 58y old  Female who presents with a chief complaint of Lack of urine, change in color. (12 Jul 2018 10:26)      SUBJECTIVE / OVERNIGHT EVENTS:   Feels better.  Denies CP/SOB/Palpitation/HA.    MEDICATIONS  (STANDING):  allopurinol 100 milliGRAM(s) Oral daily  amLODIPine   Tablet 10 milliGRAM(s) Oral daily  AQUAPHOR (petrolatum Ointment) 1 Application(s) Topical three times a day  ascorbic acid 500 milliGRAM(s) Oral daily  calcitriol   Capsule 0.25 MICROGram(s) Oral daily  carvedilol 25 milliGRAM(s) Oral every 12 hours  epoetin agata Injectable 51214 Unit(s) SubCutaneous <User Schedule>  ertapenem  IVPB 500 milliGRAM(s) IV Intermittent every 24 hours  Nephro-alyse 1 Tablet(s) Oral daily  predniSONE   Tablet 10 milliGRAM(s) Oral daily  zinc sulfate 220 milliGRAM(s) Oral daily    MEDICATIONS  (PRN):  acetaminophen   Tablet. 650 milliGRAM(s) Oral every 6 hours PRN Moderate Pain (4 - 6)        CAPILLARY BLOOD GLUCOSE        I&O's Summary    11 Jul 2018 07:01  -  12 Jul 2018 07:00  --------------------------------------------------------  IN: 1140 mL / OUT: 950 mL / NET: 190 mL    12 Jul 2018 07:01  -  12 Jul 2018 21:56  --------------------------------------------------------  IN: 954 mL / OUT: 250 mL / NET: 704 mL        PHYSICAL EXAM:  GENERAL: NAD, well-developed  HEAD:  Atraumatic, Normocephalic  NECK: Supple, No JVD  CHEST/LUNG: Clear to auscultation bilaterally; No wheezing.  HEART: Regular rate and rhythm; No murmurs, rubs, or gallops  ABDOMEN: Soft, Nontender, Nondistended; Bowel sounds present  EXTREMITIES:   No clubbing, cyanosis, or edema  NEUROLOGY: AAO X 3  SKIN: No rashes    LABS:                        9.1    6.6   )-----------( 315      ( 12 Jul 2018 10:36 )             27.2     07-12    142  |  102  |  98<H>  ----------------------------<  118<H>  3.8   |  26  |  4.84<H>    Ca    8.5      12 Jul 2018 10:30  Phos  4.2     07-12      PT/INR - ( 12 Jul 2018 10:33 )   PT: 31.7 sec;   INR: 2.85 ratio                 CAPILLARY BLOOD GLUCOSE        07-07 @ 08:27  Culture-urine --  Culture results   >100,000 CFU/ml Escherichia coli ESBL  50,000 - 99,000 CFU/mL Proteus mirabilis  method type ARSALAN  Organism Escherichia coli ESBL  Organism Identification Escherichia coli ESBL  Proteus mirabilis  Specimen source .Urine Catheterized  07-06 @ 17:06  Culture-urine --  Culture results   No growth at 5 days.  method type --  Organism --  Organism Identification --  Specimen source .Blood Blood-Peripheral           07-07 @ 08:27  Culture blood --  Culture results   >100,000 CFU/ml Escherichia coli ESBL  50,000 - 99,000 CFU/mL Proteus mirabilis  Gram stain --  Gram stain blood --  Method type ARSALAN  Organism Escherichia coli ESBL  Organism identification Escherichia coli ESBL  Proteus mirabilis  Specimen source .Urine Catheterized   07-06 @ 17:06  Culture blood --  Culture results   No growth at 5 days.  Gram stain --  Gram stain blood --  Method type --  Organism --  Organism identification --  Specimen source .Blood Blood-Peripheral      RADIOLOGY & ADDITIONAL TESTS:    Imaging Personally Reviewed:    Consultant(s) Notes Reviewed:      Care Discussed with Consultants/Other Providers:

## 2018-07-12 NOTE — PROGRESS NOTE ADULT - SUBJECTIVE AND OBJECTIVE BOX
[facility]   [phone]  RENAL FOLLOW UP NOTE  --------------------------------------------------------------------------------  Chief Complaint:    24 hour events/subjective:    flank pain improving. no sob     PAST HISTORY  --------------------------------------------------------------------------------  No significant changes to PMH, PSH, FHx, SHx, unless otherwise noted    ALLERGIES & MEDICATIONS  --------------------------------------------------------------------------------  Allergies    heparin (Unknown)  losartan (Anaphylaxis)  nitroglycerin (Other)    Intolerances      Standing Inpatient Medications  allopurinol 100 milliGRAM(s) Oral daily  amLODIPine   Tablet 10 milliGRAM(s) Oral daily  AQUAPHOR (petrolatum Ointment) 1 Application(s) Topical three times a day  ascorbic acid 500 milliGRAM(s) Oral daily  calcitriol   Capsule 0.25 MICROGram(s) Oral daily  carvedilol 25 milliGRAM(s) Oral every 12 hours  ertapenem  IVPB 500 milliGRAM(s) IV Intermittent every 24 hours  Nephro-alyse 1 Tablet(s) Oral daily  predniSONE   Tablet 10 milliGRAM(s) Oral daily  warfarin 3 milliGRAM(s) Oral once  zinc sulfate 220 milliGRAM(s) Oral daily    PRN Inpatient Medications  acetaminophen   Tablet. 650 milliGRAM(s) Oral every 6 hours PRN      REVIEW OF SYSTEMS  --------------------------------------------------------------------------------    Gen: denies fevers/chills,  CVS: denies chest pain/palpitations  Resp: denies SOB/Cough  GI: Denies N/V/Abd pain  : Denies dysuria  + edema and c/o weeping from left groin site     All other systems were reviewed and are negative, except as noted.    VITALS/PHYSICAL EXAM  --------------------------------------------------------------------------------  T(C): 36.6 (07-12-18 @ 12:41), Max: 36.6 (07-12-18 @ 04:51)  HR: 94 (07-12-18 @ 17:54) (86 - 95)  BP: 132/77 (07-12-18 @ 17:54) (111/67 - 134/79)  RR: 18 (07-12-18 @ 12:41) (18 - 18)  SpO2: 97% (07-12-18 @ 12:41) (95% - 97%)  Wt(kg): --        07-11-18 @ 07:01  -  07-12-18 @ 07:00  --------------------------------------------------------  IN: 1140 mL / OUT: 950 mL / NET: 190 mL    07-12-18 @ 07:01  -  07-12-18 @ 20:46  --------------------------------------------------------  IN: 954 mL / OUT: 250 mL / NET: 704 mL      Physical Exam:  	  Gen: alert oriented place person and date   	Pulm: CTA  no rales or ronchi or wheezing  	CV: RRR, S1/S2. no rub  	Abd: +BS, soft, nontender/nondistended  	: No suprapubic tenderness.               Extremity: No cyanosis, LLE 4+ pitting edema RLE no edema   	+ fox  left femoral area abrasion skin [ examined with pt female RN present at approx 6 pm ]      LABS/STUDIES  --------------------------------------------------------------------------------              9.1    6.6   >-----------<  315      [07-12-18 @ 10:36]              27.2     142  |  102  |  98  ----------------------------<  118      [07-12-18 @ 10:30]  3.8   |  26  |  4.84        Ca     8.5     [07-12-18 @ 10:30]      Phos  4.2     [07-12-18 @ 10:30]      PT/INR: PT 31.7 , INR 2.85       [07-12-18 @ 10:33]      Creatinine Trend:  SCr 4.84 [07-12 @ 10:30]  SCr 4.74 [07-11 @ 11:37]  SCr 5.18 [07-10 @ 09:20]  SCr 5.10 [07-09 @ 08:37]  SCr 5.07 [07-08 @ 05:18]              Urinalysis - [07-06-18 @ 14:45]      Color Yellow / Appearance Turbid / SG 1.015 / pH 8.5      Gluc Negative / Ketone Negative  / Bili Negative / Urobili Negative       Blood Small / Protein 300 / Leuk Est Large / Nitrite Negative      RBC 5-10 / WBC 26-50 / Hyaline  / Gran  / Sq Epi  / Non Sq Epi  / Bacteria Moderate      Iron 67, TIBC 203, %sat 33      [07-12-18 @ 14:18]  Ferritin 692      [04-20-18 @ 08:14]  PTH -- (Ca 8.9)      [04-20-18 @ 08:14]   217  PTH -- (Ca 9.0)      [11-09-17 @ 08:51]   387  Vitamin D (25OH) 7.8      [11-09-17 @ 08:51]  HbA1c 4.5      [08-09-16 @ 06:49]  TSH 0.47      [04-24-18 @ 07:51]    dsDNA 38      [07-10-18 @ 13:55]  C3 Complement 114      [07-10-18 @ 13:54]  C4 Complement 32      [07-10-18 @ 13:54]

## 2018-07-12 NOTE — DISCHARGE NOTE ADULT - CARE PLAN
Principal Discharge DX:	Urinary tract infection associated with catheterization of urinary tract, unspecified indwelling urinary catheter type, initial encounter  Goal:	stable  Assessment and plan of treatment:	HOME CARE INSTRUCTIONS  f you were prescribed antibiotics, take them exactly as your caregiver instructs you. Finish the medication even if you feel better after you have only taken some of the medication.  Drink enough water and fluids to keep your urine clear or pale yellow.  Avoid caffeine, tea, and carbonated beverages. They tend to irritate your bladder.  Empty your bladder often. Avoid holding urine for long periods of time.  Empty your bladder before and after sexual intercourse.  After a bowel movement, women should cleanse from front to back. Use each tissue only once.  SEEK MEDICAL CARE IF:  You have back pain.  You develop a fever.  Your symptoms do not begin to resolve within 3 days.  SEEK IMMEDIATE MEDICAL CARE IF:  You have severe back pain or lower abdominal pain.  You develop chills.  You have nausea or vomiting.  You have continued burning or discomfort with urination.  Secondary Diagnosis:	Thoracic aortic aneurysm without rupture  Goal:	stable  Assessment and plan of treatment:	cleared by ct sx  f/u repeat chest ct  Secondary Diagnosis:	Lupus erythematosus, unspecified form  Goal:	stablr  Assessment and plan of treatment:	c/w prednisone  Secondary Diagnosis:	Congestive heart failure, severe  Goal:	stable  Assessment and plan of treatment:	Weigh yourself daily.  If you gain 3lbs in 3 days, or 5lbs in a week call your Health Care Provider.  Do not eat or drink foods containing more than 2000mg of salt (sodium) in your diet every day.  Call your Health Care Provider if you have any swelling or increased swelling in your feet, ankles, and/or stomach.  Take all of your medication as directed.  If you become dizzy call your Health Care Provider.  Secondary Diagnosis:	Essential hypertension  Goal:	stable  Assessment and plan of treatment:	Follow up with your medical doctor to establish long term blood pressure treatment goals.  Secondary Diagnosis:	History of pulmonary embolism  Goal:	stable  Assessment and plan of treatment:	Take your anticoagulation ( coumadin) as directed.  Follow up with your health care provider within one week. Call for appointment.  If you develop shortness of breath or if your shortness of breath worsens call your Health Care Provider or go to the Emergency Department.

## 2018-07-12 NOTE — DISCHARGE NOTE ADULT - DEVELOP COPING SKILLS. FOR EXAMPLE, STRATEGIES AND LIFESTYLE CHANGES THAT REDUCE NEGATIVE MOODS, STRESS, AND EXPOSURE TO SMOKING CUES
"Anca Ordoñez (51 y.o. Female)     Date of Birth Social Security Number Address Home Phone MRN    1966  [address]   [facility] [phone] [id number]    [employer] Mar[employer] Status          Anglican        Admission Date Admission Type Admitting Provider Attending Provider Department, Room/Bed    12/19/17 Elective Pete Willis MD  [facility] MED SURG  4, 424/1    Discharge Date Discharge Disposition Discharge Destination        12/21/2017 Home-Health Care Svc             Attending Provider: (none)    Allergies:  Ativan [Lorazepam], Morphine And Related, Penicillins    Isolation:  None   Infection:  None   Code Status:  FULL    Ht:  167.6 cm (66\")   Wt:  60.3 kg (132 lb 14.4 oz)    Admission Cmt:  None   Principal Problem:  None                Active Insurance as of 12/19/2017     Primary Coverage     Payor Plan Insurance Group Employer/Plan Group    [employer] BLUE CROSS AN[employer] CROSS BLUE SHIELD O [id number]     Payor Plan Address Payor Plan Phone Number Effective From Effective To    [address] [phone] 2/3/2013     [place] [address]       Subscriber Name Subscriber Birth Date Member ID       ANCA ORDOÑEZ 1966 [id number]                 Emergency Contacts      (Rel.) Home Phone Work Phone Mobile Phone    Maria Ines Marie (Mother) [phone] -- --            Referral Orders (last 24 hours) (24h ago through future)    Start     Ordered    12/21/17 0000  Ambulatory Referral to Home Health      12/21/17 0842          Consult Orders (last 24 hours)     None           Discharge Summary      Yosvany Sheridan PA-C at 12/21/2017  9:20 AM     Attestation signed by Pete Willis MD at 12/21/2017 10:04 AM        I have reviewed the notes and assessments performed by Johann Sheridan PA-C, and concur with his documentation of Anca Ordoñez.      Pete Willis MD  12/21/2017  10:04 AM                           "       Name:  Anca Andrade   Age:  51 y.o.  Sex:  female  :  1966  MRN:  [id number]   Visit Number:  [id number]  Primary Care Physician:  Zita Velasco  Date of Discharge:  2017  Admission Date:  2017 11:57 AM      Discharge Diagnosis:       Patient Active Problem List   Diagnosis   • Arthralgia of knee, right   • Primary osteoarthritis of right knee   • Pseudogout   • Myalgia   • Neuropathy   • Pre-op testing   • S/P ACL reconstruction   • Pre-op evaluation   • Arthralgia of left shoulder region   • Lumbar degenerative disc disease   • Impingement syndrome of left shoulder   • Post-traumatic osteoarthritis of left knee   • Rotator cuff tendinitis   • Arthralgia of right knee   • Status post total right knee replacement using cement   • Rupture of right posterior tibialis tendon   • Pain of right lower extremity   • S/P right knee surgery   • Infection of prosthetic right knee joint       Presenting Problem/History of Present Illness:    Pseudogout [M11.20]  Neuropathy [G62.9]  Arthralgia of knee, right [M25.561]  Lumbar degenerative disc disease [M51.36]  Post-traumatic osteoarthritis of left knee [M17.32]  Status post total right knee replacement using cement [Z96.651]  Infection associated with internal right knee prosthesis, initial encounter [T84.53XA]  Infection associated with internal right knee prosthesis, initial encounter [T84.53XA]         Consults:     Consults     Date and Time Order Name Status Description    2017 Inpatient Consult to Hospitalist Completed           Procedures Performed:    Procedure(s):  SECOND STAGE OF RIGHT TOTAL KNEE ARTHROPLASTY REVISION (S&N)         History of presenting illness:    Patient was admitted status post right second stage revision of a total knee arthroplasty for her prosthetic infection with removal of temporary antibiotic knee spacers. Patient tolerated the procedure well.  She did have a drop in her hemoglobin she was  transfused packed red blood cells.  She reports no dizziness, no chest pain or shortness of breath.  She states her pain as a 7 out of 10 to her right knee.  Patient is currently being followed by infectious disease.  She had a PICC line placed in the hospital yesterday.  She is comfortable with home discharge and home health.  She received the Invanz abx       Vital Signs:    Temp:  [98.1 °F (36.7 °C)-99.4 °F (37.4 °C)] 98.4 °F (36.9 °C)  Heart Rate:  [64-82] 81  Resp:  [16-20] 16  BP: (106-165)/(58-99) 123/72    Physical Exam:    General Appearance: alert, appears stated age and cooperative  Head: normocephalic, without obvious abnormality  Eyes: conjunctivae and sclerae normal  Nose: no drainage  Throat: oral mucosa moist  Lungs: respirations regular, respirations even and respirations unlabored  Heart: regular rhythm & normal rate  Abdomen: soft non-tender  Extremities: no redness, Silvia's sign negative, no ischemic changes and no ulcers  Pulses: Pulses palpable and equal bilaterally  Skin: turgor normal  Neurologic: Mental Status orientated to person, place, time and situation  Psych: normal    + swelling to right knee medial aspect of knee.  + ttp to right knee    Pertinent Lab Results:     Lab Results (all)     Procedure Component Value Units Date/Time    Basic Metabolic Panel [[id number]]  (Abnormal) Collected:  12/20/17 0611    Specimen:  Blood Updated:  12/20/17 0636     Glucose 81 mg/dL      BUN 13 mg/dL      Creatinine 0.80 mg/dL      Sodium 139 mmol/L      Potassium 3.8 mmol/L      Chloride 107 mmol/L      CO2 26.0 mmol/L      Calcium 7.9 (L) mg/dL      eGFR Non African Amer 75 mL/min/1.73      BUN/Creatinine Ratio 16.3     Anion Gap 9.8 mmol/L     Narrative:       Abnormal estimated GFR should be followed by more specific studies to confirm end stage chronic renal disease. The equation used for calculation may not be accurate for patients less than 19 years old, greater than 70 years old, patients at  extremes of weight, malnutrition, or with acute renal dysfunction.    CBC & Differential [[id number]] Collected:  12/20/17 0611    Specimen:  Blood Updated:  12/20/17 0647    Narrative:       The following orders were created for panel order CBC & Differential.  Procedure                               Abnormality         Status                     ---------                               -----------         ------                     CBC Auto Differential[[id number]]        Abnormal            Final result                 Please view results for these tests on the individual orders.    CBC Auto Differential [[id number]]  (Abnormal) Collected:  12/20/17 0611    Specimen:  Blood Updated:  12/20/17 0647     WBC 7.01 10*3/mm3      RBC 2.56 (L) 10*6/mm3      Hemoglobin 7.3 (C) g/dL      Hematocrit 23.3 (C) %      MCV 91.0 fL      MCH 28.5 pg      MCHC 31.3 g/dL      RDW 13.7 %      RDW-SD 45.4 fl      MPV 10.5 fL      Platelets 134 10*3/mm3      Neutrophil % 77.9 %      Lymphocyte % 14.6 %      Monocyte % 4.7 %      Eosinophil % 2.4 %      Basophil % 0.1 %      Immature Grans % 0.3 %      Neutrophils, Absolute 5.46 10*3/mm3      Lymphocytes, Absolute 1.02 10*3/mm3      Monocytes, Absolute 0.33 10*3/mm3      Eosinophils, Absolute 0.17 10*3/mm3      Basophils, Absolute 0.01 10*3/mm3      Immature Grans, Absolute 0.02 10*3/mm3      nRBC 0.0 /100 WBC     Hemoglobin & Hematocrit, Blood [[id number]]  (Abnormal) Collected:  12/20/17 0703    Specimen:  Blood Updated:  12/20/17 0721     Hemoglobin 7.2 (C) g/dL      Hematocrit 22.6 (C) %     CBC & Differential [[id number]] Collected:  12/21/17 0611    Specimen:  Blood Updated:  12/21/17 0623    Narrative:       The following orders were created for panel order CBC & Differential.  Procedure                               Abnormality         Status                     ---------                               -----------         ------                     CBC Auto Differential[[id number]]         Abnormal            Final result                 Please view results for these tests on the individual orders.    CBC Auto Differential [[id number]]  (Abnormal) Collected:  12/21/17 0611    Specimen:  Blood Updated:  12/21/17 0623     WBC 4.65 (L) 10*3/mm3      RBC 3.29 (L) 10*6/mm3      Hemoglobin 9.4 (L) g/dL      Hematocrit 28.3 (L) %      MCV 86.0 fL      MCH 28.6 pg      MCHC 33.2 g/dL      RDW 14.6 (H) %      RDW-SD 46.7 fl      MPV 9.8 fL      Platelets 99 (L) 10*3/mm3      Neutrophil % 59.6 %      Lymphocyte % 24.5 %      Monocyte % 6.9 %      Eosinophil % 8.2 (H) %      Basophil % 0.4 %      Immature Grans % 0.4 %      Neutrophils, Absolute 2.77 10*3/mm3      Lymphocytes, Absolute 1.14 10*3/mm3      Monocytes, Absolute 0.32 10*3/mm3      Eosinophils, Absolute 0.38 10*3/mm3      Basophils, Absolute 0.02 10*3/mm3      Immature Grans, Absolute 0.02 10*3/mm3      nRBC 0.0 /100 WBC     Basic Metabolic Panel [[id number]]  (Abnormal) Collected:  12/21/17 0611    Specimen:  Blood Updated:  12/21/17 0640     Glucose 80 mg/dL      BUN 8 mg/dL      Creatinine 0.80 mg/dL      Sodium 142 mmol/L      Potassium 3.4 (L) mmol/L      Chloride 110 (H) mmol/L      CO2 28.0 mmol/L      Calcium 7.8 (L) mg/dL      eGFR Non African Amer 75 mL/min/1.73      BUN/Creatinine Ratio 10.0     Anion Gap 7.4 mmol/L     Narrative:       Abnormal estimated GFR should be followed by more specific studies to confirm end stage chronic renal disease. The equation used for calculation may not be accurate for patients less than 19 years old, greater than 70 years old, patients at extremes of weight, malnutrition, or with acute renal dysfunction.          Pertinent Radiology Results:    Imaging Results (all)     Procedure Component Value Units Date/Time    XR Knee 1 or 2 View Right [[id number]] Collected:  12/20/17 0807     Updated:  12/20/17 0810    Narrative:       PROCEDURE: XR KNEE 1 OR 2 VW RIGHT-     HISTORY: post-op revision R TKR;  T84.53XA-Infection and inflammatory  reaction due to internal right knee prosthesis, initial encounter;  M25.561-Pain in right knee; M17.32-Unilateral post-traumatic  osteoarthritis, left knee; M51.36-Other intervertebral disc  degeneration, lumbar region; G62.9-Polyneuropathy, unspecified;  M11.20-Other chondrocalcinosis, unspecified site; Z96.651-Presence of  right artificial      COMPARISON: October 4, 2017.     FINDINGS:  A 2 view exam demonstrates revision of the patient's knee  arthroplasty with a cemented longstem device is.  There are no hardware  complications or fractures.  The expected postoperative fluid and gas is  seen in the soft tissues.           Impression:       Revision of the patient's right knee arthroplasty with  placement of longstem device is.                 This report was finalized on 12/20/2017 8:08 AM by Noah Felder M.D..          Condition on Discharge:      Stable        Discharge Disposition:    Home-Health Care [facility] [place]    Discharge Medication:     Anca Andrade   Home Medication Instructions LUCAS:[id number]    Printed on:12/21/17 2331   Medication Information                      aspirin (ECOTRIN) 325 MG EC tablet  Take 1 tablet by mouth Daily.             atenolol (TENORMIN) 50 MG tablet  Take 0.5 tablets by mouth daily, patient states BP fluctuates and she doesn't take this everyday             docusate sodium 100 MG capsule  Take 1 capsule by mouth 2 (Two) Times a Day As Needed for Constipation.             doxylamine (UNISOM) 25 MG tablet  Take 25 mg by mouth At Night As Needed for Sleep.             gabapentin (NEURONTIN) 400 MG capsule  Take 1 capsule by mouth 3 (Three) Times a Day.             lisinopril-hydrochlorothiazide (PRINZIDE,ZESTORETIC) 10-12.5 MG per tablet  Take 1 tablet by mouth Daily. Patient states she doesn't take this every day.             meloxicam (MOBIC) 15 MG tablet  Take 1 tablet by mouth Daily.             oxyCODONE (ROXICODONE) 10 MG  tablet  Take 1 tablet by mouth Every 4 (Four) Hours As Needed (for pain).                 Discharge Diet:     Diet Instructions     Advance Diet As Tolerated                     Activity at Discharge:     Activity Instructions     Discharge Activity Restrictions       No driving until cleared by ortho  WBAT with a walker.   Continue wearing the PAU hose   A apply ice to the right knee was a protective barrier 20 minutes on 1 hour off at least 5 times per day.                 Follow-up Appointments:    Future Appointments  Date Time Provider Department Center   1/3/2018 1:45 PM Pete Willis MD [facility] None     Additional Instructions for the Follow-ups that You Need to Schedule     Ambulatory Referral to Home Health    As directed    Face to Face Visit Date:  12/21/2017    Follow-up Provider for Plan of Care?:  I will be treating the patient on an ongoing basis.  Please send me the Plan of Care for signature.    Follow-up Provider:  PETE WILLIS [[id number]]    Reason/Clinical Findings:  S/P Right KNEE surger    Describe mobility limitations that make leaving home difficult:  pain, weakness and decreased ROM    Nursing/Therapeutic Services Requested:  Physical Therapy    PT orders:  Total joint pathway Therapeutic exercise Gait Training Strengthening    Weight Bearing Status:  As Tolerated                     Test Results Pending at Discharge:      S/P right second stage revision of a total knee arthroplasty for her prosthetic infection with removal of temporary antibiotic knee spacers:    Patient will be discharged and followed by infectious disease for PICC line antibiotics.  She already has all necessary DME at home.  For DVT prophylaxis patient is encouraged to continue using the PAU hose and continue the aspirin 325 mg daily for 30 days.     Yosvany Sheridan PA-C  12/21/17  9:20 AM    Time: Discharge 20 min       Electronically signed by Pete Willis MD at 12/21/2017 10:04 AM         Ambulatory Referral to Home Health [[id number]] (Order [id number])   Outpatient Referral    Date: 12/21/2017   Department: [facility] MED SURG  4   Ordering/Authorizing: Zuleima Sheridan PA-C           Order History  Outpatient       Date/Time Action Taken User Additional Information       12/21/17 0842 Sign Zuleima Sheridan PA-C        12/21/17 0958 Cosign Pete Willis MD            Order Details        Frequency Duration Priority Order Class       None None Routine Outgoing Referral           Start Date/Time        Start Date       12/21/17           Order Questions        Question Answer Comment       Face to Face Visit Date 12/21/2017        Follow-up Provider for Plan of Care? I will be treating the patient on an ongoing basis.  Please send me the Plan of Care for signature.        Follow-up Provider PETE WILLIS        Reason/Clinical Findings S/P Right KNEE surger        Describe mobility limitations that make leaving home difficult pain, weakness and decreased ROM        Nursing/Therapeutic Services Requested Physical Therapy        PT orders: Total joint pathway         Therapeutic exercise         Gait Training         Strengthening        Weight Bearing Status As Tolerated            Cosign Order Info        Action Created on Responsible Provider Signed by Signed on       Ordering 12/21/17 0842 MD Pete Woods MD 12/21/17 0958           Source Order Set / Preference List        Preference List       ZULEIMA SHERIDAN ([id number]) FREQUENT ORDERS [facility] OP-SYS [[id number]]           Clinical Indications           ICD-10-CM ICD-9-CM       Impaired functional mobility, balance, gait, and endurance  - Primary     Z74.09 V49.89       Infection associated with internal right knee prosthesis, initial encounter     T84.53XA 996.66  V43.65       Impaired mobility and ADLs     Z74.09 799.89           Reprint Order Requisition        AMB REFERRAL  TO HOME HEALTH (Order #[id number]) on 12/21/17         Encounter-Level Cardiology Documents:        There are no encounter-level cardiology documents.         Encounter        View Encounter         Order Provider Info            Office phone Pager E-mail       Ordering User Yosvany Sheridan PA-C [phone] -- --       Authorizing Provider Yosvany Sheridan PA-C [phone] -- --       Ordering Provider Yosvany Sheridan PA-C [phone] -- --       Cosigned By (on 12/21/2017 0958) Pete Willis [phone] -- --           Linked Charges        No charges linked to this procedure         Order Transmittal Tracking        AMB REFERRAL TO HOME HEALTH (Order #[id number]) on 12/21/17         Authorized by:  Yosvany Sheridan PA-C  (NPI: [id number])                Statement Selected

## 2018-07-13 ENCOUNTER — RESULT REVIEW (OUTPATIENT)
Age: 58
End: 2018-07-13

## 2018-07-13 LAB
ANION GAP SERPL CALC-SCNC: 16 MMOL/L — SIGNIFICANT CHANGE UP (ref 5–17)
APTT BLD: 39.8 SEC — HIGH (ref 27.5–37.4)
BLD GP AB SCN SERPL QL: NEGATIVE — SIGNIFICANT CHANGE UP
BUN SERPL-MCNC: 104 MG/DL — HIGH (ref 7–23)
CALCIUM SERPL-MCNC: 8.7 MG/DL — SIGNIFICANT CHANGE UP (ref 8.4–10.5)
CHLORIDE SERPL-SCNC: 103 MMOL/L — SIGNIFICANT CHANGE UP (ref 96–108)
CO2 SERPL-SCNC: 24 MMOL/L — SIGNIFICANT CHANGE UP (ref 22–31)
CREAT SERPL-MCNC: 4.51 MG/DL — HIGH (ref 0.5–1.3)
FERRITIN SERPL-MCNC: 961 NG/ML — HIGH (ref 15–150)
GLUCOSE SERPL-MCNC: 86 MG/DL — SIGNIFICANT CHANGE UP (ref 70–99)
HCT VFR BLD CALC: 28.7 % — LOW (ref 34.5–45)
HGB BLD-MCNC: 8.8 G/DL — LOW (ref 11.5–15.5)
INR BLD: 2.41 RATIO — HIGH (ref 0.88–1.16)
MCHC RBC-ENTMCNC: 27.3 PG — SIGNIFICANT CHANGE UP (ref 27–34)
MCHC RBC-ENTMCNC: 30.6 GM/DL — LOW (ref 32–36)
MCV RBC AUTO: 89.3 FL — SIGNIFICANT CHANGE UP (ref 80–100)
PLATELET # BLD AUTO: 305 K/UL — SIGNIFICANT CHANGE UP (ref 150–400)
POTASSIUM SERPL-MCNC: 4.2 MMOL/L — SIGNIFICANT CHANGE UP (ref 3.5–5.3)
POTASSIUM SERPL-SCNC: 4.2 MMOL/L — SIGNIFICANT CHANGE UP (ref 3.5–5.3)
PROTHROM AB SERPL-ACNC: 26.5 SEC — HIGH (ref 9.8–12.7)
RBC # BLD: 3.21 M/UL — LOW (ref 3.8–5.2)
RBC # FLD: 15.9 % — HIGH (ref 10.3–14.5)
RH IG SCN BLD-IMP: POSITIVE — SIGNIFICANT CHANGE UP
SODIUM SERPL-SCNC: 143 MMOL/L — SIGNIFICANT CHANGE UP (ref 135–145)
URATE SERPL-MCNC: 9.5 MG/DL — HIGH (ref 2.5–7)
WBC # BLD: 7.8 K/UL — SIGNIFICANT CHANGE UP (ref 3.8–10.5)
WBC # FLD AUTO: 7.8 K/UL — SIGNIFICANT CHANGE UP (ref 3.8–10.5)

## 2018-07-13 PROCEDURE — 88305 TISSUE EXAM BY PATHOLOGIST: CPT | Mod: 26

## 2018-07-13 PROCEDURE — 88112 CYTOPATH CELL ENHANCE TECH: CPT | Mod: 26

## 2018-07-13 PROCEDURE — 99232 SBSQ HOSP IP/OBS MODERATE 35: CPT

## 2018-07-13 PROCEDURE — 75989 ABSCESS DRAINAGE UNDER X-RAY: CPT | Mod: 26

## 2018-07-13 PROCEDURE — 33015: CPT

## 2018-07-13 RX ORDER — WARFARIN SODIUM 2.5 MG/1
2 TABLET ORAL ONCE
Qty: 0 | Refills: 0 | Status: COMPLETED | OUTPATIENT
Start: 2018-07-13 | End: 2018-07-13

## 2018-07-13 RX ADMIN — Medication 1 APPLICATION(S): at 05:53

## 2018-07-13 RX ADMIN — ERTAPENEM SODIUM 100 MILLIGRAM(S): 1 INJECTION, POWDER, LYOPHILIZED, FOR SOLUTION INTRAMUSCULAR; INTRAVENOUS at 13:28

## 2018-07-13 RX ADMIN — Medication 10 MILLIGRAM(S): at 09:31

## 2018-07-13 RX ADMIN — CARVEDILOL PHOSPHATE 25 MILLIGRAM(S): 80 CAPSULE, EXTENDED RELEASE ORAL at 05:51

## 2018-07-13 RX ADMIN — ERYTHROPOIETIN 10000 UNIT(S): 10000 INJECTION, SOLUTION INTRAVENOUS; SUBCUTANEOUS at 09:31

## 2018-07-13 RX ADMIN — CARVEDILOL PHOSPHATE 25 MILLIGRAM(S): 80 CAPSULE, EXTENDED RELEASE ORAL at 21:48

## 2018-07-13 RX ADMIN — WARFARIN SODIUM 2 MILLIGRAM(S): 2.5 TABLET ORAL at 21:48

## 2018-07-13 RX ADMIN — AMLODIPINE BESYLATE 10 MILLIGRAM(S): 2.5 TABLET ORAL at 09:31

## 2018-07-13 RX ADMIN — Medication 1 APPLICATION(S): at 13:28

## 2018-07-13 NOTE — PROGRESS NOTE ADULT - ASSESSMENT
58 year old female w/ SLE on prednisone, HFrEF s/p ICD, CKD, Hx PE/HIT, HTN, CAD no stents, Stage IV decub p/w decreased urine output and blood in urine.    Febrile in ED  CT with pericardial effusion chronic - cardiology on board  Also with thoracic aortic aneurysm with vascular surgery on board  WBC WNL  UA with 26-50 WBC  Blood cxs NGTD  Urine cxs with ESBL E. coli and Proteus  Feeling better  Aferbrile  WBC WNL  US with new above the knee DVT  Left artery popliteal aneurysm increased in size  Planned for IR drainage of pericardial effusion    Recommend:  -Continue ertapenem 500 mg IV q 24 hours.  -Will most likely need a 10- day course.  -Would continue IV until Sunday and can transition to oral fosfomycin 3 gram po x 1 on Monday.

## 2018-07-13 NOTE — PROGRESS NOTE ADULT - ASSESSMENT
58F w/ SLE on prednisone, HFrEF s/p ICD, CKD III-IV, Hx PE/HIT, HTN, CAD no stents, Stage IV decub p/w decreased urine output, hematuria and concurrent UTI.    Recurrent Pericardial effusion:  S/p IR drainage.

## 2018-07-13 NOTE — CONSULT NOTE ADULT - SUBJECTIVE AND OBJECTIVE BOX
VASCULAR SURGERY CONSULT    Team pager: [phone]  Consulting attending: Anusha  Reason for Consult: Bilateral popliteal aneurysms      HPI:  Patient is a 58y Female - h/o SLE on prednisone, HFrEF (32%), s/p ICD, CKD III-IV, Hx PE/HIT, HTN, CAD no stents, Stage IV decub - admitted to the medical service with hematuria/complications of SLE. She has a very complicated history of multiple surgical interventions for a left iliac artery aneurysm. Briefly, the sequence is as follows:    2009, January - Endovascular repair of Left iliac artery aneurysm (aorto-unifemoral stent graft) with L to R femoral-femoral bypass with PTFE. Complicated by pre-existing AVF from iliac aneurysm to IVC.  2009, January (next day) - LLE four-compartment fasciotomies for compartment syndrome  2009, May - Ligation of R common iliac artery (for persistent Type II endoleak/growing aneurysm sac)  2009, September - Open aorto-left femoral bypass (revision of iliac aneurysm repair), RP approach. Complicated post-op by RP bleed from intercostal artery, with RTOR on POD 4.  2011, September - Endovascular exclusion of left iliac vein from AVF to iliac aneurysm sac with covered venous stent.  2016, February - Open access of left iliac aneurysm sac, intra-operative IR-assisted angioembolization of venous/arterial communications with aneurysm sac    Last office visit with Vascular: June, 2017. At that time, abdominal duplex US revealed a stable iliac aneurysm (14.4 x 14.5 cm), with no evidence of endoleak.     Patient is reporting no LE pain (at least, no worsening of her chronic bilateral LE knee pain). She has chronic paresthesia of the L foot, and diminished motor function of the LLE.   The primary team obtained a LE duplex to evaluate for DVT (patient had a known PE, on coumadin), and discovered a partially occlusive thrombus within the common femoral vein. In addition, the duplex identified bilateral popliteal  aneurysms. These had grown slightly from a prior duplex study, performed in April 2018.       PAST MEDICAL HISTORY:  Anemia  Morbid obesity  HTN (Hypertension)  Drop Foot Gait  Compartment Syndrome  HIT (Heparin-Induced Thrombocytopenia)  History of Pulmonary Embolism  Iliac Aneurysm (ICD9 442.2)  Iliac Aneurysm (ICD9 442.2)  Iliac Aneurysm (ICD9 442.2)  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)  Calf DVT (Deep Venous Thrombosis) (ICD9 453.42)  Adenomatous Goiter (ICD9 241.9)  Chronic Gout (ICD9 274.02)  LBBB (Left Bundle Branch Block) (ICD9 426.3)  CHF (Congestive Heart Failure) (ICD9 428.0)      PAST SURGICAL HISTORY:  Hx of aorta-iliac-femoral bypass  TOP (Termination of Pregnancy)  S/P Dilatation and Curettage  History of Tubal Ligation  s/p AAA (Abdominal Aortic Aneurysm) Repair  History of Cholecystectomy  S/P Partial Hysterectomy      ALLERGIES:  heparin (Unknown)  losartan (Anaphylaxis)  nitroglycerin (Other)      MEDICATIONS  (STANDING):  allopurinol 100 milliGRAM(s) Oral daily  amLODIPine   Tablet 10 milliGRAM(s) Oral daily  AQUAPHOR (petrolatum Ointment) 1 Application(s) Topical three times a day  ascorbic acid 500 milliGRAM(s) Oral daily  calcitriol   Capsule 0.25 MICROGram(s) Oral daily  carvedilol 25 milliGRAM(s) Oral every 12 hours  epoetin agata Injectable 43066 Unit(s) SubCutaneous <User Schedule>  ertapenem  IVPB 500 milliGRAM(s) IV Intermittent every 24 hours  Nephro-alyse 1 Tablet(s) Oral daily  predniSONE   Tablet 10 milliGRAM(s) Oral daily  zinc sulfate 220 milliGRAM(s) Oral daily    MEDICATIONS  (PRN):  acetaminophen   Tablet. 650 milliGRAM(s) Oral every 6 hours PRN Moderate Pain (4 - 6)      VITALS & I/Os:  Vital Signs Last 24 Hrs  T(C): 36.7 (13 Jul 2018 14:55), Max: 36.8 (13 Jul 2018 04:49)  T(F): 98 (13 Jul 2018 14:55), Max: 98.3 (13 Jul 2018 04:49)  HR: 93 (13 Jul 2018 14:55) (84 - 100)  BP: 150/83 (13 Jul 2018 14:55) (124/72 - 150/83)  BP(mean): --  RR: 17 (13 Jul 2018 14:55) (16 - 20)  SpO2: 99% (13 Jul 2018 14:55) (96% - 99%)  CAPILLARY BLOOD GLUCOSE          I&O's Summary    12 Jul 2018 07:01  -  13 Jul 2018 07:00  --------------------------------------------------------  IN: 954 mL / OUT: 1000 mL / NET: -46 mL    13 Jul 2018 07:01  -  13 Jul 2018 15:09  --------------------------------------------------------  IN: 0 mL / OUT: 400 mL / NET: -400 mL          GEN: NAD, alert and oriented x 3  HEENT: WNL  CHEST: Symmetrical chest rise, breath sounds CTAB  HEART: RRR, non-muffled heart sounds  ABD: Obese. Soft, non-tender, non-distended  EXT/VASC:                       RLE - No edema/erythema or deformity. Warm, cap refill < 2 sec. Motor and sensory function intact.                                                   femoral  -  palpable [ x ]       doppler signal [  ]                                       popliteal  -  palpable [  ]       doppler signal [ x ]                                       DP -  palpable [ x ], thready       doppler signal [  ]                                       PT -  palpable [  ]       doppler signal [ x ]                       LLE - Substantial edema, limb is at least twice the circumference of the RLE. Warm, capillary refill < 2 sec. Motor/sensory diminished (at baseline).                                                   femoral  -  palpable [ x ]       doppler signal [  ]                                       popliteal  -  palpable [ x ], prominent       doppler signal [  ]                                       DP -  palpable [ x ]       doppler signal [  ]                                       PT -  palpable [  ]       doppler signal [ x ]    LABS:                        8.8    7.8   )-----------( 305      ( 13 Jul 2018 10:44 )             28.7     07-13    143  |  103  |  104<H>  ----------------------------<  86  4.2   |  24  |  4.51<H>    Ca    8.7      13 Jul 2018 10:30  Phos  4.2     07-12      Lactate:    PT/INR - ( 13 Jul 2018 10:44 )   PT: 26.5 sec;   INR: 2.41 ratio         PTT - ( 13 Jul 2018 10:52 )  PTT:39.8 sec            IMAGING:  < from: [facility] Duplex Lower Ext Vein Scan, Bilat (07.12.18 @ 16:07) >  TECHNIQUE: Duplex sonography of the BILATERAL LOWER extremities with   color and spectral Doppler, with and without compression.      Spontaneous flow is identified in the right common femoral, femoral and   popliteal veins. The veins are patent and compressible without evidence   of thrombus. The visualized segments of the right posterior tibial and   peroneal veins are also patent.    Venous waveforms demonstrate normal phasicity and spontaneous flow.    A right popliteal artery aneurysm is identified, measuring 1.9 x 2.4 cm.   The aneurysm is filled with thrombus and almost completely thrombosed. A   markedly narrowed lumen is seen with a peak systolicvelocity of 26 cm/s.    Partially occlusive thrombus is seen in the left common femoral vein.   This is new compared to the prior study from 4/25/2018. There is limited   visualization of the left femoral vein due to marked leg swelling and   edema. The left femoral vein at the mid and distal thigh levels is not   visualized. The left popliteal vein is patent and compressible. A left   popliteal artery aneurysm is again seen, measuring 2.8 x 2.9 cm,   increased in size from 2.4 cm on the prior study. There is moderate   intraluminal thrombus. The left calf veins were not visualized.    Impression: New, above the knee, left common femoral vein DVT compared to   prior study.    The examination is limited by body habitus and leg swelling. The left   femoral and calf veins were not visualized.    A right popliteal artery aneurysm, measuring 1.9 x 2.4 cm is noted with a   markedly narrowed lumen.    The left popliteal artery aneurysm is increased in size compared to prior   study, measuring 2.8 x 2.9 cm.    < end of copied text >

## 2018-07-13 NOTE — PROGRESS NOTE ADULT - ASSESSMENT
58 F PMH SLE, systolic HFrEF(EF 32%) s/p AICD, HTN, CKD III- IV, PE / left leg DVT dx 2009, HIT, CAD (no stents), stage IV sacral ulcer, chronic Fox, recurrent pericardial effusion, AAA ~14cm in 12/2016 s/p repair in 2010 w/ aortoiliac stent complicated post-operatively with development of left lower extremity compartment syndrome, with recent cellulitis treated inpt now presents with uti and pericardial effusion      1- CKD IV with ILANA   2- pericardial effusion   3- chf  4- HTN   5- neurogenic bladder  6- lupus   7- anemia  8- shpt  9- hyperuricemia   10- UTI   11- thoracic aneurysm  pericardial effusion for drainage hopefully cr begins to improve after that   cont with b-b  norvasc to cont   invanz per ID to cont   cont fox    cont calcitriol  cont with allopurinol  procrit [id number] u tiw  d/w Dr. Yoder

## 2018-07-13 NOTE — PROGRESS NOTE ADULT - SUBJECTIVE AND OBJECTIVE BOX
Interventional Radiology Pre-Procedure Note    This is a 58y Female presenting with a urinary tract infection and found to have a recurrent pericardial effusion.     Procedure: CT Guided Pericardial Drainage    Diagnosis/Indication: Patient is a 58y old  Female who presents with a chief complaint of Lack of urine, change in color. (12 Jul 2018 10:26)      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)  Calf DVT (Deep Venous Thrombosis) (ICD9 453.42)  Adenomatous Goiter (ICD9 241.9)  Chronic Gout (ICD9 274.02)  LBBB (Left Bundle Branch Block) (ICD9 426.3)  CHF (Congestive Heart Failure) (ICD9 428.0)  Hx of aorta-iliac-femoral bypass  TOP (Termination of Pregnancy)  S/P Dilatation and Curettage  History of Tubal Ligation  s/p AAA (Abdominal Aortic Aneurysm) Repair: 2009 2010  History of Cholecystectomy  S/P Partial Hysterectomy       Allergies: heparin (Unknown)  losartan (Anaphylaxis)  nitroglycerin (Other)      LABS:  CBC Full  -  ( 13 Jul 2018 10:44 )  WBC Count : 7.8 K/uL  Hemoglobin : 8.8 g/dL  Hematocrit : 28.7 %  Platelet Count - Automated : 305 K/uL  Mean Cell Volume : 89.3 fl  Mean Cell Hemoglobin : 27.3 pg  Mean Cell Hemoglobin Concentration : 30.6 gm/dL  Auto Neutrophil # : x  Auto Lymphocyte # : x  Auto Monocyte # : x  Auto Eosinophil # : x  Auto Basophil # : x  Auto Neutrophil % : x  Auto Lymphocyte % : x  Auto Monocyte % : x  Auto Eosinophil % : x  Auto Basophil % : x    07-13    143  |  103  |  104<H>  ----------------------------<  86  4.2   |  24  |  4.51<H>    Ca    8.7      13 Jul 2018 10:30  Phos  4.2     07-12      PT/INR - ( 13 Jul 2018 10:44 )   PT: 26.5 sec;   INR: 2.41 ratio         PTT - ( 13 Jul 2018 10:52 )  PTT:39.8 sec    Procedure/ risks/ benefits/ alternatives were explained, informed consent obtained from patient, verbalizes understanding.  Procedure will be performed even though the patient is on anticoagulation and she is unable to come off anticoagulation secondary to new DVT.

## 2018-07-13 NOTE — PROGRESS NOTE ADULT - SUBJECTIVE AND OBJECTIVE BOX
Patient is a 58y old  Female who presents with a chief complaint of Lack of urine, change in color. (12 Jul 2018 10:26)      SUBJECTIVE / OVERNIGHT EVENTS:  s/p CT guided pericardial fluid drainage,  Denies CP/SOB/Palpitation/HA.    MEDICATIONS  (STANDING):  allopurinol 100 milliGRAM(s) Oral daily  amLODIPine   Tablet 10 milliGRAM(s) Oral daily  AQUAPHOR (petrolatum Ointment) 1 Application(s) Topical three times a day  ascorbic acid 500 milliGRAM(s) Oral daily  calcitriol   Capsule 0.25 MICROGram(s) Oral daily  carvedilol 25 milliGRAM(s) Oral every 12 hours  epoetin agata Injectable 56441 Unit(s) SubCutaneous <User Schedule>  ertapenem  IVPB 500 milliGRAM(s) IV Intermittent every 24 hours  Nephro-alyse 1 Tablet(s) Oral daily  predniSONE   Tablet 10 milliGRAM(s) Oral daily  warfarin 2 milliGRAM(s) Oral once  zinc sulfate 220 milliGRAM(s) Oral daily    MEDICATIONS  (PRN):  acetaminophen   Tablet. 650 milliGRAM(s) Oral every 6 hours PRN Moderate Pain (4 - 6)        CAPILLARY BLOOD GLUCOSE        I&O's Summary    12 Jul 2018 07:01  -  13 Jul 2018 07:00  --------------------------------------------------------  IN: 954 mL / OUT: 1000 mL / NET: -46 mL    13 Jul 2018 07:01  -  13 Jul 2018 18:33  --------------------------------------------------------  IN: 0 mL / OUT: 400 mL / NET: -400 mL        PHYSICAL EXAM:  GENERAL: NAD, well-developed  HEAD:  Atraumatic, Normocephalic  NECK: Supple, No JVD  CHEST/LUNG: Clear to auscultation bilaterally; No wheezing.  HEART: Regular rate and rhythm; No murmurs, rubs, or gallops  ABDOMEN: Soft, Nontender, Nondistended; Bowel sounds present  EXTREMITIES:   No clubbing, cyanosis, or edema  NEUROLOGY: AAO X 3  SKIN: No rashes    LABS:                        8.8    7.8   )-----------( 305      ( 13 Jul 2018 10:44 )             28.7     07-13    143  |  103  |  104<H>  ----------------------------<  86  4.2   |  24  |  4.51<H>    Ca    8.7      13 Jul 2018 10:30  Phos  4.2     07-12      PT/INR - ( 13 Jul 2018 10:44 )   PT: 26.5 sec;   INR: 2.41 ratio         PTT - ( 13 Jul 2018 10:52 )  PTT:39.8 sec        CAPILLARY BLOOD GLUCOSE        07-07 @ 08:27  Culture-urine --  Culture results   >100,000 CFU/ml Escherichia coli ESBL  50,000 - 99,000 CFU/mL Proteus mirabilis  method type ARSALAN  Organism Escherichia coli ESBL  Organism Identification Escherichia coli ESBL  Proteus mirabilis  Specimen source .Urine Catheterized           07-07 @ 08:27  Culture blood --  Culture results   >100,000 CFU/ml Escherichia coli ESBL  50,000 - 99,000 CFU/mL Proteus mirabilis  Gram stain --  Gram stain blood --  Method type ARSALAN  Organism Escherichia coli ESBL  Organism identification Escherichia coli ESBL  Proteus mirabilis  Specimen source .Urine Catheterized      RADIOLOGY & ADDITIONAL TESTS:    Imaging Personally Reviewed:    Consultant(s) Notes Reviewed:      Care Discussed with Consultants/Other Providers:

## 2018-07-13 NOTE — PROGRESS NOTE ADULT - SUBJECTIVE AND OBJECTIVE BOX
Subjective: Patient seen and examined. No new events except as noted.   feels ok   no cp or sob     REVIEW OF SYSTEMS:    CONSTITUTIONAL: + weakness, fevers or chills  EYES/ENT: No visual changes;  No vertigo or throat pain   NECK: No pain or stiffness  RESPIRATORY: No cough, wheezing, hemoptysis; No shortness of breath  CARDIOVASCULAR: No chest pain or palpitations  GASTROINTESTINAL: No abdominal or epigastric pain. No nausea, vomiting, or hematemesis; No diarrhea or constipation. No melena or hematochezia.  GENITOURINARY: No dysuria, frequency or hematuria  NEUROLOGICAL: No numbness or weakness  SKIN: No itching, burning, rashes, or lesions   All other review of systems is negative unless indicated above.    MEDICATIONS:  MEDICATIONS  (STANDING):  allopurinol 100 milliGRAM(s) Oral daily  amLODIPine   Tablet 10 milliGRAM(s) Oral daily  AQUAPHOR (petrolatum Ointment) 1 Application(s) Topical three times a day  ascorbic acid 500 milliGRAM(s) Oral daily  calcitriol   Capsule 0.25 MICROGram(s) Oral daily  carvedilol 25 milliGRAM(s) Oral every 12 hours  epoetin agata Injectable 62249 Unit(s) SubCutaneous <User Schedule>  ertapenem  IVPB 500 milliGRAM(s) IV Intermittent every 24 hours  Nephro-alyse 1 Tablet(s) Oral daily  predniSONE   Tablet 10 milliGRAM(s) Oral daily  zinc sulfate 220 milliGRAM(s) Oral daily      PHYSICAL EXAM:  T(C): 36.6 (07-13-18 @ 08:45), Max: 36.8 (07-13-18 @ 04:49)  HR: 84 (07-13-18 @ 08:45) (84 - 100)  BP: 132/77 (07-13-18 @ 08:45) (124/72 - 137/76)  RR: 17 (07-13-18 @ 08:45) (17 - 20)  SpO2: 97% (07-13-18 @ 08:45) (97% - 99%)  Wt(kg): --  I&O's Summary    12 Jul 2018 07:01  -  13 Jul 2018 07:00  --------------------------------------------------------  IN: 954 mL / OUT: 1000 mL / NET: -46 mL          Appearance: Normal	  HEENT:   Normal oral mucosa, PERRL, EOMI	  Lymphatic: No lymphadenopathy , no edema  Cardiovascular: Normal S1 S2, + JVD, No murmurs , Peripheral pulses palpable 2+ bilaterally  Respiratory: Lungs clear to auscultation, normal effort 	  Gastrointestinal:  Soft, Non-tender, + BS	  Skin: No rashes, No ecchymoses, No cyanosis  Musculoskeletal: decreased range of motion and  strength  Psychiatry:  Mood & affect appropriate  Ext: massive LLE edema  +fox     LABS:    CARDIAC MARKERS:                                9.1    6.6   )-----------( 315      ( 12 Jul 2018 10:36 )             27.2     07-12    142  |  102  |  98<H>  ----------------------------<  118<H>  3.8   |  26  |  4.84<H>    Ca    8.5      12 Jul 2018 10:30  Phos  4.2     07-12      proBNP:   Lipid Profile:   HgA1c:   TSH:             TELEMETRY: 	  V paced   ECG:  	  RADIOLOGY:   DIAGNOSTIC TESTING:  [ ] Echocardiogram:  [ ]  Catheterization:  [ ] Stress Test:    OTHER:

## 2018-07-13 NOTE — PROGRESS NOTE ADULT - SUBJECTIVE AND OBJECTIVE BOX
Interventional Radiology Procedure Note    Procedure:  Pericardial drainage    Indication: pericardial effusion    Operators: Alyssa    Anesthesia (type): IV sedation    Contrast:  none    EBL: 10cc    Findings/Follow up Plan of Care:   Pericardial drainage yielded approximately 70 cc of clear fluid - specimens sent for culture and chemistry.  Post-images -partial decompression of effusion with tube in place and no  complication.    Specimens Removed: chemistry and cytology    Implants: 8 Fr drain    Complications: none    Condition/Disposition:  PACU then floors.    Please call Interventional Radiology x [phone] with any questions, concerns, or issues.

## 2018-07-13 NOTE — CONSULT NOTE ADULT - ASSESSMENT
ASSESSMENT & PLAN  58y Female, complicated medical and surgical history as above, with bilateral popliteal pseudoaneurysms. R aneurysm appears to have been occluded in April, and limb has developed good collateral arterial supply to lower limb. However, as neither aneurysm is completely occluded, both pose risk of limb loss should they embolize.   Due to patient's ILANA on CKD, she cannot undergo a CTA. We would recommend an angiogram of the lower extremities. We do not have a date/time for this procedure as yet - we must discuss the case with the attending of record. In the meantime, would continue full anticoagulation. However, we would switch her A/C regimen to heparin gtt.      Discussed with Fellow on call, Cammie. Patien will be seen and evaluated by Fellow on campus, Jaswant. Will discuss with Attending, Anusha.            Carter Guo  R4, General Surgery

## 2018-07-13 NOTE — PROGRESS NOTE ADULT - SUBJECTIVE AND OBJECTIVE BOX
[facility]   [phone]  RENAL FOLLOW UP NOTE  --------------------------------------------------------------------------------  Chief Complaint:    24 hour events/subjective:    seen. c/o edema in leg    PAST HISTORY  --------------------------------------------------------------------------------  No significant changes to PMH, PSH, FHx, SHx, unless otherwise noted    ALLERGIES & MEDICATIONS  --------------------------------------------------------------------------------  Allergies    heparin (Unknown)  losartan (Anaphylaxis)  nitroglycerin (Other)    Intolerances      Standing Inpatient Medications  allopurinol 100 milliGRAM(s) Oral daily  amLODIPine   Tablet 10 milliGRAM(s) Oral daily  AQUAPHOR (petrolatum Ointment) 1 Application(s) Topical three times a day  ascorbic acid 500 milliGRAM(s) Oral daily  calcitriol   Capsule 0.25 MICROGram(s) Oral daily  carvedilol 25 milliGRAM(s) Oral every 12 hours  epoetin agata Injectable 59570 Unit(s) SubCutaneous <User Schedule>  ertapenem  IVPB 500 milliGRAM(s) IV Intermittent every 24 hours  Nephro-alyse 1 Tablet(s) Oral daily  predniSONE   Tablet 10 milliGRAM(s) Oral daily  zinc sulfate 220 milliGRAM(s) Oral daily    PRN Inpatient Medications  acetaminophen   Tablet. 650 milliGRAM(s) Oral every 6 hours PRN      REVIEW OF SYSTEMS  --------------------------------------------------------------------------------    Gen: denies fevers/chills,  CVS: denies chest pain/palpitations  Resp: denies SOB/Cough  GI: Denies N/V/Abd pain  : Denies dysuria  + edema and c/o weeping from left groin site     All other systems were reviewed and are negative, except as noted.      VITALS/PHYSICAL EXAM  --------------------------------------------------------------------------------  T(C): 36.6 (07-13-18 @ 15:30), Max: 36.8 (07-13-18 @ 04:49)  HR: 88 (07-13-18 @ 15:30) (84 - 100)  BP: 146/74 (07-13-18 @ 15:30) (124/72 - 150/83)  RR: 18 (07-13-18 @ 15:30) (16 - 20)  SpO2: 97% (07-13-18 @ 15:30) (96% - 99%)  Wt(kg): --        07-12-18 @ 07:01  -  07-13-18 @ 07:00  --------------------------------------------------------  IN: 954 mL / OUT: 1000 mL / NET: -46 mL    07-13-18 @ 07:01  -  07-13-18 @ 16:43  --------------------------------------------------------  IN: 0 mL / OUT: 400 mL / NET: -400 mL      Physical Exam:  	  alert oriented place person and date   	Pulm: CTA  no rales or ronchi or wheezing  	CV: RRR, S1/S2. no rub  	Abd: +BS, soft, nontender/nondistended  	: No suprapubic tenderness.               Extremity: No cyanosis, LLE 4+ pitting edema RLE no edema     LABS/STUDIES	  --------------------------------------------------------------------------------              8.8    7.8   >-----------<  305      [07-13-18 @ 10:44]              28.7     143  |  103  |  104  ----------------------------<  86      [07-13-18 @ 10:30]  4.2   |  24  |  4.51        Ca     8.7     [07-13-18 @ 10:30]      Phos  4.2     [07-12-18 @ 10:30]      PT/INR: PT 26.5 , INR 2.41       [07-13-18 @ 10:44]  PTT: 39.8       [07-13-18 @ 10:52]    Uric acid 9.5      [07-13-18 @ 10:30]    Creatinine Trend:  SCr 4.51 [07-13 @ 10:30]  SCr 4.84 [07-12 @ 10:30]  SCr 4.74 [07-11 @ 11:37]  SCr 5.18 [07-10 @ 09:20]  SCr 5.10 [07-09 @ 08:37]              Urinalysis - [07-06-18 @ 14:45]      Color Yellow / Appearance Turbid / SG 1.015 / pH 8.5      Gluc Negative / Ketone Negative  / Bili Negative / Urobili Negative       Blood Small / Protein 300 / Leuk Est Large / Nitrite Negative      RBC 5-10 / WBC 26-50 / Hyaline  / Gran  / Sq Epi  / Non Sq Epi  / Bacteria Moderate      Iron 67, TIBC 203, %sat 33      [07-12-18 @ 14:18]  Ferritin 961      [07-12-18 @ 14:18]  PTH -- (Ca 8.9)      [04-20-18 @ 08:14]   217  PTH -- (Ca 9.0)      [11-09-17 @ 08:51]   387  Vitamin D (25OH) 7.8      [11-09-17 @ 08:51]  HbA1c 4.5      [08-09-16 @ 06:49]  TSH 0.47      [04-24-18 @ 07:51]    dsDNA 38      [07-10-18 @ 13:55]  C3 Complement 114      [07-10-18 @ 13:54]  C4 Complement 32      [07-10-18 @ 13:54]

## 2018-07-14 LAB
ANION GAP SERPL CALC-SCNC: 16 MMOL/L — SIGNIFICANT CHANGE UP (ref 5–17)
BUN SERPL-MCNC: 98 MG/DL — HIGH (ref 7–23)
CALCIUM SERPL-MCNC: 8.7 MG/DL — SIGNIFICANT CHANGE UP (ref 8.4–10.5)
CHLORIDE SERPL-SCNC: 104 MMOL/L — SIGNIFICANT CHANGE UP (ref 96–108)
CO2 SERPL-SCNC: 22 MMOL/L — SIGNIFICANT CHANGE UP (ref 22–31)
CREAT SERPL-MCNC: 4.33 MG/DL — HIGH (ref 0.5–1.3)
GLUCOSE SERPL-MCNC: 89 MG/DL — SIGNIFICANT CHANGE UP (ref 70–99)
GRAM STN FLD: SIGNIFICANT CHANGE UP
HCT VFR BLD CALC: 26.4 % — LOW (ref 34.5–45)
HGB BLD-MCNC: 8.2 G/DL — LOW (ref 11.5–15.5)
INR BLD: 1.76 RATIO — HIGH (ref 0.88–1.16)
MCHC RBC-ENTMCNC: 27.8 PG — SIGNIFICANT CHANGE UP (ref 27–34)
MCHC RBC-ENTMCNC: 31.1 GM/DL — LOW (ref 32–36)
MCV RBC AUTO: 89.5 FL — SIGNIFICANT CHANGE UP (ref 80–100)
PLATELET # BLD AUTO: 303 K/UL — SIGNIFICANT CHANGE UP (ref 150–400)
POTASSIUM SERPL-MCNC: 4.2 MMOL/L — SIGNIFICANT CHANGE UP (ref 3.5–5.3)
POTASSIUM SERPL-SCNC: 4.2 MMOL/L — SIGNIFICANT CHANGE UP (ref 3.5–5.3)
PROTHROM AB SERPL-ACNC: 20.1 SEC — HIGH (ref 10–13.1)
RBC # BLD: 2.95 M/UL — LOW (ref 3.8–5.2)
RBC # FLD: 16.6 % — HIGH (ref 10.3–14.5)
SODIUM SERPL-SCNC: 142 MMOL/L — SIGNIFICANT CHANGE UP (ref 135–145)
SPECIMEN SOURCE: SIGNIFICANT CHANGE UP
WBC # BLD: 6.73 K/UL — SIGNIFICANT CHANGE UP (ref 3.8–10.5)
WBC # FLD AUTO: 6.73 K/UL — SIGNIFICANT CHANGE UP (ref 3.8–10.5)

## 2018-07-14 RX ORDER — WARFARIN SODIUM 2.5 MG/1
3 TABLET ORAL ONCE
Qty: 0 | Refills: 0 | Status: COMPLETED | OUTPATIENT
Start: 2018-07-14 | End: 2018-07-14

## 2018-07-14 RX ADMIN — WARFARIN SODIUM 3 MILLIGRAM(S): 2.5 TABLET ORAL at 21:14

## 2018-07-14 RX ADMIN — Medication 500 MILLIGRAM(S): at 12:00

## 2018-07-14 RX ADMIN — Medication 100 MILLIGRAM(S): at 12:00

## 2018-07-14 RX ADMIN — Medication 1 APPLICATION(S): at 21:14

## 2018-07-14 RX ADMIN — CARVEDILOL PHOSPHATE 25 MILLIGRAM(S): 80 CAPSULE, EXTENDED RELEASE ORAL at 06:36

## 2018-07-14 RX ADMIN — CARVEDILOL PHOSPHATE 25 MILLIGRAM(S): 80 CAPSULE, EXTENDED RELEASE ORAL at 18:04

## 2018-07-14 RX ADMIN — Medication 1 TABLET(S): at 12:00

## 2018-07-14 RX ADMIN — ZINC SULFATE TAB 220 MG (50 MG ZINC EQUIVALENT) 220 MILLIGRAM(S): 220 (50 ZN) TAB at 12:00

## 2018-07-14 RX ADMIN — ERTAPENEM SODIUM 100 MILLIGRAM(S): 1 INJECTION, POWDER, LYOPHILIZED, FOR SOLUTION INTRAMUSCULAR; INTRAVENOUS at 12:08

## 2018-07-14 RX ADMIN — CALCITRIOL 0.25 MICROGRAM(S): 0.5 CAPSULE ORAL at 12:00

## 2018-07-14 RX ADMIN — AMLODIPINE BESYLATE 10 MILLIGRAM(S): 2.5 TABLET ORAL at 08:05

## 2018-07-14 RX ADMIN — Medication 1 APPLICATION(S): at 12:08

## 2018-07-14 RX ADMIN — Medication 10 MILLIGRAM(S): at 08:05

## 2018-07-14 NOTE — PROGRESS NOTE ADULT - ASSESSMENT
58 F PMH SLE, systolic HFrEF(EF 32%) s/p AICD, HTN, CKD III- IV, PE / left leg DVT dx 2009, HIT, CAD (no stents), stage IV sacral ulcer, chronic Fox, recurrent pericardial effusion, AAA ~14cm in 12/2016 s/p repair in 2010 w/ aortoiliac stent complicated post-operatively with development of left lower extremity compartment syndrome, with recent cellulitis treated inpt now presents with uti and pericardial effusion      1- CKD IV with ILANA   2- pericardial effusion   3- chf  4- HTN   5- neurogenic bladder  6- lupus   7- anemia  8- shpt  9- hyperuricemia   10- UTI   11- thoracic aneurysm  cr improving   cont with b-b  norvasc to cont   invanz per ID to cont   cont fox    cont calcitriol 0.25mcg daily   cont with allopurinol  procrit [id number] u tiw

## 2018-07-14 NOTE — PROGRESS NOTE ADULT - SUBJECTIVE AND OBJECTIVE BOX
Patient is a 58y old  Female who presents with a chief complaint of Lack of urine, change in color. (12 Jul 2018 10:26)      SUBJECTIVE / OVERNIGHT EVENTS:   Feels better.  Denies CP/SOB/Palpitation/HA.    MEDICATIONS  (STANDING):  allopurinol 100 milliGRAM(s) Oral daily  amLODIPine   Tablet 10 milliGRAM(s) Oral daily  AQUAPHOR (petrolatum Ointment) 1 Application(s) Topical three times a day  ascorbic acid 500 milliGRAM(s) Oral daily  calcitriol   Capsule 0.25 MICROGram(s) Oral daily  carvedilol 25 milliGRAM(s) Oral every 12 hours  epoetin agata Injectable 82248 Unit(s) SubCutaneous <User Schedule>  ertapenem  IVPB 500 milliGRAM(s) IV Intermittent every 24 hours  Nephro-alyse 1 Tablet(s) Oral daily  predniSONE   Tablet 10 milliGRAM(s) Oral daily  zinc sulfate 220 milliGRAM(s) Oral daily    MEDICATIONS  (PRN):  acetaminophen   Tablet. 650 milliGRAM(s) Oral every 6 hours PRN Moderate Pain (4 - 6)        CAPILLARY BLOOD GLUCOSE        I&O's Summary    13 Jul 2018 07:01  -  14 Jul 2018 07:00  --------------------------------------------------------  IN: 600 mL / OUT: 1563 mL / NET: -963 mL    14 Jul 2018 07:01  -  15 Jul 2018 00:26  --------------------------------------------------------  IN: 1074 mL / OUT: 0 mL / NET: 1074 mL        PHYSICAL EXAM:  GENERAL: NAD, well-developed  HEAD:  Atraumatic, Normocephalic  NECK: Supple, No JVD  CHEST/LUNG: Clear to auscultation bilaterally; No wheezing.  HEART: Regular rate and rhythm; No murmurs, rubs, or gallops  ABDOMEN: Soft, Nontender, Nondistended; Bowel sounds present  EXTREMITIES:   No clubbing, cyanosis, or edema  NEUROLOGY: AAO X 3  SKIN: No rashes    LABS:                        8.2    6.73  )-----------( 303      ( 14 Jul 2018 08:28 )             26.4     07-14    142  |  104  |  98<H>  ----------------------------<  89  4.2   |  22  |  4.33<H>    Ca    8.7      14 Jul 2018 06:13      PT/INR - ( 14 Jul 2018 08:28 )   PT: 20.1 sec;   INR: 1.76 ratio         PTT - ( 13 Jul 2018 10:52 )  PTT:39.8 sec        CAPILLARY BLOOD GLUCOSE        07-14 @ 00:29  Culture-urine --  Culture results   No growth to date.  method type --  Organism --  Organism Identification --  Specimen source .Body Fluid Pericardial Fluid      07-14 @ 00:29  Culture-CSF --  Culture results   No growth to date.  Gram stain   polymorphonuclear leukocytes seen  No organisms seen  by cytocentrifuge  Gram stain spinal fluid --  Method type --  Organism --  Organism identification --  Specimen source .Body Fluid Pericardial Fluid       07-14 @ 00:29  Culture blood --  Culture results   No growth to date.  Gram stain   polymorphonuclear leukocytes seen  No organisms seen  by cytocentrifuge  Gram stain blood --  Method type --  Organism --  Organism identification --  Specimen source .Body Fluid Pericardial Fluid      RADIOLOGY & ADDITIONAL TESTS:    Imaging Personally Reviewed:    Consultant(s) Notes Reviewed:      Care Discussed with Consultants/Other Providers:

## 2018-07-14 NOTE — PROGRESS NOTE ADULT - ASSESSMENT
59 yo female with complicated and extensive past medical medical history including chronic large pericardial effusion admitted with UTI and found to have recurrent large pericardial effusion s/p pericardial drain

## 2018-07-14 NOTE — PROGRESS NOTE ADULT - SUBJECTIVE AND OBJECTIVE BOX
[facility]   [phone]  RENAL FOLLOW UP NOTE  --------------------------------------------------------------------------------  Chief Complaint:    24 hour events/subjective:    no new c/o  at present.   had IR place a pericardial drain yesterday     PAST HISTORY  --------------------------------------------------------------------------------  No significant changes to PMH, PSH, FHx, SHx, unless otherwise noted    ALLERGIES & MEDICATIONS  --------------------------------------------------------------------------------  Allergies    heparin (Unknown)  losartan (Anaphylaxis)  nitroglycerin (Other)    Intolerances      Standing Inpatient Medications  allopurinol 100 milliGRAM(s) Oral daily  amLODIPine   Tablet 10 milliGRAM(s) Oral daily  AQUAPHOR (petrolatum Ointment) 1 Application(s) Topical three times a day  ascorbic acid 500 milliGRAM(s) Oral daily  calcitriol   Capsule 0.25 MICROGram(s) Oral daily  carvedilol 25 milliGRAM(s) Oral every 12 hours  epoetin agata Injectable 70699 Unit(s) SubCutaneous <User Schedule>  ertapenem  IVPB 500 milliGRAM(s) IV Intermittent every 24 hours  Nephro-alyse 1 Tablet(s) Oral daily  predniSONE   Tablet 10 milliGRAM(s) Oral daily  warfarin 3 milliGRAM(s) Oral once  zinc sulfate 220 milliGRAM(s) Oral daily    PRN Inpatient Medications  acetaminophen   Tablet. 650 milliGRAM(s) Oral every 6 hours PRN      REVIEW OF SYSTEMS  --------------------------------------------------------------------------------    Gen: denies fevers/chills,  CVS: denies chest pain/palpitations  Resp: denies SOB/Cough  GI: Denies N/V/Abd pain  : Denies dysuria    All other systems were reviewed and are negative, except as noted.    VITALS/PHYSICAL EXAM  --------------------------------------------------------------------------------  T(C): 37.1 (07-14-18 @ 17:52), Max: 37.1 (07-14-18 @ 17:52)  HR: 89 (07-14-18 @ 17:52) (86 - 96)  BP: 156/82 (07-14-18 @ 17:52) (126/70 - 156/82)  RR: 18 (07-14-18 @ 17:52) (16 - 18)  SpO2: 98% (07-14-18 @ 17:52) (94% - 98%)  Wt(kg): --        07-13-18 @ 07:01  -  07-14-18 @ 07:00  --------------------------------------------------------  IN: 600 mL / OUT: 1563 mL / NET: -963 mL    07-14-18 @ 07:01  -  07-14-18 @ 18:01  --------------------------------------------------------  IN: 600 mL / OUT: 0 mL / NET: 600 mL      Physical Exam:  	  alert oriented place person and date   	Pulm: CTA  no rales or ronchi or wheezing  	CV: RRR, S1/S2. no rub  	Abd: +BS, soft, nontender/nondistended  	: No suprapubic tenderness.               Extremity: No cyanosis, LLE 4+ pitting edema RLE no edema   	    LABS/STUDIES  --------------------------------------------------------------------------------              8.2    6.73  >-----------<  303      [07-14-18 @ 08:28]              26.4     142  |  104  |  98  ----------------------------<  89      [07-14-18 @ 06:13]  4.2   |  22  |  4.33        Ca     8.7     [07-14-18 @ 06:13]      PT/INR: PT 20.1 , INR 1.76       [07-14-18 @ 08:28]  PTT: 39.8       [07-13-18 @ 10:52]    Uric acid 9.5      [07-13-18 @ 10:30]    Creatinine Trend:  SCr 4.33 [07-14 @ 06:13]  SCr 4.51 [07-13 @ 10:30]  SCr 4.84 [07-12 @ 10:30]  SCr 4.74 [07-11 @ 11:37]  SCr 5.18 [07-10 @ 09:20]              Urinalysis - [07-06-18 @ 14:45]      Color Yellow / Appearance Turbid / SG 1.015 / pH 8.5      Gluc Negative / Ketone Negative  / Bili Negative / Urobili Negative       Blood Small / Protein 300 / Leuk Est Large / Nitrite Negative      RBC 5-10 / WBC 26-50 / Hyaline  / Gran  / Sq Epi  / Non Sq Epi  / Bacteria Moderate      Iron 67, TIBC 203, %sat 33      [07-12-18 @ 14:18]  Ferritin 961      [07-12-18 @ 14:18]  PTH -- (Ca 8.9)      [04-20-18 @ 08:14]   217  PTH -- (Ca 9.0)      [11-09-17 @ 08:51]   387  Vitamin D (25OH) 7.8      [11-09-17 @ 08:51]  HbA1c 4.5      [08-09-16 @ 06:49]  TSH 0.47      [04-24-18 @ 07:51]    dsDNA 38      [07-10-18 @ 13:55]  C3 Complement 114      [07-10-18 @ 13:54]  C4 Complement 32      [07-10-18 @ 13:54]

## 2018-07-14 NOTE — PROGRESS NOTE ADULT - SUBJECTIVE AND OBJECTIVE BOX
Subjective: Patient seen and examined. No new events except as noted.   s/p pericardial drain   700 cc drained yesterday     REVIEW OF SYSTEMS:    CONSTITUTIONAL: + weakness, fevers or chills  EYES/ENT: No visual changes;  No vertigo or throat pain   NECK: No pain or stiffness  RESPIRATORY: No cough, wheezing, hemoptysis; No shortness of breath  CARDIOVASCULAR: No chest pain or palpitations  GASTROINTESTINAL: No abdominal or epigastric pain. No nausea, vomiting, or hematemesis; No diarrhea or constipation. No melena or hematochezia.  GENITOURINARY: No dysuria, frequency or hematuria  NEUROLOGICAL: No numbness or weakness  SKIN: No itching, burning, rashes, or lesions   All other review of systems is negative unless indicated above.    MEDICATIONS:  MEDICATIONS  (STANDING):  allopurinol 100 milliGRAM(s) Oral daily  amLODIPine   Tablet 10 milliGRAM(s) Oral daily  AQUAPHOR (petrolatum Ointment) 1 Application(s) Topical three times a day  ascorbic acid 500 milliGRAM(s) Oral daily  calcitriol   Capsule 0.25 MICROGram(s) Oral daily  carvedilol 25 milliGRAM(s) Oral every 12 hours  epoetin agata Injectable 42293 Unit(s) SubCutaneous <User Schedule>  ertapenem  IVPB 500 milliGRAM(s) IV Intermittent every 24 hours  Nephro-alyse 1 Tablet(s) Oral daily  predniSONE   Tablet 10 milliGRAM(s) Oral daily  zinc sulfate 220 milliGRAM(s) Oral daily      PHYSICAL EXAM:  T(C): 36.8 (07-14-18 @ 20:49), Max: 37.1 (07-14-18 @ 17:52)  HR: 85 (07-14-18 @ 20:49) (85 - 94)  BP: 123/69 (07-14-18 @ 20:49) (123/69 - 156/82)  RR: 18 (07-14-18 @ 20:49) (18 - 18)  SpO2: 97% (07-14-18 @ 20:49) (94% - 98%)  Wt(kg): --  I&O's Summary    13 Jul 2018 07:01  -  14 Jul 2018 07:00  --------------------------------------------------------  IN: 600 mL / OUT: 1563 mL / NET: -963 mL    14 Jul 2018 07:01  -  14 Jul 2018 22:45  --------------------------------------------------------  IN: 1074 mL / OUT: 0 mL / NET: 1074 mL          Appearance: NAD  HEENT:   Normal oral mucosa, PERRL, EOMI	  Lymphatic: No lymphadenopathy, +jvp  Cardiovascular: Normal S1 S2, No JVD, No murmurs , +pericardial drain   Respiratory: Lungs clear to auscultation, normal effort 	  Gastrointestinal:  Soft, Non-tender, + BS	  Skin: No rashes, No ecchymoses, No cyanosis, warm to touch  Musculoskeletal: Normal range of motion, normal strength  Psychiatry:  Mood & affect appropriate  Ext: Massive LLE edema  +fox           LABS:    CARDIAC MARKERS:                                8.2    6.73  )-----------( 303      ( 14 Jul 2018 08:28 )             26.4     07-14    142  |  104  |  98<H>  ----------------------------<  89  4.2   |  22  |  4.33<H>    Ca    8.7      14 Jul 2018 06:13      proBNP:   Lipid Profile:   HgA1c:   TSH:             TELEMETRY: V paced , WCT x 7 beats 	    ECG:  	  RADIOLOGY:   DIAGNOSTIC TESTING:  [ ] Echocardiogram:  [ ]  Catheterization:  [ ] Stress Test:    OTHER:

## 2018-07-15 LAB
ANION GAP SERPL CALC-SCNC: 15 MMOL/L — SIGNIFICANT CHANGE UP (ref 5–17)
APTT BLD: 34.6 SEC — SIGNIFICANT CHANGE UP (ref 27.5–37.4)
BUN SERPL-MCNC: 93 MG/DL — HIGH (ref 7–23)
CALCIUM SERPL-MCNC: 9 MG/DL — SIGNIFICANT CHANGE UP (ref 8.4–10.5)
CHLORIDE SERPL-SCNC: 104 MMOL/L — SIGNIFICANT CHANGE UP (ref 96–108)
CO2 SERPL-SCNC: 23 MMOL/L — SIGNIFICANT CHANGE UP (ref 22–31)
CREAT SERPL-MCNC: 4.16 MG/DL — HIGH (ref 0.5–1.3)
GLUCOSE SERPL-MCNC: 87 MG/DL — SIGNIFICANT CHANGE UP (ref 70–99)
HCT VFR BLD CALC: 29.9 % — LOW (ref 34.5–45)
HGB BLD-MCNC: 9.3 G/DL — LOW (ref 11.5–15.5)
INR BLD: 1.7 RATIO — HIGH (ref 0.88–1.16)
MCHC RBC-ENTMCNC: 27.7 PG — SIGNIFICANT CHANGE UP (ref 27–34)
MCHC RBC-ENTMCNC: 31.1 GM/DL — LOW (ref 32–36)
MCV RBC AUTO: 89 FL — SIGNIFICANT CHANGE UP (ref 80–100)
PLATELET # BLD AUTO: 332 K/UL — SIGNIFICANT CHANGE UP (ref 150–400)
POTASSIUM SERPL-MCNC: 4.4 MMOL/L — SIGNIFICANT CHANGE UP (ref 3.5–5.3)
POTASSIUM SERPL-SCNC: 4.4 MMOL/L — SIGNIFICANT CHANGE UP (ref 3.5–5.3)
PROTHROM AB SERPL-ACNC: 19.4 SEC — HIGH (ref 10–13.1)
RBC # BLD: 3.36 M/UL — LOW (ref 3.8–5.2)
RBC # FLD: 17.1 % — HIGH (ref 10.3–14.5)
SODIUM SERPL-SCNC: 142 MMOL/L — SIGNIFICANT CHANGE UP (ref 135–145)
WBC # BLD: 9.1 K/UL — SIGNIFICANT CHANGE UP (ref 3.8–10.5)
WBC # FLD AUTO: 9.1 K/UL — SIGNIFICANT CHANGE UP (ref 3.8–10.5)

## 2018-07-15 RX ORDER — WARFARIN SODIUM 2.5 MG/1
4 TABLET ORAL ONCE
Qty: 0 | Refills: 0 | Status: COMPLETED | OUTPATIENT
Start: 2018-07-15 | End: 2018-07-15

## 2018-07-15 RX ADMIN — CARVEDILOL PHOSPHATE 25 MILLIGRAM(S): 80 CAPSULE, EXTENDED RELEASE ORAL at 17:07

## 2018-07-15 RX ADMIN — Medication 500 MILLIGRAM(S): at 11:27

## 2018-07-15 RX ADMIN — AMLODIPINE BESYLATE 10 MILLIGRAM(S): 2.5 TABLET ORAL at 08:22

## 2018-07-15 RX ADMIN — Medication 1 TABLET(S): at 11:27

## 2018-07-15 RX ADMIN — CARVEDILOL PHOSPHATE 25 MILLIGRAM(S): 80 CAPSULE, EXTENDED RELEASE ORAL at 05:52

## 2018-07-15 RX ADMIN — CALCITRIOL 0.25 MICROGRAM(S): 0.5 CAPSULE ORAL at 11:27

## 2018-07-15 RX ADMIN — Medication 10 MILLIGRAM(S): at 08:23

## 2018-07-15 RX ADMIN — ERTAPENEM SODIUM 100 MILLIGRAM(S): 1 INJECTION, POWDER, LYOPHILIZED, FOR SOLUTION INTRAMUSCULAR; INTRAVENOUS at 12:31

## 2018-07-15 RX ADMIN — Medication 100 MILLIGRAM(S): at 11:27

## 2018-07-15 RX ADMIN — ZINC SULFATE TAB 220 MG (50 MG ZINC EQUIVALENT) 220 MILLIGRAM(S): 220 (50 ZN) TAB at 11:27

## 2018-07-15 RX ADMIN — WARFARIN SODIUM 4 MILLIGRAM(S): 2.5 TABLET ORAL at 21:20

## 2018-07-15 NOTE — PROGRESS NOTE ADULT - ASSESSMENT
58 F PMH SLE, systolic HFrEF(EF 32%) s/p AICD, HTN, CKD III- IV, PE / left leg DVT dx 2009, HIT, CAD (no stents), stage IV sacral ulcer, chronic Fox, recurrent pericardial effusion, AAA ~14cm in 12/2016 s/p repair in 2010 w/ aortoiliac stent complicated post-operatively with development of left lower extremity compartment syndrome, with recent cellulitis treated inpt now presents with uti and pericardial effusion      1- CKD IV with ILANA   2- pericardial effusion   3- chf  4- HTN   5- neurogenic bladder  6- lupus   7- anemia  8- shpt  9- hyperuricemia   10- UTI   11- thoracic aneurysm  cr improving slowly   cont with b-b  norvasc to cont   invanz per ID  to cont duration 10 days total then dc   cont fox    cont calcitriol 0.25mcg daily   cont with allopurinol  procrit [id number] u tiw hb drastically higher today if continue to rise then will hold off procrit   vascular consult noted and appreciated

## 2018-07-15 NOTE — PROGRESS NOTE ADULT - SUBJECTIVE AND OBJECTIVE BOX
Subjective: Patient seen and examined. No new events except as noted.   feels ok   no cp or sob     REVIEW OF SYSTEMS:    CONSTITUTIONAL: +weakness, fevers or chills  EYES/ENT: No visual changes;  No vertigo or throat pain   NECK: No pain or stiffness  RESPIRATORY: No cough, wheezing, hemoptysis; No shortness of breath  CARDIOVASCULAR: No chest pain or palpitations  GASTROINTESTINAL: No abdominal or epigastric pain. No nausea, vomiting, or hematemesis; No diarrhea or constipation. No melena or hematochezia.  GENITOURINARY: No dysuria, frequency or hematuria  NEUROLOGICAL: No numbness or weakness  SKIN: No itching, burning, rashes, or lesions   All other review of systems is negative unless indicated above.    MEDICATIONS:  MEDICATIONS  (STANDING):  allopurinol 100 milliGRAM(s) Oral daily  amLODIPine   Tablet 10 milliGRAM(s) Oral daily  AQUAPHOR (petrolatum Ointment) 1 Application(s) Topical three times a day  ascorbic acid 500 milliGRAM(s) Oral daily  calcitriol   Capsule 0.25 MICROGram(s) Oral daily  carvedilol 25 milliGRAM(s) Oral every 12 hours  epoetin agata Injectable 12999 Unit(s) SubCutaneous <User Schedule>  ertapenem  IVPB 500 milliGRAM(s) IV Intermittent every 24 hours  Nephro-alyse 1 Tablet(s) Oral daily  predniSONE   Tablet 10 milliGRAM(s) Oral daily  zinc sulfate 220 milliGRAM(s) Oral daily      PHYSICAL EXAM:  T(C): 36.7 (07-15-18 @ 04:43), Max: 37.1 (07-14-18 @ 17:52)  HR: 91 (07-15-18 @ 04:43) (85 - 91)  BP: 128/75 (07-15-18 @ 04:43) (123/69 - 156/82)  RR: 18 (07-15-18 @ 04:43) (18 - 18)  SpO2: 95% (07-15-18 @ 04:43) (94% - 98%)  Wt(kg): --  I&O's Summary    14 Jul 2018 07:01  -  15 Jul 2018 07:00  --------------------------------------------------------  IN: 1074 mL / OUT: 0 mL / NET: 1074 mL    15 Jul 2018 07:01  -  15 Jul 2018 08:14  --------------------------------------------------------  IN: 0 mL / OUT: 70 mL / NET: -70 mL          Appearance: Normal	  HEENT:   Normal oral mucosa, PERRL, EOMI	  Lymphatic: No lymphadenopathy   Cardiovascular: Normal S1 S2, No JVD, No murmurs , +pericardial drain   Respiratory: Lungs clear to auscultation, normal effort 	  Gastrointestinal:  Soft, Non-tender, + BS	  Skin: No rashes, No ecchymoses, No cyanosis, warm to touch  Musculoskeletal: Normal range of motion, normal strength  Psychiatry:  Mood & affect appropriate  Ext: Massive LLE edema  +fox     LABS:    CARDIAC MARKERS:                                8.2    6.73  )-----------( 303      ( 14 Jul 2018 08:28 )             26.4     07-15    142  |  104  |  93<H>  ----------------------------<  87  4.4   |  23  |  4.16<H>    Ca    9.0      15 Jul 2018 06:41      proBNP:   Lipid Profile:   HgA1c:   TSH:             TELEMETRY: 	SR/V paced    ECG:  	  RADIOLOGY:   DIAGNOSTIC TESTING:  [ ] Echocardiogram:  [ ]  Catheterization:  [ ] Stress Test:    OTHER:

## 2018-07-15 NOTE — PROGRESS NOTE ADULT - SUBJECTIVE AND OBJECTIVE BOX
Patient is a 58y old  Female who presents with a chief complaint of Lack of urine, change in color. (12 Jul 2018 10:26)      SUBJECTIVE / OVERNIGHT EVENTS:   Feels better.  Denies CP/SOB/Palpitation/HA.    MEDICATIONS  (STANDING):  allopurinol 100 milliGRAM(s) Oral daily  amLODIPine   Tablet 10 milliGRAM(s) Oral daily  AQUAPHOR (petrolatum Ointment) 1 Application(s) Topical three times a day  ascorbic acid 500 milliGRAM(s) Oral daily  calcitriol   Capsule 0.25 MICROGram(s) Oral daily  carvedilol 25 milliGRAM(s) Oral every 12 hours  epoetin agata Injectable 21153 Unit(s) SubCutaneous <User Schedule>  ertapenem  IVPB 500 milliGRAM(s) IV Intermittent every 24 hours  Nephro-alyse 1 Tablet(s) Oral daily  predniSONE   Tablet 10 milliGRAM(s) Oral daily  warfarin 4 milliGRAM(s) Oral once  zinc sulfate 220 milliGRAM(s) Oral daily    MEDICATIONS  (PRN):  acetaminophen   Tablet. 650 milliGRAM(s) Oral every 6 hours PRN Moderate Pain (4 - 6)        CAPILLARY BLOOD GLUCOSE        I&O's Summary    14 Jul 2018 07:01  -  15 Jul 2018 07:00  --------------------------------------------------------  IN: 1074 mL / OUT: 0 mL / NET: 1074 mL    15 Jul 2018 07:01  -  15 Jul 2018 20:05  --------------------------------------------------------  IN: 1100 mL / OUT: 90 mL / NET: 1010 mL        PHYSICAL EXAM:  GENERAL: NAD, well-developed  HEAD:  Atraumatic, Normocephalic  NECK: Supple, No JVD  CHEST/LUNG: Clear to auscultation bilaterally; No wheezing.  HEART: Regular rate and rhythm; No murmurs, rubs, or gallops  ABDOMEN: Soft, Nontender, Nondistended; Bowel sounds present  EXTREMITIES:   No clubbing, cyanosis, or edema  NEUROLOGY: AAO X 3  SKIN: No rashes    LABS:                        9.3    9.10  )-----------( 332      ( 15 Jul 2018 08:28 )             29.9     07-15    142  |  104  |  93<H>  ----------------------------<  87  4.4   |  23  |  4.16<H>    Ca    9.0      15 Jul 2018 06:41      PT/INR - ( 15 Jul 2018 08:28 )   PT: 19.4 sec;   INR: 1.70 ratio         PTT - ( 15 Jul 2018 08:28 )  PTT:34.6 sec        CAPILLARY BLOOD GLUCOSE        07-14 @ 00:29  Culture-urine --  Culture results   No growth to date.  method type --  Organism --  Organism Identification --  Specimen source .Body Fluid Pericardial Fluid           07-14 @ 00:29  Culture blood --  Culture results   No growth to date.  Gram stain   polymorphonuclear leukocytes seen  No organisms seen  by cytocentrifuge  Gram stain blood --  Method type --  Organism --  Organism identification --  Specimen source .Body Fluid Pericardial Fluid      RADIOLOGY & ADDITIONAL TESTS:    Imaging Personally Reviewed:    Consultant(s) Notes Reviewed:      Care Discussed with Consultants/Other Providers:

## 2018-07-15 NOTE — PROGRESS NOTE ADULT - SUBJECTIVE AND OBJECTIVE BOX
[facility]   [phone]  RENAL FOLLOW UP NOTE  --------------------------------------------------------------------------------  Chief Complaint:    24 hour events/subjective:    seen earlier. no pain or dysuria sx has pericardial drain     PAST HISTORY  --------------------------------------------------------------------------------  No significant changes to PMH, PSH, FHx, SHx, unless otherwise noted    ALLERGIES & MEDICATIONS  --------------------------------------------------------------------------------  Allergies    heparin (Unknown)  losartan (Anaphylaxis)  nitroglycerin (Other)    Intolerances      Standing Inpatient Medications  allopurinol 100 milliGRAM(s) Oral daily  amLODIPine   Tablet 10 milliGRAM(s) Oral daily  AQUAPHOR (petrolatum Ointment) 1 Application(s) Topical three times a day  ascorbic acid 500 milliGRAM(s) Oral daily  calcitriol   Capsule 0.25 MICROGram(s) Oral daily  carvedilol 25 milliGRAM(s) Oral every 12 hours  epoetin agata Injectable 18219 Unit(s) SubCutaneous <User Schedule>  ertapenem  IVPB 500 milliGRAM(s) IV Intermittent every 24 hours  Nephro-alyse 1 Tablet(s) Oral daily  predniSONE   Tablet 10 milliGRAM(s) Oral daily  warfarin 4 milliGRAM(s) Oral once  zinc sulfate 220 milliGRAM(s) Oral daily    PRN Inpatient Medications  acetaminophen   Tablet. 650 milliGRAM(s) Oral every 6 hours PRN      REVIEW OF SYSTEMS  --------------------------------------------------------------------------------    Gen: denies fevers/chills,  CVS: denies chest pain/palpitations  Resp: denies SOB/Cough  GI: Denies N/V/Abd pain  : Denies dysuria    All other systems were reviewed and are negative, except as noted.    VITALS/PHYSICAL EXAM  --------------------------------------------------------------------------------  T(C): 36.7 (07-15-18 @ 12:42), Max: 36.8 (07-14-18 @ 20:49)  HR: 98 (07-15-18 @ 12:42) (85 - 98)  BP: 122/71 (07-15-18 @ 12:42) (122/71 - 128/75)  RR: 18 (07-15-18 @ 12:42) (18 - 18)  SpO2: 96% (07-15-18 @ 12:42) (95% - 97%)  Wt(kg): --        07-14-18 @ 07:01  -  07-15-18 @ 07:00  --------------------------------------------------------  IN: 1074 mL / OUT: 0 mL / NET: 1074 mL    07-15-18 @ 07:01  -  07-15-18 @ 19:51  --------------------------------------------------------  IN: 1100 mL / OUT: 90 mL / NET: 1010 mL      Physical Exam:  	  alert oriented place person and date   	Pulm: CTA  no rales or ronchi or wheezing  	CV: RRR, S1/S2. no rub  	Abd: +BS, soft, nontender/nondistended  	: No suprapubic tenderness.               Extremity: No cyanosis, LLE 4+ pitting edema RLE no edema   	  LABS/STUDIES  --------------------------------------------------------------------------------              9.3    9.10  >-----------<  332      [07-15-18 @ 08:28]              29.9     142  |  104  |  93  ----------------------------<  87      [07-15-18 @ 06:41]  4.4   |  23  |  4.16        Ca     9.0     [07-15-18 @ 06:41]      PT/INR: PT 19.4 , INR 1.70       [07-15-18 @ 08:28]  PTT: 34.6       [07-15-18 @ 08:28]      Creatinine Trend:  SCr 4.16 [07-15 @ 06:41]  SCr 4.33 [07-14 @ 06:13]  SCr 4.51 [07-13 @ 10:30]  SCr 4.84 [07-12 @ 10:30]  SCr 4.74 [07-11 @ 11:37]        Urinalysis - [07-06-18 @ 14:45]      Color Yellow / Appearance Turbid / SG 1.015 / pH 8.5      Gluc Negative / Ketone Negative  / Bili Negative / Urobili Negative       Blood Small / Protein 300 / Leuk Est Large / Nitrite Negative      RBC 5-10 / WBC 26-50 / Hyaline  / Gran  / Sq Epi  / Non Sq Epi  / Bacteria Moderate      Iron 67, TIBC 203, %sat 33      [07-12-18 @ 14:18]  Ferritin 961      [07-12-18 @ 14:18]  PTH -- (Ca 8.9)      [04-20-18 @ 08:14]   217  PTH -- (Ca 9.0)      [11-09-17 @ 08:51]   387  Vitamin D (25OH) 7.8      [11-09-17 @ 08:51]  HbA1c 4.5      [08-09-16 @ 06:49]  TSH 0.47      [04-24-18 @ 07:51]    dsDNA 38      [07-10-18 @ 13:55]  C3 Complement 114      [07-10-18 @ 13:54]  C4 Complement 32      [07-10-18 @ 13:54]

## 2018-07-15 NOTE — PROGRESS NOTE ADULT - SUBJECTIVE AND OBJECTIVE BOX
Patient has  known popliteal aneurysm for  a long  time  She is non ambulatory her comorbidities  prevent her from having  interventions   She is extremely high  risk for  either  endovascular or  open  intervention for these  patient  does not  want to   have any  procedures for these knowing the risks

## 2018-07-16 LAB
ANION GAP SERPL CALC-SCNC: 14 MMOL/L — SIGNIFICANT CHANGE UP (ref 5–17)
BUN SERPL-MCNC: 91 MG/DL — HIGH (ref 7–23)
CALCIUM SERPL-MCNC: 8.7 MG/DL — SIGNIFICANT CHANGE UP (ref 8.4–10.5)
CHLORIDE SERPL-SCNC: 105 MMOL/L — SIGNIFICANT CHANGE UP (ref 96–108)
CO2 SERPL-SCNC: 25 MMOL/L — SIGNIFICANT CHANGE UP (ref 22–31)
CREAT SERPL-MCNC: 3.93 MG/DL — HIGH (ref 0.5–1.3)
GLUCOSE SERPL-MCNC: 91 MG/DL — SIGNIFICANT CHANGE UP (ref 70–99)
HCT VFR BLD CALC: 29 % — LOW (ref 34.5–45)
HGB BLD-MCNC: 9.2 G/DL — LOW (ref 11.5–15.5)
INR BLD: 2.01 RATIO — HIGH (ref 0.88–1.16)
MCHC RBC-ENTMCNC: 29 PG — SIGNIFICANT CHANGE UP (ref 27–34)
MCHC RBC-ENTMCNC: 31.6 GM/DL — LOW (ref 32–36)
MCV RBC AUTO: 91.6 FL — SIGNIFICANT CHANGE UP (ref 80–100)
NON-GYNECOLOGICAL CYTOLOGY STUDY: SIGNIFICANT CHANGE UP
PLATELET # BLD AUTO: 303 K/UL — SIGNIFICANT CHANGE UP (ref 150–400)
POTASSIUM SERPL-MCNC: 4.2 MMOL/L — SIGNIFICANT CHANGE UP (ref 3.5–5.3)
POTASSIUM SERPL-SCNC: 4.2 MMOL/L — SIGNIFICANT CHANGE UP (ref 3.5–5.3)
PROTHROM AB SERPL-ACNC: 22.2 SEC — HIGH (ref 9.8–12.7)
RBC # BLD: 3.17 M/UL — LOW (ref 3.8–5.2)
RBC # FLD: 18.4 % — HIGH (ref 10.3–14.5)
SODIUM SERPL-SCNC: 144 MMOL/L — SIGNIFICANT CHANGE UP (ref 135–145)
WBC # BLD: 9.1 K/UL — SIGNIFICANT CHANGE UP (ref 3.8–10.5)
WBC # FLD AUTO: 9.1 K/UL — SIGNIFICANT CHANGE UP (ref 3.8–10.5)

## 2018-07-16 PROCEDURE — 99232 SBSQ HOSP IP/OBS MODERATE 35: CPT

## 2018-07-16 RX ORDER — WARFARIN SODIUM 2.5 MG/1
4 TABLET ORAL ONCE
Qty: 0 | Refills: 0 | Status: COMPLETED | OUTPATIENT
Start: 2018-07-16 | End: 2018-07-16

## 2018-07-16 RX ADMIN — ZINC SULFATE TAB 220 MG (50 MG ZINC EQUIVALENT) 220 MILLIGRAM(S): 220 (50 ZN) TAB at 12:40

## 2018-07-16 RX ADMIN — CARVEDILOL PHOSPHATE 25 MILLIGRAM(S): 80 CAPSULE, EXTENDED RELEASE ORAL at 05:25

## 2018-07-16 RX ADMIN — WARFARIN SODIUM 4 MILLIGRAM(S): 2.5 TABLET ORAL at 21:53

## 2018-07-16 RX ADMIN — ERTAPENEM SODIUM 100 MILLIGRAM(S): 1 INJECTION, POWDER, LYOPHILIZED, FOR SOLUTION INTRAMUSCULAR; INTRAVENOUS at 13:32

## 2018-07-16 RX ADMIN — Medication 10 MILLIGRAM(S): at 09:48

## 2018-07-16 RX ADMIN — CALCITRIOL 0.25 MICROGRAM(S): 0.5 CAPSULE ORAL at 12:39

## 2018-07-16 RX ADMIN — Medication 1 APPLICATION(S): at 05:25

## 2018-07-16 RX ADMIN — ERYTHROPOIETIN 10000 UNIT(S): 10000 INJECTION, SOLUTION INTRAVENOUS; SUBCUTANEOUS at 09:48

## 2018-07-16 RX ADMIN — Medication 100 MILLIGRAM(S): at 12:39

## 2018-07-16 RX ADMIN — Medication 1 APPLICATION(S): at 13:32

## 2018-07-16 RX ADMIN — CARVEDILOL PHOSPHATE 25 MILLIGRAM(S): 80 CAPSULE, EXTENDED RELEASE ORAL at 17:22

## 2018-07-16 RX ADMIN — Medication 1 TABLET(S): at 12:39

## 2018-07-16 RX ADMIN — AMLODIPINE BESYLATE 10 MILLIGRAM(S): 2.5 TABLET ORAL at 09:48

## 2018-07-16 RX ADMIN — Medication 500 MILLIGRAM(S): at 12:39

## 2018-07-16 RX ADMIN — Medication 1 APPLICATION(S): at 21:54

## 2018-07-16 NOTE — PROGRESS NOTE ADULT - SUBJECTIVE AND OBJECTIVE BOX
[facility]   [phone]  RENAL FOLLOW UP NOTE  --------------------------------------------------------------------------------  Chief Complaint:    24 hour events/subjective:    no new c/o today are offered   still with pericardial drain     PAST HISTORY  --------------------------------------------------------------------------------  No significant changes to PMH, PSH, FHx, SHx, unless otherwise noted    ALLERGIES & MEDICATIONS  --------------------------------------------------------------------------------  Allergies    heparin (Unknown)  losartan (Anaphylaxis)  nitroglycerin (Other)    Intolerances      Standing Inpatient Medications  allopurinol 100 milliGRAM(s) Oral daily  amLODIPine   Tablet 10 milliGRAM(s) Oral daily  AQUAPHOR (petrolatum Ointment) 1 Application(s) Topical three times a day  ascorbic acid 500 milliGRAM(s) Oral daily  calcitriol   Capsule 0.25 MICROGram(s) Oral daily  carvedilol 25 milliGRAM(s) Oral every 12 hours  epoetin agata Injectable 58357 Unit(s) SubCutaneous <User Schedule>  ertapenem  IVPB 500 milliGRAM(s) IV Intermittent every 24 hours  Nephro-alyse 1 Tablet(s) Oral daily  predniSONE   Tablet 10 milliGRAM(s) Oral daily  warfarin 4 milliGRAM(s) Oral once  zinc sulfate 220 milliGRAM(s) Oral daily    PRN Inpatient Medications  acetaminophen   Tablet. 650 milliGRAM(s) Oral every 6 hours PRN      REVIEW OF SYSTEMS  --------------------------------------------------------------------------------    Gen: denies  fevers/chills,  CVS: denies chest pain/palpitations  Resp: denies SOB/Cough  GI: Denies N/V/Abd pain  : Denies dysuria  left leg edema     All other systems were reviewed and are negative, except as noted.    VITALS/PHYSICAL EXAM  --------------------------------------------------------------------------------  T(C): 36.6 (07-16-18 @ 13:41), Max: 36.6 (07-16-18 @ 04:49)  HR: 89 (07-16-18 @ 16:37) (86 - 90)  BP: 144/74 (07-16-18 @ 16:37) (128/63 - 144/74)  RR: 20 (07-16-18 @ 13:41) (20 - 20)  SpO2: 97% (07-16-18 @ 13:41) (97% - 98%)  Wt(kg): --        07-15-18 @ 07:01  -  07-16-18 @ 07:00  --------------------------------------------------------  IN: 1100 mL / OUT: 1322 mL / NET: -222 mL    07-16-18 @ 07:01  -  07-16-18 @ 21:24  --------------------------------------------------------  IN: 590 mL / OUT: 940 mL / NET: -350 mL      Physical Exam:  	  alert oriented place person and date   	Pulm: CTA  no rales or ronchi or wheezing  	CV: RRR, S1/S2. no rub  	Abd: +BS, soft, nontender/nondistended  	: No suprapubic tenderness.               Extremity: No cyanosis, LLE 4+ pitting edema RLE no edema     LABS/STUDIES	  --------------------------------------------------------------------------------              9.2    9.1   >-----------<  303      [07-16-18 @ 12:02]              29.0     144  |  105  |  91  ----------------------------<  91      [07-16-18 @ 11:56]  4.2   |  25  |  3.93        Ca     8.7     [07-16-18 @ 11:56]      PT/INR: PT 22.2 , INR 2.01       [07-16-18 @ 12:03]  PTT: 34.6       [07-15-18 @ 08:28]      Creatinine Trend:  SCr 3.93 [07-16 @ 11:56]  SCr 4.16 [07-15 @ 06:41]  SCr 4.33 [07-14 @ 06:13]  SCr 4.51 [07-13 @ 10:30]  SCr 4.84 [07-12 @ 10:30]              Urinalysis - [07-06-18 @ 14:45]      Color Yellow / Appearance Turbid / SG 1.015 / pH 8.5      Gluc Negative / Ketone Negative  / Bili Negative / Urobili Negative       Blood Small / Protein 300 / Leuk Est Large / Nitrite Negative      RBC 5-10 / WBC 26-50 / Hyaline  / Gran  / Sq Epi  / Non Sq Epi  / Bacteria Moderate      Iron 67, TIBC 203, %sat 33      [07-12-18 @ 14:18]  Ferritin 961      [07-12-18 @ 14:18]  PTH -- (Ca 8.9)      [04-20-18 @ 08:14]   217  PTH -- (Ca 9.0)      [11-09-17 @ 08:51]   387  Vitamin D (25OH) 7.8      [11-09-17 @ 08:51]  HbA1c 4.5      [08-09-16 @ 06:49]  TSH 0.47      [04-24-18 @ 07:51]    dsDNA 38      [07-10-18 @ 13:55]  C3 Complement 114      [07-10-18 @ 13:54]  C4 Complement 32      [07-10-18 @ 13:54]

## 2018-07-16 NOTE — PROGRESS NOTE ADULT - SUBJECTIVE AND OBJECTIVE BOX
Patient is a 58y old  Female who presents with a chief complaint of Lack of urine, change in color. (12 Jul 2018 10:26)      SUBJECTIVE / OVERNIGHT EVENTS:   Feels better.  Denies CP/SOB/Palpitation/HA.    MEDICATIONS  (STANDING):  allopurinol 100 milliGRAM(s) Oral daily  amLODIPine   Tablet 10 milliGRAM(s) Oral daily  AQUAPHOR (petrolatum Ointment) 1 Application(s) Topical three times a day  ascorbic acid 500 milliGRAM(s) Oral daily  calcitriol   Capsule 0.25 MICROGram(s) Oral daily  carvedilol 25 milliGRAM(s) Oral every 12 hours  epoetin agata Injectable 25075 Unit(s) SubCutaneous <User Schedule>  ertapenem  IVPB 500 milliGRAM(s) IV Intermittent every 24 hours  Nephro-alyse 1 Tablet(s) Oral daily  predniSONE   Tablet 10 milliGRAM(s) Oral daily  zinc sulfate 220 milliGRAM(s) Oral daily    MEDICATIONS  (PRN):  acetaminophen   Tablet. 650 milliGRAM(s) Oral every 6 hours PRN Moderate Pain (4 - 6)        CAPILLARY BLOOD GLUCOSE        I&O's Summary    15 Jul 2018 07:01  -  16 Jul 2018 07:00  --------------------------------------------------------  IN: 1100 mL / OUT: 1322 mL / NET: -222 mL    16 Jul 2018 07:01  -  16 Jul 2018 22:53  --------------------------------------------------------  IN: 590 mL / OUT: 1290 mL / NET: -700 mL        PHYSICAL EXAM:  GENERAL: NAD, well-developed  HEAD:  Atraumatic, Normocephalic  NECK: Supple, No JVD  CHEST/LUNG: Clear to auscultation bilaterally; No wheezing.  HEART: Regular rate and rhythm; No murmurs, rubs, or gallops  ABDOMEN: Soft, Nontender, Nondistended; Bowel sounds present  EXTREMITIES:   No clubbing, cyanosis, or edema  NEUROLOGY: AAO X 3  SKIN: No rashes    LABS:                        9.2    9.1   )-----------( 303      ( 16 Jul 2018 12:02 )             29.0     07-16    144  |  105  |  91<H>  ----------------------------<  91  4.2   |  25  |  3.93<H>    Ca    8.7      16 Jul 2018 11:56      PT/INR - ( 16 Jul 2018 12:03 )   PT: 22.2 sec;   INR: 2.01 ratio         PTT - ( 15 Jul 2018 08:28 )  PTT:34.6 sec        CAPILLARY BLOOD GLUCOSE        07-14 @ 00:29  Culture-urine --  Culture results   No growth to date.  method type --  Organism --  Organism Identification --  Specimen source .Body Fluid Pericardial Fluid           07-14 @ 00:29  Culture blood --  Culture results   No growth to date.  Gram stain   polymorphonuclear leukocytes seen  No organisms seen  by cytocentrifuge  Gram stain blood --  Method type --  Organism --  Organism identification --  Specimen source .Body Fluid Pericardial Fluid      RADIOLOGY & ADDITIONAL TESTS:    Imaging Personally Reviewed:    Consultant(s) Notes Reviewed:      Care Discussed with Consultants/Other Providers:

## 2018-07-16 NOTE — PROGRESS NOTE ADULT - SUBJECTIVE AND OBJECTIVE BOX
Interventional Radiology Follow- Up Note      Patient seen and examined at bedside. She states she is resting comfortably and offered no complaints overnight. Patient mentioned that upon deep inspiration she feels the presence of the tube but does not report any associated pain.     Vitals: T(F): 97.8 (07-16-18 @ 04:49), Max: 98.2 (07-15-18 @ 20:06)  HR: 88 (07-16-18 @ 09:45) (83 - 98)  BP: 128/63 (07-16-18 @ 09:45) (122/71 - 138/76)  RR: 20 (07-16-18 @ 04:49) (18 - 20)  SpO2: 98% (07-16-18 @ 04:49) (96% - 98%)  Wt(kg): --    LABS:                        9.3    9.10  )-----------( 332      ( 15 Jul 2018 08:28 )             29.9     07-15    142  |  104  |  93<H>  ----------------------------<  87  4.4   |  23  |  4.16<H>    Ca    9.0      15 Jul 2018 06:41      PT/INR - ( 15 Jul 2018 08:28 )   PT: 19.4 sec;   INR: 1.70 ratio         PTT - ( 15 Jul 2018 08:28 )  PTT:34.6 sec  I&O's Detail    15 Jul 2018 07:01  -  16 Jul 2018 07:00  --------------------------------------------------------  IN:    Oral Fluid: 1100 mL  Total IN: 1100 mL    OUT:    Drain: 122 mL    Indwelling Catheter - Urethral: 1200 mL  Total OUT: 1322 mL    Total NET: -222 mL      16 Jul 2018 07:01  -  16 Jul 2018 10:34  --------------------------------------------------------  IN:    Oral Fluid: 240 mL  Total IN: 240 mL    OUT:    Indwelling Catheter - Urethral: 650 mL  Total OUT: 650 mL    Total NET: -410 mL            PHYSICAL EXAM:    General: Nontoxic, in NAD  Neuro:  Alert & oriented x 3  Chest: Left chest tube site is clean, dry and intact.

## 2018-07-16 NOTE — PROGRESS NOTE ADULT - ASSESSMENT
58 F PMH SLE, systolic HFrEF(EF 32%) s/p AICD, HTN, CKD III- IV, PE / left leg DVT dx 2009, HIT, CAD (no stents), stage IV sacral ulcer, chronic Fox, recurrent pericardial effusion, AAA ~14cm in 12/2016 s/p repair in 2010 w/ aortoiliac stent complicated post-operatively with development of left lower extremity compartment syndrome, with recent cellulitis treated inpt now presents with uti and pericardial effusion      1- CKD IV with ILANA   2- pericardial effusion   3- chf  4- HTN   5- neurogenic bladder  6- lupus   7- anemia  8- shpt  9- hyperuricemia   10- UTI   11- thoracic aneurysm  cr improving nicely over days not uremic   cont with b-b  norvasc for bp control   invanz to complete and dc  cont fox    cont calcitriol 0.25mcg daily   cont with allopurinol  procrit [id number] u tiw hb drastically higher today if continue to rise then will hold off procrit

## 2018-07-16 NOTE — PROGRESS NOTE ADULT - ASSESSMENT
58 year old female w/ SLE on prednisone, HFrEF s/p ICD, CKD, Hx PE/HIT, HTN, CAD no stents, Stage IV decub p/w decreased urine output and blood in urine.    Febrile in ED  CT with pericardial effusion chronic - cardiology on board  Also with thoracic aortic aneurysm with vascular surgery on board  WBC WNL  UA with 26-50 WBC  Blood cxs NGTD  Urine cxs with ESBL E. coli and Proteus  Feeling better  Aferbrile  WBC WNL  US with new above the knee DVT  Left artery popliteal aneurysm increased in size  s/p drainage of pericardial effusion - cxs NGTD - ?from Lupus    Recommend:  -Continue ertapenem 500 mg IV q 24 hours to complete 7/18/2018 for a 10-day course.  -F/U pericardial cxs  -If discharged prior to completion of IV ertapenem, can give 1 dose of fosfomycin to complete course.    Will sign off. Please call with questions.

## 2018-07-16 NOTE — PROGRESS NOTE ADULT - SUBJECTIVE AND OBJECTIVE BOX
CC: Patient is a 58y old  Female who presents with a chief complaint of Lack of urine, change in color. (12 Jul 2018 10:26)    Interval History/ROS: Patient feeling better. Has no urinary complaints. Denies fever, chills. Pericardial fluid cxs negative.    Allergies  heparin (Unknown)  losartan (Anaphylaxis)  nitroglycerin (Other)    ANTIMICROBIALS:  ertapenem  IVPB 500 every 24 hours    PE:    Vital Signs Last 24 Hrs  T(C): 36.6 (16 Jul 2018 04:49), Max: 36.8 (15 Jul 2018 20:06)  T(F): 97.8 (16 Jul 2018 04:49), Max: 98.2 (15 Jul 2018 20:06)  HR: 88 (16 Jul 2018 09:45) (83 - 90)  BP: 128/63 (16 Jul 2018 09:45) (128/63 - 138/76)  BP(mean): --  RR: 20 (16 Jul 2018 04:49) (20 - 20)  SpO2: 98% (16 Jul 2018 04:49) (96% - 98%)    Gen: AOx3, NAD, non-toxic, pleasant  CV: S1+S2 normal, no murmurs  Resp: Clear bilat, no resp distress  Abd: Soft, nontender, +BS  Ext: Left lower extremity edema  : +Hook  IV/Skin: No thrombophlebitis  Neuro: no focal deficits      LABS:                          9.2    9.1   )-----------( 303      ( 16 Jul 2018 12:02 )             29.0       07-15    142  |  104  |  93<H>  ----------------------------<  87  4.4   |  23  |  4.16<H>    Ca    9.0      15 Jul 2018 06:41    MICROBIOLOGY:  v  .Body Fluid Pericardial Fluid  07-14-18   No growth to date.  --    polymorphonuclear leukocytes seen  No organisms seen  by cytocentrifuge      .Urine Catheterized  07-07-18   >100,000 CFU/ml Escherichia coli ESBL  50,000 - 99,000 CFU/mL Proteus mirabilis  --  Escherichia coli ESBL  Proteus mirabilis      .Blood Blood-Peripheral  07-06-18   No growth at 5 days.  --  --    RADIOLOGY:    No new images.

## 2018-07-16 NOTE — PROGRESS NOTE ADULT - ASSESSMENT
57 yo female with complicated and extensive past medical medical history including chronic large pericardial effusion admitted with UTI and found to have recurrent large pericardial effusion s/p pericardial drain

## 2018-07-16 NOTE — PROGRESS NOTE ADULT - SUBJECTIVE AND OBJECTIVE BOX
Subjective: Patient seen and examined. No new events except as noted.   feels ok   30 cc overnight from pericardial drain      REVIEW OF SYSTEMS:    CONSTITUTIONAL: + weakness, fevers or chills  EYES/ENT: No visual changes;  No vertigo or throat pain   NECK: No pain or stiffness  RESPIRATORY: No cough, wheezing, hemoptysis; No shortness of breath  CARDIOVASCULAR: No chest pain or palpitations  GASTROINTESTINAL: No abdominal or epigastric pain. No nausea, vomiting, or hematemesis; No diarrhea or constipation. No melena or hematochezia.  GENITOURINARY: No dysuria, frequency or hematuria  NEUROLOGICAL: No numbness or weakness  SKIN: No itching, burning, rashes, or lesions   All other review of systems is negative unless indicated above.    MEDICATIONS:  MEDICATIONS  (STANDING):  allopurinol 100 milliGRAM(s) Oral daily  amLODIPine   Tablet 10 milliGRAM(s) Oral daily  AQUAPHOR (petrolatum Ointment) 1 Application(s) Topical three times a day  ascorbic acid 500 milliGRAM(s) Oral daily  calcitriol   Capsule 0.25 MICROGram(s) Oral daily  carvedilol 25 milliGRAM(s) Oral every 12 hours  epoetin agata Injectable 81986 Unit(s) SubCutaneous <User Schedule>  ertapenem  IVPB 500 milliGRAM(s) IV Intermittent every 24 hours  Nephro-alyse 1 Tablet(s) Oral daily  predniSONE   Tablet 10 milliGRAM(s) Oral daily  zinc sulfate 220 milliGRAM(s) Oral daily      PHYSICAL EXAM:  T(C): 36.6 (07-16-18 @ 04:49), Max: 36.8 (07-15-18 @ 20:06)  HR: 88 (07-16-18 @ 09:45) (83 - 98)  BP: 128/63 (07-16-18 @ 09:45) (122/71 - 138/76)  RR: 20 (07-16-18 @ 04:49) (18 - 20)  SpO2: 98% (07-16-18 @ 04:49) (96% - 98%)  Wt(kg): --  I&O's Summary    15 Jul 2018 07:01  -  16 Jul 2018 07:00  --------------------------------------------------------  IN: 1100 mL / OUT: 1322 mL / NET: -222 mL    16 Jul 2018 07:01  -  16 Jul 2018 10:02  --------------------------------------------------------  IN: 240 mL / OUT: 650 mL / NET: -410 mL          Appearance: NAD	  HEENT:   Normal oral mucosa, PERRL, EOMI	  Lymphatic: No lymphadenopathy , no edema  Cardiovascular: Normal S1 S2, No JVD, No murmurs , +pericardial drain   Respiratory: Lungs clear to auscultation, normal effort 	  Gastrointestinal:  Soft, Non-tender, + BS	  Skin: No rashes, No ecchymoses, No cyanosis, warm to touch  Musculoskeletal: decreased  range of motion and  strength  Psychiatry:  Mood & affect appropriate  Ext: Massive LLE edema  +fox       LABS:    CARDIAC MARKERS:        Prothrombin Time and INR, Plasma in AM (07.15.18 @ 08:28)    Prothrombin Time, Plasma: 19.4 sec    INR: 1.70: Recommended ranges for therapeutic INR:    2.0-3.0 for most medical and surgical thromboembolic states    2.0-3.0 for atrial fibrillation    2.0-3.0 for bileaflet mechanical valve in aortic position    2.5-3.5 for mechanical heart valves    Chest 2004;126:[id number]  The presence of direct thrombin inhibitors (argatroban, refludan)  may falsely increase results. ratio                            9.3    9.10  )-----------( 332      ( 15 Jul 2018 08:28 )             29.9     07-15    142  |  104  |  93<H>  ----------------------------<  87  4.4   |  23  |  4.16<H>    Ca    9.0      15 Jul 2018 06:41      proBNP:   Lipid Profile:   HgA1c:   TSH:             TELEMETRY: SR/V-paced	    ECG:  	  RADIOLOGY:   DIAGNOSTIC TESTING:  [ ] Echocardiogram:  [ ]  Catheterization:  [ ] Stress Test:    OTHER:

## 2018-07-16 NOTE — PROGRESS NOTE ADULT - ASSESSMENT
58F w/ SLE on prednisone, HFrEF s/p ICD, CKD III-IV, Hx PE/HIT, HTN, CAD no stents, Stage IV decub p/w decreased urine output, hematuria and concurrent UTI.     Plan:  -continue to monitor drain output (122ml on 7/15/18-7/16/18 at 7AM. Minimal output as of today).   -F/Up pericardial fluid culture and specimen (Culture negative to date)  -Flush drain per doctor orders     Please call IR at extension [phone] with any questions, concerns, or issues regarding above.

## 2018-07-17 LAB
ANION GAP SERPL CALC-SCNC: 15 MMOL/L — SIGNIFICANT CHANGE UP (ref 5–17)
BUN SERPL-MCNC: 96 MG/DL — HIGH (ref 7–23)
CALCIUM SERPL-MCNC: 8.8 MG/DL — SIGNIFICANT CHANGE UP (ref 8.4–10.5)
CHLORIDE SERPL-SCNC: 104 MMOL/L — SIGNIFICANT CHANGE UP (ref 96–108)
CO2 SERPL-SCNC: 25 MMOL/L — SIGNIFICANT CHANGE UP (ref 22–31)
CREAT SERPL-MCNC: 3.71 MG/DL — HIGH (ref 0.5–1.3)
GLUCOSE SERPL-MCNC: 114 MG/DL — HIGH (ref 70–99)
HCT VFR BLD CALC: 29.9 % — LOW (ref 34.5–45)
HGB BLD-MCNC: 9.7 G/DL — LOW (ref 11.5–15.5)
INR BLD: 2.05 RATIO — HIGH (ref 0.88–1.16)
MCHC RBC-ENTMCNC: 29.7 PG — SIGNIFICANT CHANGE UP (ref 27–34)
MCHC RBC-ENTMCNC: 32.3 GM/DL — SIGNIFICANT CHANGE UP (ref 32–36)
MCV RBC AUTO: 92 FL — SIGNIFICANT CHANGE UP (ref 80–100)
PLATELET # BLD AUTO: 307 K/UL — SIGNIFICANT CHANGE UP (ref 150–400)
POTASSIUM SERPL-MCNC: 3.9 MMOL/L — SIGNIFICANT CHANGE UP (ref 3.5–5.3)
POTASSIUM SERPL-SCNC: 3.9 MMOL/L — SIGNIFICANT CHANGE UP (ref 3.5–5.3)
PROTHROM AB SERPL-ACNC: 22.5 SEC — HIGH (ref 9.8–12.7)
RBC # BLD: 3.25 M/UL — LOW (ref 3.8–5.2)
RBC # FLD: 19.2 % — HIGH (ref 10.3–14.5)
SODIUM SERPL-SCNC: 144 MMOL/L — SIGNIFICANT CHANGE UP (ref 135–145)
WBC # BLD: 9.4 K/UL — SIGNIFICANT CHANGE UP (ref 3.8–10.5)
WBC # FLD AUTO: 9.4 K/UL — SIGNIFICANT CHANGE UP (ref 3.8–10.5)

## 2018-07-17 RX ORDER — WARFARIN SODIUM 2.5 MG/1
4 TABLET ORAL ONCE
Qty: 0 | Refills: 0 | Status: COMPLETED | OUTPATIENT
Start: 2018-07-17 | End: 2018-07-17

## 2018-07-17 RX ORDER — LACTOBACILLUS ACIDOPHILUS 100MM CELL
1 CAPSULE ORAL DAILY
Qty: 0 | Refills: 0 | Status: DISCONTINUED | OUTPATIENT
Start: 2018-07-17 | End: 2018-07-24

## 2018-07-17 RX ADMIN — Medication 100 MILLIGRAM(S): at 11:20

## 2018-07-17 RX ADMIN — Medication 500 MILLIGRAM(S): at 11:20

## 2018-07-17 RX ADMIN — CARVEDILOL PHOSPHATE 25 MILLIGRAM(S): 80 CAPSULE, EXTENDED RELEASE ORAL at 17:20

## 2018-07-17 RX ADMIN — WARFARIN SODIUM 4 MILLIGRAM(S): 2.5 TABLET ORAL at 21:12

## 2018-07-17 RX ADMIN — Medication 1 APPLICATION(S): at 14:26

## 2018-07-17 RX ADMIN — Medication 1 APPLICATION(S): at 05:43

## 2018-07-17 RX ADMIN — Medication 1 TABLET(S): at 11:20

## 2018-07-17 RX ADMIN — ERTAPENEM SODIUM 100 MILLIGRAM(S): 1 INJECTION, POWDER, LYOPHILIZED, FOR SOLUTION INTRAMUSCULAR; INTRAVENOUS at 11:20

## 2018-07-17 RX ADMIN — Medication 10 MILLIGRAM(S): at 10:54

## 2018-07-17 RX ADMIN — Medication 1 APPLICATION(S): at 21:12

## 2018-07-17 RX ADMIN — CARVEDILOL PHOSPHATE 25 MILLIGRAM(S): 80 CAPSULE, EXTENDED RELEASE ORAL at 05:40

## 2018-07-17 RX ADMIN — AMLODIPINE BESYLATE 10 MILLIGRAM(S): 2.5 TABLET ORAL at 10:54

## 2018-07-17 RX ADMIN — ZINC SULFATE TAB 220 MG (50 MG ZINC EQUIVALENT) 220 MILLIGRAM(S): 220 (50 ZN) TAB at 11:20

## 2018-07-17 RX ADMIN — CALCITRIOL 0.25 MICROGRAM(S): 0.5 CAPSULE ORAL at 11:20

## 2018-07-17 NOTE — PROGRESS NOTE ADULT - SUBJECTIVE AND OBJECTIVE BOX
Subjective: Patient seen and examined. No new events except as noted.   worsening diarrhea   no cp or sob   REVIEW OF SYSTEMS:    CONSTITUTIONAL: + weakness, fevers or chills  EYES/ENT: No visual changes;  No vertigo or throat pain   NECK: No pain or stiffness  RESPIRATORY: No cough, wheezing, hemoptysis; No shortness of breath  CARDIOVASCULAR: No chest pain or palpitations  GASTROINTESTINAL: No abdominal or epigastric pain. No nausea, vomiting, or hematemesis; + diarrhea or constipation. No melena or hematochezia.  GENITOURINARY: No dysuria, frequency or hematuria  NEUROLOGICAL: No numbness or weakness  SKIN: No itching, burning, rashes, or lesions   All other review of systems is negative unless indicated above.    MEDICATIONS:  MEDICATIONS  (STANDING):  allopurinol 100 milliGRAM(s) Oral daily  amLODIPine   Tablet 10 milliGRAM(s) Oral daily  AQUAPHOR (petrolatum Ointment) 1 Application(s) Topical three times a day  ascorbic acid 500 milliGRAM(s) Oral daily  calcitriol   Capsule 0.25 MICROGram(s) Oral daily  carvedilol 25 milliGRAM(s) Oral every 12 hours  epoetin agata Injectable 56522 Unit(s) SubCutaneous <User Schedule>  ertapenem  IVPB 500 milliGRAM(s) IV Intermittent every 24 hours  Nephro-alyse 1 Tablet(s) Oral daily  predniSONE   Tablet 10 milliGRAM(s) Oral daily  zinc sulfate 220 milliGRAM(s) Oral daily      PHYSICAL EXAM:  T(C): 36.7 (07-17-18 @ 04:12), Max: 36.9 (07-16-18 @ 21:26)  HR: 87 (07-17-18 @ 04:12) (86 - 89)  BP: 143/77 (07-17-18 @ 04:12) (131/73 - 147/84)  RR: 18 (07-17-18 @ 04:12) (18 - 20)  SpO2: 96% (07-17-18 @ 04:12) (96% - 97%)  Wt(kg): --  I&O's Summary    16 Jul 2018 07:01  -  17 Jul 2018 07:00  --------------------------------------------------------  IN: 590 mL / OUT: 1646 mL / NET: -1056 mL    17 Jul 2018 07:01  -  17 Jul 2018 10:48  --------------------------------------------------------  IN: 240 mL / OUT: 20 mL / NET: 220 mL    Appearance: NAD	  HEENT:   Normal oral mucosa, PERRL, EOMI	  Lymphatic: No lymphadenopathy , no edema  Cardiovascular: Normal S1 S2, No JVD, No murmurs , +pericardial drain   Respiratory: Lungs clear to auscultation, normal effort 	  Gastrointestinal:  Soft, Non-tender, + BS	  Skin: No rashes, No ecchymoses, No cyanosis, warm to touch  Musculoskeletal: decreased  range of motion and  strength  Psychiatry:  Mood & affect appropriate  Ext: Massive LLE edema  +fox           LABS:    CARDIAC MARKERS:                                9.7    9.4   )-----------( 307      ( 17 Jul 2018 10:16 )             29.9     07-17    144  |  104  |  96<H>  ----------------------------<  114<H>  3.9   |  25  |  3.71<H>    Ca    8.8      17 Jul 2018 10:02      proBNP:   Lipid Profile:   HgA1c:   TSH:             TELEMETRY: 	SR/ V paced     ECG:  	  RADIOLOGY:   DIAGNOSTIC TESTING:  [ ] Echocardiogram:  [ ]  Catheterization:  [ ] Stress Test:    OTHER:

## 2018-07-17 NOTE — PROGRESS NOTE ADULT - SUBJECTIVE AND OBJECTIVE BOX
Interventional Radiology Follow- Up Note      58y Female s/p pericardial drainage.     Patient is complaining of "itchiness" at the pericardial drain site.     Vitals: T(F): 98 (07-17-18 @ 04:12), Max: 98.5 (07-16-18 @ 21:26)  HR: 87 (07-17-18 @ 04:12) (86 - 89)  BP: 143/77 (07-17-18 @ 04:12) (128/63 - 147/84)  RR: 18 (07-17-18 @ 04:12) (18 - 20)  SpO2: 96% (07-17-18 @ 04:12) (96% - 97%)  Wt(kg): --    LABS:                        9.2    9.1   )-----------( 303      ( 16 Jul 2018 12:02 )             29.0     07-16    144  |  105  |  91<H>  ----------------------------<  91  4.2   |  25  |  3.93<H>    Ca    8.7      16 Jul 2018 11:56      PT/INR - ( 16 Jul 2018 12:03 )   PT: 22.2 sec;   INR: 2.01 ratio           I&O's Detail    16 Jul 2018 07:01  -  17 Jul 2018 07:00  --------------------------------------------------------  IN:    IV PiggyBack: 50 mL    Oral Fluid: 540 mL  Total IN: 590 mL    OUT:    Drain: 46 mL    Indwelling Catheter - Urethral: 1600 mL  Total OUT: 1646 mL    Total NET: -1056 mL      17 Jul 2018 07:01  -  17 Jul 2018 09:14  --------------------------------------------------------  IN:  Total IN: 0 mL    OUT:    Drain: 20 mL  Total OUT: 20 mL    Total NET: -20 mL            PHYSICAL EXAM:    General: Nontoxic, in NAD  Neuro:  Alert & oriented x 3  Chest: Pericardial drain site is clean, dry and intact      Impression: 58y Female s/p SLE, systolic HFrEF(EF 32%) s/p AICD, HTN, CKD III- IV, PE / left leg DVT dx 2009, HIT, CAD (no stents), stage IV sacral ulcer, chronic Hook, recurrent pericardial effusion, AAA ~14cm in 12/2016 s/p repair in 2010 w/ aortoiliac stent complicated post-operatively with development of left lower extremity compartment syndrome, with recent cellulitis treated as an inpatient now presents with uti and pericardial effusion.     Plan:  -continue to monitor drain output  -F/Up pericardial cultures. Prelim is negative  -trend vitals, labs     Please call IR at extension [phone] with any questions, concerns, or issues regarding above. Interventional Radiology Follow- Up Note      58y Female s/p pericardial drainage.     Patient is complaining of "itchiness" at the pericardial drain site.     Vitals: T(F): 98 (07-17-18 @ 04:12), Max: 98.5 (07-16-18 @ 21:26)  HR: 87 (07-17-18 @ 04:12) (86 - 89)  BP: 143/77 (07-17-18 @ 04:12) (128/63 - 147/84)  RR: 18 (07-17-18 @ 04:12) (18 - 20)  SpO2: 96% (07-17-18 @ 04:12) (96% - 97%)  Wt(kg): --    LABS:                        9.2    9.1   )-----------( 303      ( 16 Jul 2018 12:02 )             29.0     07-16    144  |  105  |  91<H>  ----------------------------<  91  4.2   |  25  |  3.93<H>    Ca    8.7      16 Jul 2018 11:56      PT/INR - ( 16 Jul 2018 12:03 )   PT: 22.2 sec;   INR: 2.01 ratio           I&O's Detail    16 Jul 2018 07:01  -  17 Jul 2018 07:00  --------------------------------------------------------  IN:    IV PiggyBack: 50 mL    Oral Fluid: 540 mL  Total IN: 590 mL    OUT:    Drain: 46 mL    Indwelling Catheter - Urethral: 1600 mL  Total OUT: 1646 mL    Total NET: -1056 mL      17 Jul 2018 07:01  -  17 Jul 2018 09:14  --------------------------------------------------------  IN:  Total IN: 0 mL    OUT:    Drain: 20 mL  Total OUT: 20 mL    Total NET: -20 mL            PHYSICAL EXAM:    General: Nontoxic, in NAD  Neuro:  Alert & oriented x 3  Chest: Pericardial drain site is clean, dry and intact      Impression: 58y Female s/p SLE, systolic HFrEF(EF 32%) s/p AICD, HTN, CKD III- IV, PE / left leg DVT dx 2009, HIT, CAD (no stents), stage IV sacral ulcer, chronic Hook, recurrent pericardial effusion, AAA ~14cm in 12/2016 s/p repair in 2010 w/ aortoiliac stent complicated post-operatively with development of left lower extremity compartment syndrome, with recent cellulitis treated as an inpatient now presents with uti and pericardial effusion.     Plan:  -continue to monitor drain output  -F/Up pericardial cultures. Prelim is negative  -Patient's "itchiness" at tube site may be related to multiple adhesive dressings in place to help secure the catheter. Recommend switching to paper tape for dressings to see if this alleviates the sensation of pruritis.    -trend vitals, labs     Please call IR at extension [phone] with any questions, concerns, or issues regarding above.

## 2018-07-17 NOTE — PROGRESS NOTE ADULT - ASSESSMENT
58 F PMH SLE, systolic HFrEF(EF 32%) s/p AICD, HTN, CKD III- IV, PE / left leg DVT dx 2009, HIT, CAD (no stents), stage IV sacral ulcer, chronic Fox, recurrent pericardial effusion, AAA ~14cm in 12/2016 s/p repair in 2010 w/ aortoiliac stent complicated post-operatively with development of left lower extremity compartment syndrome, with recent cellulitis treated inpt now presents with uti and pericardial effusion      1- CKD IV with ILANA   2- pericardial effusion   3- chf  4- HTN   5- neurogenic bladder  6- lupus   7- anemia  8- shpt  9- hyperuricemia   10- UTI   11- thoracic aneurysm  cr improving   cont with b-b  norvasc for bp control   invanz to complete treatment per ID   cont fox    cont calcitriol 0.25mcg daily   cont with allopurinol  dc procrit hb improving nicely   will need to restart diuretics shortly given RLE edema worsening slowly

## 2018-07-17 NOTE — PROGRESS NOTE ADULT - SUBJECTIVE AND OBJECTIVE BOX
[facility]   [phone]  RENAL FOLLOW UP NOTE  --------------------------------------------------------------------------------  Chief Complaint:    24 hour events/subjective:    states has loose bm    PAST HISTORY  --------------------------------------------------------------------------------  No significant changes to PMH, PSH, FHx, SHx, unless otherwise noted    ALLERGIES & MEDICATIONS  --------------------------------------------------------------------------------  Allergies    heparin (Unknown)  losartan (Anaphylaxis)  nitroglycerin (Other)    Intolerances      Standing Inpatient Medications  allopurinol 100 milliGRAM(s) Oral daily  amLODIPine   Tablet 10 milliGRAM(s) Oral daily  AQUAPHOR (petrolatum Ointment) 1 Application(s) Topical three times a day  ascorbic acid 500 milliGRAM(s) Oral daily  calcitriol   Capsule 0.25 MICROGram(s) Oral daily  carvedilol 25 milliGRAM(s) Oral every 12 hours  epoetin agata Injectable 07708 Unit(s) SubCutaneous <User Schedule>  ertapenem  IVPB 500 milliGRAM(s) IV Intermittent every 24 hours  lactobacillus acidophilus 1 Tablet(s) Oral daily  Nephro-alyse 1 Tablet(s) Oral daily  predniSONE   Tablet 10 milliGRAM(s) Oral daily  warfarin 4 milliGRAM(s) Oral once  zinc sulfate 220 milliGRAM(s) Oral daily    PRN Inpatient Medications  acetaminophen   Tablet. 650 milliGRAM(s) Oral every 6 hours PRN      REVIEW OF SYSTEMS  --------------------------------------------------------------------------------    Gen: denies fatigue, fevers/chills,  CVS: denies chest pain/palpitations  Resp: denies SOB/Cough  GI: Denies N/V/Abd pain  : Denies dysuria    All other systems were reviewed and are negative, except as noted.    VITALS/PHYSICAL EXAM  --------------------------------------------------------------------------------  T(C): 36.7 (07-17-18 @ 04:12), Max: 36.9 (07-16-18 @ 21:26)  HR: 83 (07-17-18 @ 16:54) (83 - 88)  BP: 139/74 (07-17-18 @ 16:54) (129/74 - 147/84)  RR: 18 (07-17-18 @ 04:12) (18 - 19)  SpO2: 96% (07-17-18 @ 04:12) (96% - 97%)  Wt(kg): --        07-16-18 @ 07:01  -  07-17-18 @ 07:00  --------------------------------------------------------  IN: 590 mL / OUT: 1646 mL / NET: -1056 mL    07-17-18 @ 07:01  -  07-17-18 @ 19:22  --------------------------------------------------------  IN: 650 mL / OUT: 40 mL / NET: 610 mL      Physical Exam:  	  alert oriented place person and date   	Pulm: CTA  no rales or ronchi or wheezing  	CV: RRR, S1/S2. no rub  	Abd: +BS, soft, nontender/nondistended  	: No suprapubic tenderness.               Extremity: No cyanosis, LLE 4+ pitting edema RLE 1++ edema     LABS/STUDIES	  --------------------------------------------------------------------------------              9.7    9.4   >-----------<  307      [07-17-18 @ 10:16]              29.9     144  |  104  |  96  ----------------------------<  114      [07-17-18 @ 10:02]  3.9   |  25  |  3.71        Ca     8.8     [07-17-18 @ 10:02]      PT/INR: PT 22.5 , INR 2.05       [07-17-18 @ 10:15]      Creatinine Trend:  SCr 3.71 [07-17 @ 10:02]  SCr 3.93 [07-16 @ 11:56]  SCr 4.16 [07-15 @ 06:41]  SCr 4.33 [07-14 @ 06:13]  SCr 4.51 [07-13 @ 10:30]      Urinalysis - [07-06-18 @ 14:45]      Color Yellow / Appearance Turbid / SG 1.015 / pH 8.5      Gluc Negative / Ketone Negative  / Bili Negative / Urobili Negative       Blood Small / Protein 300 / Leuk Est Large / Nitrite Negative      RBC 5-10 / WBC 26-50 / Hyaline  / Gran  / Sq Epi  / Non Sq Epi  / Bacteria Moderate      Iron 67, TIBC 203, %sat 33      [07-12-18 @ 14:18]  Ferritin 961      [07-12-18 @ 14:18]  PTH -- (Ca 8.9)      [04-20-18 @ 08:14]   217  PTH -- (Ca 9.0)      [11-09-17 @ 08:51]   387  Vitamin D (25OH) 7.8      [11-09-17 @ 08:51]  HbA1c 4.5      [08-09-16 @ 06:49]  TSH 0.47      [04-24-18 @ 07:51]    dsDNA 38      [07-10-18 @ 13:55]  C3 Complement 114      [07-10-18 @ 13:54]  C4 Complement 32      [07-10-18 @ 13:54]

## 2018-07-17 NOTE — PROGRESS NOTE ADULT - SUBJECTIVE AND OBJECTIVE BOX
Patient is a 58y old  Female who presents with a chief complaint of Lack of urine, change in color. (12 Jul 2018 10:26)      SUBJECTIVE / OVERNIGHT EVENTS:   Feels better.  Denies CP/SOB/Palpitation/HA.    MEDICATIONS  (STANDING):  allopurinol 100 milliGRAM(s) Oral daily  amLODIPine   Tablet 10 milliGRAM(s) Oral daily  AQUAPHOR (petrolatum Ointment) 1 Application(s) Topical three times a day  ascorbic acid 500 milliGRAM(s) Oral daily  calcitriol   Capsule 0.25 MICROGram(s) Oral daily  carvedilol 25 milliGRAM(s) Oral every 12 hours  ertapenem  IVPB 500 milliGRAM(s) IV Intermittent every 24 hours  lactobacillus acidophilus 1 Tablet(s) Oral daily  Nephro-alyse 1 Tablet(s) Oral daily  predniSONE   Tablet 10 milliGRAM(s) Oral daily  zinc sulfate 220 milliGRAM(s) Oral daily    MEDICATIONS  (PRN):  acetaminophen   Tablet. 650 milliGRAM(s) Oral every 6 hours PRN Moderate Pain (4 - 6)        CAPILLARY BLOOD GLUCOSE        I&O's Summary    16 Jul 2018 07:01  -  17 Jul 2018 07:00  --------------------------------------------------------  IN: 590 mL / OUT: 1646 mL / NET: -1056 mL    17 Jul 2018 07:01  -  17 Jul 2018 22:31  --------------------------------------------------------  IN: 1010 mL / OUT: 40 mL / NET: 970 mL        PHYSICAL EXAM:    NECK: Supple, No JVD  CHEST/LUNG: Clear to auscultation bilaterally; No wheezing.  HEART: Regular rate and rhythm; No murmurs, rubs, or gallops  ABDOMEN: Soft, Nontender, Nondistended; Bowel sounds present  EXTREMITIES:   No clubbing, cyanosis, or edema  NEUROLOGY: AAO X 3  SKIN: No rashes    LABS:                        9.7    9.4   )-----------( 307      ( 17 Jul 2018 10:16 )             29.9     07-17    144  |  104  |  96<H>  ----------------------------<  114<H>  3.9   |  25  |  3.71<H>    Ca    8.8      17 Jul 2018 10:02      PT/INR - ( 17 Jul 2018 10:15 )   PT: 22.5 sec;   INR: 2.05 ratio                 CAPILLARY BLOOD GLUCOSE        07-14 @ 00:29  Culture-urine --  Culture results   No growth to date.  method type --  Organism --  Organism Identification --  Specimen source .Body Fluid Pericardial Fluid           07-14 @ 00:29  Culture blood --  Culture results   No growth to date.  Gram stain   polymorphonuclear leukocytes seen  No organisms seen  by cytocentrifuge  Gram stain blood --  Method type --  Organism --  Organism identification --  Specimen source .Body Fluid Pericardial Fluid      RADIOLOGY & ADDITIONAL TESTS:    Imaging Personally Reviewed:    Consultant(s) Notes Reviewed:      Care Discussed with Consultants/Other Providers:

## 2018-07-17 NOTE — PROGRESS NOTE ADULT - ASSESSMENT
58F w/ SLE on prednisone, HFrEF s/p ICD, CKD III-IV, Hx PE/HIT, HTN, CAD no stents, Stage IV decub p/w decreased urine output, hematuria and concurrent UTI.    Recurrent Pericardial effusion:  S/p IR drainage.  Drainage +

## 2018-07-18 LAB
ANION GAP SERPL CALC-SCNC: 16 MMOL/L — SIGNIFICANT CHANGE UP (ref 5–17)
BASOPHILS # BLD AUTO: 0.02 K/UL — SIGNIFICANT CHANGE UP (ref 0–0.2)
BASOPHILS NFR BLD AUTO: 0.2 % — SIGNIFICANT CHANGE UP (ref 0–2)
BUN SERPL-MCNC: 94 MG/DL — HIGH (ref 7–23)
CALCIUM SERPL-MCNC: 9 MG/DL — SIGNIFICANT CHANGE UP (ref 8.4–10.5)
CHLORIDE SERPL-SCNC: 104 MMOL/L — SIGNIFICANT CHANGE UP (ref 96–108)
CO2 SERPL-SCNC: 23 MMOL/L — SIGNIFICANT CHANGE UP (ref 22–31)
CREAT SERPL-MCNC: 3.94 MG/DL — HIGH (ref 0.5–1.3)
CULTURE RESULTS: SIGNIFICANT CHANGE UP
EOSINOPHIL # BLD AUTO: 0.02 K/UL — SIGNIFICANT CHANGE UP (ref 0–0.5)
EOSINOPHIL NFR BLD AUTO: 0.2 % — SIGNIFICANT CHANGE UP (ref 0–6)
GLUCOSE SERPL-MCNC: 93 MG/DL — SIGNIFICANT CHANGE UP (ref 70–99)
HCT VFR BLD CALC: 31.6 % — LOW (ref 34.5–45)
HGB BLD-MCNC: 9.7 G/DL — LOW (ref 11.5–15.5)
IMM GRANULOCYTES NFR BLD AUTO: 4.7 % — HIGH (ref 0–1.5)
INR BLD: 2.03 RATIO — HIGH (ref 0.88–1.16)
LYMPHOCYTES # BLD AUTO: 1.52 K/UL — SIGNIFICANT CHANGE UP (ref 1–3.3)
LYMPHOCYTES # BLD AUTO: 16.7 % — SIGNIFICANT CHANGE UP (ref 13–44)
MCHC RBC-ENTMCNC: 27.6 PG — SIGNIFICANT CHANGE UP (ref 27–34)
MCHC RBC-ENTMCNC: 30.7 GM/DL — LOW (ref 32–36)
MCV RBC AUTO: 90 FL — SIGNIFICANT CHANGE UP (ref 80–100)
MONOCYTES # BLD AUTO: 0.1 K/UL — SIGNIFICANT CHANGE UP (ref 0–0.9)
MONOCYTES NFR BLD AUTO: 1.1 % — LOW (ref 2–14)
NEUTROPHILS # BLD AUTO: 7.03 K/UL — SIGNIFICANT CHANGE UP (ref 1.8–7.4)
NEUTROPHILS NFR BLD AUTO: 77.1 % — HIGH (ref 43–77)
PLATELET # BLD AUTO: 284 K/UL — SIGNIFICANT CHANGE UP (ref 150–400)
POTASSIUM SERPL-MCNC: 4.4 MMOL/L — SIGNIFICANT CHANGE UP (ref 3.5–5.3)
POTASSIUM SERPL-SCNC: 4.4 MMOL/L — SIGNIFICANT CHANGE UP (ref 3.5–5.3)
PROTHROM AB SERPL-ACNC: 23.3 SEC — HIGH (ref 10–13.1)
RBC # BLD: 3.51 M/UL — LOW (ref 3.8–5.2)
RBC # FLD: 19.1 % — HIGH (ref 10.3–14.5)
SODIUM SERPL-SCNC: 143 MMOL/L — SIGNIFICANT CHANGE UP (ref 135–145)
SPECIMEN SOURCE: SIGNIFICANT CHANGE UP
WBC # BLD: 9.12 K/UL — SIGNIFICANT CHANGE UP (ref 3.8–10.5)
WBC # FLD AUTO: 9.12 K/UL — SIGNIFICANT CHANGE UP (ref 3.8–10.5)

## 2018-07-18 PROCEDURE — 93306 TTE W/DOPPLER COMPLETE: CPT | Mod: 26

## 2018-07-18 RX ORDER — NYSTATIN CREAM 100000 [USP'U]/G
1 CREAM TOPICAL
Qty: 0 | Refills: 0 | Status: DISCONTINUED | OUTPATIENT
Start: 2018-07-18 | End: 2018-07-24

## 2018-07-18 RX ORDER — FUROSEMIDE 40 MG
80 TABLET ORAL DAILY
Qty: 0 | Refills: 0 | Status: DISCONTINUED | OUTPATIENT
Start: 2018-07-19 | End: 2018-07-24

## 2018-07-18 RX ORDER — WARFARIN SODIUM 2.5 MG/1
4 TABLET ORAL ONCE
Qty: 0 | Refills: 0 | Status: COMPLETED | OUTPATIENT
Start: 2018-07-18 | End: 2018-07-18

## 2018-07-18 RX ADMIN — Medication 1 APPLICATION(S): at 05:38

## 2018-07-18 RX ADMIN — Medication 1 APPLICATION(S): at 22:00

## 2018-07-18 RX ADMIN — Medication 500 MILLIGRAM(S): at 12:16

## 2018-07-18 RX ADMIN — NYSTATIN CREAM 1 APPLICATION(S): 100000 CREAM TOPICAL at 22:00

## 2018-07-18 RX ADMIN — Medication 1 TABLET(S): at 12:16

## 2018-07-18 RX ADMIN — AMLODIPINE BESYLATE 10 MILLIGRAM(S): 2.5 TABLET ORAL at 10:34

## 2018-07-18 RX ADMIN — ERTAPENEM SODIUM 100 MILLIGRAM(S): 1 INJECTION, POWDER, LYOPHILIZED, FOR SOLUTION INTRAMUSCULAR; INTRAVENOUS at 12:13

## 2018-07-18 RX ADMIN — ZINC SULFATE TAB 220 MG (50 MG ZINC EQUIVALENT) 220 MILLIGRAM(S): 220 (50 ZN) TAB at 12:16

## 2018-07-18 RX ADMIN — Medication 1 APPLICATION(S): at 12:46

## 2018-07-18 RX ADMIN — Medication 100 MILLIGRAM(S): at 12:46

## 2018-07-18 RX ADMIN — CARVEDILOL PHOSPHATE 25 MILLIGRAM(S): 80 CAPSULE, EXTENDED RELEASE ORAL at 18:19

## 2018-07-18 RX ADMIN — WARFARIN SODIUM 4 MILLIGRAM(S): 2.5 TABLET ORAL at 22:00

## 2018-07-18 RX ADMIN — CARVEDILOL PHOSPHATE 25 MILLIGRAM(S): 80 CAPSULE, EXTENDED RELEASE ORAL at 05:38

## 2018-07-18 RX ADMIN — Medication 10 MILLIGRAM(S): at 10:35

## 2018-07-18 RX ADMIN — CALCITRIOL 0.25 MICROGRAM(S): 0.5 CAPSULE ORAL at 12:16

## 2018-07-18 NOTE — CHART NOTE - NSCHARTNOTEFT_GEN_A_CORE
58 year old female pt with PMH SLE, systolic HFrEF s/p AICD, HTN, CKD III- IV, PE / left leg DVT dx 2009, HIT, CAD (no stents), stage IV sacral ulcer, neurogenic bladder w/ chronic Fox,  AAA ~14cm in 12/2016 s/p repair in 2010 w/ aortoiliac stent complicated post-operatively with development of left lower extremity compartment syndrome presents to ED c/o decreased urine output w/ blood in urine, chills and generalized body aches x few days PTA. Found to have pyelonephritis/UTI, fox changed 7/8 remains on IV ertapenem and pericardial effusion (+) pericardial drain.    Pt seen today for LOS follow-up.     Source: Patient [x]    Family [ ]     other [x] electronic medical records    Diet: DASH/TLC    Patient reports [ ] nausea  [ ] vomiting [ ] diarrhea [ ] constipation  [ ]chewing problems [ ] swallowing issues  [x] other:  Last BM yesterday 7/17, reported as loose    PO intake:  < 50% [ ] 50-75% [ ]   % [x]  other :    Source for PO intake [x] Patient [ ] family [x] chart [ ] staff [ ] other    Enteral /Parenteral Nutrition: n/a    Current Weight: 257.4 pounds (current, bedscale, +2 right leg, +4 L leg L ankle edema noted). 259 pounds admit wt 7/6/18.    Pertinent Medications: MEDICATIONS  (STANDING):  allopurinol 100 milliGRAM(s) Oral daily  amLODIPine   Tablet 10 milliGRAM(s) Oral daily  AQUAPHOR (petrolatum Ointment) 1 Application(s) Topical three times a day  ascorbic acid 500 milliGRAM(s) Oral daily  calcitriol   Capsule 0.25 MICROGram(s) Oral daily  carvedilol 25 milliGRAM(s) Oral every 12 hours  ertapenem  IVPB 500 milliGRAM(s) IV Intermittent every 24 hours  lactobacillus acidophilus 1 Tablet(s) Oral daily  Nephro-alyse 1 Tablet(s) Oral daily  predniSONE   Tablet 10 milliGRAM(s) Oral daily  zinc sulfate 220 milliGRAM(s) Oral daily  Coumadin    MEDICATIONS  (PRN):  acetaminophen   Tablet. 650 milliGRAM(s) Oral every 6 hours PRN Moderate Pain (4 - 6)    Pertinent Labs:  07-18 Na143 mmol/L Glu 93 mg/dL K+ 4.4 mmol/L Cr  3.94 mg/dL<H> BUN 94 mg/dL<H> 07-12 Phos 4.2 mg/dL      Skin: R ischium stage 4 pressure ulcer      Estimated Needs:   [x] no change since previous assessment  [ ] recalculated:       Previous Nutrition Diagnosis: Obesity Class III       Nutrition Diagnosis is [x] ongoing  [ ] resolved [ ] not applicable       New Nutrition Diagnosis: [x] not applicable      Interventions:     1) Continue current diet order  2) Recommend add Miguel 2 packets daily to promote wound healing       Monitoring and Evaluation:     [x] PO intake [x] Tolerance to diet prescription [x] weights [x] follow up per protocol    [x] other: RD remains available: Aleena Wilder MS, RDN, CDN, CDE. #[id number] 58 year old female pt with PMH SLE, systolic HFrEF s/p AICD, HTN, CKD III- IV, PE / left leg DVT dx 2009, HIT, CAD (no stents), stage IV sacral ulcer, neurogenic bladder w/ chronic Fox,  AAA ~14cm in 12/2016 s/p repair in 2010 w/ aortoiliac stent complicated post-operatively with development of left lower extremity compartment syndrome presents to ED c/o decreased urine output w/ blood in urine, chills and generalized body aches x few days PTA. Found to have pyelonephritis/UTI, fox changed 7/8 remains on IV ertapenem and pericardial effusion (+) pericardial drain.    Pt seen today for LOS follow-up. Pt reports good appetite, denies GI complaints. Requests new food preferences.    Source: Patient [x]    Family [ ]     other [x] electronic medical records    Diet: DASH/TLC    Patient reports [ ] nausea  [ ] vomiting [ ] diarrhea [ ] constipation  [ ]chewing problems [ ] swallowing issues  [x] other:  Last BM yesterday 7/17, reported as loose    PO intake:  < 50% [ ] 50-75% [ ]   % [x]  other :    Source for PO intake [x] Patient [ ] family [x] chart [ ] staff [ ] other    Enteral /Parenteral Nutrition: n/a    Current Weight: 257.4 pounds (current, bedscale, +2 right leg, +4 L leg L ankle edema noted). 259 pounds admit wt 7/6/18.    Pertinent Medications: MEDICATIONS  (STANDING):  allopurinol 100 milliGRAM(s) Oral daily  amLODIPine   Tablet 10 milliGRAM(s) Oral daily  AQUAPHOR (petrolatum Ointment) 1 Application(s) Topical three times a day  ascorbic acid 500 milliGRAM(s) Oral daily  calcitriol   Capsule 0.25 MICROGram(s) Oral daily  carvedilol 25 milliGRAM(s) Oral every 12 hours  ertapenem  IVPB 500 milliGRAM(s) IV Intermittent every 24 hours  lactobacillus acidophilus 1 Tablet(s) Oral daily  Nephro-alyse 1 Tablet(s) Oral daily  predniSONE   Tablet 10 milliGRAM(s) Oral daily  zinc sulfate 220 milliGRAM(s) Oral daily  Coumadin    MEDICATIONS  (PRN):  acetaminophen   Tablet. 650 milliGRAM(s) Oral every 6 hours PRN Moderate Pain (4 - 6)      Pertinent Labs:  07-18 Na143 mmol/L Glu 93 mg/dL K+ 4.4 mmol/L Cr  3.94 mg/dL<H> BUN 94 mg/dL<H> 07-12 Phos 4.2 mg/dL      Skin: R ischium stage 4 pressure ulcer      Estimated Needs:   [x] no change since previous assessment  [ ] recalculated:       Previous Nutrition Diagnosis: Obesity Class III       Nutrition Diagnosis is [x] ongoing  [ ] resolved [ ] not applicable       New Nutrition Diagnosis: [x] not applicable      Interventions:     1) Can liberalize diet to low sodium for more food choices per pt request  2) Pt declined addition of Miguel 2 packets daily to promote wound healing, states she's tried it in the past and dislikes the taste and is not willing to try it again despite potential benefit. Encouraged intake of healthful diet adequate in fruits and vegetables + high biological value protein foods to promote wound healing. Pt voices understanding.  3) Pt declines review of diet education regarding Coumadin/vitamin K interaction, heart health       Monitoring and Evaluation:     [x] PO intake [x] Tolerance to diet prescription [x] weights [x] follow up per protocol    [x] other: RD remains available: Aleena Wilder MS, RDN, CDN, CDE. #[phone]

## 2018-07-18 NOTE — PROGRESS NOTE ADULT - SUBJECTIVE AND OBJECTIVE BOX
Subjective: Patient seen and examined. No new events except as noted.   feeling ok   no cp or sob    REVIEW OF SYSTEMS:    CONSTITUTIONAL: +weakness, fevers or chills  EYES/ENT: No visual changes;  No vertigo or throat pain   NECK: No pain or stiffness  RESPIRATORY: No cough, wheezing, hemoptysis; No shortness of breath  CARDIOVASCULAR: No chest pain or palpitations  GASTROINTESTINAL: No abdominal or epigastric pain. No nausea, vomiting, or hematemesis; No diarrhea or constipation. No melena or hematochezia.  GENITOURINARY: No dysuria, frequency or hematuria  NEUROLOGICAL: No numbness or weakness  SKIN: No itching, burning, rashes, or lesions   All other review of systems is negative unless indicated above.    MEDICATIONS:  MEDICATIONS  (STANDING):  allopurinol 100 milliGRAM(s) Oral daily  amLODIPine   Tablet 10 milliGRAM(s) Oral daily  AQUAPHOR (petrolatum Ointment) 1 Application(s) Topical three times a day  ascorbic acid 500 milliGRAM(s) Oral daily  calcitriol   Capsule 0.25 MICROGram(s) Oral daily  carvedilol 25 milliGRAM(s) Oral every 12 hours  ertapenem  IVPB 500 milliGRAM(s) IV Intermittent every 24 hours  lactobacillus acidophilus 1 Tablet(s) Oral daily  Nephro-alyse 1 Tablet(s) Oral daily  predniSONE   Tablet 10 milliGRAM(s) Oral daily  zinc sulfate 220 milliGRAM(s) Oral daily      PHYSICAL EXAM:  T(C): 36.8 (07-18-18 @ 04:32), Max: 37.1 (07-17-18 @ 20:31)  HR: 89 (07-18-18 @ 04:32) (74 - 89)  BP: 136/81 (07-18-18 @ 04:32) (129/74 - 151/77)  RR: 18 (07-18-18 @ 04:32) (17 - 18)  SpO2: 95% (07-18-18 @ 04:32) (95% - 97%)  Wt(kg): --  I&O's Summary    17 Jul 2018 07:01  -  18 Jul 2018 07:00  --------------------------------------------------------  IN: 1010 mL / OUT: 880 mL / NET: 130 mL          Appearance: NAD, obese	  HEENT:   Normal oral mucosa, PERRL, EOMI	  Lymphatic: No lymphadenopathy , no edema  Cardiovascular: Normal S1 S2, No JVD, No murmurs ,+ pericardial drain   Respiratory: decreased bs and effort 	  Gastrointestinal:  Soft, Non-tender, + BS	  Skin: No rashes, No ecchymoses  Musculoskeletal: decreased  range of motion and strength  Psychiatry:  Mood & affect appropriate  Ext: massive LLE edema  +fox     LABS:    CARDIAC MARKERS:                                9.7    9.12  )-----------( 284      ( 18 Jul 2018 08:11 )             31.6     07-18    143  |  104  |  94<H>  ----------------------------<  93  4.4   |  23  |  3.94<H>    Ca    9.0      18 Jul 2018 05:42      proBNP:   Lipid Profile:   HgA1c:   TSH:             TELEMETRY: V paced 	    ECG:  	  RADIOLOGY:   DIAGNOSTIC TESTING:  [ ] Echocardiogram:  [ ]  Catheterization:  [ ] Stress Test:    OTHER:

## 2018-07-18 NOTE — PROGRESS NOTE ADULT - ASSESSMENT
58 F PMH SLE, systolic HFrEF(EF 32%) s/p AICD, HTN, CKD III- IV, PE / left leg DVT dx 2009, HIT, CAD (no stents), stage IV sacral ulcer, chronic Fox, recurrent pericardial effusion, AAA ~14cm in 12/2016 s/p repair in 2010 w/ aortoiliac stent complicated post-operatively with development of left lower extremity compartment syndrome, with recent cellulitis treated inpt now presents with uti and pericardial effusion      1- CKD IV with ILANA   2- pericardial effusion   3- chf  4- HTN   5- neurogenic bladder  6- lupus   7- anemia  8- shpt  9- hyperuricemia   10- UTI   11- thoracic aneurysm  cr steady at this higher level   cont with b-b  norvasc for bp control   invanz completed   cont fox    cont calcitriol 0.25mcg daily   cont with allopurinol  hold procrit for now   start lasix 80 mg po qd

## 2018-07-18 NOTE — PROGRESS NOTE ADULT - SUBJECTIVE AND OBJECTIVE BOX
[facility]   [phone]  RENAL FOLLOW UP NOTE  --------------------------------------------------------------------------------  Chief Complaint:    24 hour events/subjective:    seen earlier. no specific c/o except fatigue     PAST HISTORY  --------------------------------------------------------------------------------  No significant changes to PMH, PSH, FHx, SHx, unless otherwise noted    ALLERGIES & MEDICATIONS  --------------------------------------------------------------------------------  Allergies    heparin (Unknown)  losartan (Anaphylaxis)  nitroglycerin (Other)    Intolerances      Standing Inpatient Medications  allopurinol 100 milliGRAM(s) Oral daily  amLODIPine   Tablet 10 milliGRAM(s) Oral daily  AQUAPHOR (petrolatum Ointment) 1 Application(s) Topical three times a day  ascorbic acid 500 milliGRAM(s) Oral daily  calcitriol   Capsule 0.25 MICROGram(s) Oral daily  carvedilol 25 milliGRAM(s) Oral every 12 hours  lactobacillus acidophilus 1 Tablet(s) Oral daily  Nephro-alyse 1 Tablet(s) Oral daily  nystatin Powder 1 Application(s) Topical two times a day  predniSONE   Tablet 10 milliGRAM(s) Oral daily  warfarin 4 milliGRAM(s) Oral once  zinc sulfate 220 milliGRAM(s) Oral daily    PRN Inpatient Medications  acetaminophen   Tablet. 650 milliGRAM(s) Oral every 6 hours PRN      REVIEW OF SYSTEMS  --------------------------------------------------------------------------------    Gen: denies  fevers/chills,  CVS: denies chest pain/palpitations  Resp: denies SOB/Cough  GI: Denies N/V/Abd pain  : Denies dysuria    All other systems were reviewed and are negative, except as noted.    VITALS/PHYSICAL EXAM  --------------------------------------------------------------------------------  T(C): 37 (07-18-18 @ 13:26), Max: 37.1 (07-17-18 @ 20:31)  HR: 89 (07-18-18 @ 18:17) (74 - 89)  BP: 143/75 (07-18-18 @ 18:17) (132/68 - 151/77)  RR: 18 (07-18-18 @ 13:26) (17 - 18)  SpO2: 95% (07-18-18 @ 13:26) (95% - 97%)  Wt(kg): --        07-17-18 @ 07:01  -  07-18-18 @ 07:00  --------------------------------------------------------  IN: 1010 mL / OUT: 880 mL / NET: 130 mL    07-18-18 @ 07:01  -  07-18-18 @ 20:21  --------------------------------------------------------  IN: 420 mL / OUT: 700 mL / NET: -280 mL      Physical Exam:  	  	  alert oriented place person and date   	Pulm: CTA  no rales or ronchi or wheezing  	CV: RRR, S1/S2. no rub  	Abd: +BS, soft, nontender/nondistended  	: No suprapubic tenderness.               Extremity: No cyanosis, LLE 4+ pitting edema RLE 1++ edema   	    LABS/STUDIES  --------------------------------------------------------------------------------              9.7    9.12  >-----------<  284      [07-18-18 @ 08:11]              31.6     143  |  104  |  94  ----------------------------<  93      [07-18-18 @ 05:42]  4.4   |  23  |  3.94        Ca     9.0     [07-18-18 @ 05:42]      PT/INR: PT 23.3 , INR 2.03       [07-18-18 @ 08:25]      Creatinine Trend:  SCr 3.94 [07-18 @ 05:42]  SCr 3.71 [07-17 @ 10:02]  SCr 3.93 [07-16 @ 11:56]  SCr 4.16 [07-15 @ 06:41]  SCr 4.33 [07-14 @ 06:13]              Urinalysis - [07-06-18 @ 14:45]      Color Yellow / Appearance Turbid / SG 1.015 / pH 8.5      Gluc Negative / Ketone Negative  / Bili Negative / Urobili Negative       Blood Small / Protein 300 / Leuk Est Large / Nitrite Negative      RBC 5-10 / WBC 26-50 / Hyaline  / Gran  / Sq Epi  / Non Sq Epi  / Bacteria Moderate      Iron 67, TIBC 203, %sat 33      [07-12-18 @ 14:18]  Ferritin 961      [07-12-18 @ 14:18]  PTH -- (Ca 8.9)      [04-20-18 @ 08:14]   217  PTH -- (Ca 9.0)      [11-09-17 @ 08:51]   387  Vitamin D (25OH) 7.8      [11-09-17 @ 08:51]  HbA1c 4.5      [08-09-16 @ 06:49]  TSH 0.47      [04-24-18 @ 07:51]    dsDNA 38      [07-10-18 @ 13:55]  C3 Complement 114      [07-10-18 @ 13:54]  C4 Complement 32      [07-10-18 @ 13:54]

## 2018-07-18 NOTE — PROGRESS NOTE ADULT - SUBJECTIVE AND OBJECTIVE BOX
Patient is a 58y old  Female who presents with a chief complaint of Lack of urine, change in color. (12 Jul 2018 10:26)      SUBJECTIVE / OVERNIGHT EVENTS:   Feels better.  Denies CP/SOB/Palpitation/HA.    MEDICATIONS  (STANDING):  allopurinol 100 milliGRAM(s) Oral daily  amLODIPine   Tablet 10 milliGRAM(s) Oral daily  AQUAPHOR (petrolatum Ointment) 1 Application(s) Topical three times a day  ascorbic acid 500 milliGRAM(s) Oral daily  calcitriol   Capsule 0.25 MICROGram(s) Oral daily  carvedilol 25 milliGRAM(s) Oral every 12 hours  lactobacillus acidophilus 1 Tablet(s) Oral daily  Nephro-alyse 1 Tablet(s) Oral daily  nystatin Powder 1 Application(s) Topical two times a day  predniSONE   Tablet 10 milliGRAM(s) Oral daily  warfarin 4 milliGRAM(s) Oral once  zinc sulfate 220 milliGRAM(s) Oral daily    MEDICATIONS  (PRN):  acetaminophen   Tablet. 650 milliGRAM(s) Oral every 6 hours PRN Moderate Pain (4 - 6)        CAPILLARY BLOOD GLUCOSE        I&O's Summary    17 Jul 2018 07:01  -  18 Jul 2018 07:00  --------------------------------------------------------  IN: 1010 mL / OUT: 880 mL / NET: 130 mL    18 Jul 2018 07:01  -  18 Jul 2018 18:48  --------------------------------------------------------  IN: 420 mL / OUT: 700 mL / NET: -280 mL        PHYSICAL EXAM:    NECK: Supple, No JVD  CHEST/LUNG: Clear to auscultation bilaterally; No wheezing.  HEART: Regular rate and rhythm; No murmurs, rubs, or gallops  ABDOMEN: Soft, Nontender, Nondistended; Bowel sounds present  EXTREMITIES:   No clubbing, cyanosis, or edema  NEUROLOGY: AAO X 3  SKIN: No rashes    LABS:                        9.7    9.12  )-----------( 284      ( 18 Jul 2018 08:11 )             31.6     07-18    143  |  104  |  94<H>  ----------------------------<  93  4.4   |  23  |  3.94<H>    Ca    9.0      18 Jul 2018 05:42      PT/INR - ( 18 Jul 2018 08:25 )   PT: 23.3 sec;   INR: 2.03 ratio                 CAPILLARY BLOOD GLUCOSE        07-14 @ 00:29  Culture-urine --  Culture results   No growth to date.  method type --  Organism --  Organism Identification --  Specimen source .Body Fluid Pericardial Fluid           07-14 @ 00:29  Culture blood --  Culture results   No growth to date.  Gram stain   polymorphonuclear leukocytes seen  No organisms seen  by cytocentrifuge  Gram stain blood --  Method type --  Organism --  Organism identification --  Specimen source .Body Fluid Pericardial Fluid      RADIOLOGY & ADDITIONAL TESTS:    Imaging Personally Reviewed:    Consultant(s) Notes Reviewed:      Care Discussed with Consultants/Other Providers:

## 2018-07-18 NOTE — PROGRESS NOTE ADULT - SUBJECTIVE AND OBJECTIVE BOX
Interventional Radiology Follow- Up Note      58y Female s/p pericardial drainage  Patient states the itchiness and tenderness around the drain site has markedly reduced in intensity.     Vitals: T(F): 98.2 (07-18-18 @ 04:32), Max: 98.7 (07-17-18 @ 20:31)  HR: 89 (07-18-18 @ 04:32) (74 - 89)  BP: 136/81 (07-18-18 @ 04:32) (129/74 - 151/77)  RR: 18 (07-18-18 @ 04:32) (17 - 18)  SpO2: 95% (07-18-18 @ 04:32) (95% - 97%)  Wt(kg): --    LABS:                        9.7    9.4   )-----------( 307      ( 17 Jul 2018 10:16 )             29.9     07-18    143  |  104  |  94<H>  ----------------------------<  93  4.4   |  23  |  3.94<H>    Ca    9.0      18 Jul 2018 05:42      PT/INR - ( 17 Jul 2018 10:15 )   PT: 22.5 sec;   INR: 2.05 ratio           I&O's Detail    17 Jul 2018 07:01  -  18 Jul 2018 07:00  --------------------------------------------------------  IN:    IV PiggyBack: 50 mL    Oral Fluid: 960 mL  Total IN: 1010 mL    OUT:    Drain: 80 mL    Indwelling Catheter - Urethral: 800 mL  Total OUT: 880 mL    Total NET: 130 mL            PHYSICAL EXAM:    General: Nontoxic, in NAD  Neuro:  Alert & oriented x 3  Chest: Tube site is clean, dry and intact.       Impression: 58y Female s/p pericardial drainage    Plan:  -continue to monitor pericardial drain output. Defer to cardiology regarding when to remove drain and the need for a repeat echocardiogram.          Please call IR at extension [phone] with any questions, concerns, or issues regarding above. Interventional Radiology Follow- Up Note      58y Female s/p pericardial drainage  Patient states the itchiness and tenderness around the drain site has markedly reduced in intensity.     Vitals: T(F): 98.2 (07-18-18 @ 04:32), Max: 98.7 (07-17-18 @ 20:31)  HR: 89 (07-18-18 @ 04:32) (74 - 89)  BP: 136/81 (07-18-18 @ 04:32) (129/74 - 151/77)  RR: 18 (07-18-18 @ 04:32) (17 - 18)  SpO2: 95% (07-18-18 @ 04:32) (95% - 97%)  Wt(kg): --    LABS:                        9.7    9.4   )-----------( 307      ( 17 Jul 2018 10:16 )             29.9     07-18    143  |  104  |  94<H>  ----------------------------<  93  4.4   |  23  |  3.94<H>    Ca    9.0      18 Jul 2018 05:42      PT/INR - ( 17 Jul 2018 10:15 )   PT: 22.5 sec;   INR: 2.05 ratio           I&O's Detail    17 Jul 2018 07:01  -  18 Jul 2018 07:00  --------------------------------------------------------  IN:    IV PiggyBack: 50 mL    Oral Fluid: 960 mL  Total IN: 1010 mL    OUT:    Drain: 80 mL    Indwelling Catheter - Urethral: 800 mL  Total OUT: 880 mL    Total NET: 130 mL            PHYSICAL EXAM:    General: Nontoxic, in NAD  Neuro:  Alert & oriented x 3  Chest: Tube site is clean, dry and intact.       Impression: 58y Female s/p SLE, systolic HFrEF(EF 32%) s/p AICD, HTN, CKD III- IV, PE / left leg DVT dx 2009, HIT, CAD (no stents), stage IV sacral ulcer, chronic Hook, recurrent pericardial effusion, AAA ~14cm in 12/2016 s/p repair in 2010 w/ aortoiliac stent complicated post-operatively with development of left lower extremity compartment syndrome, with recent cellulitis treated as an inpatient now presents with uti and pericardial effusion.     Plan:  -continue to monitor pericardial drain output. Defer to cardiology regarding when to remove drain and the need for a repeat echocardiogram.          Please call IR at extension [phone] with any questions, concerns, or issues regarding above.

## 2018-07-18 NOTE — PROGRESS NOTE ADULT - ASSESSMENT
58F w/ SLE on prednisone, HFrEF s/p ICD, CKD III-IV, Hx PE/HIT, HTN, CAD no stents, Stage IV decub p/w decreased urine output, hematuria and concurrent UTI.    Recurrent Pericardial effusion:  S/p IR drainage.  Drainage +  F/up with ECHO

## 2018-07-19 LAB
ANION GAP SERPL CALC-SCNC: 13 MMOL/L — SIGNIFICANT CHANGE UP (ref 5–17)
ANION GAP SERPL CALC-SCNC: 14 MMOL/L — SIGNIFICANT CHANGE UP (ref 5–17)
BUN SERPL-MCNC: 96 MG/DL — HIGH (ref 7–23)
BUN SERPL-MCNC: 98 MG/DL — HIGH (ref 7–23)
CALCIUM SERPL-MCNC: 8.9 MG/DL — SIGNIFICANT CHANGE UP (ref 8.4–10.5)
CALCIUM SERPL-MCNC: 8.9 MG/DL — SIGNIFICANT CHANGE UP (ref 8.4–10.5)
CHLORIDE SERPL-SCNC: 109 MMOL/L — HIGH (ref 96–108)
CHLORIDE SERPL-SCNC: 109 MMOL/L — HIGH (ref 96–108)
CO2 SERPL-SCNC: 21 MMOL/L — LOW (ref 22–31)
CO2 SERPL-SCNC: 22 MMOL/L — SIGNIFICANT CHANGE UP (ref 22–31)
CREAT SERPL-MCNC: 3.81 MG/DL — HIGH (ref 0.5–1.3)
CREAT SERPL-MCNC: 3.99 MG/DL — HIGH (ref 0.5–1.3)
GLUCOSE SERPL-MCNC: 101 MG/DL — HIGH (ref 70–99)
GLUCOSE SERPL-MCNC: 92 MG/DL — SIGNIFICANT CHANGE UP (ref 70–99)
HCT VFR BLD CALC: 30.5 % — LOW (ref 34.5–45)
HGB BLD-MCNC: 9.1 G/DL — LOW (ref 11.5–15.5)
INR BLD: 2.37 RATIO — HIGH (ref 0.88–1.16)
MAGNESIUM SERPL-MCNC: 2.4 MG/DL — SIGNIFICANT CHANGE UP (ref 1.6–2.6)
MCHC RBC-ENTMCNC: 27 PG — SIGNIFICANT CHANGE UP (ref 27–34)
MCHC RBC-ENTMCNC: 29.8 GM/DL — LOW (ref 32–36)
MCV RBC AUTO: 90.5 FL — SIGNIFICANT CHANGE UP (ref 80–100)
PHOSPHATE SERPL-MCNC: 4.7 MG/DL — HIGH (ref 2.5–4.5)
PLATELET # BLD AUTO: 269 K/UL — SIGNIFICANT CHANGE UP (ref 150–400)
POTASSIUM SERPL-MCNC: 4.8 MMOL/L — SIGNIFICANT CHANGE UP (ref 3.5–5.3)
POTASSIUM SERPL-MCNC: 4.8 MMOL/L — SIGNIFICANT CHANGE UP (ref 3.5–5.3)
POTASSIUM SERPL-SCNC: 4.8 MMOL/L — SIGNIFICANT CHANGE UP (ref 3.5–5.3)
POTASSIUM SERPL-SCNC: 4.8 MMOL/L — SIGNIFICANT CHANGE UP (ref 3.5–5.3)
PROTHROM AB SERPL-ACNC: 27.3 SEC — HIGH (ref 10–13.1)
RBC # BLD: 3.37 M/UL — LOW (ref 3.8–5.2)
RBC # FLD: 19 % — HIGH (ref 10.3–14.5)
SODIUM SERPL-SCNC: 144 MMOL/L — SIGNIFICANT CHANGE UP (ref 135–145)
SODIUM SERPL-SCNC: 144 MMOL/L — SIGNIFICANT CHANGE UP (ref 135–145)
WBC # BLD: 9.18 K/UL — SIGNIFICANT CHANGE UP (ref 3.8–10.5)
WBC # FLD AUTO: 9.18 K/UL — SIGNIFICANT CHANGE UP (ref 3.8–10.5)

## 2018-07-19 PROCEDURE — 99232 SBSQ HOSP IP/OBS MODERATE 35: CPT

## 2018-07-19 RX ORDER — WARFARIN SODIUM 2.5 MG/1
4 TABLET ORAL ONCE
Qty: 0 | Refills: 0 | Status: COMPLETED | OUTPATIENT
Start: 2018-07-19 | End: 2018-07-19

## 2018-07-19 RX ADMIN — NYSTATIN CREAM 1 APPLICATION(S): 100000 CREAM TOPICAL at 05:36

## 2018-07-19 RX ADMIN — Medication 1 TABLET(S): at 12:00

## 2018-07-19 RX ADMIN — Medication 500 MILLIGRAM(S): at 12:00

## 2018-07-19 RX ADMIN — Medication 80 MILLIGRAM(S): at 05:35

## 2018-07-19 RX ADMIN — Medication 1 APPLICATION(S): at 13:45

## 2018-07-19 RX ADMIN — CARVEDILOL PHOSPHATE 25 MILLIGRAM(S): 80 CAPSULE, EXTENDED RELEASE ORAL at 05:35

## 2018-07-19 RX ADMIN — Medication 100 MILLIGRAM(S): at 12:00

## 2018-07-19 RX ADMIN — ZINC SULFATE TAB 220 MG (50 MG ZINC EQUIVALENT) 220 MILLIGRAM(S): 220 (50 ZN) TAB at 12:01

## 2018-07-19 RX ADMIN — Medication 10 MILLIGRAM(S): at 12:01

## 2018-07-19 RX ADMIN — NYSTATIN CREAM 1 APPLICATION(S): 100000 CREAM TOPICAL at 21:43

## 2018-07-19 RX ADMIN — WARFARIN SODIUM 4 MILLIGRAM(S): 2.5 TABLET ORAL at 21:43

## 2018-07-19 RX ADMIN — AMLODIPINE BESYLATE 10 MILLIGRAM(S): 2.5 TABLET ORAL at 12:01

## 2018-07-19 RX ADMIN — Medication 1 APPLICATION(S): at 21:43

## 2018-07-19 RX ADMIN — Medication 1 APPLICATION(S): at 05:36

## 2018-07-19 RX ADMIN — CALCITRIOL 0.25 MICROGRAM(S): 0.5 CAPSULE ORAL at 12:01

## 2018-07-19 RX ADMIN — CARVEDILOL PHOSPHATE 25 MILLIGRAM(S): 80 CAPSULE, EXTENDED RELEASE ORAL at 17:26

## 2018-07-19 NOTE — PROGRESS NOTE ADULT - ASSESSMENT
58 F PMH SLE, systolic HFrEF(EF 32%) s/p AICD, HTN, CKD III- IV, PE / left leg DVT dx 2009, HIT, CAD (no stents), stage IV sacral ulcer, chronic Fox, recurrent pericardial effusion, AAA ~14cm in 12/2016 s/p repair in 2010 w/ aortoiliac stent complicated post-operatively with development of left lower extremity compartment syndrome, with recent cellulitis treated inpt now presents with uti and pericardial effusion      1- CKD IV with ILANA   2- pericardial effusion   3- chf  4- HTN   5- neurogenic bladder  6- lupus   7- anemia  8- shpt  9- hyperuricemia   10- UTI  completed abx   11- thoracic aneurysm  cr steady at this higher level   cont with b-b  norvasc for bp control   invanz completed   cont fox    cont calcitriol 0.25mcg daily   cont with allopurinol  restart lasix at 80 mg daily   to restart procrit [id number] units tiw

## 2018-07-19 NOTE — PROGRESS NOTE ADULT - SUBJECTIVE AND OBJECTIVE BOX
[facility]   [phone]  RENAL FOLLOW UP NOTE  --------------------------------------------------------------------------------  Chief Complaint:    24 hour events/subjective:    seen no new c/o except edema     PAST HISTORY  --------------------------------------------------------------------------------  No significant changes to PMH, PSH, FHx, SHx, unless otherwise noted    ALLERGIES & MEDICATIONS  --------------------------------------------------------------------------------  Allergies    heparin (Unknown)  losartan (Anaphylaxis)  nitroglycerin (Other)    Intolerances      Standing Inpatient Medications  allopurinol 100 milliGRAM(s) Oral daily  amLODIPine   Tablet 10 milliGRAM(s) Oral daily  AQUAPHOR (petrolatum Ointment) 1 Application(s) Topical three times a day  ascorbic acid 500 milliGRAM(s) Oral daily  calcitriol   Capsule 0.25 MICROGram(s) Oral daily  carvedilol 25 milliGRAM(s) Oral every 12 hours  furosemide    Tablet 80 milliGRAM(s) Oral daily  lactobacillus acidophilus 1 Tablet(s) Oral daily  Nephro-alyse 1 Tablet(s) Oral daily  nystatin Powder 1 Application(s) Topical two times a day  predniSONE   Tablet 10 milliGRAM(s) Oral daily  zinc sulfate 220 milliGRAM(s) Oral daily    PRN Inpatient Medications  acetaminophen   Tablet. 650 milliGRAM(s) Oral every 6 hours PRN      REVIEW OF SYSTEMS  --------------------------------------------------------------------------------    Gen: denies fevers/chills,  CVS: denies chest pain/palpitations  Resp: denies SOB/Cough  GI: Denies N/V/Abd pain  : Denies dysuria/oliguria/hematuria  + edema     All other systems were reviewed and are negative, except as noted.    VITALS/PHYSICAL EXAM  --------------------------------------------------------------------------------  T(C): 37 (07-19-18 @ 11:55), Max: 37.1 (07-18-18 @ 22:40)  HR: 99 (07-19-18 @ 17:27) (82 - 99)  BP: 135/76 (07-19-18 @ 17:27) (128/73 - 146/74)  RR: 17 (07-19-18 @ 11:55) (17 - 18)  SpO2: 96% (07-19-18 @ 11:55) (95% - 97%)  Wt(kg): --        07-18-18 @ 07:01  -  07-19-18 @ 07:00  --------------------------------------------------------  IN: 780 mL / OUT: 1013 mL / NET: -233 mL    07-19-18 @ 07:01  -  07-19-18 @ 19:11  --------------------------------------------------------  IN: 240 mL / OUT: 1247 mL / NET: -1007 mL      Physical Exam:  	alert oriented place person and date   	Pulm: CTA  no rales or ronchi or wheezing  	CV: RRR, S1/S2. no rub  	Abd: +BS, soft, nontender/nondistended  	: No suprapubic tenderness.               Extremity: No cyanosis, LLE 4+ pitting edema RLE 1++ edema     LABS/STUDIES	  --------------------------------------------------------------------------------              9.1    9.18  >-----------<  269      [07-19-18 @ 08:06]              30.5     144  |  109  |  96  ----------------------------<  92      [07-19-18 @ 06:29]  4.8   |  22  |  3.81        Ca     8.9     [07-19-18 @ 06:29]      Mg     2.4     [07-19-18 @ 02:32]      Phos  4.7     [07-19-18 @ 02:32]      PT/INR: PT 27.3 , INR 2.37       [07-19-18 @ 08:04]      Creatinine Trend:  SCr 3.81 [07-19 @ 06:29]  SCr 3.99 [07-19 @ 02:32]  SCr 3.94 [07-18 @ 05:42]  SCr 3.71 [07-17 @ 10:02]  SCr 3.93 [07-16 @ 11:56]        Urinalysis - [07-06-18 @ 14:45]      Color Yellow / Appearance Turbid / SG 1.015 / pH 8.5      Gluc Negative / Ketone Negative  / Bili Negative / Urobili Negative       Blood Small / Protein 300 / Leuk Est Large / Nitrite Negative      RBC 5-10 / WBC 26-50 / Hyaline  / Gran  / Sq Epi  / Non Sq Epi  / Bacteria Moderate      Iron 67, TIBC 203, %sat 33      [07-12-18 @ 14:18]  Ferritin 961      [07-12-18 @ 14:18]  PTH -- (Ca 8.9)      [04-20-18 @ 08:14]   217  PTH -- (Ca 9.0)      [11-09-17 @ 08:51]   387  Vitamin D (25OH) 7.8      [11-09-17 @ 08:51]  HbA1c 4.5      [08-09-16 @ 06:49]  TSH 0.47      [04-24-18 @ 07:51]    dsDNA 38      [07-10-18 @ 13:55]  C3 Complement 114      [07-10-18 @ 13:54]  C4 Complement 32      [07-10-18 @ 13:54]

## 2018-07-19 NOTE — PROGRESS NOTE ADULT - SUBJECTIVE AND OBJECTIVE BOX
SUBJECTIVE: Pt seen, chart reviewed.  No odor, redness, warmth, f/c/s, nor new wounds  Pt feels relief w/ LLE wrapped   tries to offload  Trying to move around- limited by drains  S/p IR drainage for Recurrent Pericardial effusions      ROS skin/ Msk see HPI  All other systems negative    Physical Exam:  Vital Signs Last 24 Hrs  T(C): 37 (19 Jul 2018 11:55), Max: 37.1 (18 Jul 2018 22:40)  T(F): 98.6 (19 Jul 2018 11:55), Max: 98.7 (18 Jul 2018 22:40)  HR: 88 (19 Jul 2018 11:55) (82 - 89)  BP: 146/74 (19 Jul 2018 11:55) (128/73 - 146/74)  BP(mean): --  RR: 17 (19 Jul 2018 11:55) (17 - 18)  SpO2: 96% (19 Jul 2018 11:55) (95% - 97%)    General Appearance:    Musculoskeletal/Vascular: limited ROM BLE 2/2 LLE edema  FROM BUE   L>>RLE edema  Difficult to assess DP/PT 2/2 edema  (+)hemosiderin staining    Skin: Dry, frail, good turgor    Rt Ischium w/ moist & granular healing STAGE 4 Pressure ucler  2.5cm x 2cm x 0.5cm  w/moist & granular wound base  scant fibrinous exudate  (+)serosanginous drainage   No odor, erythema, increased warmth, tenderness, induration, fluctuance        LABS:                        9.1    9.18  )-----------( 269      ( 19 Jul 2018 08:06 )             30.5     PT/INR - ( 19 Jul 2018 08:04 )   PT: 27.3 sec;   INR: 2.37 ratio             RADIOLOGY & ADDITIONAL STUDIES:  CULTURES:  Culture - Body Fluid with Gram Stain (07.14.18 @ 00:29)    Gram Stain:   polymorphonuclear leukocytes seen  No organisms seen  by cytocentrifuge    Specimen Source: .Body Fluid Pericardial Fluid    Culture Results:   No growth at 5 days SUBJECTIVE: Pt seen, chart reviewed.  No odor, redness, warmth, f/c/s, nor new wounds  Pt feels relief w/ LLE wrapped   tries to offload  Trying to move around- limited by drains  S/p IR drainage for Recurrent Pericardial effusions      ROS skin/ Msk see HPI  All other systems negative    Physical Exam:  Vital Signs Last 24 Hrs  T(C): 37 (19 Jul 2018 11:55), Max: 37.1 (18 Jul 2018 22:40)  T(F): 98.6 (19 Jul 2018 11:55), Max: 98.7 (18 Jul 2018 22:40)  HR: 88 (19 Jul 2018 11:55) (82 - 89)  BP: 146/74 (19 Jul 2018 11:55) (128/73 - 146/74)  BP(mean): --  RR: 17 (19 Jul 2018 11:55) (17 - 18)  SpO2: 96% (19 Jul 2018 11:55) (95% - 97%)    General Appearance:    Musculoskeletal/Vascular: limited ROM BLE 2/2 LLE edema  FROM BUE   L>>RLE edema  Difficult to assess DP/PT 2/2 edema  (+)hemosiderin staining    Skin: Dry, frail, good turgor    Rt Ischium w/ moist & granular healing STAGE 4 Pressure ucler- stable  w/moist & granular wound base  scant fibrinous exudate  (+)serosanginous drainage   No odor, erythema, increased warmth, tenderness, induration, fluctuance        LABS:                        9.1    9.18  )-----------( 269      ( 19 Jul 2018 08:06 )             30.5     PT/INR - ( 19 Jul 2018 08:04 )   PT: 27.3 sec;   INR: 2.37 ratio             RADIOLOGY & ADDITIONAL STUDIES:  CULTURES:  Culture - Body Fluid with Gram Stain (07.14.18 @ 00:29)    Gram Stain:   polymorphonuclear leukocytes seen  No organisms seen  by cytocentrifuge    Specimen Source: .Body Fluid Pericardial Fluid    Culture Results:   No growth at 5 days

## 2018-07-19 NOTE — CHART NOTE - NSCHARTNOTEFT_GEN_A_CORE
called by RN with 11 beats non-sustained Wide complex tachycardia, pt asymptomatic  Vital Signs Last 24 Hrs  T(C): 36.8 (19 Jul 2018 04:00), Max: 37.1 (18 Jul 2018 22:40)  T(F): 98.2 (19 Jul 2018 04:00), Max: 98.7 (18 Jul 2018 22:40)  HR: 87 (19 Jul 2018 04:00) (82 - 89)  BP: 138/78 (19 Jul 2018 04:00) (128/73 - 143/75)  BP(mean): 133 (18 Jul 2018 10:31) (133 - 133)  RR: 18 (19 Jul 2018 04:00) (17 - 18)  SpO2: 96% (19 Jul 2018 04:00) (95% - 97%)  Will check BMP, Mg and phos and supplement as necessary.  Continue coreg.   F/U with primary team in AM    Anastasiya Avilez NP  [phone]

## 2018-07-19 NOTE — PROGRESS NOTE ADULT - SUBJECTIVE AND OBJECTIVE BOX
Patient is a 58y old  Female who presents with a chief complaint of Lack of urine, change in color. (12 Jul 2018 10:26)      SUBJECTIVE / OVERNIGHT EVENTS:   Feels better.  Denies CP/SOB/Palpitation/HA.    MEDICATIONS  (STANDING):  allopurinol 100 milliGRAM(s) Oral daily  amLODIPine   Tablet 10 milliGRAM(s) Oral daily  AQUAPHOR (petrolatum Ointment) 1 Application(s) Topical three times a day  ascorbic acid 500 milliGRAM(s) Oral daily  calcitriol   Capsule 0.25 MICROGram(s) Oral daily  carvedilol 25 milliGRAM(s) Oral every 12 hours  furosemide    Tablet 80 milliGRAM(s) Oral daily  lactobacillus acidophilus 1 Tablet(s) Oral daily  Nephro-alyse 1 Tablet(s) Oral daily  nystatin Powder 1 Application(s) Topical two times a day  predniSONE   Tablet 10 milliGRAM(s) Oral daily  zinc sulfate 220 milliGRAM(s) Oral daily    MEDICATIONS  (PRN):  acetaminophen   Tablet. 650 milliGRAM(s) Oral every 6 hours PRN Moderate Pain (4 - 6)        CAPILLARY BLOOD GLUCOSE        I&O's Summary    18 Jul 2018 07:01  -  19 Jul 2018 07:00  --------------------------------------------------------  IN: 780 mL / OUT: 1013 mL / NET: -233 mL    19 Jul 2018 07:01  -  20 Jul 2018 00:34  --------------------------------------------------------  IN: 951 mL / OUT: 2572 mL / NET: -1621 mL        PHYSICAL EXAM:  GENERAL: NAD, well-developed  HEAD:  Atraumatic, Normocephalic  NECK: Supple, No JVD  CHEST/LUNG: Clear to auscultation bilaterally; No wheezing.  HEART: Regular rate and rhythm; No murmurs, rubs, or gallops  ABDOMEN: Soft, Nontender, Nondistended; Bowel sounds present  EXTREMITIES:   No clubbing, cyanosis, or edema  NEUROLOGY: AAO X 3  SKIN: No rashes    LABS:                        9.1    9.18  )-----------( 269      ( 19 Jul 2018 08:06 )             30.5     07-19    144  |  109<H>  |  96<H>  ----------------------------<  92  4.8   |  22  |  3.81<H>    Ca    8.9      19 Jul 2018 06:29  Phos  4.7     07-19  Mg     2.4     07-19      PT/INR - ( 19 Jul 2018 08:04 )   PT: 27.3 sec;   INR: 2.37 ratio                 CAPILLARY BLOOD GLUCOSE        07-14 @ 00:29  Culture-urine --  Culture results   No growth at 5 days  method type --  Organism --  Organism Identification --  Specimen source .Body Fluid Pericardial Fluid           07-14 @ 00:29  Culture blood --  Culture results   No growth at 5 days  Gram stain   polymorphonuclear leukocytes seen  No organisms seen  by cytocentrifuge  Gram stain blood --  Method type --  Organism --  Organism identification --  Specimen source .Body Fluid Pericardial Fluid      RADIOLOGY & ADDITIONAL TESTS:    Imaging Personally Reviewed:    Consultant(s) Notes Reviewed:      Care Discussed with Consultants/Other Providers:

## 2018-07-19 NOTE — PROGRESS NOTE ADULT - ASSESSMENT
A/P:  58F w/ SLE on prednisone, HFrEF s/p ICD, CKD III-IV, Hx PE/HIT, HTN, CAD no stents, Stage IV decub p/w decreased urine output, hematuria and concurrent UTI    Stage 4 Healing Rt Ischial pressure ulcer- Aquacel  Compression LLE-  BLE elevation  Abx per Medicine/ ID  Moisturize intact skin w/ SWEEN cream BID  con't Nutrition (as tolerated), Nutrition Consult  con't Offloading   con't Pericare  Care as per medicine will follow w/ you  Upon discharge f/u as outpatient at [facility] [address] [place] [address] [phone]  D/w team & attng

## 2018-07-19 NOTE — PROGRESS NOTE ADULT - SUBJECTIVE AND OBJECTIVE BOX
Subjective: Patient seen and examined. No new events except as noted.   no cp or sob    REVIEW OF SYSTEMS:    CONSTITUTIONAL: + weakness, fevers or chills  EYES/ENT: No visual changes;  No vertigo or throat pain   NECK: No pain or stiffness  RESPIRATORY: No cough, wheezing, hemoptysis; No shortness of breath  CARDIOVASCULAR: No chest pain or palpitations  GASTROINTESTINAL: No abdominal or epigastric pain. No nausea, vomiting, or hematemesis; No diarrhea or constipation. No melena or hematochezia.  GENITOURINARY: No dysuria, frequency or hematuria  NEUROLOGICAL: No numbness or weakness  SKIN: No itching, burning, rashes, or lesions   All other review of systems is negative unless indicated above.    MEDICATIONS:  MEDICATIONS  (STANDING):  allopurinol 100 milliGRAM(s) Oral daily  amLODIPine   Tablet 10 milliGRAM(s) Oral daily  AQUAPHOR (petrolatum Ointment) 1 Application(s) Topical three times a day  ascorbic acid 500 milliGRAM(s) Oral daily  calcitriol   Capsule 0.25 MICROGram(s) Oral daily  carvedilol 25 milliGRAM(s) Oral every 12 hours  furosemide    Tablet 80 milliGRAM(s) Oral daily  lactobacillus acidophilus 1 Tablet(s) Oral daily  Nephro-alyse 1 Tablet(s) Oral daily  nystatin Powder 1 Application(s) Topical two times a day  predniSONE   Tablet 10 milliGRAM(s) Oral daily  zinc sulfate 220 milliGRAM(s) Oral daily      PHYSICAL EXAM:  T(C): 36.8 (07-19-18 @ 04:00), Max: 37.1 (07-18-18 @ 22:40)  HR: 83 (07-19-18 @ 05:34) (82 - 89)  BP: 130/81 (07-19-18 @ 05:34) (128/73 - 143/75)  RR: 18 (07-19-18 @ 05:34) (17 - 18)  SpO2: 96% (07-19-18 @ 05:34) (95% - 97%)  Wt(kg): --  I&O's Summary    18 Jul 2018 07:01  -  19 Jul 2018 07:00  --------------------------------------------------------  IN: 780 mL / OUT: 1013 mL / NET: -233 mL    19 Jul 2018 07:01  -  19 Jul 2018 10:54  --------------------------------------------------------  IN: 240 mL / OUT: 0 mL / NET: 240 mL          Appearance: NAD  HEENT:   Normal oral mucosa, PERRL, EOMI	  Lymphatic: No lymphadenopathy , no edema  Cardiovascular: Normal S1 S2, No JVD, No murmurs , +pericardial drain   Respiratory: Lungs clear to auscultation, normal effort 	  Gastrointestinal:  Soft, Non-tender, + BS	  Skin: No rashes, No ecchymoses, No cyanosis, warm to touch  Musculoskeletal: decreased range of motion and  strength  Psychiatry:  Mood & affect appropriate  Ext: Massive LLE  edema  + fox     LABS:    CARDIAC MARKERS:      Prothrombin Time and INR, Plasma in AM (07.19.18 @ 08:04)    Prothrombin Time, Plasma: 27.3 sec    INR: 2.37: Recommended ranges for therapeutic INR:    2.0-3.0 for most medical and surgical thromboembolic states    2.0-3.0 for atrial fibrillation    2.0-3.0 for bileaflet mechanical valve in aortic position    2.5-3.5 for mechanical heart valves    Chest 2004;126:[id number]  The presence of direct thrombin inhibitors (argatroban, refludan)  may falsely increase results. ratio                              9.1    9.18  )-----------( 269      ( 19 Jul 2018 08:06 )             30.5     07-19    144  |  109<H>  |  96<H>  ----------------------------<  92  4.8   |  22  |  3.81<H>    Ca    8.9      19 Jul 2018 06:29  Phos  4.7     07-19  Mg     2.4     07-19      proBNP:   Lipid Profile:   HgA1c:   TSH:             TELEMETRY: 	V paced     ECG:  	  RADIOLOGY:   DIAGNOSTIC TESTING:  [ ] Echocardiogram:    [ ]  Catheterization:  [ ] Stress Test:    OTHER:

## 2018-07-20 LAB
ANION GAP SERPL CALC-SCNC: 16 MMOL/L — SIGNIFICANT CHANGE UP (ref 5–17)
APTT BLD: 37.6 SEC — HIGH (ref 27.5–37.4)
BUN SERPL-MCNC: 97 MG/DL — HIGH (ref 7–23)
CALCIUM SERPL-MCNC: 8.6 MG/DL — SIGNIFICANT CHANGE UP (ref 8.4–10.5)
CHLORIDE SERPL-SCNC: 106 MMOL/L — SIGNIFICANT CHANGE UP (ref 96–108)
CO2 SERPL-SCNC: 22 MMOL/L — SIGNIFICANT CHANGE UP (ref 22–31)
CREAT SERPL-MCNC: 3.66 MG/DL — HIGH (ref 0.5–1.3)
GLUCOSE SERPL-MCNC: 97 MG/DL — SIGNIFICANT CHANGE UP (ref 70–99)
HCT VFR BLD CALC: 29.1 % — LOW (ref 34.5–45)
HGB BLD-MCNC: 8.8 G/DL — LOW (ref 11.5–15.5)
INR BLD: 2.5 RATIO — HIGH (ref 0.88–1.16)
MCHC RBC-ENTMCNC: 27.3 PG — SIGNIFICANT CHANGE UP (ref 27–34)
MCHC RBC-ENTMCNC: 30.2 GM/DL — LOW (ref 32–36)
MCV RBC AUTO: 90.4 FL — SIGNIFICANT CHANGE UP (ref 80–100)
PLATELET # BLD AUTO: 236 K/UL — SIGNIFICANT CHANGE UP (ref 150–400)
POTASSIUM SERPL-MCNC: 4.4 MMOL/L — SIGNIFICANT CHANGE UP (ref 3.5–5.3)
POTASSIUM SERPL-SCNC: 4.4 MMOL/L — SIGNIFICANT CHANGE UP (ref 3.5–5.3)
PROTHROM AB SERPL-ACNC: 28.8 SEC — HIGH (ref 10–13.1)
RBC # BLD: 3.22 M/UL — LOW (ref 3.8–5.2)
RBC # FLD: 18.9 % — HIGH (ref 10.3–14.5)
SODIUM SERPL-SCNC: 144 MMOL/L — SIGNIFICANT CHANGE UP (ref 135–145)
WBC # BLD: 9.73 K/UL — SIGNIFICANT CHANGE UP (ref 3.8–10.5)
WBC # FLD AUTO: 9.73 K/UL — SIGNIFICANT CHANGE UP (ref 3.8–10.5)

## 2018-07-20 RX ORDER — WARFARIN SODIUM 2.5 MG/1
2 TABLET ORAL ONCE
Qty: 0 | Refills: 0 | Status: COMPLETED | OUTPATIENT
Start: 2018-07-20 | End: 2018-07-20

## 2018-07-20 RX ADMIN — CALCITRIOL 0.25 MICROGRAM(S): 0.5 CAPSULE ORAL at 12:24

## 2018-07-20 RX ADMIN — Medication 1 APPLICATION(S): at 13:25

## 2018-07-20 RX ADMIN — NYSTATIN CREAM 1 APPLICATION(S): 100000 CREAM TOPICAL at 06:08

## 2018-07-20 RX ADMIN — Medication 1 TABLET(S): at 12:24

## 2018-07-20 RX ADMIN — Medication 1 APPLICATION(S): at 06:07

## 2018-07-20 RX ADMIN — NYSTATIN CREAM 1 APPLICATION(S): 100000 CREAM TOPICAL at 17:13

## 2018-07-20 RX ADMIN — AMLODIPINE BESYLATE 10 MILLIGRAM(S): 2.5 TABLET ORAL at 07:57

## 2018-07-20 RX ADMIN — Medication 10 MILLIGRAM(S): at 07:59

## 2018-07-20 RX ADMIN — Medication 80 MILLIGRAM(S): at 06:08

## 2018-07-20 RX ADMIN — CARVEDILOL PHOSPHATE 25 MILLIGRAM(S): 80 CAPSULE, EXTENDED RELEASE ORAL at 17:13

## 2018-07-20 RX ADMIN — Medication 100 MILLIGRAM(S): at 12:25

## 2018-07-20 RX ADMIN — ZINC SULFATE TAB 220 MG (50 MG ZINC EQUIVALENT) 220 MILLIGRAM(S): 220 (50 ZN) TAB at 12:25

## 2018-07-20 RX ADMIN — Medication 500 MILLIGRAM(S): at 12:25

## 2018-07-20 RX ADMIN — WARFARIN SODIUM 2 MILLIGRAM(S): 2.5 TABLET ORAL at 21:17

## 2018-07-20 RX ADMIN — CARVEDILOL PHOSPHATE 25 MILLIGRAM(S): 80 CAPSULE, EXTENDED RELEASE ORAL at 06:08

## 2018-07-20 NOTE — PROGRESS NOTE ADULT - ASSESSMENT
58F w/ SLE on prednisone, HFrEF s/p ICD, CKD III-IV, Hx PE/HIT, HTN, CAD no stents, Stage IV decub p/w decreased urine output, hematuria and concurrent UTI.    Recurrent Pericardial effusion:  S/p IR drainage.  Cardio f/up

## 2018-07-20 NOTE — PROGRESS NOTE ADULT - PROBLEM SELECTOR PROBLEM 7
Other pulmonary embolism without acute cor pulmonale, unspecified chronicity

## 2018-07-20 NOTE — PROGRESS NOTE ADULT - SUBJECTIVE AND OBJECTIVE BOX
Pericardial drain output minimal. Echo demonstrates no significant change in effusion after drainage which may be a result of loculation. Patient is no longer in tamponade. It was then decided to remove the drain after discussion with patient's cardiologist  on 7/20/18.

## 2018-07-20 NOTE — PROGRESS NOTE ADULT - PROBLEM SELECTOR PLAN 8
DVT PPx  -Cont. coumadin

## 2018-07-20 NOTE — PROGRESS NOTE ADULT - SUBJECTIVE AND OBJECTIVE BOX
Subjective: Patient seen and examined. No new events except as noted.   feels well  no cp or sob     REVIEW OF SYSTEMS:    CONSTITUTIONAL: + weakness, fevers or chills  EYES/ENT: No visual changes;  No vertigo or throat pain   NECK: No pain or stiffness  RESPIRATORY: No cough, wheezing, hemoptysis; No shortness of breath  CARDIOVASCULAR: No chest pain or palpitations  GASTROINTESTINAL: No abdominal or epigastric pain. No nausea, vomiting, or hematemesis; No diarrhea or constipation. No melena or hematochezia.  GENITOURINARY: No dysuria, frequency or hematuria  NEUROLOGICAL: No numbness or weakness  SKIN: No itching, burning, rashes, or lesions   All other review of systems is negative unless indicated above.    MEDICATIONS:  MEDICATIONS  (STANDING):  allopurinol 100 milliGRAM(s) Oral daily  amLODIPine   Tablet 10 milliGRAM(s) Oral daily  AQUAPHOR (petrolatum Ointment) 1 Application(s) Topical three times a day  ascorbic acid 500 milliGRAM(s) Oral daily  calcitriol   Capsule 0.25 MICROGram(s) Oral daily  carvedilol 25 milliGRAM(s) Oral every 12 hours  furosemide    Tablet 80 milliGRAM(s) Oral daily  lactobacillus acidophilus 1 Tablet(s) Oral daily  Nephro-alyse 1 Tablet(s) Oral daily  nystatin Powder 1 Application(s) Topical two times a day  predniSONE   Tablet 10 milliGRAM(s) Oral daily  zinc sulfate 220 milliGRAM(s) Oral daily      PHYSICAL EXAM:  T(C): 36.4 (07-20-18 @ 05:13), Max: 37 (07-19-18 @ 11:55)  HR: 90 (07-20-18 @ 07:59) (81 - 99)  BP: 145/84 (07-20-18 @ 07:59) (135/76 - 146/74)  RR: 18 (07-20-18 @ 05:13) (17 - 18)  SpO2: 96% (07-20-18 @ 05:13) (96% - 97%)  Wt(kg): --  I&O's Summary    19 Jul 2018 07:01  -  20 Jul 2018 07:00  --------------------------------------------------------  IN: 951 mL / OUT: 3247 mL / NET: -2296 mL    20 Jul 2018 07:01  -  20 Jul 2018 09:59  --------------------------------------------------------  IN: 240 mL / OUT: 0 mL / NET: 240 mL          Appearance: Normal	  HEENT:   Normal oral mucosa, PERRL, EOMI	  Lymphatic: No lymphadenopathy , no edema  Cardiovascular: Normal S1 S2, No JVD, No murmurs , + pericardial drain   Respiratory: Lungs clear to auscultation, normal effort 	  Gastrointestinal:  Soft, Non-tender, + BS	  Skin: No rashes, No ecchymoses, No cyanosis, warm to touch  Musculoskeletal: decreased  range of motion and strength  Psychiatry:  Mood & affect appropriate  Ext: massive LLE edema  +fox       LABS:    CARDIAC MARKERS:                                9.1    9.18  )-----------( 269      ( 19 Jul 2018 08:06 )             30.5     07-19    144  |  109<H>  |  96<H>  ----------------------------<  92  4.8   |  22  |  3.81<H>    Ca    8.9      19 Jul 2018 06:29  Phos  4.7     07-19  Mg     2.4     07-19      proBNP:   Lipid Profile:   HgA1c:   TSH:             TELEMETRY: 	  SR/V paced   ECG:  	  RADIOLOGY:   DIAGNOSTIC TESTING:   [ ] Echocardiogram:    [ ]  Catheterization:  [ ] Stress Test:    OTHER:

## 2018-07-20 NOTE — CHART NOTE - NSCHARTNOTEFT_GEN_A_CORE
Medicine NP     IR called with Echo finding and minimum drainage noted from pericardial drain > Inlast 12 hrs ,noted 10ml drainage.  ECHO : Compared with echocardiogram report  of 7/6/2018, no  significant changes noted. Large effusion seen on prior  study.  Spoke with IR resident Dr. Smith and  (cardiology). Regarding flushing the tubing Vs ? loculated ? need for pericardial window In setting of  recurrent pericardial effusion .  Patient will be evaluated by IR at the bedside for necessary intervention.

## 2018-07-20 NOTE — PROGRESS NOTE ADULT - SUBJECTIVE AND OBJECTIVE BOX
Patient is a 58y old  Female who presents with a chief complaint of Lack of urine, change in color. (12 Jul 2018 10:26)      SUBJECTIVE / OVERNIGHT EVENTS:   Feels better.  Denies CP/SOB/Palpitation/HA.    MEDICATIONS  (STANDING):  allopurinol 100 milliGRAM(s) Oral daily  amLODIPine   Tablet 10 milliGRAM(s) Oral daily  AQUAPHOR (petrolatum Ointment) 1 Application(s) Topical three times a day  ascorbic acid 500 milliGRAM(s) Oral daily  calcitriol   Capsule 0.25 MICROGram(s) Oral daily  carvedilol 25 milliGRAM(s) Oral every 12 hours  furosemide    Tablet 80 milliGRAM(s) Oral daily  lactobacillus acidophilus 1 Tablet(s) Oral daily  Nephro-alyse 1 Tablet(s) Oral daily  nystatin Powder 1 Application(s) Topical two times a day  predniSONE   Tablet 10 milliGRAM(s) Oral daily  zinc sulfate 220 milliGRAM(s) Oral daily    MEDICATIONS  (PRN):  acetaminophen   Tablet. 650 milliGRAM(s) Oral every 6 hours PRN Moderate Pain (4 - 6)        CAPILLARY BLOOD GLUCOSE        I&O's Summary    19 Jul 2018 07:01  -  20 Jul 2018 07:00  --------------------------------------------------------  IN: 951 mL / OUT: 3247 mL / NET: -2296 mL    20 Jul 2018 07:01  -  20 Jul 2018 23:52  --------------------------------------------------------  IN: 954 mL / OUT: 2225 mL / NET: -1271 mL        PHYSICAL EXAM:  GENERAL: NAD, well-developed  HEAD:  Atraumatic, Normocephalic  NECK: Supple, No JVD  CHEST/LUNG: Clear to auscultation bilaterally; No wheezing.  HEART: Regular rate and rhythm; No murmurs, rubs, or gallops  ABDOMEN: Soft, Nontender, Nondistended; Bowel sounds present  EXTREMITIES:   No clubbing, cyanosis, or edema  NEUROLOGY: AAO X 3  SKIN: No rashes    LABS:                        8.8    9.73  )-----------( 236      ( 20 Jul 2018 10:39 )             29.1     07-20    144  |  106  |  97<H>  ----------------------------<  97  4.4   |  22  |  3.66<H>    Ca    8.6      20 Jul 2018 09:15  Phos  4.7     07-19  Mg     2.4     07-19      PT/INR - ( 20 Jul 2018 10:53 )   PT: 28.8 sec;   INR: 2.50 ratio         PTT - ( 20 Jul 2018 10:53 )  PTT:37.6 sec        CAPILLARY BLOOD GLUCOSE        07-14 @ 00:29  Culture-urine --  Culture results   No growth at 5 days  method type --  Organism --  Organism Identification --  Specimen source .Body Fluid Pericardial Fluid           07-14 @ 00:29  Culture blood --  Culture results   No growth at 5 days  Gram stain   polymorphonuclear leukocytes seen  No organisms seen  by cytocentrifuge  Gram stain blood --  Method type --  Organism --  Organism identification --  Specimen source .Body Fluid Pericardial Fluid      RADIOLOGY & ADDITIONAL TESTS:    Imaging Personally Reviewed:    Consultant(s) Notes Reviewed:      Care Discussed with Consultants/Other Providers:

## 2018-07-20 NOTE — PROGRESS NOTE ADULT - ASSESSMENT
58 F PMH SLE, systolic HFrEF(EF 32%) s/p AICD, HTN, CKD III- IV, PE / left leg DVT dx 2009, HIT, CAD (no stents), stage IV sacral ulcer, chronic Fox, recurrent pericardial effusion, AAA ~14cm in 12/2016 s/p repair in 2010 w/ aortoiliac stent complicated post-operatively with development of left lower extremity compartment syndrome, with recent cellulitis treated inpt now presents with uti and pericardial effusion      1- CKD IV with ILANA   2- pericardial effusion   3- chf  4- HTN   5- neurogenic bladder  6- lupus   7- anemia  8- shpt  9- hyperuricemia   10- UTI  completed abx   11- thoracic aneurysm  cr steady at this higher level   cont with b-b  norvasc for bp control   invanz completed   cont fox    cont calcitriol 0.25mcg daily   cont with allopurinol  lasix at 80 mg daily  Keep O> I for now   to restart procrit [id number] units tiw

## 2018-07-20 NOTE — PROGRESS NOTE ADULT - PROBLEM SELECTOR PLAN 6
Cont current Mx.
-Trend vital signs, BP meds with hold parameters  -Cont. labetalol 200mg TID  -Cont. hydralazine 50mg TID  -Cont. amlodipine
-Trend vital signs, BP meds with hold parameters  -Cont. labetalol 200mg TID  -Cont. hydralazine 50mg TID  -Cont. amlodipine
Cont current Mx.
-Trend vital signs, BP meds with hold parameters  -Cont. labetalol 200mg TID  -Cont. hydralazine 50mg TID  -Cont. amlodipine
Cont current Mx.
Cont current Mx.

## 2018-07-20 NOTE — PROGRESS NOTE ADULT - PROBLEM SELECTOR PLAN 7
INR- supratherapeutic  -Trend PT/INR daily
-Cont. coumadin  -Trend PT/INR daily
INR- supratherapeutic  -Trend PT/INR daily
INR- supratherapeutic  -Trend PT/INR daily
-Cont. coumadin  -Trend PT/INR daily
-Cont. coumadin  -Trend PT/INR daily
-Cont. coumadin 4mg QHS  -Trend PT/INR daily
INR- supratherapeutic  -Trend PT/INR daily

## 2018-07-20 NOTE — PROGRESS NOTE ADULT - SUBJECTIVE AND OBJECTIVE BOX
[facility]   [phone]  RENAL FOLLOW UP NOTE  --------------------------------------------------------------------------------  Chief Complaint:    24 hour events/subjective:    seen pericardial drain being pulled out     PAST HISTORY  --------------------------------------------------------------------------------  No significant changes to PMH, PSH, FHx, SHx, unless otherwise noted    ALLERGIES & MEDICATIONS  --------------------------------------------------------------------------------  Allergies    heparin (Unknown)  losartan (Anaphylaxis)  nitroglycerin (Other)    Intolerances      Standing Inpatient Medications  allopurinol 100 milliGRAM(s) Oral daily  amLODIPine   Tablet 10 milliGRAM(s) Oral daily  AQUAPHOR (petrolatum Ointment) 1 Application(s) Topical three times a day  ascorbic acid 500 milliGRAM(s) Oral daily  calcitriol   Capsule 0.25 MICROGram(s) Oral daily  carvedilol 25 milliGRAM(s) Oral every 12 hours  furosemide    Tablet 80 milliGRAM(s) Oral daily  lactobacillus acidophilus 1 Tablet(s) Oral daily  Nephro-alyse 1 Tablet(s) Oral daily  nystatin Powder 1 Application(s) Topical two times a day  predniSONE   Tablet 10 milliGRAM(s) Oral daily  warfarin 2 milliGRAM(s) Oral once  zinc sulfate 220 milliGRAM(s) Oral daily    PRN Inpatient Medications  acetaminophen   Tablet. 650 milliGRAM(s) Oral every 6 hours PRN      REVIEW OF SYSTEMS  --------------------------------------------------------------------------------    Gen: denies fevers/chills,  CVS: denies chest pain/palpitations  Resp: denies SOB/Cough  GI: Denies N/V/Abd pain  : Denies dysuria    All other systems were reviewed and are negative, except as noted.    VITALS/PHYSICAL EXAM  --------------------------------------------------------------------------------  T(C): 37 (07-20-18 @ 13:17), Max: 37 (07-20-18 @ 13:17)  HR: 87 (07-20-18 @ 17:14) (81 - 92)  BP: 163/77 (07-20-18 @ 17:14) (138/52 - 163/77)  RR: 18 (07-20-18 @ 13:17) (18 - 18)  SpO2: 95% (07-20-18 @ 13:17) (95% - 97%)  Wt(kg): --        07-19-18 @ 07:01  -  07-20-18 @ 07:00  --------------------------------------------------------  IN: 951 mL / OUT: 3247 mL / NET: -2296 mL    07-20-18 @ 07:01  -  07-20-18 @ 19:01  --------------------------------------------------------  IN: 480 mL / OUT: 1125 mL / NET: -645 mL      Physical Exam:  alert oriented place person and date   	Pulm: CTA  no rales or ronchi or wheezing  	CV: RRR, S1/S2. no rub  	Abd: +BS, soft, nontender/nondistended  	: No suprapubic tenderness.               Extremity: No cyanosis, LLE 4+ pitting edema RLE 1++ edema   		    LABS/STUDIES  --------------------------------------------------------------------------------              8.8    9.73  >-----------<  236      [07-20-18 @ 10:39]              29.1     144  |  106  |  97  ----------------------------<  97      [07-20-18 @ 09:15]  4.4   |  22  |  3.66        Ca     8.6     [07-20-18 @ 09:15]      Mg     2.4     [07-19-18 @ 02:32]      Phos  4.7     [07-19-18 @ 02:32]      PT/INR: PT 28.8 , INR 2.50       [07-20-18 @ 10:53]  PTT: 37.6       [07-20-18 @ 10:53]      Creatinine Trend:  SCr 3.66 [07-20 @ 09:15]  SCr 3.81 [07-19 @ 06:29]  SCr 3.99 [07-19 @ 02:32]  SCr 3.94 [07-18 @ 05:42]  SCr 3.71 [07-17 @ 10:02]              Urinalysis - [07-06-18 @ 14:45]      Color Yellow / Appearance Turbid / SG 1.015 / pH 8.5      Gluc Negative / Ketone Negative  / Bili Negative / Urobili Negative       Blood Small / Protein 300 / Leuk Est Large / Nitrite Negative      RBC 5-10 / WBC 26-50 / Hyaline  / Gran  / Sq Epi  / Non Sq Epi  / Bacteria Moderate      Iron 67, TIBC 203, %sat 33      [07-12-18 @ 14:18]  Ferritin 961      [07-12-18 @ 14:18]  PTH -- (Ca 8.9)      [04-20-18 @ 08:14]   217  PTH -- (Ca 9.0)      [11-09-17 @ 08:51]   387  Vitamin D (25OH) 7.8      [11-09-17 @ 08:51]  HbA1c 4.5      [08-09-16 @ 06:49]  TSH 0.47      [04-24-18 @ 07:51]    dsDNA 38      [07-10-18 @ 13:55]  C3 Complement 114      [07-10-18 @ 13:54]  C4 Complement 32      [07-10-18 @ 13:54]

## 2018-07-21 LAB
ANION GAP SERPL CALC-SCNC: 14 MMOL/L — SIGNIFICANT CHANGE UP (ref 5–17)
ANISOCYTOSIS BLD QL: SIGNIFICANT CHANGE UP
BASOPHILS # BLD AUTO: 0 K/UL — SIGNIFICANT CHANGE UP (ref 0–0.2)
BASOPHILS NFR BLD AUTO: 0 % — SIGNIFICANT CHANGE UP (ref 0–2)
BUN SERPL-MCNC: 95 MG/DL — HIGH (ref 7–23)
CALCIUM SERPL-MCNC: 8.4 MG/DL — SIGNIFICANT CHANGE UP (ref 8.4–10.5)
CHLORIDE SERPL-SCNC: 108 MMOL/L — SIGNIFICANT CHANGE UP (ref 96–108)
CO2 SERPL-SCNC: 22 MMOL/L — SIGNIFICANT CHANGE UP (ref 22–31)
CREAT SERPL-MCNC: 3.59 MG/DL — HIGH (ref 0.5–1.3)
DACRYOCYTES BLD QL SMEAR: SLIGHT — SIGNIFICANT CHANGE UP
ELLIPTOCYTES BLD QL SMEAR: SLIGHT — SIGNIFICANT CHANGE UP
EOSINOPHIL # BLD AUTO: 0.1 K/UL — SIGNIFICANT CHANGE UP (ref 0–0.5)
EOSINOPHIL NFR BLD AUTO: 1 % — SIGNIFICANT CHANGE UP (ref 0–6)
GLUCOSE SERPL-MCNC: 87 MG/DL — SIGNIFICANT CHANGE UP (ref 70–99)
HCT VFR BLD CALC: 29.6 % — LOW (ref 34.5–45)
HGB BLD-MCNC: 8.7 G/DL — LOW (ref 11.5–15.5)
HYPOCHROMIA BLD QL: SLIGHT — SIGNIFICANT CHANGE UP
INR BLD: 2.83 RATIO — HIGH (ref 0.88–1.16)
LYMPHOCYTES # BLD AUTO: 0.84 K/UL — LOW (ref 1–3.3)
LYMPHOCYTES # BLD AUTO: 8 % — LOW (ref 13–44)
MANUAL SMEAR VERIFICATION: SIGNIFICANT CHANGE UP
MCHC RBC-ENTMCNC: 26.4 PG — LOW (ref 27–34)
MCHC RBC-ENTMCNC: 29.4 GM/DL — LOW (ref 32–36)
MCV RBC AUTO: 90 FL — SIGNIFICANT CHANGE UP (ref 80–100)
METAMYELOCYTES # FLD: 1 % — HIGH (ref 0–0)
MONOCYTES # BLD AUTO: 0.84 K/UL — SIGNIFICANT CHANGE UP (ref 0–0.9)
MONOCYTES NFR BLD AUTO: 8 % — SIGNIFICANT CHANGE UP (ref 2–14)
MYELOCYTES NFR BLD: 1 % — HIGH (ref 0–0)
NEUTROPHILS # BLD AUTO: 8.5 K/UL — HIGH (ref 1.8–7.4)
NEUTROPHILS NFR BLD AUTO: 80 % — HIGH (ref 43–77)
NEUTS BAND # BLD: 1 % — SIGNIFICANT CHANGE UP (ref 0–8)
NRBC # BLD: 0 /100 — SIGNIFICANT CHANGE UP (ref 0–0)
PLAT MORPH BLD: NORMAL — SIGNIFICANT CHANGE UP
PLATELET # BLD AUTO: 254 K/UL — SIGNIFICANT CHANGE UP (ref 150–400)
POIKILOCYTOSIS BLD QL AUTO: SIGNIFICANT CHANGE UP
POLYCHROMASIA BLD QL SMEAR: SLIGHT — SIGNIFICANT CHANGE UP
POTASSIUM SERPL-MCNC: 4.3 MMOL/L — SIGNIFICANT CHANGE UP (ref 3.5–5.3)
POTASSIUM SERPL-SCNC: 4.3 MMOL/L — SIGNIFICANT CHANGE UP (ref 3.5–5.3)
PROTHROM AB SERPL-ACNC: 32.7 SEC — HIGH (ref 10–13.1)
RBC # BLD: 3.29 M/UL — LOW (ref 3.8–5.2)
RBC # FLD: 19.3 % — HIGH (ref 10.3–14.5)
RBC BLD AUTO: ABNORMAL
SCHISTOCYTES BLD QL AUTO: SLIGHT — SIGNIFICANT CHANGE UP
SODIUM SERPL-SCNC: 144 MMOL/L — SIGNIFICANT CHANGE UP (ref 135–145)
WBC # BLD: 10.49 K/UL — SIGNIFICANT CHANGE UP (ref 3.8–10.5)
WBC # FLD AUTO: 10.49 K/UL — SIGNIFICANT CHANGE UP (ref 3.8–10.5)

## 2018-07-21 RX ADMIN — NYSTATIN CREAM 1 APPLICATION(S): 100000 CREAM TOPICAL at 18:41

## 2018-07-21 RX ADMIN — CALCITRIOL 0.25 MICROGRAM(S): 0.5 CAPSULE ORAL at 13:18

## 2018-07-21 RX ADMIN — Medication 500 MILLIGRAM(S): at 13:18

## 2018-07-21 RX ADMIN — Medication 650 MILLIGRAM(S): at 22:21

## 2018-07-21 RX ADMIN — AMLODIPINE BESYLATE 10 MILLIGRAM(S): 2.5 TABLET ORAL at 08:52

## 2018-07-21 RX ADMIN — CARVEDILOL PHOSPHATE 25 MILLIGRAM(S): 80 CAPSULE, EXTENDED RELEASE ORAL at 18:40

## 2018-07-21 RX ADMIN — Medication 1 APPLICATION(S): at 13:18

## 2018-07-21 RX ADMIN — Medication 100 MILLIGRAM(S): at 13:18

## 2018-07-21 RX ADMIN — Medication 10 MILLIGRAM(S): at 08:51

## 2018-07-21 RX ADMIN — ZINC SULFATE TAB 220 MG (50 MG ZINC EQUIVALENT) 220 MILLIGRAM(S): 220 (50 ZN) TAB at 13:18

## 2018-07-21 RX ADMIN — Medication 1 TABLET(S): at 13:17

## 2018-07-21 RX ADMIN — Medication 650 MILLIGRAM(S): at 22:51

## 2018-07-21 RX ADMIN — CARVEDILOL PHOSPHATE 25 MILLIGRAM(S): 80 CAPSULE, EXTENDED RELEASE ORAL at 06:12

## 2018-07-21 RX ADMIN — Medication 80 MILLIGRAM(S): at 06:12

## 2018-07-21 NOTE — PROGRESS NOTE ADULT - ASSESSMENT
58 F PMH SLE, systolic HFrEF(EF 32%) s/p AICD, HTN, CKD III- IV, PE / left leg DVT dx 2009, HIT, CAD (no stents), stage IV sacral ulcer, chronic Fox, recurrent pericardial effusion, AAA ~14cm in 12/2016 s/p repair in 2010 w/ aortoiliac stent complicated post-operatively with development of left lower extremity compartment syndrome, with recent cellulitis treated inpt now presents with uti and pericardial effusion      1- CKD IV with ILANA   2- pericardial effusion   3- chf  4- HTN   5- neurogenic bladder  6- lupus   7- anemia  8- shpt  9- hyperuricemia   10- UTI  completed abx   11- thoracic aneurysm  cr steady  cont with b-b and norvasc    completed abx   cont fox   given hx of neurogenic bladder  cont calcitriol 0.25mcg daily  check pth   cont with allopurinol for hx of severe gout. pt was unable to afford uloric   lasix at 80 mg daily  Keep O> I fluid status improving  to restart procrit [id number] units tiw

## 2018-07-21 NOTE — PROGRESS NOTE ADULT - ASSESSMENT
58F w/ SLE on prednisone, HFrEF s/p ICD, CKD III-IV, Hx PE/HIT, HTN, CAD no stents, Stage IV decub p/w decreased urine output, hematuria and concurrent UTI.    Recurrent Pericardial effusion:  S/p IR drainage.  Cardio f/up    Problem/Plan - 1:  ·  Problem: Urinary tract infection associated with catheterization of urinary tract, unspecified indwelling urinary catheter type, initial encounter.  Plan: Off abx.     Problem/Plan - 2:  ·  Problem: Acute kidney injury.  Plan: Renal FU noted.     Problem/Plan - 3:  ·  Problem: Thoracic aortic aneurysm without rupture.  Plan: CT Surgery suggests repeat CT for surveillance.     Problem/Plan - 4:  ·  Problem: Lupus erythematosus, unspecified form.  Plan: Prednisone.     Problem/Plan - 5:58F w/ SLE on prednisone, HFrEF s/p ICD, CKD III-IV, Hx PE/HIT, HTN, CAD no stents, Stage IV decub p/w decreased urine output, hematuria and concurrent UTI.    Recurrent Pericardial effusion:  S/p IR drainage.  Cardio f/up    Problem/Plan - 1:  ·  Problem: Urinary tract infection associated with catheterization of urinary tract, unspecified indwelling urinary catheter type, initial encounter.  Plan: Off abx.     Problem/Plan - 2:  ·  Problem: Acute kidney injury.  Plan: Renal FU noted.     Problem/Plan - 3:  ·  Problem: Thoracic aortic aneurysm without rupture.  Plan: CT Surgery suggests repeat CT for surveillance.     Problem/Plan - 4:  ·  Problem: Lupus erythematosus, unspecified form.  Plan: Prednisone.     Problem/Plan - 5:·  Problem: Congestive heart failure, severe.  Plan: Lasix  BMP.     Problem/Plan - 6:  Problem: Essential hypertension. Plan: Cont current Mx.    Problem/Plan - 7:  ·  Problem: Other pulmonary embolism without acute cor pulmonale, unspecified chronicity.  Plan: INR- supratherapeutic  -Trend PT/INR daily. Problem/Plan - 1:  ·  Problem: Urinary tract infection associated with catheterization of urinary tract, unspecified indwelling urinary catheter type, initial encounter.  Plan: Off abx.     Problem/Plan - 2:  ·  Problem: Acute kidney injury.  Plan: Renal FU noted.     Problem/Plan - 3:  ·  Problem: Thoracic aortic aneurysm without rupture.  Plan: CT Surgery suggests repeat CT for surveillance.     Problem/Plan - 4:  ·  Problem: Lupus erythematosus, unspecified form.  Plan: Prednisone.     Problem/Plan - 5:58F w/ SLE on prednisone, HFrEF s/p ICD, CKD III-IV, Hx PE/HIT, HTN, CAD no stents, Stage IV decub p/w decreased urine output, hematuria and concurrent UTI.        Problem/Plan - 6:  Problem: Essential hypertension. Plan: Cont current Mx.    Problem/Plan - 7:  ·  Problem: Other pulmonary embolism without acute cor pulmonale, unspecified chronicity.  Plan: INR- supratherapeutic  -Trend PT/INR daily.

## 2018-07-21 NOTE — PROGRESS NOTE ADULT - SUBJECTIVE AND OBJECTIVE BOX
Patient is a 58y old  Female who presents with a chief complaint of Lack of urine, change in color. (12 Jul 2018 10:26)      INTERVAL HPI/OVERNIGHT EVENTS:  T(C): 36.8 (07-21-18 @ 13:21), Max: 37.1 (07-21-18 @ 00:17)  HR: 82 (07-21-18 @ 13:21) (82 - 87)  BP: 137/73 (07-21-18 @ 13:21) (137/73 - 164/75)  RR: 19 (07-21-18 @ 13:21) (18 - 19)  SpO2: 97% (07-21-18 @ 13:21) (96% - 97%)  Wt(kg): --  I&O's Summary    20 Jul 2018 07:01  -  21 Jul 2018 07:00  --------------------------------------------------------  IN: 954 mL / OUT: 3250 mL / NET: -2296 mL    21 Jul 2018 07:01  -  21 Jul 2018 16:50  --------------------------------------------------------  IN: 840 mL / OUT: 825 mL / NET: 15 mL        LABS:                        8.7    10.49 )-----------( 254      ( 21 Jul 2018 06:46 )             29.6     07-21    144  |  108  |  95<H>  ----------------------------<  87  4.3   |  22  |  3.59<H>    Ca    8.4      21 Jul 2018 06:23      PT/INR - ( 21 Jul 2018 08:54 )   PT: 32.7 sec;   INR: 2.83 ratio         PTT - ( 20 Jul 2018 10:53 )  PTT:37.6 sec    CAPILLARY BLOOD GLUCOSE                MEDICATIONS  (STANDING):  allopurinol 100 milliGRAM(s) Oral daily  amLODIPine   Tablet 10 milliGRAM(s) Oral daily  AQUAPHOR (petrolatum Ointment) 1 Application(s) Topical three times a day  ascorbic acid 500 milliGRAM(s) Oral daily  calcitriol   Capsule 0.25 MICROGram(s) Oral daily  carvedilol 25 milliGRAM(s) Oral every 12 hours  furosemide    Tablet 80 milliGRAM(s) Oral daily  lactobacillus acidophilus 1 Tablet(s) Oral daily  Nephro-alyse 1 Tablet(s) Oral daily  nystatin Powder 1 Application(s) Topical two times a day  predniSONE   Tablet 10 milliGRAM(s) Oral daily  zinc sulfate 220 milliGRAM(s) Oral daily    MEDICATIONS  (PRN):  acetaminophen   Tablet. 650 milliGRAM(s) Oral every 6 hours PRN Moderate Pain (4 - 6)          PHYSICAL EXAM:  GENERAL: NAD, well-groomed, well-developed  HEAD:  Atraumatic, Normocephalic  CHEST/LUNG: Clear to percussion bilaterally; No rales, rhonchi, wheezing, or rubs  HEART: Regular rate and rhythm; No murmurs, rubs, or gallops  ABDOMEN: Soft, Nontender, Nondistended; Bowel sounds present  EXTREMITIES:  2+ Peripheral Pulses, No clubbing, cyanosis, or edema  LYMPH: No lymphadenopathy noted  SKIN: No rashes or lesions    Care Discussed with Consultants/Other Providers [+ ] YES  [ ] NO

## 2018-07-21 NOTE — PROGRESS NOTE ADULT - SUBJECTIVE AND OBJECTIVE BOX
[facility]   [phone]  RENAL FOLLOW UP NOTE  --------------------------------------------------------------------------------  Chief Complaint:    24 hour events/subjective:    no specific c/o today   had pericardial drain removed     PAST HISTORY  --------------------------------------------------------------------------------  No significant changes to PMH, PSH, FHx, SHx, unless otherwise noted    ALLERGIES & MEDICATIONS  --------------------------------------------------------------------------------  Allergies    heparin (Unknown)  losartan (Anaphylaxis)  nitroglycerin (Other)    Intolerances      Standing Inpatient Medications  allopurinol 100 milliGRAM(s) Oral daily  amLODIPine   Tablet 10 milliGRAM(s) Oral daily  AQUAPHOR (petrolatum Ointment) 1 Application(s) Topical three times a day  ascorbic acid 500 milliGRAM(s) Oral daily  calcitriol   Capsule 0.25 MICROGram(s) Oral daily  carvedilol 25 milliGRAM(s) Oral every 12 hours  furosemide    Tablet 80 milliGRAM(s) Oral daily  lactobacillus acidophilus 1 Tablet(s) Oral daily  Nephro-alyse 1 Tablet(s) Oral daily  nystatin Powder 1 Application(s) Topical two times a day  predniSONE   Tablet 10 milliGRAM(s) Oral daily  zinc sulfate 220 milliGRAM(s) Oral daily    PRN Inpatient Medications  acetaminophen   Tablet. 650 milliGRAM(s) Oral every 6 hours PRN      REVIEW OF SYSTEMS  --------------------------------------------------------------------------------    Gen: denies fatigue, fevers/chills,  CVS: denies chest pain/palpitations  Resp: denies SOB/Cough  GI: Denies N/V/Abd pain  : Denies dysuria    All other systems were reviewed and are negative, except as noted.    VITALS/PHYSICAL EXAM  --------------------------------------------------------------------------------  T(C): 36.8 (07-21-18 @ 13:21), Max: 37.1 (07-21-18 @ 00:17)  HR: 82 (07-21-18 @ 13:21) (82 - 87)  BP: 137/73 (07-21-18 @ 13:21) (137/73 - 164/75)  RR: 19 (07-21-18 @ 13:21) (18 - 19)  SpO2: 97% (07-21-18 @ 13:21) (96% - 97%)  Wt(kg): --        07-20-18 @ 07:01  -  07-21-18 @ 07:00  --------------------------------------------------------  IN: 954 mL / OUT: 3250 mL / NET: -2296 mL    07-21-18 @ 07:01  -  07-21-18 @ 15:04  --------------------------------------------------------  IN: 840 mL / OUT: 825 mL / NET: 15 mL      Physical Exam:  	  alert oriented place person and date   	Pulm: CTA  no rales or ronchi or wheezing  	CV: RRR, S1/S2. no rub  	Abd: +BS, soft, nontender/nondistended  	: No suprapubic tenderness.               Extremity: No cyanosis, LLE 4+ pitting edema RLE 1++ edema   	  LABS/STUDIES  --------------------------------------------------------------------------------              8.7    10.49 >-----------<  254      [07-21-18 @ 06:46]              29.6     144  |  108  |  95  ----------------------------<  87      [07-21-18 @ 06:23]  4.3   |  22  |  3.59        Ca     8.4     [07-21-18 @ 06:23]      PT/INR: PT 32.7 , INR 2.83       [07-21-18 @ 08:54]  PTT: 37.6       [07-20-18 @ 10:53]      Creatinine Trend:  SCr 3.59 [07-21 @ 06:23]  SCr 3.66 [07-20 @ 09:15]  SCr 3.81 [07-19 @ 06:29]  SCr 3.99 [07-19 @ 02:32]  SCr 3.94 [07-18 @ 05:42]              Urinalysis - [07-06-18 @ 14:45]      Color Yellow / Appearance Turbid / SG 1.015 / pH 8.5      Gluc Negative / Ketone Negative  / Bili Negative / Urobili Negative       Blood Small / Protein 300 / Leuk Est Large / Nitrite Negative      RBC 5-10 / WBC 26-50 / Hyaline  / Gran  / Sq Epi  / Non Sq Epi  / Bacteria Moderate      Iron 67, TIBC 203, %sat 33      [07-12-18 @ 14:18]  Ferritin 961      [07-12-18 @ 14:18]  PTH -- (Ca 8.9)      [04-20-18 @ 08:14]   217  PTH -- (Ca 9.0)      [11-09-17 @ 08:51]   387  Vitamin D (25OH) 7.8      [11-09-17 @ 08:51]  HbA1c 4.5      [08-09-16 @ 06:49]  TSH 0.47      [04-24-18 @ 07:51]    dsDNA 38      [07-10-18 @ 13:55]  C3 Complement 114      [07-10-18 @ 13:54]  C4 Complement 32      [07-10-18 @ 13:54]

## 2018-07-21 NOTE — PROGRESS NOTE ADULT - SUBJECTIVE AND OBJECTIVE BOX
Subjective: Patient seen and examined. No new events except as noted.   resting comfortably in bed   pericardial drain removed     REVIEW OF SYSTEMS:    CONSTITUTIONAL: + weakness, fevers or chills  EYES/ENT: No visual changes;  No vertigo or throat pain   NECK: No pain or stiffness  RESPIRATORY: No cough, wheezing, hemoptysis; No shortness of breath  CARDIOVASCULAR: No chest pain or palpitations  GASTROINTESTINAL: No abdominal or epigastric pain. No nausea, vomiting, or hematemesis; No diarrhea or constipation. No melena or hematochezia.  GENITOURINARY: No dysuria, frequency or hematuria  NEUROLOGICAL: No numbness or weakness  SKIN: No itching, burning, rashes, or lesions   All other review of systems is negative unless indicated above.    MEDICATIONS:  MEDICATIONS  (STANDING):  allopurinol 100 milliGRAM(s) Oral daily  amLODIPine   Tablet 10 milliGRAM(s) Oral daily  AQUAPHOR (petrolatum Ointment) 1 Application(s) Topical three times a day  ascorbic acid 500 milliGRAM(s) Oral daily  calcitriol   Capsule 0.25 MICROGram(s) Oral daily  carvedilol 25 milliGRAM(s) Oral every 12 hours  furosemide    Tablet 80 milliGRAM(s) Oral daily  lactobacillus acidophilus 1 Tablet(s) Oral daily  Nephro-alyse 1 Tablet(s) Oral daily  nystatin Powder 1 Application(s) Topical two times a day  predniSONE   Tablet 10 milliGRAM(s) Oral daily  zinc sulfate 220 milliGRAM(s) Oral daily      PHYSICAL EXAM:  T(C): 36.9 (07-21-18 @ 20:52), Max: 37.1 (07-21-18 @ 00:17)  HR: 87 (07-21-18 @ 20:52) (82 - 92)  BP: 147/81 (07-21-18 @ 20:52) (137/73 - 164/85)  RR: 20 (07-21-18 @ 20:52) (18 - 20)  SpO2: 98% (07-21-18 @ 20:52) (96% - 98%)  Wt(kg): --  I&O's Summary    20 Jul 2018 07:01  -  21 Jul 2018 07:00  --------------------------------------------------------  IN: 954 mL / OUT: 3250 mL / NET: -2296 mL    21 Jul 2018 07:01  -  21 Jul 2018 22:20  --------------------------------------------------------  IN: 1080 mL / OUT: 1175 mL / NET: -95 mL          Appearance: NAD  HEENT:   Normal oral mucosa, PERRL, EOMI	  Lymphatic: No lymphadenopathy , no edema  Cardiovascular: Normal S1 S2, No JVD, No murmurs , Peripheral pulses palpable 2+ bilaterally  Respiratory: Lungs clear to auscultation, normal effort 	  Gastrointestinal:  Soft, Non-tender, + BS	  Skin: No rashes, No ecchymoses, No cyanosis, warm to touch  Musculoskeletal: decreased  range of motion and strength  Psychiatry:  Mood & affect appropriate  Ext: massive LLE edema  +fox     LABS:    CARDIAC MARKERS:        Prothrombin Time and INR, Plasma (07.21.18 @ 08:54)    Prothrombin Time, Plasma: 32.7 sec    INR: 2.83: Recommended ranges for therapeutic INR:    2.0-3.0 for most medical and surgical thromboembolic states    2.0-3.0 for atrial fibrillation    2.0-3.0 for bileaflet mechanical valve in aortic position    2.5-3.5 for mechanical heart valves    Chest 2004;[id number]  The presence of direct thrombin inhibitors (argatroban, refludan)  may falsely increase results. ratio                            8.7    10.49 )-----------( 254      ( 21 Jul 2018 06:46 )             29.6     07-21    144  |  108  |  95<H>  ----------------------------<  87  4.3   |  22  |  3.59<H>    Ca    8.4      21 Jul 2018 06:23      proBNP:   Lipid Profile:   HgA1c:   TSH:             TELEMETRY: 	V paced, SR    ECG:  	  RADIOLOGY:   DIAGNOSTIC TESTING:  [ ] Echocardiogram:  < from: Transthoracic Echocardiogram (07.18.18 @ 15:09) >    Patient name: COSTEN, SANDRA YOB: 1960   Age: 58 (F)   MR#: [id number]  Study Date: 7/18/2018  Location: [address] [place], [place] [id number]: Micki Fisher RDCS  Study quality: Technically fair  Referring Physician: Patric Patino MD  Blood Pressure: 133/77 mmHg  Height: 165 cm  Weight: 117 kg  BSA: 2.2 m2  ------------------------------------------------------------------------  PROCEDURE: Transthoracic echocardiogram with 2-D, M-Mode  and complete spectral and color flow Doppler.  INDICATION: Pericardial effusion (noninflammatory) (I31.3)  ------------------------------------------------------------------------  Observations:  Mitral Valve: Mitral annular calcification and calcified  mitral leaflets with normal diastolic opening. Mild mitral  regurgitation.  Aortic Valve/Aorta:  Pericardium/Pleura: Large 2.1 cm pericardial effusion  posterior to the LV. About 2.3 cm at the right atrium.  1.2-1.4 cm at mid to distal RV levela. No RA or RV collapse  is noted. HR at 87 bpm.  ------------------------------------------------------------------------  Conclusions:  Limited:  1. Mitral annular calcification and calcified mitral  leaflets with normal diastolic opening. Mild mitral  regurgitation.  2. Large 2.1 cm pericardial effusion posterior to the LV.  About 2.3 cm at the right atrium. 1.2-1.4 cm at mid to  distal RV levela. No RA or RV collapse is noted. HR at 87  bpm.  *** Compared with echocardiogram report  of 7/6/2018, no  significant changes noted. Large effusion seen on prior  study.  ------------------------------------------------------------------------  Confirmed on  7/20/2018 - 12:34:39 by Sandra Brush M.D.  ------------------------------------------------------------------------    < end of copied text >    [ ]  Catheterization:  [ ] Stress Test:    OTHER:

## 2018-07-22 LAB
ANION GAP SERPL CALC-SCNC: 16 MMOL/L — SIGNIFICANT CHANGE UP (ref 5–17)
BUN SERPL-MCNC: 93 MG/DL — HIGH (ref 7–23)
CALCIUM SERPL-MCNC: 8.7 MG/DL — SIGNIFICANT CHANGE UP (ref 8.4–10.5)
CHLORIDE SERPL-SCNC: 107 MMOL/L — SIGNIFICANT CHANGE UP (ref 96–108)
CO2 SERPL-SCNC: 21 MMOL/L — LOW (ref 22–31)
CREAT SERPL-MCNC: 3.87 MG/DL — HIGH (ref 0.5–1.3)
GLUCOSE SERPL-MCNC: 81 MG/DL — SIGNIFICANT CHANGE UP (ref 70–99)
HCT VFR BLD CALC: 29.4 % — LOW (ref 34.5–45)
HGB BLD-MCNC: 9.2 G/DL — LOW (ref 11.5–15.5)
INR BLD: 2.69 RATIO — HIGH (ref 0.88–1.16)
MAGNESIUM SERPL-MCNC: 2.3 MG/DL — SIGNIFICANT CHANGE UP (ref 1.6–2.6)
MCHC RBC-ENTMCNC: 27.8 PG — SIGNIFICANT CHANGE UP (ref 27–34)
MCHC RBC-ENTMCNC: 31.3 GM/DL — LOW (ref 32–36)
MCV RBC AUTO: 88.8 FL — SIGNIFICANT CHANGE UP (ref 80–100)
PHOSPHATE SERPL-MCNC: 5.1 MG/DL — HIGH (ref 2.5–4.5)
PLATELET # BLD AUTO: 185 K/UL — SIGNIFICANT CHANGE UP (ref 150–400)
POTASSIUM SERPL-MCNC: 4.4 MMOL/L — SIGNIFICANT CHANGE UP (ref 3.5–5.3)
POTASSIUM SERPL-SCNC: 4.4 MMOL/L — SIGNIFICANT CHANGE UP (ref 3.5–5.3)
PROTHROM AB SERPL-ACNC: 31 SEC — HIGH (ref 10–13.1)
RBC # BLD: 3.31 M/UL — LOW (ref 3.8–5.2)
RBC # FLD: 19.5 % — HIGH (ref 10.3–14.5)
SODIUM SERPL-SCNC: 144 MMOL/L — SIGNIFICANT CHANGE UP (ref 135–145)
WBC # BLD: 9.75 K/UL — SIGNIFICANT CHANGE UP (ref 3.8–10.5)
WBC # FLD AUTO: 9.75 K/UL — SIGNIFICANT CHANGE UP (ref 3.8–10.5)

## 2018-07-22 RX ORDER — WARFARIN SODIUM 2.5 MG/1
2 TABLET ORAL ONCE
Qty: 0 | Refills: 0 | Status: COMPLETED | OUTPATIENT
Start: 2018-07-22 | End: 2018-07-22

## 2018-07-22 RX ADMIN — Medication 1 APPLICATION(S): at 15:10

## 2018-07-22 RX ADMIN — CARVEDILOL PHOSPHATE 25 MILLIGRAM(S): 80 CAPSULE, EXTENDED RELEASE ORAL at 05:47

## 2018-07-22 RX ADMIN — Medication 10 MILLIGRAM(S): at 08:08

## 2018-07-22 RX ADMIN — AMLODIPINE BESYLATE 10 MILLIGRAM(S): 2.5 TABLET ORAL at 08:08

## 2018-07-22 RX ADMIN — Medication 1 TABLET(S): at 15:09

## 2018-07-22 RX ADMIN — Medication 1 TABLET(S): at 17:55

## 2018-07-22 RX ADMIN — CARVEDILOL PHOSPHATE 25 MILLIGRAM(S): 80 CAPSULE, EXTENDED RELEASE ORAL at 17:58

## 2018-07-22 RX ADMIN — WARFARIN SODIUM 2 MILLIGRAM(S): 2.5 TABLET ORAL at 21:34

## 2018-07-22 RX ADMIN — Medication 500 MILLIGRAM(S): at 15:10

## 2018-07-22 RX ADMIN — NYSTATIN CREAM 1 APPLICATION(S): 100000 CREAM TOPICAL at 17:59

## 2018-07-22 RX ADMIN — Medication 100 MILLIGRAM(S): at 15:10

## 2018-07-22 RX ADMIN — Medication 80 MILLIGRAM(S): at 05:47

## 2018-07-22 RX ADMIN — Medication 1 APPLICATION(S): at 05:46

## 2018-07-22 RX ADMIN — ZINC SULFATE TAB 220 MG (50 MG ZINC EQUIVALENT) 220 MILLIGRAM(S): 220 (50 ZN) TAB at 15:09

## 2018-07-22 RX ADMIN — Medication 1 APPLICATION(S): at 21:34

## 2018-07-22 RX ADMIN — CALCITRIOL 0.25 MICROGRAM(S): 0.5 CAPSULE ORAL at 15:09

## 2018-07-22 NOTE — PROGRESS NOTE ADULT - SUBJECTIVE AND OBJECTIVE BOX
[facility]   [phone]  RENAL FOLLOW UP NOTE  --------------------------------------------------------------------------------  Chief Complaint:    24 hour events/subjective:    no worsening sob still with edema.       PAST HISTORY  --------------------------------------------------------------------------------  No significant changes to PMH, PSH, FHx, SHx, unless otherwise noted    ALLERGIES & MEDICATIONS  --------------------------------------------------------------------------------  Allergies    heparin (Unknown)  losartan (Anaphylaxis)  nitroglycerin (Other)    Intolerances      Standing Inpatient Medications  allopurinol 100 milliGRAM(s) Oral daily  amLODIPine   Tablet 10 milliGRAM(s) Oral daily  AQUAPHOR (petrolatum Ointment) 1 Application(s) Topical three times a day  ascorbic acid 500 milliGRAM(s) Oral daily  calcitriol   Capsule 0.25 MICROGram(s) Oral daily  carvedilol 25 milliGRAM(s) Oral every 12 hours  furosemide    Tablet 80 milliGRAM(s) Oral daily  lactobacillus acidophilus 1 Tablet(s) Oral daily  Nephro-alyse 1 Tablet(s) Oral daily  nystatin Powder 1 Application(s) Topical two times a day  predniSONE   Tablet 10 milliGRAM(s) Oral daily  warfarin 2 milliGRAM(s) Oral once  zinc sulfate 220 milliGRAM(s) Oral daily    PRN Inpatient Medications  acetaminophen   Tablet. 650 milliGRAM(s) Oral every 6 hours PRN      REVIEW OF SYSTEMS  --------------------------------------------------------------------------------    Gen: denies fevers/chills,  CVS: denies chest pain/palpitations  Resp: denies SOB/Cough  GI: Denies N/V/Abd pain  : Denies dysuria    All other systems were reviewed and are negative, except as noted.    VITALS/PHYSICAL EXAM  --------------------------------------------------------------------------------  T(C): 36.7 (07-22-18 @ 08:07), Max: 36.9 (07-21-18 @ 20:52)  HR: 79 (07-22-18 @ 08:07) (79 - 92)  BP: 150/82 (07-22-18 @ 08:07) (135/83 - 164/85)  RR: 18 (07-22-18 @ 08:07) (18 - 20)  SpO2: 97% (07-22-18 @ 08:07) (96% - 98%)  Wt(kg): --        07-21-18 @ 07:01  -  07-22-18 @ 07:00  --------------------------------------------------------  IN: 1080 mL / OUT: 2025 mL / NET: -945 mL    07-22-18 @ 07:01  -  07-22-18 @ 14:18  --------------------------------------------------------  IN: 600 mL / OUT: 0 mL / NET: 600 mL      Physical Exam:  	    alert oriented   	Pulm: CTA  no rales or ronchi or wheezing  	CV: RRR, S1/S2. no rub  	Abd: +BS, soft, nontender/nondistended  	: No suprapubic tenderness.               Extremity: No cyanosis, LLE 4+ pitting edema RLE 1++ edema     LABS/STUDIES  --------------------------------------------------------------------------------              9.2    9.75  >-----------<  185      [07-22-18 @ 08:04]              29.4     144  |  107  |  93  ----------------------------<  81      [07-22-18 @ 06:53]  4.4   |  21  |  3.87        Ca     8.7     [07-22-18 @ 06:53]      Mg     2.3     [07-22-18 @ 06:53]      Phos  5.1     [07-22-18 @ 06:53]      PT/INR: PT 31.0 , INR 2.69       [07-22-18 @ 08:04]      Creatinine Trend:  SCr 3.87 [07-22 @ 06:53]  SCr 3.59 [07-21 @ 06:23]  SCr 3.66 [07-20 @ 09:15]  SCr 3.81 [07-19 @ 06:29]  SCr 3.99 [07-19 @ 02:32]              Urinalysis - [07-06-18 @ 14:45]      Color Yellow / Appearance Turbid / SG 1.015 / pH 8.5      Gluc Negative / Ketone Negative  / Bili Negative / Urobili Negative       Blood Small / Protein 300 / Leuk Est Large / Nitrite Negative      RBC 5-10 / WBC 26-50 / Hyaline  / Gran  / Sq Epi  / Non Sq Epi  / Bacteria Moderate      Iron 67, TIBC 203, %sat 33      [07-12-18 @ 14:18]  Ferritin 961      [07-12-18 @ 14:18]  PTH -- (Ca 8.9)      [04-20-18 @ 08:14]   217  PTH -- (Ca 9.0)      [11-09-17 @ 08:51]   387  Vitamin D (25OH) 7.8      [11-09-17 @ 08:51]  HbA1c 4.5      [08-09-16 @ 06:49]  TSH 0.47      [04-24-18 @ 07:51]    dsDNA 38      [07-10-18 @ 13:55]  C3 Complement 114      [07-10-18 @ 13:54]  C4 Complement 32      [07-10-18 @ 13:54]

## 2018-07-22 NOTE — PROVIDER CONTACT NOTE (OTHER) - RECOMMENDATIONS
will continue to monitor pt
Mg Phos AM labs; will con't to monitor
no new orders made a martina pradhan

## 2018-07-22 NOTE — PROGRESS NOTE ADULT - ASSESSMENT
58 F PMH SLE, systolic HFrEF(EF 32%) s/p AICD, HTN, CKD III- IV, PE / left leg DVT dx 2009, HIT, CAD (no stents), stage IV sacral ulcer, chronic Fox, recurrent pericardial effusion, AAA ~14cm in 12/2016 s/p repair in 2010 w/ aortoiliac stent complicated post-operatively with development of left lower extremity compartment syndrome, with recent cellulitis treated inpt now presents with uti and pericardial effusion      1- CKD IV with ILANA   2- pericardial effusion   3- chf  4- HTN   5- neurogenic bladder  6- lupus   7- anemia  8- shpt  9- hyperuricemia   10- UTI  completed abx   11- thoracic aneurysm  cr higher   cont with b-b and norvasc    completed abx   cont fox for hx of neurogenic bladder  cont calcitriol 0.25mcg daily  check pth   cont with allopurinol for hx of severe gout. pt was unable to afford uloric   lasix at 80 mg daily  Keep O> I fluid status improving slowly may need to increase dose   hb low cont procrit [id number] units tiw  given renal function over time has been worsening will need avf soon will d/w cards and primary team if on this admission or outpt

## 2018-07-22 NOTE — PROVIDER CONTACT NOTE (OTHER) - BACKGROUND
Pt admitted with polynephritis/UTI and CKD. Pt has an AICD, pt has history of wide complex tachycardia on 7/18
CHF, UTI,
Pt admitted for Pyelonephritis, UTI, DVT LLE edema

## 2018-07-22 NOTE — PROVIDER CONTACT NOTE (OTHER) - SITUATION
Pt sustained 11 beats WCT
[facility] beats WCT on tele briefly

## 2018-07-22 NOTE — PROGRESS NOTE ADULT - SUBJECTIVE AND OBJECTIVE BOX
Subjective: Patient seen and examined. No new events except as noted.   Headache   no cp or sob     REVIEW OF SYSTEMS:    CONSTITUTIONAL: + weakness, fevers or chills  EYES/ENT: No visual changes;  No vertigo or throat pain   NECK: No pain or stiffness  RESPIRATORY: No cough, wheezing, hemoptysis; No shortness of breath  CARDIOVASCULAR: No chest pain or palpitations  GASTROINTESTINAL: No abdominal or epigastric pain. No nausea, vomiting, or hematemesis; No diarrhea or constipation. No melena or hematochezia.  GENITOURINARY: No dysuria, frequency or hematuria  NEUROLOGICAL: No numbness or weakness  SKIN: No itching, burning, rashes, or lesions   All other review of systems is negative unless indicated above.    MEDICATIONS:  MEDICATIONS  (STANDING):  allopurinol 100 milliGRAM(s) Oral daily  amLODIPine   Tablet 10 milliGRAM(s) Oral daily  AQUAPHOR (petrolatum Ointment) 1 Application(s) Topical three times a day  ascorbic acid 500 milliGRAM(s) Oral daily  calcitriol   Capsule 0.25 MICROGram(s) Oral daily  carvedilol 25 milliGRAM(s) Oral every 12 hours  furosemide    Tablet 80 milliGRAM(s) Oral daily  lactobacillus acidophilus 1 Tablet(s) Oral daily  Nephro-alyse 1 Tablet(s) Oral daily  nystatin Powder 1 Application(s) Topical two times a day  predniSONE   Tablet 10 milliGRAM(s) Oral daily  warfarin 2 milliGRAM(s) Oral once  zinc sulfate 220 milliGRAM(s) Oral daily      PHYSICAL EXAM:  T(C): 36.7 (07-22-18 @ 08:07), Max: 36.9 (07-21-18 @ 20:52)  HR: 79 (07-22-18 @ 08:07) (79 - 92)  BP: 150/82 (07-22-18 @ 08:07) (135/83 - 164/85)  RR: 18 (07-22-18 @ 08:07) (18 - 20)  SpO2: 97% (07-22-18 @ 08:07) (96% - 98%)  Wt(kg): --  I&O's Summary    21 Jul 2018 07:01  -  22 Jul 2018 07:00  --------------------------------------------------------  IN: 1080 mL / OUT: 2025 mL / NET: -945 mL          Appearance: NAD	  HEENT:   Normal oral mucosa, PERRL, EOMI	  Lymphatic: No lymphadenopathy , no edema  Cardiovascular: Normal S1 S2, No JVD, No murmurs , Peripheral pulses palpable 2+ bilaterally  Respiratory: Lungs clear to auscultation, normal effort 	  Gastrointestinal:  Soft, Non-tender, + BS	  Skin: No rashes, No ecchymoses  Musculoskeletal: decreased range of motion and  strength  Psychiatry:  Mood & affect appropriate  Ext: Massive LLE edema  +fox     LABS:    CARDIAC MARKERS:                                9.2    9.75  )-----------( 185      ( 22 Jul 2018 08:04 )             29.4     07-22    144  |  107  |  93<H>  ----------------------------<  81  4.4   |  21<L>  |  3.87<H>    Ca    8.7      22 Jul 2018 06:53  Phos  5.1     07-22  Mg     2.3     07-22      proBNP:   Lipid Profile:   HgA1c:   TSH:             TELEMETRY: Vpaced 	    ECG:  	  RADIOLOGY:   DIAGNOSTIC TESTING:  [ ] Echocardiogram:  [ ]  Catheterization:  [ ] Stress Test:    OTHER:

## 2018-07-22 NOTE — PROGRESS NOTE ADULT - ASSESSMENT
Problem/Plan - 1:  ·  Problem: Urinary tract infection associated with catheterization of urinary tract, unspecified indwelling urinary catheter type, initial encounter.  Plan: Off abx.     Problem/Plan - 2:  ·  Problem: Acute kidney injury.  Plan: Renal FU noted.     Problem/Plan - 3:  ·  Problem: Thoracic aortic aneurysm without rupture.  Plan: CT Surgery suggests repeat CT for surveillance.     Problem/Plan - 4:  ·  Problem: Lupus erythematosus, unspecified form.  Plan: Prednisone.     Problem/Plan - 5:·  Problem: Congestive heart failure, severe.  Plan: Lasix  BMP.     Problem/Plan - 6:  Problem: Essential hypertension. Plan: Cont current Mx.    Problem/Plan - 7:  ·  Problem: Other pulmonary embolism without acute cor pulmonale, unspecified chronicity.  Plan: INR- supratherapeutic  -Trend PT/INR daily.

## 2018-07-22 NOTE — PROVIDER CONTACT NOTE (OTHER) - ACTION/TREATMENT ORDERED:
Sent stat BMP. RN will continue to monitor.
Mg Phos AM labs; will con't to monitor
will continue to montior patient.

## 2018-07-22 NOTE — PROGRESS NOTE ADULT - SUBJECTIVE AND OBJECTIVE BOX
Patient is a 58y old  Female who presents with a chief complaint of Lack of urine, change in color. (12 Jul 2018 10:26)      INTERVAL HPI/OVERNIGHT EVENTS:  T(C): 36.7 (07-22-18 @ 08:07), Max: 36.9 (07-21-18 @ 20:52)  HR: 79 (07-22-18 @ 08:07) (79 - 92)  BP: 150/82 (07-22-18 @ 08:07) (135/83 - 164/85)  RR: 18 (07-22-18 @ 08:07) (18 - 20)  SpO2: 97% (07-22-18 @ 08:07) (96% - 98%)  Wt(kg): --  I&O's Summary    21 Jul 2018 07:01  -  22 Jul 2018 07:00  --------------------------------------------------------  IN: 1080 mL / OUT: 2025 mL / NET: -945 mL        LABS:                        9.2    9.75  )-----------( 185      ( 22 Jul 2018 08:04 )             29.4     07-22    144  |  107  |  93<H>  ----------------------------<  81  4.4   |  21<L>  |  3.87<H>    Ca    8.7      22 Jul 2018 06:53  Phos  5.1     07-22  Mg     2.3     07-22      PT/INR - ( 22 Jul 2018 08:04 )   PT: 31.0 sec;   INR: 2.69 ratio             CAPILLARY BLOOD GLUCOSE                MEDICATIONS  (STANDING):  allopurinol 100 milliGRAM(s) Oral daily  amLODIPine   Tablet 10 milliGRAM(s) Oral daily  AQUAPHOR (petrolatum Ointment) 1 Application(s) Topical three times a day  ascorbic acid 500 milliGRAM(s) Oral daily  calcitriol   Capsule 0.25 MICROGram(s) Oral daily  carvedilol 25 milliGRAM(s) Oral every 12 hours  furosemide    Tablet 80 milliGRAM(s) Oral daily  lactobacillus acidophilus 1 Tablet(s) Oral daily  Nephro-alyse 1 Tablet(s) Oral daily  nystatin Powder 1 Application(s) Topical two times a day  predniSONE   Tablet 10 milliGRAM(s) Oral daily  warfarin 2 milliGRAM(s) Oral once  zinc sulfate 220 milliGRAM(s) Oral daily    MEDICATIONS  (PRN):  acetaminophen   Tablet. 650 milliGRAM(s) Oral every 6 hours PRN Moderate Pain (4 - 6)          PHYSICAL EXAM:  GENERAL: NAD, well-groomed, well-developed  HEAD:  Atraumatic, Normocephalic  CHEST/LUNG: Clear to percussion bilaterally; No rales, rhonchi, wheezing, or rubs  HEART: Regular rate and rhythm; No murmurs, rubs, or gallops  ABDOMEN: Soft, Nontender, Nondistended; Bowel sounds present  EXTREMITIES:  2+ Peripheral Pulses, No clubbing, cyanosis, or edema  LYMPH: No lymphadenopathy noted  SKIN: No rashes or lesions    Care Discussed with Consultants/Other Providers [ ] YES  [ ] NO

## 2018-07-22 NOTE — PROVIDER CONTACT NOTE (OTHER) - ASSESSMENT
Pt is asymptomatic. VSS /77, HR 85, pulse ox 97% on RA, RR 18. Pt denies chest pain or palpitations.
VSS: /83; HR 81; RR 18, Temp 98.1 oral; Spo2 96% RA; Pt asymptomatic, no complaints of SOB, chest pain, dizziness, or palpitations.
asymptomatic. vitals as charted.

## 2018-07-23 LAB
ANION GAP SERPL CALC-SCNC: 16 MMOL/L — SIGNIFICANT CHANGE UP (ref 5–17)
BUN SERPL-MCNC: 96 MG/DL — HIGH (ref 7–23)
CALCIUM SERPL-MCNC: 8.4 MG/DL — SIGNIFICANT CHANGE UP (ref 8.4–10.5)
CHLORIDE SERPL-SCNC: 106 MMOL/L — SIGNIFICANT CHANGE UP (ref 96–108)
CO2 SERPL-SCNC: 21 MMOL/L — LOW (ref 22–31)
CREAT SERPL-MCNC: 3.75 MG/DL — HIGH (ref 0.5–1.3)
GLUCOSE SERPL-MCNC: 86 MG/DL — SIGNIFICANT CHANGE UP (ref 70–99)
HCT VFR BLD CALC: 30.2 % — LOW (ref 34.5–45)
HGB BLD-MCNC: 8.9 G/DL — LOW (ref 11.5–15.5)
INR BLD: 2.57 RATIO — HIGH (ref 0.88–1.16)
MAGNESIUM SERPL-MCNC: 2.2 MG/DL — SIGNIFICANT CHANGE UP (ref 1.6–2.6)
MCHC RBC-ENTMCNC: 26.7 PG — LOW (ref 27–34)
MCHC RBC-ENTMCNC: 29.5 GM/DL — LOW (ref 32–36)
MCV RBC AUTO: 90.7 FL — SIGNIFICANT CHANGE UP (ref 80–100)
PHOSPHATE SERPL-MCNC: 4.9 MG/DL — HIGH (ref 2.5–4.5)
PLATELET # BLD AUTO: 206 K/UL — SIGNIFICANT CHANGE UP (ref 150–400)
POTASSIUM SERPL-MCNC: 4.2 MMOL/L — SIGNIFICANT CHANGE UP (ref 3.5–5.3)
POTASSIUM SERPL-SCNC: 4.2 MMOL/L — SIGNIFICANT CHANGE UP (ref 3.5–5.3)
PROTHROM AB SERPL-ACNC: 29.6 SEC — HIGH (ref 10–13.1)
RBC # BLD: 3.33 M/UL — LOW (ref 3.8–5.2)
RBC # FLD: 19.5 % — HIGH (ref 10.3–14.5)
SODIUM SERPL-SCNC: 143 MMOL/L — SIGNIFICANT CHANGE UP (ref 135–145)
WBC # BLD: 9.66 K/UL — SIGNIFICANT CHANGE UP (ref 3.8–10.5)
WBC # FLD AUTO: 9.66 K/UL — SIGNIFICANT CHANGE UP (ref 3.8–10.5)

## 2018-07-23 RX ORDER — FERROUS SULFATE 325(65) MG
1 TABLET ORAL
Qty: 0 | Refills: 0 | COMMUNITY

## 2018-07-23 RX ORDER — LACTOBACILLUS ACIDOPHILUS 100MM CELL
1 CAPSULE ORAL
Qty: 30 | Refills: 0 | OUTPATIENT
Start: 2018-07-23 | End: 2018-08-21

## 2018-07-23 RX ORDER — WARFARIN SODIUM 2.5 MG/1
1 TABLET ORAL
Qty: 30 | Refills: 0 | OUTPATIENT
Start: 2018-07-23 | End: 2018-08-21

## 2018-07-23 RX ORDER — WARFARIN SODIUM 2.5 MG/1
3 TABLET ORAL ONCE
Qty: 0 | Refills: 0 | Status: COMPLETED | OUTPATIENT
Start: 2018-07-23 | End: 2018-07-23

## 2018-07-23 RX ORDER — ZINC SULFATE TAB 220 MG (50 MG ZINC EQUIVALENT) 220 (50 ZN) MG
1 TAB ORAL
Qty: 0 | Refills: 0 | COMMUNITY
Start: 2018-07-23

## 2018-07-23 RX ORDER — HYDRALAZINE HCL 50 MG
1 TABLET ORAL
Qty: 0 | Refills: 0 | COMMUNITY

## 2018-07-23 RX ORDER — LABETALOL HCL 100 MG
1 TABLET ORAL
Qty: 0 | Refills: 0 | COMMUNITY

## 2018-07-23 RX ADMIN — Medication 1 TABLET(S): at 11:14

## 2018-07-23 RX ADMIN — CARVEDILOL PHOSPHATE 25 MILLIGRAM(S): 80 CAPSULE, EXTENDED RELEASE ORAL at 05:51

## 2018-07-23 RX ADMIN — WARFARIN SODIUM 3 MILLIGRAM(S): 2.5 TABLET ORAL at 21:11

## 2018-07-23 RX ADMIN — Medication 500 MILLIGRAM(S): at 11:13

## 2018-07-23 RX ADMIN — Medication 1 APPLICATION(S): at 21:11

## 2018-07-23 RX ADMIN — Medication 1 APPLICATION(S): at 11:14

## 2018-07-23 RX ADMIN — CARVEDILOL PHOSPHATE 25 MILLIGRAM(S): 80 CAPSULE, EXTENDED RELEASE ORAL at 18:03

## 2018-07-23 RX ADMIN — Medication 10 MILLIGRAM(S): at 09:15

## 2018-07-23 RX ADMIN — AMLODIPINE BESYLATE 10 MILLIGRAM(S): 2.5 TABLET ORAL at 09:15

## 2018-07-23 RX ADMIN — NYSTATIN CREAM 1 APPLICATION(S): 100000 CREAM TOPICAL at 05:52

## 2018-07-23 RX ADMIN — Medication 1 APPLICATION(S): at 05:52

## 2018-07-23 RX ADMIN — Medication 100 MILLIGRAM(S): at 11:13

## 2018-07-23 RX ADMIN — ZINC SULFATE TAB 220 MG (50 MG ZINC EQUIVALENT) 220 MILLIGRAM(S): 220 (50 ZN) TAB at 11:14

## 2018-07-23 RX ADMIN — NYSTATIN CREAM 1 APPLICATION(S): 100000 CREAM TOPICAL at 18:03

## 2018-07-23 RX ADMIN — Medication 80 MILLIGRAM(S): at 05:51

## 2018-07-23 RX ADMIN — CALCITRIOL 0.25 MICROGRAM(S): 0.5 CAPSULE ORAL at 11:13

## 2018-07-23 NOTE — PROGRESS NOTE ADULT - SUBJECTIVE AND OBJECTIVE BOX
[facility]   [phone]  RENAL FOLLOW UP NOTE  --------------------------------------------------------------------------------  Chief Complaint:    24 hour events/subjective:    no new c/o by pt today     PAST HISTORY  --------------------------------------------------------------------------------  No significant changes to PMH, PSH, FHx, SHx, unless otherwise noted    ALLERGIES & MEDICATIONS  --------------------------------------------------------------------------------  Allergies    heparin (Unknown)  losartan (Anaphylaxis)  nitroglycerin (Other)    Intolerances      Standing Inpatient Medications  allopurinol 100 milliGRAM(s) Oral daily  amLODIPine   Tablet 10 milliGRAM(s) Oral daily  AQUAPHOR (petrolatum Ointment) 1 Application(s) Topical three times a day  ascorbic acid 500 milliGRAM(s) Oral daily  calcitriol   Capsule 0.25 MICROGram(s) Oral daily  carvedilol 25 milliGRAM(s) Oral every 12 hours  furosemide    Tablet 80 milliGRAM(s) Oral daily  lactobacillus acidophilus 1 Tablet(s) Oral daily  Nephro-alyse 1 Tablet(s) Oral daily  nystatin Powder 1 Application(s) Topical two times a day  predniSONE   Tablet 10 milliGRAM(s) Oral daily  zinc sulfate 220 milliGRAM(s) Oral daily    PRN Inpatient Medications  acetaminophen   Tablet. 650 milliGRAM(s) Oral every 6 hours PRN      REVIEW OF SYSTEMS  --------------------------------------------------------------------------------    Gen: denies  fevers/chills,  CVS: denies chest pain/palpitations  Resp: denies SOB/Cough  GI: Denies N/V/Abd pain  : Denies dysuria  All other systems were reviewed and are negative, except as noted.    VITALS/PHYSICAL EXAM  --------------------------------------------------------------------------------  T(C): 36.8 (07-23-18 @ 14:22), Max: 37 (07-22-18 @ 20:40)  HR: 69 (07-23-18 @ 14:22) (67 - 85)  BP: 122/83 (07-23-18 @ 14:22) (122/83 - 153/84)  RR: 18 (07-23-18 @ 14:22) (18 - 22)  SpO2: 98% (07-23-18 @ 14:22) (96% - 98%)  Wt(kg): --        07-22-18 @ 07:01  -  07-23-18 @ 07:00  --------------------------------------------------------  IN: 600 mL / OUT: 1625 mL / NET: -1025 mL    07-23-18 @ 07:01  -  07-23-18 @ 14:26  --------------------------------------------------------  IN: 600 mL / OUT: 1000 mL / NET: -400 mL      Physical Exam:  	  alert oriented   	Pulm: CTA  no rales or ronchi or wheezing  	CV: RRR, S1/S2. no rub  	Abd: +BS, soft, nontender/nondistended  	: No suprapubic tenderness.               Extremity: No cyanosis, LLE 4+ pitting edema RLE 1++ edema   	    LABS/STUDIES  --------------------------------------------------------------------------------              8.9    9.66  >-----------<  206      [07-23-18 @ 06:27]              30.2     143  |  106  |  96  ----------------------------<  86      [07-23-18 @ 05:33]  4.2   |  21  |  3.75        Ca     8.4     [07-23-18 @ 05:33]      Mg     2.2     [07-23-18 @ 05:33]      Phos  4.9     [07-23-18 @ 05:33]      PT/INR: PT 29.6 , INR 2.57       [07-23-18 @ 06:27]      Creatinine Trend:  SCr 3.75 [07-23 @ 05:33]  SCr 3.87 [07-22 @ 06:53]  SCr 3.59 [07-21 @ 06:23]  SCr 3.66 [07-20 @ 09:15]  SCr 3.81 [07-19 @ 06:29]              Urinalysis - [07-06-18 @ 14:45]      Color Yellow / Appearance Turbid / SG 1.015 / pH 8.5      Gluc Negative / Ketone Negative  / Bili Negative / Urobili Negative       Blood Small / Protein 300 / Leuk Est Large / Nitrite Negative      RBC 5-10 / WBC 26-50 / Hyaline  / Gran  / Sq Epi  / Non Sq Epi  / Bacteria Moderate      Iron 67, TIBC 203, %sat 33      [07-12-18 @ 14:18]  Ferritin 961      [07-12-18 @ 14:18]  PTH -- (Ca 8.9)      [04-20-18 @ 08:14]   217  PTH -- (Ca 9.0)      [11-09-17 @ 08:51]   387  Vitamin D (25OH) 7.8      [11-09-17 @ 08:51]  HbA1c 4.5      [08-09-16 @ 06:49]  TSH 0.47      [04-24-18 @ 07:51]    dsDNA 38      [07-10-18 @ 13:55]  C3 Complement 114      [07-10-18 @ 13:54]  C4 Complement 32      [07-10-18 @ 13:54]

## 2018-07-23 NOTE — PROGRESS NOTE ADULT - SUBJECTIVE AND OBJECTIVE BOX
Patient is a 58y old  Female who presents with a chief complaint of Lack of urine, change in color. (12 Jul 2018 10:26)      INTERVAL HPI/OVERNIGHT EVENTS:  T(C): 36.8 (07-23-18 @ 14:22), Max: 37 (07-22-18 @ 20:40)  HR: 69 (07-23-18 @ 14:22) (69 - 85)  BP: 122/83 (07-23-18 @ 14:22) (122/83 - 153/84)  RR: 18 (07-23-18 @ 14:22) (18 - 22)  SpO2: 98% (07-23-18 @ 14:22) (96% - 98%)  Wt(kg): --  I&O's Summary    22 Jul 2018 07:01  -  23 Jul 2018 07:00  --------------------------------------------------------  IN: 600 mL / OUT: 1625 mL / NET: -1025 mL    23 Jul 2018 07:01  -  23 Jul 2018 14:58  --------------------------------------------------------  IN: 600 mL / OUT: 1000 mL / NET: -400 mL        LABS:                        8.9    9.66  )-----------( 206      ( 23 Jul 2018 06:27 )             30.2     07-23    143  |  106  |  96<H>  ----------------------------<  86  4.2   |  21<L>  |  3.75<H>    Ca    8.4      23 Jul 2018 05:33  Phos  4.9     07-23  Mg     2.2     07-23      PT/INR - ( 23 Jul 2018 06:27 )   PT: 29.6 sec;   INR: 2.57 ratio             CAPILLARY BLOOD GLUCOSE                MEDICATIONS  (STANDING):  allopurinol 100 milliGRAM(s) Oral daily  amLODIPine   Tablet 10 milliGRAM(s) Oral daily  AQUAPHOR (petrolatum Ointment) 1 Application(s) Topical three times a day  ascorbic acid 500 milliGRAM(s) Oral daily  calcitriol   Capsule 0.25 MICROGram(s) Oral daily  carvedilol 25 milliGRAM(s) Oral every 12 hours  furosemide    Tablet 80 milliGRAM(s) Oral daily  lactobacillus acidophilus 1 Tablet(s) Oral daily  Nephro-alyse 1 Tablet(s) Oral daily  nystatin Powder 1 Application(s) Topical two times a day  predniSONE   Tablet 10 milliGRAM(s) Oral daily  zinc sulfate 220 milliGRAM(s) Oral daily    MEDICATIONS  (PRN):  acetaminophen   Tablet. 650 milliGRAM(s) Oral every 6 hours PRN Moderate Pain (4 - 6)          PHYSICAL EXAM:  GENERAL: NAD, well-groomed, well-developed  HEAD:  Atraumatic, Normocephalic  CHEST/LUNG: Clear to percussion bilaterally; No rales, rhonchi, wheezing, or rubs  HEART: Regular rate and rhythm; No murmurs, rubs, or gallops  ABDOMEN: Soft, Nontender, Nondistended; Bowel sounds present  EXTREMITIES:  2+ Peripheral Pulses, No clubbing, cyanosis, or edema  LYMPH: No lymphadenopathy noted  SKIN: No rashes or lesions    Care Discussed with Consultants/Other Providers [ ] YES  [ ] NO

## 2018-07-23 NOTE — PROGRESS NOTE ADULT - ASSESSMENT
58 F PMH SLE, systolic HFrEF(EF 32%) s/p AICD, HTN, CKD III- IV, PE / left leg DVT dx 2009, HIT, CAD (no stents), stage IV sacral ulcer, chronic Fox, recurrent pericardial effusion, AAA ~14cm in 12/2016 s/p repair in 2010 w/ aortoiliac stent complicated post-operatively with development of left lower extremity compartment syndrome, with recent cellulitis treated inpt now presents with uti and pericardial effusion      1- CKD IV with ILANA   2- pericardial effusion   3- chf  4- HTN   5- neurogenic bladder  6- lupus   7- anemia  8- shpt  9- hyperuricemia   10- UTI  completed abx   11- thoracic aneurysm  cr higher   cont with b-b and norvasc    cont fox for hx of neurogenic bladder  cont calcitriol 0.25mcg daily    cont with allopurinol for hx of severe gout. pt was unable to afford uloric   lasix at 80 mg daily  Keep O> I fluid status improving slowly   hb low cont procrit [id number] units tiw  given renal function over time has been worsening will need avf outpt

## 2018-07-23 NOTE — PROGRESS NOTE ADULT - SUBJECTIVE AND OBJECTIVE BOX
Subjective: Patient seen and examined. No new events except as noted.   + Headache   no cp or sob     REVIEW OF SYSTEMS:    CONSTITUTIONAL: + weakness, fevers or chills  EYES/ENT: No visual changes;  No vertigo or throat pain   NECK: No pain or stiffness  RESPIRATORY: No cough, wheezing, hemoptysis; No shortness of breath  CARDIOVASCULAR: No chest pain or palpitations  GASTROINTESTINAL: No abdominal or epigastric pain. No nausea, vomiting, or hematemesis; No diarrhea or constipation. No melena or hematochezia.  GENITOURINARY: No dysuria, frequency or hematuria  NEUROLOGICAL: No numbness or weakness  SKIN: No itching, burning, rashes, or lesions   All other review of systems is negative unless indicated above.    MEDICATIONS:  MEDICATIONS  (STANDING):  allopurinol 100 milliGRAM(s) Oral daily  amLODIPine   Tablet 10 milliGRAM(s) Oral daily  AQUAPHOR (petrolatum Ointment) 1 Application(s) Topical three times a day  ascorbic acid 500 milliGRAM(s) Oral daily  calcitriol   Capsule 0.25 MICROGram(s) Oral daily  carvedilol 25 milliGRAM(s) Oral every 12 hours  furosemide    Tablet 80 milliGRAM(s) Oral daily  lactobacillus acidophilus 1 Tablet(s) Oral daily  Nephro-alyse 1 Tablet(s) Oral daily  nystatin Powder 1 Application(s) Topical two times a day  predniSONE   Tablet 10 milliGRAM(s) Oral daily  warfarin 2 milliGRAM(s) Oral once  zinc sulfate 220 milliGRAM(s) Oral daily      PHYSICAL EXAM:  T(C): 36.7 (07-22-18 @ 08:07), Max: 36.9 (07-21-18 @ 20:52)  HR: 79 (07-22-18 @ 08:07) (79 - 92)  BP: 150/82 (07-22-18 @ 08:07) (135/83 - 164/85)  RR: 18 (07-22-18 @ 08:07) (18 - 20)  SpO2: 97% (07-22-18 @ 08:07) (96% - 98%)  Wt(kg): --  I&O's Summary    21 Jul 2018 07:01  -  22 Jul 2018 07:00  --------------------------------------------------------  IN: 1080 mL / OUT: 2025 mL / NET: -945 mL          Appearance: NAD	  HEENT:   Normal oral mucosa, PERRL, EOMI	  Lymphatic: No lymphadenopathy , no edema  Cardiovascular: Normal S1 S2, No JVD, No murmurs , Peripheral pulses palpable 2+ bilaterally  Respiratory: Lungs clear to auscultation, normal effort 	  Gastrointestinal:  Soft, Non-tender, + BS	  Skin: No rashes, No ecchymoses  Musculoskeletal: decreased range of motion and  strength  Psychiatry:  Mood & affect appropriate  Ext: Massive LLE edema  +fox     LABS:    CARDIAC MARKERS:                                9.2    9.75  )-----------( 185      ( 22 Jul 2018 08:04 )             29.4     07-22    144  |  107  |  93<H>  ----------------------------<  81  4.4   |  21<L>  |  3.87<H>    Ca    8.7      22 Jul 2018 06:53  Phos  5.1     07-22  Mg     2.3     07-22      proBNP:   Lipid Profile:   HgA1c:   TSH:             TELEMETRY: Vpaced 	    ECG:  	  RADIOLOGY:   DIAGNOSTIC TESTING:  [ ] Echocardiogram:  [ ]  Catheterization:  [ ] Stress Test:    OTHER:

## 2018-07-23 NOTE — PROGRESS NOTE ADULT - ASSESSMENT
Problem/Plan - 1:  ·  Problem: Urinary tract infection associated with catheterization of urinary tract, unspecified indwelling urinary catheter type, initial encounter.  Plan: Off abx.     Problem/Plan - 2:  ·  Problem: Acute kidney injury.  Plan: Renal FU noted.     Problem/Plan - 3:  ·  Problem: Thoracic aortic aneurysm without rupture.  Plan: CT Surgery suggests repeat CT for surveillance.     Problem/Plan - 4:  ·  Problem: Lupus erythematosus, unspecified form.  Plan: Prednisone.     Problem/Plan - 5:·  Problem: Congestive heart failure, severe.  Plan: Lasix  BMP.     Problem/Plan - 6:  Problem: Essential hypertension. Plan: Cont current Mx.    Problem/Plan - 7:  ·  Problem: Other pulmonary embolism without acute cor pulmonale, unspecified chronicity.  Plan: INR- supratherapeutic  -Trend PT/INR daily.   dc planning

## 2018-07-24 VITALS
DIASTOLIC BLOOD PRESSURE: 77 MMHG | TEMPERATURE: 98 F | HEART RATE: 80 BPM | SYSTOLIC BLOOD PRESSURE: 156 MMHG | RESPIRATION RATE: 18 BRPM | OXYGEN SATURATION: 96 %

## 2018-07-24 LAB
ANION GAP SERPL CALC-SCNC: 15 MMOL/L — SIGNIFICANT CHANGE UP (ref 5–17)
BUN SERPL-MCNC: 99 MG/DL — HIGH (ref 7–23)
CALCIUM SERPL-MCNC: 8.9 MG/DL — SIGNIFICANT CHANGE UP (ref 8.4–10.5)
CHLORIDE SERPL-SCNC: 105 MMOL/L — SIGNIFICANT CHANGE UP (ref 96–108)
CO2 SERPL-SCNC: 24 MMOL/L — SIGNIFICANT CHANGE UP (ref 22–31)
CREAT SERPL-MCNC: 3.78 MG/DL — HIGH (ref 0.5–1.3)
GLUCOSE SERPL-MCNC: 86 MG/DL — SIGNIFICANT CHANGE UP (ref 70–99)
HCT VFR BLD CALC: 31.7 % — LOW (ref 34.5–45)
HGB BLD-MCNC: 9.8 G/DL — LOW (ref 11.5–15.5)
INR BLD: 2.64 RATIO — HIGH (ref 0.88–1.16)
MCHC RBC-ENTMCNC: 28 PG — SIGNIFICANT CHANGE UP (ref 27–34)
MCHC RBC-ENTMCNC: 30.9 GM/DL — LOW (ref 32–36)
MCV RBC AUTO: 90.5 FL — SIGNIFICANT CHANGE UP (ref 80–100)
PLATELET # BLD AUTO: 194 K/UL — SIGNIFICANT CHANGE UP (ref 150–400)
POTASSIUM SERPL-MCNC: 4.4 MMOL/L — SIGNIFICANT CHANGE UP (ref 3.5–5.3)
POTASSIUM SERPL-SCNC: 4.4 MMOL/L — SIGNIFICANT CHANGE UP (ref 3.5–5.3)
PROTHROM AB SERPL-ACNC: 30.4 SEC — HIGH (ref 10–13.1)
RBC # BLD: 3.51 M/UL — LOW (ref 3.8–5.2)
RBC # FLD: 18.5 % — HIGH (ref 10.3–14.5)
SODIUM SERPL-SCNC: 144 MMOL/L — SIGNIFICANT CHANGE UP (ref 135–145)
WBC # BLD: 9.7 K/UL — SIGNIFICANT CHANGE UP (ref 3.8–10.5)
WBC # FLD AUTO: 9.7 K/UL — SIGNIFICANT CHANGE UP (ref 3.8–10.5)

## 2018-07-24 PROCEDURE — 87040 BLOOD CULTURE FOR BACTERIA: CPT

## 2018-07-24 PROCEDURE — 93005 ELECTROCARDIOGRAM TRACING: CPT

## 2018-07-24 PROCEDURE — C1729: CPT

## 2018-07-24 PROCEDURE — 87070 CULTURE OTHR SPECIMN AEROBIC: CPT

## 2018-07-24 PROCEDURE — 86225 DNA ANTIBODY NATIVE: CPT

## 2018-07-24 PROCEDURE — 86901 BLOOD TYPING SEROLOGIC RH(D): CPT

## 2018-07-24 PROCEDURE — 82435 ASSAY OF BLOOD CHLORIDE: CPT

## 2018-07-24 PROCEDURE — 33015: CPT

## 2018-07-24 PROCEDURE — 97162 PT EVAL MOD COMPLEX 30 MIN: CPT

## 2018-07-24 PROCEDURE — 84100 ASSAY OF PHOSPHORUS: CPT

## 2018-07-24 PROCEDURE — 83550 IRON BINDING TEST: CPT

## 2018-07-24 PROCEDURE — 87086 URINE CULTURE/COLONY COUNT: CPT

## 2018-07-24 PROCEDURE — 97602 WOUND(S) CARE NON-SELECTIVE: CPT

## 2018-07-24 PROCEDURE — 85610 PROTHROMBIN TIME: CPT

## 2018-07-24 PROCEDURE — 80048 BASIC METABOLIC PNL TOTAL CA: CPT

## 2018-07-24 PROCEDURE — 83605 ASSAY OF LACTIC ACID: CPT

## 2018-07-24 PROCEDURE — 84295 ASSAY OF SERUM SODIUM: CPT

## 2018-07-24 PROCEDURE — 85014 HEMATOCRIT: CPT

## 2018-07-24 PROCEDURE — 82947 ASSAY GLUCOSE BLOOD QUANT: CPT

## 2018-07-24 PROCEDURE — 82803 BLOOD GASES ANY COMBINATION: CPT

## 2018-07-24 PROCEDURE — 88305 TISSUE EXAM BY PATHOLOGIST: CPT

## 2018-07-24 PROCEDURE — 84550 ASSAY OF BLOOD/URIC ACID: CPT

## 2018-07-24 PROCEDURE — 96374 THER/PROPH/DIAG INJ IV PUSH: CPT

## 2018-07-24 PROCEDURE — 82330 ASSAY OF CALCIUM: CPT

## 2018-07-24 PROCEDURE — 36430 TRANSFUSION BLD/BLD COMPNT: CPT

## 2018-07-24 PROCEDURE — 80053 COMPREHEN METABOLIC PANEL: CPT

## 2018-07-24 PROCEDURE — 75989 ABSCESS DRAINAGE UNDER X-RAY: CPT

## 2018-07-24 PROCEDURE — 81001 URINALYSIS AUTO W/SCOPE: CPT

## 2018-07-24 PROCEDURE — 87075 CULTR BACTERIA EXCEPT BLOOD: CPT

## 2018-07-24 PROCEDURE — 82565 ASSAY OF CREATININE: CPT

## 2018-07-24 PROCEDURE — 86900 BLOOD TYPING SEROLOGIC ABO: CPT

## 2018-07-24 PROCEDURE — 93970 EXTREMITY STUDY: CPT

## 2018-07-24 PROCEDURE — 85045 AUTOMATED RETICULOCYTE COUNT: CPT

## 2018-07-24 PROCEDURE — 97110 THERAPEUTIC EXERCISES: CPT

## 2018-07-24 PROCEDURE — 85027 COMPLETE CBC AUTOMATED: CPT

## 2018-07-24 PROCEDURE — C1769: CPT

## 2018-07-24 PROCEDURE — 85652 RBC SED RATE AUTOMATED: CPT

## 2018-07-24 PROCEDURE — 87186 SC STD MICRODIL/AGAR DIL: CPT

## 2018-07-24 PROCEDURE — P9017: CPT

## 2018-07-24 PROCEDURE — 86160 COMPLEMENT ANTIGEN: CPT

## 2018-07-24 PROCEDURE — 84132 ASSAY OF SERUM POTASSIUM: CPT

## 2018-07-24 PROCEDURE — 83735 ASSAY OF MAGNESIUM: CPT

## 2018-07-24 PROCEDURE — 85730 THROMBOPLASTIN TIME PARTIAL: CPT

## 2018-07-24 PROCEDURE — 87205 SMEAR GRAM STAIN: CPT

## 2018-07-24 PROCEDURE — 86850 RBC ANTIBODY SCREEN: CPT

## 2018-07-24 PROCEDURE — 97530 THERAPEUTIC ACTIVITIES: CPT

## 2018-07-24 PROCEDURE — 86235 NUCLEAR ANTIGEN ANTIBODY: CPT

## 2018-07-24 PROCEDURE — 93306 TTE W/DOPPLER COMPLETE: CPT

## 2018-07-24 PROCEDURE — 74176 CT ABD & PELVIS W/O CONTRAST: CPT

## 2018-07-24 PROCEDURE — P9011: CPT

## 2018-07-24 PROCEDURE — 82728 ASSAY OF FERRITIN: CPT

## 2018-07-24 PROCEDURE — 88112 CYTOPATH CELL ENHANCE TECH: CPT

## 2018-07-24 PROCEDURE — 99285 EMERGENCY DEPT VISIT HI MDM: CPT | Mod: 25

## 2018-07-24 RX ORDER — LACTOBACILLUS ACIDOPHILUS 100MM CELL
1 CAPSULE ORAL
Qty: 30 | Refills: 0 | OUTPATIENT
Start: 2018-07-24 | End: 2018-08-22

## 2018-07-24 RX ORDER — WARFARIN SODIUM 2.5 MG/1
1 TABLET ORAL
Qty: 30 | Refills: 0 | OUTPATIENT
Start: 2018-07-24 | End: 2018-08-22

## 2018-07-24 RX ADMIN — Medication 10 MILLIGRAM(S): at 08:34

## 2018-07-24 RX ADMIN — Medication 1 TABLET(S): at 12:34

## 2018-07-24 RX ADMIN — ZINC SULFATE TAB 220 MG (50 MG ZINC EQUIVALENT) 220 MILLIGRAM(S): 220 (50 ZN) TAB at 12:35

## 2018-07-24 RX ADMIN — Medication 1 APPLICATION(S): at 05:12

## 2018-07-24 RX ADMIN — CARVEDILOL PHOSPHATE 25 MILLIGRAM(S): 80 CAPSULE, EXTENDED RELEASE ORAL at 05:12

## 2018-07-24 RX ADMIN — CALCITRIOL 0.25 MICROGRAM(S): 0.5 CAPSULE ORAL at 12:34

## 2018-07-24 RX ADMIN — Medication 1 APPLICATION(S): at 12:35

## 2018-07-24 RX ADMIN — NYSTATIN CREAM 1 APPLICATION(S): 100000 CREAM TOPICAL at 05:11

## 2018-07-24 RX ADMIN — Medication 80 MILLIGRAM(S): at 05:12

## 2018-07-24 RX ADMIN — AMLODIPINE BESYLATE 10 MILLIGRAM(S): 2.5 TABLET ORAL at 08:35

## 2018-07-24 RX ADMIN — Medication 100 MILLIGRAM(S): at 12:34

## 2018-07-24 RX ADMIN — Medication 500 MILLIGRAM(S): at 12:34

## 2018-07-24 NOTE — PROGRESS NOTE ADULT - SUBJECTIVE AND OBJECTIVE BOX
[facility]   [phone]  RENAL FOLLOW UP NOTE  --------------------------------------------------------------------------------  Chief Complaint:    24 hour events/subjective:    seen earlier. no new c/o     PAST HISTORY  --------------------------------------------------------------------------------  No significant changes to PMH, PSH, FHx, SHx, unless otherwise noted    ALLERGIES & MEDICATIONS  --------------------------------------------------------------------------------  Allergies    heparin (Unknown)  losartan (Anaphylaxis)  nitroglycerin (Other)    Intolerances      Standing Inpatient Medications  allopurinol 100 milliGRAM(s) Oral daily  amLODIPine   Tablet 10 milliGRAM(s) Oral daily  AQUAPHOR (petrolatum Ointment) 1 Application(s) Topical three times a day  ascorbic acid 500 milliGRAM(s) Oral daily  calcitriol   Capsule 0.25 MICROGram(s) Oral daily  carvedilol 25 milliGRAM(s) Oral every 12 hours  furosemide    Tablet 80 milliGRAM(s) Oral daily  lactobacillus acidophilus 1 Tablet(s) Oral daily  Nephro-alyse 1 Tablet(s) Oral daily  nystatin Powder 1 Application(s) Topical two times a day  predniSONE   Tablet 10 milliGRAM(s) Oral daily  zinc sulfate 220 milliGRAM(s) Oral daily    PRN Inpatient Medications  acetaminophen   Tablet. 650 milliGRAM(s) Oral every 6 hours PRN      REVIEW OF SYSTEMS  --------------------------------------------------------------------------------    Gen: denies fatigue, fevers/chills,  CVS: denies chest pain/palpitations  Resp: denies SOB/Cough  GI: Denies N/V/Abd pain  : Denies dysuria    All other systems were reviewed and are negative, except as noted.    VITALS/PHYSICAL EXAM  --------------------------------------------------------------------------------  T(C): 36.7 (07-24-18 @ 12:09), Max: 36.7 (07-24-18 @ 00:38)  HR: 80 (07-24-18 @ 12:09) (71 - 86)  BP: 156/77 (07-24-18 @ 12:09) (139/81 - 156/77)  RR: 18 (07-24-18 @ 12:09) (18 - 18)  SpO2: 96% (07-24-18 @ 12:09) (95% - 97%)  Wt(kg): --        07-23-18 @ 07:01  -  07-24-18 @ 07:00  --------------------------------------------------------  IN: 1320 mL / OUT: 2350 mL / NET: -1030 mL    07-24-18 @ 07:01  -  07-24-18 @ 20:34  --------------------------------------------------------  IN: 240 mL / OUT: 550 mL / NET: -310 mL      Physical Exam:  	  alert oriented   	Pulm: CTA  no rales or ronchi or wheezing  	CV: RRR, S1/S2. no rub  	Abd: +BS, soft, nontender/nondistended  	: No suprapubic tenderness.               Extremity: No cyanosis, LLE 4+ pitting edema RLE 1++ edema     	  LABS/STUDIES  --------------------------------------------------------------------------------              9.8    9.7   >-----------<  194      [07-24-18 @ 05:47]              31.7     144  |  105  |  99  ----------------------------<  86      [07-24-18 @ 05:47]  4.4   |  24  |  3.78        Ca     8.9     [07-24-18 @ 05:47]      Mg     2.2     [07-23-18 @ 05:33]      Phos  4.9     [07-23-18 @ 05:33]      PT/INR: PT 30.4 , INR 2.64       [07-24-18 @ 08:36]      Creatinine Trend:  SCr 3.78 [07-24 @ 05:47]  SCr 3.75 [07-23 @ 05:33]  SCr 3.87 [07-22 @ 06:53]  SCr 3.59 [07-21 @ 06:23]  SCr 3.66 [07-20 @ 09:15]              Urinalysis - [07-06-18 @ 14:45]      Color Yellow / Appearance Turbid / SG 1.015 / pH 8.5      Gluc Negative / Ketone Negative  / Bili Negative / Urobili Negative       Blood Small / Protein 300 / Leuk Est Large / Nitrite Negative      RBC 5-10 / WBC 26-50 / Hyaline  / Gran  / Sq Epi  / Non Sq Epi  / Bacteria Moderate      Iron 67, TIBC 203, %sat 33      [07-12-18 @ 14:18]  Ferritin 961      [07-12-18 @ 14:18]  PTH -- (Ca 8.9)      [04-20-18 @ 08:14]   217  PTH -- (Ca 9.0)      [11-09-17 @ 08:51]   387  Vitamin D (25OH) 7.8      [11-09-17 @ 08:51]  HbA1c 4.5      [08-09-16 @ 06:49]  TSH 0.47      [04-24-18 @ 07:51]    dsDNA 38      [07-10-18 @ 13:55]  C3 Complement 114      [07-10-18 @ 13:54]  C4 Complement 32      [07-10-18 @ 13:54]

## 2018-07-24 NOTE — PROGRESS NOTE ADULT - PROBLEM SELECTOR PROBLEM 5
CKD (chronic kidney disease)
Congestive heart failure, severe
CKD (chronic kidney disease)
Congestive heart failure, severe
Congestive heart failure, severe
CKD (chronic kidney disease)
Congestive heart failure, severe

## 2018-07-24 NOTE — PROGRESS NOTE ADULT - SUBJECTIVE AND OBJECTIVE BOX
Patient is a 58y old  Female who presents with a chief complaint of Lack of urine, change in color. (12 Jul 2018 10:26)      INTERVAL HPI/OVERNIGHT EVENTS:  T(C): 36.7 (07-24-18 @ 12:09), Max: 36.8 (07-23-18 @ 14:22)  HR: 80 (07-24-18 @ 12:09) (69 - 86)  BP: 156/77 (07-24-18 @ 12:09) (122/83 - 164/84)  RR: 18 (07-24-18 @ 12:09) (18 - 18)  SpO2: 96% (07-24-18 @ 12:09) (95% - 98%)  Wt(kg): --  I&O's Summary    23 Jul 2018 07:01  -  24 Jul 2018 07:00  --------------------------------------------------------  IN: 1320 mL / OUT: 2350 mL / NET: -1030 mL    24 Jul 2018 07:01  -  24 Jul 2018 13:11  --------------------------------------------------------  IN: 240 mL / OUT: 550 mL / NET: -310 mL        LABS:                        9.8    9.7   )-----------( 194      ( 24 Jul 2018 05:47 )             31.7     07-24    144  |  105  |  99<H>  ----------------------------<  86  4.4   |  24  |  3.78<H>    Ca    8.9      24 Jul 2018 05:47  Phos  4.9     07-23  Mg     2.2     07-23      PT/INR - ( 24 Jul 2018 08:36 )   PT: 30.4 sec;   INR: 2.64 ratio             CAPILLARY BLOOD GLUCOSE                MEDICATIONS  (STANDING):  allopurinol 100 milliGRAM(s) Oral daily  amLODIPine   Tablet 10 milliGRAM(s) Oral daily  AQUAPHOR (petrolatum Ointment) 1 Application(s) Topical three times a day  ascorbic acid 500 milliGRAM(s) Oral daily  calcitriol   Capsule 0.25 MICROGram(s) Oral daily  carvedilol 25 milliGRAM(s) Oral every 12 hours  furosemide    Tablet 80 milliGRAM(s) Oral daily  lactobacillus acidophilus 1 Tablet(s) Oral daily  Nephro-alyse 1 Tablet(s) Oral daily  nystatin Powder 1 Application(s) Topical two times a day  predniSONE   Tablet 10 milliGRAM(s) Oral daily  zinc sulfate 220 milliGRAM(s) Oral daily    MEDICATIONS  (PRN):  acetaminophen   Tablet. 650 milliGRAM(s) Oral every 6 hours PRN Moderate Pain (4 - 6)          PHYSICAL EXAM:  GENERAL: NAD, well-groomed, well-developed  HEAD:  Atraumatic, Normocephalic  CHEST/LUNG: Clear to percussion bilaterally; No rales, rhonchi, wheezing, or rubs  HEART: Regular rate and rhythm; No murmurs, rubs, or gallops  ABDOMEN: Soft, Nontender, Nondistended; Bowel sounds present  EXTREMITIES:  2+ Peripheral Pulses, No clubbing, cyanosis, or edema  LYMPH: No lymphadenopathy noted  SKIN: No rashes or lesions    Care Discussed with Consultants/Other Providers [ ] YES  [ ] NO

## 2018-07-24 NOTE — PROGRESS NOTE ADULT - PROBLEM SELECTOR PLAN 1
IV Invanz  ID f/up noted.
chronic  Given continued hypotension (which is new for her) and +JVP, will plan for CT guided pericardial drain with IR. Discussed with Dr. Jeancarlos Shah, patient and staff.  Plan to transfuse 2 units FFP. No need to recheck another INR
IV Invanz  ID f/up noted.
IV abx  ID eval noted.
IV abx  ID f/up noted.
Off abx
Urine culture is positive,but this could be a chronic colonization. Will get ID eval in AM, meanwhile, patient is on Ceftriaxone.
chronic  No clinical or echocardiographic evidence of tamponade physiology at present time
s/p pericardial drain  Minimal output since yesterday     IR following
s/p pericardial drain  Minimal output since yesterday     IR following
s/p pericardial drain  Minimal output since yesterday. DC drain today
s/p pericardial drain  Minimal output since yesterday. Follow up repeat limited echo from yesterday and hopefully plan for Dc drain today   IR following
s/p pericardial drain  Minimal output since yesterday. likely DC tomorrow      IR following
s/p pericardial drain  Monitor output    IR following
s/p pericardial drain removal. Still with large pericardial effusion which most likely is loculated and not accessible with drain  No objection to DC from cardiac standpoint. Close Outpatient follow up
s/p pericardial drain removal. Still with large pericardial effusion which most likely is loculated and not accessible with drain  No objection to DC from cardiac standpoint. Close Outpatient follow up
s/p pericardial drain removal. Still with large pericardial effusion which most likely is loculated and not accessible with drain  monitor clinically for now
s/p pericardial drain removal. Still with large pericardial effusion which most likely is loculated and not accessible with drain  monitor clinically for now
chronic  No clinical or echocardiographic evidence of tamponade physiology at present time
s/p pericardial drain  Minimal output since yesterday. Repeat limited echo today and hopefully plan for Dc drain tomorrow     IR following
Off abx
IV Invanz  ID f/up noted.
IV Invanz  ID f/up noted.
Off abx

## 2018-07-24 NOTE — PROGRESS NOTE ADULT - PROBLEM SELECTOR PLAN 4
Restart Lasix 80 mg daily   Cont with rest of meds
well compensated ot present time
Prednisone.
Prednisone.
well compensated at present time  Keep off labetolol for now given relative hypotension
On  Lasix 80 mg daily   Cont with rest of meds
Prednisone.
Restarted Lasix 80 mg daily   Cont with rest of meds
Restarted Lasix 80 mg daily   Cont with rest of meds
Unclear of SLE exacerbation. Some swelling in hands yesterday, diffuse body aches and kidney symptoms.  -Cont. prednisone 10mg daily  -Would consult rheumatology in AM, Dr. Goldberg is pt's rheum  -F/u Anti-smith antibody, anti DS DNA
well compensated at present time  Keep off labetolol for now given relative hypotension
well compensated ot present time
well compensated ot present time
Prednisone.

## 2018-07-24 NOTE — PROGRESS NOTE ADULT - PROBLEM SELECTOR PLAN 3
On IV abx
CT Surgery suggests repeat CT for surveillance.
CT Surgery suggests repeat CT for surveillance.
On IV abx
CT Surgery suggests repeat CT for surveillance.
Completed  IV abx
Off IV abx
On IV abx
On IV abx  ? DC today
On IV abx  ? DC today
CT Surgery suggests repeat CT for surveillance.

## 2018-07-24 NOTE — PROGRESS NOTE ADULT - PROBLEM SELECTOR PLAN 5
BMP
BMP
Nephrology following  restart lasix as above
Lasix  BMP
BMP
BMP
Nephrology following
BMP
Lasix  BMP
Nephrology following
Nephrology following  restart lasix as above
Severely reduced EF. S/p ICD due to severe cardiomyopathy. Paced rhythm on EKG. Pt states her LE edema is at baseline and her LLE has been bigger than her RLE since prior vascular surgery.   -Cont. coreg 25mg BID  -Holding furosemide 40mg BID due to ILANA for now
BMP

## 2018-07-24 NOTE — PROGRESS NOTE ADULT - PROBLEM SELECTOR PROBLEM 3
Thoracic aortic aneurysm without rupture
Thoracic aortic aneurysm without rupture
UTI (urinary tract infection)
Thoracic aortic aneurysm without rupture
UTI (urinary tract infection)
Thoracic aortic aneurysm without rupture

## 2018-07-24 NOTE — PROGRESS NOTE ADULT - PROBLEM SELECTOR PROBLEM 1
Pericardial effusion
Urinary tract infection associated with catheterization of urinary tract, unspecified indwelling urinary catheter type, initial encounter
Pericardial effusion
Urinary tract infection associated with catheterization of urinary tract, unspecified indwelling urinary catheter type, initial encounter
Pericardial effusion
Pericardial effusion
Urinary tract infection associated with catheterization of urinary tract, unspecified indwelling urinary catheter type, initial encounter

## 2018-07-24 NOTE — PROGRESS NOTE ADULT - PROVIDER SPECIALTY LIST ADULT
Cardiology
Hospitalist
Infectious Disease
Internal Medicine
Intervent Radiology
Nephrology
Vascular Surgery
Wound Care
Nephrology
Nephrology
Cardiology
Cardiology
Internal Medicine
Cardiology
Internal Medicine
Internal Medicine

## 2018-07-24 NOTE — PROGRESS NOTE ADULT - PROBLEM SELECTOR PROBLEM 2
Acute kidney injury
Thoracic aortic aneurysm without rupture
Acute kidney injury
Thoracic aortic aneurysm without rupture
Acute kidney injury

## 2018-07-24 NOTE — PROGRESS NOTE ADULT - PROBLEM SELECTOR PLAN 2
Renal FU noted
Renal FU noted
incidental finding  Seen by CTS
Renal FU noted
incidental finding  Seen by CTS
Renal FU noted

## 2018-07-24 NOTE — PROGRESS NOTE ADULT - PROBLEM SELECTOR PROBLEM 4
Congestive heart failure
Congestive heart failure
Lupus erythematosus, unspecified form
Congestive heart failure
Lupus erythematosus, unspecified form
Congestive heart failure
Lupus erythematosus, unspecified form

## 2018-07-24 NOTE — PROGRESS NOTE ADULT - ASSESSMENT
58 F PMH SLE, systolic HFrEF(EF 32%) s/p AICD, HTN, CKD III- IV, PE / left leg DVT dx 2009, HIT, CAD (no stents), stage IV sacral ulcer, chronic Fox, recurrent pericardial effusion, AAA ~14cm in 12/2016 s/p repair in 2010 w/ aortoiliac stent complicated post-operatively with development of left lower extremity compartment syndrome, with recent cellulitis treated inpt now presents with uti and pericardial effusion      1- CKD IV with ILANA   2- pericardial effusion   3- chf  4- HTN   5- neurogenic bladder  6- lupus   7- anemia  8- shpt  9- hyperuricemia   10- UTI  completed abx   11- thoracic aneurysm  cr steady at this higher level   cont with b-b and norvasc    cont fox for hx of neurogenic bladder  cont calcitriol 0.25mcg daily    cont with allopurinol  lasix at 80 mg daily  Keep O> I    procrit [id number] units tiw

## 2018-08-15 NOTE — PATIENT PROFILE ADULT. - PRO SERVICES AT [facility]
After Visit Summary   8/15/2018    Jonas Asher    MRN: [id number]           Patient Information     Date Of Birth          2002        Visit Information        Provider Department      8/15/2018 7:00 AM Arie Carty DO [facility]        Care Instructions    Allergy Staff Appt Hours Shot Hours Locations    Physician     Aire Carty DO       Support Staff     Marbella NIEVES, RN      Maryse NIEVES, [facility]  Monday:                      [place] 8-7     Tuesday:         [place] 8-5     Wednesday:        [place]: 7-5     Friday:        [place] 7-5   [place] Monday: 9-5:50        Wednesday: 2-5:50        Friday: 7-12:50     [place]        Tuesday: 7-10:50        Thursday: 1:30-6:30     [place] Monday: 7:10-4:50        Tuesday: 12:30-6:30        Thursday: 7-11:50 [facility]  [address] [place] [address]  Appt Line: [phone]  Allergy RN (Monday):  [phone]    [facility]  [address] [place] [address]  Appt Line: [phone]  Allergy RN (Tues & Wed):  [phone]    [facility]  [address] [place] [address]  Appt Line: [phone]  Allergy RN (Friday):  [phone]       Important Scheduling Information  Aspirin Desensitization: Appt will last 2 clinic days. Please call the Allergy RN line for your clinic to schedule. Discontinue antihistamines 7 days prior to the appointment.     Food Challenges: Appt will last 3-4 hours. Please call the Allergy RN line for your clinic to schedule. Discontinue antihistamines 7 days prior to the appointment.     Penicillin Testing: Appt will last 2-3 hours. Please call the Allergy RN line for your clinic to schedule. Discontinue antihistamines 7 days prior to the appointment.     Skin Testing: Appt will about 40 minutes. Call the appointment line for your clinic to schedule. Discontinue antihistamines 7 days prior to the appointment.     Venom Testing: Appt will last 2-3  hours. Please call the Allergy RN line for your clinic to schedule. Discontinue antihistamines 7 days prior to the appointment.     Thank you for trusting us with your Allergy, Asthma, and Immunology care. Please feel free to contact us with any questions or concerns you may have.      - Continue Xolair 300mg monthly.   - Zyrtec 20mg by mouth twice daily.   - Return to clinic in 3 months.           Follow-ups after your visit        Follow-up notes from your care team     Return in about 3 months (around 11/15/2018).      Who to contact     If you have questions or need follow up information about today's clinic visit or your schedule please contact [facility] directly at [phone].  Normal or non-critical lab and imaging results will be communicated to you by Karmahart, letter or phone within 4 business days after the clinic has received the results. If you do not hear from us within 7 days, please contact the clinic through [email or web address] or phone. If you have a critical or abnormal lab result, we will notify you by phone as soon as possible.  Submit refill requests through [facility] or call your pharmacy and they will forward the refill request to us. Please allow 3 business days for your refill to be completed.          Additional Information About Your Visit        Karmahar[employer]CONIC Information     [facility] gives you secure access to your electronic health record. If you see a primary care provider, you can also send messages to your care team and make appointments. If you have questions, please call your primary care clinic.  If you do not have a primary care provider, please call [phone] and they will assist you.        Care EveryWhere ID     This is your Care EveryWhere ID. This could be used by other organizations to access your [facility] medical records  [id number]        Your Vitals Were     Pulse Temperature Respirations Pulse Oximetry BMI (Body Mass Index)       64 97.7  F (36.5  C) (Oral) 16 98%  22.86 kg/m2        Blood Pressure from Last 3 Encounters:   08/15/18 118/62   06/11/18 120/80   05/09/18 106/68    Weight from Last 3 Encounters:   08/15/18 81.6 kg (180 lb) (84 %)*   06/11/18 88 kg (194 lb) (92 %)*   05/09/18 90 kg (198 lb 8 oz) (94 %)*     * Growth percentiles are based on CDC 2-20 Years data.              Today, you had the following     No orders found for display       Primary Care Provider Fax #    Physician No Ref-Primary [phone]       No address on file        Equal Access to Services     [facility]: Hadii georgie Pugh, wastephie herman, erica kaalmada nadira, verenice price . So [facility] [phone].    ATENCIÓN: Si habla español, tiene a monatno disposición servicios gratuitos de asistencia lingüística. Llame al [phone].    We comply with applicable federal civil rights laws and [place] laws. We do not discriminate on the basis of race, color, national origin, age, disability, sex, sexual orientation, or gender identity.            Thank you!     Thank you for choosing [facility]  for your care. Our goal is always to provide you with excellent care. Hearing back from our patients is one way we can continue to improve our services. Please take a few minutes to complete the written survey that you may receive in the mail after your visit with us. Thank you!             Your Updated Medication List - Protect others around you: Learn how to safely use, store and throw away your medicines at www.disposemymeds.org.          This list is accurate as of 8/15/18  7:19 AM.  Always use your most recent med list.                   Brand Name Dispense Instructions for use Diagnosis    adapalene 0.1 % gel    DIFFERIN    45 g    Apply topically At Bedtime    Acne vulgaris       ADVIL PO      Take 200 mg by mouth        cetirizine 10 MG tablet    zyrTEC     Take 10 mg by mouth daily        EPINEPHrine 0.3 MG/0.3ML injection 2-pack    AUVI-Q     2 mL    Inject 0.3 mLs (0.3 mg) into the muscle as needed for anaphylaxis    Chronic urticaria       TYLENOL PO           XOLAIR 150 MG injection   Generic drug:  omalizumab     2 each    Inject 2.4 mLs (300 mg) Subcutaneous every 28 days    Chronic idiopathic urticaria          unsure

## 2018-08-18 ENCOUNTER — INPATIENT (INPATIENT)
Facility: HOSPITAL | Age: 58
LOS: 8 days | Discharge: HOME | DRG: 813 | End: 2018-08-27
Attending: INTERNAL MEDICINE | Admitting: INTERNAL MEDICINE
Payer: MEDICARE

## 2018-08-18 VITALS
SYSTOLIC BLOOD PRESSURE: 156 MMHG | RESPIRATION RATE: 18 BRPM | DIASTOLIC BLOOD PRESSURE: 89 MMHG | HEART RATE: 86 BPM | TEMPERATURE: 98 F | OXYGEN SATURATION: 96 %

## 2018-08-18 DIAGNOSIS — L98.429 NON-PRESSURE CHRONIC ULCER OF BACK WITH UNSPECIFIED SEVERITY: ICD-10-CM

## 2018-08-18 DIAGNOSIS — I26.99 OTHER PULMONARY EMBOLISM WITHOUT ACUTE COR PULMONALE: ICD-10-CM

## 2018-08-18 DIAGNOSIS — K92.2 GASTROINTESTINAL HEMORRHAGE, UNSPECIFIED: ICD-10-CM

## 2018-08-18 LAB
ALBUMIN SERPL ELPH-MCNC: 3.9 G/DL — SIGNIFICANT CHANGE UP (ref 3.3–5)
ALP SERPL-CCNC: 56 U/L — SIGNIFICANT CHANGE UP (ref 40–120)
ALT FLD-CCNC: 7 U/L — LOW (ref 10–45)
ANION GAP SERPL CALC-SCNC: 14 MMOL/L — SIGNIFICANT CHANGE UP (ref 5–17)
APPEARANCE UR: CLEAR — SIGNIFICANT CHANGE UP
APTT BLD: 36.7 SEC — SIGNIFICANT CHANGE UP (ref 27.5–37.4)
AST SERPL-CCNC: 17 U/L — SIGNIFICANT CHANGE UP (ref 10–40)
BACTERIA # UR AUTO: ABNORMAL /HPF
BASOPHILS # BLD AUTO: 0 K/UL — SIGNIFICANT CHANGE UP (ref 0–0.2)
BASOPHILS NFR BLD AUTO: 0.4 % — SIGNIFICANT CHANGE UP (ref 0–2)
BILIRUB SERPL-MCNC: 0.3 MG/DL — SIGNIFICANT CHANGE UP (ref 0.2–1.2)
BILIRUB UR-MCNC: NEGATIVE — SIGNIFICANT CHANGE UP
BUN SERPL-MCNC: 96 MG/DL — HIGH (ref 7–23)
CALCIUM SERPL-MCNC: 9.3 MG/DL — SIGNIFICANT CHANGE UP (ref 8.4–10.5)
CHLORIDE SERPL-SCNC: 104 MMOL/L — SIGNIFICANT CHANGE UP (ref 96–108)
CO2 SERPL-SCNC: 23 MMOL/L — SIGNIFICANT CHANGE UP (ref 22–31)
COLOR SPEC: SIGNIFICANT CHANGE UP
CREAT SERPL-MCNC: 3.5 MG/DL — HIGH (ref 0.5–1.3)
DIFF PNL FLD: ABNORMAL
EOSINOPHIL # BLD AUTO: 0 K/UL — SIGNIFICANT CHANGE UP (ref 0–0.5)
EOSINOPHIL NFR BLD AUTO: 0.5 % — SIGNIFICANT CHANGE UP (ref 0–6)
GLUCOSE SERPL-MCNC: 104 MG/DL — HIGH (ref 70–99)
GLUCOSE UR QL: NEGATIVE — SIGNIFICANT CHANGE UP
HCT VFR BLD CALC: 32.3 % — LOW (ref 34.5–45)
HGB BLD-MCNC: 10 G/DL — LOW (ref 11.5–15.5)
INR BLD: 2.2 RATIO — HIGH (ref 0.88–1.16)
KETONES UR-MCNC: NEGATIVE — SIGNIFICANT CHANGE UP
LEUKOCYTE ESTERASE UR-ACNC: ABNORMAL
LYMPHOCYTES # BLD AUTO: 0.6 K/UL — LOW (ref 1–3.3)
LYMPHOCYTES # BLD AUTO: 6.9 % — LOW (ref 13–44)
MCHC RBC-ENTMCNC: 27.4 PG — SIGNIFICANT CHANGE UP (ref 27–34)
MCHC RBC-ENTMCNC: 31 GM/DL — LOW (ref 32–36)
MCV RBC AUTO: 88.4 FL — SIGNIFICANT CHANGE UP (ref 80–100)
MONOCYTES # BLD AUTO: 0.5 K/UL — SIGNIFICANT CHANGE UP (ref 0–0.9)
MONOCYTES NFR BLD AUTO: 5.4 % — SIGNIFICANT CHANGE UP (ref 2–14)
NEUTROPHILS # BLD AUTO: 8.2 K/UL — HIGH (ref 1.8–7.4)
NEUTROPHILS NFR BLD AUTO: 86.9 % — HIGH (ref 43–77)
NITRITE UR-MCNC: NEGATIVE — SIGNIFICANT CHANGE UP
OB PNL STL: NEGATIVE — SIGNIFICANT CHANGE UP
PH UR: 6 — SIGNIFICANT CHANGE UP (ref 5–8)
PLATELET # BLD AUTO: 178 K/UL — SIGNIFICANT CHANGE UP (ref 150–400)
POTASSIUM SERPL-MCNC: 4.3 MMOL/L — SIGNIFICANT CHANGE UP (ref 3.5–5.3)
POTASSIUM SERPL-SCNC: 4.3 MMOL/L — SIGNIFICANT CHANGE UP (ref 3.5–5.3)
PROT SERPL-MCNC: 6.8 G/DL — SIGNIFICANT CHANGE UP (ref 6–8.3)
PROT UR-MCNC: 30 MG/DL
PROTHROM AB SERPL-ACNC: 24.2 SEC — HIGH (ref 9.8–12.7)
RBC # BLD: 3.65 M/UL — LOW (ref 3.8–5.2)
RBC # FLD: 18.4 % — HIGH (ref 10.3–14.5)
RBC CASTS # UR COMP ASSIST: ABNORMAL /HPF (ref 0–2)
SODIUM SERPL-SCNC: 141 MMOL/L — SIGNIFICANT CHANGE UP (ref 135–145)
SP GR SPEC: 1.01 — SIGNIFICANT CHANGE UP (ref 1.01–1.02)
UROBILINOGEN FLD QL: NEGATIVE — SIGNIFICANT CHANGE UP
WBC # BLD: 9.4 K/UL — SIGNIFICANT CHANGE UP (ref 3.8–10.5)
WBC # FLD AUTO: 9.4 K/UL — SIGNIFICANT CHANGE UP (ref 3.8–10.5)
WBC UR QL: SIGNIFICANT CHANGE UP /HPF (ref 0–5)

## 2018-08-18 PROCEDURE — 78278 ACUTE GI BLOOD LOSS IMAGING: CPT | Mod: 26

## 2018-08-18 PROCEDURE — 99285 EMERGENCY DEPT VISIT HI MDM: CPT | Mod: GC

## 2018-08-18 RX ORDER — WARFARIN SODIUM 2.5 MG/1
3 TABLET ORAL ONCE
Qty: 0 | Refills: 0 | Status: DISCONTINUED | OUTPATIENT
Start: 2018-08-18 | End: 2018-08-18

## 2018-08-18 RX ORDER — PANTOPRAZOLE SODIUM 20 MG/1
40 TABLET, DELAYED RELEASE ORAL
Qty: 0 | Refills: 0 | Status: DISCONTINUED | OUTPATIENT
Start: 2018-08-18 | End: 2018-08-27

## 2018-08-18 RX ORDER — ALLOPURINOL 300 MG
100 TABLET ORAL DAILY
Qty: 0 | Refills: 0 | Status: DISCONTINUED | OUTPATIENT
Start: 2018-08-18 | End: 2018-08-27

## 2018-08-18 RX ORDER — FUROSEMIDE 40 MG
80 TABLET ORAL
Qty: 0 | Refills: 0 | Status: DISCONTINUED | OUTPATIENT
Start: 2018-08-18 | End: 2018-08-27

## 2018-08-18 RX ORDER — AMLODIPINE BESYLATE 2.5 MG/1
10 TABLET ORAL DAILY
Qty: 0 | Refills: 0 | Status: DISCONTINUED | OUTPATIENT
Start: 2018-08-18 | End: 2018-08-27

## 2018-08-18 RX ORDER — ONDANSETRON 8 MG/1
4 TABLET, FILM COATED ORAL EVERY 6 HOURS
Qty: 0 | Refills: 0 | Status: DISCONTINUED | OUTPATIENT
Start: 2018-08-18 | End: 2018-08-27

## 2018-08-18 RX ORDER — CARVEDILOL PHOSPHATE 80 MG/1
25 CAPSULE, EXTENDED RELEASE ORAL EVERY 12 HOURS
Qty: 0 | Refills: 0 | Status: DISCONTINUED | OUTPATIENT
Start: 2018-08-18 | End: 2018-08-27

## 2018-08-18 RX ADMIN — CARVEDILOL PHOSPHATE 25 MILLIGRAM(S): 80 CAPSULE, EXTENDED RELEASE ORAL at 21:03

## 2018-08-18 RX ADMIN — Medication 10 MILLIGRAM(S): at 21:03

## 2018-08-18 RX ADMIN — Medication 80 MILLIGRAM(S): at 22:33

## 2018-08-18 NOTE — ED ADULT NURSE NOTE - NSIMPLEMENTINTERV_GEN_ALL_ED
Implemented All Fall with Harm Risk Interventions:  [place] to call system. Call bell, personal items and telephone within reach. Instruct patient to call for assistance. Room bathroom lighting operational. Non-slip footwear when patient is off stretcher. Physically safe environment: no spills, clutter or unnecessary equipment. Stretcher in lowest position, wheels locked, appropriate side rails in place. Provide visual cue, wrist band, yellow gown, etc. Monitor gait and stability. Monitor for mental status changes and reorient to person, place, and time. Review medications for side effects contributing to fall risk. Reinforce activity limits and safety measures with patient and family. Provide visual clues: red socks.

## 2018-08-18 NOTE — ED PROVIDER NOTE - OBJECTIVE STATEMENT
57 yo F PMHx PE on Coumadin (3 mg daily), iliac aneurysm repair, CHF, chronic gout, sacral ulcer, chronic fox, p/w rectal bleeding. Pt was in bed and rolled over and noticed the sheets were saturated in blood. She denies N/V, abd pain, fevers/chills, lightheadedness/dizziness. She has had colonoscopies in the past which have been normal.

## 2018-08-18 NOTE — ED PROVIDER NOTE - NS ED ROS FT
GENERAL: No fever or chills, EYES: no change in vision, HEENT: no trouble swallowing or speaking, CARDIAC: no chest pain, PULMONARY: no cough or SOB, GI: no abdominal pain, no nausea, no vomiting, no diarrhea or constipation, +rectal bleeding, : No changes in urination, SKIN: no rashes, NEURO: no headache,  MSK: No joint pain ~Mushtaq Ayoub M.D. Resident

## 2018-08-18 NOTE — ED PROVIDER NOTE - MEDICAL DECISION MAKING DETAILS
57 yo F PMHx PE on Coumadin (3 mg daily), iliac aneurysm repair, CHF, chronic gout, sacral ulcer, chronic fox, p/w bleeding, likely from sacral ulcer as dsg saturated in blood and stool sample with brown, non-bloody stool. will send off hemeoccult, basic labs + coags (pt on coumadin), likely admission for wound care

## 2018-08-18 NOTE — H&P ADULT - HISTORY OF PRESENT ILLNESS
57 yo F PMHx PE on Coumadin (3 mg daily), iliac aneurysm repair, CHF, chronic gout, sacral ulcer, chronic fox, p/w rectal bleeding. Pt was in bed and rolled over and noticed the sheets were saturated in blood. She denies N/V, abd pain, fevers/chills, lightheadedness/dizziness.

## 2018-08-18 NOTE — H&P ADULT - NSHPPHYSICALEXAM_GEN_ALL_CORE
General: WN/WD NAD  PERRLA  Neurology: A&Ox3, nonfocal, HALE x 4  Respiratory: CTA B/L  CV: RRR, S1S2, no murmurs, rubs or gallops  Abdominal: Soft, NT, ND +BS, Last BM  Extremities: No edema, + peripheral pulses  Skin Normal

## 2018-08-18 NOTE — H&P ADULT - ASSESSMENT
59 yo F PMHx PE on Coumadin (3 mg daily), iliac aneurysm repair, CHF, chronic gout, sacral ulcer, chronic fox, p/w rectal bleeding. Pt was in bed and rolled over and noticed the sheets were saturated in blood. She denies N/V, abd pain, fevers/chills, lightheadedness/dizziness.

## 2018-08-18 NOTE — ED ADULT TRIAGE NOTE - CHIEF COMPLAINT QUOTE
brought in by ems for rectal bleeding. patient reports rolling in bed and noticed the blood on her sheets. reports there was "quite a bit of blood"  c/o abd discomfort, denies any dizziness, no headache

## 2018-08-18 NOTE — CONSULT NOTE ADULT - SUBJECTIVE AND OBJECTIVE BOX
Chief Complaint:  Patient is a 58y old  Female who presents with a chief complaint of   · Chief Complaint: The patient is a 58y Female complaining of rectal bleeding.	  · HPI Objective Statement: 59 yo F PMHx PE on Coumadin (3 mg daily), iliac aneurysm repair, CHF, chronic gout, sacral ulcer, chronic fox, p/w rectal bleeding. Pt was in bed and rolled over and noticed the sheets were saturated in blood. She denies N/V, abd pain, fevers/chills, lightheadedness/dizziness. She has had colonoscopies in the past which have been normal.	  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)  Calf DVT (Deep Venous Thrombosis) (ICD9 453.42)  Adenomatous Goiter (ICD9 241.9)  Chronic Gout (ICD9 274.02)  LBBB (Left Bundle Branch Block) (ICD9 426.3)  CHF (Congestive Heart Failure) (ICD9 428.0)  Hx of aorta-iliac-femoral bypass  TOP (Termination of Pregnancy)  S/P Dilatation and Curettage  History of Tubal Ligation  s/p AAA (Abdominal Aortic Aneurysm) Repair  History of Cholecystectomy  S/P Partial Hysterectomy      Family history:  No pertinent family history in first degree relatives      Social History: no eoth no cigs no ivda livesa t home    ROS:     General:  No wt loss, fevers, chills, night sweats, fatigue,   Eyes:  Good vision, no reported pain  ENT:  No sore throat, pain, runny nose, dysphagia  CV:  No pain, palpitations, hypo/hypertension  Resp:  No dyspnea, cough, tachypnea, wheezing  GI:  No pain, No nausea, No vomiting, No diarrhea, No constipation, No weight loss, No fever, No pruritis, No rectal bleeding, No tarry stools, No dysphagia,  :  No pain, bleeding, incontinence, nocturia  Muscle:  No pain, weakness  Neuro:  No weakness, tingling, memory problems  Psych:  No fatigue, insomnia, mood problems, depression  Endocrine:  No polyuria, polydipsia, cold/heat intolerance  Heme:  No petechiae, ecchymosis, easy bruisability  Skin:  No rash, tattoos, scars, edema      PHYSICAL EXAM:   Vital Signs:  Vital Signs Last 24 Hrs  T(C): 36.7 (18 Aug 2018 14:49), Max: 36.9 (18 Aug 2018 04:15)  T(F): 98.1 (18 Aug 2018 14:49), Max: 98.5 (18 Aug 2018 04:15)  HR: 82 (18 Aug 2018 14:49) (73 - 88)  BP: 168/82 (18 Aug 2018 14:49) (122/67 - 168/94)  BP(mean): --  RR: 18 (18 Aug 2018 14:49) (17 - 22)  SpO2: 97% (18 Aug 2018 14:49) (96% - 98%)  Daily     Daily     GENERAL:  Appears stated age, well-groomed, well-nourished, no distress  HEENT:  NC/AT,  conjunctivae clear and pink, no thyromegaly, nodules, adenopathy, no JVD, sclera -anicteric  CHEST:  Full & symmetric excursion, no increased effort, breath sounds clear  HEART:  Regular rhythm, S1, S2, no murmur/rub/S3/S4, no abdominal bruit, no edema  ABDOMEN:  Soft, non-tender, non-distended, normoactive bowel sounds,  no masses ,no hepato-splenomegaly, no signs of chronic liver disease  EXTEREMITIES:  no cyanosis,clubbing or edema  SKIN:  No rash/erythema/ecchymoses/petechiae/wounds/abscess/warm/dry  NEURO:  Alert, oriented, no asterixis, no tremor, no encephalopathy    LABS:                        10.0   9.4   )-----------( 178      ( 18 Aug 2018 05:59 )             32.3     08-18    141  |  104  |  96<H>  ----------------------------<  104<H>  4.3   |  23  |  3.50<H>    Ca    9.3      18 Aug 2018 05:59    TPro  6.8  /  Alb  3.9  /  TBili  0.3  /  DBili  x   /  AST  17  /  ALT  7<L>  /  AlkPhos  56  08-18    LIVER FUNCTIONS - ( 18 Aug 2018 05:59 )  Alb: 3.9 g/dL / Pro: 6.8 g/dL / ALK PHOS: 56 U/L / ALT: 7 U/L / AST: 17 U/L / GGT: x           PT/INR - ( 18 Aug 2018 05:59 )   PT: 24.2 sec;   INR: 2.20 ratio         PTT - ( 18 Aug 2018 05:59 )  PTT:36.7 sec  Urinalysis Basic - ( 18 Aug 2018 05:33 )    Color: PL Yellow / Appearance: Clear / S.011 / pH: x  Gluc: x / Ketone: Negative  / Bili: Negative / Urobili: Negative   Blood: x / Protein: 30 mg/dL / Nitrite: Negative   Leuk Esterase: Large / RBC: 10-25 /HPF / WBC 11-25 /HPF   Sq Epi: x / Non Sq Epi: x / Bacteria: Many /HPF          Imaging:

## 2018-08-18 NOTE — CONSULT NOTE ADULT - PROBLEM SELECTOR RECOMMENDATION 9
monitor CBC, transfuse prn,   bleeding scan if unstable consider CT ab/pel  hold anticoagulation  IR/CRS evaluation if decompensates   colonoscopy once optimized
GI Consulted. Consider Gyn consulted as bleed may be Gyn related and not GI   Monitor CBC

## 2018-08-18 NOTE — H&P ADULT - NSHPLABSRESULTS_GEN_ALL_CORE
Lab Results:  CBC  CBC Full  -  ( 18 Aug 2018 05:59 )  WBC Count : 9.4 K/uL  Hemoglobin : 10.0 g/dL  Hematocrit : 32.3 %  Platelet Count - Automated : 178 K/uL  Mean Cell Volume : 88.4 fl  Mean Cell Hemoglobin : 27.4 pg  Mean Cell Hemoglobin Concentration : 31.0 gm/dL  Auto Neutrophil # : 8.2 K/uL  Auto Lymphocyte # : 0.6 K/uL  Auto Monocyte # : 0.5 K/uL  Auto Eosinophil # : 0.0 K/uL  Auto Basophil # : 0.0 K/uL  Auto Neutrophil % : 86.9 %  Auto Lymphocyte % : 6.9 %  Auto Monocyte % : 5.4 %  Auto Eosinophil % : 0.5 %  Auto Basophil % : 0.4 %    .		Differential:	[] Automated		[] Manual  Chemistry                        10.0   9.4   )-----------( 178      ( 18 Aug 2018 05:59 )             32.3     08-18    141  |  104  |  96<H>  ----------------------------<  104<H>  4.3   |  23  |  3.50<H>    Ca    9.3      18 Aug 2018 05:59    TPro  6.8  /  Alb  3.9  /  TBili  0.3  /  DBili  x   /  AST  17  /  ALT  7<L>  /  AlkPhos  56  08-18    LIVER FUNCTIONS - ( 18 Aug 2018 05:59 )  Alb: 3.9 g/dL / Pro: 6.8 g/dL / ALK PHOS: 56 U/L / ALT: 7 U/L / AST: 17 U/L / GGT: x           PT/INR - ( 18 Aug 2018 05:59 )   PT: 24.2 sec;   INR: 2.20 ratio         PTT - ( 18 Aug 2018 05:59 )  PTT:36.7 sec  Urinalysis Basic - ( 18 Aug 2018 05:33 )    Color: PL Yellow / Appearance: Clear / S.011 / pH: x  Gluc: x / Ketone: Negative  / Bili: Negative / Urobili: Negative   Blood: x / Protein: 30 mg/dL / Nitrite: Negative   Leuk Esterase: Large / RBC: 10-25 /HPF / WBC 11-25 /HPF   Sq Epi: x / Non Sq Epi: x / Bacteria: Many /HPF            MICROBIOLOGY/CULTURES:      RADIOLOGY RESULTS:

## 2018-08-18 NOTE — CONSULT NOTE ADULT - SUBJECTIVE AND OBJECTIVE BOX
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)  Calf DVT (Deep Venous Thrombosis) (ICD9 453.42)  Adenomatous Goiter (ICD9 241.9)  Chronic Gout (ICD9 274.02)  LBBB (Left Bundle Branch Block) (ICD9 426.3)  CHF (Congestive Heart Failure) (ICD9 428.0)  Hx of aorta-iliac-femoral bypass  TOP (Termination of Pregnancy)  S/P Dilatation and Curettage  History of Tubal Ligation  s/p AAA (Abdominal Aortic Aneurysm) Repair: 2009 2010  History of Cholecystectomy  S/P Partial Hysterectomy          MEDICATIONS:  amLODIPine   Tablet 10 milliGRAM(s) Oral daily  carvedilol 25 milliGRAM(s) Oral every 12 hours  furosemide    Tablet 80 milliGRAM(s) Oral two times a day        ondansetron Injectable 4 milliGRAM(s) IV Push every 6 hours PRN    pantoprazole    Tablet 40 milliGRAM(s) Oral before breakfast    allopurinol 100 milliGRAM(s) Oral daily  predniSONE   Tablet 10 milliGRAM(s) Oral daily        FAMILY HISTORY:  No pertinent family history in first degree relatives      SOCIAL HISTORY:    [ ] Non-smoker  [ ] Smoker  [ ] Alcohol    Allergies    heparin (Unknown)  losartan (Anaphylaxis)  nitroglycerin (Other)    Intolerances    	    REVIEW OF SYSTEMS:  CONSTITUTIONAL: No fever, weight loss, or fatigue  EYES: No eye pain, visual disturbances, or discharge  ENMT:  No difficulty hearing, tinnitus, vertigo; No sinus or throat pain  NECK: No pain or stiffness  RESPIRATORY: No cough, wheezing, chills or hemoptysis; No Shortness of Breath  CARDIOVASCULAR: No chest pain, palpitations, passing out, dizziness, or leg swelling  GASTROINTESTINAL: No abdominal or epigastric pain. No nausea, vomiting, or hematemesis; No diarrhea or constipation. No melena or hematochezia.  GENITOURINARY: No dysuria, frequency, hematuria, or incontinence  NEUROLOGICAL: No headaches, memory loss, loss of strength, numbness, or tremors  SKIN: No itching, burning, rashes, or lesions   LYMPH Nodes: No enlarged glands  ENDOCRINE: No heat or cold intolerance; No hair loss  MUSCULOSKELETAL: No joint pain or swelling; No muscle, back, or extremity pain  PSYCHIATRIC: No depression, anxiety, mood swings, or difficulty sleeping  HEME/LYMPH: No easy bruising, or bleeding gums  ALLERY AND IMMUNOLOGIC: No hives or eczema	    [ ] All others negative	  [ ] Unable to obtain    PHYSICAL EXAM:  T(C): 36.3 (08-18-18 @ 20:52), Max: 36.9 (08-18-18 @ 04:15)  HR: 83 (08-18-18 @ 20:52) (73 - 88)  BP: 157/83 (08-18-18 @ 20:52) (122/67 - 170/85)  RR: 18 (08-18-18 @ 20:52) (17 - 22)  SpO2: 93% (08-18-18 @ 20:52) (93% - 98%)  Wt(kg): --  I&O's Summary      Appearance: Normal	  HEENT:   Normal oral mucosa, PERRL, EOMI	  Lymphatic: No lymphadenopathy  Cardiovascular: Normal S1 S2, No JVD, No murmurs, No edema  Respiratory: Lungs clear to auscultation	  Psychiatry: A & O x 3, Mood & affect appropriate  Gastrointestinal:  Soft, Non-tender, + BS	  Skin: No rashes, No ecchymoses, No cyanosis	  Neurologic: Non-focal  Extremities: Normal range of motion, No clubbing, cyanosis or edema  Vascular: Peripheral pulses palpable 2+ bilaterally    TELEMETRY: 	    ECG:  	  RADIOLOGY:  OTHER: 	  	  LABS:	 	    CARDIAC MARKERS:                                  10.0   9.4   )-----------( 178      ( 18 Aug 2018 05:59 )             32.3     08-18    141  |  104  |  96<H>  ----------------------------<  104<H>  4.3   |  23  |  3.50<H>    Ca    9.3      18 Aug 2018 05:59    TPro  6.8  /  Alb  3.9  /  TBili  0.3  /  DBili  x   /  AST  17  /  ALT  7<L>  /  AlkPhos  56  08-18    proBNP:   Lipid Profile:   HgA1c:   TSH: CHIEF COMPLAINT: rectal bleed     HISTORY OF PRESENT ILLNESS:   57 yo F well known to me with history of chronic LLE DVT on Coumadin, chronic large pericrdia effusion and ischemic cardiomyopathy s.p ICD, chronic fox, p/w rectal bleeding. Pt was in bed and rolled over and noticed the sheets were saturated in blood. She denies N/V, abd pain, fevers/chills.  No cp or shortness of breath.     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)  Calf DVT (Deep Venous Thrombosis) (ICD9 453.42)  Adenomatous Goiter (ICD9 241.9)  Chronic Gout (ICD9 274.02)  LBBB (Left Bundle Branch Block) (ICD9 426.3)  CHF (Congestive Heart Failure) (ICD9 428.0)  Hx of aorta-iliac-femoral bypass  TOP (Termination of Pregnancy)  S/P Dilatation and Curettage  History of Tubal Ligation  s/p AAA (Abdominal Aortic Aneurysm) Repair: 2009 2010  History of Cholecystectomy  S/P Partial Hysterectomy          MEDICATIONS:  amLODIPine   Tablet 10 milliGRAM(s) Oral daily  carvedilol 25 milliGRAM(s) Oral every 12 hours  furosemide    Tablet 80 milliGRAM(s) Oral two times a day        ondansetron Injectable 4 milliGRAM(s) IV Push every 6 hours PRN    pantoprazole    Tablet 40 milliGRAM(s) Oral before breakfast    allopurinol 100 milliGRAM(s) Oral daily  predniSONE   Tablet 10 milliGRAM(s) Oral daily        FAMILY HISTORY:  No pertinent family history in first degree relatives      SOCIAL HISTORY:    [ ] Non-smoker  [ ] Smoker  [ ] Alcohol    Allergies    heparin (Unknown)  losartan (Anaphylaxis)  nitroglycerin (Other)    Intolerances    	    REVIEW OF SYSTEMS:  CONSTITUTIONAL: No fever, weight loss, or fatigue  EYES: No eye pain, visual disturbances, or discharge  ENMT:  No difficulty hearing, tinnitus, vertigo; No sinus or throat pain  NECK: No pain or stiffness  RESPIRATORY: No cough, wheezing, chills or hemoptysis; No Shortness of Breath  CARDIOVASCULAR: No chest pain, palpitations, passing out, dizziness, or leg swelling  GASTROINTESTINAL: No abdominal or epigastric pain. No nausea, vomiting, or hematemesis; No diarrhea or constipation. + melena or hematochezia.  GENITOURINARY: No dysuria, frequency, hematuria, or incontinence  NEUROLOGICAL: No headaches, memory loss, loss of strength, numbness, or tremors  SKIN:+ sacral decub ulcer   LYMPH Nodes: No enlarged glands  ENDOCRINE: No heat or cold intolerance; No hair loss  MUSCULOSKELETAL: + joint pain or swelling; No muscle, back, or extremity pain  PSYCHIATRIC: No depression, anxiety, mood swings, or difficulty sleeping  HEME/LYMPH: No easy bruising, or bleeding gums  ALLERY AND IMMUNOLOGIC: No hives or eczema	    [ ] All others negative	  [ ] Unable to obtain    PHYSICAL EXAM:  T(C): 36.3 (08-18-18 @ 20:52), Max: 36.9 (08-18-18 @ 04:15)  HR: 83 (08-18-18 @ 20:52) (73 - 88)  BP: 157/83 (08-18-18 @ 20:52) (122/67 - 170/85)  RR: 18 (08-18-18 @ 20:52) (17 - 22)  SpO2: 93% (08-18-18 @ 20:52) (93% - 98%)  Wt(kg): --  I&O's Summary      Appearance: Obese 	  HEENT:   Normal oral mucosa, PERRL, EOMI	  Lymphatic: No lymphadenopathy  Cardiovascular: Normal S1 S2, No JVD, No murmurs, No edema  Respiratory: Lungs clear to auscultation	  Psychiatry: A & O x 3, Mood & affect appropriate  Gastrointestinal:  Soft, Non-tender, + BS	  Skin: No rashes, No ecchymoses, No cyanosis	  Neurologic: Non-focal  Extremities: massive LLE edema Chronic   Vascular: Peripheral pulses palpable 2+ bilaterally  +fox       TELEMETRY: 	    ECG:  	  RADIOLOGY:  < from: NM GI Bleed Localization (08.18.18 @ 19:56) >    EXAM:  NM GI BLEEDING IMG                                PROCEDURE DATE:  08/18/2018          INTERPRETATION:  RADIOPHARMACEUTICAL: 21.3 mCi Tc-99m labeled autologous   red blood cells, I.V.    CLINICAL STATEMENT: 58-year-old female with abdominal pain and GI   bleeding referred for localization of bleeding site.    TECHNIQUE:  Dynamic images of the anterior abdomen/pelvis were obtained   for 2 hours following administration of radiopharmaceutical. Static image   of the abdomen/pelvis in the anterior view was obtained immediately   thereafter.    FINDINGS: There is physiologic distribution of radiolabeled red cells in   the abdomen and pelvis. There is no abnormal focus of increased activity   to suggest the presence of active bleeding.    IMPRESSION: Normal GI bleeding scan.    No evidence of active bleeding site.                          JE FOY M.D., NUCLEAR MEDICINE ATTENDING  This document has been electronically signed. Aug 18 2018  8:30PM                < end of copied text >    OTHER: 	  	  LABS:	 	    CARDIAC MARKERS:        Prothrombin Time and INR, Plasma in AM (08.19.18 @ 09:21)    Prothrombin Time, Plasma: 27.4 sec    INR: 2.38: Recommended ranges for therapeutic INR:    2.0-3.0 for most medical and surgical thromboembolic states    2.0-3.0 for atrial fibrillation    2.0-3.0 for bileaflet mechanical valve in aortic position    2.5-3.5 for mechanical heart valves    Chest 2004;[id number]  The presence of direct thrombin inhibitors (argatroban, refludan)  may falsely increase results. ratio                              10.0   9.4   )-----------( 178      ( 18 Aug 2018 05:59 )             32.3     08-18    141  |  104  |  96<H>  ----------------------------<  104<H>  4.3   |  23  |  3.50<H>    Ca    9.3      18 Aug 2018 05:59    TPro  6.8  /  Alb  3.9  /  TBili  0.3  /  DBili  x   /  AST  17  /  ALT  7<L>  /  AlkPhos  56  08-18    proBNP:   Lipid Profile:   HgA1c:   TSH:

## 2018-08-18 NOTE — ED ADULT NURSE REASSESSMENT NOTE - NS ED NURSE REASSESS COMMENT FT1
Hook catheter inserted in ED under sterile technique.    Stage 3 pressure ulcer on patients sacrum cleaned, barrier cream applied and dressed.

## 2018-08-18 NOTE — ED PROVIDER NOTE - ATTENDING CONTRIBUTION TO CARE
Afebrile. No hypotension. Lungs CTA. Heart RRR. Abdomen soft NTND. Afebrile. No hypotension. Lungs CTA. Heart RRR. Abdomen soft NTND. Right ischial decubitus ulcer cavity.  No rectal pain Afebrile. No hypotension. Lungs CTA. Heart RRR. Abdomen soft NTND. Right ischial decubitus ulcer cavity. surrounded by dry blood. No external hemorrhoids or rectal fissures. No dark or bloody stool. Check H&H for r/o pRBC need. HD stable. Bleeding source likely ulcer, however, will need admission for would care, HD monitoring and r/o GIB.

## 2018-08-18 NOTE — CONSULT NOTE ADULT - SUBJECTIVE AND OBJECTIVE BOX
59 yo F PMHx PE on Coumadin (3 mg daily), iliac aneurysm repair, CHF, chronic gout, sacral ulcer, chronic fox, p/w rectal bleeding. Pt was in bed and rolled over and noticed the sheets were saturated in blood. She denies N/V, abd pain, fevers/chills, lightheadedness/dizziness.    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)  Calf DVT (Deep Venous Thrombosis) (ICD9 453.42)  Adenomatous Goiter (ICD9 241.9)  Chronic Gout (ICD9 274.02)  LBBB (Left Bundle Branch Block) (ICD9 426.3)  CHF (Congestive Heart Failure) (ICD9 428.0)  Hx of aorta-iliac-femoral bypass  TOP (Termination of Pregnancy)  S/P Dilatation and Curettage  History of Tubal Ligation  s/p AAA (Abdominal Aortic Aneurysm) Repair: 2009  History of Cholecystectomy  S/P Partial Hysterectomy    T(C): 36.9 (18 @ 16:25), Max: 36.9 (18 @ 04:15)  HR: 83 (18 @ 16:25) (73 - 88)  BP: 170/85 (18 @ 16:25) (122/67 - 170/85)  RR: 18 (18 @ 16:25) (17 - 22)  SpO2: 98% (18 @ 16:25) (96% - 98%)  Wt(kg): --    Gen: NAD  Chest: comfortable on room air  Abd: obese, soft nt, nd  Rectal:          LABS:                         10.0   9.4   )-----------( 178      ( 18 Aug 2018 05:59 )             32.3         141  |  104  |  96<H>  ----------------------------<  104<H>  4.3   |  23  |  3.50<H>    Ca    9.3      18 Aug 2018 05:59    TPro  6.8  /  Alb  3.9  /  TBili  0.3  /  DBili  x   /  AST  17  /  ALT  7<L>  /  AlkPhos  56  08-18    PT/INR - ( 18 Aug 2018 05:59 )   PT: 24.2 sec;   INR: 2.20 ratio         PTT - ( 18 Aug 2018 05:59 )  PTT:36.7 sec  Urinalysis Basic - ( 18 Aug 2018 05:33 )    Color: PL Yellow / Appearance: Clear / S.011 / pH: x  Gluc: x / Ketone: Negative  / Bili: Negative / Urobili: Negative   Blood: x / Protein: 30 mg/dL / Nitrite: Negative   Leuk Esterase: Large / RBC: 10-25 /HPF / WBC 11-25 /HPF   Sq Epi: x / Non Sq Epi: x / Bacteria: Many /HPF            RADIOLOGY, EKG & ADDITIONAL TESTS: Reviewed. 57 yo F PMHx PE on Coumadin (3 mg daily), iliac aneurysm repair, CHF, chronic gout, sacral ulcer, chronic fox, p/w rectal bleeding. Pt was in bed and rolled over and noticed the sheets were saturated in blood. She denies N/V, abd pain, fevers/chills, lightheadedness/dizziness.    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)  Calf DVT (Deep Venous Thrombosis) (ICD9 453.42)  Adenomatous Goiter (ICD9 241.9)  Chronic Gout (ICD9 274.02)  LBBB (Left Bundle Branch Block) (ICD9 426.3)  CHF (Congestive Heart Failure) (ICD9 428.0)  Hx of aorta-iliac-femoral bypass  TOP (Termination of Pregnancy)  S/P Dilatation and Curettage  History of Tubal Ligation  s/p AAA (Abdominal Aortic Aneurysm) Repair: 2009  History of Cholecystectomy  S/P Partial Hysterectomy    FAMILY HISTORY:  Father and brother with testicular cancer  Mother with ovarian cancer and some type of skin cancer    T(C): 36.9 (18 @ 16:25), Max: 36.9 (18 @ 04:15)  HR: 83 (18 @ 16:25) (73 - 88)  BP: 170/85 (18 @ 16:25) (122/67 - 170/85)  RR: 18 (18 @ 16:25) (17 - 22)  SpO2: 98% (18 @ 16:25) (96% - 98%)  Wt(kg): --    Gen: NAD  Chest: comfortable on room air  Abd: obese, soft nt, nd  Rectal: refused          LABS:                         10.0   9.4   )-----------( 178      ( 18 Aug 2018 05:59 )             32.3         141  |  104  |  96<H>  ----------------------------<  104<H>  4.3   |  23  |  3.50<H>    Ca    9.3      18 Aug 2018 05:59    TPro  6.8  /  Alb  3.9  /  TBili  0.3  /  DBili  x   /  AST  17  /  ALT  7<L>  /  AlkPhos  56  08-18    PT/INR - ( 18 Aug 2018 05:59 )   PT: 24.2 sec;   INR: 2.20 ratio         PTT - ( 18 Aug 2018 05:59 )  PTT:36.7 sec  Urinalysis Basic - ( 18 Aug 2018 05:33 )    Color: PL Yellow / Appearance: Clear / S.011 / pH: x  Gluc: x / Ketone: Negative  / Bili: Negative / Urobili: Negative   Blood: x / Protein: 30 mg/dL / Nitrite: Negative   Leuk Esterase: Large / RBC: 10-25 /HPF / WBC 11-25 /HPF   Sq Epi: x / Non Sq Epi: x / Bacteria: Many /HPF            RADIOLOGY, EKG & ADDITIONAL TESTS: Reviewed.

## 2018-08-18 NOTE — CONSULT NOTE ADULT - ATTENDING COMMENTS
Pt with bleeding on sheets at home with known sacral decubitus ulcer. No h/o similar bleeding. H/O colonoscopy 10 years ago. Normal soft BMs at home    AAOx3  rectal no fissures, no sig external hemorrhoids. ISABEL refused.  large sacral decub with dressing in place    A/P bleeding possibly from decub  no bleeding since admission  H/H stable  cont to monitor

## 2018-08-18 NOTE — ED ADULT NURSE NOTE - PMH
Adenomatous Goiter (ICD9 241.9)    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)    Calf DVT (Deep Venous Thrombosis) (ICD9 453.42)    CHF (Congestive Heart Failure) (ICD9 428.0)    Chronic Gout (ICD9 274.02)    History of Pulmonary Embolism  2009  HIT (Heparin-Induced Thrombocytopenia)    Iliac Aneurysm (ICD9 442.2)  repair  Iliac Aneurysm (ICD9 442.2)  left iliac aneurysm repair  Iliac Aneurysm (ICD9 442.2)  endoleak l iliac aneurysm repair  LBBB (Left Bundle Branch Block) (ICD9 426.3)

## 2018-08-18 NOTE — ED ADULT NURSE NOTE - OBJECTIVE STATEMENT
57 y/o female BIBA reporting sudden onset rectal bleeding. Daughter reports rolling patient to place pad underneath her bottom when she noticed bright red bleeding from her rectum. Denies blood in stool or abdominal pain. Denies SOB. Reports mild dizziness. +coumadin. Patient also reports chronic fox leaking. Presents with fox taped and out of place. 59 y/o female BIBA reporting sudden onset rectal bleeding. Daughter reports rolling patient to place pad underneath her bottom when she noticed bright red bleeding from her rectum. Denies blood in stool and abdominal pain. -N/V/D. Denies SOB. Reports mild dizziness. +coumadin. Patient also reports chronic Hook catheter leaking. Presents with Hook taped and out of place, severe LLE lymphedema with ace bandage in place, and wound to buttocks.  Patient is A&Ox3 and in NAD. 57 y/o female BIBA reporting sudden onset rectal bleeding. Daughter reports rolling patient to place pad underneath her bottom when she noticed bright red bleeding from her rectum. Denies blood in stool and abdominal pain. -N/V/D. Denies SOB. Reports mild dizziness. +coumadin. Patient also reports chronic Hook catheter leaking. Presents with Hook taped and out of place, severe LLE lymphedema with ace bandage in place, and pressure ulcer with blood saturated dressing.  Patient is A&Ox3 and in NAD.

## 2018-08-19 DIAGNOSIS — R33.9 RETENTION OF URINE, UNSPECIFIED: ICD-10-CM

## 2018-08-19 DIAGNOSIS — R39.81 FUNCTIONAL URINARY INCONTINENCE: ICD-10-CM

## 2018-08-19 DIAGNOSIS — I42.9 CARDIOMYOPATHY, UNSPECIFIED: ICD-10-CM

## 2018-08-19 DIAGNOSIS — I31.3 PERICARDIAL EFFUSION (NONINFLAMMATORY): ICD-10-CM

## 2018-08-19 DIAGNOSIS — I82.409 ACUTE EMBOLISM AND THROMBOSIS OF UNSPECIFIED DEEP VEINS OF UNSPECIFIED LOWER EXTREMITY: ICD-10-CM

## 2018-08-19 LAB
ALBUMIN SERPL ELPH-MCNC: 3.7 G/DL — SIGNIFICANT CHANGE UP (ref 3.3–5)
ALP SERPL-CCNC: 54 U/L — SIGNIFICANT CHANGE UP (ref 40–120)
ALT FLD-CCNC: 8 U/L — LOW (ref 10–45)
ANION GAP SERPL CALC-SCNC: 14 MMOL/L — SIGNIFICANT CHANGE UP (ref 5–17)
AST SERPL-CCNC: 18 U/L — SIGNIFICANT CHANGE UP (ref 10–40)
BILIRUB SERPL-MCNC: 0.4 MG/DL — SIGNIFICANT CHANGE UP (ref 0.2–1.2)
BUN SERPL-MCNC: 89 MG/DL — HIGH (ref 7–23)
CALCIUM SERPL-MCNC: 9.2 MG/DL — SIGNIFICANT CHANGE UP (ref 8.4–10.5)
CHLORIDE SERPL-SCNC: 103 MMOL/L — SIGNIFICANT CHANGE UP (ref 96–108)
CO2 SERPL-SCNC: 24 MMOL/L — SIGNIFICANT CHANGE UP (ref 22–31)
CREAT SERPL-MCNC: 3.35 MG/DL — HIGH (ref 0.5–1.3)
CULTURE RESULTS: SIGNIFICANT CHANGE UP
GLUCOSE SERPL-MCNC: 95 MG/DL — SIGNIFICANT CHANGE UP (ref 70–99)
HCT VFR BLD CALC: 30.8 % — LOW (ref 34.5–45)
HGB BLD-MCNC: 9.4 G/DL — LOW (ref 11.5–15.5)
INR BLD: 2.38 RATIO — HIGH (ref 0.88–1.16)
MCHC RBC-ENTMCNC: 26.9 PG — LOW (ref 27–34)
MCHC RBC-ENTMCNC: 30.5 GM/DL — LOW (ref 32–36)
MCV RBC AUTO: 88.3 FL — SIGNIFICANT CHANGE UP (ref 80–100)
PLATELET # BLD AUTO: 168 K/UL — SIGNIFICANT CHANGE UP (ref 150–400)
POTASSIUM SERPL-MCNC: 4.2 MMOL/L — SIGNIFICANT CHANGE UP (ref 3.5–5.3)
POTASSIUM SERPL-SCNC: 4.2 MMOL/L — SIGNIFICANT CHANGE UP (ref 3.5–5.3)
PROT SERPL-MCNC: 6.4 G/DL — SIGNIFICANT CHANGE UP (ref 6–8.3)
PROTHROM AB SERPL-ACNC: 27.4 SEC — HIGH (ref 10–13.1)
RBC # BLD: 3.49 M/UL — LOW (ref 3.8–5.2)
RBC # FLD: 19.2 % — HIGH (ref 10.3–14.5)
SODIUM SERPL-SCNC: 141 MMOL/L — SIGNIFICANT CHANGE UP (ref 135–145)
SPECIMEN SOURCE: SIGNIFICANT CHANGE UP
WBC # BLD: 5.35 K/UL — SIGNIFICANT CHANGE UP (ref 3.8–10.5)
WBC # FLD AUTO: 5.35 K/UL — SIGNIFICANT CHANGE UP (ref 3.8–10.5)

## 2018-08-19 PROCEDURE — 99223 1ST HOSP IP/OBS HIGH 75: CPT | Mod: AI

## 2018-08-19 RX ORDER — ASCORBIC ACID 60 MG
500 TABLET,CHEWABLE ORAL DAILY
Qty: 0 | Refills: 0 | Status: DISCONTINUED | OUTPATIENT
Start: 2018-08-19 | End: 2018-08-27

## 2018-08-19 RX ORDER — CALCITRIOL 0.5 UG/1
0.25 CAPSULE ORAL DAILY
Qty: 0 | Refills: 0 | Status: DISCONTINUED | OUTPATIENT
Start: 2018-08-19 | End: 2018-08-27

## 2018-08-19 RX ORDER — ZINC SULFATE TAB 220 MG (50 MG ZINC EQUIVALENT) 220 (50 ZN) MG
220 TAB ORAL DAILY
Qty: 0 | Refills: 0 | Status: DISCONTINUED | OUTPATIENT
Start: 2018-08-19 | End: 2018-08-27

## 2018-08-19 RX ADMIN — Medication 500 MILLIGRAM(S): at 17:46

## 2018-08-19 RX ADMIN — Medication 100 MILLIGRAM(S): at 11:47

## 2018-08-19 RX ADMIN — Medication 10 MILLIGRAM(S): at 11:47

## 2018-08-19 RX ADMIN — CARVEDILOL PHOSPHATE 25 MILLIGRAM(S): 80 CAPSULE, EXTENDED RELEASE ORAL at 17:47

## 2018-08-19 RX ADMIN — PANTOPRAZOLE SODIUM 40 MILLIGRAM(S): 20 TABLET, DELAYED RELEASE ORAL at 06:28

## 2018-08-19 RX ADMIN — ZINC SULFATE TAB 220 MG (50 MG ZINC EQUIVALENT) 220 MILLIGRAM(S): 220 (50 ZN) TAB at 17:46

## 2018-08-19 RX ADMIN — CARVEDILOL PHOSPHATE 25 MILLIGRAM(S): 80 CAPSULE, EXTENDED RELEASE ORAL at 08:46

## 2018-08-19 RX ADMIN — CALCITRIOL 0.25 MICROGRAM(S): 0.5 CAPSULE ORAL at 17:46

## 2018-08-19 RX ADMIN — AMLODIPINE BESYLATE 10 MILLIGRAM(S): 2.5 TABLET ORAL at 06:27

## 2018-08-19 RX ADMIN — Medication 80 MILLIGRAM(S): at 17:46

## 2018-08-19 NOTE — DIETITIAN INITIAL EVALUATION ADULT. - ENERGY NEEDS
ht: 65 inches, wt: 276 pounds, BMI: 45.9 kg/m2, IBW: 125 pounds (+/- 10%), 221 %IBW  Edema: 1+ generalized and L leg, 2+ L foot. Skin: stage 4 pressure ulcer on R gluteal fold.  Other pertinent information: 59 yo F PMHx PE on Coumadin (3 mg daily), iliac aneurysm repair, CHF, chronic gout, sacral ulcer, chronic fox, p/w rectal bleeding

## 2018-08-19 NOTE — DIETITIAN INITIAL EVALUATION ADULT. - NS AS NUTRI INTERV ED CONTENT3
Discussed protein-rich food sources and encouraged Pt to incorporate them at each meal./Purpose of the nutrition education/Priority modifications/Nutrition relationship to health/disease/Recommended modifications

## 2018-08-19 NOTE — CONSULT NOTE ADULT - SUBJECTIVE AND OBJECTIVE BOX
[facility]  [phone]  NEPHROLOGY      INITIAL CONSULT NOTE  --------------------------------------------------------------------------------  HPI:  59 y/o female AA with a PMHx including CKD4, chronic fox, SLE on prednisone, HFrEF s/p ICD, chronic pericardial effusion previously requiring drainage, Hx PE/HIT, HTN, CAD no stents, Stage IV decub. iliac artery aneurysm chronic lymphedema as a result presents with c/o rectal bleed.. noticed with cr 3.3 range renal consult called.       PAST HISTORY  --------------------------------------------------------------------------------  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)  Calf DVT (Deep Venous Thrombosis) (ICD9 453.42)  Adenomatous Goiter (ICD9 241.9)  Chronic Gout (ICD9 274.02)  LBBB (Left Bundle Branch Block) (ICD9 426.3)  CHF (Congestive Heart Failure) (ICD9 428.0)  Hx of aorta-iliac-femoral bypass  TOP (Termination of Pregnancy)  S/P Dilatation and Curettage  History of Tubal Ligation  s/p AAA (Abdominal Aortic Aneurysm) Repair: 2009 2010  History of Cholecystectomy  S/P Partial Hysterectomy    FAMILY HISTORY:  No pertinent family history in first degree relatives    PAST SOCIAL HISTORY:    ALLERGIES & MEDICATIONS  --------------------------------------------------------------------------------  Allergies    heparin (Unknown)  losartan (Anaphylaxis)  nitroglycerin (Other)    Intolerances      Standing Inpatient Medications  allopurinol 100 milliGRAM(s) Oral daily  amLODIPine   Tablet 10 milliGRAM(s) Oral daily  ascorbic acid 500 milliGRAM(s) Oral daily  calcitriol   Capsule 0.25 MICROGram(s) Oral daily  carvedilol 25 milliGRAM(s) Oral every 12 hours  furosemide    Tablet 80 milliGRAM(s) Oral two times a day  Nephro-alyse 1 Tablet(s) Oral daily  pantoprazole    Tablet 40 milliGRAM(s) Oral before breakfast  predniSONE   Tablet 10 milliGRAM(s) Oral daily  zinc sulfate 220 milliGRAM(s) Oral daily    PRN Inpatient Medications  ondansetron Injectable 4 milliGRAM(s) IV Push every 6 hours PRN      REVIEW OF SYSTEMS  --------------------------------------------------------------------------------  Gen: No  fevers/chills   Head/Eyes/Ears/Mouth: No headache; Normal hearing;  No sinus pain/discomfort, sore throat  Respiratory: No dyspnea, cough, wheezing, hemoptysis  CV: No chest pain, orthopnea  GI: No abdominal pain, diarrhea, nausea, vomiting, melena, hematochezia +   : No dysuria, decrease urination or hesitancy urinating  hematuria + states has urinary leakage around the fox despite fox was changed  MSK: No joint pain/swelling; no back pain  Neuro: No dizziness/lightheadedness,   also with +  edema     All other systems were reviewed and are negative, except as noted.    VITALS/PHYSICAL EXAM  --------------------------------------------------------------------------------  T(C): 36.6 (08-19-18 @ 14:27), Max: 36.7 (08-19-18 @ 05:00)  HR: 79 (08-19-18 @ 14:27) (79 - 83)  BP: 147/78 (08-19-18 @ 14:27) (132/75 - 157/83)  RR: 17 (08-19-18 @ 14:27) (17 - 18)  SpO2: 96% (08-19-18 @ 14:27) (93% - 98%)  Wt(kg): --  Height (cm): 165.1 (08-18-18 @ 16:25)  Weight (kg): 125.2 (08-19-18 @ 05:00)  BMI (kg/m2): 45.9 (08-19-18 @ 05:00)  BSA (m2): 2.27 (08-19-18 @ 05:00)      08-18-18 @ 07:01  -  08-19-18 @ 07:00  --------------------------------------------------------  IN: 50 mL / OUT: 2600 mL / NET: -2550 mL    08-19-18 @ 07:01  -  08-19-18 @ 18:54  --------------------------------------------------------  IN: 840 mL / OUT: 1900 mL / NET: -1060 mL      Physical Exam:  	Gen: Non toxic comfortable appearing   	no jvd , supple neck,   	Pulm: decrease bs  no rales or ronchi or wheezing  	CV: RRR, S1S2; no rub  	Back: No CVA tenderness; no sacral edema  	Abd: +BS, soft, nontender/nondistended  	: No suprapubic tenderness  	UE: Warm, no cyanosis  no clubbing,  no edema  	LE: Warm, no cyanosis  no clubbing, LLE 4+  RLE 1++  edema  	Neuro: alert and oriented. speech coherent   	Psych: Normal affect and mood  	    LABS/STUDIES  --------------------------------------------------------------------------------              9.4    5.35  >-----------<  168      [08-19-18 @ 09:28]              30.8     141  |  103  |  89  ----------------------------<  95      [08-19-18 @ 07:13]  4.2   |  24  |  3.35        Ca     9.2     [08-19-18 @ 07:13]    TPro  6.4  /  Alb  3.7  /  TBili  0.4  /  DBili  x   /  AST  18  /  ALT  8   /  AlkPhos  54  [08-19-18 @ 07:13]    PT/INR: PT 27.4 , INR 2.38       [08-19-18 @ 09:21]  PTT: 36.7       [08-18-18 @ 05:59]      Creatinine Trend:  SCr 3.35 [08-19 @ 07:13]  SCr 3.50 [08-18 @ 05:59]  SCr 3.78 [07-24 @ 05:47]  SCr 3.75 [07-23 @ 05:33]  SCr 3.87 [07-22 @ 06:53]    Urinalysis - [08-18-18 @ 05:33]      Color PL Yellow / Appearance Clear / SG 1.011 / pH 6.0      Gluc Negative / Ketone Negative  / Bili Negative / Urobili Negative       Blood Moderate / Protein 30 / Leuk Est Large / Nitrite Negative      RBC 10-25 / WBC 11-25 / Hyaline  / Gran  / Sq Epi  / Non Sq Epi  / Bacteria Many      Iron 67, TIBC 203, %sat 33      [07-12-18 @ 14:18]  Ferritin 961      [07-12-18 @ 14:18]  PTH -- (Ca 8.9)      [04-20-18 @ 08:14]   217  PTH -- (Ca 9.0)      [11-09-17 @ 08:51]   387  Vitamin D (25OH) 7.8      [11-09-17 @ 08:51]  HbA1c 4.5      [08-09-16 @ 06:49]  TSH 0.47      [04-24-18 @ 07:51]      KRISTOPHER: titer 1:320, pattern Homogeneous      [01-10-17 @ 12:14]  dsDNA 38      [07-10-18 @ 13:55]  C3 Complement 114      [07-10-18 @ 13:54]  C4 Complement 32      [07-10-18 @ 13:54]

## 2018-08-19 NOTE — DIETITIAN INITIAL EVALUATION ADULT. - ADHERENCE
fair-good. Daughter cooks for her and limits her sodium, fluid and potassium intake PTA. Reports consistent vitamin k intake. Pt confirms NKFA. States she took calcitriol, Genesis-Riddhi, Vitamin C and zinc PTA.

## 2018-08-19 NOTE — CONSULT NOTE ADULT - SUBJECTIVE AND OBJECTIVE BOX
HPI  Patient with urinary retention managed with an indwelling fox x 1 year without known gu evaluation now with leakage around fox.  Her balloon was distended to 20cc with improvement.  She denies sp pain or pressure and no prior gu work up tat she is aware of.  She is a 57 yo F PMHx PE on Coumadin (3 mg daily), iliac aneurysm repair, CHF, chronic gout, sacral ulcer, chronic fox, p/w rectal bleeding. Pt was in bed and rolled over and noticed the sheets were saturated in blood. She denies N/V, abd pain, fevers/chills, lightheadedness/dizziness    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)  Calf DVT (Deep Venous Thrombosis) (ICD9 453.42)  Adenomatous Goiter (ICD9 241.9)  Chronic Gout (ICD9 274.02)  LBBB (Left Bundle Branch Block) (ICD9 426.3)  CHF (Congestive Heart Failure) (ICD9 428.0)  Hx of aorta-iliac-femoral bypass  TOP (Termination of Pregnancy)  S/P Dilatation and Curettage  History of Tubal Ligation  s/p AAA (Abdominal Aortic Aneurysm) Repair: 2009  History of Cholecystectomy  S/P Partial Hysterectomy      MEDICATIONS  (STANDING):  allopurinol 100 milliGRAM(s) Oral daily  amLODIPine   Tablet 10 milliGRAM(s) Oral daily  carvedilol 25 milliGRAM(s) Oral every 12 hours  furosemide    Tablet 80 milliGRAM(s) Oral two times a day  pantoprazole    Tablet 40 milliGRAM(s) Oral before breakfast  predniSONE   Tablet 10 milliGRAM(s) Oral daily    MEDICATIONS  (PRN):  ondansetron Injectable 4 milliGRAM(s) IV Push every 6 hours PRN Nausea      FAMILY HISTORY:  No pertinent family history in first degree relatives    Allergies    heparin (Unknown)  losartan (Anaphylaxis)  nitroglycerin (Other)    SOCIAL HISTORY: no tobacco etoh    REVIEW OF SYSTEMS: gen weak and lethargic  heent neg neck neg resp neg cv per hpi  gi blood gu per hpi all neg  msk neg neuro neeg endo neg skin neg psych neg    Physical Exam  Vital signs  T(C): 36.7 (18 @ 05:00), Max: 36.9 (18 @ 16:25)  HR: 79 (18 @ 05:00)  BP: 132/75 (18 @ 05:00)  SpO2: 98% (18 @ 05:00)    Gen:  WDWN woman at bedrest  heent ncat neck symm supple cor reg  chest clear abd soft nd nt no cvat no micheline  gu indwelling fox urine clear  ext no edema  neuro non focal    LABS:       @ 09:28    WBC 5.35  / Hct 30.8  / SCr --        @ 07:13    WBC --    / Hct --    / SCr 3.35         141  |  103  |  89<H>  ----------------------------<  95  4.2   |  24  |  3.35<H>    Ca    9.2      19 Aug 2018 07:13    TPro  6.4  /  Alb  3.7  /  TBili  0.4  /  DBili  x   /  AST  18  /  ALT  8<L>  /  AlkPhos  54      PT/INR - ( 19 Aug 2018 09:21 )   PT: 27.4 sec;   INR: 2.38 ratio         PTT - ( 18 Aug 2018 05:59 )  PTT:36.7 sec  Urinalysis Basic - ( 18 Aug 2018 05:33 )    Color: PL Yellow / Appearance: Clear / S.011 / pH: x  Gluc: x / Ketone: Negative  / Bili: Negative / Urobili: Negative   Blood: x / Protein: 30 mg/dL / Nitrite: Negative   Leuk Esterase: Large / RBC: 10-25 /HPF / WBC 11-25 /HPF   Sq Epi: x / Non Sq Epi: x / Bacteria: Many /HPF

## 2018-08-19 NOTE — DIETITIAN INITIAL EVALUATION ADULT. - NS AS NUTRI INTERV ED CONTENT
Priority modifications/Nutrition relationship to health/disease/Recommended modifications/Reviewed importance of general heart healthy diet including foods recommended and foods to avoid. Discussed eating foods low in sodium (<140mg per serving) and importance of limiting cholesterol to <200mg/day. Weight loss education provided. Discussed coumadin-vitamin K interaction and importance of consistant vitamin K intake./Purpose of the nutrition education

## 2018-08-19 NOTE — PROGRESS NOTE ADULT - PROBLEM SELECTOR PLAN 1
Recommendation: monitor CBC daily while admitted  transfuse prn,   normal bleeding scan as above  hold anticoagulation  IR/CRS evaluation if decompensates   monitor stools for signs of bleeding  No immediate plans for colonoscopy-will d/w Dr. Reed Recommendation: monitor CBC daily while admitted  transfuse prn,   normal bleeding scan as above  hold anticoagulation for now; can likely resume tomorrow if no signs of GIB  monitor stools for signs of bleeding  Advance diet to regular as tolerated  No immediate plans for colonoscopy d/w Dr. Reed  Discussed plan with unit NP

## 2018-08-19 NOTE — DIETITIAN INITIAL EVALUATION ADULT. - OTHER INFO
Nutrition consult received for stage 2 or greater pressure ulcer. Pt recently discharged from [facility] and now p/w rectal bleeding. Reports normal BM today. Pt previously educated on dietary restrictions and verbalizes good teach back/understanding, decline written educational materials stating she has them at home. Pt currently on a clear liquid diet, denies any nausea/vomiting. Denies chewing/swallowing difficulty. States she dislikes Promote, Miguel and Prosource. Amenable to eating high-protein foods at each meal.

## 2018-08-20 DIAGNOSIS — Z71.89 OTHER SPECIFIED COUNSELING: ICD-10-CM

## 2018-08-20 LAB
ANION GAP SERPL CALC-SCNC: 14 MMOL/L — SIGNIFICANT CHANGE UP (ref 5–17)
BUN SERPL-MCNC: 86 MG/DL — HIGH (ref 7–23)
CALCIUM SERPL-MCNC: 9.1 MG/DL — SIGNIFICANT CHANGE UP (ref 8.4–10.5)
CHLORIDE SERPL-SCNC: 105 MMOL/L — SIGNIFICANT CHANGE UP (ref 96–108)
CO2 SERPL-SCNC: 25 MMOL/L — SIGNIFICANT CHANGE UP (ref 22–31)
CREAT SERPL-MCNC: 3.54 MG/DL — HIGH (ref 0.5–1.3)
GLUCOSE SERPL-MCNC: 93 MG/DL — SIGNIFICANT CHANGE UP (ref 70–99)
HCT VFR BLD CALC: 30.8 % — LOW (ref 34.5–45)
HGB BLD-MCNC: 9.4 G/DL — LOW (ref 11.5–15.5)
INR BLD: 2.35 RATIO — HIGH (ref 0.88–1.16)
MCHC RBC-ENTMCNC: 26.7 PG — LOW (ref 27–34)
MCHC RBC-ENTMCNC: 30.5 GM/DL — LOW (ref 32–36)
MCV RBC AUTO: 87.5 FL — SIGNIFICANT CHANGE UP (ref 80–100)
PLATELET # BLD AUTO: 166 K/UL — SIGNIFICANT CHANGE UP (ref 150–400)
POTASSIUM SERPL-MCNC: 3.7 MMOL/L — SIGNIFICANT CHANGE UP (ref 3.5–5.3)
POTASSIUM SERPL-SCNC: 3.7 MMOL/L — SIGNIFICANT CHANGE UP (ref 3.5–5.3)
PROTHROM AB SERPL-ACNC: 26.1 SEC — HIGH (ref 9.8–12.7)
RBC # BLD: 3.52 M/UL — LOW (ref 3.8–5.2)
RBC # FLD: 19.3 % — HIGH (ref 10.3–14.5)
SODIUM SERPL-SCNC: 144 MMOL/L — SIGNIFICANT CHANGE UP (ref 135–145)
WBC # BLD: 6.88 K/UL — SIGNIFICANT CHANGE UP (ref 3.8–10.5)
WBC # FLD AUTO: 6.88 K/UL — SIGNIFICANT CHANGE UP (ref 3.8–10.5)

## 2018-08-20 PROCEDURE — 99232 SBSQ HOSP IP/OBS MODERATE 35: CPT

## 2018-08-20 RX ORDER — WARFARIN SODIUM 2.5 MG/1
3 TABLET ORAL ONCE
Qty: 0 | Refills: 0 | Status: COMPLETED | OUTPATIENT
Start: 2018-08-20 | End: 2018-08-20

## 2018-08-20 RX ADMIN — Medication 1 TABLET(S): at 12:19

## 2018-08-20 RX ADMIN — CARVEDILOL PHOSPHATE 25 MILLIGRAM(S): 80 CAPSULE, EXTENDED RELEASE ORAL at 17:11

## 2018-08-20 RX ADMIN — PANTOPRAZOLE SODIUM 40 MILLIGRAM(S): 20 TABLET, DELAYED RELEASE ORAL at 06:08

## 2018-08-20 RX ADMIN — Medication 80 MILLIGRAM(S): at 17:11

## 2018-08-20 RX ADMIN — AMLODIPINE BESYLATE 10 MILLIGRAM(S): 2.5 TABLET ORAL at 08:33

## 2018-08-20 RX ADMIN — Medication 10 MILLIGRAM(S): at 08:33

## 2018-08-20 RX ADMIN — Medication 80 MILLIGRAM(S): at 06:07

## 2018-08-20 RX ADMIN — Medication 100 MILLIGRAM(S): at 12:19

## 2018-08-20 RX ADMIN — CARVEDILOL PHOSPHATE 25 MILLIGRAM(S): 80 CAPSULE, EXTENDED RELEASE ORAL at 06:08

## 2018-08-20 RX ADMIN — ZINC SULFATE TAB 220 MG (50 MG ZINC EQUIVALENT) 220 MILLIGRAM(S): 220 (50 ZN) TAB at 12:19

## 2018-08-20 RX ADMIN — CALCITRIOL 0.25 MICROGRAM(S): 0.5 CAPSULE ORAL at 12:19

## 2018-08-20 RX ADMIN — WARFARIN SODIUM 3 MILLIGRAM(S): 2.5 TABLET ORAL at 21:43

## 2018-08-20 RX ADMIN — Medication 500 MILLIGRAM(S): at 12:19

## 2018-08-20 NOTE — ADVANCED PRACTICE NURSE CONSULT - ASSESSMENT
Patient is on a Samasourcere P 500 low airloss surface and is being turned and positioned as per the nursing documentation.  Patient is able to greatly assist with movement in the bed.  She is known to the wound care team.  Wounded area is not over a bony prominence and should be considered a full thickness wound.  Assessed by Dr Gar.  Wound measures approximately 1.0 cm X 1.5 cm X 2.0 cm with 2.0 cm undermining at 12 o'clock.  When assessing depth and undermining patient said this was painful.  Wound appears clean and viable and drainage is minimal at this time without odor.  Would continue with treatment of Aquacel rope into wound lightly packed and cover with an Allevyn foam.   dressing.   Periwound skin intact.    Nutrition consult requested

## 2018-08-20 NOTE — PROGRESS NOTE ADULT - PROBLEM SELECTOR PLAN 1
monitor CBC daily while admitted  transfuse prn  normal bleeding scan   no signs of GIB, can resume AC  monitor stools for signs of bleeding  diet as tolerated  No immediate plans for colonoscopy d/w Dr. Reed  Discussed plan with unit NP

## 2018-08-20 NOTE — PROGRESS NOTE ADULT - PROBLEM SELECTOR PLAN 1
GI Consulted. Consider Gyn consulted as bleed may be Gyn related and not GI   Monitor CBC. Now stable

## 2018-08-20 NOTE — ADVANCED PRACTICE NURSE CONSULT - RECOMMEDATIONS
1.  Continue with lightly packing with Aquacel rope and cover with an Allevyn foam dressing.  Change daily and prn  2.  Continue to encourage turning and positioning  3.  Nutritional support if needed  Remain available as requested by staff

## 2018-08-20 NOTE — ADVANCED PRACTICE NURSE CONSULT - REASON FOR CONSULT
Requested by nursing staff to assess skin status in right gluteal fold.  Patient is a 59 yo F PMHx PE on Coumadin (3 mg daily), iliac aneurysm repair, CHF, chronic gout, sacral ulcer, chronic fox, p/w rectal bleeding. Pt was in bed and rolled over and noticed the sheets were saturated in blood. She denies N/V, abd pain, fevers/chills, lightheadedness/dizziness.  Patient is currently on 4 DSU

## 2018-08-21 ENCOUNTER — TRANSCRIPTION ENCOUNTER (OUTPATIENT)
Age: 58
End: 2018-08-21

## 2018-08-21 LAB
ANION GAP SERPL CALC-SCNC: 15 MMOL/L — SIGNIFICANT CHANGE UP (ref 5–17)
BUN SERPL-MCNC: 87 MG/DL — HIGH (ref 7–23)
CALCIUM SERPL-MCNC: 8.9 MG/DL — SIGNIFICANT CHANGE UP (ref 8.4–10.5)
CHLORIDE SERPL-SCNC: 104 MMOL/L — SIGNIFICANT CHANGE UP (ref 96–108)
CO2 SERPL-SCNC: 24 MMOL/L — SIGNIFICANT CHANGE UP (ref 22–31)
CREAT SERPL-MCNC: 3.76 MG/DL — HIGH (ref 0.5–1.3)
GLUCOSE SERPL-MCNC: 84 MG/DL — SIGNIFICANT CHANGE UP (ref 70–99)
HCT VFR BLD CALC: 30.8 % — LOW (ref 34.5–45)
HGB BLD-MCNC: 9.2 G/DL — LOW (ref 11.5–15.5)
INR BLD: 2.26 RATIO — HIGH (ref 0.88–1.16)
MCHC RBC-ENTMCNC: 26.4 PG — LOW (ref 27–34)
MCHC RBC-ENTMCNC: 29.9 GM/DL — LOW (ref 32–36)
MCV RBC AUTO: 88.3 FL — SIGNIFICANT CHANGE UP (ref 80–100)
PLATELET # BLD AUTO: 148 K/UL — LOW (ref 150–400)
POTASSIUM SERPL-MCNC: 3.5 MMOL/L — SIGNIFICANT CHANGE UP (ref 3.5–5.3)
POTASSIUM SERPL-SCNC: 3.5 MMOL/L — SIGNIFICANT CHANGE UP (ref 3.5–5.3)
PROTHROM AB SERPL-ACNC: 26 SEC — HIGH (ref 10–13.1)
RBC # BLD: 3.49 M/UL — LOW (ref 3.8–5.2)
RBC # FLD: 19.4 % — HIGH (ref 10.3–14.5)
SODIUM SERPL-SCNC: 143 MMOL/L — SIGNIFICANT CHANGE UP (ref 135–145)
WBC # BLD: 7.2 K/UL — SIGNIFICANT CHANGE UP (ref 3.8–10.5)
WBC # FLD AUTO: 7.2 K/UL — SIGNIFICANT CHANGE UP (ref 3.8–10.5)

## 2018-08-21 RX ORDER — WARFARIN SODIUM 2.5 MG/1
3 TABLET ORAL ONCE
Qty: 0 | Refills: 0 | Status: COMPLETED | OUTPATIENT
Start: 2018-08-21 | End: 2018-08-21

## 2018-08-21 RX ADMIN — CARVEDILOL PHOSPHATE 25 MILLIGRAM(S): 80 CAPSULE, EXTENDED RELEASE ORAL at 06:16

## 2018-08-21 RX ADMIN — Medication 1 TABLET(S): at 11:58

## 2018-08-21 RX ADMIN — AMLODIPINE BESYLATE 10 MILLIGRAM(S): 2.5 TABLET ORAL at 08:28

## 2018-08-21 RX ADMIN — ZINC SULFATE TAB 220 MG (50 MG ZINC EQUIVALENT) 220 MILLIGRAM(S): 220 (50 ZN) TAB at 11:59

## 2018-08-21 RX ADMIN — Medication 500 MILLIGRAM(S): at 11:58

## 2018-08-21 RX ADMIN — WARFARIN SODIUM 3 MILLIGRAM(S): 2.5 TABLET ORAL at 23:17

## 2018-08-21 RX ADMIN — CARVEDILOL PHOSPHATE 25 MILLIGRAM(S): 80 CAPSULE, EXTENDED RELEASE ORAL at 17:22

## 2018-08-21 RX ADMIN — Medication 10 MILLIGRAM(S): at 08:28

## 2018-08-21 RX ADMIN — Medication 80 MILLIGRAM(S): at 06:16

## 2018-08-21 RX ADMIN — Medication 100 MILLIGRAM(S): at 11:58

## 2018-08-21 RX ADMIN — PANTOPRAZOLE SODIUM 40 MILLIGRAM(S): 20 TABLET, DELAYED RELEASE ORAL at 08:28

## 2018-08-21 RX ADMIN — Medication 80 MILLIGRAM(S): at 17:22

## 2018-08-21 RX ADMIN — CALCITRIOL 0.25 MICROGRAM(S): 0.5 CAPSULE ORAL at 11:58

## 2018-08-21 NOTE — DISCHARGE NOTE ADULT - CONDITIONS AT DISCHARGE
Alert and oriented X 4. Skin color good warm and dry to touch. Respirations regular and unlabored. Denies pain or discomfort. Hook draining yellow urine. Appetite good at meals. Sacral pressure ulcer dressing changed as ordered. Turned and positioned q2h. No distress noted at time of discharge.

## 2018-08-21 NOTE — DISCHARGE NOTE ADULT - MEDICATION SUMMARY - MEDICATIONS TO STOP TAKING
I will STOP taking the medications listed below when I get home from the hospital:    warfarin 3 mg oral tablet  -- 1 tab(s) by mouth once a day Please get INR checked on Wednesday 7/25   -- Do not take this drug if you are pregnant.  It is very important that you take or use this exactly as directed.  Do not skip doses or discontinue unless directed by your doctor.  Obtain medical advice before taking any non-prescription drugs as some may affect the action of this medication.

## 2018-08-21 NOTE — DISCHARGE NOTE ADULT - PLAN OF CARE
resolution of symptoms There are 2 common types of GI Bleed, Upper GI Bleed and Lower GI Bleed.  Upper GI Bleed affects the esophagus, stomach, and first part of the small intestine. Lower GI Bleed affects the colon and rectum.  Upper GI Bleed signs and symptoms to notify your Health Care Provider are vomiting blood, or coffee ground vomitus, and bowel movements that look like black tar.  Lower GI Bleed signs and symptoms to notify your health care provider are bright red bloody bowel movements.   Take your medications as prescribed by your Gastroenterologist.  If you have had an Endoscopy or Colonoscopy, follow up with your Gastroenterologist for Pathology results.  Avoid NSAIDs unless your Health Care Provider tells you that it is ok (Aspirin, Ibuprofen, Advil, Motrin, Aleve).  Follow up with your Gastroenterologist within 1-2 weeks of discharge. Take your "blood thinners" as prescribed.  Walking is encouraged, increase activity as tolerated.  If you develop new leg pain, swelling, and/or redness contact your healthcare provider.  If you develop new chest pain with difficulty breathing, a rapid heart rate and/or a feeling of passing out call emergency medical services [phone]. continue current wound care  follow up at the wound care center chronic fox  follow up with urology after discharge You will need to follow up with your Vascular doctor, Dr. Tavares [phone] upon discharge - please call to make an appointment.  Walking is encouraged, increase activity as tolerated.  If you develop new leg pain, swelling, and/or redness contact your healthcare provider.  If you develop new chest pain with difficulty breathing, a rapid heart rate and/or a feeling of passing out call emergency medical services 911. You will need to follow up with Dr. Bennett upon discharge - please call to make an appointment.  Continue with allopurinol. Avoid taking (NSAIDs) - (ex: Ibuprofen, Advil, Celebrex, Naprosyn)  Avoid taking any nephrotoxic agents (can harm kidneys) - Intravenous contrast for diagnostic testing, combination cold medications.  Have all medications adjusted for your renal function by your Health Care Provider.  Blood pressure control is important.  Take all medication as prescribed. chronic fox  follow up with urology within one week of discharge - please call to make an appointment. Due to your acute gout flare, you are being discharged on Colcrys 0.6mg orally every other day x 2 doses (take on 8/29 and 8/31 - you already received a dose today).  On the day of the final dose (8/31), increase your allopurinol dose to 200mg orally daily.  You will need to follow up with Dr. Bennett upon discharge - please call to make an appointment. chronic fox - re-inserted during admission  follow up with urology within one week of discharge - please call to make an appointment. Leaking stopped

## 2018-08-21 NOTE — DISCHARGE NOTE ADULT - MEDICATION SUMMARY - MEDICATIONS TO CHANGE
I will SWITCH the dose or number of times a day I take the medications listed below when I get home from the hospital:    allopurinol 100 mg oral tablet  -- 1 tab(s) by mouth once a day

## 2018-08-21 NOTE — DISCHARGE NOTE ADULT - MEDICATION SUMMARY - MEDICATIONS TO TAKE
I will START or STAY ON the medications listed below when I get home from the hospital:    predniSONE 10 mg oral tablet  -- 1 tab(s) by mouth once a day  -- Indication: For Steroid    Tylenol Extra Strength 500 mg oral tablet  -- 2 tab(s) by mouth once a day, As Needed  -- Indication: For Pain    colchicine 0.6 mg oral tablet  -- 1 tab(s) by mouth every 48 hours  take on 8/29 and 8/31, then discontinue  -- Indication: For Gout    allopurinol 100 mg oral tablet  -- 1 tab(s) by mouth once a day  on 8/31, increase to 2 tabs (200mg) by mouth daily  -- Indication: For Gout    carvedilol 25 mg oral tablet  -- 1 tab(s) by mouth every 12 hours  -- Indication: For Cardiovascular agent    amLODIPine 10 mg oral tablet  -- 1 tab(s) by mouth once a day  -- Indication: For Hypertension    furosemide 40 mg oral tablet  -- 2 tab(s) by mouth once a day  -- Indication: For Diuretic    zinc sulfate 220 mg oral capsule  -- 1 cap(s) by mouth once a day  -- Indication: For Supplement    simethicone 80 mg oral tablet, chewable  -- 1 tab(s) by mouth every 8 hours, As Needed for gas  -- Indication: For Gas    Saline Mist 0.65% nasal spray  -- 2 spray(s) into nose 3 times a day, As Needed for into nose congestion  -- Indication: For Nasal congestion    pantoprazole 40 mg oral delayed release tablet  -- 1 tab(s) by mouth once a day (before a meal)  -- Indication: For Lower GI bleed    Genesis-Riddhi oral tablet  -- 1 tab(s) by mouth once a day  -- Indication: For Supplement    calcitriol 0.25 mcg oral capsule  -- 1 cap(s) by mouth once a day  -- Indication: For Supplement    Vitamin C 500 mg oral tablet  -- 1 tab(s) by mouth once a day  -- Indication: For Supplement

## 2018-08-21 NOTE — DISCHARGE NOTE ADULT - HOSPITAL COURSE
57 yo F PMHx PE on Coumadin (3 mg daily), iliac aneurysm repair, CHF, chronic gout, sacral ulcer, chronic fox, p/w rectal bleeding. Pt was in bed and rolled over and noticed the sheets were saturated in blood. She denies N/V, abd pain, fevers/chills, lightheadedness/dizziness.     Problem/Plan - 1:  ·  Problem: Lower GI bleed.  Plan: monitor cbc  unclear if rectal beed vs bleed from the decub  GI and colorectal surgery consults appreciated  Monitor CBC     Problem/Plan - 2:  ·  Problem: Sacral ulcer.  Plan: wound care consult  will monitor  ID f/up     Problem/Plan - 3:  ·  Problem: Pulmonary embolism.  Plan: check INR and dose coumadin daily. (0) independent 58F with iliac aneurysm repair, CHF, chronic gout, sacral ulcer, chronic fox, DVT on coumadin presents with painless vaginal bleeding on Saturday. Patient states that the bleeding lasted one day and has not resumed. Bleeding was initially thought to be rectal and then vaginal. At the time of my exam, patient deferred rectal/ vaginal exams, but allowed lower extremity exam. She says she has been having normal bowel movements since then and the urine in her chronic fox has become more clear. She also endorses left lower extremity swelling. Normal vital signs, H&H stable, and no blood transfusions required this admission.   On this admission, patient has had a TVUS with a normal endometrium and a thrombosed left common iliac artery aneurysm with endovascular stent as well as a lower extremity venous duplex showing a persistent chronic residual DVT in the left common femoral vein and bilateral popliteal aneurysms (stable at R2.3cm, L2.6cmx2.9) with thrombus.   Patient refusing Coumadin at this time, despite risks/benefits discussion.  Stable for discharge with Vascular, GI, Cards, and PMD follow up. 58F with iliac aneurysm repair, CHF, chronic gout, sacral ulcer, chronic fox, DVT on coumadin presents with painless vaginal bleeding on Saturday. Patient states that the bleeding lasted one day and has not resumed. Bleeding was initially thought to be rectal and then vaginal. At the time of my exam, patient deferred rectal/ vaginal exams, but allowed lower extremity exam. She says she has been having normal bowel movements since then and the urine in her chronic fox has become more clear. She also endorses left lower extremity swelling. Normal vital signs, H&H stable, and no blood transfusions required this admission.   On this admission, patient has had a TVUS with a normal endometrium and a thrombosed left common iliac artery aneurysm with endovascular stent as well as a lower extremity venous duplex showing a persistent chronic residual DVT in the left common femoral vein and bilateral popliteal aneurysms (stable at R2.3cm, L2.6cmx2.9) with thrombus.   Patient refusing Coumadin at this time, despite risks/benefits discussion.  Stable for discharge with Vascular, GI, Urology, and PMD follow up. 58F with iliac aneurysm repair, CHF, chronic gout, sacral ulcer, chronic fox, DVT on coumadin presents with painless vaginal bleeding on Saturday. Patient states that the bleeding lasted one day and has not resumed. Bleeding was initially thought to be rectal and then vaginal. At the time of my exam, patient deferred rectal/ vaginal exams, but allowed lower extremity exam. She says she has been having normal bowel movements since then and the urine in her chronic fox has become more clear. She also endorses left lower extremity swelling. Normal vital signs, H&H stable, and no blood transfusions required this admission.   On this admission, patient has had a TVUS with a normal endometrium and a thrombosed left common iliac artery aneurysm with endovascular stent as well as a lower extremity venous duplex showing a persistent chronic residual DVT in the left common femoral vein and bilateral popliteal aneurysms (stable at R2.3cm, L2.6cmx2.9) with thrombus.   Patient refusing Coumadin at this time, despite risks/benefits discussion.  Fox catheter changed secondary to urinary retention and leaking.  Stable for discharge with Vascular, GI, Urology, and PMD follow up. 58F with iliac aneurysm repair, CHF, chronic gout, sacral ulcer, chronic fox, DVT on coumadin presents with painless vaginal bleeding on Saturday. Patient states that the bleeding lasted one day and has not resumed. Bleeding was initially thought to be rectal and then vaginal. At the time of my exam, patient deferred rectal/ vaginal exams, but allowed lower extremity exam. She says she has been having normal bowel movements since then and the urine in her chronic fox has become more clear. She also endorses left lower extremity swelling. Normal vital signs, H&H stable, and no blood transfusions required this admission.   On this admission, patient has had a TVUS with a normal endometrium and a thrombosed left common iliac artery aneurysm with endovascular stent as well as a lower extremity venous duplex showing a persistent chronic residual DVT in the left common femoral vein and bilateral popliteal aneurysms (stable at R2.3cm, L2.6cmx2.9) with thrombus.   Patient refusing Coumadin at this time, despite risks/benefits discussion.  Fox catheter changed secondary to urinary retention and leaking - leaking resolved as per patient.  Stable for discharge with Vascular, GI, Urology, and PMD follow up.

## 2018-08-21 NOTE — DISCHARGE NOTE ADULT - SECONDARY DIAGNOSIS.
DVT, lower extremity Sacral ulcer Urinary retention Chronic gout CKD (chronic kidney disease) stage 4, GFR 15-29 ml/min Gout

## 2018-08-21 NOTE — DISCHARGE NOTE ADULT - PATIENT PORTAL LINK FT
You can access the [facility] Patient Portal, offered by [facility], by registering with the following website: [email or web address]

## 2018-08-21 NOTE — DISCHARGE NOTE ADULT - PROVIDER TOKENS
TOKEN:'[id number]:'[id number]' TOKEN:'[id number]:'[id number]:'[id number]:'[id number]'

## 2018-08-21 NOTE — DISCHARGE NOTE ADULT - CARE PROVIDER_API CALL
Cy Chicas), Medicine  [address]  [address]  [place] [address]  Phone: [phone]  Fax: [phone]    Gerry Yoder (), Cardiology; Internal Medicine  [address]  [address]  [place] [address]  Phone: [phone]  Fax: [phone] Cy Chicas (MD), Medicine  [address]  [address]  [place] [address]  Phone: [phone]  Fax: [phone]    Gerry Yoder (), Cardiology; Internal Medicine  [address]  [address]  [place] [address]  Phone: [phone]  Fax: [phone]    Radha Tavares), Surgery; Surgical Critical Care; Vascular Surgery  [address]  [address]  [place] [address]  Phone: [phone]  Fax: [phone]    Kevin Bennett (), Nephrology  [address]  [place] [address]  Phone: [phone]  Fax: [phone]

## 2018-08-21 NOTE — DISCHARGE NOTE ADULT - CARE PLAN
Principal Discharge DX:	Lower GI bleed  Goal:	resolution of symptoms  Assessment and plan of treatment:	There are 2 common types of GI Bleed, Upper GI Bleed and Lower GI Bleed.  Upper GI Bleed affects the esophagus, stomach, and first part of the small intestine. Lower GI Bleed affects the colon and rectum.  Upper GI Bleed signs and symptoms to notify your Health Care Provider are vomiting blood, or coffee ground vomitus, and bowel movements that look like black tar.  Lower GI Bleed signs and symptoms to notify your health care provider are bright red bloody bowel movements.   Take your medications as prescribed by your Gastroenterologist.  If you have had an Endoscopy or Colonoscopy, follow up with your Gastroenterologist for Pathology results.  Avoid NSAIDs unless your Health Care Provider tells you that it is ok (Aspirin, Ibuprofen, Advil, Motrin, Aleve).  Follow up with your Gastroenterologist within 1-2 weeks of discharge.  Secondary Diagnosis:	DVT, lower extremity  Assessment and plan of treatment:	Take your "blood thinners" as prescribed.  Walking is encouraged, increase activity as tolerated.  If you develop new leg pain, swelling, and/or redness contact your healthcare provider.  If you develop new chest pain with difficulty breathing, a rapid heart rate and/or a feeling of passing out call emergency medical services [phone].  Secondary Diagnosis:	Sacral ulcer  Assessment and plan of treatment:	continue current wound care  follow up at the wound care center  Secondary Diagnosis:	Urinary retention  Assessment and plan of treatment:	chronic fox  follow up with urology after discharge Principal Discharge DX:	Lower GI bleed  Goal:	resolution of symptoms  Assessment and plan of treatment:	There are 2 common types of GI Bleed, Upper GI Bleed and Lower GI Bleed.  Upper GI Bleed affects the esophagus, stomach, and first part of the small intestine. Lower GI Bleed affects the colon and rectum.  Upper GI Bleed signs and symptoms to notify your Health Care Provider are vomiting blood, or coffee ground vomitus, and bowel movements that look like black tar.  Lower GI Bleed signs and symptoms to notify your health care provider are bright red bloody bowel movements.   Take your medications as prescribed by your Gastroenterologist.  If you have had an Endoscopy or Colonoscopy, follow up with your Gastroenterologist for Pathology results.  Avoid NSAIDs unless your Health Care Provider tells you that it is ok (Aspirin, Ibuprofen, Advil, Motrin, Aleve).  Follow up with your Gastroenterologist within 1-2 weeks of discharge.  Secondary Diagnosis:	DVT, lower extremity  Assessment and plan of treatment:	You will need to follow up with your Vascular doctor, Dr. Tavares [phone] upon discharge - please call to make an appointment.  Walking is encouraged, increase activity as tolerated.  If you develop new leg pain, swelling, and/or redness contact your healthcare provider.  If you develop new chest pain with difficulty breathing, a rapid heart rate and/or a feeling of passing out call emergency medical services [phone].  Secondary Diagnosis:	Sacral ulcer  Assessment and plan of treatment:	continue current wound care  follow up at the wound care center  Secondary Diagnosis:	Urinary retention  Assessment and plan of treatment:	chronic fox  follow up with urology after discharge  Secondary Diagnosis:	Chronic gout  Assessment and plan of treatment:	You will need to follow up with Dr. Bennett upon discharge - please call to make an appointment.  Continue with allopurinol.  Secondary Diagnosis:	CKD (chronic kidney disease) stage 4, GFR 15-29 ml/min  Assessment and plan of treatment:	Avoid taking (NSAIDs) - (ex: Ibuprofen, Advil, Celebrex, Naprosyn)  Avoid taking any nephrotoxic agents (can harm kidneys) - Intravenous contrast for diagnostic testing, combination cold medications.  Have all medications adjusted for your renal function by your Health Care Provider.  Blood pressure control is important.  Take all medication as prescribed. Principal Discharge DX:	Lower GI bleed  Goal:	resolution of symptoms  Assessment and plan of treatment:	There are 2 common types of GI Bleed, Upper GI Bleed and Lower GI Bleed.  Upper GI Bleed affects the esophagus, stomach, and first part of the small intestine. Lower GI Bleed affects the colon and rectum.  Upper GI Bleed signs and symptoms to notify your Health Care Provider are vomiting blood, or coffee ground vomitus, and bowel movements that look like black tar.  Lower GI Bleed signs and symptoms to notify your health care provider are bright red bloody bowel movements.   Take your medications as prescribed by your Gastroenterologist.  If you have had an Endoscopy or Colonoscopy, follow up with your Gastroenterologist for Pathology results.  Avoid NSAIDs unless your Health Care Provider tells you that it is ok (Aspirin, Ibuprofen, Advil, Motrin, Aleve).  Follow up with your Gastroenterologist within 1-2 weeks of discharge.  Secondary Diagnosis:	DVT, lower extremity  Assessment and plan of treatment:	You will need to follow up with your Vascular doctor, Dr. Tavares [phone] upon discharge - please call to make an appointment.  Walking is encouraged, increase activity as tolerated.  If you develop new leg pain, swelling, and/or redness contact your healthcare provider.  If you develop new chest pain with difficulty breathing, a rapid heart rate and/or a feeling of passing out call emergency medical services [phone].  Secondary Diagnosis:	Sacral ulcer  Assessment and plan of treatment:	continue current wound care  follow up at the wound care center  Secondary Diagnosis:	Urinary retention  Assessment and plan of treatment:	chronic fox  follow up with urology after discharge  Secondary Diagnosis:	Gout  Assessment and plan of treatment:	You will need to follow up with Dr. Bennett upon discharge - please call to make an appointment.  Continue with allopurinol.  Secondary Diagnosis:	CKD (chronic kidney disease) stage 4, GFR 15-29 ml/min  Assessment and plan of treatment:	Avoid taking (NSAIDs) - (ex: Ibuprofen, Advil, Celebrex, Naprosyn)  Avoid taking any nephrotoxic agents (can harm kidneys) - Intravenous contrast for diagnostic testing, combination cold medications.  Have all medications adjusted for your renal function by your Health Care Provider.  Blood pressure control is important.  Take all medication as prescribed. Principal Discharge DX:	Lower GI bleed  Goal:	resolution of symptoms  Assessment and plan of treatment:	There are 2 common types of GI Bleed, Upper GI Bleed and Lower GI Bleed.  Upper GI Bleed affects the esophagus, stomach, and first part of the small intestine. Lower GI Bleed affects the colon and rectum.  Upper GI Bleed signs and symptoms to notify your Health Care Provider are vomiting blood, or coffee ground vomitus, and bowel movements that look like black tar.  Lower GI Bleed signs and symptoms to notify your health care provider are bright red bloody bowel movements.   Take your medications as prescribed by your Gastroenterologist.  If you have had an Endoscopy or Colonoscopy, follow up with your Gastroenterologist for Pathology results.  Avoid NSAIDs unless your Health Care Provider tells you that it is ok (Aspirin, Ibuprofen, Advil, Motrin, Aleve).  Follow up with your Gastroenterologist within 1-2 weeks of discharge.  Secondary Diagnosis:	DVT, lower extremity  Assessment and plan of treatment:	You will need to follow up with your Vascular doctor, Dr. Tavares [phone] upon discharge - please call to make an appointment.  Walking is encouraged, increase activity as tolerated.  If you develop new leg pain, swelling, and/or redness contact your healthcare provider.  If you develop new chest pain with difficulty breathing, a rapid heart rate and/or a feeling of passing out call emergency medical services [phone].  Secondary Diagnosis:	Sacral ulcer  Assessment and plan of treatment:	continue current wound care  follow up at the wound care center  Secondary Diagnosis:	Urinary retention  Assessment and plan of treatment:	chronic fox  follow up with urology within one week of discharge - please call to make an appointment.  Secondary Diagnosis:	Gout  Assessment and plan of treatment:	Due to your acute gout flare, you are being discharged on Colcrys 0.6mg orally every other day x 2 doses (take on 8/29 and 8/31 - you already received a dose today).  On the day of the final dose (8/31), increase your allopurinol dose to 200mg orally daily.  You will need to follow up with Dr. Bennett upon discharge - please call to make an appointment.  Secondary Diagnosis:	CKD (chronic kidney disease) stage 4, GFR 15-29 ml/min  Assessment and plan of treatment:	Avoid taking (NSAIDs) - (ex: Ibuprofen, Advil, Celebrex, Naprosyn)  Avoid taking any nephrotoxic agents (can harm kidneys) - Intravenous contrast for diagnostic testing, combination cold medications.  Have all medications adjusted for your renal function by your Health Care Provider.  Blood pressure control is important.  Take all medication as prescribed. Principal Discharge DX:	Lower GI bleed  Goal:	resolution of symptoms  Assessment and plan of treatment:	There are 2 common types of GI Bleed, Upper GI Bleed and Lower GI Bleed.  Upper GI Bleed affects the esophagus, stomach, and first part of the small intestine. Lower GI Bleed affects the colon and rectum.  Upper GI Bleed signs and symptoms to notify your Health Care Provider are vomiting blood, or coffee ground vomitus, and bowel movements that look like black tar.  Lower GI Bleed signs and symptoms to notify your health care provider are bright red bloody bowel movements.   Take your medications as prescribed by your Gastroenterologist.  If you have had an Endoscopy or Colonoscopy, follow up with your Gastroenterologist for Pathology results.  Avoid NSAIDs unless your Health Care Provider tells you that it is ok (Aspirin, Ibuprofen, Advil, Motrin, Aleve).  Follow up with your Gastroenterologist within 1-2 weeks of discharge.  Secondary Diagnosis:	DVT, lower extremity  Assessment and plan of treatment:	You will need to follow up with your Vascular doctor, Dr. Tavares [phone] upon discharge - please call to make an appointment.  Walking is encouraged, increase activity as tolerated.  If you develop new leg pain, swelling, and/or redness contact your healthcare provider.  If you develop new chest pain with difficulty breathing, a rapid heart rate and/or a feeling of passing out call emergency medical services [phone].  Secondary Diagnosis:	Sacral ulcer  Assessment and plan of treatment:	continue current wound care  follow up at the wound care center  Secondary Diagnosis:	Urinary retention  Assessment and plan of treatment:	chronic fox - re-inserted during admission  follow up with urology within one week of discharge - please call to make an appointment.  Secondary Diagnosis:	Gout  Assessment and plan of treatment:	Due to your acute gout flare, you are being discharged on Colcrys 0.6mg orally every other day x 2 doses (take on 8/29 and 8/31 - you already received a dose today).  On the day of the final dose (8/31), increase your allopurinol dose to 200mg orally daily.  You will need to follow up with Dr. Bennett upon discharge - please call to make an appointment.  Secondary Diagnosis:	CKD (chronic kidney disease) stage 4, GFR 15-29 ml/min  Assessment and plan of treatment:	Avoid taking (NSAIDs) - (ex: Ibuprofen, Advil, Celebrex, Naprosyn)  Avoid taking any nephrotoxic agents (can harm kidneys) - Intravenous contrast for diagnostic testing, combination cold medications.  Have all medications adjusted for your renal function by your Health Care Provider.  Blood pressure control is important.  Take all medication as prescribed. Principal Discharge DX:	Lower GI bleed  Goal:	resolution of symptoms  Assessment and plan of treatment:	There are 2 common types of GI Bleed, Upper GI Bleed and Lower GI Bleed.  Upper GI Bleed affects the esophagus, stomach, and first part of the small intestine. Lower GI Bleed affects the colon and rectum.  Upper GI Bleed signs and symptoms to notify your Health Care Provider are vomiting blood, or coffee ground vomitus, and bowel movements that look like black tar.  Lower GI Bleed signs and symptoms to notify your health care provider are bright red bloody bowel movements.   Take your medications as prescribed by your Gastroenterologist.  If you have had an Endoscopy or Colonoscopy, follow up with your Gastroenterologist for Pathology results.  Avoid NSAIDs unless your Health Care Provider tells you that it is ok (Aspirin, Ibuprofen, Advil, Motrin, Aleve).  Follow up with your Gastroenterologist within 1-2 weeks of discharge.  Secondary Diagnosis:	DVT, lower extremity  Assessment and plan of treatment:	You will need to follow up with your Vascular doctor, Dr. Tavares [phone] upon discharge - please call to make an appointment.  Walking is encouraged, increase activity as tolerated.  If you develop new leg pain, swelling, and/or redness contact your healthcare provider.  If you develop new chest pain with difficulty breathing, a rapid heart rate and/or a feeling of passing out call emergency medical services [phone].  Secondary Diagnosis:	Sacral ulcer  Assessment and plan of treatment:	continue current wound care  follow up at the wound care center  Secondary Diagnosis:	Urinary retention  Goal:	Leaking stopped  Assessment and plan of treatment:	chronic fox - re-inserted during admission  follow up with urology within one week of discharge - please call to make an appointment.  Secondary Diagnosis:	Gout  Assessment and plan of treatment:	Due to your acute gout flare, you are being discharged on Colcrys 0.6mg orally every other day x 2 doses (take on 8/29 and 8/31 - you already received a dose today).  On the day of the final dose (8/31), increase your allopurinol dose to 200mg orally daily.  You will need to follow up with Dr. Bennett upon discharge - please call to make an appointment.  Secondary Diagnosis:	CKD (chronic kidney disease) stage 4, GFR 15-29 ml/min  Assessment and plan of treatment:	Avoid taking (NSAIDs) - (ex: Ibuprofen, Advil, Celebrex, Naprosyn)  Avoid taking any nephrotoxic agents (can harm kidneys) - Intravenous contrast for diagnostic testing, combination cold medications.  Have all medications adjusted for your renal function by your Health Care Provider.  Blood pressure control is important.  Take all medication as prescribed.

## 2018-08-21 NOTE — DISCHARGE NOTE ADULT - CARE PROVIDERS DIRECT ADDRESSES
,DirectAddress_Unknown,DirectAddress_Unknown ,DirectAddress_Unknown,DirectAddress_Unknown,[email or web address],DirectAddress_Unknown

## 2018-08-21 NOTE — DISCHARGE NOTE ADULT - NS AS DC FOLLOWUP STROKE INST
Heart Failure/Coumadin/Warfarin/Smoking Cessation/Influenza vaccination ([facility] Pub Date: August 7, 2015) Coumadin/Warfarin/Heart Failure Heart Failure

## 2018-08-21 NOTE — PROGRESS NOTE ADULT - PROBLEM SELECTOR PLAN 1
monitor CBC daily while admitted  transfuse prn  normal bleeding scan   no signs of GIB, can resume AC  monitor stools for signs of bleeding  diet as tolerated  No immediate plans for colonoscopy d/w Dr. Reed

## 2018-08-21 NOTE — DISCHARGE NOTE ADULT - ADDITIONAL INSTRUCTIONS
follow up with Dr. Chicas after discharge follow up with Dr. Chicas after discharge  follow up with Dr. Goldberg, urology after discharge follow up with your primary medical doctor, Dr. Chicas, within one week of discharge - please call to make an appointment.  follow up with Dr. Goldberg, urology after discharge  You will need to follow up with your Vascular doctor, Dr. Tavares [phone] upon discharge - please call to make an appointment.  You will need to follow up with Dr. Bennett upon discharge - please call to make an appointment. follow up with your primary medical doctor, Dr. Chicas, within one week of discharge - please call to make an appointment.  follow up with Dr. Goldberg, urology after discharge  You will need to follow up with your Vascular doctor, Dr. Tavares [phone] upon discharge - please call to make an appointment.  You will need to follow up with Dr. Bennett upon discharge - please call to make an appointment.  Follow up with your Gastroenterologist within 1-2 weeks of discharge. follow up with your primary medical doctor, Dr. Chicas, within one week of discharge - please call to make an appointment.  follow up with Dr. Goldberg, urology, within one week of discharge - please call to make an appointment.  You will need to follow up with your Vascular doctor, Dr. Tavares [phone] upon discharge - please call to make an appointment.  You will need to follow up with Dr. Bennett upon discharge - please call to make an appointment.  Follow up with your Gastroenterologist within 1-2 weeks of discharge.

## 2018-08-22 LAB
ANION GAP SERPL CALC-SCNC: 19 MMOL/L — HIGH (ref 5–17)
BUN SERPL-MCNC: 88 MG/DL — HIGH (ref 7–23)
CALCIUM SERPL-MCNC: 9 MG/DL — SIGNIFICANT CHANGE UP (ref 8.4–10.5)
CHLORIDE SERPL-SCNC: 100 MMOL/L — SIGNIFICANT CHANGE UP (ref 96–108)
CO2 SERPL-SCNC: 24 MMOL/L — SIGNIFICANT CHANGE UP (ref 22–31)
CREAT SERPL-MCNC: 3.75 MG/DL — HIGH (ref 0.5–1.3)
GLUCOSE SERPL-MCNC: 87 MG/DL — SIGNIFICANT CHANGE UP (ref 70–99)
HCT VFR BLD CALC: 29.6 % — LOW (ref 34.5–45)
HGB BLD-MCNC: 9.1 G/DL — LOW (ref 11.5–15.5)
INR BLD: 2.02 RATIO — HIGH (ref 0.88–1.16)
MCHC RBC-ENTMCNC: 27.7 PG — SIGNIFICANT CHANGE UP (ref 27–34)
MCHC RBC-ENTMCNC: 30.7 GM/DL — LOW (ref 32–36)
MCV RBC AUTO: 90 FL — SIGNIFICANT CHANGE UP (ref 80–100)
PLATELET # BLD AUTO: 147 K/UL — LOW (ref 150–400)
POTASSIUM SERPL-MCNC: 3.5 MMOL/L — SIGNIFICANT CHANGE UP (ref 3.5–5.3)
POTASSIUM SERPL-SCNC: 3.5 MMOL/L — SIGNIFICANT CHANGE UP (ref 3.5–5.3)
PROTHROM AB SERPL-ACNC: 23.2 SEC — HIGH (ref 10–13.1)
RBC # BLD: 3.29 M/UL — LOW (ref 3.8–5.2)
RBC # FLD: 19.2 % — HIGH (ref 10.3–14.5)
SODIUM SERPL-SCNC: 143 MMOL/L — SIGNIFICANT CHANGE UP (ref 135–145)
WBC # BLD: 6.85 K/UL — SIGNIFICANT CHANGE UP (ref 3.8–10.5)
WBC # FLD AUTO: 6.85 K/UL — SIGNIFICANT CHANGE UP (ref 3.8–10.5)

## 2018-08-22 RX ORDER — WARFARIN SODIUM 2.5 MG/1
3 TABLET ORAL ONCE
Qty: 0 | Refills: 0 | Status: COMPLETED | OUTPATIENT
Start: 2018-08-22 | End: 2018-08-22

## 2018-08-22 RX ADMIN — Medication 10 MILLIGRAM(S): at 08:27

## 2018-08-22 RX ADMIN — WARFARIN SODIUM 3 MILLIGRAM(S): 2.5 TABLET ORAL at 21:46

## 2018-08-22 RX ADMIN — ZINC SULFATE TAB 220 MG (50 MG ZINC EQUIVALENT) 220 MILLIGRAM(S): 220 (50 ZN) TAB at 12:59

## 2018-08-22 RX ADMIN — Medication 80 MILLIGRAM(S): at 18:09

## 2018-08-22 RX ADMIN — AMLODIPINE BESYLATE 10 MILLIGRAM(S): 2.5 TABLET ORAL at 08:27

## 2018-08-22 RX ADMIN — PANTOPRAZOLE SODIUM 40 MILLIGRAM(S): 20 TABLET, DELAYED RELEASE ORAL at 08:27

## 2018-08-22 RX ADMIN — CALCITRIOL 0.25 MICROGRAM(S): 0.5 CAPSULE ORAL at 12:59

## 2018-08-22 RX ADMIN — Medication 500 MILLIGRAM(S): at 12:59

## 2018-08-22 RX ADMIN — Medication 100 MILLIGRAM(S): at 12:59

## 2018-08-22 RX ADMIN — Medication 80 MILLIGRAM(S): at 05:48

## 2018-08-22 RX ADMIN — Medication 1 TABLET(S): at 12:59

## 2018-08-22 RX ADMIN — CARVEDILOL PHOSPHATE 25 MILLIGRAM(S): 80 CAPSULE, EXTENDED RELEASE ORAL at 05:48

## 2018-08-22 RX ADMIN — CARVEDILOL PHOSPHATE 25 MILLIGRAM(S): 80 CAPSULE, EXTENDED RELEASE ORAL at 18:09

## 2018-08-22 NOTE — PHYSICAL THERAPY INITIAL EVALUATION ADULT - PERTINENT HX OF CURRENT PROBLEM, REHAB EVAL
Pt is a 58 y.o. F PMHx PE on Coumadin (3 mg daily), iliac aneurysm repair, CHF, chronic gout, sacral ulcer, chronic fox, p/w rectal bleeding. Pt was in bed and rolled over and noticed the sheets were saturated in blood. She denies N/V, abd pain, fevers/chills, lightheadedness/dizziness.

## 2018-08-22 NOTE — PHYSICAL THERAPY INITIAL EVALUATION ADULT - ACTIVE RANGE OF MOTION EXAMINATION, REHAB EVAL
LLE AROM limited due to weakness/edema, RUE AROM limited/RLE Active ROM was WNL (within normal limits)/Left UE Active ROM was WNL (within normal limits)

## 2018-08-22 NOTE — PROGRESS NOTE ADULT - PROBLEM SELECTOR PLAN 1
monitor CBC daily while admitted  transfuse prn  normal bleeding scan   no signs of GIB, continue AC  monitor stools for signs of bleeding  diet as tolerated  No plans for colonoscopy d/w Dr. Reed

## 2018-08-23 LAB
ANION GAP SERPL CALC-SCNC: 16 MMOL/L — SIGNIFICANT CHANGE UP (ref 5–17)
APTT BLD: 35.4 SEC — SIGNIFICANT CHANGE UP (ref 27.5–37.4)
BUN SERPL-MCNC: 86 MG/DL — HIGH (ref 7–23)
CALCIUM SERPL-MCNC: 8.9 MG/DL — SIGNIFICANT CHANGE UP (ref 8.4–10.5)
CHLORIDE SERPL-SCNC: 102 MMOL/L — SIGNIFICANT CHANGE UP (ref 96–108)
CO2 SERPL-SCNC: 25 MMOL/L — SIGNIFICANT CHANGE UP (ref 22–31)
CREAT SERPL-MCNC: 3.71 MG/DL — HIGH (ref 0.5–1.3)
GLUCOSE SERPL-MCNC: 77 MG/DL — SIGNIFICANT CHANGE UP (ref 70–99)
INR BLD: 2 RATIO — HIGH (ref 0.88–1.16)
POTASSIUM SERPL-MCNC: 3.7 MMOL/L — SIGNIFICANT CHANGE UP (ref 3.5–5.3)
POTASSIUM SERPL-SCNC: 3.7 MMOL/L — SIGNIFICANT CHANGE UP (ref 3.5–5.3)
PROTHROM AB SERPL-ACNC: 22.9 SEC — HIGH (ref 10–13.1)
SODIUM SERPL-SCNC: 143 MMOL/L — SIGNIFICANT CHANGE UP (ref 135–145)

## 2018-08-23 PROCEDURE — 93970 EXTREMITY STUDY: CPT | Mod: 26

## 2018-08-23 RX ORDER — SODIUM CHLORIDE 0.65 %
1 AEROSOL, SPRAY (ML) NASAL THREE TIMES A DAY
Qty: 0 | Refills: 0 | Status: DISCONTINUED | OUTPATIENT
Start: 2018-08-23 | End: 2018-08-27

## 2018-08-23 RX ORDER — WARFARIN SODIUM 2.5 MG/1
3 TABLET ORAL ONCE
Qty: 0 | Refills: 0 | Status: COMPLETED | OUTPATIENT
Start: 2018-08-23 | End: 2018-08-23

## 2018-08-23 RX ORDER — SIMETHICONE 80 MG/1
80 TABLET, CHEWABLE ORAL EVERY 4 HOURS
Qty: 0 | Refills: 0 | Status: DISCONTINUED | OUTPATIENT
Start: 2018-08-23 | End: 2018-08-27

## 2018-08-23 RX ADMIN — Medication 1 TABLET(S): at 12:28

## 2018-08-23 RX ADMIN — SIMETHICONE 80 MILLIGRAM(S): 80 TABLET, CHEWABLE ORAL at 21:39

## 2018-08-23 RX ADMIN — WARFARIN SODIUM 3 MILLIGRAM(S): 2.5 TABLET ORAL at 21:39

## 2018-08-23 RX ADMIN — Medication 10 MILLIGRAM(S): at 09:52

## 2018-08-23 RX ADMIN — PANTOPRAZOLE SODIUM 40 MILLIGRAM(S): 20 TABLET, DELAYED RELEASE ORAL at 09:52

## 2018-08-23 RX ADMIN — Medication 80 MILLIGRAM(S): at 05:21

## 2018-08-23 RX ADMIN — CALCITRIOL 0.25 MICROGRAM(S): 0.5 CAPSULE ORAL at 12:29

## 2018-08-23 RX ADMIN — Medication 80 MILLIGRAM(S): at 17:26

## 2018-08-23 RX ADMIN — AMLODIPINE BESYLATE 10 MILLIGRAM(S): 2.5 TABLET ORAL at 09:52

## 2018-08-23 RX ADMIN — CARVEDILOL PHOSPHATE 25 MILLIGRAM(S): 80 CAPSULE, EXTENDED RELEASE ORAL at 17:26

## 2018-08-23 RX ADMIN — Medication 100 MILLIGRAM(S): at 12:28

## 2018-08-23 RX ADMIN — ZINC SULFATE TAB 220 MG (50 MG ZINC EQUIVALENT) 220 MILLIGRAM(S): 220 (50 ZN) TAB at 12:28

## 2018-08-23 RX ADMIN — CARVEDILOL PHOSPHATE 25 MILLIGRAM(S): 80 CAPSULE, EXTENDED RELEASE ORAL at 05:20

## 2018-08-23 RX ADMIN — Medication 500 MILLIGRAM(S): at 12:28

## 2018-08-23 NOTE — PROGRESS NOTE ADULT - PROBLEM SELECTOR PLAN 1
monitor CBC daily while admitted  transfuse prn  normal bleeding scan   no signs of GIB, continue AC  monitor stools for signs of bleeding  diet as tolerated, simethicone 80 mg QID started for gas   No plans for colonoscopy d/w Dr. Reed

## 2018-08-24 LAB
ANION GAP SERPL CALC-SCNC: 16 MMOL/L — SIGNIFICANT CHANGE UP (ref 5–17)
APTT BLD: 36.4 SEC — SIGNIFICANT CHANGE UP (ref 27.5–37.4)
BUN SERPL-MCNC: 85 MG/DL — HIGH (ref 7–23)
CALCIUM SERPL-MCNC: 8.6 MG/DL — SIGNIFICANT CHANGE UP (ref 8.4–10.5)
CHLORIDE SERPL-SCNC: 101 MMOL/L — SIGNIFICANT CHANGE UP (ref 96–108)
CO2 SERPL-SCNC: 26 MMOL/L — SIGNIFICANT CHANGE UP (ref 22–31)
CREAT SERPL-MCNC: 3.64 MG/DL — HIGH (ref 0.5–1.3)
FERRITIN SERPL-MCNC: 618 NG/ML — HIGH (ref 15–150)
GLUCOSE SERPL-MCNC: 74 MG/DL — SIGNIFICANT CHANGE UP (ref 70–99)
HCT VFR BLD CALC: 30.7 % — LOW (ref 34.5–45)
HGB BLD-MCNC: 9.2 G/DL — LOW (ref 11.5–15.5)
INR BLD: 2.11 RATIO — HIGH (ref 0.88–1.16)
IRON SATN MFR SERPL: 26 % — SIGNIFICANT CHANGE UP (ref 14–50)
IRON SATN MFR SERPL: 55 UG/DL — SIGNIFICANT CHANGE UP (ref 30–160)
MCHC RBC-ENTMCNC: 26.7 PG — LOW (ref 27–34)
MCHC RBC-ENTMCNC: 30 GM/DL — LOW (ref 32–36)
MCV RBC AUTO: 89 FL — SIGNIFICANT CHANGE UP (ref 80–100)
PLATELET # BLD AUTO: 142 K/UL — LOW (ref 150–400)
POTASSIUM SERPL-MCNC: 3.5 MMOL/L — SIGNIFICANT CHANGE UP (ref 3.5–5.3)
POTASSIUM SERPL-SCNC: 3.5 MMOL/L — SIGNIFICANT CHANGE UP (ref 3.5–5.3)
PROTHROM AB SERPL-ACNC: 24.2 SEC — HIGH (ref 10–13.1)
RBC # BLD: 3.45 M/UL — LOW (ref 3.8–5.2)
RBC # FLD: 19.3 % — HIGH (ref 10.3–14.5)
SODIUM SERPL-SCNC: 143 MMOL/L — SIGNIFICANT CHANGE UP (ref 135–145)
TIBC SERPL-MCNC: 212 UG/DL — LOW (ref 220–430)
UIBC SERPL-MCNC: 157 UG/DL — SIGNIFICANT CHANGE UP (ref 110–370)
WBC # BLD: 6.57 K/UL — SIGNIFICANT CHANGE UP (ref 3.8–10.5)
WBC # FLD AUTO: 6.57 K/UL — SIGNIFICANT CHANGE UP (ref 3.8–10.5)

## 2018-08-24 PROCEDURE — 76856 US EXAM PELVIC COMPLETE: CPT | Mod: 26

## 2018-08-24 RX ORDER — WARFARIN SODIUM 2.5 MG/1
3 TABLET ORAL ONCE
Qty: 0 | Refills: 0 | Status: DISCONTINUED | OUTPATIENT
Start: 2018-08-24 | End: 2018-08-24

## 2018-08-24 RX ADMIN — Medication 500 MILLIGRAM(S): at 12:16

## 2018-08-24 RX ADMIN — ZINC SULFATE TAB 220 MG (50 MG ZINC EQUIVALENT) 220 MILLIGRAM(S): 220 (50 ZN) TAB at 12:16

## 2018-08-24 RX ADMIN — Medication 10 MILLIGRAM(S): at 08:59

## 2018-08-24 RX ADMIN — AMLODIPINE BESYLATE 10 MILLIGRAM(S): 2.5 TABLET ORAL at 09:00

## 2018-08-24 RX ADMIN — CARVEDILOL PHOSPHATE 25 MILLIGRAM(S): 80 CAPSULE, EXTENDED RELEASE ORAL at 17:44

## 2018-08-24 RX ADMIN — Medication 80 MILLIGRAM(S): at 17:44

## 2018-08-24 RX ADMIN — CARVEDILOL PHOSPHATE 25 MILLIGRAM(S): 80 CAPSULE, EXTENDED RELEASE ORAL at 05:30

## 2018-08-24 RX ADMIN — PANTOPRAZOLE SODIUM 40 MILLIGRAM(S): 20 TABLET, DELAYED RELEASE ORAL at 08:59

## 2018-08-24 RX ADMIN — Medication 100 MILLIGRAM(S): at 12:16

## 2018-08-24 RX ADMIN — SIMETHICONE 80 MILLIGRAM(S): 80 TABLET, CHEWABLE ORAL at 21:15

## 2018-08-24 RX ADMIN — CALCITRIOL 0.25 MICROGRAM(S): 0.5 CAPSULE ORAL at 12:17

## 2018-08-24 RX ADMIN — Medication 80 MILLIGRAM(S): at 05:30

## 2018-08-24 RX ADMIN — Medication 1 TABLET(S): at 12:16

## 2018-08-24 NOTE — CONSULT NOTE ADULT - ASSESSMENT
58F with multiple medical problems and extensive past vascular surgical history on coumadin presented on Saturday with vaginal bleeding    -patient hemodynamically normal with stable H&H and required no transfusions  -bleeding seems to have stopped, would still recommend locating source of bleed to help guide future care  -no indication for IVC filter at this time, and access would be extremely high risk given multiple prior interventions  -will discuss with vascular surgeon on call, Dr. Yoder. If patient is still in the hospital when Dr. Tavares returns, he will likely see patient, if not she can follow up with him as an outpatient
A/P: 58F with BRBPR on coumadin for PE (2009).  -continue to monitor H/H  -continue to monitor stools  -pt was unsure when last had colonoscopy, may need colonoscopy  -colorectal surgery will follow  -discussed with Colorectal Surgery Fellow
58 F PMH SLE, systolic HFrEF(EF 32%) s/p AICD, HTN, CKD III- IV, PE / left leg DVT dx 2009, HIT, CAD (no stents), stage IV sacral ulcer, chronic Fox, recurrent pericardial effusion, AAA ~14cm in 12/2016 s/p repair in 2010 w/ aortoiliac stent complicated post-operatively with development of left lower extremity compartment syndrome, with recent cellulitis treated inpt readmitted recently for pericardial effusion s/p pericardial drain. now admitted with lower GI bleed vs. vaginal bleed   1- CKD IV  2- pericardial effusion s/p drain in july 2018   3- chf  4- HTN   5- neurogenic bladder  6- lupus   7- anemia  8- shpt  9- hyperuricemia   10- thoracic aneurysm  11- lower gi bleed      cr steady at this  level likely over time has been worsening   cont with b-b and norvasc    cont fox for hx of neurogenic bladder  cont calcitriol 0.25mcg daily    cont with allopurinol  lasix at 80 mg bid  keep O> I  fox leakage  consult for replacement fox  serial h and h  cont procrit 26712 units sq tiw
59 yo female with extensive medical and cardiac history on coumadin admitted with rectal bleed
patient stable with fox but will require gu work up ofr ? neurogenic bladder  check urine culture  incontinence resolved with increase fox balloon volume  urine cytology  outpatient  urodynamics and ? tov if oob.

## 2018-08-24 NOTE — CONSULT NOTE ADULT - CONSULT REASON
Cardiac Management
abn creatinine
anemia  ? gi bleed
BRBPR
IVC filter
incontinence and urinary retention

## 2018-08-24 NOTE — CHART NOTE - NSCHARTNOTEFT_GEN_A_CORE
58F with multiple medical problems such as   PE on Coumadin, DVT ,Iliac Aneurysm repair, CHF, chronic Gout, sacral ulcer, chronic fox,  and extensive past vascular surgical history on coumadin presented on Saturday with vaginal bleeding. Discussed with  and vascular consult called to evaluated the need for IVC filter.  As per vascular :no indication for IVC filter at this time, and access would be extremely high risk given multiple prior interventions.  As per  he had extensive discussion with the patient and explained the risk vs benefits of AC and IVC filter.   Patient refused both , therefor as per  we are holding coumadin currently.

## 2018-08-24 NOTE — CONSULT NOTE ADULT - CONSULT REQUESTED DATE/TIME
18-Aug-2018 15:03
18-Aug-2018 21:30
19-Aug-2018 18:54
18-Aug-2018 18:21
24-Aug-2018 14:32
19-Aug-2018

## 2018-08-24 NOTE — CONSULT NOTE ADULT - SUBJECTIVE AND OBJECTIVE BOX
VASCULAR SURGERY CONSULT NOTE    58F with multiple medical problems on coumadin presents with painless vaginal bleeding on Saturday. Patient states that the bleeding lasted one day and has not resumed. Bleeding was initially thought to be rectal and then vaginal. At the time of my exam, patient deferred rectal/ vaginal exams, but allowed lower extremity exam. She says she has been having normal bowel movements since then and the urine in her chronic fox has become more clear. She also endorses left lower extremity swelling. Normal vital signs, H&H stable, and no blood transfusions required this admission.   On this admission, patient has had a TVUS with a normal endometrium and a thrombosed left common iliac artery aneurysm with endovascular stent as well as a lower extremity venous duplex showing a persistent chronic residual DVT in the left common femoral vein and bilateral popliteal aneurysms (stable at R2.3cm, L2.6cmx2.9) with thrombus.     2009, January - Endovascular repair of Left iliac artery aneurysm (aorto-unifemoral stent graft) with L to R femoral-femoral bypass with PTFE. Complicated by pre-existing AVF from iliac aneurysm to IVC.  2009, January (next day) - LLE four-compartment fasciotomies for compartment syndrome  2009, May - Ligation of R common iliac artery (for persistent Type II endoleak/growing aneurysm sac)  2009, September - Open aorto-left femoral bypass (revision of iliac aneurysm repair), RP approach. Complicated post-op by RP bleed from intercostal artery, with RTOR on POD 4.  2011, September - Endovascular exclusion of left iliac vein from AVF to iliac aneurysm sac with covered venous stent.  2016, February - Open access of left iliac aneurysm sac, intra-operative IR-assisted angioembolization of venous/arterial communications with aneurysm sac    HPI:  59 yo F PMHx PE on Coumadin (3 mg daily), iliac aneurysm repair, CHF, chronic gout, sacral ulcer, chronic fox, p/w rectal bleeding. Pt was in bed and rolled over and noticed the sheets were saturated in blood. She denies N/V, abd pain, fevers/chills, lightheadedness/dizziness. (18 Aug 2018 15:01)      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)  Calf DVT (Deep Venous Thrombosis) (ICD9 453.42)  Adenomatous Goiter (ICD9 241.9)  Chronic Gout (ICD9 274.02)  LBBB (Left Bundle Branch Block) (ICD9 426.3)  CHF (Congestive Heart Failure) (ICD9 428.0)  Hx of aorta-iliac-femoral bypass  TOP (Termination of Pregnancy)  S/P Dilatation and Curettage  History of Tubal Ligation  s/p AAA (Abdominal Aortic Aneurysm) Repair: 2009 2010  History of Cholecystectomy  S/P Partial Hysterectomy    FAMILY HISTORY:  No pertinent family history in first degree relatives      SOCIAL HISTORY:    MEDICATIONS  (STANDING):  allopurinol 100 milliGRAM(s) Oral daily  amLODIPine   Tablet 10 milliGRAM(s) Oral daily  ascorbic acid 500 milliGRAM(s) Oral daily  calcitriol   Capsule 0.25 MICROGram(s) Oral daily  carvedilol 25 milliGRAM(s) Oral every 12 hours  furosemide    Tablet 80 milliGRAM(s) Oral two times a day  Nephro-alyse 1 Tablet(s) Oral daily  pantoprazole    Tablet 40 milliGRAM(s) Oral before breakfast  predniSONE   Tablet 10 milliGRAM(s) Oral daily  simethicone 80 milliGRAM(s) Chew every 4 hours  zinc sulfate 220 milliGRAM(s) Oral daily    MEDICATIONS  (PRN):  ondansetron Injectable 4 milliGRAM(s) IV Push every 6 hours PRN Nausea  sodium chloride 0.65% Nasal 1 Spray(s) Both Nostrils three times a day PRN Nasal Congestion    Allergies    heparin (Unknown)  losartan (Anaphylaxis)  nitroglycerin (Other)    Intolerances    PHYSICAL EXAM    Vital Signs Last 24 Hrs  T(C): 36.8 (24 Aug 2018 12:55), Max: 36.8 (23 Aug 2018 20:49)  T(F): 98.3 (24 Aug 2018 12:55), Max: 98.3 (24 Aug 2018 12:55)  HR: 81 (24 Aug 2018 12:55) (75 - 91)  BP: 144/76 (24 Aug 2018 12:55) (143/77 - 155/81)  BP(mean): --  RR: 18 (24 Aug 2018 12:55) (18 - 18)  SpO2: 98% (24 Aug 2018 12:55) (98% - 98%)  Daily     Daily     General: obese, WN/WD NAD  Neurology: A&Ox3, nonfocal, HALE x 4  Head:  Normocephalic, atraumatic  ENT:  Mucosa moist, no ulcerations  Neck:  Supple, no sinuses or palpable masses  Lymphatic:  No palpable cervical, supraclavicular, axillary or inguinal adenopathy  Respiratory: CTA B/L  CV: RRR, S1S2, no murmur  Abdominal: Soft, NT, ND no palpable mass  MSK: significant LLE swelling, palpable DP pulses bilaterally, LLE fasciotomy sites healed                           9.2    6.57  )-----------( 142      ( 24 Aug 2018 07:22 )             30.7     08-24    143  |  101  |  85<H>  ----------------------------<  74  3.5   |  26  |  3.64<H>    Ca    8.6      24 Aug 2018 06:47      PT/INR - ( 24 Aug 2018 07:26 )   PT: 24.2 sec;   INR: 2.11 ratio         PTT - ( 24 Aug 2018 07:26 )  PTT:36.4 sec      IMAGING STUDIES:

## 2018-08-25 DIAGNOSIS — N18.4 CHRONIC KIDNEY DISEASE, STAGE 4 (SEVERE): ICD-10-CM

## 2018-08-25 LAB
ANION GAP SERPL CALC-SCNC: 15 MMOL/L — SIGNIFICANT CHANGE UP (ref 5–17)
BASOPHILS # BLD AUTO: 0.01 K/UL — SIGNIFICANT CHANGE UP (ref 0–0.2)
BASOPHILS NFR BLD AUTO: 0.2 % — SIGNIFICANT CHANGE UP (ref 0–2)
BUN SERPL-MCNC: 84 MG/DL — HIGH (ref 7–23)
CALCIUM SERPL-MCNC: 9.1 MG/DL — SIGNIFICANT CHANGE UP (ref 8.4–10.5)
CHLORIDE SERPL-SCNC: 102 MMOL/L — SIGNIFICANT CHANGE UP (ref 96–108)
CO2 SERPL-SCNC: 28 MMOL/L — SIGNIFICANT CHANGE UP (ref 22–31)
CREAT SERPL-MCNC: 3.59 MG/DL — HIGH (ref 0.5–1.3)
EOSINOPHIL # BLD AUTO: 0.11 K/UL — SIGNIFICANT CHANGE UP (ref 0–0.5)
EOSINOPHIL NFR BLD AUTO: 1.9 % — SIGNIFICANT CHANGE UP (ref 0–6)
GLUCOSE SERPL-MCNC: 85 MG/DL — SIGNIFICANT CHANGE UP (ref 70–99)
HCT VFR BLD CALC: 30.9 % — LOW (ref 34.5–45)
HGB BLD-MCNC: 9.3 G/DL — LOW (ref 11.5–15.5)
IMM GRANULOCYTES NFR BLD AUTO: 0.2 % — SIGNIFICANT CHANGE UP (ref 0–1.5)
INR BLD: 2.19 RATIO — HIGH (ref 0.88–1.16)
LYMPHOCYTES # BLD AUTO: 0.69 K/UL — LOW (ref 1–3.3)
LYMPHOCYTES # BLD AUTO: 11.7 % — LOW (ref 13–44)
MCHC RBC-ENTMCNC: 26.5 PG — LOW (ref 27–34)
MCHC RBC-ENTMCNC: 30.1 GM/DL — LOW (ref 32–36)
MCV RBC AUTO: 88 FL — SIGNIFICANT CHANGE UP (ref 80–100)
MONOCYTES # BLD AUTO: 0.59 K/UL — SIGNIFICANT CHANGE UP (ref 0–0.9)
MONOCYTES NFR BLD AUTO: 10 % — SIGNIFICANT CHANGE UP (ref 2–14)
NEUTROPHILS # BLD AUTO: 4.47 K/UL — SIGNIFICANT CHANGE UP (ref 1.8–7.4)
NEUTROPHILS NFR BLD AUTO: 76 % — SIGNIFICANT CHANGE UP (ref 43–77)
PLATELET # BLD AUTO: 136 K/UL — LOW (ref 150–400)
POTASSIUM SERPL-MCNC: 3.7 MMOL/L — SIGNIFICANT CHANGE UP (ref 3.5–5.3)
POTASSIUM SERPL-SCNC: 3.7 MMOL/L — SIGNIFICANT CHANGE UP (ref 3.5–5.3)
PROTHROM AB SERPL-ACNC: 25.1 SEC — HIGH (ref 10–13.1)
RBC # BLD: 3.51 M/UL — LOW (ref 3.8–5.2)
RBC # FLD: 19.2 % — HIGH (ref 10.3–14.5)
SODIUM SERPL-SCNC: 145 MMOL/L — SIGNIFICANT CHANGE UP (ref 135–145)
WBC # BLD: 5.88 K/UL — SIGNIFICANT CHANGE UP (ref 3.8–10.5)
WBC # FLD AUTO: 5.88 K/UL — SIGNIFICANT CHANGE UP (ref 3.8–10.5)

## 2018-08-25 RX ADMIN — Medication 80 MILLIGRAM(S): at 05:43

## 2018-08-25 RX ADMIN — CARVEDILOL PHOSPHATE 25 MILLIGRAM(S): 80 CAPSULE, EXTENDED RELEASE ORAL at 18:21

## 2018-08-25 RX ADMIN — Medication 500 MILLIGRAM(S): at 12:23

## 2018-08-25 RX ADMIN — Medication 80 MILLIGRAM(S): at 18:16

## 2018-08-25 RX ADMIN — CARVEDILOL PHOSPHATE 25 MILLIGRAM(S): 80 CAPSULE, EXTENDED RELEASE ORAL at 05:42

## 2018-08-25 RX ADMIN — CALCITRIOL 0.25 MICROGRAM(S): 0.5 CAPSULE ORAL at 12:23

## 2018-08-25 RX ADMIN — ZINC SULFATE TAB 220 MG (50 MG ZINC EQUIVALENT) 220 MILLIGRAM(S): 220 (50 ZN) TAB at 12:22

## 2018-08-25 RX ADMIN — AMLODIPINE BESYLATE 10 MILLIGRAM(S): 2.5 TABLET ORAL at 05:42

## 2018-08-25 RX ADMIN — Medication 10 MILLIGRAM(S): at 10:16

## 2018-08-25 RX ADMIN — Medication 1 TABLET(S): at 12:22

## 2018-08-25 RX ADMIN — PANTOPRAZOLE SODIUM 40 MILLIGRAM(S): 20 TABLET, DELAYED RELEASE ORAL at 05:42

## 2018-08-25 RX ADMIN — Medication 100 MILLIGRAM(S): at 12:22

## 2018-08-25 NOTE — PROGRESS NOTE ADULT - PROBLEM SELECTOR PLAN 1
creatinine 3.59 today as viewed in [facility] clinical viewer  renal function stable.  Continue diuretics as ordered, continue calcitriol for bone/mineral metabolism management  dose meds for eGFR ~15-20  maintain fox for chronic urinary retention

## 2018-08-26 LAB
ANION GAP SERPL CALC-SCNC: 15 MMOL/L — SIGNIFICANT CHANGE UP (ref 5–17)
APTT BLD: 33.6 SEC — SIGNIFICANT CHANGE UP (ref 27.5–37.4)
BUN SERPL-MCNC: 86 MG/DL — HIGH (ref 7–23)
CALCIUM SERPL-MCNC: 9.1 MG/DL — SIGNIFICANT CHANGE UP (ref 8.4–10.5)
CHLORIDE SERPL-SCNC: 100 MMOL/L — SIGNIFICANT CHANGE UP (ref 96–108)
CO2 SERPL-SCNC: 27 MMOL/L — SIGNIFICANT CHANGE UP (ref 22–31)
CREAT SERPL-MCNC: 3.48 MG/DL — HIGH (ref 0.5–1.3)
GLUCOSE SERPL-MCNC: 92 MG/DL — SIGNIFICANT CHANGE UP (ref 70–99)
HCT VFR BLD CALC: 31.4 % — LOW (ref 34.5–45)
HGB BLD-MCNC: 9.4 G/DL — LOW (ref 11.5–15.5)
INR BLD: 1.96 RATIO — HIGH (ref 0.88–1.16)
MCHC RBC-ENTMCNC: 27.2 PG — SIGNIFICANT CHANGE UP (ref 27–34)
MCHC RBC-ENTMCNC: 29.9 GM/DL — LOW (ref 32–36)
MCV RBC AUTO: 90.8 FL — SIGNIFICANT CHANGE UP (ref 80–100)
PLATELET # BLD AUTO: 142 K/UL — LOW (ref 150–400)
POTASSIUM SERPL-MCNC: 3.6 MMOL/L — SIGNIFICANT CHANGE UP (ref 3.5–5.3)
POTASSIUM SERPL-SCNC: 3.6 MMOL/L — SIGNIFICANT CHANGE UP (ref 3.5–5.3)
PROTHROM AB SERPL-ACNC: 22.5 SEC — HIGH (ref 10–13.1)
RBC # BLD: 3.46 M/UL — LOW (ref 3.8–5.2)
RBC # FLD: 19.2 % — HIGH (ref 10.3–14.5)
SODIUM SERPL-SCNC: 142 MMOL/L — SIGNIFICANT CHANGE UP (ref 135–145)
WBC # BLD: 6.22 K/UL — SIGNIFICANT CHANGE UP (ref 3.8–10.5)
WBC # FLD AUTO: 6.22 K/UL — SIGNIFICANT CHANGE UP (ref 3.8–10.5)

## 2018-08-26 RX ADMIN — Medication 80 MILLIGRAM(S): at 17:06

## 2018-08-26 RX ADMIN — Medication 500 MILLIGRAM(S): at 13:00

## 2018-08-26 RX ADMIN — CARVEDILOL PHOSPHATE 25 MILLIGRAM(S): 80 CAPSULE, EXTENDED RELEASE ORAL at 05:47

## 2018-08-26 RX ADMIN — CALCITRIOL 0.25 MICROGRAM(S): 0.5 CAPSULE ORAL at 13:00

## 2018-08-26 RX ADMIN — Medication 100 MILLIGRAM(S): at 13:00

## 2018-08-26 RX ADMIN — SIMETHICONE 80 MILLIGRAM(S): 80 TABLET, CHEWABLE ORAL at 08:52

## 2018-08-26 RX ADMIN — Medication 1 TABLET(S): at 13:00

## 2018-08-26 RX ADMIN — Medication 10 MILLIGRAM(S): at 08:52

## 2018-08-26 RX ADMIN — AMLODIPINE BESYLATE 10 MILLIGRAM(S): 2.5 TABLET ORAL at 05:47

## 2018-08-26 RX ADMIN — CARVEDILOL PHOSPHATE 25 MILLIGRAM(S): 80 CAPSULE, EXTENDED RELEASE ORAL at 17:06

## 2018-08-26 RX ADMIN — ZINC SULFATE TAB 220 MG (50 MG ZINC EQUIVALENT) 220 MILLIGRAM(S): 220 (50 ZN) TAB at 13:00

## 2018-08-26 RX ADMIN — PANTOPRAZOLE SODIUM 40 MILLIGRAM(S): 20 TABLET, DELAYED RELEASE ORAL at 05:48

## 2018-08-26 RX ADMIN — Medication 80 MILLIGRAM(S): at 05:47

## 2018-08-26 NOTE — PROGRESS NOTE ADULT - PROBLEM SELECTOR PLAN 1
renal function stable.  Continue diuretics as ordered, continue calcitriol for bone/mineral metabolism management  dose meds for eGFR ~15-20  maintain fox for chronic urinary retention  check uric acid in AM re: possible gout flare

## 2018-08-27 VITALS
OXYGEN SATURATION: 97 % | DIASTOLIC BLOOD PRESSURE: 76 MMHG | TEMPERATURE: 98 F | SYSTOLIC BLOOD PRESSURE: 144 MMHG | RESPIRATION RATE: 18 BRPM | HEART RATE: 62 BPM

## 2018-08-27 LAB
ALBUMIN SERPL ELPH-MCNC: 3.4 G/DL — SIGNIFICANT CHANGE UP (ref 3.3–5)
ALP SERPL-CCNC: 57 U/L — SIGNIFICANT CHANGE UP (ref 40–120)
ALT FLD-CCNC: 11 U/L — SIGNIFICANT CHANGE UP (ref 10–45)
ANION GAP SERPL CALC-SCNC: 14 MMOL/L — SIGNIFICANT CHANGE UP (ref 5–17)
APTT BLD: 32.9 SEC — SIGNIFICANT CHANGE UP (ref 27.5–37.4)
AST SERPL-CCNC: 17 U/L — SIGNIFICANT CHANGE UP (ref 10–40)
BILIRUB SERPL-MCNC: 0.4 MG/DL — SIGNIFICANT CHANGE UP (ref 0.2–1.2)
BUN SERPL-MCNC: 86 MG/DL — HIGH (ref 7–23)
CALCIUM SERPL-MCNC: 8.9 MG/DL — SIGNIFICANT CHANGE UP (ref 8.4–10.5)
CHLORIDE SERPL-SCNC: 102 MMOL/L — SIGNIFICANT CHANGE UP (ref 96–108)
CO2 SERPL-SCNC: 29 MMOL/L — SIGNIFICANT CHANGE UP (ref 22–31)
CREAT SERPL-MCNC: 3.66 MG/DL — HIGH (ref 0.5–1.3)
GLUCOSE SERPL-MCNC: 87 MG/DL — SIGNIFICANT CHANGE UP (ref 70–99)
HCT VFR BLD CALC: 31.9 % — LOW (ref 34.5–45)
HGB BLD-MCNC: 9.6 G/DL — LOW (ref 11.5–15.5)
INR BLD: 1.66 RATIO — HIGH (ref 0.88–1.16)
MAGNESIUM SERPL-MCNC: 2 MG/DL — SIGNIFICANT CHANGE UP (ref 1.6–2.6)
MCHC RBC-ENTMCNC: 27.7 PG — SIGNIFICANT CHANGE UP (ref 27–34)
MCHC RBC-ENTMCNC: 30.1 GM/DL — LOW (ref 32–36)
MCV RBC AUTO: 92.2 FL — SIGNIFICANT CHANGE UP (ref 80–100)
PLATELET # BLD AUTO: 158 K/UL — SIGNIFICANT CHANGE UP (ref 150–400)
POTASSIUM SERPL-MCNC: 3.9 MMOL/L — SIGNIFICANT CHANGE UP (ref 3.5–5.3)
POTASSIUM SERPL-SCNC: 3.9 MMOL/L — SIGNIFICANT CHANGE UP (ref 3.5–5.3)
PROT SERPL-MCNC: 6.3 G/DL — SIGNIFICANT CHANGE UP (ref 6–8.3)
PROTHROM AB SERPL-ACNC: 18.9 SEC — HIGH (ref 10–13.1)
RBC # BLD: 3.46 M/UL — LOW (ref 3.8–5.2)
RBC # FLD: 19.3 % — HIGH (ref 10.3–14.5)
SODIUM SERPL-SCNC: 145 MMOL/L — SIGNIFICANT CHANGE UP (ref 135–145)
URATE SERPL-MCNC: 9.2 MG/DL — HIGH (ref 2.5–7)
URATE SERPL-MCNC: 9.5 MG/DL — HIGH (ref 2.5–7)
WBC # BLD: 6.47 K/UL — SIGNIFICANT CHANGE UP (ref 3.8–10.5)
WBC # FLD AUTO: 6.47 K/UL — SIGNIFICANT CHANGE UP (ref 3.8–10.5)

## 2018-08-27 PROCEDURE — 97530 THERAPEUTIC ACTIVITIES: CPT

## 2018-08-27 PROCEDURE — 83735 ASSAY OF MAGNESIUM: CPT

## 2018-08-27 PROCEDURE — 87086 URINE CULTURE/COLONY COUNT: CPT

## 2018-08-27 PROCEDURE — A9560: CPT

## 2018-08-27 PROCEDURE — 83550 IRON BINDING TEST: CPT

## 2018-08-27 PROCEDURE — 97162 PT EVAL MOD COMPLEX 30 MIN: CPT

## 2018-08-27 PROCEDURE — 51702 INSERT TEMP BLADDER CATH: CPT

## 2018-08-27 PROCEDURE — 78278 ACUTE GI BLOOD LOSS IMAGING: CPT

## 2018-08-27 PROCEDURE — 76856 US EXAM PELVIC COMPLETE: CPT

## 2018-08-27 PROCEDURE — 84550 ASSAY OF BLOOD/URIC ACID: CPT

## 2018-08-27 PROCEDURE — 82728 ASSAY OF FERRITIN: CPT

## 2018-08-27 PROCEDURE — 85027 COMPLETE CBC AUTOMATED: CPT

## 2018-08-27 PROCEDURE — 85610 PROTHROMBIN TIME: CPT

## 2018-08-27 PROCEDURE — 80053 COMPREHEN METABOLIC PANEL: CPT

## 2018-08-27 PROCEDURE — 99285 EMERGENCY DEPT VISIT HI MDM: CPT | Mod: 25

## 2018-08-27 PROCEDURE — 85730 THROMBOPLASTIN TIME PARTIAL: CPT

## 2018-08-27 PROCEDURE — 80048 BASIC METABOLIC PNL TOTAL CA: CPT

## 2018-08-27 PROCEDURE — 82272 OCCULT BLD FECES 1-3 TESTS: CPT

## 2018-08-27 PROCEDURE — 97110 THERAPEUTIC EXERCISES: CPT

## 2018-08-27 PROCEDURE — 81001 URINALYSIS AUTO W/SCOPE: CPT

## 2018-08-27 PROCEDURE — 93970 EXTREMITY STUDY: CPT

## 2018-08-27 RX ORDER — CARVEDILOL PHOSPHATE 80 MG/1
1 CAPSULE, EXTENDED RELEASE ORAL
Qty: 0 | Refills: 0 | COMMUNITY
Start: 2018-08-27

## 2018-08-27 RX ORDER — SODIUM CHLORIDE 0.65 %
2 AEROSOL, SPRAY (ML) NASAL
Qty: 0 | Refills: 0 | COMMUNITY

## 2018-08-27 RX ORDER — SIMETHICONE 80 MG/1
1 TABLET, CHEWABLE ORAL
Qty: 0 | Refills: 0 | COMMUNITY
Start: 2018-08-27

## 2018-08-27 RX ORDER — ACETAMINOPHEN 500 MG
2 TABLET ORAL
Qty: 0 | Refills: 0 | COMMUNITY

## 2018-08-27 RX ORDER — FUROSEMIDE 40 MG
2 TABLET ORAL
Qty: 60 | Refills: 0 | OUTPATIENT
Start: 2018-08-27 | End: 2018-09-25

## 2018-08-27 RX ORDER — PANTOPRAZOLE SODIUM 20 MG/1
1 TABLET, DELAYED RELEASE ORAL
Qty: 30 | Refills: 0 | OUTPATIENT
Start: 2018-08-27 | End: 2018-09-25

## 2018-08-27 RX ORDER — CALCITRIOL 0.5 UG/1
1 CAPSULE ORAL
Qty: 30 | Refills: 0 | OUTPATIENT
Start: 2018-08-27

## 2018-08-27 RX ORDER — ALLOPURINOL 300 MG
1 TABLET ORAL
Qty: 30 | Refills: 0 | OUTPATIENT
Start: 2018-08-27

## 2018-08-27 RX ORDER — COLCHICINE 0.6 MG
0.6 TABLET ORAL
Qty: 0 | Refills: 0 | Status: DISCONTINUED | OUTPATIENT
Start: 2018-08-27 | End: 2018-08-27

## 2018-08-27 RX ORDER — ZINC SULFATE TAB 220 MG (50 MG ZINC EQUIVALENT) 220 (50 ZN) MG
1 TAB ORAL
Qty: 0 | Refills: 0 | COMMUNITY
Start: 2018-08-27

## 2018-08-27 RX ORDER — ALLOPURINOL 300 MG
1 TABLET ORAL
Qty: 0 | Refills: 0 | COMMUNITY

## 2018-08-27 RX ADMIN — PANTOPRAZOLE SODIUM 40 MILLIGRAM(S): 20 TABLET, DELAYED RELEASE ORAL at 05:15

## 2018-08-27 RX ADMIN — Medication 80 MILLIGRAM(S): at 05:15

## 2018-08-27 RX ADMIN — Medication 1 TABLET(S): at 11:55

## 2018-08-27 RX ADMIN — Medication 100 MILLIGRAM(S): at 11:55

## 2018-08-27 RX ADMIN — Medication 500 MILLIGRAM(S): at 11:55

## 2018-08-27 RX ADMIN — CARVEDILOL PHOSPHATE 25 MILLIGRAM(S): 80 CAPSULE, EXTENDED RELEASE ORAL at 05:15

## 2018-08-27 RX ADMIN — AMLODIPINE BESYLATE 10 MILLIGRAM(S): 2.5 TABLET ORAL at 05:15

## 2018-08-27 RX ADMIN — CALCITRIOL 0.25 MICROGRAM(S): 0.5 CAPSULE ORAL at 11:55

## 2018-08-27 RX ADMIN — Medication 0.6 MILLIGRAM(S): at 18:05

## 2018-08-27 RX ADMIN — CARVEDILOL PHOSPHATE 25 MILLIGRAM(S): 80 CAPSULE, EXTENDED RELEASE ORAL at 17:54

## 2018-08-27 RX ADMIN — ZINC SULFATE TAB 220 MG (50 MG ZINC EQUIVALENT) 220 MILLIGRAM(S): 220 (50 ZN) TAB at 11:57

## 2018-08-27 RX ADMIN — Medication 10 MILLIGRAM(S): at 10:21

## 2018-08-27 RX ADMIN — Medication 80 MILLIGRAM(S): at 17:55

## 2018-08-27 NOTE — PROGRESS NOTE ADULT - ASSESSMENT
59 yo female with extensive medical and cardiac history on coumadin admitted with rectal bleed
58 F PMH SLE, systolic HFrEF(EF 32%) s/p AICD, HTN, CKD III- IV, PE / left leg DVT dx 2009, HIT, CAD (no stents), stage IV sacral ulcer, chronic Hook, recurrent pericardial effusion, AAA ~14cm in 12/2016 s/p repair in 2010 w/ aortoiliac stent complicated post-operatively with development of left lower extremity compartment syndrome, with recent cellulitis treated inpt readmitted recently for pericardial effusion s/p pericardial drain. now admitted with lower GI bleed vs. vaginal bleed
57 yo F PMHx PE on Coumadin (3 mg daily), iliac aneurysm repair, CHF, chronic gout, sacral ulcer, chronic fox, p/w rectal bleeding. Pt was in bed and rolled over and noticed the sheets were saturated in blood. She denies N/V, abd pain, fevers/chills, lightheadedness/dizziness.    Problem/Plan - 1:  ·  Problem: Lower GI bleed.  Plan:   GI f/up noted.  No plan for colonoscopy.      Problem/Plan - 3:  ·  Problem: Pulmonary embolism.  Plan: V Doppler: Chronic DVT.Hold coumadin. Pt doesn't want to continue with coumadin. R/B/A dw pt and pt verbalizes understanding. DC coumadin.    DC planning.
57 yo F PMHx PE on Coumadin (3 mg daily), iliac aneurysm repair, CHF, chronic gout, sacral ulcer, chronic fox, p/w rectal bleeding. Pt was in bed and rolled over and noticed the sheets were saturated in blood. She denies N/V, abd pain, fevers/chills, lightheadedness/dizziness.    Problem/Plan - 1:  ·  Problem: Lower GI bleed.  Plan: monitor cbc  GI f/up noted.  No plan for colonoscopy.      Problem/Plan - 3:  ·  Problem: Pulmonary embolism.  Plan: Coumadin as per INR. V Doppler: Chronic DVT.
57 yo F PMHx PE on Coumadin (3 mg daily), iliac aneurysm repair, CHF, chronic gout, sacral ulcer, chronic fox, p/w rectal bleeding. Pt was in bed and rolled over and noticed the sheets were saturated in blood. She denies N/V, abd pain, fevers/chills, lightheadedness/dizziness.    Problem/Plan - 1:  ·  Problem: Lower GI bleed.  Plan: monitor cbc  GI f/up noted.  No plan for colonoscopy.    Problem/Plan - 2:  ·  Problem: Sacral ulcer.  Plan: wound care consult      Problem/Plan - 3:  ·  Problem: Pulmonary embolism.  Plan: Coumadin as per INR.
57 yo F PMHx PE on Coumadin (3 mg daily), iliac aneurysm repair, CHF, chronic gout, sacral ulcer, chronic fox, p/w rectal bleeding. Pt was in bed and rolled over and noticed the sheets were saturated in blood. She denies N/V, abd pain, fevers/chills, lightheadedness/dizziness.    Problem/Plan - 1:  ·  Problem: Lower GI bleed.  Plan: monitor cbc  unclear if rectal beed vs bleed from the decub  GI and colorectal surgery consults appreciated    Problem/Plan - 2:  ·  Problem: Sacral ulcer.  Plan: wound care consult  will monitor  ID consult to access if infected.     Problem/Plan - 3:  ·  Problem: Pulmonary embolism.  Plan: check INR and dose coumadin daily.
57 yo female with extensive medical and cardiac history on coumadin admitted with rectal bleed
58 F PMH SLE, systolic HFrEF(EF 32%) s/p AICD, HTN, CKD III- IV, PE / left leg DVT dx 2009, HIT, CAD (no stents), stage IV sacral ulcer, chronic Fox, recurrent pericardial effusion, AAA ~14cm in 12/2016 s/p repair in 2010 w/ aortoiliac stent complicated post-operatively with development of left lower extremity compartment syndrome, with recent cellulitis treated inpt readmitted recently for pericardial effusion s/p pericardial drain. now admitted with lower GI bleed vs. vaginal bleed   1- CKD IV  2- pericardial effusion s/p drain in july 2018   3- chf  4- HTN   5- neurogenic bladder  6- lupus   7- anemia  8- shpt  9- hyperuricemia   10- thoracic aneurysm  11- lower gi bleed      cr seems to be stabilizing at this high level   to have pth level  cont with b-b and norvasc    cont fox for hx of neurogenic bladder  cont calcitriol 0.25mcg daily    cont with allopurinol  lasix at 80 mg bid    cont procrit [id number] units sq tiw recheck iron profile
58 F PMH SLE, systolic HFrEF(EF 32%) s/p AICD, HTN, CKD III- IV, PE / left leg DVT dx 2009, HIT, CAD (no stents), stage IV sacral ulcer, chronic Fox, recurrent pericardial effusion, AAA ~14cm in 12/2016 s/p repair in 2010 w/ aortoiliac stent complicated post-operatively with development of left lower extremity compartment syndrome, with recent cellulitis treated inpt readmitted recently for pericardial effusion s/p pericardial drain. now admitted with lower GI bleed vs. vaginal bleed   1- CKD IV  2- pericardial effusion s/p drain in july 2018   3- chf  4- HTN   5- neurogenic bladder  6- lupus   7- anemia  8- shpt  9- hyperuricemia   10- thoracic aneurysm  11- lower gi bleed      cr slightly higher   cont with b-b and norvasc    cont fox for hx of neurogenic bladder  cont calcitriol 0.25mcg daily    cont with allopurinol  lasix at 80 mg bid may need to decreaes if cr worsening   serial h and h  cont procrit [id number] units sq tiw
58 F PMH SLE, systolic HFrEF(EF 32%) s/p AICD, HTN, CKD III- IV, PE / left leg DVT dx 2009, HIT, CAD (no stents), stage IV sacral ulcer, chronic Fox, recurrent pericardial effusion, AAA ~14cm in 12/2016 s/p repair in 2010 w/ aortoiliac stent complicated post-operatively with development of left lower extremity compartment syndrome, with recent cellulitis treated inpt readmitted recently for pericardial effusion s/p pericardial drain. now admitted with lower GI bleed vs. vaginal bleed   1- CKD IV  2- pericardial effusion s/p drain in july 2018   3- chf  4- HTN   5- neurogenic bladder  6- lupus   7- anemia  8- shpt  9- hyperuricemia   10- thoracic aneurysm  11- lower gi bleed      cr slightly higher again increase po fluid intake if cr worsens further then will decrease lasix dose   cont with b-b and norvasc    cont fox for hx of neurogenic bladder  cont calcitriol 0.25mcg daily    cont with allopurinol  lasix at 80 mg bid today   cont procrit 39773 units sq tiw
58 F PMH SLE, systolic HFrEF(EF 32%) s/p AICD, HTN, CKD III- IV, PE / left leg DVT dx 2009, HIT, CAD (no stents), stage IV sacral ulcer, chronic Hook, recurrent pericardial effusion, AAA ~14cm in 12/2016 s/p repair in 2010 w/ aortoiliac stent complicated post-operatively with development of left lower extremity compartment syndrome, with recent cellulitis treated inpt readmitted recently for pericardial effusion s/p pericardial drain. now admitted with lower GI bleed vs. vaginal bleed
58y female with PMH of PE on Coumadin, Iliac Aneurysm repair, CHF, chronic Gout, sacral ulcer, chronic fox, p/w rectal bleeding.
59 yo F PMHx PE on Coumadin (3 mg daily), iliac aneurysm repair, CHF, chronic gout, sacral ulcer, chronic fox, p/w rectal bleeding. Pt was in bed and rolled over and noticed the sheets were saturated in blood. She denies N/V, abd pain, fevers/chills, lightheadedness/dizziness.    Problem/Plan - 1:  ·  Problem: Lower GI bleed.  Plan: monitor cbc  GI f/up noted.  No plan for colonoscopy.      Problem/Plan - 3:  ·  Problem: Pulmonary embolism.  Plan: Coumadin as per INR. V Doppler.
59 yo F PMHx PE on Coumadin (3 mg daily), iliac aneurysm repair, CHF, chronic gout, sacral ulcer, chronic fox, p/w rectal bleeding. Pt was in bed and rolled over and noticed the sheets were saturated in blood. She denies N/V, abd pain, fevers/chills, lightheadedness/dizziness.    Problem/Plan - 1:  ·  Problem: Lower GI bleed.  Plan: monitor cbc  unclear if rectal beed vs bleed from the decub  GI and colorectal surgery consults appreciated  Monitor CBC    Problem/Plan - 2:  ·  Problem: Sacral ulcer.  Plan: wound care consult  will monitor  ID f/up    Problem/Plan - 3:  ·  Problem: Pulmonary embolism.  Plan: check INR and dose coumadin daily.
59 yo F PMHx PE on Coumadin (3 mg daily), iliac aneurysm repair, CHF, chronic gout, sacral ulcer, chronic fox, p/w rectal bleeding. Pt was in bed and rolled over and noticed the sheets were saturated in blood. She denies N/V, abd pain, fevers/chills, lightheadedness/dizziness.    Problem/Plan - 1:  ·  Problem: Lower GI bleed.  Plan: monitor cbc  unclear if rectal beed vs bleed from the decub  GI and colorectal surgery consults appreciated  Monitor CBC    Problem/Plan - 2:  ·  Problem: Sacral ulcer.  Plan: wound care consult  will monitor  ID f/up    Problem/Plan - 3:  ·  Problem: Pulmonary embolism.  Plan: check INR and dose coumadin daily.
59 yo female with extensive medical and cardiac history on coumadin admitted with rectal bleed
59 yo female with extensive medical and cardiac history on coumadin admitted with rectal bleed
A/P: 58F with BRBPR on coumadin for PE (2009). No complaints of bleeding this morning.    -Bleeding likely due to sacral decubitus ulcer as no hemorrhoids or fissures were visualized on exam.  -Discussed with Colorectal Surgery Attending  -No surgical intervention required.   -Please notify sx team if any changes in patient's status    Galdino Poole MD   PGY-1   Red Team Surgery [id number]
58y female with PMH of PE on Coumadin, Iliac Aneurysm repair, CHF, chronic Gout, sacral ulcer, chronic fox, p/w rectal bleeding.
57 yo female with extensive medical and cardiac history on coumadin admitted with rectal bleed
58y female with PMH of PE on Coumadin, Iliac Aneurysm repair, CHF, chronic Gout, sacral ulcer, chronic fox, p/w rectal bleeding.

## 2018-08-27 NOTE — PROGRESS NOTE ADULT - PROBLEM SELECTOR PLAN 4
clinically stable   No clinical signs of tamponade at present time.

## 2018-08-27 NOTE — PROGRESS NOTE ADULT - PROBLEM SELECTOR PROBLEM 2
DVT, lower extremity

## 2018-08-27 NOTE — PROGRESS NOTE ADULT - PROBLEM SELECTOR PLAN 3
s/p ICD   Appears compensated at present time.
s/p ICD   Appears compensated at present time.
Advanced care planning was discussed with patient and family.  Advanced care planning forms were reviewed and discussed.  Risks, benefits and alternatives of gastroenterologic procedures were discussed in detail and all questions were answered.    30 minutes spent.
s/p ICD   Appears compensated at present time.

## 2018-08-27 NOTE — PROGRESS NOTE ADULT - PROBLEM SELECTOR PROBLEM 3
Cardiomyopathy
ACP (advance care planning)
Cardiomyopathy

## 2018-08-27 NOTE — CHART NOTE - NSCHARTNOTEFT_GEN_A_CORE
Patient noted to complain of gout pain and uric acid level elevated - discussed with renal, Dr. Bennett - patient started on Colcrys 0.6mg po every other day x 3 doses and, on the day of the third dose, allopurinol to be increased to 200mg po daily.  Dr. Bennett also ordering patient to return to home dose of lasix 80mg po daily.  Patient instructed to follow up with Dr. Bennett outpatient.  Request from Dr. Patino to facilitate patient discharge.  Patient states she is leaking urine despite catheter insertion.  Discussed with Dr. Goldberg, who states that patient can follow up in office within one week.  Bladder scan performed and 499cc noted.  Fox re-inserted by RN.  Fox draining clear yellow urine - repeat bladder scan noted to be 419cc.  Patient denies urine leaking around catheter after fox re-insertion.  Discussed the above findings with Dr. Patino who states that patient is medically cleared for discharge now with Urology, Renal and PMD followup as outpatient.  Medication reconciliation reviewed, revised, and resolved with Dr. Patino.  Please refer to discharge noted for detailed hospital course.

## 2018-08-27 NOTE — PROGRESS NOTE ADULT - SUBJECTIVE AND OBJECTIVE BOX
[facility]   [phone]  Dr. Queen (covering for Dr. Bennett)  RENAL FOLLOW UP NOTE  --------------------------------------------------------------------------------  Patient seen and examined.  Denies chest pain / palpitations / SOB.  Reports right knee pain    PAST HISTORY  --------------------------------------------------------------------------------  No significant changes to PMH, PSH, FHx, SHx, unless otherwise noted    ALLERGIES & MEDICATIONS  --------------------------------------------------------------------------------  Allergies    heparin (Unknown)  losartan (Anaphylaxis)  nitroglycerin (Other)    Intolerances      Standing Inpatient Medications  allopurinol 100 milliGRAM(s) Oral daily  amLODIPine   Tablet 10 milliGRAM(s) Oral daily  ascorbic acid 500 milliGRAM(s) Oral daily  calcitriol   Capsule 0.25 MICROGram(s) Oral daily  carvedilol 25 milliGRAM(s) Oral every 12 hours  furosemide    Tablet 80 milliGRAM(s) Oral two times a day  Nephro-alyse 1 Tablet(s) Oral daily  pantoprazole    Tablet 40 milliGRAM(s) Oral before breakfast  predniSONE   Tablet 10 milliGRAM(s) Oral daily  simethicone 80 milliGRAM(s) Chew every 4 hours  zinc sulfate 220 milliGRAM(s) Oral daily    PRN Inpatient Medications  ondansetron Injectable 4 milliGRAM(s) IV Push every 6 hours PRN  sodium chloride 0.65% Nasal 1 Spray(s) Both Nostrils three times a day PRN      FOCUSED REVIEW OF SYSTEMS  --------------------------------------------------------------------------------  denies fevers/rigors  denies CP/palpitations  denies SOB/cough  denies N/V/abd pain  +R knee pain  fox in place draining clear urine      VITALS/PHYSICAL EXAM  --------------------------------------------------------------------------------  T(C): 36.8 (08-26-18 @ 05:14), Max: 36.9 (08-25-18 @ 20:54)  HR: 90 (08-26-18 @ 05:14) (77 - 90)  BP: 168/72 (08-26-18 @ 05:14) (148/73 - 168/72)  RR: 18 (08-26-18 @ 05:14) (18 - 18)  SpO2: 96% (08-26-18 @ 05:14) (96% - 96%)  Wt(kg): --        08-25-18 @ 07:01  -  08-26-18 @ 07:00  --------------------------------------------------------  IN: 1140 mL / OUT: 3100 mL / NET: -1960 mL    08-26-18 @ 07:01  -  08-26-18 @ 12:39  --------------------------------------------------------  IN: 120 mL / OUT: 0 mL / NET: 120 mL      Physical Exam:  	Gen: NAD, lying in bed  	Pulm: CTA B/L  	CV: RRR, S1S2  	Abd: +BS, soft, nontender/nondistended  	: No suprapubic tenderness.  +  fox          Extremity: No cyanosis, LLE edema>R.  Right knee TTP.  No erythema.  some swelling  	Neuro: A&O x 3      LABS/STUDIES  --------------------------------------------------------------------------------              9.4    6.22  >-----------<  142      [08-26-18 @ 08:10]              31.4     142  |  100  |  86  ----------------------------<  92      [08-26-18 @ 06:25]  3.6   |  27  |  3.48        Ca     9.1     [08-26-18 @ 06:25]      PT/INR: PT 22.5 , INR 1.96       [08-26-18 @ 08:10]  PTT: 33.6       [08-26-18 @ 08:10]      eGFR if Non African American: 14 mL/min/1.73M2 (08-26 @ 06:25)  eGFR if African American: 16 mL/min/1.73M2 (08-26 @ 06:25)    Creatinine Trend:  SCr 3.48 [08-26 @ 06:25]  SCr 3.59 [08-25 @ 06:57]  SCr 3.64 [08-24 @ 06:47]  SCr 3.71 [08-23 @ 06:57]  SCr 3.75 [08-22 @ 07:08]              Urinalysis - [08-18-18 @ 05:33]      Color PL Yellow / Appearance Clear / SG 1.011 / pH 6.0      Gluc Negative / Ketone Negative  / Bili Negative / Urobili Negative       Blood Moderate / Protein 30 / Leuk Est Large / Nitrite Negative      RBC 10-25 / WBC 11-25 / Hyaline  / Gran  / Sq Epi  / Non Sq Epi  / Bacteria Many      Iron 55, TIBC 212, %sat 26      [08-24-18 @ 08:10]  Ferritin 618      [08-24-18 @ 08:01]  PTH -- (Ca 8.9)      [04-20-18 @ 08:14]   217  PTH -- (Ca 9.0)      [11-09-17 @ 08:51]   387  Vitamin D (25OH) 7.8      [11-09-17 @ 08:51]  HbA1c 4.5      [08-09-16 @ 06:49]  TSH 0.47      [04-24-18 @ 07:51]
GASTROENTEROLOGY    Patient seen and examined at bedside  awake and alert  Just had a large brown bowel movement  no brbpr per RN report      MEDICATIONS  (STANDING):  allopurinol 100 milliGRAM(s) Oral daily  amLODIPine   Tablet 10 milliGRAM(s) Oral daily  carvedilol 25 milliGRAM(s) Oral every 12 hours  furosemide    Tablet 80 milliGRAM(s) Oral two times a day  pantoprazole    Tablet 40 milliGRAM(s) Oral before breakfast  predniSONE   Tablet 10 milliGRAM(s) Oral daily        T(F): 98.1 (08-19-18 @ 05:00), Max: 98.4 (08-18-18 @ 16:25)  HR: 79 (08-19-18 @ 05:00) (79 - 83)  BP: 132/75 (08-19-18 @ 05:00) (132/75 - 170/85)  RR: 18 (08-19-18 @ 05:00) (18 - 18)  SpO2: 98% (08-19-18 @ 05:00) (93% - 98%)  Wt(kg): --    PHYSICAL EXAM  GENERAL:   NAD  HEENT:  NC/AT,   no JVD, sclera-anicteric  CHEST:  clear to ascultation bilaterally, respirations nonlabored  HEART:  +S1+S2 regular rate and rhythm   ABDOMEN:  Soft, non-tender, non-distended, normoactive bowel sounds, no masses  EXTREMITIES:   no cyanosis, clubbing or edema  NEURO:  Alert, oriented, no asterixis, no tremor, no encephalopathy  SKIN:  No rash/warm/dry      LABS:                        9.4<L>  5.35  )-----------( 168      ( 19 Aug 2018 09:28 )             30.8<L>  19 Aug 2018 09:28    08-19    141  |  103  |  89<H>  ----------------------------<  95  4.2   |  24  |  3.35<H>    Ca    9.2      19 Aug 2018 07:13    TPro  6.4  /  Alb  3.7  /  TBili  0.4  /  DBili  x   /  AST  18  /  ALT  8<L>  /  AlkPhos  54  08-19    LIVER FUNCTIONS - ( 19 Aug 2018 07:13 )  Alb: 3.7 g/dL / Pro: 6.4 g/dL / ALK PHOS: 54 U/L / ALT: 8 U/L / AST: 18 U/L / GGT: x           PT/INR - ( 19 Aug 2018 09:21 )   PT: 27.4 sec;   INR: 2.38 ratio         PTT - ( 18 Aug 2018 05:59 )  PTT:36.7 sec    < from: NM GI Bleed Localization (08.18.18 @ 19:56) >  EXAM:  NM GI BLEEDING IMG                          PROCEDURE DATE:  08/18/2018      INTERPRETATION:  RADIOPHARMACEUTICAL: 21.3 mCi Tc-99m labeled autologous   red blood cells, I.V.    CLINICAL STATEMENT: 58-year-old female with abdominal pain and GI   bleeding referred for localization of bleeding site.    TECHNIQUE:  Dynamic images of the anterior abdomen/pelvis were obtained   for 2 hours following administration of radiopharmaceutical. Static image   of the abdomen/pelvis in the anterior view was obtained immediately   thereafter.    FINDINGS: There is physiologic distribution of radiolabeled red cells in   the abdomen and pelvis. There is no abnormal focus of increased activity   to suggest the presence of active bleeding.    IMPRESSION: Normal GI bleeding scan.    No evidence of active bleeding site.    < end of copied text >          I&O's Detail    18 Aug 2018 07:01  -  19 Aug 2018 07:00  --------------------------------------------------------  IN:    Oral Fluid: 50 mL  Total IN: 50 mL    OUT:    Indwelling Catheter - Urethral: 2600 mL  Total OUT: 2600 mL    Total NET: -2550 mL        Culture - Urine (collected 18 Aug 2018 07:09)  Source: .Urine Catheterized  Final Report (19 Aug 2018 11:15):    Culture grew 3 or more types of organisms which indicate    collection contamination; consider recollection only if clinically    indicated.
[facility]   [phone]  Dr. Queen (covering for Dr. Bennett)  RENAL FOLLOW UP NOTE  --------------------------------------------------------------------------------  Patient seen and examined.  Denies chest pain / palpitations / SOB.  Denies bleeding    PAST HISTORY  --------------------------------------------------------------------------------  No significant changes to PMH, PSH, FHx, SHx, unless otherwise noted    ALLERGIES & MEDICATIONS  --------------------------------------------------------------------------------  Allergies    heparin (Unknown)  losartan (Anaphylaxis)  nitroglycerin (Other)    Intolerances      Standing Inpatient Medications  allopurinol 100 milliGRAM(s) Oral daily  amLODIPine   Tablet 10 milliGRAM(s) Oral daily  ascorbic acid 500 milliGRAM(s) Oral daily  calcitriol   Capsule 0.25 MICROGram(s) Oral daily  carvedilol 25 milliGRAM(s) Oral every 12 hours  furosemide    Tablet 80 milliGRAM(s) Oral two times a day  Nephro-alyse 1 Tablet(s) Oral daily  pantoprazole    Tablet 40 milliGRAM(s) Oral before breakfast  predniSONE   Tablet 10 milliGRAM(s) Oral daily  simethicone 80 milliGRAM(s) Chew every 4 hours  zinc sulfate 220 milliGRAM(s) Oral daily    PRN Inpatient Medications  ondansetron Injectable 4 milliGRAM(s) IV Push every 6 hours PRN  sodium chloride 0.65% Nasal 1 Spray(s) Both Nostrils three times a day PRN      FOCUSED REVIEW OF SYSTEMS  --------------------------------------------------------------------------------  denies fevers/rigors  denies CP/palpitations  denies SOB/cough  denies dysuria.  ruddy in place      VITALS/PHYSICAL EXAM  --------------------------------------------------------------------------------  T(C): 36.6 (08-25-18 @ 12:21), Max: 36.9 (08-24-18 @ 19:55)  HR: 84 (08-25-18 @ 12:21) (74 - 84)  BP: 146/78 (08-25-18 @ 12:21) (146/78 - 162/88)  RR: 18 (08-25-18 @ 12:21) (17 - 18)  SpO2: 99% (08-25-18 @ 12:21) (98% - 99%)  Wt(kg): --        08-24-18 @ 07:01  -  08-25-18 @ 07:00  --------------------------------------------------------  IN: 0 mL / OUT: 3250 mL / NET: -3250 mL    08-25-18 @ 07:01  -  08-25-18 @ 14:40  --------------------------------------------------------  IN: 900 mL / OUT: 800 mL / NET: 100 mL      Physical Exam:  	Gen: NAD, well-appearing  	Pulm: CTA B/L no w/r/r  	CV: RRR, S1S2  	Abd: +BS, soft, nontender/nondistended  	: No suprapubic tenderness.  + fox          Extremity: No cyanosis, chronic LLE edema > R  	Neuro: A&O x 3      LABS/STUDIES  --------------------------------------------------------------------------------              9.3    5.88  >-----------<  136      [08-25-18 @ 08:02]              30.9     143  |  101  |  85  ----------------------------<  74      [08-24-18 @ 06:47]  3.5   |  26  |  3.64        Ca     8.6     [08-24-18 @ 06:47]      PT/INR: PT 25.1 , INR 2.19       [08-25-18 @ 08:02]  PTT: 36.4       [08-24-18 @ 07:26]        Creatinine Trend:  SCr 3.64 [08-24 @ 06:47]  SCr 3.71 [08-23 @ 06:57]  SCr 3.75 [08-22 @ 07:08]  SCr 3.76 [08-21 @ 06:49]  SCr 3.54 [08-20 @ 06:38]              Urinalysis - [08-18-18 @ 05:33]      Color PL Yellow / Appearance Clear / SG 1.011 / pH 6.0      Gluc Negative / Ketone Negative  / Bili Negative / Urobili Negative       Blood Moderate / Protein 30 / Leuk Est Large / Nitrite Negative      RBC 10-25 / WBC 11-25 / Hyaline  / Gran  / Sq Epi  / Non Sq Epi  / Bacteria Many      Iron 55, TIBC 212, %sat 26      [08-24-18 @ 08:10]  Ferritin 618      [08-24-18 @ 08:01]  PTH -- (Ca 8.9)      [04-20-18 @ 08:14]   217  PTH -- (Ca 9.0)      [11-09-17 @ 08:51]   387  Vitamin D (25OH) 7.8      [11-09-17 @ 08:51]  HbA1c 4.5      [08-09-16 @ 06:49]  TSH 0.47      [04-24-18 @ 07:51]
Interval Events:   no new events       MEDICATIONS  (STANDING):  allopurinol 100 milliGRAM(s) Oral daily  amLODIPine   Tablet 10 milliGRAM(s) Oral daily  ascorbic acid 500 milliGRAM(s) Oral daily  calcitriol   Capsule 0.25 MICROGram(s) Oral daily  carvedilol 25 milliGRAM(s) Oral every 12 hours  furosemide    Tablet 80 milliGRAM(s) Oral two times a day  Nephro-alyse 1 Tablet(s) Oral daily  pantoprazole    Tablet 40 milliGRAM(s) Oral before breakfast  predniSONE   Tablet 10 milliGRAM(s) Oral daily  simethicone 80 milliGRAM(s) Chew every 4 hours  zinc sulfate 220 milliGRAM(s) Oral daily    MEDICATIONS  (PRN):  ondansetron Injectable 4 milliGRAM(s) IV Push every 6 hours PRN Nausea  sodium chloride 0.65% Nasal 1 Spray(s) Both Nostrils three times a day PRN Nasal Congestion      Allergies    heparin (Unknown)  losartan (Anaphylaxis)  nitroglycerin (Other)    Intolerances        Review of Systems:    General:  No wt loss, fevers, chills, night sweats,fatigue,   Eyes:  Good vision, no reported pain  ENT:  No sore throat, pain, runny nose, dysphagia  CV:  No pain, palpitations, hypo/hypertension  Resp:  No dyspnea, cough, tachypnea, wheezing  GI:  No pain, No nausea, No vomiting, No diarrhea, No constipation, No weight loss, No fever, No pruritis, No rectal bleeding, No melena, No dysphagia  :  No pain, bleeding, incontinence, nocturia  Muscle:  No pain, weakness  Neuro:  No weakness, tingling, memory problems  Psych:  No fatigue, insomnia, mood problems, depression  Endocrine:  No polyuria, polydypsia, cold/heat intolerance  Heme:  No petechiae, ecchymosis, easy bruisability  Skin:  No rash, tattoos, scars, edema      Vital Signs Last 24 Hrs  T(C): 36.5 (24 Aug 2018 05:08), Max: 36.8 (23 Aug 2018 20:49)  T(F): 97.7 (24 Aug 2018 05:08), Max: 98.2 (23 Aug 2018 20:49)  HR: 91 (24 Aug 2018 05:08) (75 - 91)  BP: 146/78 (24 Aug 2018 05:08) (143/74 - 155/81)  BP(mean): --  RR: 18 (24 Aug 2018 05:08) (17 - 18)  SpO2: 98% (24 Aug 2018 05:08) (96% - 98%)    PHYSICAL EXAM:    Constitutional: NAD, well-developed  HEENT: EOMI, throat clear  Neck: No LAD, supple  Respiratory: CTA and P  Cardiovascular: S1 and S2, RRR, no M  Gastrointestinal: BS+, soft, NT/ND, neg HSM,  Extremities: No peripheral edema, neg clubing, cyanosis  Vascular: 2+ peripheral pulses  Neurological: A/O x 3, no focal deficits  Psychiatric: Normal mood, normal affect  Skin: No rashes      LABS:                        9.2    6.57  )-----------( 142      ( 24 Aug 2018 07:22 )             30.7     08-24    143  |  101  |  85<H>  ----------------------------<  74  3.5   |  26  |  3.64<H>    Ca    8.6      24 Aug 2018 06:47      PT/INR - ( 24 Aug 2018 07:26 )   PT: 24.2 sec;   INR: 2.11 ratio         PTT - ( 24 Aug 2018 07:26 )  PTT:36.4 sec      RADIOLOGY & ADDITIONAL TESTS:
Interval Events: Pt admits to mild lower abdominal discomfort today. She denies N/V, and is tolerating PO. Pt is having formed bms, no melena/brbpr.       MEDICATIONS  (STANDING):  allopurinol 100 milliGRAM(s) Oral daily  amLODIPine   Tablet 10 milliGRAM(s) Oral daily  ascorbic acid 500 milliGRAM(s) Oral daily  calcitriol   Capsule 0.25 MICROGram(s) Oral daily  carvedilol 25 milliGRAM(s) Oral every 12 hours  furosemide    Tablet 80 milliGRAM(s) Oral two times a day  Nephro-alyse 1 Tablet(s) Oral daily  pantoprazole    Tablet 40 milliGRAM(s) Oral before breakfast  predniSONE   Tablet 10 milliGRAM(s) Oral daily  simethicone 80 milliGRAM(s) Chew every 4 hours  zinc sulfate 220 milliGRAM(s) Oral daily    MEDICATIONS  (PRN):  ondansetron Injectable 4 milliGRAM(s) IV Push every 6 hours PRN Nausea  sodium chloride 0.65% Nasal 1 Spray(s) Both Nostrils three times a day PRN Nasal Congestion      Allergies    heparin (Unknown)  losartan (Anaphylaxis)  nitroglycerin (Other)    Intolerances        Review of Systems:    General:  No wt loss, fevers, chills, night sweats,fatigue,   Eyes:  Good vision, no reported pain  ENT:  No sore throat, pain, runny nose, dysphagia  CV:  No pain, palpitations, hypo/hypertension  Resp:  No dyspnea, cough, tachypnea, wheezing  GI:  No pain, No nausea, No vomiting, No diarrhea, No constipation, No weight loss, No fever, No pruritis, No rectal bleeding, No melena, No dysphagia  :  No pain, bleeding, incontinence, nocturia  Muscle:  No pain, weakness  Neuro:  No weakness, tingling, memory problems  Psych:  No fatigue, insomnia, mood problems, depression  Endocrine:  No polyuria, polydypsia, cold/heat intolerance  Heme:  No petechiae, ecchymosis, easy bruisability  Skin:  No rash, tattoos, scars, edema      Vital Signs Last 24 Hrs  T(C): 36.5 (24 Aug 2018 05:08), Max: 36.8 (23 Aug 2018 20:49)  T(F): 97.7 (24 Aug 2018 05:08), Max: 98.2 (23 Aug 2018 20:49)  HR: 91 (24 Aug 2018 05:08) (75 - 91)  BP: 146/78 (24 Aug 2018 05:08) (143/74 - 155/81)  BP(mean): --  RR: 18 (24 Aug 2018 05:08) (17 - 18)  SpO2: 98% (24 Aug 2018 05:08) (96% - 98%)    PHYSICAL EXAM:    Constitutional: NAD, well-developed  HEENT: EOMI, throat clear  Neck: No LAD, supple  Respiratory: CTA and P  Cardiovascular: S1 and S2, RRR, no M  Gastrointestinal: BS+, soft, NT/ND, neg HSM,  Extremities: No peripheral edema, neg clubing, cyanosis  Vascular: 2+ peripheral pulses  Neurological: A/O x 3, no focal deficits  Psychiatric: Normal mood, normal affect  Skin: No rashes      LABS:                        9.2    6.57  )-----------( 142      ( 24 Aug 2018 07:22 )             30.7     08-24    143  |  101  |  85<H>  ----------------------------<  74  3.5   |  26  |  3.64<H>    Ca    8.6      24 Aug 2018 06:47      PT/INR - ( 24 Aug 2018 07:26 )   PT: 24.2 sec;   INR: 2.11 ratio         PTT - ( 24 Aug 2018 07:26 )  PTT:36.4 sec      RADIOLOGY & ADDITIONAL TESTS:
Interval Events: Pt denies abdominal pain, N/V/D/C, BRBPR/melena. h/h remains stable.     MEDICATIONS  (STANDING):  allopurinol 100 milliGRAM(s) Oral daily  amLODIPine   Tablet 10 milliGRAM(s) Oral daily  ascorbic acid 500 milliGRAM(s) Oral daily  calcitriol   Capsule 0.25 MICROGram(s) Oral daily  carvedilol 25 milliGRAM(s) Oral every 12 hours  furosemide    Tablet 80 milliGRAM(s) Oral two times a day  Nephro-alyse 1 Tablet(s) Oral daily  pantoprazole    Tablet 40 milliGRAM(s) Oral before breakfast  predniSONE   Tablet 10 milliGRAM(s) Oral daily  zinc sulfate 220 milliGRAM(s) Oral daily    MEDICATIONS  (PRN):  ondansetron Injectable 4 milliGRAM(s) IV Push every 6 hours PRN Nausea      Allergies    heparin (Unknown)  losartan (Anaphylaxis)  nitroglycerin (Other)    Intolerances        Review of Systems:    General:  No wt loss, fevers, chills, night sweats,fatigue,   Eyes:  Good vision, no reported pain  ENT:  No sore throat, pain, runny nose, dysphagia  CV:  No pain, palpitations, hypo/hypertension  Resp:  No dyspnea, cough, tachypnea, wheezing  GI:  No pain, No nausea, No vomiting, No diarrhea, No constipation, No weight loss, No fever, No pruritis, No rectal bleeding, No melena, No dysphagia  :  No pain, bleeding, incontinence, nocturia  Muscle:  No pain, weakness  Neuro:  No weakness, tingling, memory problems  Psych:  No fatigue, insomnia, mood problems, depression  Endocrine:  No polyuria, polydypsia, cold/heat intolerance  Heme:  No petechiae, ecchymosis, easy bruisability  Skin:  No rash, tattoos, scars, edema      Vital Signs Last 24 Hrs  T(C): 36.4 (20 Aug 2018 04:39), Max: 37.3 (19 Aug 2018 19:55)  T(F): 97.5 (20 Aug 2018 04:39), Max: 99.2 (19 Aug 2018 19:55)  HR: 80 (20 Aug 2018 04:39) (79 - 83)  BP: 142/79 (20 Aug 2018 04:39) (142/79 - 157/81)  BP(mean): --  RR: 18 (20 Aug 2018 04:39) (17 - 18)  SpO2: 98% (20 Aug 2018 04:39) (96% - 98%)    PHYSICAL EXAM:    Constitutional: NAD, well-developed  HEENT: EOMI, throat clear  Neck: No LAD, supple  Respiratory: CTA and P  Cardiovascular: S1 and S2, RRR, no M  Gastrointestinal: BS+, soft, NT/ND, neg HSM,  Extremities: No peripheral edema, neg clubing, cyanosis  Vascular: 2+ peripheral pulses  Neurological: A/O x 3, no focal deficits  Psychiatric: Normal mood, normal affect  Skin: No rashes      LABS:                        9.4    6.88  )-----------( 166      ( 20 Aug 2018 07:52 )             30.8     08-20    144  |  105  |  86<H>  ----------------------------<  93  3.7   |  25  |  3.54<H>    Ca    9.1      20 Aug 2018 06:38    TPro  6.4  /  Alb  3.7  /  TBili  0.4  /  DBili  x   /  AST  18  /  ALT  8<L>  /  AlkPhos  54  08-19    PT/INR - ( 19 Aug 2018 09:21 )   PT: 27.4 sec;   INR: 2.38 ratio               RADIOLOGY & ADDITIONAL TESTS:
Interval Events: pt denies abdominal pain, N/V/D/C, brbpr/melena. Tolerating PO.     MEDICATIONS  (STANDING):  allopurinol 100 milliGRAM(s) Oral daily  amLODIPine   Tablet 10 milliGRAM(s) Oral daily  ascorbic acid 500 milliGRAM(s) Oral daily  calcitriol   Capsule 0.25 MICROGram(s) Oral daily  carvedilol 25 milliGRAM(s) Oral every 12 hours  furosemide    Tablet 80 milliGRAM(s) Oral two times a day  Nephro-alyse 1 Tablet(s) Oral daily  pantoprazole    Tablet 40 milliGRAM(s) Oral before breakfast  predniSONE   Tablet 10 milliGRAM(s) Oral daily  zinc sulfate 220 milliGRAM(s) Oral daily    MEDICATIONS  (PRN):  ondansetron Injectable 4 milliGRAM(s) IV Push every 6 hours PRN Nausea      Allergies    heparin (Unknown)  losartan (Anaphylaxis)  nitroglycerin (Other)    Intolerances        Review of Systems:    General:  No wt loss, fevers, chills, night sweats,fatigue,   Eyes:  Good vision, no reported pain  ENT:  No sore throat, pain, runny nose, dysphagia  CV:  No pain, palpitations, hypo/hypertension  Resp:  No dyspnea, cough, tachypnea, wheezing  GI:  No pain, No nausea, No vomiting, No diarrhea, No constipation, No weight loss, No fever, No pruritis, No rectal bleeding, No melena, No dysphagia  :  No pain, bleeding, incontinence, nocturia  Muscle:  No pain, weakness  Neuro:  No weakness, tingling, memory problems  Psych:  No fatigue, insomnia, mood problems, depression  Endocrine:  No polyuria, polydypsia, cold/heat intolerance  Heme:  No petechiae, ecchymosis, easy bruisability  Skin:  No rash, tattoos, scars, edema      Vital Signs Last 24 Hrs  T(C): 36.6 (22 Aug 2018 05:42), Max: 37 (21 Aug 2018 20:58)  T(F): 97.9 (22 Aug 2018 05:42), Max: 98.6 (21 Aug 2018 20:58)  HR: 88 (22 Aug 2018 05:42) (76 - 88)  BP: 135/76 (22 Aug 2018 05:42) (134/77 - 152/71)  BP(mean): --  RR: 17 (22 Aug 2018 05:42) (17 - 18)  SpO2: 99% (22 Aug 2018 05:42) (99% - 99%)    PHYSICAL EXAM:    Constitutional: NAD, well-developed  HEENT: EOMI, throat clear  Neck: No LAD, supple  Respiratory: CTA and P  Cardiovascular: S1 and S2, RRR, no M  Gastrointestinal: BS+, soft, NT/ND, neg HSM,  Extremities: No peripheral edema, neg clubing, cyanosis  Vascular: 2+ peripheral pulses  Neurological: A/O x 3, no focal deficits  Psychiatric: Normal mood, normal affect  Skin: No rashes      LABS:                        9.1    6.85  )-----------( 147      ( 22 Aug 2018 08:10 )             29.6     08-22    143  |  100  |  88<H>  ----------------------------<  87  3.5   |  24  |  3.75<H>    Ca    9.0      22 Aug 2018 07:08      PT/INR - ( 22 Aug 2018 07:35 )   PT: 23.2 sec;   INR: 2.02 ratio               RADIOLOGY & ADDITIONAL TESTS:
Interval Events; Pt seen and examined. She denies abdominal pain, nausea, vomiting, diarrhea, constipation, brbpr/melena. Tolerating PO well.    MEDICATIONS  (STANDING):  allopurinol 100 milliGRAM(s) Oral daily  amLODIPine   Tablet 10 milliGRAM(s) Oral daily  ascorbic acid 500 milliGRAM(s) Oral daily  calcitriol   Capsule 0.25 MICROGram(s) Oral daily  carvedilol 25 milliGRAM(s) Oral every 12 hours  furosemide    Tablet 80 milliGRAM(s) Oral two times a day  Nephro-alyse 1 Tablet(s) Oral daily  pantoprazole    Tablet 40 milliGRAM(s) Oral before breakfast  predniSONE   Tablet 10 milliGRAM(s) Oral daily  simethicone 80 milliGRAM(s) Chew every 4 hours  zinc sulfate 220 milliGRAM(s) Oral daily    MEDICATIONS  (PRN):  ondansetron Injectable 4 milliGRAM(s) IV Push every 6 hours PRN Nausea  sodium chloride 0.65% Nasal 1 Spray(s) Both Nostrils three times a day PRN Nasal Congestion      Allergies    heparin (Unknown)  losartan (Anaphylaxis)  nitroglycerin (Other)    Intolerances        Review of Systems:    General:  No wt loss, fevers, chills, night sweats,fatigue,   Eyes:  Good vision, no reported pain  ENT:  No sore throat, pain, runny nose, dysphagia  CV:  No pain, palpitations, hypo/hypertension  Resp:  No dyspnea, cough, tachypnea, wheezing  GI:  No pain, No nausea, No vomiting, No diarrhea, No constipation, No weight loss, No fever, No pruritis, No rectal bleeding, No melena, No dysphagia  :  No pain, bleeding, incontinence, nocturia  Muscle:  No pain, weakness  Neuro:  No weakness, tingling, memory problems  Psych:  No fatigue, insomnia, mood problems, depression  Endocrine:  No polyuria, polydypsia, cold/heat intolerance  Heme:  No petechiae, ecchymosis, easy bruisability  Skin:  No rash, tattoos, scars, edema      Vital Signs Last 24 Hrs  T(C): 36.8 (27 Aug 2018 11:32), Max: 37 (26 Aug 2018 21:22)  T(F): 98.2 (27 Aug 2018 11:32), Max: 98.6 (26 Aug 2018 21:22)  HR: 62 (27 Aug 2018 11:32) (62 - 89)  BP: 144/76 (27 Aug 2018 11:32) (142/78 - 173/91)  BP(mean): --  RR: 18 (27 Aug 2018 11:32) (17 - 18)  SpO2: 97% (27 Aug 2018 11:32) (95% - 97%)    PHYSICAL EXAM:    Constitutional: NAD, well-developed  HEENT: EOMI, throat clear  Neck: No LAD, supple  Respiratory: CTA and P  Cardiovascular: S1 and S2, RRR, no M  Gastrointestinal: BS+, soft, NT/ND, neg HSM,  Extremities: No peripheral edema, neg clubing, cyanosis  Vascular: 2+ peripheral pulses  Neurological: A/O x 3, no focal deficits  Psychiatric: Normal mood, normal affect  Skin: No rashes      LABS:                        9.6    6.47  )-----------( 158      ( 27 Aug 2018 07:07 )             31.9     08-27    145  |  102  |  86<H>  ----------------------------<  87  3.9   |  29  |  3.66<H>    Ca    8.9      27 Aug 2018 05:28  Mg     2.0     08-27    TPro  6.3  /  Alb  3.4  /  TBili  0.4  /  DBili  x   /  AST  17  /  ALT  11  /  AlkPhos  57  08-27    PT/INR - ( 27 Aug 2018 07:13 )   PT: 18.9 sec;   INR: 1.66 ratio         PTT - ( 27 Aug 2018 07:13 )  PTT:32.9 sec      RADIOLOGY & ADDITIONAL TESTS:
[facility]   [phone]  RENAL FOLLOW UP NOTE  --------------------------------------------------------------------------------  Chief Complaint:    24 hour events/subjective:    still c/o urinary leakage around fox   no dizziness     PAST HISTORY  --------------------------------------------------------------------------------  No significant changes to PMH, PSH, FHx, SHx, unless otherwise noted    ALLERGIES & MEDICATIONS  --------------------------------------------------------------------------------  Allergies    heparin (Unknown)  losartan (Anaphylaxis)  nitroglycerin (Other)    Intolerances      Standing Inpatient Medications  allopurinol 100 milliGRAM(s) Oral daily  amLODIPine   Tablet 10 milliGRAM(s) Oral daily  ascorbic acid 500 milliGRAM(s) Oral daily  calcitriol   Capsule 0.25 MICROGram(s) Oral daily  carvedilol 25 milliGRAM(s) Oral every 12 hours  furosemide    Tablet 80 milliGRAM(s) Oral two times a day  Nephro-alyse 1 Tablet(s) Oral daily  pantoprazole    Tablet 40 milliGRAM(s) Oral before breakfast  predniSONE   Tablet 10 milliGRAM(s) Oral daily  warfarin 3 milliGRAM(s) Oral once  zinc sulfate 220 milliGRAM(s) Oral daily    PRN Inpatient Medications  ondansetron Injectable 4 milliGRAM(s) IV Push every 6 hours PRN      REVIEW OF SYSTEMS  --------------------------------------------------------------------------------    Gen: denies  fevers/chills,  CVS: denies chest pain/palpitations  Resp: denies SOB/Cough  GI: Denies N/V/Abd pain  : Denies dysuria    All other systems were reviewed and are negative, except as noted.    VITALS/PHYSICAL EXAM  --------------------------------------------------------------------------------  T(C): 37 (08-20-18 @ 12:14), Max: 37.3 (08-19-18 @ 19:55)  HR: 77 (08-20-18 @ 12:14) (77 - 83)  BP: 157/78 (08-20-18 @ 12:14) (142/79 - 157/81)  RR: 18 (08-20-18 @ 12:14) (18 - 18)  SpO2: 97% (08-20-18 @ 12:14) (97% - 98%)  Wt(kg): --    Weight (kg): 125.2 (08-19-18 @ 05:00)      08-19-18 @ 07:01  -  08-20-18 @ 07:00  --------------------------------------------------------  IN: 890 mL / OUT: 3450 mL / NET: -2560 mL    08-20-18 @ 07:01  -  08-20-18 @ 19:49  --------------------------------------------------------  IN: 420 mL / OUT: 1000 mL / NET: -580 mL      Physical Exam:  	  Gen: Non toxic comfortable appearing   	no jvd , supple neck,   	Pulm: decrease bs  no rales or ronchi or wheezing  	CV: RRR, S1S2; no rub  	Back: No CVA tenderness; no sacral edema  	Abd: +BS, soft, nontender/nondistended  	: No suprapubic tenderness  	UE: Warm, no cyanosis  no clubbing,  no edema  	LE: Warm, no cyanosis  no clubbing, LLE 4+  RLE 1++  edema  	Neuro: alert and oriented.	    LABS/STUDIES  --------------------------------------------------------------------------------              9.4    6.88  >-----------<  166      [08-20-18 @ 07:52]              30.8     144  |  105  |  86  ----------------------------<  93      [08-20-18 @ 06:38]  3.7   |  25  |  3.54        Ca     9.1     [08-20-18 @ 06:38]    TPro  6.4  /  Alb  3.7  /  TBili  0.4  /  DBili  x   /  AST  18  /  ALT  8   /  AlkPhos  54  [08-19-18 @ 07:13]    PT/INR: PT 26.1 , INR 2.35       [08-20-18 @ 14:54]      Creatinine Trend:  SCr 3.54 [08-20 @ 06:38]  SCr 3.35 [08-19 @ 07:13]  SCr 3.50 [08-18 @ 05:59]  SCr 3.78 [07-24 @ 05:47]  SCr 3.75 [07-23 @ 05:33]              Urinalysis - [08-18-18 @ 05:33]      Color PL Yellow / Appearance Clear / SG 1.011 / pH 6.0      Gluc Negative / Ketone Negative  / Bili Negative / Urobili Negative       Blood Moderate / Protein 30 / Leuk Est Large / Nitrite Negative      RBC 10-25 / WBC 11-25 / Hyaline  / Gran  / Sq Epi  / Non Sq Epi  / Bacteria Many      Iron 67, TIBC 203, %sat 33      [07-12-18 @ 14:18]  Ferritin 961      [07-12-18 @ 14:18]  PTH -- (Ca 8.9)      [04-20-18 @ 08:14]   217  PTH -- (Ca 9.0)      [11-09-17 @ 08:51]   387  Vitamin D (25OH) 7.8      [11-09-17 @ 08:51]  HbA1c 4.5      [08-09-16 @ 06:49]  TSH 0.47      [04-24-18 @ 07:51]
Patient is a 58y old  Female who presents with a chief complaint of rectal bleed (18 Aug 2018 15:01)      INTERVAL HPI/OVERNIGHT EVENTS:  T(C): 36.6 (18 @ 14:27), Max: 36.7 (18 @ 05:00)  HR: 79 (18 @ 14:27) (79 - 83)  BP: 147/78 (18 @ 14:27) (132/75 - 157/83)  RR: 17 (18 @ 14:27) (17 - 18)  SpO2: 96% (18 @ 14:27) (93% - 98%)  Wt(kg): --  I&O's Summary    18 Aug 2018 07:  -  19 Aug 2018 07:00  --------------------------------------------------------  IN: 50 mL / OUT: 2600 mL / NET: -2550 mL    19 Aug 2018 07:01  -  19 Aug 2018 20:35  --------------------------------------------------------  IN: 840 mL / OUT: 1900 mL / NET: -1060 mL        LABS:                        9.4    5.35  )-----------( 168      ( 19 Aug 2018 09:28 )             30.8         141  |  103  |  89<H>  ----------------------------<  95  4.2   |  24  |  3.35<H>    Ca    9.2      19 Aug 2018 07:13    TPro  6.4  /  Alb  3.7  /  TBili  0.4  /  DBili  x   /  AST  18  /  ALT  8<L>  /  AlkPhos  54      PT/INR - ( 19 Aug 2018 09:21 )   PT: 27.4 sec;   INR: 2.38 ratio         PTT - ( 18 Aug 2018 05:59 )  PTT:36.7 sec  Urinalysis Basic - ( 18 Aug 2018 05:33 )    Color: PL Yellow / Appearance: Clear / S.011 / pH: x  Gluc: x / Ketone: Negative  / Bili: Negative / Urobili: Negative   Blood: x / Protein: 30 mg/dL / Nitrite: Negative   Leuk Esterase: Large / RBC: 10-25 /HPF / WBC 11-25 /HPF   Sq Epi: x / Non Sq Epi: x / Bacteria: Many /HPF      CAPILLARY BLOOD GLUCOSE            Urinalysis Basic - ( 18 Aug 2018 05:33 )    Color: PL Yellow / Appearance: Clear / S.011 / pH: x  Gluc: x / Ketone: Negative  / Bili: Negative / Urobili: Negative   Blood: x / Protein: 30 mg/dL / Nitrite: Negative   Leuk Esterase: Large / RBC: 10-25 /HPF / WBC 11-25 /HPF   Sq Epi: x / Non Sq Epi: x / Bacteria: Many /HPF        MEDICATIONS  (STANDING):  allopurinol 100 milliGRAM(s) Oral daily  amLODIPine   Tablet 10 milliGRAM(s) Oral daily  ascorbic acid 500 milliGRAM(s) Oral daily  calcitriol   Capsule 0.25 MICROGram(s) Oral daily  carvedilol 25 milliGRAM(s) Oral every 12 hours  furosemide    Tablet 80 milliGRAM(s) Oral two times a day  Nephro-alyse 1 Tablet(s) Oral daily  pantoprazole    Tablet 40 milliGRAM(s) Oral before breakfast  predniSONE   Tablet 10 milliGRAM(s) Oral daily  zinc sulfate 220 milliGRAM(s) Oral daily    MEDICATIONS  (PRN):  ondansetron Injectable 4 milliGRAM(s) IV Push every 6 hours PRN Nausea          PHYSICAL EXAM:  GENERAL: NAD, well-groomed, well-developed  HEAD:  Atraumatic, Normocephalic  CHEST/LUNG: Clear to percussion bilaterally; No rales, rhonchi, wheezing, or rubs  HEART: Regular rate and rhythm; No murmurs, rubs, or gallops  ABDOMEN: Soft, Nontender, Nondistended; Bowel sounds present  EXTREMITIES:  2+ Peripheral Pulses, No clubbing, cyanosis, or edema  LYMPH: No lymphadenopathy noted  SKIN: No rashes or lesions    Care Discussed with Consultants/Other Providers [ ] YES  [ ] NO
Patient is a 58y old  Female who presents with a chief complaint of rectal bleed (18 Aug 2018 15:01)      SUBJECTIVE / OVERNIGHT EVENTS:   Feels better.  Denies CP/SOB/Palpitation/HA.    MEDICATIONS  (STANDING):  allopurinol 100 milliGRAM(s) Oral daily  amLODIPine   Tablet 10 milliGRAM(s) Oral daily  ascorbic acid 500 milliGRAM(s) Oral daily  calcitriol   Capsule 0.25 MICROGram(s) Oral daily  carvedilol 25 milliGRAM(s) Oral every 12 hours  furosemide    Tablet 80 milliGRAM(s) Oral two times a day  Nephro-alyse 1 Tablet(s) Oral daily  pantoprazole    Tablet 40 milliGRAM(s) Oral before breakfast  predniSONE   Tablet 10 milliGRAM(s) Oral daily  zinc sulfate 220 milliGRAM(s) Oral daily    MEDICATIONS  (PRN):  ondansetron Injectable 4 milliGRAM(s) IV Push every 6 hours PRN Nausea        CAPILLARY BLOOD GLUCOSE        I&O's Summary    19 Aug 2018 07:01  -  20 Aug 2018 07:00  --------------------------------------------------------  IN: 890 mL / OUT: 3450 mL / NET: -2560 mL    20 Aug 2018 07:01  -  20 Aug 2018 14:47  --------------------------------------------------------  IN: 240 mL / OUT: 1000 mL / NET: -760 mL        PHYSICAL EXAM:  GENERAL: NAD, well-developed  HEAD:  Atraumatic, Normocephalic  NECK: Supple, No JVD  CHEST/LUNG: Clear to auscultation bilaterally; No wheezing.  HEART: Regular rate and rhythm; No murmurs, rubs, or gallops  ABDOMEN: Soft, Nontender, Nondistended; Bowel sounds present  EXTREMITIES:   No clubbing, cyanosis, or edema  NEUROLOGY: AAO X 3  SKIN: No rashes    LABS:                        9.4    6.88  )-----------( 166      ( 20 Aug 2018 07:52 )             30.8     08-20    144  |  105  |  86<H>  ----------------------------<  93  3.7   |  25  |  3.54<H>    Ca    9.1      20 Aug 2018 06:38    TPro  6.4  /  Alb  3.7  /  TBili  0.4  /  DBili  x   /  AST  18  /  ALT  8<L>  /  AlkPhos  54  08-19    PT/INR - ( 19 Aug 2018 09:21 )   PT: 27.4 sec;   INR: 2.38 ratio                 CAPILLARY BLOOD GLUCOSE        08-18 @ 07:09  Culture-urine --  Culture results   Culture grew 3 or more types of organisms which indicate  collection contamination; consider recollection only if clinically  indicated.  method type --  Organism --  Organism Identification --  Specimen source .Urine Catheterized           08-18 @ 07:09  Culture blood --  Culture results   Culture grew 3 or more types of organisms which indicate  collection contamination; consider recollection only if clinically  indicated.  Gram stain --  Gram stain blood --  Method type --  Organism --  Organism identification --  Specimen source .Urine Catheterized      RADIOLOGY & ADDITIONAL TESTS:    Imaging Personally Reviewed:    Consultant(s) Notes Reviewed:      Care Discussed with Consultants/Other Providers:
Patient is a 58y old  Female who presents with a chief complaint of rectal bleed (21 Aug 2018 12:25)      INTERVAL HPI/OVERNIGHT EVENTS:  T(C): 36.7 (08-21-18 @ 12:58), Max: 37.2 (08-20-18 @ 21:05)  HR: 76 (08-21-18 @ 12:58) (76 - 84)  BP: 134/77 (08-21-18 @ 12:58) (134/77 - 164/83)  RR: 18 (08-21-18 @ 12:58) (18 - 18)  SpO2: 99% (08-21-18 @ 12:58) (96% - 99%)  Wt(kg): --  I&O's Summary    20 Aug 2018 07:01  -  21 Aug 2018 07:00  --------------------------------------------------------  IN: 420 mL / OUT: 3350 mL / NET: -2930 mL    21 Aug 2018 07:01  -  21 Aug 2018 15:29  --------------------------------------------------------  IN: 360 mL / OUT: 1100 mL / NET: -740 mL        LABS:                        9.2    7.20  )-----------( 148      ( 21 Aug 2018 07:51 )             30.8     08-21    143  |  104  |  87<H>  ----------------------------<  84  3.5   |  24  |  3.76<H>    Ca    8.9      21 Aug 2018 06:49      PT/INR - ( 21 Aug 2018 07:49 )   PT: 26.0 sec;   INR: 2.26 ratio             CAPILLARY BLOOD GLUCOSE                MEDICATIONS  (STANDING):  allopurinol 100 milliGRAM(s) Oral daily  amLODIPine   Tablet 10 milliGRAM(s) Oral daily  ascorbic acid 500 milliGRAM(s) Oral daily  calcitriol   Capsule 0.25 MICROGram(s) Oral daily  carvedilol 25 milliGRAM(s) Oral every 12 hours  furosemide    Tablet 80 milliGRAM(s) Oral two times a day  Nephro-alyse 1 Tablet(s) Oral daily  pantoprazole    Tablet 40 milliGRAM(s) Oral before breakfast  predniSONE   Tablet 10 milliGRAM(s) Oral daily  zinc sulfate 220 milliGRAM(s) Oral daily    MEDICATIONS  (PRN):  ondansetron Injectable 4 milliGRAM(s) IV Push every 6 hours PRN Nausea          PHYSICAL EXAM:  GENERAL: FRAIL  CHEST/LUNG: Clear to percussion bilaterally; No rales, rhonchi, wheezing, or rubs  HEART: Regular rate and rhythm; No murmurs, rubs, or gallops  ABDOMEN: Soft, Nontender, Nondistended; Bowel sounds present  EXTREMITIES:  BL LE EDEMA. L more than R  LYMPH: No lymphadenopathy noted  SKIN: No rashes or lesions    Care Discussed with Consultants/Other Providers [ +] YES  [ ] NO
Patient is a 58y old  Female who presents with a chief complaint of rectal bleed (21 Aug 2018 12:25)      SUBJECTIVE / OVERNIGHT EVENTS:   Feels better.  Denies CP/SOB/Palpitation/HA.    MEDICATIONS  (STANDING):  allopurinol 100 milliGRAM(s) Oral daily  amLODIPine   Tablet 10 milliGRAM(s) Oral daily  ascorbic acid 500 milliGRAM(s) Oral daily  calcitriol   Capsule 0.25 MICROGram(s) Oral daily  carvedilol 25 milliGRAM(s) Oral every 12 hours  colchicine 0.6 milliGRAM(s) Oral every 48 hours  furosemide    Tablet 80 milliGRAM(s) Oral two times a day  Nephro-alyse 1 Tablet(s) Oral daily  pantoprazole    Tablet 40 milliGRAM(s) Oral before breakfast  predniSONE   Tablet 10 milliGRAM(s) Oral daily  simethicone 80 milliGRAM(s) Chew every 4 hours  zinc sulfate 220 milliGRAM(s) Oral daily    MEDICATIONS  (PRN):  ondansetron Injectable 4 milliGRAM(s) IV Push every 6 hours PRN Nausea  sodium chloride 0.65% Nasal 1 Spray(s) Both Nostrils three times a day PRN Nasal Congestion        CAPILLARY BLOOD GLUCOSE        I&O's Summary    26 Aug 2018 07:01  -  27 Aug 2018 07:00  --------------------------------------------------------  IN: 980 mL / OUT: 2750 mL / NET: -1770 mL    27 Aug 2018 07:01  -  27 Aug 2018 21:30  --------------------------------------------------------  IN: 420 mL / OUT: 800 mL / NET: -380 mL        PHYSICAL EXAM:  GENERAL: NAD, well-developed  HEAD:  Atraumatic, Normocephalic  NECK: Supple, No JVD  CHEST/LUNG: Clear to auscultation bilaterally; No wheezing.  HEART: Regular rate and rhythm; No murmurs, rubs, or gallops  ABDOMEN: Soft, Nontender, Nondistended; Bowel sounds present  EXTREMITIES:   No clubbing, cyanosis, or edema  NEUROLOGY: AAO X 3  SKIN: No rashes    LABS:                        9.6    6.47  )-----------( 158      ( 27 Aug 2018 07:07 )             31.9     08-27    145  |  102  |  86<H>  ----------------------------<  87  3.9   |  29  |  3.66<H>    Ca    8.9      27 Aug 2018 05:28  Mg     2.0     08-27    TPro  6.3  /  Alb  3.4  /  TBili  0.4  /  DBili  x   /  AST  17  /  ALT  11  /  AlkPhos  57  08-27    PT/INR - ( 27 Aug 2018 07:13 )   PT: 18.9 sec;   INR: 1.66 ratio         PTT - ( 27 Aug 2018 07:13 )  PTT:32.9 sec        CAPILLARY BLOOD GLUCOSE                    RADIOLOGY & ADDITIONAL TESTS:    Imaging Personally Reviewed:    Consultant(s) Notes Reviewed:      Care Discussed with Consultants/Other Providers:
Patient is a 58y old  Female who presents with a chief complaint of rectal bleed (21 Aug 2018 12:25)      SUBJECTIVE / OVERNIGHT EVENTS:   Feels better.  Denies CP/SOB/Palpitation/HA.    MEDICATIONS  (STANDING):  allopurinol 100 milliGRAM(s) Oral daily  amLODIPine   Tablet 10 milliGRAM(s) Oral daily  ascorbic acid 500 milliGRAM(s) Oral daily  calcitriol   Capsule 0.25 MICROGram(s) Oral daily  carvedilol 25 milliGRAM(s) Oral every 12 hours  furosemide    Tablet 80 milliGRAM(s) Oral two times a day  Nephro-alyse 1 Tablet(s) Oral daily  pantoprazole    Tablet 40 milliGRAM(s) Oral before breakfast  predniSONE   Tablet 10 milliGRAM(s) Oral daily  simethicone 80 milliGRAM(s) Chew every 4 hours  warfarin 3 milliGRAM(s) Oral once  zinc sulfate 220 milliGRAM(s) Oral daily    MEDICATIONS  (PRN):  ondansetron Injectable 4 milliGRAM(s) IV Push every 6 hours PRN Nausea  sodium chloride 0.65% Nasal 1 Spray(s) Both Nostrils three times a day PRN Nasal Congestion        CAPILLARY BLOOD GLUCOSE        I&O's Summary    22 Aug 2018 07:01  -  23 Aug 2018 07:00  --------------------------------------------------------  IN: 760 mL / OUT: 3650 mL / NET: -2890 mL        PHYSICAL EXAM:  GENERAL: NAD, well-developed  HEAD:  Atraumatic, Normocephalic  NECK: Supple, No JVD  CHEST/LUNG: Clear to auscultation bilaterally; No wheezing.  HEART: Regular rate and rhythm; No murmurs, rubs, or gallops  ABDOMEN: Soft, Nontender, Nondistended; Bowel sounds present  EXTREMITIES:   No clubbing, cyanosis, or edema  NEUROLOGY: AAO X 3  SKIN: No rashes    LABS:                        9.1    6.85  )-----------( 147      ( 22 Aug 2018 08:10 )             29.6     08-23    143  |  102  |  86<H>  ----------------------------<  77  3.7   |  25  |  3.71<H>    Ca    8.9      23 Aug 2018 06:57      PT/INR - ( 23 Aug 2018 08:51 )   PT: 22.9 sec;   INR: 2.00 ratio         PTT - ( 23 Aug 2018 08:51 )  PTT:35.4 sec        CAPILLARY BLOOD GLUCOSE        08-18 @ 07:09  Culture-urine --  Culture results   Culture grew 3 or more types of organisms which indicate  collection contamination; consider recollection only if clinically  indicated.  method type --  Organism --  Organism Identification --  Specimen source .Urine Catheterized           08-18 @ 07:09  Culture blood --  Culture results   Culture grew 3 or more types of organisms which indicate  collection contamination; consider recollection only if clinically  indicated.  Gram stain --  Gram stain blood --  Method type --  Organism --  Organism identification --  Specimen source .Urine Catheterized      RADIOLOGY & ADDITIONAL TESTS:    Imaging Personally Reviewed:    Consultant(s) Notes Reviewed:      Care Discussed with Consultants/Other Providers:
Patient is a 58y old  Female who presents with a chief complaint of rectal bleed (21 Aug 2018 12:25)      SUBJECTIVE / OVERNIGHT EVENTS:   Feels better.  Denies CP/SOB/Palpitation/HA.    MEDICATIONS  (STANDING):  allopurinol 100 milliGRAM(s) Oral daily  amLODIPine   Tablet 10 milliGRAM(s) Oral daily  ascorbic acid 500 milliGRAM(s) Oral daily  calcitriol   Capsule 0.25 MICROGram(s) Oral daily  carvedilol 25 milliGRAM(s) Oral every 12 hours  furosemide    Tablet 80 milliGRAM(s) Oral two times a day  Nephro-alyse 1 Tablet(s) Oral daily  pantoprazole    Tablet 40 milliGRAM(s) Oral before breakfast  predniSONE   Tablet 10 milliGRAM(s) Oral daily  simethicone 80 milliGRAM(s) Chew every 4 hours  zinc sulfate 220 milliGRAM(s) Oral daily    MEDICATIONS  (PRN):  ondansetron Injectable 4 milliGRAM(s) IV Push every 6 hours PRN Nausea  sodium chloride 0.65% Nasal 1 Spray(s) Both Nostrils three times a day PRN Nasal Congestion        CAPILLARY BLOOD GLUCOSE        I&O's Summary    23 Aug 2018 07:01  -  24 Aug 2018 07:00  --------------------------------------------------------  IN: 0 mL / OUT: 3400 mL / NET: -3400 mL    24 Aug 2018 07:01  -  24 Aug 2018 20:21  --------------------------------------------------------  IN: 0 mL / OUT: 850 mL / NET: -850 mL        PHYSICAL EXAM:  GENERAL: NAD, well-developed  HEAD:  Atraumatic, Normocephalic  NECK: Supple, No JVD  CHEST/LUNG: Clear to auscultation bilaterally; No wheezing.  HEART: Regular rate and rhythm; No murmurs, rubs, or gallops  ABDOMEN: Soft, Nontender, Nondistended; Bowel sounds present  EXTREMITIES:   No clubbing, cyanosis, or edema  NEUROLOGY: AAO X 3  SKIN: No rashes    LABS:                        9.2    6.57  )-----------( 142      ( 24 Aug 2018 07:22 )             30.7     08-24    143  |  101  |  85<H>  ----------------------------<  74  3.5   |  26  |  3.64<H>    Ca    8.6      24 Aug 2018 06:47      PT/INR - ( 24 Aug 2018 07:26 )   PT: 24.2 sec;   INR: 2.11 ratio         PTT - ( 24 Aug 2018 07:26 )  PTT:36.4 sec        CAPILLARY BLOOD GLUCOSE        08-18 @ 07:09  Culture-urine --  Culture results   Culture grew 3 or more types of organisms which indicate  collection contamination; consider recollection only if clinically  indicated.  method type --  Organism --  Organism Identification --  Specimen source .Urine Catheterized           08-18 @ 07:09  Culture blood --  Culture results   Culture grew 3 or more types of organisms which indicate  collection contamination; consider recollection only if clinically  indicated.  Gram stain --  Gram stain blood --  Method type --  Organism --  Organism identification --  Specimen source .Urine Catheterized      RADIOLOGY & ADDITIONAL TESTS:    Imaging Personally Reviewed:    Consultant(s) Notes Reviewed:      Care Discussed with Consultants/Other Providers:
Patient is a 58y old  Female who presents with a chief complaint of rectal bleed (21 Aug 2018 12:25)      SUBJECTIVE / OVERNIGHT EVENTS:   Feels better.  Denies CP/SOB/Palpitation/HA.    MEDICATIONS  (STANDING):  allopurinol 100 milliGRAM(s) Oral daily  amLODIPine   Tablet 10 milliGRAM(s) Oral daily  ascorbic acid 500 milliGRAM(s) Oral daily  calcitriol   Capsule 0.25 MICROGram(s) Oral daily  carvedilol 25 milliGRAM(s) Oral every 12 hours  furosemide    Tablet 80 milliGRAM(s) Oral two times a day  Nephro-alyse 1 Tablet(s) Oral daily  pantoprazole    Tablet 40 milliGRAM(s) Oral before breakfast  predniSONE   Tablet 10 milliGRAM(s) Oral daily  simethicone 80 milliGRAM(s) Chew every 4 hours  zinc sulfate 220 milliGRAM(s) Oral daily    MEDICATIONS  (PRN):  ondansetron Injectable 4 milliGRAM(s) IV Push every 6 hours PRN Nausea  sodium chloride 0.65% Nasal 1 Spray(s) Both Nostrils three times a day PRN Nasal Congestion        CAPILLARY BLOOD GLUCOSE        I&O's Summary    24 Aug 2018 07:01  -  25 Aug 2018 07:00  --------------------------------------------------------  IN: 0 mL / OUT: 3250 mL / NET: -3250 mL    25 Aug 2018 07:01  -  25 Aug 2018 15:05  --------------------------------------------------------  IN: 900 mL / OUT: 800 mL / NET: 100 mL        PHYSICAL EXAM:  GENERAL: NAD, well-developed  HEAD:  Atraumatic, Normocephalic  NECK: Supple, No JVD  CHEST/LUNG: Clear to auscultation bilaterally; No wheezing.  HEART: Regular rate and rhythm; No murmurs, rubs, or gallops  ABDOMEN: Soft, Nontender, Nondistended; Bowel sounds present  EXTREMITIES:   No clubbing, cyanosis, or edema  NEUROLOGY: AAO X 3  SKIN: No rashes    LABS:                        9.3    5.88  )-----------( 136      ( 25 Aug 2018 08:02 )             30.9     08-24    143  |  101  |  85<H>  ----------------------------<  74  3.5   |  26  |  3.64<H>    Ca    8.6      24 Aug 2018 06:47      PT/INR - ( 25 Aug 2018 08:02 )   PT: 25.1 sec;   INR: 2.19 ratio         PTT - ( 24 Aug 2018 07:26 )  PTT:36.4 sec        CAPILLARY BLOOD GLUCOSE                    RADIOLOGY & ADDITIONAL TESTS:    Imaging Personally Reviewed:    Consultant(s) Notes Reviewed:      Care Discussed with Consultants/Other Providers:
Patient is a 58y old  Female who presents with a chief complaint of rectal bleed (21 Aug 2018 12:25)      SUBJECTIVE / OVERNIGHT EVENTS:   Feels better.  Denies CP/SOB/Palpitation/HA.    MEDICATIONS  (STANDING):  allopurinol 100 milliGRAM(s) Oral daily  amLODIPine   Tablet 10 milliGRAM(s) Oral daily  ascorbic acid 500 milliGRAM(s) Oral daily  calcitriol   Capsule 0.25 MICROGram(s) Oral daily  carvedilol 25 milliGRAM(s) Oral every 12 hours  furosemide    Tablet 80 milliGRAM(s) Oral two times a day  Nephro-alyse 1 Tablet(s) Oral daily  pantoprazole    Tablet 40 milliGRAM(s) Oral before breakfast  predniSONE   Tablet 10 milliGRAM(s) Oral daily  simethicone 80 milliGRAM(s) Chew every 4 hours  zinc sulfate 220 milliGRAM(s) Oral daily    MEDICATIONS  (PRN):  ondansetron Injectable 4 milliGRAM(s) IV Push every 6 hours PRN Nausea  sodium chloride 0.65% Nasal 1 Spray(s) Both Nostrils three times a day PRN Nasal Congestion        CAPILLARY BLOOD GLUCOSE        I&O's Summary    25 Aug 2018 07:01  -  26 Aug 2018 07:00  --------------------------------------------------------  IN: 1140 mL / OUT: 3100 mL / NET: -1960 mL    26 Aug 2018 07:01  -  26 Aug 2018 11:18  --------------------------------------------------------  IN: 120 mL / OUT: 0 mL / NET: 120 mL        PHYSICAL EXAM:  GENERAL: NAD, well-developed  HEAD:  Atraumatic, Normocephalic  NECK: Supple, No JVD  CHEST/LUNG: Clear to auscultation bilaterally; No wheezing.  HEART: Regular rate and rhythm; No murmurs, rubs, or gallops  ABDOMEN: Soft, Nontender, Nondistended; Bowel sounds present  EXTREMITIES:   No clubbing, cyanosis, or edema  NEUROLOGY: AAO X 3  SKIN: No rashes    LABS:                        9.4    6.22  )-----------( 142      ( 26 Aug 2018 08:10 )             31.4     08-26    142  |  100  |  86<H>  ----------------------------<  92  3.6   |  27  |  3.48<H>    Ca    9.1      26 Aug 2018 06:25      PT/INR - ( 26 Aug 2018 08:10 )   PT: 22.5 sec;   INR: 1.96 ratio         PTT - ( 26 Aug 2018 08:10 )  PTT:33.6 sec        CAPILLARY BLOOD GLUCOSE                    RADIOLOGY & ADDITIONAL TESTS:    Imaging Personally Reviewed:    Consultant(s) Notes Reviewed:      Care Discussed with Consultants/Other Providers:
Patient is a 58y old  Female who presents with a chief complaint of rectal bleed (21 Aug 2018 12:25)      SUBJECTIVE / OVERNIGHT EVENTS:   Feels better.  Denies CP/SOB/Palpitation/HA.    MEDICATIONS  (STANDING):  allopurinol 100 milliGRAM(s) Oral daily  amLODIPine   Tablet 10 milliGRAM(s) Oral daily  ascorbic acid 500 milliGRAM(s) Oral daily  calcitriol   Capsule 0.25 MICROGram(s) Oral daily  carvedilol 25 milliGRAM(s) Oral every 12 hours  furosemide    Tablet 80 milliGRAM(s) Oral two times a day  Nephro-alyse 1 Tablet(s) Oral daily  pantoprazole    Tablet 40 milliGRAM(s) Oral before breakfast  predniSONE   Tablet 10 milliGRAM(s) Oral daily  zinc sulfate 220 milliGRAM(s) Oral daily    MEDICATIONS  (PRN):  ondansetron Injectable 4 milliGRAM(s) IV Push every 6 hours PRN Nausea        CAPILLARY BLOOD GLUCOSE        I&O's Summary    21 Aug 2018 07:01  -  22 Aug 2018 07:00  --------------------------------------------------------  IN: 360 mL / OUT: 2900 mL / NET: -2540 mL    22 Aug 2018 07:01  -  22 Aug 2018 22:23  --------------------------------------------------------  IN: 760 mL / OUT: 2300 mL / NET: -1540 mL        PHYSICAL EXAM:  GENERAL: NAD, well-developed  HEAD:  Atraumatic, Normocephalic  NECK: Supple, No JVD  CHEST/LUNG: Clear to auscultation bilaterally; No wheezing.  HEART: Regular rate and rhythm; No murmurs, rubs, or gallops  ABDOMEN: Soft, Nontender, Nondistended; Bowel sounds present  EXTREMITIES:   No clubbing, cyanosis, or edema  NEUROLOGY: AAO X 3  SKIN: No rashes    LABS:                        9.1    6.85  )-----------( 147      ( 22 Aug 2018 08:10 )             29.6     08-22    143  |  100  |  88<H>  ----------------------------<  87  3.5   |  24  |  3.75<H>    Ca    9.0      22 Aug 2018 07:08      PT/INR - ( 22 Aug 2018 07:35 )   PT: 23.2 sec;   INR: 2.02 ratio                 CAPILLARY BLOOD GLUCOSE        08-18 @ 07:09  Culture-urine --  Culture results   Culture grew 3 or more types of organisms which indicate  collection contamination; consider recollection only if clinically  indicated.  method type --  Organism --  Organism Identification --  Specimen source .Urine Catheterized           08-18 @ 07:09  Culture blood --  Culture results   Culture grew 3 or more types of organisms which indicate  collection contamination; consider recollection only if clinically  indicated.  Gram stain --  Gram stain blood --  Method type --  Organism --  Organism identification --  Specimen source .Urine Catheterized      RADIOLOGY & ADDITIONAL TESTS:    Imaging Personally Reviewed:    Consultant(s) Notes Reviewed:      Care Discussed with Consultants/Other Providers:
[facility]   [phone]  RENAL FOLLOW UP NOTE  --------------------------------------------------------------------------------  Chief Complaint:    24 hour events/subjective:    seen. no new c/o at present     PAST HISTORY  --------------------------------------------------------------------------------  No significant changes to PMH, PSH, FHx, SHx, unless otherwise noted    ALLERGIES & MEDICATIONS  --------------------------------------------------------------------------------  Allergies    heparin (Unknown)  losartan (Anaphylaxis)  nitroglycerin (Other)    Intolerances      Standing Inpatient Medications  allopurinol 100 milliGRAM(s) Oral daily  amLODIPine   Tablet 10 milliGRAM(s) Oral daily  ascorbic acid 500 milliGRAM(s) Oral daily  calcitriol   Capsule 0.25 MICROGram(s) Oral daily  carvedilol 25 milliGRAM(s) Oral every 12 hours  furosemide    Tablet 80 milliGRAM(s) Oral two times a day  Nephro-alyse 1 Tablet(s) Oral daily  pantoprazole    Tablet 40 milliGRAM(s) Oral before breakfast  predniSONE   Tablet 10 milliGRAM(s) Oral daily  warfarin 3 milliGRAM(s) Oral once  zinc sulfate 220 milliGRAM(s) Oral daily    PRN Inpatient Medications  ondansetron Injectable 4 milliGRAM(s) IV Push every 6 hours PRN      REVIEW OF SYSTEMS  --------------------------------------------------------------------------------    Gen: denies fevers/chills,  CVS: denies chest pain/palpitations  Resp: denies SOB/Cough  GI: Denies N/V/Abd pain  : Denies dysuria    All other systems were reviewed and are negative, except as noted.    VITALS/PHYSICAL EXAM  --------------------------------------------------------------------------------  T(C): 36.9 (08-22-18 @ 13:18), Max: 37 (08-21-18 @ 20:58)  HR: 76 (08-22-18 @ 13:18) (76 - 88)  BP: 129/82 (08-22-18 @ 13:18) (129/82 - 152/71)  RR: 17 (08-22-18 @ 13:18) (17 - 18)  SpO2: 99% (08-22-18 @ 13:18) (99% - 99%)  Wt(kg): --        08-21-18 @ 07:01  -  08-22-18 @ 07:00  --------------------------------------------------------  IN: 360 mL / OUT: 2900 mL / NET: -2540 mL    08-22-18 @ 07:01  -  08-22-18 @ 18:25  --------------------------------------------------------  IN: 760 mL / OUT: 1000 mL / NET: -240 mL      Physical Exam:  	    Physical Exam:  	Gen: alert oriented place person and date   	Pulm: Decreased breath sounds b/l bases. no rales or ronchi or wheezing  	CV: RRR, S1/S2. no rub  	Back: No CVA tenderness; no sacral edema  	Abd: +BS, soft, nontender/nondistended  	: No suprapubic tenderness.               Extremity: No cyanosis, no edema no clubbing  	Neuro: No focal deficits  	Psych: Normal affect and mood      LABS/STUDIES  --------------------------------------------------------------------------------              9.1    6.85  >-----------<  147      [08-22-18 @ 08:10]              29.6     143  |  100  |  88  ----------------------------<  87      [08-22-18 @ 07:08]  3.5   |  24  |  3.75        Ca     9.0     [08-22-18 @ 07:08]      PT/INR: PT 23.2 , INR 2.02       [08-22-18 @ 07:35]      Creatinine Trend:  SCr 3.75 [08-22 @ 07:08]  SCr 3.76 [08-21 @ 06:49]  SCr 3.54 [08-20 @ 06:38]  SCr 3.35 [08-19 @ 07:13]  SCr 3.50 [08-18 @ 05:59]      Urinalysis - [08-18-18 @ 05:33]      Color PL Yellow / Appearance Clear / SG 1.011 / pH 6.0      Gluc Negative / Ketone Negative  / Bili Negative / Urobili Negative       Blood Moderate / Protein 30 / Leuk Est Large / Nitrite Negative      RBC 10-25 / WBC 11-25 / Hyaline  / Gran  / Sq Epi  / Non Sq Epi  / Bacteria Many      Iron 67, TIBC 203, %sat 33      [07-12-18 @ 14:18]  Ferritin 961      [07-12-18 @ 14:18]  PTH -- (Ca 8.9)      [04-20-18 @ 08:14]   217  PTH -- (Ca 9.0)      [11-09-17 @ 08:51]   387  Vitamin D (25OH) 7.8      [11-09-17 @ 08:51]  HbA1c 4.5      [08-09-16 @ 06:49]  TSH 0.47      [04-24-18 @ 07:51]
[facility]   [phone]  RENAL FOLLOW UP NOTE  --------------------------------------------------------------------------------  Chief Complaint:    24 hour events/subjective:    c/o upset stomach no diarrhea no gi bleeding     PAST HISTORY  --------------------------------------------------------------------------------  No significant changes to PMH, PSH, FHx, SHx, unless otherwise noted    ALLERGIES & MEDICATIONS  --------------------------------------------------------------------------------  Allergies    heparin (Unknown)  losartan (Anaphylaxis)  nitroglycerin (Other)    Intolerances      Standing Inpatient Medications  allopurinol 100 milliGRAM(s) Oral daily  amLODIPine   Tablet 10 milliGRAM(s) Oral daily  ascorbic acid 500 milliGRAM(s) Oral daily  calcitriol   Capsule 0.25 MICROGram(s) Oral daily  carvedilol 25 milliGRAM(s) Oral every 12 hours  furosemide    Tablet 80 milliGRAM(s) Oral two times a day  Nephro-alyse 1 Tablet(s) Oral daily  pantoprazole    Tablet 40 milliGRAM(s) Oral before breakfast  predniSONE   Tablet 10 milliGRAM(s) Oral daily  simethicone 80 milliGRAM(s) Chew every 4 hours  warfarin 3 milliGRAM(s) Oral once  zinc sulfate 220 milliGRAM(s) Oral daily    PRN Inpatient Medications  ondansetron Injectable 4 milliGRAM(s) IV Push every 6 hours PRN  sodium chloride 0.65% Nasal 1 Spray(s) Both Nostrils three times a day PRN      REVIEW OF SYSTEMS  --------------------------------------------------------------------------------    Gen: denies  fevers/chills,  CVS: denies chest pain/palpitations  Resp: denies SOB/Cough  GI: Denies N/V/Abd pain +   : Denies dysuria    All other systems were reviewed and are negative, except as noted.    VITALS/PHYSICAL EXAM  --------------------------------------------------------------------------------  T(C): 36.8 (08-23-18 @ 20:49), Max: 36.8 (08-23-18 @ 20:49)  HR: 78 (08-23-18 @ 20:49) (75 - 83)  BP: 143/77 (08-23-18 @ 20:49) (143/74 - 155/81)  RR: 18 (08-23-18 @ 20:49) (17 - 18)  SpO2: 98% (08-23-18 @ 20:49) (95% - 98%)  Wt(kg): --        08-22-18 @ 07:01  -  08-23-18 @ 07:00  --------------------------------------------------------  IN: 760 mL / OUT: 3650 mL / NET: -2890 mL    08-23-18 @ 07:01  -  08-23-18 @ 21:39  --------------------------------------------------------  IN: 0 mL / OUT: 1250 mL / NET: -1250 mL      Physical Exam:  	Gen: alert oriented place person and date   	Pulm: Decreased breath sounds b/l bases. no rales or ronchi or wheezing  	CV: RRR, S1/S2. no rub  	Back: No CVA tenderness; no sacral edema  	Abd: +BS, soft, nontender/nondistended  	: No suprapubic tenderness.               Extremity: No cyanosis, no edema RLE LLE 4 + edema but overall softer       LABS/STUDIES  --------------------------------------------------------------------------------              9.1    6.85  >-----------<  147      [08-22-18 @ 08:10]              29.6     143  |  102  |  86  ----------------------------<  77      [08-23-18 @ 06:57]  3.7   |  25  |  3.71        Ca     8.9     [08-23-18 @ 06:57]      PT/INR: PT 22.9 , INR 2.00       [08-23-18 @ 08:51]  PTT: 35.4       [08-23-18 @ 08:51]      Creatinine Trend:  SCr 3.71 [08-23 @ 06:57]  SCr 3.75 [08-22 @ 07:08]  SCr 3.76 [08-21 @ 06:49]  SCr 3.54 [08-20 @ 06:38]  SCr 3.35 [08-19 @ 07:13]              Urinalysis - [08-18-18 @ 05:33]      Color PL Yellow / Appearance Clear / SG 1.011 / pH 6.0      Gluc Negative / Ketone Negative  / Bili Negative / Urobili Negative       Blood Moderate / Protein 30 / Leuk Est Large / Nitrite Negative      RBC 10-25 / WBC 11-25 / Hyaline  / Gran  / Sq Epi  / Non Sq Epi  / Bacteria Many      Iron 67, TIBC 203, %sat 33      [07-12-18 @ 14:18]  Ferritin 961      [07-12-18 @ 14:18]  PTH -- (Ca 8.9)      [04-20-18 @ 08:14]   217  PTH -- (Ca 9.0)      [11-09-17 @ 08:51]   387  Vitamin D (25OH) 7.8      [11-09-17 @ 08:51]  HbA1c 4.5      [08-09-16 @ 06:49]  TSH 0.47      [04-24-18 @ 07:51]
SUBJECTIVE: Pt seen, chart reviewed.  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)  Calf DVT (Deep Venous Thrombosis) (ICD9 453.42)  Adenomatous Goiter (ICD9 241.9)  Chronic Gout (ICD9 274.02)  LBBB (Left Bundle Branch Block) (ICD9 426.3)  CHF (Congestive Heart Failure) (ICD9 428.0)  Hx of aorta-iliac-femoral bypass  TOP (Termination of Pregnancy)  S/P Dilatation and Curettage  History of Tubal Ligation  s/p AAA (Abdominal Aortic Aneurysm) Repair: 2009 2010  History of Cholecystectomy  S/P Partial Hysterectomy      HEALTH ISSUES - PROBLEM Dx:  Urinary incontinence due to immobility: Urinary incontinence due to immobility  Urinary retention: Urinary retention  Pericardial effusion: Pericardial effusion  Cardiomyopathy: Cardiomyopathy  DVT, lower extremity: DVT, lower extremity  Pulmonary embolism: Pulmonary embolism  Sacral ulcer: Sacral ulcer  Lower GI bleed: Lower GI bleed          FAMILY HISTORY:  No pertinent family history in first degree relatives      Physical Exam:  Vital Signs Last 24 Hrs  T(C): 36.4 (20 Aug 2018 04:39), Max: 37.3 (19 Aug 2018 19:55)  T(F): 97.5 (20 Aug 2018 04:39), Max: 99.2 (19 Aug 2018 19:55)  HR: 80 (20 Aug 2018 04:39) (79 - 83)  BP: 142/79 (20 Aug 2018 04:39) (142/79 - 157/81)  BP(mean): --  RR: 18 (20 Aug 2018 04:39) (17 - 18)  SpO2: 98% (20 Aug 2018 04:39) (96% - 98%)  Significantly overweight, able to turn unassisted in bed  breathing is non labored  Chronic swelling left leg and thigh  Approx. 2 cm deep small right Ischial wound which is clean, without odor, or discharge, which probes to bone which is uncomfortable on probing  Aquacel removed and replaced  No bleeding noted  consider MRI to r/o Ischial osteo  office f/u advised  Continue Aquacel packing      LABS:                        9.4    6.88  )-----------( 166      ( 20 Aug 2018 07:52 )             30.8     PT/INR - ( 19 Aug 2018 09:21 )   PT: 27.4 sec;   INR: 2.38 ratio               Outpatient follow up information provided- [phone]
[facility] RED SURGERY DAILY PROGRESS NOTE    Subjective:  Patient seen and examined on morning rounds.  -denies pain or bleeding this AM       MEDICATIONS  (STANDING):  allopurinol 100 milliGRAM(s) Oral daily  amLODIPine   Tablet 10 milliGRAM(s) Oral daily  carvedilol 25 milliGRAM(s) Oral every 12 hours  furosemide    Tablet 80 milliGRAM(s) Oral two times a day  pantoprazole    Tablet 40 milliGRAM(s) Oral before breakfast  predniSONE   Tablet 10 milliGRAM(s) Oral daily    MEDICATIONS  (PRN):  ondansetron Injectable 4 milliGRAM(s) IV Push every 6 hours PRN Nausea    Vital Signs Last 24 Hrs  T(C): 36.7 (19 Aug 2018 05:00), Max: 36.9 (18 Aug 2018 16:25)  T(F): 98.1 (19 Aug 2018 05:00), Max: 98.4 (18 Aug 2018 16:25)  HR: 79 (19 Aug 2018 05:00) (79 - 83)  BP: 132/75 (19 Aug 2018 05:00) (132/75 - 170/85)  BP(mean): --  RR: 18 (19 Aug 2018 05:00) (17 - 18)  SpO2: 98% (19 Aug 2018 05:00) (93% - 98%)  I&O's Detail    18 Aug 2018 07:01  -  19 Aug 2018 07:00  --------------------------------------------------------  IN:    Oral Fluid: 50 mL  Total IN: 50 mL    OUT:    Indwelling Catheter - Urethral: 2600 mL  Total OUT: 2600 mL    Total NET: -2550 mL        Daily Height in cm: 165.1 (18 Aug 2018 16:25)    Daily     LABS:                        9.4    5.35  )-----------( 168      ( 19 Aug 2018 09:28 )             30.8     -    141  |  103  |  89<H>  ----------------------------<  95  4.2   |  24  |  3.35<H>    Ca    9.2      19 Aug 2018 07:13    TPro  6.4  /  Alb  3.7  /  TBili  0.4  /  DBili  x   /  AST  18  /  ALT  8<L>  /  AlkPhos  54  08-19    PT/INR - ( 19 Aug 2018 09:21 )   PT: 27.4 sec;   INR: 2.38 ratio         PTT - ( 18 Aug 2018 05:59 )  PTT:36.7 sec  Urinalysis Basic - ( 18 Aug 2018 05:33 )    Color: PL Yellow / Appearance: Clear / S.011 / pH: x  Gluc: x / Ketone: Negative  / Bili: Negative / Urobili: Negative   Blood: x / Protein: 30 mg/dL / Nitrite: Negative   Leuk Esterase: Large / RBC: 10-25 /HPF / WBC 11-25 /HPF   Sq Epi: x / Non Sq Epi: x / Bacteria: Many /HPF        Physical Exam:   Gen: NAD  CVS: RRR  Respiratory: CTA B/L  Abdomen: soft, NT, ND  Back: Sacral decubitis ulcer   Rectal: no fissure or hemmorhoids visualized  Skin: sacral decubitus ulcer visualized   Ext: no edema, gross sensation intact, gross motor function intact
Subjective: Patient seen and examined. No new events except as noted.   bleed again overnight   no cp or sob     REVIEW OF SYSTEMS:    CONSTITUTIONAL: + weakness, fevers or chills  EYES/ENT: No visual changes;  No vertigo or throat pain   NECK: No pain or stiffness  RESPIRATORY: No cough, wheezing, hemoptysis; No shortness of breath  CARDIOVASCULAR: No chest pain or palpitations  GASTROINTESTINAL: No abdominal or epigastric pain. No nausea, vomiting, or hematemesis; No diarrhea or constipation. No melena or hematochezia.  GENITOURINARY: No dysuria, frequency or hematuria  NEUROLOGICAL: No numbness or weakness  SKIN: No itching, burning, rashes, or lesions   All other review of systems is negative unless indicated above.    MEDICATIONS:  MEDICATIONS  (STANDING):  allopurinol 100 milliGRAM(s) Oral daily  amLODIPine   Tablet 10 milliGRAM(s) Oral daily  carvedilol 25 milliGRAM(s) Oral every 12 hours  furosemide    Tablet 80 milliGRAM(s) Oral two times a day  pantoprazole    Tablet 40 milliGRAM(s) Oral before breakfast  predniSONE   Tablet 10 milliGRAM(s) Oral daily      PHYSICAL EXAM:  T(C): 36.7 (08-19-18 @ 05:00), Max: 36.9 (08-18-18 @ 16:25)  HR: 79 (08-19-18 @ 05:00) (79 - 83)  BP: 132/75 (08-19-18 @ 05:00) (132/75 - 170/85)  RR: 18 (08-19-18 @ 05:00) (17 - 18)  SpO2: 98% (08-19-18 @ 05:00) (93% - 98%)  Wt(kg): --  I&O's Summary    18 Aug 2018 07:01  -  19 Aug 2018 07:00  --------------------------------------------------------  IN: 50 mL / OUT: 2600 mL / NET: -2550 mL      Height (cm): 165.1 (08-18 @ 16:25)  Weight (kg): 125.2 (08-19 @ 05:00)  BMI (kg/m2): 45.9 (08-19 @ 05:00)  BSA (m2): 2.27 (08-19 @ 05:00)    Appearance: Obese 	  HEENT:   Normal oral mucosa, PERRL, EOMI	  Lymphatic: No lymphadenopathy  Cardiovascular: Normal S1 S2, No JVD, No murmurs, No edema  Respiratory: Lungs clear to auscultation	  Psychiatry: A & O x 3, Mood & affect appropriate  Gastrointestinal:  Soft, Non-tender, + BS	  Skin: No rashes, No ecchymoses, No cyanosis	  Neurologic: Non-focal  Extremities: massive LLE edema Chronic   Vascular: Peripheral pulses palpable 2+ bilaterally  +fox       LABS:    CARDIAC MARKERS:                                9.4    5.35  )-----------( 168      ( 19 Aug 2018 09:28 )             30.8     08-19    141  |  103  |  89<H>  ----------------------------<  95  4.2   |  24  |  3.35<H>    Ca    9.2      19 Aug 2018 07:13    TPro  6.4  /  Alb  3.7  /  TBili  0.4  /  DBili  x   /  AST  18  /  ALT  8<L>  /  AlkPhos  54  08-19    proBNP:   Lipid Profile:   HgA1c:   TSH:     Negative          TELEMETRY: 	    ECG:  	  RADIOLOGY:   DIAGNOSTIC TESTING:  [ ] Echocardiogram:  [ ]  Catheterization:  [ ] Stress Test:    OTHER:
Subjective: Patient seen and examined. No new events except as noted.   feels ok   No cp or sob       REVIEW OF SYSTEMS:    CONSTITUTIONAL: + weakness, fevers or chills  EYES/ENT: No visual changes;  No vertigo or throat pain   NECK: No pain or stiffness  RESPIRATORY: No cough, wheezing, hemoptysis; No shortness of breath  CARDIOVASCULAR: No chest pain or palpitations  GASTROINTESTINAL: No abdominal or epigastric pain. No nausea, vomiting, or hematemesis; No diarrhea or constipation. No melena or hematochezia.  GENITOURINARY: No dysuria, frequency or hematuria  NEUROLOGICAL: No numbness or weakness  SKIN: No itching, burning, rashes, or lesions   All other review of systems is negative unless indicated above.    MEDICATIONS:  MEDICATIONS  (STANDING):  allopurinol 100 milliGRAM(s) Oral daily  amLODIPine   Tablet 10 milliGRAM(s) Oral daily  ascorbic acid 500 milliGRAM(s) Oral daily  calcitriol   Capsule 0.25 MICROGram(s) Oral daily  carvedilol 25 milliGRAM(s) Oral every 12 hours  furosemide    Tablet 80 milliGRAM(s) Oral two times a day  Nephro-alyse 1 Tablet(s) Oral daily  pantoprazole    Tablet 40 milliGRAM(s) Oral before breakfast  predniSONE   Tablet 10 milliGRAM(s) Oral daily  simethicone 80 milliGRAM(s) Chew every 4 hours  zinc sulfate 220 milliGRAM(s) Oral daily      PHYSICAL EXAM:  T(C): 36.8 (08-26-18 @ 05:14), Max: 36.9 (08-25-18 @ 20:54)  HR: 90 (08-26-18 @ 05:14) (77 - 90)  BP: 168/72 (08-26-18 @ 05:14) (146/78 - 168/72)  RR: 18 (08-26-18 @ 05:14) (18 - 18)  SpO2: 96% (08-26-18 @ 05:14) (96% - 99%)  Wt(kg): --  I&O's Summary    25 Aug 2018 07:01  -  26 Aug 2018 07:00  --------------------------------------------------------  IN: 1140 mL / OUT: 3100 mL / NET: -1960 mL          Appearance: Obese 	  HEENT:   Normal oral mucosa, PERRL, EOMI	  Lymphatic: No lymphadenopathy  Cardiovascular: Normal S1 S2, No JVD, No murmurs, No edema  Respiratory: Lungs clear to auscultation	  Psychiatry: A & O x 3, Mood & affect appropriate  Gastrointestinal:  Soft, Non-tender, + BS	  Skin: No rashes, No ecchymoses, No cyanosis	  Neurologic: Non-focal  Extremities: massive LLE edema Chronic   Vascular: Peripheral pulses palpable 2+ bilaterally  +fox       LABS:    CARDIAC MARKERS:                                9.4    6.22  )-----------( 142      ( 26 Aug 2018 08:10 )             31.4     08-26    142  |  100  |  86<H>  ----------------------------<  92  3.6   |  27  |  3.48<H>    Ca    9.1      26 Aug 2018 06:25      proBNP:   Lipid Profile:   HgA1c:   TSH:             TELEMETRY: 	    ECG:  	  RADIOLOGY:   DIAGNOSTIC TESTING:  [ ] Echocardiogram:  [ ]  Catheterization:  [ ] Stress Test:    OTHER:
Subjective: Patient seen and examined. No new events except as noted.   feels ok   no cp or sob     REVIEW OF SYSTEMS:    CONSTITUTIONAL: + weakness, fevers or chills  EYES/ENT: No visual changes;  No vertigo or throat pain   NECK: No pain or stiffness  RESPIRATORY: No cough, wheezing, hemoptysis; No shortness of breath  CARDIOVASCULAR: No chest pain or palpitations  GASTROINTESTINAL: No abdominal or epigastric pain. No nausea, vomiting, or hematemesis; No diarrhea or constipation. No melena or hematochezia.  GENITOURINARY: No dysuria, frequency or hematuria  NEUROLOGICAL: No numbness or weakness  SKIN: No itching, burning, rashes, or lesions   All other review of systems is negative unless indicated above.    MEDICATIONS:  MEDICATIONS  (STANDING):  allopurinol 100 milliGRAM(s) Oral daily  amLODIPine   Tablet 10 milliGRAM(s) Oral daily  ascorbic acid 500 milliGRAM(s) Oral daily  calcitriol   Capsule 0.25 MICROGram(s) Oral daily  carvedilol 25 milliGRAM(s) Oral every 12 hours  furosemide    Tablet 80 milliGRAM(s) Oral two times a day  Nephro-alyse 1 Tablet(s) Oral daily  pantoprazole    Tablet 40 milliGRAM(s) Oral before breakfast  predniSONE   Tablet 10 milliGRAM(s) Oral daily  simethicone 80 milliGRAM(s) Chew every 4 hours  warfarin 3 milliGRAM(s) Oral once  zinc sulfate 220 milliGRAM(s) Oral daily      PHYSICAL EXAM:  T(C): 36.5 (08-23-18 @ 05:05), Max: 37.3 (08-22-18 @ 21:00)  HR: 83 (08-23-18 @ 05:05) (76 - 83)  BP: 151/82 (08-23-18 @ 05:05) (129/82 - 152/78)  RR: 17 (08-23-18 @ 05:05) (17 - 17)  SpO2: 95% (08-23-18 @ 05:05) (95% - 99%)  Wt(kg): --  I&O's Summary    22 Aug 2018 07:01  -  23 Aug 2018 07:00  --------------------------------------------------------  IN: 760 mL / OUT: 3650 mL / NET: -2890 mL          Appearance: Obese 	  HEENT:   Normal oral mucosa, PERRL, EOMI	  Lymphatic: No lymphadenopathy  Cardiovascular: Normal S1 S2, No JVD, No murmurs, No edema  Respiratory: Lungs clear to auscultation	  Psychiatry: A & O x 3, Mood & affect appropriate  Gastrointestinal:  Soft, Non-tender, + BS	  Skin: No rashes, No ecchymoses, No cyanosis	  Neurologic: Non-focal  Extremities: massive LLE edema Chronic   Vascular: Peripheral pulses palpable 2+ bilaterally  +fox       LABS:    CARDIAC MARKERS:      Prothrombin Time and INR, Plasma in AM (08.23.18 @ 08:51)    Prothrombin Time, Plasma: 22.9 sec    INR: 2.00: Recommended ranges for therapeutic INR:    2.0-3.0 for most medical and surgical thromboembolic states    2.0-3.0 for atrial fibrillation    2.0-3.0 for bileaflet mechanical valve in aortic position    2.5-3.5 for mechanical heart valves    Chest 2004;[id number]  The presence of direct thrombin inhibitors (argatroban, refludan)  may falsely increase results. ratio                              9.1    6.85  )-----------( 147      ( 22 Aug 2018 08:10 )             29.6     08-23    143  |  102  |  86<H>  ----------------------------<  77  3.7   |  25  |  3.71<H>    Ca    8.9      23 Aug 2018 06:57      proBNP:   Lipid Profile:   HgA1c:   TSH:             TELEMETRY: 	    ECG:  	  RADIOLOGY:   DIAGNOSTIC TESTING:  [ ] Echocardiogram:  [ ]  Catheterization:  [ ] Stress Test:    OTHER:
Subjective: Patient seen and examined. No new events except as noted.   no bleed   no cp or sob   REVIEW OF SYSTEMS:    CONSTITUTIONAL: +weakness, fevers or chills  EYES/ENT: No visual changes;  No vertigo or throat pain   NECK: No pain or stiffness  RESPIRATORY: No cough, wheezing, hemoptysis; No shortness of breath  CARDIOVASCULAR: No chest pain or palpitations  GASTROINTESTINAL: No abdominal or epigastric pain. No nausea, vomiting, or hematemesis; No diarrhea or constipation. No melena or hematochezia.  GENITOURINARY: No dysuria, frequency or hematuria  NEUROLOGICAL: No numbness or weakness  SKIN: No itching, burning, rashes, or lesions   All other review of systems is negative unless indicated above.    MEDICATIONS:  MEDICATIONS  (STANDING):  allopurinol 100 milliGRAM(s) Oral daily  amLODIPine   Tablet 10 milliGRAM(s) Oral daily  ascorbic acid 500 milliGRAM(s) Oral daily  calcitriol   Capsule 0.25 MICROGram(s) Oral daily  carvedilol 25 milliGRAM(s) Oral every 12 hours  furosemide    Tablet 80 milliGRAM(s) Oral two times a day  Nephro-alyse 1 Tablet(s) Oral daily  pantoprazole    Tablet 40 milliGRAM(s) Oral before breakfast  predniSONE   Tablet 10 milliGRAM(s) Oral daily  zinc sulfate 220 milliGRAM(s) Oral daily      PHYSICAL EXAM:  T(C): 36.8 (08-21-18 @ 06:08), Max: 37.2 (08-20-18 @ 21:05)  HR: 82 (08-21-18 @ 06:08) (77 - 84)  BP: 164/83 (08-21-18 @ 06:08) (155/73 - 164/83)  RR: 18 (08-21-18 @ 06:08) (18 - 18)  SpO2: 97% (08-21-18 @ 06:08) (96% - 97%)  Wt(kg): --  I&O's Summary    20 Aug 2018 07:01  -  21 Aug 2018 07:00  --------------------------------------------------------  IN: 420 mL / OUT: 3350 mL / NET: -2930 mL          Appearance: Obese 	  HEENT:   Normal oral mucosa, PERRL, EOMI	  Lymphatic: No lymphadenopathy  Cardiovascular: Normal S1 S2, No JVD, No murmurs, No edema  Respiratory: Lungs clear to auscultation	  Psychiatry: A & O x 3, Mood & affect appropriate  Gastrointestinal:  Soft, Non-tender, + BS	  Skin: No rashes, No ecchymoses, No cyanosis	  Neurologic: Non-focal  Extremities: massive LLE edema Chronic   Vascular: Peripheral pulses palpable 2+ bilaterally  +fox       LABS:    CARDIAC MARKERS:                                9.4    6.88  )-----------( 166      ( 20 Aug 2018 07:52 )             30.8     08-21    143  |  104  |  87<H>  ----------------------------<  84  3.5   |  24  |  3.76<H>    Ca    8.9      21 Aug 2018 06:49      proBNP:   Lipid Profile:   HgA1c:   TSH:             TELEMETRY: 	    ECG:  	  RADIOLOGY:   DIAGNOSTIC TESTING:  [ ] Echocardiogram:  [ ]  Catheterization:  [ ] Stress Test:    OTHER:
Subjective: Patient seen and examined. No new events except as noted.   no cp or sob     REVIEW OF SYSTEMS:    CONSTITUTIONAL: + weakness, fevers or chills  EYES/ENT: No visual changes;  No vertigo or throat pain   NECK: No pain or stiffness  RESPIRATORY: No cough, wheezing, hemoptysis; No shortness of breath  CARDIOVASCULAR: No chest pain or palpitations  GASTROINTESTINAL: No abdominal or epigastric pain. No nausea, vomiting, or hematemesis; No diarrhea or constipation. No melena or hematochezia.  GENITOURINARY: No dysuria, frequency or hematuria  NEUROLOGICAL: No numbness or weakness  SKIN: No itching, burning, rashes, or lesions   All other review of systems is negative unless indicated above.    MEDICATIONS:  MEDICATIONS  (STANDING):  allopurinol 100 milliGRAM(s) Oral daily  amLODIPine   Tablet 10 milliGRAM(s) Oral daily  ascorbic acid 500 milliGRAM(s) Oral daily  calcitriol   Capsule 0.25 MICROGram(s) Oral daily  carvedilol 25 milliGRAM(s) Oral every 12 hours  furosemide    Tablet 80 milliGRAM(s) Oral two times a day  Nephro-alyse 1 Tablet(s) Oral daily  pantoprazole    Tablet 40 milliGRAM(s) Oral before breakfast  predniSONE   Tablet 10 milliGRAM(s) Oral daily  simethicone 80 milliGRAM(s) Chew every 4 hours  zinc sulfate 220 milliGRAM(s) Oral daily      PHYSICAL EXAM:  T(C): 36.9 (08-25-18 @ 20:54), Max: 36.9 (08-25-18 @ 20:54)  HR: 77 (08-25-18 @ 20:54) (76 - 84)  BP: 148/73 (08-25-18 @ 20:54) (146/78 - 162/88)  RR: 18 (08-25-18 @ 20:54) (17 - 18)  SpO2: 96% (08-25-18 @ 20:54) (96% - 99%)  Wt(kg): --  I&O's Summary    24 Aug 2018 07:01  -  25 Aug 2018 07:00  --------------------------------------------------------  IN: 0 mL / OUT: 3250 mL / NET: -3250 mL    25 Aug 2018 07:01  -  25 Aug 2018 22:21  --------------------------------------------------------  IN: 900 mL / OUT: 800 mL / NET: 100 mL          Appearance: Obese 	  HEENT:   Normal oral mucosa, PERRL, EOMI	  Lymphatic: No lymphadenopathy  Cardiovascular: Normal S1 S2, No JVD, No murmurs, No edema  Respiratory: Lungs clear to auscultation	  Psychiatry: A & O x 3, Mood & affect appropriate  Gastrointestinal:  Soft, Non-tender, + BS	  Skin: No rashes, No ecchymoses, No cyanosis	  Neurologic: Non-focal  Extremities: massive LLE edema Chronic   Vascular: Peripheral pulses palpable 2+ bilaterally  +fox         LABS:    CARDIAC MARKERS:                                9.3    5.88  )-----------( 136      ( 25 Aug 2018 08:02 )             30.9     08-25    145  |  102  |  84<H>  ----------------------------<  85  3.7   |  28  |  3.59<H>    Ca    9.1      25 Aug 2018 06:57      proBNP:   Lipid Profile:   HgA1c:   TSH:             TELEMETRY: 	    ECG:  	  RADIOLOGY:   DIAGNOSTIC TESTING:  [ ] Echocardiogram:  [ ]  Catheterization:  [ ] Stress Test:    OTHER:
Subjective: Patient seen and examined. No new events except as noted.   resting comfortably in bed     REVIEW OF SYSTEMS:    CONSTITUTIONAL: + weakness, fevers or chills  EYES/ENT: No visual changes;  No vertigo or throat pain   NECK: No pain or stiffness  RESPIRATORY: No cough, wheezing, hemoptysis; No shortness of breath  CARDIOVASCULAR: No chest pain or palpitations  GASTROINTESTINAL: No abdominal or epigastric pain. No nausea, vomiting, or hematemesis; No diarrhea or constipation. No melena or hematochezia.  GENITOURINARY: No dysuria, frequency or hematuria  NEUROLOGICAL: No numbness or weakness  SKIN: No itching, burning, rashes, or lesions   All other review of systems is negative unless indicated above.    MEDICATIONS:  MEDICATIONS  (STANDING):  allopurinol 100 milliGRAM(s) Oral daily  amLODIPine   Tablet 10 milliGRAM(s) Oral daily  ascorbic acid 500 milliGRAM(s) Oral daily  calcitriol   Capsule 0.25 MICROGram(s) Oral daily  carvedilol 25 milliGRAM(s) Oral every 12 hours  furosemide    Tablet 80 milliGRAM(s) Oral two times a day  Nephro-alyse 1 Tablet(s) Oral daily  pantoprazole    Tablet 40 milliGRAM(s) Oral before breakfast  predniSONE   Tablet 10 milliGRAM(s) Oral daily  simethicone 80 milliGRAM(s) Chew every 4 hours  zinc sulfate 220 milliGRAM(s) Oral daily      PHYSICAL EXAM:  T(C): 36.7 (08-27-18 @ 05:03), Max: 37 (08-26-18 @ 21:22)  HR: 85 (08-27-18 @ 06:31) (72 - 89)  BP: 154/78 (08-27-18 @ 06:31) (142/78 - 173/91)  RR: 17 (08-27-18 @ 05:03) (17 - 18)  SpO2: 95% (08-27-18 @ 05:03) (95% - 97%)  Wt(kg): --  I&O's Summary    26 Aug 2018 07:01  -  27 Aug 2018 07:00  --------------------------------------------------------  IN: 980 mL / OUT: 2750 mL / NET: -1770 mL    27 Aug 2018 07:01  -  27 Aug 2018 10:35  --------------------------------------------------------  IN: 300 mL / OUT: 0 mL / NET: 300 mL          Appearance: Obese 	  HEENT:   Normal oral mucosa, PERRL, EOMI	  Lymphatic: No lymphadenopathy  Cardiovascular: Normal S1 S2, No JVD, No murmurs, No edema  Respiratory: Lungs clear to auscultation	  Psychiatry: A & O x 3, Mood & affect appropriate  Gastrointestinal:  Soft, Non-tender, + BS	  Skin: No rashes, No ecchymoses, No cyanosis	  Neurologic: Non-focal  Extremities: massive LLE edema Chronic   Vascular: Peripheral pulses palpable 2+ bilaterally  +fox           LABS:    CARDIAC MARKERS:                                9.6    6.47  )-----------( 158      ( 27 Aug 2018 07:07 )             31.9     08-27    145  |  102  |  86<H>  ----------------------------<  87  3.9   |  29  |  3.66<H>    Ca    8.9      27 Aug 2018 05:28  Mg     2.0     08-27    TPro  6.3  /  Alb  3.4  /  TBili  0.4  /  DBili  x   /  AST  17  /  ALT  11  /  AlkPhos  57  08-27    proBNP:   Lipid Profile:   HgA1c:   TSH:             TELEMETRY: 	    ECG:  	  RADIOLOGY:   DIAGNOSTIC TESTING:  [ ] Echocardiogram:  [ ]  Catheterization:  [ ] Stress Test:    OTHER:
Subjective: Patient seen and examined. No new events except as noted.   resting comfortably in bed     REVIEW OF SYSTEMS:    CONSTITUTIONAL: + weakness, fevers or chills  EYES/ENT: No visual changes;  No vertigo or throat pain   NECK: No pain or stiffness  RESPIRATORY: No cough, wheezing, hemoptysis; No shortness of breath  CARDIOVASCULAR: No chest pain or palpitations  GASTROINTESTINAL: No abdominal or epigastric pain. No nausea, vomiting, or hematemesis; No diarrhea or constipation. No melena or hematochezia.  GENITOURINARY: No dysuria, frequency or hematuria  NEUROLOGICAL: No numbness or weakness  SKIN: No itching, burning, rashes, or lesions   All other review of systems is negative unless indicated above.    MEDICATIONS:  MEDICATIONS  (STANDING):  allopurinol 100 milliGRAM(s) Oral daily  amLODIPine   Tablet 10 milliGRAM(s) Oral daily  ascorbic acid 500 milliGRAM(s) Oral daily  calcitriol   Capsule 0.25 MICROGram(s) Oral daily  carvedilol 25 milliGRAM(s) Oral every 12 hours  furosemide    Tablet 80 milliGRAM(s) Oral two times a day  Nephro-alyse 1 Tablet(s) Oral daily  pantoprazole    Tablet 40 milliGRAM(s) Oral before breakfast  predniSONE   Tablet 10 milliGRAM(s) Oral daily  zinc sulfate 220 milliGRAM(s) Oral daily      PHYSICAL EXAM:  T(C): 36.4 (08-20-18 @ 04:39), Max: 37.3 (08-19-18 @ 19:55)  HR: 80 (08-20-18 @ 04:39) (79 - 83)  BP: 142/79 (08-20-18 @ 04:39) (142/79 - 157/81)  RR: 18 (08-20-18 @ 04:39) (17 - 18)  SpO2: 98% (08-20-18 @ 04:39) (96% - 98%)  Wt(kg): --  I&O's Summary    19 Aug 2018 07:01  -  20 Aug 2018 07:00  --------------------------------------------------------  IN: 890 mL / OUT: 3450 mL / NET: -2560 mL          Appearance: Obese 	  HEENT:   Normal oral mucosa, PERRL, EOMI	  Lymphatic: No lymphadenopathy  Cardiovascular: Normal S1 S2, No JVD, No murmurs, No edema  Respiratory: Lungs clear to auscultation	  Psychiatry: A & O x 3, Mood & affect appropriate  Gastrointestinal:  Soft, Non-tender, + BS	  Skin: No rashes, No ecchymoses, No cyanosis	  Neurologic: Non-focal  Extremities: massive LLE edema Chronic   Vascular: Peripheral pulses palpable 2+ bilaterally  +fox       LABS:    CARDIAC MARKERS:                                9.4    6.88  )-----------( 166      ( 20 Aug 2018 07:52 )             30.8     08-20    144  |  105  |  86<H>  ----------------------------<  93  3.7   |  25  |  3.54<H>    Ca    9.1      20 Aug 2018 06:38    TPro  6.4  /  Alb  3.7  /  TBili  0.4  /  DBili  x   /  AST  18  /  ALT  8<L>  /  AlkPhos  54  08-19    proBNP:   Lipid Profile:   HgA1c:   TSH:     Negative          TELEMETRY: 	    ECG:  	  RADIOLOGY:   DIAGNOSTIC TESTING:  [ ] Echocardiogram:  [ ]  Catheterization:  [ ] Stress Test:    OTHER:
Interval Events: Pt denies abdominal pain, nausea, vomiting, diarrhea, constipation, brbpr/melena.  Tolerating PO well.     MEDICATIONS  (STANDING):  allopurinol 100 milliGRAM(s) Oral daily  amLODIPine   Tablet 10 milliGRAM(s) Oral daily  ascorbic acid 500 milliGRAM(s) Oral daily  calcitriol   Capsule 0.25 MICROGram(s) Oral daily  carvedilol 25 milliGRAM(s) Oral every 12 hours  furosemide    Tablet 80 milliGRAM(s) Oral two times a day  Nephro-alyse 1 Tablet(s) Oral daily  pantoprazole    Tablet 40 milliGRAM(s) Oral before breakfast  predniSONE   Tablet 10 milliGRAM(s) Oral daily  zinc sulfate 220 milliGRAM(s) Oral daily    MEDICATIONS  (PRN):  ondansetron Injectable 4 milliGRAM(s) IV Push every 6 hours PRN Nausea      Allergies    heparin (Unknown)  losartan (Anaphylaxis)  nitroglycerin (Other)    Intolerances        Review of Systems:    General:  No wt loss, fevers, chills, night sweats,fatigue,   Eyes:  Good vision, no reported pain  ENT:  No sore throat, pain, runny nose, dysphagia  CV:  No pain, palpitations, hypo/hypertension  Resp:  No dyspnea, cough, tachypnea, wheezing  GI:  No pain, No nausea, No vomiting, No diarrhea, No constipation, No weight loss, No fever, No pruritis, No rectal bleeding, No melena, No dysphagia  :  No pain, bleeding, incontinence, nocturia  Muscle:  No pain, weakness  Neuro:  No weakness, tingling, memory problems  Psych:  No fatigue, insomnia, mood problems, depression  Endocrine:  No polyuria, polydypsia, cold/heat intolerance  Heme:  No petechiae, ecchymosis, easy bruisability  Skin:  No rash, tattoos, scars, edema      Vital Signs Last 24 Hrs  T(C): 36.8 (21 Aug 2018 06:08), Max: 37.2 (20 Aug 2018 21:05)  T(F): 98.3 (21 Aug 2018 06:08), Max: 98.9 (20 Aug 2018 21:05)  HR: 82 (21 Aug 2018 06:08) (77 - 84)  BP: 164/83 (21 Aug 2018 06:08) (155/73 - 164/83)  BP(mean): --  RR: 18 (21 Aug 2018 06:08) (18 - 18)  SpO2: 97% (21 Aug 2018 06:08) (96% - 97%)    PHYSICAL EXAM:    Constitutional: NAD, well-developed  HEENT: EOMI, throat clear  Neck: No LAD, supple  Respiratory: CTA and P  Cardiovascular: S1 and S2, RRR, no M  Gastrointestinal: BS+, soft, NT/ND, neg HSM,  Extremities: No peripheral edema, neg clubing, cyanosis  Vascular: 2+ peripheral pulses  Neurological: A/O x 3, no focal deficits  Psychiatric: Normal mood, normal affect  Skin: No rashes      LABS:                        9.2    7.20  )-----------( 148      ( 21 Aug 2018 07:51 )             30.8     08-21    143  |  104  |  87<H>  ----------------------------<  84  3.5   |  24  |  3.76<H>    Ca    8.9      21 Aug 2018 06:49      PT/INR - ( 21 Aug 2018 07:49 )   PT: 26.0 sec;   INR: 2.26 ratio               RADIOLOGY & ADDITIONAL TESTS:
Subjective: Patient seen and examined. No new events except as noted.   resting comfortably in bed     REVIEW OF SYSTEMS:    CONSTITUTIONAL: + weakness, fevers or chills  EYES/ENT: No visual changes;  No vertigo or throat pain   NECK: No pain or stiffness  RESPIRATORY: No cough, wheezing, hemoptysis; No shortness of breath  CARDIOVASCULAR: No chest pain or palpitations  GASTROINTESTINAL: No abdominal or epigastric pain. No nausea, vomiting, or hematemesis; No diarrhea or constipation. No melena or hematochezia.  GENITOURINARY: No dysuria, frequency or hematuria  NEUROLOGICAL: No numbness or weakness  SKIN: No itching, burning, rashes, or lesions   All other review of systems is negative unless indicated above.    MEDICATIONS:  MEDICATIONS  (STANDING):  allopurinol 100 milliGRAM(s) Oral daily  amLODIPine   Tablet 10 milliGRAM(s) Oral daily  ascorbic acid 500 milliGRAM(s) Oral daily  calcitriol   Capsule 0.25 MICROGram(s) Oral daily  carvedilol 25 milliGRAM(s) Oral every 12 hours  furosemide    Tablet 80 milliGRAM(s) Oral two times a day  Nephro-alyse 1 Tablet(s) Oral daily  pantoprazole    Tablet 40 milliGRAM(s) Oral before breakfast  predniSONE   Tablet 10 milliGRAM(s) Oral daily  zinc sulfate 220 milliGRAM(s) Oral daily      PHYSICAL EXAM:  T(C): 36.6 (08-22-18 @ 05:42), Max: 37 (08-21-18 @ 20:58)  HR: 88 (08-22-18 @ 05:42) (76 - 88)  BP: 135/76 (08-22-18 @ 05:42) (134/77 - 152/71)  RR: 17 (08-22-18 @ 05:42) (17 - 18)  SpO2: 99% (08-22-18 @ 05:42) (99% - 99%)  Wt(kg): --  I&O's Summary    21 Aug 2018 07:01  -  22 Aug 2018 07:00  --------------------------------------------------------  IN: 360 mL / OUT: 2900 mL / NET: -2540 mL          Appearance: Obese 	  HEENT:   Normal oral mucosa, PERRL, EOMI	  Lymphatic: No lymphadenopathy  Cardiovascular: Normal S1 S2, No JVD, No murmurs, No edema  Respiratory: Lungs clear to auscultation	  Psychiatry: A & O x 3, Mood & affect appropriate  Gastrointestinal:  Soft, Non-tender, + BS	  Skin: No rashes, No ecchymoses, No cyanosis	  Neurologic: Non-focal  Extremities: massive LLE edema Chronic   Vascular: Peripheral pulses palpable 2+ bilaterally  +fox         LABS:    CARDIAC MARKERS:    Prothrombin Time and INR, Plasma in AM (08.21.18 @ 07:49)    Prothrombin Time, Plasma: 26.0 sec    INR: 2.26: Recommended ranges for therapeutic INR:    2.0-3.0 for most medical and surgical thromboembolic states    2.0-3.0 for atrial fibrillation    2.0-3.0 for bileaflet mechanical valve in aortic position    2.5-3.5 for mechanical heart valves    Chest 2004;126:[id number]  The presence of direct thrombin inhibitors (argatroban, refludan)  may falsely increase results. ratio                                9.2    7.20  )-----------( 148      ( 21 Aug 2018 07:51 )             30.8     08-22    143  |  100  |  88<H>  ----------------------------<  87  3.5   |  24  |  3.75<H>    Ca    9.0      22 Aug 2018 07:08      proBNP:   Lipid Profile:   HgA1c:   TSH:             TELEMETRY: 	    ECG:  	  RADIOLOGY:   DIAGNOSTIC TESTING:  [ ] Echocardiogram:  [ ]  Catheterization:  [ ] Stress Test:    OTHER:
Interval Events: Pt admits to some gas pain, she denies N/V, tolerating PO well. No brbpr/melena.     MEDICATIONS  (STANDING):  allopurinol 100 milliGRAM(s) Oral daily  amLODIPine   Tablet 10 milliGRAM(s) Oral daily  ascorbic acid 500 milliGRAM(s) Oral daily  calcitriol   Capsule 0.25 MICROGram(s) Oral daily  carvedilol 25 milliGRAM(s) Oral every 12 hours  furosemide    Tablet 80 milliGRAM(s) Oral two times a day  Nephro-alyse 1 Tablet(s) Oral daily  pantoprazole    Tablet 40 milliGRAM(s) Oral before breakfast  predniSONE   Tablet 10 milliGRAM(s) Oral daily  zinc sulfate 220 milliGRAM(s) Oral daily    MEDICATIONS  (PRN):  ondansetron Injectable 4 milliGRAM(s) IV Push every 6 hours PRN Nausea      Allergies    heparin (Unknown)  losartan (Anaphylaxis)  nitroglycerin (Other)    Intolerances        Review of Systems:    General:  No wt loss, fevers, chills, night sweats,fatigue,   Eyes:  Good vision, no reported pain  ENT:  No sore throat, pain, runny nose, dysphagia  CV:  No pain, palpitations, hypo/hypertension  Resp:  No dyspnea, cough, tachypnea, wheezing  GI:  No pain, No nausea, No vomiting, No diarrhea, No constipation, No weight loss, No fever, No pruritis, No rectal bleeding, No melena, No dysphagia  :  No pain, bleeding, incontinence, nocturia  Muscle:  No pain, weakness  Neuro:  No weakness, tingling, memory problems  Psych:  No fatigue, insomnia, mood problems, depression  Endocrine:  No polyuria, polydypsia, cold/heat intolerance  Heme:  No petechiae, ecchymosis, easy bruisability  Skin:  No rash, tattoos, scars, edema      Vital Signs Last 24 Hrs  T(C): 36.5 (23 Aug 2018 05:05), Max: 37.3 (22 Aug 2018 21:00)  T(F): 97.7 (23 Aug 2018 05:05), Max: 99.1 (22 Aug 2018 21:00)  HR: 83 (23 Aug 2018 05:05) (76 - 83)  BP: 151/82 (23 Aug 2018 05:05) (129/82 - 152/78)  BP(mean): --  RR: 17 (23 Aug 2018 05:05) (17 - 17)  SpO2: 95% (23 Aug 2018 05:05) (95% - 99%)    PHYSICAL EXAM:    Constitutional: NAD, well-developed  HEENT: EOMI, throat clear  Neck: No LAD, supple  Respiratory: CTA and P  Cardiovascular: S1 and S2, RRR, no M  Gastrointestinal: BS+, soft, NT/ND, neg HSM,  Extremities: No peripheral edema, neg clubing, cyanosis  Vascular: 2+ peripheral pulses  Neurological: A/O x 3, no focal deficits  Psychiatric: Normal mood, normal affect  Skin: No rashes      LABS:                        9.1    6.85  )-----------( 147      ( 22 Aug 2018 08:10 )             29.6     08-23    143  |  102  |  86<H>  ----------------------------<  77  3.7   |  25  |  3.71<H>    Ca    8.9      23 Aug 2018 06:57      PT/INR - ( 23 Aug 2018 08:51 )   PT: 22.9 sec;   INR: 2.00 ratio         PTT - ( 23 Aug 2018 08:51 )  PTT:35.4 sec      RADIOLOGY & ADDITIONAL TESTS:
Subjective: Patient seen and examined. No new events except as noted.   no cp or sob     REVIEW OF SYSTEMS:    CONSTITUTIONAL: + weakness, fevers or chills  EYES/ENT: No visual changes;  No vertigo or throat pain   NECK: No pain or stiffness  RESPIRATORY: No cough, wheezing, hemoptysis; No shortness of breath  CARDIOVASCULAR: No chest pain or palpitations  GASTROINTESTINAL: No abdominal or epigastric pain. No nausea, vomiting, or hematemesis; No diarrhea or constipation. No melena or hematochezia.  GENITOURINARY: No dysuria, frequency or hematuria  NEUROLOGICAL: No numbness or weakness  SKIN: No itching, burning, rashes, or lesions   All other review of systems is negative unless indicated above.    MEDICATIONS:  MEDICATIONS  (STANDING):  allopurinol 100 milliGRAM(s) Oral daily  amLODIPine   Tablet 10 milliGRAM(s) Oral daily  ascorbic acid 500 milliGRAM(s) Oral daily  calcitriol   Capsule 0.25 MICROGram(s) Oral daily  carvedilol 25 milliGRAM(s) Oral every 12 hours  furosemide    Tablet 80 milliGRAM(s) Oral two times a day  Nephro-alyse 1 Tablet(s) Oral daily  pantoprazole    Tablet 40 milliGRAM(s) Oral before breakfast  predniSONE   Tablet 10 milliGRAM(s) Oral daily  simethicone 80 milliGRAM(s) Chew every 4 hours  zinc sulfate 220 milliGRAM(s) Oral daily      PHYSICAL EXAM:  T(C): 36.5 (08-24-18 @ 05:08), Max: 36.8 (08-23-18 @ 20:49)  HR: 91 (08-24-18 @ 05:08) (75 - 91)  BP: 146/78 (08-24-18 @ 05:08) (143/74 - 155/81)  RR: 18 (08-24-18 @ 05:08) (17 - 18)  SpO2: 98% (08-24-18 @ 05:08) (96% - 98%)  Wt(kg): --  I&O's Summary    23 Aug 2018 07:01  -  24 Aug 2018 07:00  --------------------------------------------------------  IN: 0 mL / OUT: 3400 mL / NET: -3400 mL    24 Aug 2018 07:01  -  24 Aug 2018 11:31  --------------------------------------------------------  IN: 0 mL / OUT: 850 mL / NET: -850 mL          Appearance: Obese 	  HEENT:   Normal oral mucosa, PERRL, EOMI	  Lymphatic: No lymphadenopathy  Cardiovascular: Normal S1 S2, No JVD, No murmurs, No edema  Respiratory: Lungs clear to auscultation	  Psychiatry: A & O x 3, Mood & affect appropriate  Gastrointestinal:  Soft, Non-tender, + BS	  Skin: No rashes, No ecchymoses, No cyanosis	  Neurologic: Non-focal  Extremities: massive LLE edema Chronic   Vascular: Peripheral pulses palpable 2+ bilaterally  +fox       LABS:    CARDIAC MARKERS:    Prothrombin Time and INR, Plasma in AM (08.24.18 @ 07:26)    Prothrombin Time, Plasma: 24.2 sec    INR: 2.11: Recommended ranges for therapeutic INR:    2.0-3.0 for most medical and surgical thromboembolic states    2.0-3.0 for atrial fibrillation    2.0-3.0 for bileaflet mechanical valve in aortic position    2.5-3.5 for mechanical heart valves    Chest 2004;126:[id number]  The presence of direct thrombin inhibitors (argatroban, refludan)  may falsely increase results. ratio                                9.2    6.57  )-----------( 142      ( 24 Aug 2018 07:22 )             30.7     08-24    143  |  101  |  85<H>  ----------------------------<  74  3.5   |  26  |  3.64<H>    Ca    8.6      24 Aug 2018 06:47      proBNP:   Lipid Profile:   HgA1c:   TSH:             TELEMETRY: 	    ECG:  	  RADIOLOGY: < from: [facility] Duplex Lower Ext Vein Scan, Bilat (08.23.18 @ 16:22) >    EXAM:  DUPLEX SCAN EXT VEINS LOWER BI                            PROCEDURE DATE:  08/23/2018            INTERPRETATION:  CLINICAL INFORMATION: History of DVT. Follow-up study.    COMPARISON: 7/12/2018.    TECHNIQUE: Duplex sonography of the BILATERAL LOWER extremities with   color and spectral Doppler, with and without compression.      FINDINGS: This study was difficult to patient's body habitus.    There is normal compressibility of the right common femoral, femoral and   popliteal veins. No right calf vein thrombosis is detected.    Doppler examination shows normal spontaneous and phasic flow in the right   lower extremity veins.    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   This aneurysm measures 2.3 cm.    There is persistent chronic residual DVT involving the left common   femoral vein and proximal left femoral vein. There is thickening of the   wall of the left greater saphenous vein at the saphenofemoral junction.   The distal distal left femoral vein was not visualized.    Again seen is the left popliteal arterial aneurysm containing thrombus.   This aneurysm measures 2.6 cm x 2.9 cm.    IMPRESSION:     No evidence of right lower extremity deep venous thrombosis.    Persistent chronic residual DVT involving the left common femoral vein   and proximal left femoral vein.    Bilateral popliteal arterial aneurysms containing thrombus again   appreciated.                        TANYA SILVERIO M.D., ATTENDING RADIOLOGIST  This document has been electronically signed. Aug 23 2018  4:43PM                < end of copied text >    DIAGNOSTIC TESTING:  [ ] Echocardiogram:  [ ]  Catheterization:  [ ] Stress Test:    OTHER:

## 2018-08-27 NOTE — PROGRESS NOTE ADULT - PROBLEM SELECTOR PROBLEM 1
Lower GI bleed
CKD (chronic kidney disease) stage 4, GFR 15-29 ml/min
CKD (chronic kidney disease) stage 4, GFR 15-29 ml/min
Lower GI bleed

## 2018-08-27 NOTE — PROGRESS NOTE ADULT - PROBLEM SELECTOR PLAN 1
monitor CBC daily while admitted  transfuse prn  normal bleeding scan   no signs of GIB, recommend continue AC, however patient is refusing  monitor stools for signs of bleeding  diet as tolerated, simethicone 80 mg QID started for gas   No plans for colonoscopy d/w Dr. Reed

## 2018-08-27 NOTE — PROGRESS NOTE ADULT - PROVIDER SPECIALTY LIST ADULT
Cardiology
Colorectal Surgery
Gastroenterology
Hospitalist
Hospitalist
Internal Medicine
Nephrology
Wound Care
Gastroenterology
Cardiology
Gastroenterology
Nephrology
Cardiology

## 2018-08-29 RX ORDER — COLCHICINE 0.6 MG
1 TABLET ORAL
Qty: 2 | Refills: 0 | OUTPATIENT
Start: 2018-08-29 | End: 2018-08-30

## 2018-08-29 RX ORDER — COLCHICINE 0.6 MG
1 TABLET ORAL
Qty: 0 | Refills: 0 | COMMUNITY
Start: 2018-08-29

## 2018-10-11 ENCOUNTER — INPATIENT (INPATIENT)
Facility: HOSPITAL | Age: 58
LOS: 5 days | Discharge: HOME | DRG: 698 | End: 2018-10-17
Attending: INTERNAL MEDICINE | Admitting: INTERNAL MEDICINE
Payer: MEDICARE

## 2018-10-11 VITALS
SYSTOLIC BLOOD PRESSURE: 127 MMHG | OXYGEN SATURATION: 97 % | HEART RATE: 80 BPM | TEMPERATURE: 100 F | DIASTOLIC BLOOD PRESSURE: 58 MMHG | RESPIRATION RATE: 17 BRPM

## 2018-10-11 DIAGNOSIS — D64.9 ANEMIA, UNSPECIFIED: ICD-10-CM

## 2018-10-11 DIAGNOSIS — I80.00 PHLEBITIS AND THROMBOPHLEBITIS OF SUPERFICIAL VESSELS OF UNSPECIFIED LOWER EXTREMITY: ICD-10-CM

## 2018-10-11 DIAGNOSIS — J96.01 ACUTE RESPIRATORY FAILURE WITH HYPOXIA: ICD-10-CM

## 2018-10-11 DIAGNOSIS — R50.9 FEVER, UNSPECIFIED: ICD-10-CM

## 2018-10-11 DIAGNOSIS — N17.9 ACUTE KIDNEY FAILURE, UNSPECIFIED: ICD-10-CM

## 2018-10-11 DIAGNOSIS — R09.89 OTHER SPECIFIED SYMPTOMS AND SIGNS INVOLVING THE CIRCULATORY AND RESPIRATORY SYSTEMS: ICD-10-CM

## 2018-10-11 DIAGNOSIS — N39.0 URINARY TRACT INFECTION, SITE NOT SPECIFIED: ICD-10-CM

## 2018-10-11 DIAGNOSIS — I31.3 PERICARDIAL EFFUSION (NONINFLAMMATORY): ICD-10-CM

## 2018-10-11 DIAGNOSIS — I82.512 CHRONIC EMBOLISM AND THROMBOSIS OF LEFT FEMORAL VEIN: ICD-10-CM

## 2018-10-11 LAB
ALBUMIN SERPL ELPH-MCNC: 3.5 G/DL — SIGNIFICANT CHANGE UP (ref 3.3–5)
ALP SERPL-CCNC: 46 U/L — SIGNIFICANT CHANGE UP (ref 40–120)
ALT FLD-CCNC: 7 U/L — LOW (ref 10–45)
ANION GAP SERPL CALC-SCNC: 16 MMOL/L — SIGNIFICANT CHANGE UP (ref 5–17)
APPEARANCE UR: ABNORMAL
APTT BLD: 32.1 SEC — SIGNIFICANT CHANGE UP (ref 27.5–37.4)
AST SERPL-CCNC: 15 U/L — SIGNIFICANT CHANGE UP (ref 10–40)
BACTERIA # UR AUTO: ABNORMAL
BASE EXCESS BLDV CALC-SCNC: 1.5 MMOL/L — SIGNIFICANT CHANGE UP (ref -2–2)
BASOPHILS # BLD AUTO: 0 K/UL — SIGNIFICANT CHANGE UP (ref 0–0.2)
BASOPHILS NFR BLD AUTO: 0.3 % — SIGNIFICANT CHANGE UP (ref 0–2)
BILIRUB SERPL-MCNC: 0.7 MG/DL — SIGNIFICANT CHANGE UP (ref 0.2–1.2)
BILIRUB UR-MCNC: NEGATIVE — SIGNIFICANT CHANGE UP
BUN SERPL-MCNC: 94 MG/DL — HIGH (ref 7–23)
CA-I SERPL-SCNC: 1.2 MMOL/L — SIGNIFICANT CHANGE UP (ref 1.12–1.3)
CALCIUM SERPL-MCNC: 8.9 MG/DL — SIGNIFICANT CHANGE UP (ref 8.4–10.5)
CHLORIDE BLDV-SCNC: 102 MMOL/L — SIGNIFICANT CHANGE UP (ref 96–108)
CHLORIDE SERPL-SCNC: 100 MMOL/L — SIGNIFICANT CHANGE UP (ref 96–108)
CO2 BLDV-SCNC: 29 MMOL/L — SIGNIFICANT CHANGE UP (ref 22–30)
CO2 SERPL-SCNC: 24 MMOL/L — SIGNIFICANT CHANGE UP (ref 22–31)
COLOR SPEC: ABNORMAL
CREAT SERPL-MCNC: 4.07 MG/DL — HIGH (ref 0.5–1.3)
DIFF PNL FLD: NEGATIVE — SIGNIFICANT CHANGE UP
EOSINOPHIL # BLD AUTO: 0.1 K/UL — SIGNIFICANT CHANGE UP (ref 0–0.5)
EOSINOPHIL NFR BLD AUTO: 1.2 % — SIGNIFICANT CHANGE UP (ref 0–6)
EPI CELLS # UR: 2 /HPF — SIGNIFICANT CHANGE UP
GAS PNL BLDV: 138 MMOL/L — SIGNIFICANT CHANGE UP (ref 136–145)
GAS PNL BLDV: SIGNIFICANT CHANGE UP
GLUCOSE BLDV-MCNC: 74 MG/DL — SIGNIFICANT CHANGE UP (ref 70–99)
GLUCOSE SERPL-MCNC: 83 MG/DL — SIGNIFICANT CHANGE UP (ref 70–99)
GLUCOSE UR QL: NEGATIVE — SIGNIFICANT CHANGE UP
HCO3 BLDV-SCNC: 28 MMOL/L — SIGNIFICANT CHANGE UP (ref 21–29)
HCT VFR BLD CALC: 30.1 % — LOW (ref 34.5–45)
HCT VFR BLDA CALC: 31 % — LOW (ref 39–50)
HGB BLD CALC-MCNC: 10.1 G/DL — LOW (ref 11.5–15.5)
HGB BLD-MCNC: 9.8 G/DL — LOW (ref 11.5–15.5)
HYALINE CASTS # UR AUTO: 1 /LPF — SIGNIFICANT CHANGE UP (ref 0–2)
INR BLD: 1.24 RATIO — HIGH (ref 0.88–1.16)
KETONES UR-MCNC: NEGATIVE — SIGNIFICANT CHANGE UP
LACTATE BLDV-MCNC: 1.7 MMOL/L — SIGNIFICANT CHANGE UP (ref 0.7–2)
LEUKOCYTE ESTERASE UR-ACNC: ABNORMAL
LYMPHOCYTES # BLD AUTO: 1 K/UL — SIGNIFICANT CHANGE UP (ref 1–3.3)
LYMPHOCYTES # BLD AUTO: 13.5 % — SIGNIFICANT CHANGE UP (ref 13–44)
MCHC RBC-ENTMCNC: 28.9 PG — SIGNIFICANT CHANGE UP (ref 27–34)
MCHC RBC-ENTMCNC: 32.7 GM/DL — SIGNIFICANT CHANGE UP (ref 32–36)
MCV RBC AUTO: 88.5 FL — SIGNIFICANT CHANGE UP (ref 80–100)
MONOCYTES # BLD AUTO: 0.8 K/UL — SIGNIFICANT CHANGE UP (ref 0–0.9)
MONOCYTES NFR BLD AUTO: 11.9 % — SIGNIFICANT CHANGE UP (ref 2–14)
NEUTROPHILS # BLD AUTO: 5.2 K/UL — SIGNIFICANT CHANGE UP (ref 1.8–7.4)
NEUTROPHILS NFR BLD AUTO: 73.1 % — SIGNIFICANT CHANGE UP (ref 43–77)
NITRITE UR-MCNC: NEGATIVE — SIGNIFICANT CHANGE UP
PCO2 BLDV: 55 MMHG — HIGH (ref 35–50)
PH BLDV: 7.32 — LOW (ref 7.35–7.45)
PH UR: 8.5 — HIGH (ref 5–8)
PLATELET # BLD AUTO: 105 K/UL — LOW (ref 150–400)
PO2 BLDV: 25 MMHG — SIGNIFICANT CHANGE UP (ref 25–45)
POTASSIUM BLDV-SCNC: 3.4 MMOL/L — LOW (ref 3.5–5.3)
POTASSIUM SERPL-MCNC: 3.6 MMOL/L — SIGNIFICANT CHANGE UP (ref 3.5–5.3)
POTASSIUM SERPL-SCNC: 3.6 MMOL/L — SIGNIFICANT CHANGE UP (ref 3.5–5.3)
PROT SERPL-MCNC: 6.2 G/DL — SIGNIFICANT CHANGE UP (ref 6–8.3)
PROT UR-MCNC: ABNORMAL
PROTHROM AB SERPL-ACNC: 13.5 SEC — HIGH (ref 9.8–12.7)
RBC # BLD: 3.4 M/UL — LOW (ref 3.8–5.2)
RBC # FLD: 15.3 % — HIGH (ref 10.3–14.5)
RBC CASTS # UR COMP ASSIST: 2 /HPF — SIGNIFICANT CHANGE UP (ref 0–4)
SAO2 % BLDV: 35 % — LOW (ref 67–88)
SODIUM SERPL-SCNC: 140 MMOL/L — SIGNIFICANT CHANGE UP (ref 135–145)
SP GR SPEC: 1.01 — SIGNIFICANT CHANGE UP (ref 1.01–1.02)
TRI-PHOS CRY UR QL COMP ASSIST: ABNORMAL
UROBILINOGEN FLD QL: NEGATIVE — SIGNIFICANT CHANGE UP
WBC # BLD: 7 K/UL — SIGNIFICANT CHANGE UP (ref 3.8–10.5)
WBC # FLD AUTO: 7 K/UL — SIGNIFICANT CHANGE UP (ref 3.8–10.5)
WBC UR QL: 48 /HPF — HIGH (ref 0–5)

## 2018-10-11 PROCEDURE — 99285 EMERGENCY DEPT VISIT HI MDM: CPT | Mod: 25

## 2018-10-11 PROCEDURE — 93010 ELECTROCARDIOGRAM REPORT: CPT

## 2018-10-11 PROCEDURE — 74176 CT ABD & PELVIS W/O CONTRAST: CPT | Mod: 26

## 2018-10-11 PROCEDURE — 71045 X-RAY EXAM CHEST 1 VIEW: CPT | Mod: 26

## 2018-10-11 PROCEDURE — 99223 1ST HOSP IP/OBS HIGH 75: CPT

## 2018-10-11 PROCEDURE — 72170 X-RAY EXAM OF PELVIS: CPT | Mod: 26

## 2018-10-11 RX ORDER — AMLODIPINE BESYLATE 2.5 MG/1
10 TABLET ORAL DAILY
Qty: 0 | Refills: 0 | Status: DISCONTINUED | OUTPATIENT
Start: 2018-10-11 | End: 2018-10-17

## 2018-10-11 RX ORDER — SODIUM CHLORIDE 0.65 %
1 AEROSOL, SPRAY (ML) NASAL
Qty: 0 | Refills: 0 | COMMUNITY

## 2018-10-11 RX ORDER — SODIUM CHLORIDE 9 MG/ML
1000 INJECTION INTRAMUSCULAR; INTRAVENOUS; SUBCUTANEOUS
Qty: 0 | Refills: 0 | Status: DISCONTINUED | OUTPATIENT
Start: 2018-10-11 | End: 2018-10-14

## 2018-10-11 RX ORDER — CALCITRIOL 0.5 UG/1
0.25 CAPSULE ORAL DAILY
Qty: 0 | Refills: 0 | Status: DISCONTINUED | OUTPATIENT
Start: 2018-10-11 | End: 2018-10-17

## 2018-10-11 RX ORDER — VANCOMYCIN HCL 1 G
1000 VIAL (EA) INTRAVENOUS ONCE
Qty: 0 | Refills: 0 | Status: COMPLETED | OUTPATIENT
Start: 2018-10-11 | End: 2018-10-11

## 2018-10-11 RX ORDER — PANTOPRAZOLE SODIUM 20 MG/1
40 TABLET, DELAYED RELEASE ORAL
Qty: 0 | Refills: 0 | Status: DISCONTINUED | OUTPATIENT
Start: 2018-10-11 | End: 2018-10-17

## 2018-10-11 RX ORDER — PIPERACILLIN AND TAZOBACTAM 4; .5 G/20ML; G/20ML
3.38 INJECTION, POWDER, LYOPHILIZED, FOR SOLUTION INTRAVENOUS ONCE
Qty: 0 | Refills: 0 | Status: COMPLETED | OUTPATIENT
Start: 2018-10-11 | End: 2018-10-11

## 2018-10-11 RX ORDER — SODIUM CHLORIDE 9 MG/ML
1000 INJECTION INTRAMUSCULAR; INTRAVENOUS; SUBCUTANEOUS ONCE
Qty: 0 | Refills: 0 | Status: COMPLETED | OUTPATIENT
Start: 2018-10-11 | End: 2018-10-11

## 2018-10-11 RX ORDER — FUROSEMIDE 40 MG
2 TABLET ORAL
Qty: 0 | Refills: 0 | COMMUNITY

## 2018-10-11 RX ORDER — SODIUM CHLORIDE 0.65 %
2 AEROSOL, SPRAY (ML) NASAL
Qty: 0 | Refills: 0 | COMMUNITY

## 2018-10-11 RX ORDER — CARVEDILOL PHOSPHATE 80 MG/1
1 CAPSULE, EXTENDED RELEASE ORAL
Qty: 0 | Refills: 0 | COMMUNITY

## 2018-10-11 RX ORDER — ASCORBIC ACID 60 MG
1 TABLET,CHEWABLE ORAL
Qty: 0 | Refills: 0 | COMMUNITY

## 2018-10-11 RX ORDER — ACETAMINOPHEN 500 MG
975 TABLET ORAL ONCE
Qty: 0 | Refills: 0 | Status: COMPLETED | OUTPATIENT
Start: 2018-10-11 | End: 2018-10-11

## 2018-10-11 RX ORDER — ACETAMINOPHEN 500 MG
2 TABLET ORAL
Qty: 0 | Refills: 0 | COMMUNITY

## 2018-10-11 RX ADMIN — Medication 250 MILLIGRAM(S): at 18:06

## 2018-10-11 RX ADMIN — Medication 975 MILLIGRAM(S): at 16:46

## 2018-10-11 RX ADMIN — SODIUM CHLORIDE 1000 MILLILITER(S): 9 INJECTION INTRAMUSCULAR; INTRAVENOUS; SUBCUTANEOUS at 16:30

## 2018-10-11 RX ADMIN — PIPERACILLIN AND TAZOBACTAM 200 GRAM(S): 4; .5 INJECTION, POWDER, LYOPHILIZED, FOR SOLUTION INTRAVENOUS at 16:46

## 2018-10-11 NOTE — H&P ADULT - PROBLEM SELECTOR PLAN 3
Pt has baseline CKD IV; now with ILANA in setting of diuretics, vomiting, and reduce PO intake.  -Will start IVF and reassess GFR in AM  -Renally dose meds  -Avoid nephrotoxic agents.

## 2018-10-11 NOTE — ED ADULT NURSE REASSESSMENT NOTE - NS ED NURSE REASSESS COMMENT FT1
patient resting her eyes, woke up for medication administration. vanco given per order, patient states her body aches have improved. pt pending room assignment.

## 2018-10-11 NOTE — H&P ADULT - ASSESSMENT
59 yo F w/ cardiomyopathy w/ ICD, prior hx of HIT, CKD IV, chronic right ischial ulcer, peripheral vascular disease w/ chronic left lower extremity swelling in setting of complicated left iliac artery aneurysm (aorto-unifemoral stent graft) w/ left to right femoral-femoral bypass, w/ chronic fox, pulmonary embolism (refused anticoagulation) presents with fever likely from her known DVT. Doubt this is UTI.

## 2018-10-11 NOTE — H&P ADULT - NSHPPHYSICALEXAM_GEN_ALL_CORE
PHYSICAL EXAM:  Vital Signs Last 24 Hrs  T(C): 37 (10-11-18 @ 19:40)  T(F): 98.6 (10-11-18 @ 19:40), Max: 100.5 (10-11-18 @ 16:45)  HR: 80 (10-11-18 @ 19:40) (80 - 89)  BP: 104/62 (10-11-18 @ 19:40)  BP(mean): --  RR: 18 (10-11-18 @ 19:40) (16 - 20)  SpO2: 99% (10-11-18 @ 19:40) (89% - 99%)  Wt(kg): --    Constitutional: NAD, awake and alert  EYES: EOMI  ENT:  Normal Hearing, no tonsillar exudates , +dry oral mucosa.   Neck: Soft and supple , no thyromegaly   Respiratory: Left base crackles and dullness, no wheezing. No distress.   Cardiovascular: S1 and S2, regular rate and rhythm, 2/6 systolic murmur, +left leg edema > right leg edema.   Gastrointestinal: Bowel Sounds present, soft, nontender, nondistended, no guarding, no rebound  Extremities: No cyanosis or clubbing; warm to touch  Vascular: 2+ peripheral pulses lower ex  Neurological:  CN II-XII intact bilaterally, sensation to light touch intact in all extremities, gait deferred. Pupils are equally reactive to light and symmetrical in size.   Musculoskeletal: 4/5 strength b/l upper  extremities; 2/5 left leg, 3/4 right leg, no joint swelling.  Skin: right ischial spine ulcer. No purulent drainage.   Psych: no depression or anhedonia, AAOx3, full decision making capacity   HEME: no bruises, no nose bleeds

## 2018-10-11 NOTE — ED PROVIDER NOTE - CARE PLAN
Principal Discharge DX:	UTI (urinary tract infection)  Secondary Diagnosis:	Fever Principal Discharge DX:	UTI (urinary tract infection)  Secondary Diagnosis:	Fever  Secondary Diagnosis:	Hypoxia Principal Discharge DX:	Acute cystitis without hematuria

## 2018-10-11 NOTE — H&P ADULT - PROBLEM SELECTOR PLAN 1
-Patient presents w/ low-grade fever and was found to have DVT on left lower extremity on recent admission in August. Furthermore, the urine is more brown-colored rather than cloudy and do not believe the source is completely from UTI. Given lack of leukocytosis, nontoxic appearance, and alternative cause to explain fever, this is less likely to be overt UTI. Will hold off on antibiotics for now and monitor oxygen-saturation overnight given clinical concern for pulmonary embolism/DVT as cause of the fever.  -Another explanation is pericardial effusion. Pt had TTE in July showing large pericardial effusion.

## 2018-10-11 NOTE — H&P ADULT - NSHPLABSRESULTS_GEN_ALL_CORE
9.8    7.0   )-----------( 105      ( 11 Oct 2018 16:46 )             30.1       10-11    140  |  100  |  94<H>  ----------------------------<  83  3.6   |  24  |  4.07<H>    Ca    8.9      11 Oct 2018 16:46    TPro  6.2  /  Alb  3.5  /  TBili  0.7  /  DBili  x   /  AST  15  /  ALT  7<L>  /  AlkPhos  46  1011      PT/INR - ( 11 Oct 2018 16:46 )   PT: 13.5 sec;   INR: 1.24 ratio         PTT - ( 11 Oct 2018 16:46 )  PTT:32.1 sec      Urinalysis Basic - ( 11 Oct 2018 16:46 )    Color: Light Orange / Appearance: Turbid / S.014 / pH: x  Gluc: x / Ketone: Negative  / Bili: Negative / Urobili: Negative   Blood: x / Protein: 300 mg/dL / Nitrite: Negative   Leuk Esterase: Large / RBC: 2 /hpf / WBC 48 /hpf   Sq Epi: x / Non Sq Epi: 2 /hpf / Bacteria: Many    I personally reviewed & interpreted the lab findings above; CBC c/w chronic anemia and mild thrombocytopenia. CMP c/w ILANA and subtherapeutic INR  I personally reviewed & interpreted the radiographic findings; CXR left pleural effusion. X-ray pelvis pending. CT abdomen pending.   I personally reviewed & interpreted the EKG findings; Paced rhythm.

## 2018-10-11 NOTE — H&P ADULT - PROBLEM SELECTOR PLAN 4
-No signs of tamponade on my exam.  -Will check TTE to see if there is resolution.  -Also, may not be ideal candidate for anticoagulation if there is a large effusion.

## 2018-10-11 NOTE — H&P ADULT - HISTORY OF PRESENT ILLNESS
57 yo F w/ cardiomyopathy w/ ICD, prior hx of HIT, CKD IV, chronic right ischial ulcer, peripheral vascular disease w/ chronic left lower extremity swelling in setting of complicated left iliac artery aneurysm (aorto-unifemoral stent graft) w/ left to right femoral-femoral bypass, w/ chronic fox, pulmonary embolism (refused anticoagulation) presents with fever.     Patient was recently admitted in August to [facility] for bleeding. It was unclear regarding initial source of bleeding. It was thought vaginal and/or GI bleeding. Patient was seen by GI team and felt unlikely GI source. Bleeding scan was performed and was negative. Colonoscopy was not performed as H&H was stabilized. Patient was cleared by GI to resume anticoagulation however she refused to take any forms of anticoagulation. She was found to have extensive DVT on left side. Vascular surgery was also consulted and it was recommended that patient did not require IVC filter or any vascular procedure. She was discharged and has not had any episode of bleeding. On prior admission she refused rectal/ examination to help identify cause of her bleeding.   She now presents after visiting nurse today noticed her urine was "cloudy" and she had low-grade fevers. Of note patient's TTE from 7/2018 showed a large pericardial effusion without tamponade physiology.     In ER, patient was found to be hypoxic intermittently into the high 80's and low 90s. However, patient reports she has no symptoms.  Specifically she does not feel short of breath and she has no pleuritic chest pain. She was given vancomycin and zosyn and admitted presumably for "UTI." She also had one episode of bilious vomit earlier today but reports normal bowel movement and no abdominal pain. Currently has no diarrhea or abdominal discomfort. 59 yo F w/ cardiomyopathy w/ ICD, prior hx of HIT, CKD IV, chronic right ischial ulcer, peripheral vascular disease w/ chronic left lower extremity swelling in setting of complicated left iliac artery aneurysm (aorto-unifemoral stent graft) w/ left to right femoral-femoral bypass, w/ chronic fox, pulmonary embolism (refused anticoagulation) presents with fever.     Patient was recently admitted in August to [facility] for bleeding. It was unclear regarding initial source of bleeding. It was thought vaginal and/or GI bleeding. Patient was seen by GI team and felt unlikely GI source. Bleeding scan was performed and was negative. Colonoscopy was not performed as H&H was stabilized. Patient was cleared by GI to resume anticoagulation however she refused to take any forms of anticoagulation. She was found to have extensive DVT on left side. Vascular surgery was also consulted and it was recommended that patient did not require IVC filter or any vascular procedure. She was discharged and has not had any episode of bleeding. On prior admission she refused rectal/ examination to help identify cause of her bleeding.   She now presents after visiting nurse today noticed her urine was "cloudy" and she had low-grade fevers. Of note patient's TTE from 7/2018 showed a large pericardial effusion without tamponade physiology.     In ER, patient was found to be hypoxic intermittently into the high 80's and low 90s. However, patient reports she has no symptoms.  Specifically she does not feel short of breath and she has no pleuritic chest pain. She was given vancomycin and zosyn and admitted presumably for "UTI." She also had one episode of bilious vomit earlier today but reports normal bowel movement and no abdominal pain. Currently has no diarrhea or abdominal discomfort.

## 2018-10-11 NOTE — ED PROVIDER NOTE - PROGRESS NOTE DETAILS
patient 89% on RA, declining o2 supplementation. when asked to sit up and take deep breaths the patient  comes up to 95%. breathing unlabored, lungs clear. called Dr. gao to advise of hypoxia with low grade fever although very low susp may benefit from v/q inpatient - Mel Elliott PA-C patient became hypoxic for a breif moment, discussed need to supplement with nasal cannula, patient states she will do NC but not cpap. medicine has been at bedside to evaluate, report she has not been on AC because she has refused during last admission - Mel Elliott PA-C went to patients beside to discuss growing concern for PE given episodes of hypoxia in setting of known dvts and new information that she is not taking ac. patient refusing heparin at this time. patient verbalizes that she understands this can become a life threatening condition and adamantly states she does not wish to be on anticoagulation - Mel Elliott PA-C patient became hypoxic for a brief moment to mid 70's then woke patient and she returned to the high 90's without intervention. discussed need to supplement with nasal cannula, patient states she will do NC but not cpap. medicine has been at bedside to evaluate, report she has not been on AC because she has refused during last admission - Mel Elliott PA-C

## 2018-10-11 NOTE — H&P ADULT - PROBLEM SELECTOR PLAN 5
-No signs / sx of bleeding or hemolysis  -will continue to trend hemoglobin in lieu of recent admission.

## 2018-10-11 NOTE — ED ADULT NURSE REASSESSMENT NOTE - NS ED NURSE REASSESS COMMENT FT1
placed patient on oxygen for 88 room saturation, states she is not on oxygen at home. pt having moments of not taking deep breaths, encouraged patient to continue to take deep breaths.

## 2018-10-11 NOTE — ED PROVIDER NOTE - OBJECTIVE STATEMENT
59 yo F PMHx PE on Coumadin (3 mg daily), iliac aneurysm repair, CHF, chronic gout, sacral ulcer, chronic fox who presents to the ED for cloudy urine. patient states that her home nurse came to change dressing on her sacral wound and noted she had temp of 99.5 with very cloudy urine. patient reports she was not feeling well  yesterday, had one episode of bilious vomiting. no cough/congestion/sick contacts. no diarrhea.

## 2018-10-11 NOTE — ED ADULT NURSE NOTE - NSIMPLEMENTINTERV_GEN_ALL_ED
Implemented All Fall Risk Interventions:  [place] to call system. Call bell, personal items and telephone within reach. Instruct patient to call for assistance. Room bathroom lighting operational. Non-slip footwear when patient is off stretcher. Physically safe environment: no spills, clutter or unnecessary equipment. Stretcher in lowest position, wheels locked, appropriate side rails in place. Provide visual cue, wrist band, yellow gown, etc. Monitor gait and stability. Monitor for mental status changes and reorient to person, place, and time. Review medications for side effects contributing to fall risk. Reinforce activity limits and safety measures with patient and family.

## 2018-10-11 NOTE — H&P ADULT - ATTENDING COMMENTS
Patient assigned to me by night hospitalist in charge for management and care for patient for this evening only. Care to be resumed by day hospitalist (Dr. Patino) in the morning and thereafter.

## 2018-10-11 NOTE — ED PROVIDER NOTE - MUSCULOSKELETAL, MLM
Spine appears normal, [id number] all 4 extremities, significant swelling in RLE (chronic per patient), patient with ace wrap around LE.

## 2018-10-11 NOTE — H&P ADULT - PROBLEM SELECTOR PLAN 2
-Suspect the patient likely has pulmonary embolism based on her refusal to take anticoagulation and pretty proximal DVT on recent admission. Also she has intermittent O2-saturation of 88% and goes back up to 98% on room air. I doubt this is purely just atelectasis or restrictive lung disease from her obesity.   -I had extensive discussion with patient on risk versus benefit of anticoagulation and untreated PE in this setting could lead to cardiac arrest. Pt has full decision making capacity and is able to verbalize her understanding of her medical condition but still refuses to take anticoagulation. She is concerned about her bleeding from anticoagulation (which has happened before) and I sympathized that though this is a concern, right now her respiratory status takes precedent as her hemoglobin has stabilized and she no longer has bleeding episodes.   -Will check V/Q scan given the renal dysfunction not ideal for CT angiogram.   -Would check TTE to evaluate for pericardial effusion  -Would check lower ext doppler to see if there is extension of DVT.  -of note pt does not want me to call her daughter to give her update. -Suspect the patient likely has pulmonary embolism based on her refusal to take anticoagulation and pretty proximal DVT on recent admission. Also she has intermittent O2-saturation of 88% and goes back up to 98% on room air. I doubt this is purely just atelectasis or restrictive lung disease from her obesity.   -I had extensive discussion with patient on risk versus benefit of anticoagulation and untreated PE in this setting could lead to cardiac arrest. Pt has full decision making capacity and is able to verbalize her understanding of her medical condition but still refuses to take anticoagulation. She is concerned about her bleeding from anticoagulation (which has happened before) and I sympathized that though this is a concern, right now her respiratory status takes precedent as her hemoglobin has stabilized and she no longer has bleeding episodes. However, despite a thorough discussion, she refuses anticoagulation and understands the risk v/ benefit approach.   -Will check V/Q scan given the renal dysfunction not ideal for CT angiogram.   -Would check TTE to evaluate for pericardial effusion  -Would check lower ext doppler to see if there is extension of DVT.  -of note pt does not want me to call her daughter to give her update.

## 2018-10-11 NOTE — H&P ADULT - NSHPREVIEWOFSYSTEMS_GEN_ALL_CORE
REVIEW OF SYSTEMS:    CONSTITUTIONAL: +fever, + weakness.   ENMT:  No ear pain, No vertigo or throat pain  EYES: No visual changes  or photophobia  NECK: No pain or stiffness  RESPIRATORY: No cough, wheezing, hemoptysis; No shortness of breath  CARDIOVASCULAR: No chest pain or palpitations  GASTROINTESTINAL: No melena, no hematochezia.  MUSCULOSKELETAL: No joint swelling  or warmth.  GENITOURINARY: No dysuria, frequency or hematuria  NEUROLOGICAL: No numbness or falls.   PSYCHIATRIC: No depression or anhedonia.  ENDOCRINE: No sx hypoglycemic episodes.  SKIN: +right ischial wound

## 2018-10-11 NOTE — ED ADULT NURSE NOTE - OBJECTIVE STATEMENT
58 y.o female presents via ems from home for low grade fever and cloudy urine. pmhx of htn, hld, kidney disease, lupus, kidney disease with chronic fox (patient does not know why she has the fox), defibrillator. Patient's home nurse sent her in, she was there to change the dressing of her right sacrum, and noticed the patient had a low grade temp and cloudy, foul smelling urine. Patient states she originally got the wound from a transfer, went to rehab and worsened. wound well appearing with wet to dry dressing packing, with dressing. patient has swelling to left lower extremity, states her leg is always this swollen, sensation and circulation intact, wrapped with ace bandage for swelling, bleeding to left second toe, from ems transfer today. Pt's abdomen is soft ntnd, denies chest pain/sob/abdominal pain. c.o generalized body aches which she states she has "all the time". pt appears unkempt. fully undressed and assessed head to toe. fox in place with cloudy urine noted. vital signs stable.

## 2018-10-11 NOTE — ED PROVIDER NOTE - MEDICAL DECISION MAKING DETAILS
Attending Tacos Fuentes DO: 57 yo female chronically ill presents with cloudy, foul smelling urine, generalized malaise, and fever. Chronic fox in place. PE: chronically ill appearing, MMM, lungs clear, ab soft nt/nd, fox in place, cloudy urine in bag, sacral wound clean and without surrounding erythema or drainage. No other rashes present. A/P: Concern for complicated uti given chronic fox as source. Unlikely PE/dvt since pt already on coumadin. Sacral wound does not appear to be source based on exam. Will obtain labs, cultures, ua, fluids, abx, admit.

## 2018-10-12 LAB
ANION GAP SERPL CALC-SCNC: 15 MMOL/L — SIGNIFICANT CHANGE UP (ref 5–17)
BUN SERPL-MCNC: 93 MG/DL — HIGH (ref 7–23)
CALCIUM SERPL-MCNC: 8.6 MG/DL — SIGNIFICANT CHANGE UP (ref 8.4–10.5)
CHLORIDE SERPL-SCNC: 102 MMOL/L — SIGNIFICANT CHANGE UP (ref 96–108)
CO2 SERPL-SCNC: 24 MMOL/L — SIGNIFICANT CHANGE UP (ref 22–31)
CREAT SERPL-MCNC: 4.28 MG/DL — HIGH (ref 0.5–1.3)
CULTURE RESULTS: SIGNIFICANT CHANGE UP
GLUCOSE SERPL-MCNC: 72 MG/DL — SIGNIFICANT CHANGE UP (ref 70–99)
HCT VFR BLD CALC: 29.3 % — LOW (ref 34.5–45)
HGB BLD-MCNC: 9.3 G/DL — LOW (ref 11.5–15.5)
INR BLD: 1.2 RATIO — HIGH (ref 0.88–1.16)
MCHC RBC-ENTMCNC: 28.5 PG — SIGNIFICANT CHANGE UP (ref 27–34)
MCHC RBC-ENTMCNC: 31.7 GM/DL — LOW (ref 32–36)
MCV RBC AUTO: 89.9 FL — SIGNIFICANT CHANGE UP (ref 80–100)
PLATELET # BLD AUTO: 109 K/UL — LOW (ref 150–400)
POTASSIUM SERPL-MCNC: 3.7 MMOL/L — SIGNIFICANT CHANGE UP (ref 3.5–5.3)
POTASSIUM SERPL-SCNC: 3.7 MMOL/L — SIGNIFICANT CHANGE UP (ref 3.5–5.3)
PROTHROM AB SERPL-ACNC: 13.6 SEC — HIGH (ref 10–13.1)
RAPID RVP RESULT: SIGNIFICANT CHANGE UP
RBC # BLD: 3.26 M/UL — LOW (ref 3.8–5.2)
RBC # FLD: 16.2 % — HIGH (ref 10.3–14.5)
SODIUM SERPL-SCNC: 141 MMOL/L — SIGNIFICANT CHANGE UP (ref 135–145)
SPECIMEN SOURCE: SIGNIFICANT CHANGE UP
WBC # BLD: 5.75 K/UL — SIGNIFICANT CHANGE UP (ref 3.8–10.5)
WBC # FLD AUTO: 5.75 K/UL — SIGNIFICANT CHANGE UP (ref 3.8–10.5)

## 2018-10-12 PROCEDURE — 93306 TTE W/DOPPLER COMPLETE: CPT | Mod: 26

## 2018-10-12 PROCEDURE — 78582 LUNG VENTILAT&PERFUS IMAGING: CPT | Mod: 26

## 2018-10-12 PROCEDURE — 99222 1ST HOSP IP/OBS MODERATE 55: CPT

## 2018-10-12 PROCEDURE — 93970 EXTREMITY STUDY: CPT | Mod: 26

## 2018-10-12 RX ORDER — ERTAPENEM SODIUM 1 G/1
INJECTION, POWDER, LYOPHILIZED, FOR SOLUTION INTRAMUSCULAR; INTRAVENOUS
Qty: 0 | Refills: 0 | Status: DISCONTINUED | OUTPATIENT
Start: 2018-10-12 | End: 2018-10-15

## 2018-10-12 RX ORDER — ERTAPENEM SODIUM 1 G/1
500 INJECTION, POWDER, LYOPHILIZED, FOR SOLUTION INTRAMUSCULAR; INTRAVENOUS ONCE
Qty: 0 | Refills: 0 | Status: COMPLETED | OUTPATIENT
Start: 2018-10-12 | End: 2018-10-12

## 2018-10-12 RX ORDER — ERTAPENEM SODIUM 1 G/1
500 INJECTION, POWDER, LYOPHILIZED, FOR SOLUTION INTRAMUSCULAR; INTRAVENOUS EVERY 24 HOURS
Qty: 0 | Refills: 0 | Status: DISCONTINUED | OUTPATIENT
Start: 2018-10-13 | End: 2018-10-15

## 2018-10-12 RX ADMIN — AMLODIPINE BESYLATE 10 MILLIGRAM(S): 2.5 TABLET ORAL at 06:11

## 2018-10-12 RX ADMIN — Medication 10 MILLIGRAM(S): at 12:59

## 2018-10-12 RX ADMIN — ERTAPENEM SODIUM 100 MILLIGRAM(S): 1 INJECTION, POWDER, LYOPHILIZED, FOR SOLUTION INTRAMUSCULAR; INTRAVENOUS at 21:43

## 2018-10-12 RX ADMIN — CALCITRIOL 0.25 MICROGRAM(S): 0.5 CAPSULE ORAL at 12:59

## 2018-10-12 NOTE — PROGRESS NOTE ADULT - SUBJECTIVE AND OBJECTIVE BOX
Patient is a 58y old  Female who presents with a chief complaint of fever (12 Oct 2018 15:56)      SUBJECTIVE / OVERNIGHT EVENTS:   Feels better.  Denies CP/SOB/Palpitation/HA.    MEDICATIONS  (STANDING):  amLODIPine   Tablet 10 milliGRAM(s) Oral daily  calcitriol   Capsule 0.25 MICROGram(s) Oral daily  ertapenem  IVPB      pantoprazole    Tablet 40 milliGRAM(s) Oral before breakfast  predniSONE   Tablet 10 milliGRAM(s) Oral daily  sodium chloride 0.9%. 1000 milliLiter(s) (75 mL/Hr) IV Continuous <Continuous>    MEDICATIONS  (PRN):        CAPILLARY BLOOD GLUCOSE        I&O's Summary    11 Oct 2018 07:  -  12 Oct 2018 07:00  --------------------------------------------------------  IN: 0 mL / OUT: 650 mL / NET: -650 mL    12 Oct 2018 07:01  -  12 Oct 2018 18:44  --------------------------------------------------------  IN: 440 mL / OUT: 400 mL / NET: 40 mL        PHYSICAL EXAM:  GENERAL: NAD, well-developed  HEAD:  Atraumatic, Normocephalic  NECK: Supple, No JVD  CHEST/LUNG: Clear to auscultation bilaterally; No wheezing.  HEART: Regular rate and rhythm; No murmurs, rubs, or gallops  ABDOMEN: Soft, Nontender, Nondistended; Bowel sounds present  EXTREMITIES:   No clubbing, cyanosis, or edema  NEUROLOGY: AAO X 3  SKIN: No rashes    LABS:                        9.3    5.75  )-----------( 109      ( 12 Oct 2018 12:25 )             29.3     10-    141  |  102  |  93<H>  ----------------------------<  72  3.7   |  24  |  4.28<H>    Ca    8.6      12 Oct 2018 10:04    TPro  6.2  /  Alb  3.5  /  TBili  0.7  /  DBili  x   /  AST  15  /  ALT  7<L>  /  AlkPhos  46  10-11    PT/INR - ( 12 Oct 2018 12:21 )   PT: 13.6 sec;   INR: 1.20 ratio         PTT - ( 11 Oct 2018 16:46 )  PTT:32.1 sec      Urinalysis Basic - ( 11 Oct 2018 16:46 )    Color: Light Orange / Appearance: Turbid / S.014 / pH: x  Gluc: x / Ketone: Negative  / Bili: Negative / Urobili: Negative   Blood: x / Protein: 300 mg/dL / Nitrite: Negative   Leuk Esterase: Large / RBC: 2 /hpf / WBC 48 /hpf   Sq Epi: x / Non Sq Epi: 2 /hpf / Bacteria: Many      CAPILLARY BLOOD GLUCOSE                    RADIOLOGY & ADDITIONAL TESTS:    Imaging Personally Reviewed:    Consultant(s) Notes Reviewed:      Care Discussed with Consultants/Other Providers:

## 2018-10-12 NOTE — CONSULT NOTE ADULT - SUBJECTIVE AND OBJECTIVE BOX
Patient is a 58y old  Female who presents with a chief complaint of fever (11 Oct 2018 20:53)    From HPI" HPI:  59 yo F w/ cardiomyopathy w/ ICD, prior hx of HIT, CKD IV, chronic right ischial ulcer, peripheral vascular disease w/ chronic left lower extremity swelling in setting of complicated left iliac artery aneurysm (aorto-unifemoral stent graft) w/ left to right femoral-femoral bypass, w/ chronic fox, pulmonary embolism (refused anticoagulation) presents with fever.     Patient was recently admitted in August to [facility] for bleeding. It was unclear regarding initial source of bleeding. It was thought vaginal and/or GI bleeding. Patient was seen by GI team and felt unlikely GI source. Bleeding scan was performed and was negative. Colonoscopy was not performed as H&H was stabilized. Patient was cleared by GI to resume anticoagulation however she refused to take any forms of anticoagulation. She was found to have extensive DVT on left side. Vascular surgery was also consulted and it was recommended that patient did not require IVC filter or any vascular procedure. She was discharged and has not had any episode of bleeding. On prior admission she refused rectal/ examination to help identify cause of her bleeding.   She now presents after visiting nurse today noticed her urine was "cloudy" and she had low-grade fevers. Of note patient's TTE from 7/2018 showed a large pericardial effusion without tamponade physiology.     In ER, patient was found to be hypoxic intermittently into the high 80's and low 90s. However, patient reports she has no symptoms.  Specifically she does not feel short of breath and she has no pleuritic chest pain. She was given vancomycin and zosyn and admitted presumably for "UTI." She also had one episode of bilious vomit earlier today but reports normal bowel movement and no abdominal pain. Currently has no diarrhea or abdominal discomfort. (11 Oct 2018 20:53)  "    Above verified-agree with above unless noted below.    ID consulted     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)  Calf DVT (Deep Venous Thrombosis) (ICD9 453.42)  Adenomatous Goiter (ICD9 241.9)  Chronic Gout (ICD9 274.02)  LBBB (Left Bundle Branch Block) (ICD9 426.3)  CHF (Congestive Heart Failure) (ICD9 428.0)  Hx of aorta-iliac-femoral bypass  TOP (Termination of Pregnancy)  S/P Dilatation and Curettage  History of Tubal Ligation  s/p AAA (Abdominal Aortic Aneurysm) Repair: 2009 2010  History of Cholecystectomy  S/P Partial Hysterectomy      Social history: denies     Smoking,    ETOH,      IVDU       FAMILY HISTORY:  No pertinent family history in first degree relatives  - Family history of medical problems in all first degree relatives reviewed.None of these were found to be related to patients current illness.    REVIEW OF SYSTEMS-not reliable historian      General:	Denies any malaise fatigue or chills. Fevers absent    Skin:No rash  	  Ophthalmologic:Denies any visual complaints,discharge redness or photophobia  	  ENMT:No nasal discharge,headache,sinus congestion or throat pain.No dental complaints    Respiratory and Thorax:No cough,sputum or chest pain.Denies shortness of breath  	  Cardiovascular:	No chest pain,palpitaions or dizziness    Gastrointestinal:	NO nausea,abdominal pain or diarrhea.    Genitourinary:	No dysuria,frequency. No flank pain-says nurse told her urine was cloudy    Musculoskeletal:	No joint swelling or pain.No weakness    Neurological:No confusion,diziness.No extremity weakness.No bladder or bowel incontinence	    Psychiatric:No delusions or hallucinations	    Hematology/Lymphatics:	No LN swelling.No gum bleeding     Endocrine:	No recent weight gain or loss.No abnormal heat/cold intolerance    Allergic/Immunologic:	No hives or rash   Allergies    heparin (Unknown)  losartan (Anaphylaxis)  nitroglycerin (Other)    Intolerances        Antimicrobials:  zosyn X1   vanco X 1       Other medications reviewed      Vital Signs Last 24 Hrs  T(C): 37.2 (12 Oct 2018 12:23), Max: 38.1 (11 Oct 2018 16:45)  T(F): 99 (12 Oct 2018 12:23), Max: 100.5 (11 Oct 2018 16:45)  HR: 93 (12 Oct 2018 12:23) (78 - 95)  BP: 112/73 (12 Oct 2018 12:23) (104/62 - 137/74)  BP(mean): --  RR: 18 (12 Oct 2018 12:23) (16 - 20)  SpO2: 97% (12 Oct 2018 12:23) (89% - 100%)    PHYSICAL EXAM:Pleasant patient in no acute distress.      Constitutional:Comfortable.Awake and alert  No cachexia     Eyes:PERRL EOMI.NO discharge or conjunctival injection  L sc pm no erythema or tenderness     ENMT:No sinus tenderness.No thrush.No pharyngeal exudate or erythema.Fair dental hygiene    Neck:Supple,No LN,no JVD      Respiratory:Good air entry bilaterally,CTA    Cardiovascular:S1 S2 wnl, No murmurs,rub or gallops    Gastrointestinal:Soft BS(+) no tenderness no masses ,No rebound or guarding    Genitourinary:No CVA tendereness         Extremities:left leg lymphedema-no tenderness no erythema     Vascular:peripheral pulses felt    Neurological:AAO X 3,No grossly focal deficits    Skin:No rash     Lymph Nodes:No palpable LNs    Musculoskeletal:No joint swelling or LOM    Psychiatric:Affect normal.          Labs:                            9.3    5.75  )-----------( 109      ( 12 Oct 2018 12:25 )             29.3         10-12    141  |  102  |  93<H>  ----------------------------<  72  3.7   |  24  |  4.28<H>    Ca    8.6      12 Oct 2018 10:04    Culture - Urine (07.07.18 @ 08:27)    -  Gentamicin: S 2    -  Gentamicin: S <=1    -  Tigecycline: S <=1    -  Trimethoprim/Sulfamethoxazole: R >2/38    -  Trimethoprim/Sulfamethoxazole: S <=0.5/9.5    -  Cefoxitin: S <=4    -  Cefoxitin: S <=4    -  Amikacin: S <=8    -  Amikacin: S <=8    -  Ampicillin: R >16 These ampicillin results predict results for amoxicillin    -  Ampicillin: S <=2 These ampicillin results predict results for amoxicillin    -  Cefazolin: R >16 This predicts results for oral agents cefaclor, cefdinir, cefpodoxime, cefprozil, cefuroxime axetil, cephalexin and locarbef for uncomplicated UTI. Note that some isolates may be susceptible to these agents while testing resistant to cefazolin.    -  Cefazolin: S <=2 This predicts results for oral agents cefaclor, cefdinir, cefpodoxime, cefprozil, cefuroxime axetil, cephalexin and locarbef for uncomplicated UTI. Note that some isolates may be susceptible to these agents while testing resistant to cefazolin.    -  Amoxicillin/Clavulanic Acid: I 16/8    -  Levofloxacin: R >4    -  Meropenem: S <=1    -  Nitrofurantoin: S <=32 Should not be used to treat pyelonephritis    -  Amoxicillin/Clavulanic Acid: S <=8/4    -  Levofloxacin: S <=1    -  Meropenem: S <=1    -  Nitrofurantoin: R >64 Should not be used to treat pyelonephritis    -  Cefepime: R >16    -  Piperacillin/Tazobactam: R <=8    -  Tobramycin: R >8    -  Cefepime: S <=2    -  Piperacillin/Tazobactam: S <=8    -  Tobramycin: S <=2    -  Ampicillin/Sulbactam: R >16/8    -  Ceftriaxone: R >32 Enterobacter, Citrobacter, and Serratia may develop resistance during prolonged therapy    -  Ampicillin/Sulbactam: S <=4/2    -  Ceftriaxone: S <=1 Enterobacter, Citrobacter, and Serratia may develop resistance during prolonged therapy    -  Aztreonam: R >16    -  Ciprofloxacin: R >2    -  Ertapenem: S <=0.5    -  Imipenem: S <=1    -  Aztreonam: S <=4    -  Ciprofloxacin: S <=0.5    -  Ertapenem: S <=0.5    Specimen Source: .Urine Catheterized    Culture Results:   >100,000 CFU/ml Escherichia coli ESBL  50,000 - 99,000 CFU/mL Proteus mirabilis    Organism Identification: Escherichia coli ESBL  Proteus mirabilis    Organism: Proteus mirabilis    Organism: Escherichia coli ESBL    Method Type: ARSALAN    Method Type: ARSALAN    TPro  6.2  /  Alb  3.5  /  TBili  0.7  /  DBili  x   /  AST  15  /  ALT  7<L>  /  AlkPhos  46  10-11      < from: Xray Pelvis AP only (10.11.18 @ 18:17) >    IMPRESSION:  No acute fracture or dislocation.    Severely limited evaluation of the sacrum. The pelvis is better evaluated   on CT abdomen and pelvis performed same day.        < end of copied text >      Rapid Respiratory Viral Panel (10.12.18 @ 06:45)    Rapid RVP Result: NotDete: The [employer] RVP Rapid uses polymerase chain reaction (PCR) and melt  curve analysis to screen for adenovirus; coronavirus HKU1, NL63, 229E,  OC43; human metapneumovirus (hMPV); human enterovirus/rhinovirus  (Entero/RV); influenza A; influenza A/H1;influenza A/H3; influenza  A/H1-2009; influenza B; parainfluenza viruses 1, 2, 3, 4; respiratory  syncytial virus; Bordetella pertussis; Mycoplasma pneumoniae; and  Chlamydophila pneumoniae.          Urine Microscopic-Add On (NC) (10.11.18 @ 16:46)    Red Blood Cell - Urine: 2 /hpf    White Blood Cell - Urine: 48 /hpf    Hyaline Casts: 1 /lpf    Bacteria: Many    Epithelial Cells: 2 /hpf    Triple Phosphate Crystals: Many

## 2018-10-12 NOTE — PROGRESS NOTE ADULT - ASSESSMENT
59 yo F w/ cardiomyopathy w/ ICD, prior hx of HIT, CKD IV, chronic right ischial ulcer, peripheral vascular disease w/ chronic left lower extremity swelling in setting of complicated left iliac artery aneurysm (aorto-unifemoral stent graft) w/ left to right femoral-femoral bypass, w/ chronic fox, pulmonary embolism (refused anticoagulation) presents with fever likely from UTI.    UTI:  ID eval appreciated.  F/up with U CS  IV Ertapenem    Hypoxia:  O2 PRN  Cardio eval called.    ILANA on CKD IV:  BMP  IVF  Nephro eval

## 2018-10-13 DIAGNOSIS — N39.0 URINARY TRACT INFECTION, SITE NOT SPECIFIED: ICD-10-CM

## 2018-10-13 DIAGNOSIS — R09.02 HYPOXEMIA: ICD-10-CM

## 2018-10-13 DIAGNOSIS — I10 ESSENTIAL (PRIMARY) HYPERTENSION: ICD-10-CM

## 2018-10-13 DIAGNOSIS — I25.5 ISCHEMIC CARDIOMYOPATHY: ICD-10-CM

## 2018-10-13 LAB
ANION GAP SERPL CALC-SCNC: 14 MMOL/L — SIGNIFICANT CHANGE UP (ref 5–17)
BUN SERPL-MCNC: 89 MG/DL — HIGH (ref 7–23)
CALCIUM SERPL-MCNC: 8.7 MG/DL — SIGNIFICANT CHANGE UP (ref 8.4–10.5)
CHLORIDE SERPL-SCNC: 102 MMOL/L — SIGNIFICANT CHANGE UP (ref 96–108)
CO2 SERPL-SCNC: 23 MMOL/L — SIGNIFICANT CHANGE UP (ref 22–31)
CREAT SERPL-MCNC: 4.24 MG/DL — HIGH (ref 0.5–1.3)
GLUCOSE SERPL-MCNC: 105 MG/DL — HIGH (ref 70–99)
HCT VFR BLD CALC: 29.9 % — LOW (ref 34.5–45)
HGB BLD-MCNC: 8.9 G/DL — LOW (ref 11.5–15.5)
MCHC RBC-ENTMCNC: 26.6 PG — LOW (ref 27–34)
MCHC RBC-ENTMCNC: 29.8 GM/DL — LOW (ref 32–36)
MCV RBC AUTO: 89.5 FL — SIGNIFICANT CHANGE UP (ref 80–100)
PLATELET # BLD AUTO: 137 K/UL — LOW (ref 150–400)
POTASSIUM SERPL-MCNC: 3.8 MMOL/L — SIGNIFICANT CHANGE UP (ref 3.5–5.3)
POTASSIUM SERPL-SCNC: 3.8 MMOL/L — SIGNIFICANT CHANGE UP (ref 3.5–5.3)
RBC # BLD: 3.34 M/UL — LOW (ref 3.8–5.2)
RBC # FLD: 15.5 % — HIGH (ref 10.3–14.5)
SODIUM SERPL-SCNC: 139 MMOL/L — SIGNIFICANT CHANGE UP (ref 135–145)
WBC # BLD: 6.29 K/UL — SIGNIFICANT CHANGE UP (ref 3.8–10.5)
WBC # FLD AUTO: 6.29 K/UL — SIGNIFICANT CHANGE UP (ref 3.8–10.5)

## 2018-10-13 PROCEDURE — 99232 SBSQ HOSP IP/OBS MODERATE 35: CPT | Mod: GC

## 2018-10-13 RX ADMIN — CALCITRIOL 0.25 MICROGRAM(S): 0.5 CAPSULE ORAL at 12:11

## 2018-10-13 RX ADMIN — AMLODIPINE BESYLATE 10 MILLIGRAM(S): 2.5 TABLET ORAL at 05:08

## 2018-10-13 RX ADMIN — Medication 10 MILLIGRAM(S): at 12:11

## 2018-10-13 RX ADMIN — ERTAPENEM SODIUM 100 MILLIGRAM(S): 1 INJECTION, POWDER, LYOPHILIZED, FOR SOLUTION INTRAMUSCULAR; INTRAVENOUS at 22:31

## 2018-10-13 NOTE — CONSULT NOTE ADULT - SUBJECTIVE AND OBJECTIVE BOX
CHIEF COMPLAINT: fever , hypoxia     HISTORY OF PRESENT ILLNESS:  59 yo F well known to me with extensive medical and cardiovascular history including isshemic cardiomyopathy w/ ICD, prior hx of HIT, CKD IV, chronic right ischial ulcer, peripheral vascular disease w/ chronic left lower extremity swelling in setting of complicated left iliac artery aneurysm (aorto-unifemoral stent graft) w/ left to right femoral-femoral bypass, w/ chronic fox, pulmonary embolism (refused anticoagulation) presents with fever.     She now presents after visiting nurse today noticed her urine was "cloudy" and she had low-grade fevers. Of note patient's TTE from 7/2018 showed a large pericardial effusion without tamponade physiology. She previously  had a pericardial window.     In ER, patient was found to be hypoxic intermittently into the high 80's and low 90s. However, patient reports she has no symptoms.  Specifically she does not feel short of breath and she has no pleuritic chest pain. She was given vancomycin and zosyn and admitted presumably for "UTI." She also had one episode of bilious vomit earlier today but reports normal bowel movement and no abdominal pain. Currently has no diarrhea or abdominal discomfort.       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)  Calf DVT (Deep Venous Thrombosis) (ICD9 453.42)  Adenomatous Goiter (ICD9 241.9)  Chronic Gout (ICD9 274.02)  LBBB (Left Bundle Branch Block) (ICD9 426.3)  CHF (Congestive Heart Failure) (ICD9 428.0)  Hx of aorta-iliac-femoral bypass  TOP (Termination of Pregnancy)  S/P Dilatation and Curettage  History of Tubal Ligation  s/p AAA (Abdominal Aortic Aneurysm) Repair: 2009 2010  History of Cholecystectomy  S/P Partial Hysterectomy          MEDICATIONS:  amLODIPine   Tablet 10 milliGRAM(s) Oral daily    ertapenem  IVPB      ertapenem  IVPB 500 milliGRAM(s) IV Intermittent every 24 hours        pantoprazole    Tablet 40 milliGRAM(s) Oral before breakfast    predniSONE   Tablet 10 milliGRAM(s) Oral daily    calcitriol   Capsule 0.25 MICROGram(s) Oral daily  sodium chloride 0.9%. 1000 milliLiter(s) IV Continuous <Continuous>      FAMILY HISTORY:  No pertinent family history in first degree relatives      SOCIAL HISTORY:    [ ] Non-smoker  [ ] Smoker  [ ] Alcohol    Allergies    heparin (Unknown)  losartan (Anaphylaxis)  nitroglycerin (Other)    Intolerances    	    REVIEW OF SYSTEMS:  CONSTITUTIONAL: +fever, weight loss, +fatigue  EYES: No eye pain, visual disturbances, or discharge  ENMT:  No difficulty hearing, tinnitus, vertigo; No sinus or throat pain  NECK: No pain or stiffness  RESPIRATORY: No cough, wheezing, chills or hemoptysis; No Shortness of Breath  CARDIOVASCULAR: No chest pain, palpitations, passing out, dizziness, or leg swelling  GASTROINTESTINAL: No abdominal or epigastric pain. No nausea, vomiting, or hematemesis; No diarrhea or constipation. No melena or hematochezia.  GENITOURINARY: No dysuria, frequency, hematuria, or incontinence  NEUROLOGICAL: No headaches, memory loss, loss of strength, numbness, or tremors  SKIN: No itching, burning, rashes, or lesions   LYMPH Nodes: No enlarged glands  ENDOCRINE: No heat or cold intolerance; No hair loss  MUSCULOSKELETAL: No joint pain or swelling; No muscle, back, or extremity pain  PSYCHIATRIC: No depression, anxiety, mood swings, or difficulty sleeping  HEME/LYMPH: No easy bruising, or bleeding gums  ALLERY AND IMMUNOLOGIC: No hives or eczema	    [ ] All others negative	  [ ] Unable to obtain    PHYSICAL EXAM:  T(C): 37.1 (10-13-18 @ 20:30), Max: 37.1 (10-13-18 @ 20:30)  HR: 86 (10-13-18 @ 20:30) (86 - 88)  BP: 145/65 (10-13-18 @ 20:30) (126/74 - 145/65)  RR: 18 (10-13-18 @ 20:30) (18 - 18)  SpO2: 97% (10-13-18 @ 20:30) (94% - 97%)  Wt(kg): --  I&O's Summary    12 Oct 2018 07:01  -  13 Oct 2018 07:00  --------------------------------------------------------  IN: 440 mL / OUT: 1200 mL / NET: -760 mL    13 Oct 2018 07:01  -  13 Oct 2018 20:56  --------------------------------------------------------  IN: 240 mL / OUT: 500 mL / NET: -260 mL        Appearance: NAd obese   HEENT:   Normal oral mucosa, PERRL, EOMI	  Lymphatic: No lymphadenopathy  Cardiovascular: Normal S1 S2, No JVD, No murmurs, No edema  Respiratory: Lungs clear to auscultation	  Psychiatry: A & O x 3, Mood & affect appropriate  Gastrointestinal:  Soft, Non-tender, + BS	  Skin: No rashes, No ecchymoses, No cyanosis	  Neurologic: Non-focal  Extremities: massive LLE edema  Vascular: Peripheral pulses palpable 2+ bilaterally  +fox         TELEMETRY: 	    ECG:  	V paced   RADIOLOGY: < from: Xray Pelvis AP only (10.11.18 @ 18:17) >    EXAM:  PELVIS AP ONLY                            PROCEDURE DATE:  10/11/2018            INTERPRETATION:      CLINICAL INDICATION: Sacral wound. Pelvic pain.    TECHNIQUE: AP view the pelvis.    COMPARISON: CT abdomen and pelvis 7/6/2018.    FINDINGS:    Severely limited evaluation of the sacrum secondary overlying stents and   clips.. No acute fracture. No dislocation. Joint spaces are maintained.   Vascular stents, surgical clips and suture material is noted in the lower   abdomen and pelvis.      IMPRESSION:  No acute fracture or dislocation.    Severely limited evaluation of the sacrum. The pelvis is better evaluated   on CT abdomen and pelvis performed same day.                  JOSEPHINE ORTEGA M.D., RADIOLGY RESIDENT  This document has been electronically signed.  NOVA MALLORY M.D., ATTENDING RADIOLOGIST  This document has been electronically signed. Oct 12 2018  2:21PM                < end of copied text >    OTHER: 	  	  LABS:	 	    CARDIAC MARKERS:                                  8.9    6.29  )-----------( 137      ( 13 Oct 2018 08:26 )             29.9     10-13    139  |  102  |  89<H>  ----------------------------<  105<H>  3.8   |  23  |  4.24<H>    Ca    8.7      13 Oct 2018 06:26      proBNP:   Lipid Profile:   HgA1c:   TSH:

## 2018-10-13 NOTE — CONSULT NOTE ADULT - PROBLEM SELECTOR RECOMMENDATION 3
recheck TTE
studies reviewed, c/w AOCKD  however, hesitate to give JERALD therapy given that patient is no longer on A/C (h/o DVT)

## 2018-10-13 NOTE — PROGRESS NOTE ADULT - SUBJECTIVE AND OBJECTIVE BOX
Follow Up:      Interval History/ROS:    Allergies  heparin (Unknown)  losartan (Anaphylaxis)  nitroglycerin (Other)        ANTIMICROBIALS:  ertapenem  IVPB    ertapenem  IVPB 500 every 24 hours      OTHER MEDS:  MEDICATIONS  (STANDING):  amLODIPine   Tablet 10 daily  pantoprazole    Tablet 40 before breakfast  predniSONE   Tablet 10 daily      Vital Signs Last 24 Hrs  T(C): 36.9 (13 Oct 2018 04:30), Max: 37.2 (12 Oct 2018 12:23)  T(F): 98.5 (13 Oct 2018 04:30), Max: 99 (12 Oct 2018 12:23)  HR: 88 (13 Oct 2018 04:30) (88 - 93)  BP: 126/74 (13 Oct 2018 04:30) (112/73 - 162/74)  BP(mean): --  RR: 18 (13 Oct 2018 04:30) (18 - 18)  SpO2: 94% (13 Oct 2018 04:30) (94% - 97%)    PHYSICAL EXAM:  General: non-toxic  HEAD/EYES: anicteric, PERRL  ENT:  supple  Cardiovascular:   S1, S2  Respiratory:  clear bilaterally  GI:  soft, non-tender, normal bowel sounds  :  no CVA tenderness   Musculoskeletal:  no synovitis  Neurologic:  grossly non-focal  Skin:  no rash  Lymph: no lymphadenopathy  Psychiatric:  appropriate affect  Vascular:  no phlebitis                                8.9    6.29  )-----------( 137      ( 13 Oct 2018 08:26 )             29.9       10-13    139  |  102  |  89<H>  ----------------------------<  105<H>  3.8   |  23  |  4.24<H>    Ca    8.7      13 Oct 2018 06:26    TPro  6.2  /  Alb  3.5  /  TBili  0.7  /  DBili  x   /  AST  15  /  ALT  7<L>  /  AlkPhos  46  10-11      Urinalysis Basic - ( 11 Oct 2018 16:46 )    Color: Light Orange / Appearance: Turbid / S.014 / pH: x  Gluc: x / Ketone: Negative  / Bili: Negative / Urobili: Negative   Blood: x / Protein: 300 mg/dL / Nitrite: Negative   Leuk Esterase: Large / RBC: 2 /hpf / WBC 48 /hpf   Sq Epi: x / Non Sq Epi: 2 /hpf / Bacteria: Many        MICROBIOLOGY:  v  .Blood Blood-Peripheral  10-11-18   No growth to date.  --  --          Rapid RVP Result: Bernardino (10-12 @ 06:45)        RADIOLOGY: Follow Up:      Interval History/ROS:    Allergies  heparin (Unknown)  losartan (Anaphylaxis)  nitroglycerin (Other)        ANTIMICROBIALS:  ertapenem  IVPB    ertapenem  IVPB 500 every 24 hours      OTHER MEDS:  MEDICATIONS  (STANDING):  amLODIPine   Tablet 10 daily  pantoprazole    Tablet 40 before breakfast  predniSONE   Tablet 10 daily      Vital Signs Last 24 Hrs  T(C): 36.9 (13 Oct 2018 04:30), Max: 37.2 (12 Oct 2018 12:23)  T(F): 98.5 (13 Oct 2018 04:30), Max: 99 (12 Oct 2018 12:23)  HR: 88 (13 Oct 2018 04:30) (88 - 93)  BP: 126/74 (13 Oct 2018 04:30) (112/73 - 162/74)  BP(mean): --  RR: 18 (13 Oct 2018 04:30) (18 - 18)  SpO2: 94% (13 Oct 2018 04:30) (94% - 97%)    PHYSICAL EXAM:  General: non-toxic  HEAD/EYES: anicteric, PERRL  ENT:  supple  Cardiovascular:   S1, S2  Respiratory:  clear bilaterally  GI:  soft, non-tender, normal bowel sounds  :  no CVA tenderness   Musculoskeletal:  no synovitis  Neurologic:  grossly non-focal  Skin:  no rash  Lymph: no lymphadenopathy  Psychiatric:  appropriate affect  Vascular:  no phlebitis                                8.9    6.29  )-----------( 137      ( 13 Oct 2018 08:26 )             29.9       10-13    139  |  102  |  89<H>  ----------------------------<  105<H>  3.8   |  23  |  4.24<H>    Ca    8.7      13 Oct 2018 06:26    TPro  6.2  /  Alb  3.5  /  TBili  0.7  /  DBili  x   /  AST  15  /  ALT  7<L>  /  AlkPhos  46  10-11      Urinalysis Basic - ( 11 Oct 2018 16:46 )    Color: Light Orange / Appearance: Turbid / S.014 / pH: x  Gluc: x / Ketone: Negative  / Bili: Negative / Urobili: Negative   Blood: x / Protein: 300 mg/dL / Nitrite: Negative   Leuk Esterase: Large / RBC: 2 /hpf / WBC 48 /hpf   Sq Epi: x / Non Sq Epi: 2 /hpf / Bacteria: Many        MICROBIOLOGY:  v  .Blood Blood-Peripheral  10-11-18   No growth to date.  --  --      Rapid RVP Result: NotDetec (10-12 @ 06:45)    RADIOLOGY:    EXAM:  CT ABDOMEN AND PELVIS                        PROCEDURE DATE:  10/11/2018    Marked cardiomegaly. Moderate pericardial effusion, unchanged.  Status post EVAR with unchanged left common iliac artery aneurysm.  Limited evaluation of the ascending thoracic aortic aneurysm secondary to lack of intravenous contrast. Echocardiography or contrast and enhanced CT may be performed if clinically indicated.    EXAM:  NM PULM VENTILATION PERFUS IMG                        PROCEDURE DATE:  10/12/2018    Abnormal ventilation/perfusion lung scan. Very low probability of pulmonary embolus      EXAM:  DUPLEX SCAN EXT VEINS LOWER BI                        PROCEDURE DATE:  10/12/2018    Persistent thrombus in the left common femoral vein.   Bilateral popliteal artery aneurysms, thrombosed on the right.    EXAM:  XR CHEST PORTABLE URGENT 1V                        PROCEDURE DATE:  10/11/2018    Persistent obscuration of the left hemidiaphragm, which may be due to   cardiomegaly. Small left pleural effusion. Cannot rule out underlying   infection. Follow Up:  Fever / ?uti    Interval History/ROS: No chills or fevers overnight. Has fox in place so not able to endorse urinary symptoms. Does note some cramping pain in her fingers today but states this is unchanged from her baseline.     Allergies  heparin (Unknown)  losartan (Anaphylaxis)  nitroglycerin (Other)        ANTIMICROBIALS:  ertapenem  IVPB    ertapenem  IVPB 500 every 24 hours      OTHER MEDS:  MEDICATIONS  (STANDING):  amLODIPine   Tablet 10 daily  pantoprazole    Tablet 40 before breakfast  predniSONE   Tablet 10 daily      Vital Signs Last 24 Hrs  T(C): 36.9 (13 Oct 2018 04:30), Max: 37.2 (12 Oct 2018 12:23)  T(F): 98.5 (13 Oct 2018 04:30), Max: 99 (12 Oct 2018 12:23)  HR: 88 (13 Oct 2018 04:30) (88 - 93)  BP: 126/74 (13 Oct 2018 04:30) (112/73 - 162/74)  BP(mean): --  RR: 18 (13 Oct 2018 04:30) (18 - 18)  SpO2: 94% (13 Oct 2018 04:30) (94% - 97%)    PHYSICAL EXAMINATION:  General: Alert and Awake, NAD  HEENT: PERRL, EOMI  Neck: Supple  Cardiac: RRR, JONNY best appreciated at LSB, No gallops or rubs  Resp: Rales at lung bases, no wheezes or rhonchi  Abdomen: Obese, NBS, NT/ND, No HSM, No rigidity or guarding  MSK: 2+ bilateral LE edema. No stigmata of IE. No evidence of phlebitis. No evidence of synovitis.  : _ fox  Skin: No rashes or lesions. Skin is warm and dry to the touch.   Neuro: Alert and Awake. CN 2-12 Grossly intact. Moves all four extremities spontaneously.  Psych: Calm, Pleasant, Cooperative                          8.9    6.29  )-----------( 137      ( 13 Oct 2018 08:26 )             29.9       10-13    139  |  102  |  89<H>  ----------------------------<  105<H>  3.8   |  23  |  4.24<H>    Ca    8.7      13 Oct 2018 06:26    TPro  6.2  /  Alb  3.5  /  TBili  0.7  /  DBili  x   /  AST  15  /  ALT  7<L>  /  AlkPhos  46  10-11      Urinalysis Basic - ( 11 Oct 2018 16:46 )    Color: Light Orange / Appearance: Turbid / S.014 / pH: x  Gluc: x / Ketone: Negative  / Bili: Negative / Urobili: Negative   Blood: x / Protein: 300 mg/dL / Nitrite: Negative   Leuk Esterase: Large / RBC: 2 /hpf / WBC 48 /hpf   Sq Epi: x / Non Sq Epi: 2 /hpf / Bacteria: Many        MICROBIOLOGY:  v  .Blood Blood-Peripheral  10-11-18   No growth to date.  --  --      Rapid RVP Result: NotDetec (10-12 @ 06:45)    RADIOLOGY:    EXAM:  CT ABDOMEN AND PELVIS                        PROCEDURE DATE:  10/11/2018    Marked cardiomegaly. Moderate pericardial effusion, unchanged.  Status post EVAR with unchanged left common iliac artery aneurysm.  Limited evaluation of the ascending thoracic aortic aneurysm secondary to lack of intravenous contrast. Echocardiography or contrast and enhanced CT may be performed if clinically indicated.    EXAM:  NM PULM VENTILATION PERFUS IMG                        PROCEDURE DATE:  10/12/2018    Abnormal ventilation/perfusion lung scan. Very low probability of pulmonary embolus    EXAM:  DUPLEX SCAN EXT VEINS LOWER BI                        PROCEDURE DATE:  10/12/2018    Persistent thrombus in the left common femoral vein.   Bilateral popliteal artery aneurysms, thrombosed on the right.    EXAM:  XR CHEST PORTABLE URGENT 1V                        PROCEDURE DATE:  10/11/2018    Persistent obscuration of the left hemidiaphragm, which may be due to   cardiomegaly. Small left pleural effusion. Cannot rule out underlying   infection.

## 2018-10-13 NOTE — CONSULT NOTE ADULT - PROBLEM SELECTOR RECOMMENDATION 9
on CKD4  abx for UTI/infectious workup in progress  Agree with holding outpatient diuretics for now  Dose meds for eGFR~15  Avoid NSAIDs and nephrotoxins as able  continue calcitriol for bone/mineral metabolism management

## 2018-10-13 NOTE — PROGRESS NOTE ADULT - ASSESSMENT
57 yo F w/ cardiomyopathy w/ ICD, prior hx of HIT, CKD IV, chronic right ischial ulcer, peripheral vascular disease w/ chronic left lower extremity swelling in setting of complicated left iliac artery aneurysm (aorto-unifemoral stent graft) w/ left to right femoral-femoral bypass, w/ chronic fox, pulmonary embolism (refused anticoagulation) presents with fever likely from UTI.    UTI:  ID f/up  IV Ertapenem    Hypoxia:  O2 PRN  Cardio eval called.    ILANA on CKD IV:  BMP  IVF  Nephro eval noted.

## 2018-10-13 NOTE — PROGRESS NOTE ADULT - ASSESSMENT
57 yo F w/ cardiomyopathy w/ ICD, prior hx of HIT, CKD IV, chronic right ischial ulcer, peripheral vascular disease w/ chronic left lower extremity swelling in setting of complicated left iliac artery aneurysm (aorto-unifemoral stent graft) w/ left to right femoral-femoral bypass, w/ chronic fox, pulmonary embolism (refused anticoagulation) presents with fever. Patient with chronic lymphedema. Patient with pyuria on U/A but mixed species on culture. No signs or symptoms of cellulitis or other foci. Patient does have persistent thrombus in the left common femoral vein, bilateral popliteal aneurysms with thrombosed one on the right. Possible fever from DVT/Thrombosis? Would continue short empiric course for ?UTI    --Continue Ertapenem 500 mg IV Q24H  --F/U Prelim Blood Cultures 57 yo F w/ cardiomyopathy w/ ICD, prior hx of HIT, CKD IV, chronic right ischial ulcer, peripheral vascular disease w/ chronic left lower extremity swelling in setting of complicated left iliac artery aneurysm (aorto-unifemoral stent graft) w/ left to right femoral-femoral bypass, w/ chronic fox, pulmonary embolism (refused anticoagulation) presents with fever. Patient with chronic lymphedema. Patient with pyuria on U/A but mixed species on culture. No signs or symptoms of cellulitis or other foci. Patient does have persistent thrombus in the left common femoral vein, bilateral popliteal aneurysms with thrombosed one on the right. Possible fever from DVT/Thrombosis? Would continue short empiric course for possible UTI. Right ischial wound does not appear to be infected.    Recommend:  --Continue Ertapenem 500 mg IV Q24H  --F/U Prelim Blood Cultures

## 2018-10-13 NOTE — CONSULT NOTE ADULT - SUBJECTIVE AND OBJECTIVE BOX
[facility]  [phone]  Dr. Queen (covering for Dr. Bennett)  NEPHROLOGY  INITIAL CONSULT NOTE  --------------------------------------------------------------------------------  HPI: 57 yo F w/ cardiomyopathy w/ ICD, prior hx of HIT, CKD IV, chronic right ischial ulcer, peripheral vascular disease w/ chronic left lower extremity swelling in setting of complicated left iliac artery aneurysm (aorto-unifemoral stent graft) w/ left to right femoral-femoral bypass, w/ chronic fox, pulmonary embolism (refused anticoagulation) presents with fever in association with dark urine and poor appetite.  Patient s/p recent hospitalization for bleeding of unclear etiology, at which time A/C for chronic DVT was discontinued.  Patient is well known to my associate Dr. Bennett.  Baseline creatinine ~3.5, now >4 on admission.        PAST HISTORY  --------------------------------------------------------------------------------  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)  Calf DVT (Deep Venous Thrombosis) (ICD9 453.42)  Adenomatous Goiter (ICD9 241.9)  Chronic Gout (ICD9 274.02)  LBBB (Left Bundle Branch Block) (ICD9 426.3)  CHF (Congestive Heart Failure) (ICD9 428.0)  Hx of aorta-iliac-femoral bypass  TOP (Termination of Pregnancy)  S/P Dilatation and Curettage  History of Tubal Ligation  s/p AAA (Abdominal Aortic Aneurysm) Repair: 2009 2010  History of Cholecystectomy  S/P Partial Hysterectomy    FAMILY HISTORY:  No pertinent family history in first degree relatives    PAST SOCIAL HISTORY:    ALLERGIES & MEDICATIONS  --------------------------------------------------------------------------------  Allergies    heparin (Unknown)  losartan (Anaphylaxis)  nitroglycerin (Other)    Intolerances      Standing Inpatient Medications  amLODIPine   Tablet 10 milliGRAM(s) Oral daily  calcitriol   Capsule 0.25 MICROGram(s) Oral daily  ertapenem  IVPB      ertapenem  IVPB 500 milliGRAM(s) IV Intermittent every 24 hours  pantoprazole    Tablet 40 milliGRAM(s) Oral before breakfast  predniSONE   Tablet 10 milliGRAM(s) Oral daily  sodium chloride 0.9%. 1000 milliLiter(s) IV Continuous <Continuous>    PRN Inpatient Medications      REVIEW OF SYSTEMS  --------------------------------------------------------------------------------  General: +fevers PTA, now resolved  CVS: denies CP/palpitations  Respiratory: denies SOB/cough  GI: +anorexia.  Denies N/V/abd pain  :  dark urine PTA, now clearing.  Denies oliguria/dysuria  Neuro:  denies diplopia/vertigo    All other systems were reviewed and are negative, except as noted.    VITALS/PHYSICAL EXAM  --------------------------------------------------------------------------------  T(C): 36.9 (10-13-18 @ 12:35), Max: 36.9 (10-13-18 @ 04:30)  HR: 86 (10-13-18 @ 12:35) (86 - 93)  BP: 137/76 (10-13-18 @ 12:35) (126/74 - 162/74)  RR: 18 (10-13-18 @ 12:35) (18 - 18)  SpO2: 95% (10-13-18 @ 12:35) (94% - 95%)  Wt(kg): --        10-12-18 @ 07:01  -  10-13-18 @ 07:00  --------------------------------------------------------  IN: 440 mL / OUT: 1200 mL / NET: -760 mL      Physical Exam:  	Gen: NAD, well-appearing  	Pulm: CTA B/L no w/r/r  	CV: RRR, S1S2  	Abd: +BS, soft, nontender/nondistended  	: No suprapubic tenderness, +chronic fox draining yellow urine  	Extremity: chronic LLE edema with dressing in place.  trace pedal edema RLE  	Neuro: A&Ox3      LABS/STUDIES  --------------------------------------------------------------------------------              8.9    6.29  >-----------<  137      [10-13-18 @ 08:26]              29.9     139  |  102  |  89  ----------------------------<  105      [10-13-18 @ 06:26]  3.8   |  23  |  4.24        Ca     8.7     [10-13-18 @ 06:26]    TPro  6.2  /  Alb  3.5  /  TBili  0.7  /  DBili  x   /  AST  15  /  ALT  7   /  AlkPhos  46  [10-11-18 @ 16:46]    PT/INR: PT 13.6 , INR 1.20       [10-12-18 @ 12:21]  PTT: 32.1       [10-11-18 @ 16:46]      eGFR if African American: 12 mL/min/1.73M2 (10-13 @ 06:26)    eGFR if Non African American: 11 mL/min/1.73M2 (10-13 @ 06:26)    Creatinine Trend:  SCr 4.24 [10-13 @ 06:26]  SCr 4.28 [10-12 @ 10:04]  SCr 4.07 [10-11 @ 16:46]    Urinalysis - [10-11-18 @ 16:46]      Color Light Orange / Appearance Turbid / SG 1.014 / pH 8.5      Gluc Negative / Ketone Negative  / Bili Negative / Urobili Negative       Blood Negative / Protein 300 mg/dL / Leuk Est Large / Nitrite Negative      RBC 2 / WBC 48 / Hyaline 1 / Gran  / Sq Epi  / Non Sq Epi 2 / Bacteria Many      Iron 55, TIBC 212, %sat 26      [08-24-18 @ 08:10]  Ferritin 618      [08-24-18 @ 08:01]  PTH -- (Ca 8.9)      [04-20-18 @ 08:14]   217  PTH -- (Ca 9.0)      [11-09-17 @ 08:51]   387  Vitamin D (25OH) 7.8      [11-09-17 @ 08:51]  HbA1c 4.5      [08-09-16 @ 06:49]  TSH 0.47      [04-24-18 @ 07:51]      KRISTOPHER: titer 1:320, pattern Homogeneous      [01-10-17 @ 12:14]  dsDNA 38      [07-10-18 @ 13:55]  C3 Complement 114      [07-10-18 @ 13:54]  C4 Complement 32      [07-10-18 @ 13:54]      Assessment / Plan:  58yFemale

## 2018-10-13 NOTE — PROGRESS NOTE ADULT - SUBJECTIVE AND OBJECTIVE BOX
Patient is a 58y old  Female who presents with a chief complaint of fever (13 Oct 2018 12:43)      SUBJECTIVE / OVERNIGHT EVENTS:   Feels better.  Denies CP/SOB/Palpitation/HA.    MEDICATIONS  (STANDING):  amLODIPine   Tablet 10 milliGRAM(s) Oral daily  calcitriol   Capsule 0.25 MICROGram(s) Oral daily  ertapenem  IVPB      ertapenem  IVPB 500 milliGRAM(s) IV Intermittent every 24 hours  pantoprazole    Tablet 40 milliGRAM(s) Oral before breakfast  predniSONE   Tablet 10 milliGRAM(s) Oral daily  sodium chloride 0.9%. 1000 milliLiter(s) (75 mL/Hr) IV Continuous <Continuous>    MEDICATIONS  (PRN):        CAPILLARY BLOOD GLUCOSE        I&O's Summary    12 Oct 2018 07:01  -  13 Oct 2018 07:00  --------------------------------------------------------  IN: 440 mL / OUT: 1200 mL / NET: -760 mL    13 Oct 2018 07:01  -  13 Oct 2018 19:09  --------------------------------------------------------  IN: 240 mL / OUT: 0 mL / NET: 240 mL        PHYSICAL EXAM:  GENERAL: NAD, well-developed  HEAD:  Atraumatic, Normocephalic  NECK: Supple, No JVD  CHEST/LUNG: Clear to auscultation bilaterally; No wheezing.  HEART: Regular rate and rhythm; No murmurs, rubs, or gallops  ABDOMEN: Soft, Nontender, Nondistended; Bowel sounds present  EXTREMITIES:   No clubbing, cyanosis, or edema  NEUROLOGY: AAO X 3  SKIN: No rashes    LABS:                        8.9    6.29  )-----------( 137      ( 13 Oct 2018 08:26 )             29.9     10-13    139  |  102  |  89<H>  ----------------------------<  105<H>  3.8   |  23  |  4.24<H>    Ca    8.7      13 Oct 2018 06:26      PT/INR - ( 12 Oct 2018 12:21 )   PT: 13.6 sec;   INR: 1.20 ratio                 CAPILLARY BLOOD GLUCOSE        10-11 @ 18:17  Culture-urine --  Culture results   No growth to date.  method type --  Organism --  Organism Identification --  Specimen source .Blood Blood-Peripheral           10-11 @ 18:17  Culture blood --  Culture results   No growth to date.  Gram stain --  Gram stain blood --  Method type --  Organism --  Organism identification --  Specimen source .Blood Blood-Peripheral      RADIOLOGY & ADDITIONAL TESTS:    Imaging Personally Reviewed:    Consultant(s) Notes Reviewed:      Care Discussed with Consultants/Other Providers:

## 2018-10-14 LAB
ANION GAP SERPL CALC-SCNC: 16 MMOL/L — SIGNIFICANT CHANGE UP (ref 5–17)
BUN SERPL-MCNC: 89 MG/DL — HIGH (ref 7–23)
CALCIUM SERPL-MCNC: 8.8 MG/DL — SIGNIFICANT CHANGE UP (ref 8.4–10.5)
CHLORIDE SERPL-SCNC: 102 MMOL/L — SIGNIFICANT CHANGE UP (ref 96–108)
CO2 SERPL-SCNC: 23 MMOL/L — SIGNIFICANT CHANGE UP (ref 22–31)
CREAT SERPL-MCNC: 4.43 MG/DL — HIGH (ref 0.5–1.3)
GLUCOSE SERPL-MCNC: 96 MG/DL — SIGNIFICANT CHANGE UP (ref 70–99)
HCT VFR BLD CALC: 31.1 % — LOW (ref 34.5–45)
HGB BLD-MCNC: 10 G/DL — LOW (ref 11.5–15.5)
MCHC RBC-ENTMCNC: 28.2 PG — SIGNIFICANT CHANGE UP (ref 27–34)
MCHC RBC-ENTMCNC: 32.2 GM/DL — SIGNIFICANT CHANGE UP (ref 32–36)
MCV RBC AUTO: 87.9 FL — SIGNIFICANT CHANGE UP (ref 80–100)
PLATELET # BLD AUTO: 144 K/UL — LOW (ref 150–400)
POTASSIUM SERPL-MCNC: 3.7 MMOL/L — SIGNIFICANT CHANGE UP (ref 3.5–5.3)
POTASSIUM SERPL-SCNC: 3.7 MMOL/L — SIGNIFICANT CHANGE UP (ref 3.5–5.3)
RBC # BLD: 3.54 M/UL — LOW (ref 3.8–5.2)
RBC # FLD: 15.5 % — HIGH (ref 10.3–14.5)
SODIUM SERPL-SCNC: 141 MMOL/L — SIGNIFICANT CHANGE UP (ref 135–145)
WBC # BLD: 5.29 K/UL — SIGNIFICANT CHANGE UP (ref 3.8–10.5)
WBC # FLD AUTO: 5.29 K/UL — SIGNIFICANT CHANGE UP (ref 3.8–10.5)

## 2018-10-14 RX ORDER — SODIUM CHLORIDE 9 MG/ML
250 INJECTION INTRAMUSCULAR; INTRAVENOUS; SUBCUTANEOUS ONCE
Qty: 0 | Refills: 0 | Status: COMPLETED | OUTPATIENT
Start: 2018-10-14 | End: 2018-10-14

## 2018-10-14 RX ADMIN — ERTAPENEM SODIUM 100 MILLIGRAM(S): 1 INJECTION, POWDER, LYOPHILIZED, FOR SOLUTION INTRAMUSCULAR; INTRAVENOUS at 21:08

## 2018-10-14 RX ADMIN — AMLODIPINE BESYLATE 10 MILLIGRAM(S): 2.5 TABLET ORAL at 06:17

## 2018-10-14 RX ADMIN — SODIUM CHLORIDE 50 MILLILITER(S): 9 INJECTION INTRAMUSCULAR; INTRAVENOUS; SUBCUTANEOUS at 14:38

## 2018-10-14 RX ADMIN — Medication 10 MILLIGRAM(S): at 09:14

## 2018-10-14 RX ADMIN — CALCITRIOL 0.25 MICROGRAM(S): 0.5 CAPSULE ORAL at 09:14

## 2018-10-14 NOTE — PROGRESS NOTE ADULT - SUBJECTIVE AND OBJECTIVE BOX
[facility]   [phone]  Dr. Queen (covering for Dr. Bennett)  RENAL FOLLOW UP NOTE  --------------------------------------------------------------------------------  Patient seen and examined.  Reports night sweats.  Denies SOB.    PAST HISTORY  --------------------------------------------------------------------------------  No significant changes to PMH, PSH, FHx, SHx, unless otherwise noted    ALLERGIES & MEDICATIONS  --------------------------------------------------------------------------------  Allergies    heparin (Unknown)  losartan (Anaphylaxis)  nitroglycerin (Other)    Intolerances      Standing Inpatient Medications  amLODIPine   Tablet 10 milliGRAM(s) Oral daily  calcitriol   Capsule 0.25 MICROGram(s) Oral daily  ertapenem  IVPB      ertapenem  IVPB 500 milliGRAM(s) IV Intermittent every 24 hours  pantoprazole    Tablet 40 milliGRAM(s) Oral before breakfast  predniSONE   Tablet 10 milliGRAM(s) Oral daily  sodium chloride 0.9% Bolus 250 milliLiter(s) IV Bolus once    PRN Inpatient Medications      FOCUSED REVIEW OF SYSTEMS  --------------------------------------------------------------------------------  +rigors/night sweats  denies CP/palpitations  denies SOB/cough  denies N/V/abd pain  denies dysuria/hematuria      VITALS/PHYSICAL EXAM  --------------------------------------------------------------------------------  T(C): 36.7 (10-14-18 @ 11:56), Max: 37.1 (10-13-18 @ 20:30)  HR: 83 (10-14-18 @ 11:56) (83 - 86)  BP: 105/68 (10-14-18 @ 11:56) (105/68 - 145/79)  RR: 18 (10-14-18 @ 11:56) (18 - 18)  SpO2: 97% (10-14-18 @ 11:56) (97% - 97%)  Wt(kg): --        10-13-18 @ 07:01  -  10-14-18 @ 07:00  --------------------------------------------------------  IN: 240 mL / OUT: 700 mL / NET: -460 mL      Physical Exam:  	Gen: NAD, well-appearing  	Pulm: CTA B/L no w/r/r  	CV: RRR, S1S2  	Abd: +BS, soft, nontender/nondistended  	: No suprapubic tenderness, +chronic fox draining yellow urine  	Extremity: chronic LLE edema with dressing in place.  trace pedal edema RLE  	Neuro: A&Ox3        LABS/STUDIES  --------------------------------------------------------------------------------              10.0   5.29  >-----------<  144      [10-14-18 @ 08:08]              31.1     141  |  102  |  89  ----------------------------<  96      [10-14-18 @ 07:02]  3.7   |  23  |  4.43        Ca     8.8     [10-14-18 @ 07:02]            eGFR if Non African American: 10 mL/min/1.73M2 (10-14 @ 07:02)  eGFR if : 12 mL/min/1.73M2 (10-14 @ 07:02)    Creatinine Trend:  SCr 4.43 [10-14 @ 07:02]  SCr 4.24 [10-13 @ 06:26]  SCr 4.28 [10-12 @ 10:04]  SCr 4.07 [10-11 @ 16:46]              Urinalysis - [10-11-18 @ 16:46]      Color Light Orange / Appearance Turbid / SG 1.014 / pH 8.5      Gluc Negative / Ketone Negative  / Bili Negative / Urobili Negative       Blood Negative / Protein 300 mg/dL / Leuk Est Large / Nitrite Negative      RBC 2 / WBC 48 / Hyaline 1 / Gran  / Sq Epi  / Non Sq Epi 2 / Bacteria Many      Iron 55, TIBC 212, %sat 26      [08-24-18 @ 08:10]  Ferritin 618      [08-24-18 @ 08:01]  PTH -- (Ca 8.9)      [04-20-18 @ 08:14]   217  PTH -- (Ca 9.0)      [11-09-17 @ 08:51]   387  Vitamin D (25OH) 7.8      [11-09-17 @ 08:51]  HbA1c 4.5      [08-09-16 @ 06:49]  TSH 0.47      [04-24-18 @ 07:51]

## 2018-10-14 NOTE — PROGRESS NOTE ADULT - SUBJECTIVE AND OBJECTIVE BOX
Subjective: Patient seen and examined. No new events except as noted.   no cp or sob     REVIEW OF SYSTEMS:    CONSTITUTIONAL: + weakness, fevers or chills  EYES/ENT: No visual changes;  No vertigo or throat pain   NECK: No pain or stiffness  RESPIRATORY: No cough, wheezing, hemoptysis; No shortness of breath  CARDIOVASCULAR: No chest pain or palpitations  GASTROINTESTINAL: No abdominal or epigastric pain. No nausea, vomiting, or hematemesis; No diarrhea or constipation. No melena or hematochezia.  GENITOURINARY: No dysuria, frequency or hematuria  NEUROLOGICAL: No numbness or weakness  SKIN: No itching, burning, rashes, or lesions   All other review of systems is negative unless indicated above.    MEDICATIONS:  MEDICATIONS  (STANDING):  amLODIPine   Tablet 10 milliGRAM(s) Oral daily  calcitriol   Capsule 0.25 MICROGram(s) Oral daily  ertapenem  IVPB      ertapenem  IVPB 500 milliGRAM(s) IV Intermittent every 24 hours  pantoprazole    Tablet 40 milliGRAM(s) Oral before breakfast  predniSONE   Tablet 10 milliGRAM(s) Oral daily  sodium chloride 0.9%. 1000 milliLiter(s) (75 mL/Hr) IV Continuous <Continuous>      PHYSICAL EXAM:  T(C): 36.8 (10-14-18 @ 00:05), Max: 37.1 (10-13-18 @ 20:30)  HR: 83 (10-14-18 @ 00:05) (83 - 86)  BP: 145/79 (10-14-18 @ 00:05) (137/76 - 145/79)  RR: 18 (10-14-18 @ 00:05) (18 - 18)  SpO2: 97% (10-14-18 @ 00:05) (95% - 97%)  Wt(kg): --  I&O's Summary    13 Oct 2018 07:01  -  14 Oct 2018 07:00  --------------------------------------------------------  IN: 240 mL / OUT: 700 mL / NET: -460 mL          Appearance: Normal	  HEENT:   Normal oral mucosa, PERRL, EOMI	  Lymphatic: No lymphadenopathy , no edema  Cardiovascular: Normal S1 S2, No JVD, No murmurs , Peripheral pulses palpable 2+ bilaterally  Respiratory: Lungs clear to auscultation, normal effort 	  Gastrointestinal:  Soft, Non-tender, + BS	  Skin: No rashes, No ecchymoses, No cyanosis, warm to touch  Musculoskeletal: Normal range of motion, normal strength  Psychiatry:  Mood & affect appropriate  Ext: massive LLE   +fox       LABS:    CARDIAC MARKERS:                                10.0   5.29  )-----------( 144      ( 14 Oct 2018 08:08 )             31.1     10-14    141  |  102  |  89<H>  ----------------------------<  96  3.7   |  23  |  4.43<H>    Ca    8.8      14 Oct 2018 07:02      proBNP:   Lipid Profile:   HgA1c:   TSH:               TELEMETRY: V Paced 	    ECG:  	  RADIOLOGY:   DIAGNOSTIC TESTING:  [ ] Echocardiogram:  [ ]  Catheterization:  [ ] Stress Test:    OTHER:

## 2018-10-14 NOTE — PROGRESS NOTE ADULT - SUBJECTIVE AND OBJECTIVE BOX
Patient is a 58y old  Female who presents with a chief complaint of fever (14 Oct 2018 10:08)      SUBJECTIVE / OVERNIGHT EVENTS:   Feels better.  Denies CP/SOB/Palpitation/HA.    MEDICATIONS  (STANDING):  amLODIPine   Tablet 10 milliGRAM(s) Oral daily  calcitriol   Capsule 0.25 MICROGram(s) Oral daily  ertapenem  IVPB      ertapenem  IVPB 500 milliGRAM(s) IV Intermittent every 24 hours  pantoprazole    Tablet 40 milliGRAM(s) Oral before breakfast  predniSONE   Tablet 10 milliGRAM(s) Oral daily  sodium chloride 0.9%. 1000 milliLiter(s) (75 mL/Hr) IV Continuous <Continuous>    MEDICATIONS  (PRN):        CAPILLARY BLOOD GLUCOSE        I&O's Summary    13 Oct 2018 07:01  -  14 Oct 2018 07:00  --------------------------------------------------------  IN: 240 mL / OUT: 700 mL / NET: -460 mL        PHYSICAL EXAM:  GENERAL: NAD, well-developed  HEAD:  Atraumatic, Normocephalic  NECK: Supple, No JVD  CHEST/LUNG: Clear to auscultation bilaterally; No wheezing.  HEART: Regular rate and rhythm; No murmurs, rubs, or gallops  ABDOMEN: Soft, Nontender, Nondistended; Bowel sounds present  EXTREMITIES:   No clubbing, cyanosis, or edema  NEUROLOGY: AAO X 3  SKIN: No rashes    LABS:                        10.0   5.29  )-----------( 144      ( 14 Oct 2018 08:08 )             31.1     10-14    141  |  102  |  89<H>  ----------------------------<  96  3.7   |  23  |  4.43<H>    Ca    8.8      14 Oct 2018 07:02      PT/INR - ( 12 Oct 2018 12:21 )   PT: 13.6 sec;   INR: 1.20 ratio                 CAPILLARY BLOOD GLUCOSE        10-11 @ 18:17  Culture-urine --  Culture results   No growth to date.  method type --  Organism --  Organism Identification --  Specimen source .Blood Blood-Peripheral           10-11 @ 18:17  Culture blood --  Culture results   No growth to date.  Gram stain --  Gram stain blood --  Method type --  Organism --  Organism identification --  Specimen source .Blood Blood-Peripheral      RADIOLOGY & ADDITIONAL TESTS:    Imaging Personally Reviewed:    Consultant(s) Notes Reviewed:      Care Discussed with Consultants/Other Providers:

## 2018-10-14 NOTE — PROGRESS NOTE ADULT - PROBLEM SELECTOR PLAN 3
studies reviewed, c/w AOCKD  however, hesitate to give JERALD therapy given that patient is no longer on A/C (h/o DVT).   defer to primary nephrologist Dr. Bennett, who will return tomorrow, to discuss with patient

## 2018-10-14 NOTE — PROGRESS NOTE ADULT - ASSESSMENT
57 yo F w/ cardiomyopathy w/ ICD, prior hx of HIT, CKD IV, chronic right ischial ulcer, peripheral vascular disease w/ chronic left lower extremity swelling in setting of complicated left iliac artery aneurysm (aorto-unifemoral stent graft) w/ left to right femoral-femoral bypass, w/ chronic fox, pulmonary embolism (refused anticoagulation) presents with fever likely from UTI.    UTI:  ID f/up  IV Ertapenem    Hypoxia:  Resolved  Cardio f/up noted.    ILANA on CKD IV:    Cr 4.43  Nephro f/up

## 2018-10-14 NOTE — PROGRESS NOTE ADULT - ASSESSMENT
59 yo F w/ cardiomyopathy w/ ICD, prior hx of HIT, CKD IV, chronic right ischial ulcer, peripheral vascular disease w/ chronic left lower extremity swelling in setting of complicated left iliac artery aneurysm (aorto-unifemoral stent graft) w/ left to right femoral-femoral bypass, w/ chronic fox, pulmonary embolism (refused anticoagulation) being treated for UTI and also noted to have ILANA.

## 2018-10-15 ENCOUNTER — TRANSCRIPTION ENCOUNTER (OUTPATIENT)
Age: 58
End: 2018-10-15

## 2018-10-15 LAB
ANION GAP SERPL CALC-SCNC: 13 MMOL/L — SIGNIFICANT CHANGE UP (ref 5–17)
BUN SERPL-MCNC: 91 MG/DL — HIGH (ref 7–23)
CALCIUM SERPL-MCNC: 8.7 MG/DL — SIGNIFICANT CHANGE UP (ref 8.4–10.5)
CHLORIDE SERPL-SCNC: 104 MMOL/L — SIGNIFICANT CHANGE UP (ref 96–108)
CO2 SERPL-SCNC: 24 MMOL/L — SIGNIFICANT CHANGE UP (ref 22–31)
CREAT SERPL-MCNC: 4.4 MG/DL — HIGH (ref 0.5–1.3)
GLUCOSE SERPL-MCNC: 94 MG/DL — SIGNIFICANT CHANGE UP (ref 70–99)
HCT VFR BLD CALC: 29.5 % — LOW (ref 34.5–45)
HGB BLD-MCNC: 9.6 G/DL — LOW (ref 11.5–15.5)
MCHC RBC-ENTMCNC: 28.5 PG — SIGNIFICANT CHANGE UP (ref 27–34)
MCHC RBC-ENTMCNC: 32.5 GM/DL — SIGNIFICANT CHANGE UP (ref 32–36)
MCV RBC AUTO: 87.5 FL — SIGNIFICANT CHANGE UP (ref 80–100)
PLATELET # BLD AUTO: 143 K/UL — LOW (ref 150–400)
POTASSIUM SERPL-MCNC: 3.6 MMOL/L — SIGNIFICANT CHANGE UP (ref 3.5–5.3)
POTASSIUM SERPL-SCNC: 3.6 MMOL/L — SIGNIFICANT CHANGE UP (ref 3.5–5.3)
RBC # BLD: 3.37 M/UL — LOW (ref 3.8–5.2)
RBC # FLD: 15.7 % — HIGH (ref 10.3–14.5)
SODIUM SERPL-SCNC: 141 MMOL/L — SIGNIFICANT CHANGE UP (ref 135–145)
WBC # BLD: 5.46 K/UL — SIGNIFICANT CHANGE UP (ref 3.8–10.5)
WBC # FLD AUTO: 5.46 K/UL — SIGNIFICANT CHANGE UP (ref 3.8–10.5)

## 2018-10-15 PROCEDURE — 99232 SBSQ HOSP IP/OBS MODERATE 35: CPT

## 2018-10-15 PROCEDURE — 99222 1ST HOSP IP/OBS MODERATE 55: CPT

## 2018-10-15 RX ORDER — BACITRACIN ZINC 500 UNIT/G
1 OINTMENT IN PACKET (EA) TOPICAL
Qty: 0 | Refills: 0 | Status: DISCONTINUED | OUTPATIENT
Start: 2018-10-15 | End: 2018-10-16

## 2018-10-15 RX ORDER — SODIUM CHLORIDE 0.65 %
1 AEROSOL, SPRAY (ML) NASAL THREE TIMES A DAY
Qty: 0 | Refills: 0 | Status: DISCONTINUED | OUTPATIENT
Start: 2018-10-15 | End: 2018-10-17

## 2018-10-15 RX ADMIN — CALCITRIOL 0.25 MICROGRAM(S): 0.5 CAPSULE ORAL at 08:39

## 2018-10-15 RX ADMIN — Medication 10 MILLIGRAM(S): at 08:39

## 2018-10-15 RX ADMIN — Medication 1 SPRAY(S): at 14:47

## 2018-10-15 RX ADMIN — AMLODIPINE BESYLATE 10 MILLIGRAM(S): 2.5 TABLET ORAL at 05:23

## 2018-10-15 RX ADMIN — Medication 1 SPRAY(S): at 07:15

## 2018-10-15 RX ADMIN — Medication 1 SPRAY(S): at 22:59

## 2018-10-15 NOTE — DISCHARGE NOTE ADULT - HOSPITAL COURSE
59 yo F w/ cardiomyopathy w/ ICD, prior hx of HIT, CKD IV, chronic right ischial ulcer, peripheral vascular disease w/ chronic left lower extremity swelling in setting of complicated left iliac artery aneurysm (aorto-unifemoral stent graft) w/ left to right femoral-femoral bypass, w/ chronic fox, pulmonary embolism (refused anticoagulation) presents with fever likely from UTI.    Problem/Plan:  UTI   > Resolved   > completed course of IV Invanz   > Chronic Fox     Problem/plan: Hypoxia  >Unsure of cause   >Resolved  >Follow up with cardiology     Problem/plan: ILANA on CKD IV:  ILANA resolved   Hold Lasix for 24 hours  Follow up with Renal as to when to restart   Creatine trending down     Problem/plan: Pressure Ulcer   > right ischial chronic stage IV   > Follow up at wound care center   > Dressing changes by VNS

## 2018-10-15 NOTE — CHART NOTE - NSCHARTNOTEFT_GEN_A_CORE
57 yo F w/ cardiomyopathy w/ ICD, prior hx of HIT, CKD IV, chronic right ischial ulcer, peripheral vascular disease w/ chronic left lower extremity swelling in setting of complicated left iliac artery aneurysm (aorto-unifemoral stent graft) w/ left to right femoral-femoral bypass, w/ chronic fox, pulmonary embolism (refused anticoagulation) presents with fever likely from UTI.    UTI:  ID f/up noted.  Completed abx. Notified by RN that patient had left 2nd toe nail injury after she pulled the bed sheet over her foot. Seen the patienta t bedside and examined the foot, Left 2nd toe nail pulled up from the nail bed,                         57 yo F w/ cardiomyopathy w/ ICD, prior hx of HIT, CKD IV, chronic right ischial ulcer, peripheral vascular disease w/ chronic left lower extremity swelling in setting of complicated left iliac artery aneurysm (aorto-unifemoral stent graft) w/ left to right femoral-femoral bypass, w/ chronic fox, pulmonary embolism (refused anticoagulation) presents with fever likely from UTI.    UTI:  ID f/up noted.  Completed abx. Notified by RN that patient had left 2nd toe nail injury after she pulled the bed sheet over her foot. Seen the patient at bedside and examined the foot, noted left 2nd toe nail lifted  up from the nail bed, still remains attached, bleeding stopped, patient with no c/o pain.     Vital Signs Last 24 Hrs  T(C): 36.7 (15 Oct 2018 20:19), Max: 36.8 (15 Oct 2018 09:26)  T(F): 98 (15 Oct 2018 20:19), Max: 98.3 (15 Oct 2018 09:26)  HR: 90 (15 Oct 2018 20:19) (90 - 95)  BP: 147/84 (15 Oct 2018 20:19) (136/76 - 148/81)  BP(mean): --  RR: 18 (15 Oct 2018 20:19) (16 - 18)  SpO2: 96% (15 Oct 2018 20:19) (96% - 100%)                          9.6    5.46  )-----------( 143      ( 15 Oct 2018 07:44 )             29.5     57 yo F w/ cardiomyopathy w/ ICD, prior hx of HIT, CKD IV, chronic right ischial ulcer, peripheral vascular disease w/ chronic left lower extremity swelling in setting of complicated left iliac artery aneurysm (aorto-unifemoral stent graft) w/ left to right femoral-femoral bypass, w/ chronic fox, pulmonary embolism (refused anticoagulation) presents with fever likely from UTI.  Now pt with Left 2nd toe nail injury., no active  bleeding  Cleaned 2nd toe nail with saline, gauze dressing and bandaging applied  Bacitracin ointment to the affected area  podiatry consult called, will F/U recommendations  trend CBC  Discussed with Attending  Will continue to monitor  Will update with primary team    Brian amezcua [facility] [place]  [id number]

## 2018-10-15 NOTE — DISCHARGE NOTE ADULT - PATIENT PORTAL LINK FT
You can access the [facility] Patient Portal, offered by [facility], by registering with the following website: [email or web address]

## 2018-10-15 NOTE — DISCHARGE NOTE ADULT - PROVIDER TOKENS
TOKEN:'[id number]',TOKEN:'[id number]' TOKEN:'[id number]',TOKEN:'[id number]',FREE:[LAST:[[facility]],PHONE:[phone]],FAX:[(   )    -],ADDRESS:[[address] [place] [address]]]

## 2018-10-15 NOTE — PROGRESS NOTE ADULT - ASSESSMENT
59 yo F w/ cardiomyopathy w/ ICD, prior hx of HIT, CKD IV, chronic right ischial ulcer, peripheral vascular disease w/ chronic left lower extremity swelling in setting of complicated left iliac artery aneurysm (aorto-unifemoral stent graft) w/ left to right femoral-femoral bypass, w/ chronic fox, pulmonary embolism (refused anticoagulation) presents with fever.   Patient with chronic lymphedema.   Patient with pyuria on U/A but mixed species on culture.   No signs or symptoms of cellulitis or other foci.   Patient does have persistent thrombus in the left common femoral vein, bilateral popliteal aneurysms with thrombosed one on the right.  has been afebrile  Urine Cx mixed  No other s/s infectious process  Bacteriuria likely fox related colonization  Would Dc antimicrobials and observe  Will tailor plan for ID issues  per course,results.Will defer to primary team on management of other issues.  Case d/w Med NP  Will Follow.  Beeper [id number]/afterhours/No response-[id number]

## 2018-10-15 NOTE — PROGRESS NOTE ADULT - ASSESSMENT
58 F PMH SLE, systolic HFrEF(EF 32%) s/p AICD, HTN, CKD III- IV, PE / left leg DVT dx 2009, HIT, CAD (no stents), stage IV sacral ulcer, chronic Fox, recurrent pericardial effusion, AAA ~14cm in 12/2016 s/p repair in 2010 w/ aortoiliac stent complicated post-operatively with development of left lower extremity compartment syndrome, with recent cellulitis treated inpt readmitted recently for pericardial effusion s/p pericardial drain. now admitted with lower GI bleed vs. vaginal bleed   1- CKD IV  2- pericardial effusion s/p drain in july 2018   3- chf  4- HTN   5- neurogenic bladder  6- lupus   7- anemia  8- shpt  9- hyperuricemia   10- thoracic aneurysm  11- lower gi bleed      cr seems to be stabilizing at this high level  but overall has been worsening overtime and admissions. now close to being stage V   cont with b-b and norvasc    cont fox for hx of neurogenic bladder  cont calcitriol 0.25mcg daily    cont with allopurinol  lasix hold for another 48 hr   cont prednisone

## 2018-10-15 NOTE — DISCHARGE NOTE ADULT - SECONDARY DIAGNOSIS.
Fever, unspecified fever cause Acute respiratory failure with hypoxia ILANA (acute kidney injury) Essential hypertension Chronic deep vein thrombosis (DVT) of femoral vein of left lower extremity Skin ulcer of sacrum, unspecified ulcer stage

## 2018-10-15 NOTE — PROGRESS NOTE ADULT - SUBJECTIVE AND OBJECTIVE BOX
Patient is a 58y old  Female who presents with a chief complaint of fever (14 Oct 2018 13:57)    Being followed by ID for fever    Interval history:some bodyaches  No acute events      ROS:denies urinary complaints  Has fox  No cough,SOB,CP  No N/V/D./abd pain  No other complaints      Antimicrobials:  None     Other medications reviewed    Vital Signs Last 24 Hrs  T(C): 36.8 (10-15-18 @ 09:26), Max: 36.9 (10-14-18 @ 20:24)  T(F): 98.3 (10-15-18 @ 09:26), Max: 98.5 (10-14-18 @ 20:24)  HR: 90 (10-15-18 @ 09:26) (83 - 95)  BP: 147/79 (10-15-18 @ 09:26) (105/68 - 159/85)  BP(mean): --  RR: 16 (10-15-18 @ 09:26) (16 - 18)  SpO2: 97% (10-15-18 @ 09:26) (96% - 97%)    Physical Exam:        HEENT PERRLA EOMI    No oral exudate or erythema    Chest Good AE,CTA    CVS RRR S1 S2 WNl No murmur or rub or gallop    Abd soft BS normal No tenderness no masses  Fox    Lymphedema L leg no erythema or tenderness    IV site no erythema tenderness or discharge    CNS AAO X 3 no focal    Lab Data:                          9.6    5.46  )-----------( 143      ( 15 Oct 2018 07:44 )             29.5       10-15    141  |  104  |  91<H>  ----------------------------<  94  3.6   |  24  |  4.40<H>    Ca    8.7      15 Oct 2018 06:47          Culture - Urine (collected 11 Oct 2018 18:17)  Source: .Urine Clean Catch (Midstream)  Final Report (12 Oct 2018 22:28):    Culture grew 3 or more types of organisms which indicate    collection contamination; consider recollection only if clinically    indicated.    Culture - Blood (collected 11 Oct 2018 18:17)  Source: .Blood Blood-Peripheral  Preliminary Report (12 Oct 2018 19:01):    No growth to date.    Culture - Blood (collected 11 Oct 2018 18:17)  Source: .Blood Blood-Peripheral  Preliminary Report (12 Oct 2018 19:01):    No growth to date.        < from: [facility] Duplex Lower Ext Vein Scan, Bilat (10.12.18 @ 11:17) >    IMPRESSION:     Persistent thrombus in the left common femoral vein.   Bilateral popliteal artery aneurysms, thrombosed on the right.        < end of copied text >      < from: CT Abdomen and Pelvis No Cont (10.11.18 @ 17:44) >  IMPRESSION:  Marked cardiomegaly. Moderate pericardial effusion, unchanged.    Status post EVAR with unchanged left common iliac artery aneurysm.    Limited evaluation of the ascending thoracic aortic aneurysm secondary to   lack of intravenous contrast. Echocardiography or contrast and enhanced   CT may be performed if clinically indicated.        < end of copied text >      < from: Xray Chest 1 View- PORTABLE-Urgent (10.11.18 @ 16:54) >  IMPRESSION:   Persistent obscuration of the left hemidiaphragm, which may be due to   cardiomegaly. Small left pleural effusion. Cannot rule out underlying   infection.            < end of copied text >

## 2018-10-15 NOTE — PROGRESS NOTE ADULT - SUBJECTIVE AND OBJECTIVE BOX
Subjective: Patient seen and examined. No new events except as noted.   no cp or sob     REVIEW OF SYSTEMS:    CONSTITUTIONAL: + weakness, fevers or chills  EYES/ENT: No visual changes;  No vertigo or throat pain   NECK: No pain or stiffness  RESPIRATORY: No cough, wheezing, hemoptysis; No shortness of breath  CARDIOVASCULAR: No chest pain or palpitations  GASTROINTESTINAL: No abdominal or epigastric pain. No nausea, vomiting, or hematemesis; No diarrhea or constipation. No melena or hematochezia.  GENITOURINARY: No dysuria, frequency or hematuria  NEUROLOGICAL: No numbness or weakness  SKIN: No itching, burning, rashes, or lesions   All other review of systems is negative unless indicated above.    MEDICATIONS:  MEDICATIONS  (STANDING):  amLODIPine   Tablet 10 milliGRAM(s) Oral daily  calcitriol   Capsule 0.25 MICROGram(s) Oral daily  pantoprazole    Tablet 40 milliGRAM(s) Oral before breakfast  predniSONE   Tablet 10 milliGRAM(s) Oral daily  sodium chloride 0.65% Nasal 1 Spray(s) Both Nostrils three times a day      PHYSICAL EXAM:  T(C): 36.7 (10-15-18 @ 12:15), Max: 36.9 (10-14-18 @ 20:24)  HR: 93 (10-15-18 @ 12:15) (86 - 95)  BP: 148/81 (10-15-18 @ 12:15) (136/76 - 159/85)  RR: 18 (10-15-18 @ 12:15) (16 - 18)  SpO2: 100% (10-15-18 @ 12:15) (96% - 100%)  Wt(kg): --  I&O's Summary    14 Oct 2018 07:01  -  15 Oct 2018 07:00  --------------------------------------------------------  IN: 240 mL / OUT: 700 mL / NET: -460 mL    15 Oct 2018 07:01  -  15 Oct 2018 20:05  --------------------------------------------------------  IN: 480 mL / OUT: 500 mL / NET: -20 mL          Appearance: NAD  HEENT:   Normal oral mucosa, PERRL, EOMI	  Lymphatic: No lymphadenopathy , no edema  Cardiovascular: Normal S1 S2, No JVD, No murmurs , Peripheral pulses palpable 2+ bilaterally  Respiratory: Lungs clear to auscultation, normal effort 	  Gastrointestinal:  Soft, Non-tender, + BS	  Skin: No rashes, No ecchymoses, No cyanosis, warm to touch  Musculoskeletal: Normal range of motion, normal strength  Psychiatry:  Mood & affect appropriate  Ext: massive LLE edema   +fox       LABS:    CARDIAC MARKERS:                                9.6    5.46  )-----------( 143      ( 15 Oct 2018 07:44 )             29.5     10-15    141  |  104  |  91<H>  ----------------------------<  94  3.6   |  24  |  4.40<H>    Ca    8.7      15 Oct 2018 06:47      proBNP:   Lipid Profile:   HgA1c:   TSH:             TELEMETRY: V paced 	    ECG:  	  RADIOLOGY:   DIAGNOSTIC TESTING:  [ ] Echocardiogram:  [ ]  Catheterization:  [ ] Stress Test:    OTHER:

## 2018-10-15 NOTE — DISCHARGE NOTE ADULT - MEDICATION SUMMARY - MEDICATIONS TO TAKE
I will START or STAY ON the medications listed below when I get home from the hospital:    predniSONE 10 mg oral tablet  -- 1 tab(s) by mouth once a day  -- Indication: For Steriod     colchicine 0.6 mg oral tablet  -- 1 tab(s) by mouth , As Needed  -- Indication: For gout     allopurinol 100 mg oral tablet  -- 1 tab(s) by mouth once a day  -- Indication: For gout     carvedilol 12.5 mg oral tablet  -- 1 tab(s) by mouth 3 times a day (morning, afternoon, and night)  -- Indication: For Elevated Blood pressure     amLODIPine 10 mg oral tablet  -- 1 tab(s) by mouth once a day  -- Indication: For blood pressure control     mupirocin 2% topical ointment  -- Apply on skin to affected area 2 times a day  apply to left foot 2nd digit nail injury   -- Indication: For topical antibiotic     Saline Mist 0.65% nasal spray  -- 1 spray(s) into nose 2 times a day, As Needed  -- Indication: For nasal spray for moisture    pantoprazole 40 mg oral delayed release tablet  -- 1 tab(s) by mouth once a day (before a meal)   -- Indication: For GI protective     Genesis-Riddhi oral tablet  -- 1 tab(s) by mouth once a day  -- Indication: For vitamin supplement     calcitriol 0.25 mcg oral capsule  -- 1 cap(s) by mouth once a day  -- Indication: For vitamin D supplement     Vitamin C 500 mg oral tablet  -- 1 tab(s) by mouth once a day  -- Indication: For Vitamin c supplement

## 2018-10-15 NOTE — PROGRESS NOTE ADULT - SUBJECTIVE AND OBJECTIVE BOX
Patient is a 58y old  Female who presents with a chief complaint of fever (15 Oct 2018 20:05)      SUBJECTIVE / OVERNIGHT EVENTS:   Feels better.  Denies CP/SOB/Palpitation/HA.    MEDICATIONS  (STANDING):  amLODIPine   Tablet 10 milliGRAM(s) Oral daily  calcitriol   Capsule 0.25 MICROGram(s) Oral daily  pantoprazole    Tablet 40 milliGRAM(s) Oral before breakfast  predniSONE   Tablet 10 milliGRAM(s) Oral daily  sodium chloride 0.65% Nasal 1 Spray(s) Both Nostrils three times a day    MEDICATIONS  (PRN):        CAPILLARY BLOOD GLUCOSE        I&O's Summary    14 Oct 2018 07:01  -  15 Oct 2018 07:00  --------------------------------------------------------  IN: 240 mL / OUT: 700 mL / NET: -460 mL    15 Oct 2018 07:01  -  15 Oct 2018 22:04  --------------------------------------------------------  IN: 480 mL / OUT: 500 mL / NET: -20 mL        PHYSICAL EXAM:  GENERAL: NAD, well-developed  HEAD:  Atraumatic, Normocephalic  NECK: Supple, No JVD  CHEST/LUNG: Clear to auscultation bilaterally; No wheezing.  HEART: Regular rate and rhythm; No murmurs, rubs, or gallops  ABDOMEN: Soft, Nontender, Nondistended; Bowel sounds present  EXTREMITIES:   No clubbing, cyanosis, or edema  NEUROLOGY: AAO X 3  SKIN: No rashes    LABS:                        9.6    5.46  )-----------( 143      ( 15 Oct 2018 07:44 )             29.5     10-15    141  |  104  |  91<H>  ----------------------------<  94  3.6   |  24  |  4.40<H>    Ca    8.7      15 Oct 2018 06:47              CAPILLARY BLOOD GLUCOSE        10-11 @ 18:17  Culture-urine --  Culture results   No growth to date.  method type --  Organism --  Organism Identification --  Specimen source .Blood Blood-Peripheral           10-11 @ 18:17  Culture blood --  Culture results   No growth to date.  Gram stain --  Gram stain blood --  Method type --  Organism --  Organism identification --  Specimen source .Blood Blood-Peripheral      RADIOLOGY & ADDITIONAL TESTS:    Imaging Personally Reviewed:    Consultant(s) Notes Reviewed:      Care Discussed with Consultants/Other Providers:

## 2018-10-15 NOTE — DISCHARGE NOTE ADULT - CARE PLAN
Principal Discharge DX:	Acute cystitis without hematuria  Goal:	resolved  Assessment and plan of treatment:	completed course of IV antibiotic  Secondary Diagnosis:	Fever, unspecified fever cause  Goal:	resolved  Assessment and plan of treatment:	Fever related to UTI which was treated with IV antibiotics   Tylenol 650 mg po as needed for elevated Temp  Secondary Diagnosis:	Acute respiratory failure with hypoxia  Secondary Diagnosis:	ILANA (acute kidney injury)  Assessment and plan of treatment:	Hold Lasix for 1 more day - follow up with nephology as to when to start   Avoid taking (NSAIDs) - (ex: Ibuprofen, Advil, Celebrex, Naprosyn)  Avoid taking any nephrotoxic agents (can harm kidneys) - Intravenous contrast for diagnostic testing, combination cold medications.  Have all medications adjusted for your renal function by your Health Care Provider.  Blood pressure control is important.  Take all medication as prescribed.  Secondary Diagnosis:	Essential hypertension  Assessment and plan of treatment:	Low salt diet  Activity as tolerated.  Take all medication as prescribed.  Follow up with your medical doctor for routine blood pressure monitoring at your next visit.  Notify your doctor if you have any of the following symptoms:   Dizziness, Lightheadedness, Blurry vision, Headache, Chest pain, Shortness of breath  Secondary Diagnosis:	Chronic deep vein thrombosis (DVT) of femoral vein of left lower extremity  Assessment and plan of treatment:	no AC due to hx of rectal bleeding  continue follow up with Dr. Tavares  [phone]  Secondary Diagnosis:	Skin ulcer of sacrum, unspecified ulcer stage  Assessment and plan of treatment:	Chronic right ischial ulcer stage 4 pressure ulcer   Follow up at the wound care center Principal Discharge DX:	Acute cystitis without hematuria  Goal:	resolved  Assessment and plan of treatment:	completed course of IV antibiotic  Secondary Diagnosis:	Fever, unspecified fever cause  Goal:	resolved  Assessment and plan of treatment:	Fever related to UTI which was treated with IV antibiotics   Tylenol 650 mg po as needed for elevated Temp  Secondary Diagnosis:	Acute respiratory failure with hypoxia  Assessment and plan of treatment:	resolved   VQ scan negative for PE   any further episodes of SOB please return to the ER  Secondary Diagnosis:	ILANA (acute kidney injury)  Assessment and plan of treatment:	Hold Lasix for 1 more day - follow up with nephology as to when to start   Avoid taking (NSAIDs) - (ex: Ibuprofen, Advil, Celebrex, Naprosyn)  Avoid taking any nephrotoxic agents (can harm kidneys) - Intravenous contrast for diagnostic testing, combination cold medications.  Have all medications adjusted for your renal function by your Health Care Provider.  Blood pressure control is important.  Take all medication as prescribed.  Secondary Diagnosis:	Essential hypertension  Assessment and plan of treatment:	Low salt diet  Activity as tolerated.  Take all medication as prescribed.  Follow up with your medical doctor for routine blood pressure monitoring at your next visit.  Notify your doctor if you have any of the following symptoms:   Dizziness, Lightheadedness, Blurry vision, Headache, Chest pain, Shortness of breath  Secondary Diagnosis:	Chronic deep vein thrombosis (DVT) of femoral vein of left lower extremity  Assessment and plan of treatment:	no AC due to hx of rectal bleeding  continue follow up with Dr. Tavares  [phone]  Secondary Diagnosis:	Skin ulcer of sacrum, unspecified ulcer stage  Assessment and plan of treatment:	Chronic right ischial ulcer stage 4 pressure ulcer   Follow up at the wound care center

## 2018-10-15 NOTE — PROGRESS NOTE ADULT - ASSESSMENT
57 yo F w/ cardiomyopathy w/ ICD, prior hx of HIT, CKD IV, chronic right ischial ulcer, peripheral vascular disease w/ chronic left lower extremity swelling in setting of complicated left iliac artery aneurysm (aorto-unifemoral stent graft) w/ left to right femoral-femoral bypass, w/ chronic fox, pulmonary embolism (refused anticoagulation) presents with fever likely from UTI.    UTI:  ID f/up noted.  Completed abx.    Hypoxia:  Resolved  Cardio f/up noted.    ILANA on CKD IV:    Cr trending up  Nephro f/up

## 2018-10-15 NOTE — DISCHARGE NOTE ADULT - PLAN OF CARE
resolved completed course of IV antibiotic Fever related to UTI which was treated with IV antibiotics   Tylenol 650 mg po as needed for elevated Temp Hold Lasix for 1 more day - follow up with nephology as to when to start   Avoid taking (NSAIDs) - (ex: Ibuprofen, Advil, Celebrex, Naprosyn)  Avoid taking any nephrotoxic agents (can harm kidneys) - Intravenous contrast for diagnostic testing, combination cold medications.  Have all medications adjusted for your renal function by your Health Care Provider.  Blood pressure control is important.  Take all medication as prescribed. Low salt diet  Activity as tolerated.  Take all medication as prescribed.  Follow up with your medical doctor for routine blood pressure monitoring at your next visit.  Notify your doctor if you have any of the following symptoms:   Dizziness, Lightheadedness, Blurry vision, Headache, Chest pain, Shortness of breath no AC due to hx of rectal bleeding  continue follow up with Dr. Tavares  [phone] Chronic right ischial ulcer stage 4 pressure ulcer   Follow up at the wound care center resolved   VQ scan negative for PE   any further episodes of SOB please return to the ER

## 2018-10-15 NOTE — PROGRESS NOTE ADULT - SUBJECTIVE AND OBJECTIVE BOX
[facility]   [phone]  RENAL FOLLOW UP NOTE  --------------------------------------------------------------------------------  Chief Complaint:    24 hour events/subjective:    seen. c/o fatigue overall   has allergies to ARB     PAST HISTORY  --------------------------------------------------------------------------------  No significant changes to PMH, PSH, FHx, SHx, unless otherwise noted    ALLERGIES & MEDICATIONS  --------------------------------------------------------------------------------  Allergies    heparin (Unknown)  losartan (Anaphylaxis)  nitroglycerin (Other)    Intolerances      Standing Inpatient Medications  amLODIPine   Tablet 10 milliGRAM(s) Oral daily  calcitriol   Capsule 0.25 MICROGram(s) Oral daily  pantoprazole    Tablet 40 milliGRAM(s) Oral before breakfast  predniSONE   Tablet 10 milliGRAM(s) Oral daily  sodium chloride 0.65% Nasal 1 Spray(s) Both Nostrils three times a day    PRN Inpatient Medications      REVIEW OF SYSTEMS  --------------------------------------------------------------------------------    Gen: denies fatigue, fevers/chills,  CVS: denies chest pain/palpitations  Resp: denies SOB/Cough  GI: Denies N/V/Abd pain  : Denies dysuria sx    All other systems were reviewed and are negative, except as noted.    VITALS/PHYSICAL EXAM  --------------------------------------------------------------------------------  T(C): 36.7 (10-15-18 @ 20:19), Max: 36.8 (10-15-18 @ 09:26)  HR: 90 (10-15-18 @ 20:19) (90 - 95)  BP: 147/84 (10-15-18 @ 20:19) (136/76 - 148/81)  RR: 18 (10-15-18 @ 20:19) (16 - 18)  SpO2: 96% (10-15-18 @ 20:19) (96% - 100%)  Wt(kg): --        10-14-18 @ 07:01  -  10-15-18 @ 07:00  --------------------------------------------------------  IN: 240 mL / OUT: 700 mL / NET: -460 mL    10-15-18 @ 07:01  -  10-15-18 @ 22:36  --------------------------------------------------------  IN: 480 mL / OUT: 500 mL / NET: -20 mL      Physical Exam:  	  Gen: alert oriented place person and date   	Pulm: Decreased breath sounds b/l bases. no rales or ronchi or wheezing  	CV: RRR, S1/S2. no rub  	Back: No CVA tenderness; no sacral edema  	Abd: +BS, soft, nontender/nondistended  	: No suprapubic tenderness.               Extremity: No cyanosis, no edema RLE LLE 3 + edema but overall softer  RLE minimal edema       LABS/STUDIES  --------------------------------------------------------------------------------              9.6    5.46  >-----------<  143      [10-15-18 @ 07:44]              29.5     141  |  104  |  91  ----------------------------<  94      [10-15-18 @ 06:47]  3.6   |  24  |  4.40        Ca     8.7     [10-15-18 @ 06:47]            Creatinine Trend:  SCr 4.40 [10-15 @ 06:47]  SCr 4.43 [10-14 @ 07:02]  SCr 4.24 [10-13 @ 06:26]  SCr 4.28 [10-12 @ 10:04]  SCr 4.07 [10-11 @ 16:46]              Urinalysis - [10-11-18 @ 16:46]      Color Light Orange / Appearance Turbid / SG 1.014 / pH 8.5      Gluc Negative / Ketone Negative  / Bili Negative / Urobili Negative       Blood Negative / Protein 300 mg/dL / Leuk Est Large / Nitrite Negative      RBC 2 / WBC 48 / Hyaline 1 / Gran  / Sq Epi  / Non Sq Epi 2 / Bacteria Many      Iron 55, TIBC 212, %sat 26      [08-24-18 @ 08:10]  Ferritin 618      [08-24-18 @ 08:01]  PTH -- (Ca 8.9)      [04-20-18 @ 08:14]   217  PTH -- (Ca 9.0)      [11-09-17 @ 08:51]   387  Vitamin D (25OH) 7.8      [11-09-17 @ 08:51]  HbA1c 4.5      [08-09-16 @ 06:49]  TSH 0.47      [04-24-18 @ 07:51]

## 2018-10-15 NOTE — DISCHARGE NOTE ADULT - ADDITIONAL INSTRUCTIONS
1. please call to  schedule an appointment at the Wound Care Center within 1 week after discharge  - please call [phone]  2. Please call to schedule an appointment with Cardiology  within 1-2 week after discharge 1. please call to  schedule an appointment at the Wound Care Center within 1 week after discharge  - please call [phone]  2. Please call to schedule an appointment with Cardiology  within 1 week after discharge  3. Please call to schedule an appointment with nephrology within 1 week   4 . Please call to schedule an appointment with your PMD within 2 weeks after discharge

## 2018-10-15 NOTE — CONSULT NOTE ADULT - SUBJECTIVE AND OBJECTIVE BOX
Wound SURGERY CONSULT NOTE    HPI:  57 yo F w/ cardiomyopathy w/ ICD, prior hx of HIT, CKD IV, chronic right ischial ulcer, peripheral vascular disease w/ chronic left lower extremity swelling in setting of complicated left iliac artery aneurysm (aorto-unifemoral stent graft) w/ left to right femoral-femoral bypass, w/ chronic fox, pulmonary embolism (refused anticoagulation) presents with fever.     Patient was recently admitted in August to [facility] for bleeding. It was unclear regarding initial source of bleeding. It was thought vaginal and/or GI bleeding. Patient was seen by GI team and felt unlikely GI source. Bleeding scan was performed and was negative. Colonoscopy was not performed as H&H was stabilized. Patient was cleared by GI to resume anticoagulation however she refused to take any forms of anticoagulation. She was found to have extensive DVT on left side. Vascular surgery was also consulted and it was recommended that patient did not require IVC filter or any vascular procedure. She was discharged and has not had any episode of bleeding. On prior admission she refused rectal/ examination to help identify cause of her bleeding.   She now presented after visiting nurse noticed her urine was "cloudy" and she had low-grade fevers. Of note patient's TTE from 7/2018 showed a large pericardial effusion without tamponade physiology.     In ER, patient was found to be hypoxic intermittently into the high 80's and low 90s. However, patient reports she has no symptoms.  Specifically she does not feel short of breath and she has no pleuritic chest pain. She was given vancomycin and zosyn and admitted presumably for "UTI." She also had one episode of bilious vomit earlier today but reports normal bowel movement and no abdominal pain. Currently has no diarrhea or abdominal discomfort.     Pt well known to team.  Pt w/ chronic Rt ischial wound tx w/ aquacel and VAC in the past.  Pt has outpt f/u w/ our wound center, but hasn't been since last admission 2/2 mobility issues.  VNS has been caring for wounds. No odor, increased drainage, redness, or warmth from Rt ischial wound.  no pain.  no f/c/s.  LLE swelling had been  stable. Calf almost "normal" but Thigh still very edematous and limiting mobility 2/2 heaviness-- much better w/ ACE wrapping daily. All questions asked and answered to pt's satisfaction.   Importance of WCC follow up stressed.      PAST MEDICAL & SURGICAL HISTORY:  Anemia  Morbid obesity  SLE  HTN (Hypertension)  Drop Foot Gait  Compartment Syndrome: fasciotomy LLE  HIT (Heparin-Induced Thrombocytopenia)  Pulmonary Embolism: 2009  Iliac Aneurysm  left iliac aneurysm repair  Iliac Aneurysm  endoleak l iliac aneurysm repair  Aneurysm of Iliac Artery  Calf DVT (Deep Venous Thrombosis)  Adenomatous Goiter  Chronic Gout   LBBB (Left Bundle Branch Block)   CHF (Congestive Heart Failure)   s/p aorta-iliac-femoral bypass  s/p TOP (Termination of Pregnancy)  S/P Dilatation and Curettage  s/p Tubal Ligation  s/p AAA (Abdominal Aortic Aneurysm) Repair: 2009 2010      REVIEW OF SYSTEMS  Skin/ MSK: see HPI  All other systems negative    MEDICATIONS  (STANDING):  amLODIPine   Tablet 10 milliGRAM(s) Oral daily  calcitriol   Capsule 0.25 MICROGram(s) Oral daily  pantoprazole    Tablet 40 milliGRAM(s) Oral before breakfast  predniSONE   Tablet 10 milliGRAM(s) Oral daily  sodium chloride 0.65% Nasal 1 Spray(s) Both Nostrils three times a day    Allergies    heparin (Unknown)  losartan (Anaphylaxis)  nitroglycerin (Other)    SOCIAL HISTORY:  single; lives w/ daughter, retired (+)HHA; Denies smoking, ETOH, drugs      FAMILY HISTORY:  No pertinent family history in first degree relatives      Vital Signs Last 24 Hrs  T(C): 36.7 (15 Oct 2018 12:15), Max: 36.9 (14 Oct 2018 20:24)  T(F): 98 (15 Oct 2018 12:15), Max: 98.5 (14 Oct 2018 20:24)  HR: 93 (15 Oct 2018 12:15) (86 - 95)  BP: 148/81 (15 Oct 2018 12:15) (136/76 - 159/85)  BP(mean): --  RR: 18 (15 Oct 2018 12:15) (16 - 18)  SpO2: 100% (15 Oct 2018 12:15) (96% - 100%)    NAD / A&Ox3  MO/ frail/ Disheveled  Versa Care P500 bed    Cardiovascular: RRR (+)m    Respiratory: CTA    Gastrointestinal soft NT/ND (+)BS    Neurology weakened strength & sensation grossly intact/ paraesthesia    Musculoskeletal/Vascular: limited ROM BLE 2/2 LLE edema  FROM BUE   L>>RLE edema  Difficult to assess DP/PT 2/2 edema  (+)hemosiderin staining  Elongated fungal toe nails    Skin: Dry, frail, good turgor    Rt Ischium w/ moist & granular healing STAGE 4 Pressure ucler  2.5cm x 2cm x 3.0cm  w/moist & granular wound base  macerated callous on wound edge  moderate serosanguinous drainage   No odor, erythema, increased warmth, tenderness, induration, fluctuance              LABS:  10-15    141  |  104  |  91<H>  ----------------------------<  94  3.6   |  24  |  4.40<H>    Ca    8.7      15 Oct 2018 06:47                            9.6    5.46  )-----------( 143      ( 15 Oct 2018 07:44 )             29.5           RADIOLOGY & ADDITIONAL STUDIES:  < from: VA Duplex Lower Ext Vein Scan, Bilat (10.12.18 @ 11:17) >  FINDINGS:    Noncompressibility and occlusion of the left common femoral vein,   consistent with chronic left femoral vein thrombus. The left femoral   veins are not well seen due to swelling and edema. The calf veins on the   left were not visualized secondary to body habitus.    There is normal compressibility of the right common femoral, bilateral   femoral, and right popliteal veins. No right calf vein thrombosis is   detected.    Doppler examination shows normal spontaneous and phasic flow.    Thrombosed right popliteal artery aneurysm measures 2.6 x 2.2 x 2.5 cm.   Partially thrombosed left popliteal artery aneurysm measuring 3.4 x 3.0 x   3.4 cm.    IMPRESSION:     Persistent thrombus in the left common femoral vein.   Bilateral popliteal artery aneurysms, thrombosed on the right.    < from: CT Abdomen and Pelvis No Cont (10.11.18 @ 17:44) >  FINDINGS:    LOWER CHEST: Limited evaluation of partially imaged ascending aortic   aneurysm secondary to lack of intravenous contrast. Marked cardiomegaly.   Small chronic pericardial effusion measuring up to 1.2 cm adjacent to the   left ventricle, not significantly changed since 10/1/2017. Partially   imaged AICD leads. Bibasilar subsegmental atelectasis.    Evaluation of the intra-abdominal visceral organs limited secondary to   lack of intravenous contrast.    LIVER: Within normal limits.  BILE DUCTS: Normal caliber.  GALLBLADDER: Status post cholecystectomy.  SPLEEN: Within normal limits.  PANCREAS: Within normal limits.  ADRENALS: Within normal limits.  KIDNEYS/URETERS: No hydronephrosis. No renal or ureteral calculus   bilaterally.    BLADDER: Collapsed run Fox catheter.  REPRODUCTIVE ORGANS: The uterus and adnexa are within normal limits.   Calcified fundal fibroid.    BOWEL: No bowel obstruction. Appendix is normal.   PERITONEUM: No ascites.  VESSELS:  Status post EVAR left common iliac artery aneurysm, not   significantly changed from 7/6/2018. Femoro-femoral bypass, unchanged.   Unchanged left internal iliac artery stent. Multiple bilateral anterior   abdominal and inguinal collateral vessels are noted and unchanged.  RETROPERITONEUM: No lymphadenopathy.    ABDOMINAL WALL: Anasarca.  BONES: Multilevel spinal degenerative changes. Diffuse osteopenia.    IMPRESSION:  Marked cardiomegaly. Moderate pericardial effusion, unchanged.    Status post EVAR with unchanged left common iliac artery aneurysm.    Limited evaluation of the ascending thoracic aortic aneurysm secondary to   lack of intravenous contrast. Echocardiography or contrast and enhanced   CT may be performed if clinically indicated.        Cultures:    Culture - Urine (10.11.18 @ 18:17)    Specimen Source: .Urine Clean Catch (Midstream)    Culture Results:   Culture grew 3 or more types of organisms which indicate  collection contamination; consider recollection only if clinically  indicated.    Culture - Blood (10.11.18 @ 18:17)    Specimen Source: .Blood Blood-Peripheral    Culture Results:   No growth to date.

## 2018-10-15 NOTE — DISCHARGE NOTE ADULT - OTHER SIGNIFICANT FINDINGS
Rt Ischial Chronic Stage 4 pressure injury-  apply AQUACEL dressing   Chronically Swollen LLE w/ persistent  Lt CFV thrombis- Compression LLE  Moisturize intact skin w/ SWEEN cream BID

## 2018-10-16 LAB
ANION GAP SERPL CALC-SCNC: 15 MMOL/L — SIGNIFICANT CHANGE UP (ref 5–17)
BUN SERPL-MCNC: 91 MG/DL — HIGH (ref 7–23)
CALCIUM SERPL-MCNC: 8.7 MG/DL — SIGNIFICANT CHANGE UP (ref 8.4–10.5)
CHLORIDE SERPL-SCNC: 104 MMOL/L — SIGNIFICANT CHANGE UP (ref 96–108)
CO2 SERPL-SCNC: 23 MMOL/L — SIGNIFICANT CHANGE UP (ref 22–31)
CREAT SERPL-MCNC: 4.3 MG/DL — HIGH (ref 0.5–1.3)
CULTURE RESULTS: SIGNIFICANT CHANGE UP
CULTURE RESULTS: SIGNIFICANT CHANGE UP
GLUCOSE SERPL-MCNC: 86 MG/DL — SIGNIFICANT CHANGE UP (ref 70–99)
HCT VFR BLD CALC: 29.7 % — LOW (ref 34.5–45)
HGB BLD-MCNC: 8.9 G/DL — LOW (ref 11.5–15.5)
MCHC RBC-ENTMCNC: 26.6 PG — LOW (ref 27–34)
MCHC RBC-ENTMCNC: 30 GM/DL — LOW (ref 32–36)
MCV RBC AUTO: 88.7 FL — SIGNIFICANT CHANGE UP (ref 80–100)
PLATELET # BLD AUTO: 165 K/UL — SIGNIFICANT CHANGE UP (ref 150–400)
POTASSIUM SERPL-MCNC: 3.6 MMOL/L — SIGNIFICANT CHANGE UP (ref 3.5–5.3)
POTASSIUM SERPL-SCNC: 3.6 MMOL/L — SIGNIFICANT CHANGE UP (ref 3.5–5.3)
RBC # BLD: 3.35 M/UL — LOW (ref 3.8–5.2)
RBC # FLD: 15.3 % — HIGH (ref 10.3–14.5)
SODIUM SERPL-SCNC: 142 MMOL/L — SIGNIFICANT CHANGE UP (ref 135–145)
SPECIMEN SOURCE: SIGNIFICANT CHANGE UP
SPECIMEN SOURCE: SIGNIFICANT CHANGE UP
WBC # BLD: 6.22 K/UL — SIGNIFICANT CHANGE UP (ref 3.8–10.5)
WBC # FLD AUTO: 6.22 K/UL — SIGNIFICANT CHANGE UP (ref 3.8–10.5)

## 2018-10-16 PROCEDURE — 99232 SBSQ HOSP IP/OBS MODERATE 35: CPT

## 2018-10-16 RX ORDER — ASCORBIC ACID 60 MG
1 TABLET,CHEWABLE ORAL
Qty: 0 | Refills: 0 | COMMUNITY

## 2018-10-16 RX ORDER — PANTOPRAZOLE SODIUM 20 MG/1
1 TABLET, DELAYED RELEASE ORAL
Qty: 30 | Refills: 0 | OUTPATIENT
Start: 2018-10-16 | End: 2018-11-14

## 2018-10-16 RX ORDER — MUPIROCIN 20 MG/G
1 OINTMENT TOPICAL
Qty: 1 | Refills: 0 | OUTPATIENT
Start: 2018-10-16 | End: 2018-10-30

## 2018-10-16 RX ORDER — FUROSEMIDE 40 MG
2 TABLET ORAL
Qty: 0 | Refills: 0 | COMMUNITY

## 2018-10-16 RX ORDER — CALCIUM CARB/MAGNESIUM HYDROX 675-135 MG
1 TABLET,CHEWABLE ORAL
Qty: 0 | Refills: 0 | COMMUNITY

## 2018-10-16 RX ORDER — MUPIROCIN 20 MG/G
1 OINTMENT TOPICAL
Qty: 0 | Refills: 0 | Status: DISCONTINUED | OUTPATIENT
Start: 2018-10-16 | End: 2018-10-17

## 2018-10-16 RX ORDER — ASCORBIC ACID 60 MG
0 TABLET,CHEWABLE ORAL
Qty: 0 | Refills: 0 | COMMUNITY

## 2018-10-16 RX ORDER — ACETAMINOPHEN 500 MG
1 TABLET ORAL
Qty: 0 | Refills: 0 | COMMUNITY

## 2018-10-16 RX ADMIN — Medication 1 SPRAY(S): at 06:22

## 2018-10-16 RX ADMIN — Medication 1 SPRAY(S): at 21:15

## 2018-10-16 RX ADMIN — Medication 1 SPRAY(S): at 13:02

## 2018-10-16 RX ADMIN — Medication 10 MILLIGRAM(S): at 09:33

## 2018-10-16 RX ADMIN — CALCITRIOL 0.25 MICROGRAM(S): 0.5 CAPSULE ORAL at 13:02

## 2018-10-16 RX ADMIN — Medication 1 APPLICATION(S): at 06:22

## 2018-10-16 RX ADMIN — MUPIROCIN 1 APPLICATION(S): 20 OINTMENT TOPICAL at 14:27

## 2018-10-16 RX ADMIN — AMLODIPINE BESYLATE 10 MILLIGRAM(S): 2.5 TABLET ORAL at 06:22

## 2018-10-16 NOTE — PROGRESS NOTE ADULT - ASSESSMENT
59 yo F w/ cardiomyopathy w/ ICD, prior hx of HIT, CKD IV, chronic right ischial ulcer, peripheral vascular disease w/ chronic left lower extremity swelling in setting of complicated left iliac artery aneurysm (aorto-unifemoral stent graft) w/ left to right femoral-femoral bypass, w/ chronic fox, pulmonary embolism (refused anticoagulation) presents with fever likely from UTI.    UTI:  ID f/up noted.  Completed abx.      ILANA on CKD IV:    Cr stable.  Nephro f/up    Dc planning.

## 2018-10-16 NOTE — CONSULT NOTE ADULT - SUBJECTIVE AND OBJECTIVE BOX
Patient is a 58y old  Female who presents with a chief complaint of fever (15 Oct 2018 22:35)      HPI:  59 yo F w/ cardiomyopathy w/ ICD, prior hx of HIT, CKD IV, chronic right ischial ulcer, peripheral vascular disease w/ chronic left lower extremity swelling in setting of complicated left iliac artery aneurysm (aorto-unifemoral stent graft) w/ left to right femoral-femoral bypass, w/ chronic fox, pulmonary embolism (refused anticoagulation) presents with fever.     Patient was recently admitted in August to [facility] for bleeding. It was unclear regarding initial source of bleeding. It was thought vaginal and/or GI bleeding. Patient was seen by GI team and felt unlikely GI source. Bleeding scan was performed and was negative. Colonoscopy was not performed as H&H was stabilized. Patient was cleared by GI to resume anticoagulation however she refused to take any forms of anticoagulation. She was found to have extensive DVT on left side. Vascular surgery was also consulted and it was recommended that patient did not require IVC filter or any vascular procedure. She was discharged and has not had any episode of bleeding. On prior admission she refused rectal/ examination to help identify cause of her bleeding.   She now presents after visiting nurse today noticed her urine was "cloudy" and she had low-grade fevers. Of note patient's TTE from 7/2018 showed a large pericardial effusion without tamponade physiology.     In ER, patient was found to be hypoxic intermittently into the high 80's and low 90s. However, patient reports she has no symptoms.  Specifically she does not feel short of breath and she has no pleuritic chest pain. She was given vancomycin and zosyn and admitted presumably for "UTI." She also had one episode of bilious vomit earlier today but reports normal bowel movement and no abdominal pain. Currently has no diarrhea or abdominal discomfort. (11 Oct 2018 20:53)    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)  Calf DVT (Deep Venous Thrombosis) (ICD9 453.42)  Adenomatous Goiter (ICD9 241.9)  Chronic Gout (ICD9 274.02)  LBBB (Left Bundle Branch Block) (ICD9 426.3)  CHF (Congestive Heart Failure) (ICD9 428.0)  Hx of aorta-iliac-femoral bypass  TOP (Termination of Pregnancy)  S/P Dilatation and Curettage  History of Tubal Ligation  s/p AAA (Abdominal Aortic Aneurysm) Repair: 2009 2010  History of Cholecystectomy  S/P Partial Hysterectomy    MEDICATIONS  (STANDING):  amLODIPine   Tablet 10 milliGRAM(s) Oral daily  BACItracin   Ointment 1 Application(s) Topical two times a day  calcitriol   Capsule 0.25 MICROGram(s) Oral daily  pantoprazole    Tablet 40 milliGRAM(s) Oral before breakfast  predniSONE   Tablet 10 milliGRAM(s) Oral daily  sodium chloride 0.65% Nasal 1 Spray(s) Both Nostrils three times a day    MEDICATIONS  (PRN):    Allergies    heparin (Unknown)  losartan (Anaphylaxis)  nitroglycerin (Other)    Intolerances    VITALS:    Vital Signs Last 24 Hrs  T(C): 36.7 (15 Oct 2018 20:19), Max: 36.8 (15 Oct 2018 09:26)  T(F): 98 (15 Oct 2018 20:19), Max: 98.3 (15 Oct 2018 09:26)  HR: 90 (15 Oct 2018 20:19) (90 - 95)  BP: 147/84 (15 Oct 2018 20:19) (136/76 - 148/81)  BP(mean): --  RR: 18 (15 Oct 2018 20:19) (16 - 18)  SpO2: 96% (15 Oct 2018 20:19) (96% - 100%)    LABS:                          9.6    5.46  )-----------( 143      ( 15 Oct 2018 07:44 )             29.5       10-15    141  |  104  |  91<H>  ----------------------------<  94  3.6   |  24  |  4.40<H>    Ca    8.7      15 Oct 2018 06:47    CAPILLARY BLOOD GLUCOSE    LOWER EXTREMITY PHYSICAL EXAM:    Vasular: DP/PT 1/4, B/L, CFT <3 seconds B/L, Temperature gradient warm to warm, B/L.   Neuro: Epicritic sensation diminished to the level of ankle, B/L.  Musculoskeletal/Ortho: No pain with palpation at any pedal sites. No pain with palpation or manipulation of the 2nd digit.   Skin:  LF 2nd digit nail injury --> Nail lifted from nail bed with minimal ST attachment to the nail. Nail easily removed with manipulation. No nail bed laceration appreciated.     RADIOLOGY & ADDITIONAL STUDIES: Patient is a 58y old  Female who presents with a chief complaint of fever (15 Oct 2018 22:35)      HPI:  57 yo F w/ cardiomyopathy w/ ICD, prior hx of HIT, CKD IV, chronic right ischial ulcer, peripheral vascular disease w/ chronic left lower extremity swelling in setting of complicated left iliac artery aneurysm (aorto-unifemoral stent graft) w/ left to right femoral-femoral bypass, w/ chronic fox, pulmonary embolism (refused anticoagulation) presents with fever.     Patient was recently admitted in August to [facility] for bleeding. It was unclear regarding initial source of bleeding. It was thought vaginal and/or GI bleeding. Patient was seen by GI team and felt unlikely GI source. Bleeding scan was performed and was negative. Colonoscopy was not performed as H&H was stabilized. Patient was cleared by GI to resume anticoagulation however she refused to take any forms of anticoagulation. She was found to have extensive DVT on left side. Vascular surgery was also consulted and it was recommended that patient did not require IVC filter or any vascular procedure. She was discharged and has not had any episode of bleeding. On prior admission she refused rectal/ examination to help identify cause of her bleeding.   She now presents after visiting nurse today noticed her urine was "cloudy" and she had low-grade fevers. Of note patient's TTE from 7/2018 showed a large pericardial effusion without tamponade physiology.     In ER, patient was found to be hypoxic intermittently into the high 80's and low 90s. However, patient reports she has no symptoms.  Specifically she does not feel short of breath and she has no pleuritic chest pain. She was given vancomycin and zosyn and admitted presumably for "UTI." She also had one episode of bilious vomit earlier today but reports normal bowel movement and no abdominal pain. Currently has no diarrhea or abdominal discomfort. (11 Oct 2018 20:53)    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)  Calf DVT (Deep Venous Thrombosis) (ICD9 453.42)  Adenomatous Goiter (ICD9 241.9)  Chronic Gout (ICD9 274.02)  LBBB (Left Bundle Branch Block) (ICD9 426.3)  CHF (Congestive Heart Failure) (ICD9 428.0)  Hx of aorta-iliac-femoral bypass  TOP (Termination of Pregnancy)  S/P Dilatation and Curettage  History of Tubal Ligation  s/p AAA (Abdominal Aortic Aneurysm) Repair: 2009 2010  History of Cholecystectomy  S/P Partial Hysterectomy    MEDICATIONS  (STANDING):  amLODIPine   Tablet 10 milliGRAM(s) Oral daily  BACItracin   Ointment 1 Application(s) Topical two times a day  calcitriol   Capsule 0.25 MICROGram(s) Oral daily  pantoprazole    Tablet 40 milliGRAM(s) Oral before breakfast  predniSONE   Tablet 10 milliGRAM(s) Oral daily  sodium chloride 0.65% Nasal 1 Spray(s) Both Nostrils three times a day    MEDICATIONS  (PRN):    Allergies    heparin (Unknown)  losartan (Anaphylaxis)  nitroglycerin (Other)    Intolerances    VITALS:    Vital Signs Last 24 Hrs  T(C): 36.7 (15 Oct 2018 20:19), Max: 36.8 (15 Oct 2018 09:26)  T(F): 98 (15 Oct 2018 20:19), Max: 98.3 (15 Oct 2018 09:26)  HR: 90 (15 Oct 2018 20:19) (90 - 95)  BP: 147/84 (15 Oct 2018 20:19) (136/76 - 148/81)  BP(mean): --  RR: 18 (15 Oct 2018 20:19) (16 - 18)  SpO2: 96% (15 Oct 2018 20:19) (96% - 100%)    LABS:                          9.6    5.46  )-----------( 143      ( 15 Oct 2018 07:44 )             29.5       10-15    141  |  104  |  91<H>  ----------------------------<  94  3.6   |  24  |  4.40<H>    Ca    8.7      15 Oct 2018 06:47    CAPILLARY BLOOD GLUCOSE    LOWER EXTREMITY PHYSICAL EXAM:    Vasular: DP/PT 1/4, B/L, CFT <3 seconds B/L, Temperature gradient warm to warm, B/L.   Neuro: Epicritic sensation diminished to the level of ankle, B/L.  Musculoskeletal/Ortho: No pain with palpation at any pedal sites. No pain with palpation or manipulation of the 2nd digit.   Skin:  LF 2nd digit nail injury --> Nail lifted from nail bed with minimal ST attachment to the nail. Nail easily removed with manipulation. No nail bed laceration appreciated. No active bleeding or oozing. Nail bed clean of any debris. No malodor, edema, erythema, or purulent drainage.     RADIOLOGY & ADDITIONAL STUDIES:

## 2018-10-16 NOTE — CONSULT NOTE ADULT - ASSESSMENT
59 yo F w/ cardiomyopathy w/ ICD, prior hx of HIT, CKD IV, chronic right ischial ulcer, peripheral vascular disease w/ chronic left lower extremity swelling in setting of complicated left iliac artery aneurysm (aorto-unifemoral stent graft) w/ left to right femoral-femoral bypass, w/ chronic fox, pulmonary embolism (refused anticoagulation) being treated for UTI and also noted to have ILANA.
59 yo female with extensive medical and cardiovasculr history admitted with fever and hypoxia
A/P: 57 yo F w/ cardiomyopathy w/ ICD, prior hx of HIT, CKD IV, chronic right ischial ulcer, peripheral vascular disease w/ chronic left lower extremity swelling in setting of complicated left iliac artery aneurysm (aorto-unifemoral stent graft) w/ left to right femoral-femoral bypass, w/ chronic fox, pulmonary embolism (refused anticoagulation) presents with fever likely from UTI.    Rt Ischial Chronic Stage 4 pressure injury- AQUACEL  Consider ID for eval of osteo- Nuclear medicine scan  Chronically Swollen LLE w/ persistent  Lt CFV thrombis- Compression LLE  BLE elevation  Abx per Medicine/ ID  Moisturize intact skin w/ SWEEN cream BID  con't Nutrition (as tolerated), Nutrition Consult  con't Offloading   con't Pericare  Care as per medicine will follow w/ you  Upon discharge f/u as outpatient at [facility] [address] [place] [address] [phone]  D/w team & attng  Thank you for this consult  Tanisha Weathers PA-C [facility] [id number]
59 yo F w/ cardiomyopathy w/ ICD, prior hx of HIT, CKD IV, chronic right ischial ulcer, peripheral vascular disease w/ chronic left lower extremity swelling in setting of complicated left iliac artery aneurysm (aorto-unifemoral stent graft) w/ left to right femoral-femoral bypass, w/ chronic fox, pulmonary embolism (refused anticoagulation) presents with fever.     Patient with chronic lymhedema  Unreliable historian  denies any complaints  But does have pyuria  Prior h/o ESBL UTI/bacteriurias  Also with ARF  No s/s cellulitis or other foci  ?fever due to DVT  ?UTI  ? Other occult foci    Given above rec:  1) Follow pending Cx  2) Empiric Invanz-renally adjusted  3) Monitor for any other foci  4) Will tailor plan for ID issues  per course,results.Will defer to primary team on management of other issues.  case d/w Med NP  Will Follow.  Beeper [id number]/afterhours/No response-[id number]
58 year old female LF 2nd digit nail injury  - Pt was examined and evaluated  - Toenail barely attached to Soft tissue, easily manipulated off.   - Nail bed clear of laceration and debris  - Wound dressed with betadine and DSD   - Rec daily dressing changes of mupirocin and DSD   - No emergent pod surg intervention at this time  - D/w attending

## 2018-10-16 NOTE — PROGRESS NOTE ADULT - SUBJECTIVE AND OBJECTIVE BOX
Patient is a 58y old  Female who presents with a chief complaint of fever (16 Oct 2018 21:08)      SUBJECTIVE / OVERNIGHT EVENTS:   Feels better.  Denies CP/SOB/Palpitation/HA.    MEDICATIONS  (STANDING):  amLODIPine   Tablet 10 milliGRAM(s) Oral daily  calcitriol   Capsule 0.25 MICROGram(s) Oral daily  mupirocin 2% Ointment 1 Application(s) Topical <User Schedule>  pantoprazole    Tablet 40 milliGRAM(s) Oral before breakfast  predniSONE   Tablet 10 milliGRAM(s) Oral daily  sodium chloride 0.65% Nasal 1 Spray(s) Both Nostrils three times a day    MEDICATIONS  (PRN):        CAPILLARY BLOOD GLUCOSE        I&O's Summary    15 Oct 2018 07:01  -  16 Oct 2018 07:00  --------------------------------------------------------  IN: 480 mL / OUT: 1650 mL / NET: -1170 mL    16 Oct 2018 07:01  -  16 Oct 2018 21:39  --------------------------------------------------------  IN: 840 mL / OUT: 0 mL / NET: 840 mL        PHYSICAL EXAM:  GENERAL: NAD, well-developed  HEAD:  Atraumatic, Normocephalic  NECK: Supple, No JVD  CHEST/LUNG: Clear to auscultation bilaterally; No wheezing.  HEART: Regular rate and rhythm; No murmurs, rubs, or gallops  ABDOMEN: Soft, Nontender, Nondistended; Bowel sounds present  EXTREMITIES:   No clubbing, cyanosis, or edema  NEUROLOGY: AAO X 3  SKIN: No rashes    LABS:                        8.9    6.22  )-----------( 165      ( 16 Oct 2018 08:13 )             29.7     10-16    142  |  104  |  91<H>  ----------------------------<  86  3.6   |  23  |  4.30<H>    Ca    8.7      16 Oct 2018 06:48              CAPILLARY BLOOD GLUCOSE        10-11 @ 18:17  Culture-urine --  Culture results   No growth at 5 days.  method type --  Organism --  Organism Identification --  Specimen source .Blood Blood-Peripheral           10-11 @ 18:17  Culture blood --  Culture results   No growth at 5 days.  Gram stain --  Gram stain blood --  Method type --  Organism --  Organism identification --  Specimen source .Blood Blood-Peripheral      RADIOLOGY & ADDITIONAL TESTS:    Imaging Personally Reviewed:    Consultant(s) Notes Reviewed:      Care Discussed with Consultants/Other Providers:

## 2018-10-16 NOTE — PROGRESS NOTE ADULT - ASSESSMENT
58 F PMH SLE, systolic HFrEF(EF 32%) s/p AICD, HTN, CKD III- IV, PE / left leg DVT dx 2009, HIT, CAD (no stents), stage IV sacral ulcer, chronic Fox, recurrent pericardial effusion, AAA ~14cm in 12/2016 s/p repair in 2010 w/ aortoiliac stent complicated post-operatively with development of left lower extremity compartment syndrome, with recent cellulitis treated inpt readmitted recently for pericardial effusion s/p pericardial drain. now admitted with lower GI bleed vs. vaginal bleed   1- CKD IV  2- pericardial effusion s/p drain in july 2018   3- chf  4- HTN   5- neurogenic bladder  6- lupus   7- anemia  8- shpt  9- hyperuricemia   10- thoracic aneurysm    cr still advanced for this pt i d.w her re: renal replacement therapy in near future. despite her hx of lupus she has not had significant hematuria and given her hx of aneurysms renal bx had not been feasible  cont fox   epogen [id number] tiw   amlodipine 10 mg daily   hx gi bleed none now   cont calcitriol

## 2018-10-16 NOTE — PROGRESS NOTE ADULT - ASSESSMENT
59 yo F w/ cardiomyopathy w/ ICD, prior hx of HIT, CKD IV, chronic right ischial ulcer, peripheral vascular disease w/ chronic left lower extremity swelling in setting of complicated left iliac artery aneurysm (aorto-unifemoral stent graft) w/ left to right femoral-femoral bypass, w/ chronic fox, pulmonary embolism (refused anticoagulation) presents with fever.   Patient with chronic lymphedema.   Patient with pyuria on U/A but mixed species on culture.   No signs or symptoms of cellulitis or other foci.  Patient does have persistent thrombus in the left common femoral vein, bilateral popliteal aneurysms with thrombosed one on the right.  has been afebrile  Urine Cx mixed  No other s/s infectious process  Bacteriuria likely fox related colonization  stable off abx   Continue care per primary team  ID will follow as needed,please call [phone] if any questions or issues.

## 2018-10-16 NOTE — PROVIDER CONTACT NOTE (OTHER) - BACKGROUND
Patient admitted for UTI. History of gout, DVT, PE, CHF, iliac artery aneurysm, HIT. Not on anticoagulation.

## 2018-10-16 NOTE — PROGRESS NOTE ADULT - SUBJECTIVE AND OBJECTIVE BOX
Subjective: Patient seen and examined. No new events except as noted.   feels ok   +chills     REVIEW OF SYSTEMS:    CONSTITUTIONAL: + weakness, fevers or chills  EYES/ENT: No visual changes;  No vertigo or throat pain   NECK: No pain or stiffness  RESPIRATORY: No cough, wheezing, hemoptysis; No shortness of breath  CARDIOVASCULAR: No chest pain or palpitations  GASTROINTESTINAL: No abdominal or epigastric pain. No nausea, vomiting, or hematemesis; No diarrhea or constipation. No melena or hematochezia.  GENITOURINARY: No dysuria, frequency or hematuria  NEUROLOGICAL: No numbness or weakness  SKIN: No itching, burning, rashes, or lesions   All other review of systems is negative unless indicated above.    MEDICATIONS:  MEDICATIONS  (STANDING):  amLODIPine   Tablet 10 milliGRAM(s) Oral daily  calcitriol   Capsule 0.25 MICROGram(s) Oral daily  mupirocin 2% Ointment 1 Application(s) Topical <User Schedule>  pantoprazole    Tablet 40 milliGRAM(s) Oral before breakfast  predniSONE   Tablet 10 milliGRAM(s) Oral daily  sodium chloride 0.65% Nasal 1 Spray(s) Both Nostrils three times a day      PHYSICAL EXAM:  T(C): 36.4 (10-16-18 @ 04:19), Max: 36.7 (10-15-18 @ 20:19)  HR: 95 (10-16-18 @ 04:19) (90 - 95)  BP: 163/90 (10-16-18 @ 04:19) (147/84 - 163/90)  RR: 18 (10-16-18 @ 04:19) (18 - 18)  SpO2: 97% (10-16-18 @ 04:19) (96% - 97%)  Wt(kg): --  I&O's Summary    15 Oct 2018 07:01  -  16 Oct 2018 07:00  --------------------------------------------------------  IN: 480 mL / OUT: 1650 mL / NET: -1170 mL          Appearance: Normal	  HEENT:   Normal oral mucosa, PERRL, EOMI	  Lymphatic: No lymphadenopathy , no edema  Cardiovascular: Normal S1 S2, No JVD, No murmurs , Peripheral pulses palpable 2+ bilaterally  Respiratory: Lungs clear to auscultation, normal effort 	  Gastrointestinal:  Soft, Non-tender, + BS	  Skin: No rashes, No ecchymoses, No cyanosis, warm to touch  Musculoskeletal: Normal range of motion, normal strength  Psychiatry:  Mood & affect appropriate  Ext: massive LLE edema  +fox         LABS:    CARDIAC MARKERS:                                8.9    6.22  )-----------( 165      ( 16 Oct 2018 08:13 )             29.7     10-16    142  |  104  |  91<H>  ----------------------------<  86  3.6   |  23  |  4.30<H>    Ca    8.7      16 Oct 2018 06:48      proBNP:   Lipid Profile:   HgA1c:   TSH:             TELEMETRY: V paced 	    ECG:  	  RADIOLOGY:   DIAGNOSTIC TESTING:  [ ] Echocardiogram:  [ ]  Catheterization:  [ ] Stress Test:    OTHER:

## 2018-10-16 NOTE — PROVIDER CONTACT NOTE (OTHER) - ASSESSMENT
Patient found sitting in bed. a&o x 4. Left 2nd toe nail intact but removed from nail bed, minimal bleeding.

## 2018-10-16 NOTE — PROGRESS NOTE ADULT - SUBJECTIVE AND OBJECTIVE BOX
Patient is a 58y old  Female who presents with a chief complaint of fever (16 Oct 2018 03:44)    Being followed by ID for ?UTI    Interval history:denies any pain  No urinary complaints -has a fox  No acute events      ROS:  No cough,SOB,CP  No N/V/D./abd pain  No other complaints      Antimicrobials:  None     Other medications reviewed    Vital Signs Last 24 Hrs  T(C): 36.4 (10-16-18 @ 04:19), Max: 36.7 (10-15-18 @ 12:15)  T(F): 97.5 (10-16-18 @ 04:19), Max: 98 (10-15-18 @ 12:15)  HR: 95 (10-16-18 @ 04:19) (90 - 95)  BP: 163/90 (10-16-18 @ 04:19) (147/84 - 163/90)  BP(mean): --  RR: 18 (10-16-18 @ 04:19) (18 - 18)  SpO2: 97% (10-16-18 @ 04:19) (96% - 100%)    Physical Exam:  HEENT PERRLA EOMI    No oral exudate or erythema    Chest Good AE,CTA    CVS RRR S1 S2 WNl No murmur or rub or gallop    Abd soft BS normal No tenderness no masses  Fox    Lymphedema L leg no erythema or tenderness    IV site no erythema tenderness or discharge    CNS AAO X 3 no focal    Lab Data:                          8.9    6.22  )-----------( 165      ( 16 Oct 2018 08:13 )             29.7       10-16    142  |  104  |  91<H>  ----------------------------<  86  3.6   |  23  |  4.30<H>    Ca    8.7      16 Oct 2018 06:48          Culture - Urine (collected 11 Oct 2018 18:17)  Source: .Urine Clean Catch (Midstream)  Final Report (12 Oct 2018 22:28):    Culture grew 3 or more types of organisms which indicate    collection contamination; consider recollection only if clinically    indicated.    Culture - Blood (collected 11 Oct 2018 18:17)  Source: .Blood Blood-Peripheral  Preliminary Report (12 Oct 2018 19:01):    No growth to date.    Culture - Blood (collected 11 Oct 2018 18:17)  Source: .Blood Blood-Peripheral  Preliminary Report (12 Oct 2018 19:01):    No growth to date.

## 2018-10-16 NOTE — PROGRESS NOTE ADULT - SUBJECTIVE AND OBJECTIVE BOX
[facility]   [phone]  RENAL FOLLOW UP NOTE  --------------------------------------------------------------------------------  Chief Complaint:    24 hour events/subjective:    seen. no specific new c/o     PAST HISTORY  --------------------------------------------------------------------------------  No significant changes to PMH, PSH, FHx, SHx, unless otherwise noted    ALLERGIES & MEDICATIONS  --------------------------------------------------------------------------------  Allergies    heparin (Unknown)  losartan (Anaphylaxis)  nitroglycerin (Other)    Intolerances      Standing Inpatient Medications  amLODIPine   Tablet 10 milliGRAM(s) Oral daily  calcitriol   Capsule 0.25 MICROGram(s) Oral daily  mupirocin 2% Ointment 1 Application(s) Topical <User Schedule>  pantoprazole    Tablet 40 milliGRAM(s) Oral before breakfast  predniSONE   Tablet 10 milliGRAM(s) Oral daily  sodium chloride 0.65% Nasal 1 Spray(s) Both Nostrils three times a day    PRN Inpatient Medications      REVIEW OF SYSTEMS  --------------------------------------------------------------------------------    Gen: denies fatigue, fevers/chills,  CVS: denies chest pain/palpitations  Resp: denies SOB/Cough  GI: Denies N/V/Abd pain  : Denies dysuria    All other systems were reviewed and are negative, except as noted.    VITALS/PHYSICAL EXAM  --------------------------------------------------------------------------------  T(C): 36.6 (10-16-18 @ 20:28), Max: 36.6 (10-16-18 @ 20:28)  HR: 90 (10-16-18 @ 20:28) (84 - 95)  BP: 163/86 (10-16-18 @ 20:28) (159/86 - 163/90)  RR: 18 (10-16-18 @ 20:28) (18 - 18)  SpO2: 95% (10-16-18 @ 20:28) (95% - 98%)  Wt(kg): --        10-15-18 @ 07:01  -  10-16-18 @ 07:00  --------------------------------------------------------  IN: 480 mL / OUT: 1650 mL / NET: -1170 mL    10-16-18 @ 07:01  -  10-16-18 @ 21:08  --------------------------------------------------------  IN: 840 mL / OUT: 0 mL / NET: 840 mL      Physical Exam:  	    Gen: alert oriented place person and date   	Pulm: Decreased breath sounds b/l bases. no rales or ronchi or wheezing  	CV: RRR, S1/S2. no rub  	Back: No CVA tenderness; no sacral edema  	Abd: +BS, soft, nontender/nondistended  	: No suprapubic tenderness.               Extremity: No cyanosis, no edema RLE LLE 3 + edema but overall softer  RLE minimal 	    LABS/STUDIES  --------------------------------------------------------------------------------              8.9    6.22  >-----------<  165      [10-16-18 @ 08:13]              29.7     142  |  104  |  91  ----------------------------<  86      [10-16-18 @ 06:48]  3.6   |  23  |  4.30        Ca     8.7     [10-16-18 @ 06:48]            Creatinine Trend:  SCr 4.30 [10-16 @ 06:48]  SCr 4.40 [10-15 @ 06:47]  SCr 4.43 [10-14 @ 07:02]  SCr 4.24 [10-13 @ 06:26]  SCr 4.28 [10-12 @ 10:04]              Urinalysis - [10-11-18 @ 16:46]      Color Light Orange / Appearance Turbid / SG 1.014 / pH 8.5      Gluc Negative / Ketone Negative  / Bili Negative / Urobili Negative       Blood Negative / Protein 300 mg/dL / Leuk Est Large / Nitrite Negative      RBC 2 / WBC 48 / Hyaline 1 / Gran  / Sq Epi  / Non Sq Epi 2 / Bacteria Many      Iron 55, TIBC 212, %sat 26      [08-24-18 @ 08:10]  Ferritin 618      [08-24-18 @ 08:01]  PTH -- (Ca 8.9)      [04-20-18 @ 08:14]   217  PTH -- (Ca 9.0)      [11-09-17 @ 08:51]   387  Vitamin D (25OH) 7.8      [11-09-17 @ 08:51]  HbA1c 4.5      [08-09-16 @ 06:49]  TSH 0.47      [04-24-18 @ 07:51]

## 2018-10-17 VITALS
RESPIRATION RATE: 18 BRPM | HEART RATE: 102 BPM | TEMPERATURE: 99 F | DIASTOLIC BLOOD PRESSURE: 87 MMHG | OXYGEN SATURATION: 97 % | SYSTOLIC BLOOD PRESSURE: 162 MMHG

## 2018-10-17 LAB
ANION GAP SERPL CALC-SCNC: 14 MMOL/L — SIGNIFICANT CHANGE UP (ref 5–17)
BUN SERPL-MCNC: 90 MG/DL — HIGH (ref 7–23)
CALCIUM SERPL-MCNC: 8.7 MG/DL — SIGNIFICANT CHANGE UP (ref 8.4–10.5)
CHLORIDE SERPL-SCNC: 104 MMOL/L — SIGNIFICANT CHANGE UP (ref 96–108)
CO2 SERPL-SCNC: 23 MMOL/L — SIGNIFICANT CHANGE UP (ref 22–31)
CREAT SERPL-MCNC: 4.16 MG/DL — HIGH (ref 0.5–1.3)
GLUCOSE SERPL-MCNC: 84 MG/DL — SIGNIFICANT CHANGE UP (ref 70–99)
HCT VFR BLD CALC: 30.9 % — LOW (ref 34.5–45)
HGB BLD-MCNC: 9.9 G/DL — LOW (ref 11.5–15.5)
MCHC RBC-ENTMCNC: 28 PG — SIGNIFICANT CHANGE UP (ref 27–34)
MCHC RBC-ENTMCNC: 32 GM/DL — SIGNIFICANT CHANGE UP (ref 32–36)
MCV RBC AUTO: 87.3 FL — SIGNIFICANT CHANGE UP (ref 80–100)
PLATELET # BLD AUTO: 160 K/UL — SIGNIFICANT CHANGE UP (ref 150–400)
POTASSIUM SERPL-MCNC: 3.9 MMOL/L — SIGNIFICANT CHANGE UP (ref 3.5–5.3)
POTASSIUM SERPL-SCNC: 3.9 MMOL/L — SIGNIFICANT CHANGE UP (ref 3.5–5.3)
RBC # BLD: 3.54 M/UL — LOW (ref 3.8–5.2)
RBC # FLD: 15.6 % — HIGH (ref 10.3–14.5)
SODIUM SERPL-SCNC: 141 MMOL/L — SIGNIFICANT CHANGE UP (ref 135–145)
WBC # BLD: 5.85 K/UL — SIGNIFICANT CHANGE UP (ref 3.8–10.5)
WBC # FLD AUTO: 5.85 K/UL — SIGNIFICANT CHANGE UP (ref 3.8–10.5)

## 2018-10-17 RX ADMIN — MUPIROCIN 1 APPLICATION(S): 20 OINTMENT TOPICAL at 11:55

## 2018-10-17 RX ADMIN — Medication 1 SPRAY(S): at 06:05

## 2018-10-17 RX ADMIN — Medication 10 MILLIGRAM(S): at 08:31

## 2018-10-17 RX ADMIN — AMLODIPINE BESYLATE 10 MILLIGRAM(S): 2.5 TABLET ORAL at 06:05

## 2018-10-17 NOTE — PROGRESS NOTE ADULT - PROVIDER SPECIALTY LIST ADULT
Cardiology
Cardiology
Infectious Disease
Internal Medicine
Nephrology
Cardiology
Cardiology

## 2018-10-17 NOTE — PROGRESS NOTE ADULT - SUBJECTIVE AND OBJECTIVE BOX
Patient is a 58y old  Female who presents with a chief complaint of fever (17 Oct 2018 20:09)      SUBJECTIVE / OVERNIGHT EVENTS:   Feels better.  Denies CP/SOB/Palpitation/HA.    MEDICATIONS  (STANDING):  amLODIPine   Tablet 10 milliGRAM(s) Oral daily  calcitriol   Capsule 0.25 MICROGram(s) Oral daily  mupirocin 2% Ointment 1 Application(s) Topical <User Schedule>  pantoprazole    Tablet 40 milliGRAM(s) Oral before breakfast  predniSONE   Tablet 10 milliGRAM(s) Oral daily  sodium chloride 0.65% Nasal 1 Spray(s) Both Nostrils three times a day    MEDICATIONS  (PRN):        CAPILLARY BLOOD GLUCOSE        I&O's Summary    16 Oct 2018 07:01  -  17 Oct 2018 07:00  --------------------------------------------------------  IN: 840 mL / OUT: 1500 mL / NET: -660 mL    17 Oct 2018 07:01  -  17 Oct 2018 20:19  --------------------------------------------------------  IN: 720 mL / OUT: 600 mL / NET: 120 mL        PHYSICAL EXAM:  GENERAL: NAD, well-developed  HEAD:  Atraumatic, Normocephalic  NECK: Supple, No JVD  CHEST/LUNG: Clear to auscultation bilaterally; No wheezing.  HEART: Regular rate and rhythm; No murmurs, rubs, or gallops  ABDOMEN: Soft, Nontender, Nondistended; Bowel sounds present  EXTREMITIES:   No clubbing, cyanosis, or edema  NEUROLOGY: AAO X 3  SKIN: No rashes    LABS:                        9.9    5.85  )-----------( 160      ( 17 Oct 2018 09:01 )             30.9     10-17    141  |  104  |  90<H>  ----------------------------<  84  3.9   |  23  |  4.16<H>    Ca    8.7      17 Oct 2018 07:07              CAPILLARY BLOOD GLUCOSE        10-11 @ 18:17  Culture-urine --  Culture results   No growth at 5 days.  method type --  Organism --  Organism Identification --  Specimen source .Blood Blood-Peripheral           10-11 @ 18:17  Culture blood --  Culture results   No growth at 5 days.  Gram stain --  Gram stain blood --  Method type --  Organism --  Organism identification --  Specimen source .Blood Blood-Peripheral      RADIOLOGY & ADDITIONAL TESTS:    Imaging Personally Reviewed:    Consultant(s) Notes Reviewed:      Care Discussed with Consultants/Other Providers:

## 2018-10-17 NOTE — PROGRESS NOTE ADULT - PROBLEM SELECTOR PLAN 1
unclear etiology   now stable
unclear etiology   now stable
on CKD4  abx for UTI/infectious workup in progress  Agree with holding outpatient diuretics for now  patient with night sweats last night-- will dose NS at 50 ml/hr for 5 hours today (total of 250ml), then stop  Dose meds for eGFR~15  Avoid NSAIDs and nephrotoxins as able  continue calcitriol for bone/mineral metabolism management.
unclear etiology   now stable
unclear etiology   now stable

## 2018-10-17 NOTE — PROGRESS NOTE ADULT - PROBLEM SELECTOR PROBLEM 3
Pericardial effusion
Pericardial effusion
Anemia, unspecified type
Pericardial effusion
Pericardial effusion

## 2018-10-17 NOTE — CHART NOTE - NSCHARTNOTEFT_GEN_A_CORE
Notified by RN that pt had 6 beats of wide complex tachycardia. pt asymptomatic    Vital Signs Last 24 Hrs  T(C): 36.7 (17 Oct 2018 06:03), Max: 36.7 (17 Oct 2018 06:03)  T(F): 98 (17 Oct 2018 06:03), Max: 98 (17 Oct 2018 06:03)  HR: 87 (17 Oct 2018 06:03) (84 - 103)  BP: 150/91 (17 Oct 2018 06:03) (148/90 - 163/86)  BP(mean): --  RR: 18 (17 Oct 2018 06:03) (18 - 18)  SpO2: 98% (17 Oct 2018 06:03) (95% - 98%)    59 yo F w/ cardiomyopathy w/ ICD, prior hx of HIT, CKD IV, chronic right ischial ulcer, peripheral vascular disease w/ chronic left lower extremity swelling in setting of complicated left iliac artery aneurysm (aorto-unifemoral stent graft) w/ left to right femoral-femoral bypass, w/ chronic fox, pulmonary embolism (refused anticoagulation) presents with fever likely from UTI. pt s/p ABX  completion for UTI,   Now pt with 6 beats of WCT, asymptomatic    Continue tele monitoring  Pt on Coreg 12,5mg PO TID as home regimen  F/U with cardiology in am to restart Coreg  BMP, mg, phos ordered  Keep k+ 4.0, mg 2.0, phos 3.0  Will follow closely  Will update with primary team    Brian Young P BC  [id number]

## 2018-10-17 NOTE — PROVIDER CONTACT NOTE (OTHER) - ASSESSMENT
Pt denies chest pain, SOB, palpations or chest tightness. 150/91 BP, 87 HR, 98 O2, 18 RR, 98.0F oral.

## 2018-10-17 NOTE — PROGRESS NOTE ADULT - ASSESSMENT
59 yo F w/ cardiomyopathy w/ ICD, prior hx of HIT, CKD IV, chronic right ischial ulcer, peripheral vascular disease w/ chronic left lower extremity swelling in setting of complicated left iliac artery aneurysm (aorto-unifemoral stent graft) w/ left to right femoral-femoral bypass, w/ chronic fox, pulmonary embolism (refused anticoagulation) presents with fever likely from UTI.      ILANA on CKD IV:    Cr stable.  Nephro f/up    Dc planning.

## 2018-10-17 NOTE — PROGRESS NOTE ADULT - PROBLEM SELECTOR PROBLEM 2
Fever, unspecified fever cause
UTI (urinary tract infection)

## 2018-10-17 NOTE — PROGRESS NOTE ADULT - PROBLEM SELECTOR PLAN 2
Completed IV abx
IV abx
UCx results noted, multiple organisms noted  urine clearing as per patient  continue ertapenem  f/u ID

## 2018-10-17 NOTE — PROVIDER CONTACT NOTE (OTHER) - ACTION/TREATMENT ORDERED:
no intervention at this time. pt had AICD/PPM. will continue to monitor pt on tele.
Follow up AM labs.
NP aware, assessed patient at bedside. Bacitracin ordered. Toe cleansed with NS, bacitracin to toe, wrapped in gauze nicole.

## 2018-11-07 ENCOUNTER — APPOINTMENT (OUTPATIENT)
Dept: WOUND CARE | Facility: CLINIC | Age: 58
End: 2018-11-07

## 2018-11-11 PROCEDURE — 87486 CHLMYD PNEUM DNA AMP PROBE: CPT

## 2018-11-11 PROCEDURE — 85730 THROMBOPLASTIN TIME PARTIAL: CPT

## 2018-11-11 PROCEDURE — 87798 DETECT AGENT NOS DNA AMP: CPT

## 2018-11-11 PROCEDURE — 81001 URINALYSIS AUTO W/SCOPE: CPT

## 2018-11-11 PROCEDURE — 96374 THER/PROPH/DIAG INJ IV PUSH: CPT

## 2018-11-11 PROCEDURE — 80053 COMPREHEN METABOLIC PANEL: CPT

## 2018-11-11 PROCEDURE — 93306 TTE W/DOPPLER COMPLETE: CPT

## 2018-11-11 PROCEDURE — 96375 TX/PRO/DX INJ NEW DRUG ADDON: CPT

## 2018-11-11 PROCEDURE — 93005 ELECTROCARDIOGRAM TRACING: CPT

## 2018-11-11 PROCEDURE — 78582 LUNG VENTILAT&PERFUS IMAGING: CPT

## 2018-11-11 PROCEDURE — 71045 X-RAY EXAM CHEST 1 VIEW: CPT

## 2018-11-11 PROCEDURE — 87040 BLOOD CULTURE FOR BACTERIA: CPT

## 2018-11-11 PROCEDURE — 84295 ASSAY OF SERUM SODIUM: CPT

## 2018-11-11 PROCEDURE — 82435 ASSAY OF BLOOD CHLORIDE: CPT

## 2018-11-11 PROCEDURE — 85014 HEMATOCRIT: CPT

## 2018-11-11 PROCEDURE — 82803 BLOOD GASES ANY COMBINATION: CPT

## 2018-11-11 PROCEDURE — A9567: CPT

## 2018-11-11 PROCEDURE — 87086 URINE CULTURE/COLONY COUNT: CPT

## 2018-11-11 PROCEDURE — 87581 M.PNEUMON DNA AMP PROBE: CPT

## 2018-11-11 PROCEDURE — 82330 ASSAY OF CALCIUM: CPT

## 2018-11-11 PROCEDURE — 80048 BASIC METABOLIC PNL TOTAL CA: CPT

## 2018-11-11 PROCEDURE — 84132 ASSAY OF SERUM POTASSIUM: CPT

## 2018-11-11 PROCEDURE — 83605 ASSAY OF LACTIC ACID: CPT

## 2018-11-11 PROCEDURE — 99285 EMERGENCY DEPT VISIT HI MDM: CPT | Mod: 25

## 2018-11-11 PROCEDURE — 93970 EXTREMITY STUDY: CPT

## 2018-11-11 PROCEDURE — 85610 PROTHROMBIN TIME: CPT

## 2018-11-11 PROCEDURE — A9540: CPT

## 2018-11-11 PROCEDURE — 82947 ASSAY GLUCOSE BLOOD QUANT: CPT

## 2018-11-11 PROCEDURE — 74176 CT ABD & PELVIS W/O CONTRAST: CPT

## 2018-11-11 PROCEDURE — 85027 COMPLETE CBC AUTOMATED: CPT

## 2018-11-11 PROCEDURE — 72170 X-RAY EXAM OF PELVIS: CPT

## 2018-11-11 PROCEDURE — 87633 RESP VIRUS 12-25 TARGETS: CPT

## 2018-11-14 NOTE — PROGRESS NOTE ADULT - PROBLEM SELECTOR PLAN 6
uncontrolled  add imdur 30 mg daily for now
[facility]...
remains labile  cont labetolol 400 mg tid
remains labile  cont labetolol 400 mg tid and rest of meds   outpatient follow up
remains uncontrolled  Increase labetolol 400 mg tid    monitor BP
uncontrolled  pt is apparently allergic to nitrates  Norvasc 10 mg  added yesterday. Monitor for now   monitor BP
uncontrolled  pt is apparently allergic to nitrates  Norvasc added   monitor BP

## 2018-11-24 NOTE — ED PROVIDER NOTE - CROS ED RESP ALL NEG
Reason For Visit  DAQUAN BABCOCK CST    
- - -

## 2019-01-31 NOTE — DISCHARGE NOTE ADULT - DISCHARGE TO
Called pt to introduce CCM program, pt was at PT. Information mailed to pt to review CCM program and I will follow up next month.
Home with Home Care

## 2019-04-12 NOTE — ED PROVIDER NOTE - ATTENDING CONTRIBUTION TO CARE
Problem: Pressure Injury, Risk for  Goal: # Skin remains intact  Outcome: Outcome Met, Continue evaluating goal progress toward completion  q 2 hour reposition, prevalon boots, barrier cream applied.        59 y/o female with the above documented history and HPI who on exam appears comfortable. VSs noted, neck supple, lungs CTA, cardiac sounds s/ audible m/r/g, abdomen soft, ND c/ some non-focal discomfort on palpation s/ rebound or guarding, extremities c/ asymmetry: LLE much > RLE in size, skin s/ rash c/ gluteal wound that is s/ erythema, edema or drainage and neurologically at her baseline. Full investigation initiated. Results back thus far noted. ABX provided. Will follow the rest of her ED studies and treat/dispo accordingly.

## 2019-04-23 NOTE — ED PROVIDER NOTE - ENMT NEGATIVE STATEMENT, MLM
86
Ears: no ear pain and no hearing problems.Nose: no nasal congestion and no nasal drainage.Mouth/Throat: no dysphagia, no hoarseness and no throat pain.Neck: no lumps, no pain, no stiffness and no swollen glands.

## 2019-06-03 NOTE — DISCHARGE NOTE ADULT - SIZE THE SAME. CHANGES IN THE AMOUNT YOU EAT CAN AFFECT YOUR PT/INR BLOOD TEST. CONTACT YOUR DOCTOR BEFORE MAKING ANY MAJOR CHANGES IN YOUR DIET. LIMIT YOUR ALCOHOL INTAKE.
OT DAILY TREATMENT NOTE 10-18    Patient Name: Elieser Storm  Date:6/3/2019  : 1955  [x]  Patient  Verified  [place]or: [place] MEDICARE / Plan: [place] MEDICARE PART A & B / Product Type: Medicare /    In time:238  Out time:320  Total Treatment Time (min): 42  Visit #: 1 of 16    Medicare/BCBS Only   Total Timed Codes (min):  23 1:1 Treatment Time:  19     Treatment Area: Pain in right hand [M79.641]  Pain in left hand [M79.642]    SUBJECTIVE  Pain Level (0-10 scale): 410  Any medication changes, allergies to medications, adverse drug reactions, diagnosis change, or new procedure performed?: [x] No    [] Yes (see summary sheet for update)  Subjective functional status/changes:   [] No changes reported  Trouble writing, buttoning, lifitng gripping, cutting meat    OBJECTIVE    Modality rationale: decrease pain to improve the patients ability to grasp   Min Type Additional Details    [] Estim:  []Unatt       []IFC  []Premod                        []Other:  []w/ice   []w/heat  Position:  Location:    [] Estim: []Att    []TENS instruct  []NMES                    []Other:  []w/US   []w/ice   []w/heat  Position:  Location:    []  Traction: [] Cervical       []Lumbar                       [] Prone          []Supine                       []Intermittent   []Continuous Lbs:  [] before manual  [] after manual    []  Ultrasound: []Continuous   [] Pulsed                           []1MHz   []3MHz W/cm2:  Location:    []  Iontophoresis with dexamethasone         Location: [] Take home patch   [] In clinic    []  Ice     []  heat  []  Ice massage  []  Laser   []  Anodyne Position:  Location:     10 []  Laser with stim  [x]  Other: paraffin Position:  Location:both hands    []  Vasopneumatic Device Pressure:       [] lo [] med [] hi   Temperature: [] lo [] med [] hi       [x] Skin assessment post-treatment:  [x]intact []redness- no adverse reaction    []redness - adverse reaction:     19 min [x]Eval                  []Re-Eval 13 min Therapeutic Exercise:  [] See flow sheet :   Rationale: increase ROM to improve the patients ability to grasp  Tendon glides and towel scrunch        With   [] TE   [] TA   [] neuro   [] other: Patient Education: [x] Review HEP    [] Progressed/Changed HEP based on: HEP, parafffin  [] positioning   [] body mechanics   [] transfers   [] heat/ice application   [] Splint wear/care   [] Sensory re-education   [] scar management      [] other:            Other Objective/Functional Measures:   Subjective: pt is a right hand dominant, 75 y.o.y/o, male who has had pain in thumb for 5 years with increasing stiffness and pain in both hands over last six months. Right thumb index and middle, and left thumb and index. Prior level of function: I self care, some home care, yard care, camping hunting, outdoor activities, Mosque,   Pain level:(0-no pain 10-debilitating pain) moderate    Description/Location: bilateral hand with c/o minimal relief left off and on right constant   Worst pain7/10 Least pain 2/10   Activities which aggravate pain: certain movements, gripping, pulling, buttoning   Activities which ease pain: rest  Current functional limitations/living situation: With wife    Medical hx: Left TKR, prostate CA, right eye surgery, HTN,arthritis,     Medications: See the written copy of this report in the patient's paper medical record. Objective: Angulaiton of MF PIP on right  Range of Motion:WFL    Hand ROM R/L, poor hook fist B   Index Middle Ring Small Thumb    MP      CMC   PIP      MP   DIP      IP        42/55 Alvarado Abd        15/45 Radial Abd     Hand Strength:   Gross Grasp 3pt Pinch Lateral Pinch Tip Pinch   Right  35 7 12 6   Left 40 8 11 7     Nine-Hole Peg Test:  Left= _____seconds  Right=_____seconds  Finger Opposition: To all tips and base        Palpation: Tender in thenar eminence    ADLs  Feeding:        []MaxA   []ModA   [x]Ankita   [] CGA   []SBA   []Agustin   []Independent cutting  UE Dressing:       []MaxA   []ModA   [x]Ankita   [] CGA   []SBA   []Agustin   []Independentbuttons, open front shirt  LE Dressing:       []MaxA   []ModA   [x]Ankita   [] CGA   []SBA   []Agustin   []Independentpants button  Grooming:       []MaxA   []ModA   []Ankita   [] CGA   []SBA   []Agustin   [x]Independent  Toileting:       []MaxA   []ModA   []Ankita   [] CGA   []SBA   []Agustin   [x]Independent  Bathing:       []MaxA   []ModA   []Ankita   [] CGA   []SBA   []Agustin   [x]Independent  Light Meal Prep:    []MaxA   []ModA   [x]Ankita   [] CGA   []SBA   []Agustin   []Independent  Household/Other: []MaxA   [x]ModA   []Ankita   [] CGA   []SBA   []Agustin   []Independent  Adaptive Equip:     []MaxA   []ModA   []Ankita   [] CGA   []SBA   []Agutsin   []Independent  Driving:       []MaxA   []ModA   [x]Ankita   [] CGA   []SBA   []Agustin   []Independent key, door handles  Money Mgmt:        []MaxA   []ModA   []Ankita   [] CGA   []SBA   []Agustin   []Independent       Pain Level (0-10 scale) post treatment: right 2/10, left 1/10    ASSESSMENT/Changes in Function: [x]  See Plan of Care  []  See progress note/recertification  []  See Discharge Summary             PLAN  []  Upgrade activities as tolerated     []  Continue plan of care  []  Update interventions per flow sheet       []  Discharge due to:_  []  Other:_      Kris Patel, OTR/L 6/3/2019  2:35 PM    Future Appointments   Date Time Provider [place]   6/5/2019 11:00 AM BIRGIT WilloughbyPTLIBRADO [facility]   6/7/2019 10:00 AM MD Ludwin Dixon [address]   6/7/2019  1:00 PM BIRGIT Willoughby [facility]   6/10/2019 11:30 AM Jose Roberto [place], PT PDYPCJP [facility]   6/13/2019  2:30 PM Jose Roberto Whittington, PT [id number] [facility]   6/17/2019  2:00 PM Geno Enriquez, PTA MMCPTPB [facility]   6/18/2019 11:15 AM [facility]   6/19/2019 12:00 PM Jose Roberto Whittington, PT MMCPTPB [facility]   6/24/2019  1:30 PM Geno Enriquez, BIRGIT MMCPTPB [facility]   6/26/2019 11:00 AM [facility] [place] SCHED   6/27/2019  3:00 PM Brayan Morales, PT EEGNSLT [facility]   7/1/2019  1:00 PM Elisha De La Garza MMCPTPB [facility]   7/3/2019 12:30 PM Brayan Morales, PT UARVPXI [facility]   7/22/2019  8:30 AM Korin Davey  [address] 8/29/2019  4:00 PM Mayelin Perez MD [facility] SCHED   10/2/2019  1:00 PM Amor, Virgil Santos MD [id number] Statement Selected
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
## 2019-09-11 NOTE — PHYSICAL THERAPY INITIAL EVALUATION ADULT - FOLLOWS COMMANDS/ANSWERS QUESTIONS, REHAB EVAL
SPEECH THERAPY BEDSIDE SWALLOW EVALUATION   Date: 9/11/2019  Recorded diagnosis/complaint at time of admission:    There are no active hospital problems to display for this patient.      Observation:    Respiratory Status: room air  Lines/Devices: Telemetry, Capped IV  Behavior/Cognition: alert, cooperative  Patient Positioning: upright in chair  Collaboration with Nurse Mireya and Doctor Eladio    Subjective:  Pt. reported agreeable to therapy and agreeable to having friend present for session      Per Patient, \"I cough more for liquids\". Per RN, patient cough for pills taken with liquids.  Pt's personal goal for therapy: eat regular foods/liquids without cough    Pain:  None Reported  None Observed     Prior Function:   Prior Function  Dentition - Upper Teeth: Intact  Dentition - Lower Teeth: Intact  Feeds Self: Yes  Liquids: Thin  Solids : General  Previous Video Swallow Studies/Interventions: None        Objective:   Oral-Motor Exam:  Facial Symmetry: General Observation: Intact  Range of Motion: Intact  Strength: Intact   Labial Symmetry: General Observation: Intact  Range of Motion: Intact  Strength: Intact   Lingual Symmetry: General Observation: Decreased Right  Range of Motion: Decreased Right  Strength: Intact   Velum Symmetry: General Observation: Intact  Range of Motion: Intact  Jaw ROM: General Observation: Intact  Protective Mechanisms: Voluntary Cough:  Weak  Pretrial Vocal Quality: weak  Volitional Swallow:  present    Clinical Swallow:  Oral Phase   Thin Liquids Puree Solid Comments   Intact x x     Anterior Loss - Left       Anterior Loss - Right       Pocketing - Left       Pocketing - Right       Slow Oral Transit   x    Premature Spillage Suspected       Bolus Holding       Oral Residuals       Impaired Bolus Control       Lingual Pumping       Slow Mastication       Incomplete Mastication       Excessive Mastication         Pharyngeal Phase   Thin Liquids Puree Solid Comments   Intact        Delayed Swallow Response Suspected x x x    Decreased Hyolaryngeal Elevation Suspected       Incomplete Swallow Suspected       Multiple Swallows Noted x x x    Cough - Immediate       Cough - Delayed 1x  1x     Throat Clear - Immediate       Throat Clear - Delayed       Vocal Quality Change       Change in Vitals       No Pharyngeal Response         Esophageal Phase   Thin Liquids Puree Solid Comments   Esophageal Symptoms Noted     None       Pain Associated with Swallow:  No  Swallow Strategies/Techniques found effective during trials:  Alternate liquids/solids, Small bites/sips       Goals:    Rehab Potential:  Good  Review Date: 9/19/2019  Patient will tolerate a general consistency diet with thin liquids without signs or symptoms of aspiration.  Patient will follow swallowing guidelines with min cues.   Patient will participate in a videoswallow study.      Plan of Care/Recommendations:   Interventions:meal assessment video swallow study education       Education:   Learner: Patient and friend  Readiness to Learn: acceptance  Learning Method: Verbal  Learning Evaluation: Verbalizes Understanding  Teaching Content: diet recommendations, swallowing guidelines, plan of care for speech therapy   Please reference the patient education activity for further information regarding the patient's learning assessment.     SLP Time Spent: 35 min    The plan of care and goals were established with the patient and he is in agreement.     Assessment:    Orders received and bedside swallow evaluation completed.  Oral mechanism exam revealed slightly decreased asymmetry and decreased range of motion to right side of tongue. Patient was assessed with Thin Liquid, Puree, Solid.  Oral phase of swallowing Impaired, characterized by slow transit.  Patient's pharyngeal phase of swallowing Impaired, characterized by suspected decreased swallow response and suspected decreased hyolaryngeal elevation/excursion.  1x delayed cough and  expectoration of phlegm for thin liquids and solid consistencies. No further Signs of aspiration were not  present with Thin Liquid, Puree, Solid during the trials.  Swallow strategies improve  the safety of the swallow.  Recommend General, Thin Liquids, pills whole in puree.  Patient is to Periodic liquid wash, Small bites/sips, Eat Slowly, Sit Upright.  Speech therapy to follow up to ensure safe diet and complete further assessment through video swallow study.    Plan  Frequency: 1x/day, 6x/week  Plan for Next Session: Assess meal tolerance, video swallow study, education  Recommendations for Discharge: SLP: Unknown based on swallow function  Patient/Family Goals for Therapy: to able to eat/drink without cough                    Therapy Recommendations: Assess tolerance of least restrictive diet, Patient/family education  Swallow Recommendations: VFSS  - Video Fluoroscopic Swallow Study, Dysphagia treatment  Diet Solids Recommendation: General consistency  Diet Liquids Recommendations: Thin liquids  Recommended Form of Meds: Whole, With puree  Feeding Guidelines: Periodic liquid wash, Small bites/sips, Feeds self, Sit fully upright for all PO intake, Eat slowly, only when alert    After today's session this therapist is recommending the above location for optimal discharge.  This recommendation is supported by deficits identified not impacting the discharge location.                     100% of the time

## 2019-09-30 NOTE — DISCHARGE NOTE ADULT - NSFTFSERV1RD_GEN_ALL_CORE
Alert-The patient is alert, awake and responds to voice. The patient is oriented to time, place, and person. The triage nurse is able to obtain subjective information.
rehabilitation services/observation and assessment/teaching and training

## 2019-11-05 NOTE — DIETITIAN INITIAL EVALUATION ADULT. - OTHER INFO
nml Pt reports usual weight of 173-185pounds which she last weighed about 6 weeks ago. Noted current weight 279.1pounds. Suspect pt with 94 to106 pound weight gain, question related to fluid retention as pt with 4+ left lower extremity edema. No known food allergies or intolerances reported. Supplementation PTA consisted of vitamin C, FeSO4, and vitamin B12. Pt states she was also taking 2 packets of Miguel daily. Currently, pt with a good appetite and eating >75% of meals. Denies GI distress and reports no difficulty chewing/swallowing.

## 2020-02-10 NOTE — ED ADULT TRIAGE NOTE - NS ED NURSE DIRECT TO ROOM YN
Yes Hydroquinone Counseling:  Patient advised that medication may result in skin irritation, lightening (hypopigmentation), dryness, and burning.  In the event of skin irritation, the patient was advised to reduce the amount of the drug applied or use it less frequently.  Rarely, spots that are treated with hydroquinone can become darker (pseudoochronosis).  Should this occur, patient instructed to stop medication and call the office. The patient verbalized understanding of the proper use and possible adverse effects of hydroquinone.  All of the patient's questions and concerns were addressed.

## 2020-05-14 NOTE — PATIENT PROFILE ADULT. - NS PRO AD PATIENT TYPE
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 Partial resection. 12mm splenic artery aneurysm is stable     Plan:  Patient needs a follow up CT in 3 months   Health Care Proxy (HCP)

## 2020-06-29 NOTE — DISCHARGE NOTE ADULT - NS AS DC STROKE DX YN
Problem: Pain  Goal: #Acceptable pain level achieved/maintained at rest using NRS/Faces  This goal is used for patients who can self-report.  Acceptable means the level is at or below the identified comfort/function goal.  Outcome: Outcome Not Met, Continue to Monitor  Patient can have Tramadol Q6hr PRN for pain and has scheduled Tylenol.        no

## 2020-07-30 NOTE — PROGRESS NOTE ADULT - SUBJECTIVE AND OBJECTIVE BOX
No-they are two separate problems.  Mammogram is screening and ultrasound is for an axillary mass outside of breast tissue.     [facility]   [phone]  RENAL FOLLOW UP NOTE  --------------------------------------------------------------------------------  Chief Complaint:    24 hour events/subjective:    seen. states had cramps over body today no sob     PAST HISTORY  --------------------------------------------------------------------------------  No significant changes to PMH, PSH, FHx, SHx, unless otherwise noted    ALLERGIES & MEDICATIONS  --------------------------------------------------------------------------------  Allergies    heparin (Unknown)  losartan (Anaphylaxis)  nitroglycerin (Other)    Intolerances      Standing Inpatient Medications  amLODIPine   Tablet 10 milliGRAM(s) Oral daily  AQUAPHOR (petrolatum Ointment) 1 Application(s) Topical two times a day  argatroban Infusion 2 MICROgram(s)/kG/Min IV Continuous <Continuous>  ascorbic acid 500 milliGRAM(s) Oral daily  carvedilol 25 milliGRAM(s) Oral every 12 hours  ferrous    sulfate 325 milliGRAM(s) Oral daily  hydrALAZINE 25 milliGRAM(s) Oral every 8 hours  influenza   Vaccine 0.5 milliLiter(s) IntraMuscular once  labetalol 200 milliGRAM(s) Oral every 8 hours  Nephro-alyse 1 Tablet(s) Oral daily  pantoprazole    Tablet 40 milliGRAM(s) Oral before breakfast  predniSONE   Tablet 10 milliGRAM(s) Oral daily  sucralfate suspension 1 Gram(s) Oral three times a day  warfarin 10 milliGRAM(s) Oral once    PRN Inpatient Medications      REVIEW OF SYSTEMS  --------------------------------------------------------------------------------    Gen: denies fevers/chills, + cramps   CVS: denies chest pain/palpitations  Resp: denies SOB/Cough  GI: Denies N/V/Abd pain  : Denies dysuria/oliguria/hematuria    All other systems were reviewed and are negative, except as noted.    VITALS/PHYSICAL EXAM  --------------------------------------------------------------------------------  T(C): 36.6 (11-08-17 @ 12:54), Max: 37.1 (11-07-17 @ 21:29)  HR: 91 (11-08-17 @ 17:15) (67 - 96)  BP: 159/94 (11-08-17 @ 17:15) (144/75 - 167/92)  RR: 18 (11-08-17 @ 12:54) (18 - 18)  SpO2: 95% (11-08-17 @ 12:54) (95% - 97%)  Wt(kg): --        11-07-17 @ 07:01  -  11-08-17 @ 07:00  --------------------------------------------------------  IN: 1142.4 mL / OUT: 4000 mL / NET: -2857.6 mL    11-08-17 @ 07:01  -  11-08-17 @ 17:59  --------------------------------------------------------  IN: 752 mL / OUT: 1000 mL / NET: -248 mL    Gen: alert oriented place person and date   	Pulm: Decreased breath sounds b/l bases. no rales or ronchi or wheezing  	CV: RRR, S1/S2. no rub  	Back: No CVA tenderness; no sacral edema  	Abd: +BS, soft, nontender/nondistended  	: No suprapubic tenderness.               Extremity: LLE dressing over lymphedema  RLE no edema     LABS/STUDIES  --------------------------------------------------------------------------------              10.4   5.7   >-----------<  141      [11-08-17 @ 06:40]              32.0     141  |  101  |  66  ----------------------------<  87      [11-08-17 @ 06:41]  4.1   |  28  |  2.89        Ca     8.8     [11-08-17 @ 06:41]      Mg     2.2     [11-08-17 @ 06:41]      Phos  3.8     [11-08-17 @ 06:41]      PT/INR: PT 35.3 , INR 3.19       [11-08-17 @ 06:41]  PTT: 78.0       [11-08-17 @ 06:41]      Creatinine Trend:  SCr 2.89 [11-08 @ 06:41]  SCr 2.83 [11-07 @ 05:55]  SCr 2.69 [11-06 @ 06:30]  SCr 2.74 [11-05 @ 06:47]  SCr 2.59 [11-04 @ 10:20]              Urinalysis - [10-11-17 @ 18:44]      Color Yellow / Appearance SL Turbid / SG 1.015 / pH 8.0      Gluc Negative / Ketone Negative  / Bili Negative / Urobili Negative       Blood Negative / Protein >600 / Leuk Est Large / Nitrite Negative      RBC  / WBC 5-10 / Hyaline  / Gran  / Sq Epi  / Non Sq Epi  / Bacteria       Iron 48, TIBC --, %sat --      [10-12-17 @ 07:14]  Ferritin 847.0      [10-12-17 @ 07:14]  HbA1c 4.5      [08-09-16 @ 06:49]  TSH 0.33      [10-22-17 @ 08:40]    dsDNA 44      [10-17-17 @ 08:26]  C3 Complement 105      [10-17-17 @ 08:26]  C4 Complement 31      [10-17-17 @ 08:26]

## 2020-08-24 NOTE — DISCHARGE NOTE ADULT - NS AS DC FU INST LIST INST
You will receive a phone call if your gonorrhea/chlamydia test result is positive  Follow-up with PCP in 1 week  Follow up with Urology as needed  Return to the emergency department if you develop worsening symptoms such as fevers, severe abdominal pain, red/hot/swollen penis, testicular swelling/pain, shortness of breath or chest pain 
yes

## 2020-09-09 NOTE — ED ADULT NURSE NOTE - CHIEF COMPLAINT QUOTE
NEW PATIENT QUESTIONS     Have you seen a PAIN MANAGEMENT PROVIDER in the last 5 YEARS: Yes     If yes who did you see and what facility? \"[facility] somewhere\"       ORTHOPEDIC PROVIDER, PHYSICAL THERAPY, or CHIROPRACTOR:     Ortho -  No   PT -  No   Chiro -  No       If yes, who did you see and what facility      Have you had any PROCEDURES / INJECTIONS / SURGERIES for this condition: Yes     If yes who did you see  and what facility? [facility]         Patient's Insurance: [unfilled]         brought in by ems for rectal bleeding. patient reports rolling in bed and noticed the blood on her sheets. reports there was "quite a bit of blood"  c/o abd discomfort, denies any dizziness, no headache

## 2020-09-16 NOTE — DIETITIAN INITIAL EVALUATION ADULT. - PROBLEM SELECTOR PLAN 1
Ms Lagos returns for a f/u visit conducted via telephone. She has cirrhosis from ENRIQUEZ/AIH. Not on immunosuppression. Liver disease complicated by portal hypertension and esophageal varices. No ascites or HE. 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 Also has ESRD and is on HD. Previously evaluated for SLK at [facility] but declined as she was still smoking at the time and her diabetes was poorly controlled. She has since quit smoking and reports improved glycemic control.  She does not have any complaints. Last EGD was in 2019 and she had EV banding at the time. A recent US did not show any suspicious liver lesions. MELD-Na based on labs from  is 24. AFP is normal. Her platelet count was 22k which is significantly less than before. She denies any bleeding or bruising.  Patient seen today via telehealth by agreement and consent of patient in light of current COVID-19 pandemic. I used a telephone call during the visit. The patient encounter is appropriate and reasonable under the circumstances given the patient's particular presentation at this time. The patient has been advised of the followin) the potential risks and limitations of this mode of treatment (including but not limited to the absence of in-person examination); 2) the right to refuse telehealth services at any point without affecting the right to future care; 3) the right to receive in-person services, included immediately after this consultation if an urgent need arises; 4) information, including identifiable images or information from this telehealth consult, will only be shared in accordance with HIPAA regulations. Any and all of the patient's and/or patient's family member's questions on this issue have been answered. The patient has verbally consented to be treated via telehealth services. The patient has also been advised to contact this office for worsening conditions or problems, and seek emergency medical treatment and/or call 911 if the patient deems either necessary. -Patient presenting with progressive shortness of breath. She is hemodynamically stable. I asked ED provider to perform bedside FAST exam which revealed a 2.5-cm large pericardial effusion. No pulses paradoxus on my exam. CT surgery consulted by me and Cardiologist Dr. Yoder was notified. CT surgery recommends that patient gets IR drainage in the morning. Given the anemia and large effusion suspect this could be hemopercardium and patient has had multiple recurrent pericardial effusion requiring drainage. Will get CT chest as this was also protocoled by me. Will get type and screen and monitor hemodynamics carefully.

## 2020-10-03 NOTE — ED PROVIDER NOTE - CPE EDP GU CVA TENDER
GINNY GOMEZ IS COVERING FOR DR Geo Pinto.  PAGED GINNY BOYKIN FOR DR NIÑO AT 8:28 PM     Griselda Hagan  10/02/20 2029 no tenderness

## 2020-12-01 NOTE — H&P ADULT. - PRO ANTICIPATED DISCH DISP
Breath Sounds equal & clear to percussion & auscultation, no accessory muscle use
rehabilitation facility

## 2021-01-10 NOTE — ED ADULT TRIAGE NOTE - TEMPERATURE IN FAHRENHEIT (DEGREES F)
Hospitalist Progress Note    1/10/2021  Admit Date: 1/3/2021  7:56 PM   NAME: Cadence Pringle   :  1936   MRN:  [id number]   Attending: Gorge Martinez MD  PCP:  Los Johns Jr., MD    SUBJECTIVE:   Patient is an 83yo F wit hx dementia, chronic white who presented with COVID and decreased intake.     1/10  Slightly more alert today. Still lethargic.  Denies dyspnea. Still with nausea.       PHYSICAL EXAM     Visit Vitals  BP (!) 145/83 (BP 1 Location: Left arm, BP Patient Position: At rest)   Pulse 72   Temp 98.4 °F (36.9 °C)   Resp 20   Ht 5' 3\" (1.6 m)   Wt 59 kg (130 lb)   SpO2 93%   Breastfeeding No   BMI 23.03 kg/m²      Temp (24hrs), Av.1 °F (36.7 °C), Min:97.5 °F (36.4 °C), Max:98.6 °F (37 °C)    Oxygen Therapy  O2 Sat (%): 93 % (01/10/21 0758)  Pulse via Oximetry: 69 beats per minute (21 1212)  O2 Device: Nasal cannula (01/10/21 035)  O2 Flow Rate (L/min): 2 l/min (01/10/21 0351)    Intake/Output Summary (Last 24 hours) at 1/10/2021 1140  Last data filed at 1/10/2021 0643  Gross per 24 hour   Intake 450 ml   Output 200 ml   Net 250 ml      General: Elderly, thin, lethargic  Lungs:  CTA Bilaterally.   Heart:  Regular rate and rhythm,  No murmur, rub, or gallop  Abdomen: Soft, Non distended, Non tender, Positive bowel sounds  Extremities: No cyanosis, clubbing or edema  Neurologic:  Dementia, lethargy    XR CHEST PORT   Final Result   IMPRESSION: Minimal atelectasis or infiltrate in the left lung base.            ASSESSMENT      Active Hospital Problems    Diagnosis Date Noted   • UTI (urinary tract infection) 2021   • COVID-19 virus infection 2021   • Pulmonary hypertension (HCC) 2017      Left ventricle: Systolic function was hyperdynamic. Ejection fraction was  estimated in the range of 70 % to 75 %.Septal flattening consistent with RV  pressure and volume overload There were no regional wall motion   abnormalities.     PAP est 65mmhg by TR     -  Right ventricle: The  ventricle was markedly dilated. Systolic function was  reduced. Plan:    Covid/chronic respiratory failure  Clarified with son - was formerly on oxygen and they restarted it but at recent baseline has not been on any O2. Weaned back off, does not look like she has been actually hypoxic. Too weak/lethargic for PO intake - SLP following, has prn zofran which may help intake as well  Too weak again for PO trial. Will give more time to improve from COVID and maybe try speech again after zofran. Poor prognosis. s/p remdesivir  Stop rocephin for pyuria, cx with skin emma. May just be colonized.   Gentle hydration for poor intake  Dementia severe at baseline but able to converse on good days    Hypokalemia/hypomagnesemia  Replace IV - recheck labs still pending    DVT Prophylaxis: lovenox  Dispo: pending    Signed By: Maxine Pinto MD     January 10, 2021 99.5

## 2021-04-15 NOTE — PROGRESS NOTE ADULT - SUBJECTIVE AND OBJECTIVE BOX
[General Appearance - Well Developed] : well developed [Normal Appearance] : normal appearance [General Appearance - Well Nourished] : well nourished [General Appearance - In No Acute Distress] : no acute distress [Normal Conjunctiva] : the conjunctiva exhibited no abnormalities [Normal Oral Mucosa] : normal oral mucosa Subjective: Patient seen and examined. No new events except as noted.   no cp or sob     REVIEW OF SYSTEMS:    CONSTITUTIONAL: + weakness, fevers or chills  EYES/ENT: No visual changes;  No vertigo or throat pain   NECK: No pain or stiffness  RESPIRATORY: No cough, wheezing, hemoptysis; No shortness of breath  CARDIOVASCULAR: No chest pain or palpitations  GASTROINTESTINAL: No abdominal or epigastric pain. No nausea, vomiting, or hematemesis; No diarrhea or constipation. No melena or hematochezia.  GENITOURINARY: No dysuria, frequency or hematuria  NEUROLOGICAL: No numbness or weakness  SKIN: No itching, burning, rashes, or lesions   All other review of systems is negative unless indicated above.    MEDICATIONS:  MEDICATIONS  (STANDING):  amLODIPine   Tablet 10 milliGRAM(s) Oral daily  calcitriol   Capsule 0.25 MICROGram(s) Oral daily  mupirocin 2% Ointment 1 Application(s) Topical <User Schedule>  pantoprazole    Tablet 40 milliGRAM(s) Oral before breakfast  predniSONE   Tablet 10 milliGRAM(s) Oral daily  sodium chloride 0.65% Nasal 1 Spray(s) Both Nostrils three times a day      PHYSICAL EXAM:  T(C): 37 (10-17-18 @ 12:00), Max: 37 (10-17-18 @ 12:00)  HR: 102 (10-17-18 @ 12:00) (87 - 103)  BP: 162/87 (10-17-18 @ 12:00) (148/90 - 163/86)  RR: 18 (10-17-18 @ 12:00) (18 - 18)  SpO2: 97% (10-17-18 @ 12:00) (95% - 98%)  Wt(kg): --  I&O's Summary    16 Oct 2018 07:01  -  17 Oct 2018 07:00  --------------------------------------------------------  IN: 840 mL / OUT: 1500 mL / NET: -660 mL    17 Oct 2018 07:01  -  17 Oct 2018 20:09  --------------------------------------------------------  IN: 720 mL / OUT: 600 mL / NET: 120 mL          Appearance: Normal	  HEENT:   Normal oral mucosa, PERRL, EOMI	  Lymphatic: No lymphadenopathy , no edema  Cardiovascular: Normal S1 S2, No JVD, No murmurs , Peripheral pulses palpable 2+ bilaterally  Respiratory: Lungs clear to auscultation, normal effort 	  Gastrointestinal:  Soft, Non-tender, + BS	  Skin: No rashes, No ecchymoses, No cyanosis, warm to touch  Musculoskeletal: Normal range of motion, normal strength  Psychiatry:  Mood & affect appropriate  Ext: massive LLE edema  Hook     LABS:    CARDIAC MARKERS:                                9.9    5.85  )-----------( 160      ( 17 Oct 2018 09:01 )             30.9     10-17    141  |  104  |  90<H>  ----------------------------<  84  3.9   |  23  |  4.16<H>    Ca    8.7      17 Oct 2018 07:07      proBNP:   Lipid Profile:   HgA1c:   TSH:             TELEMETRY: 	  Sr/V paced, 6 beats WCT   ECG:  	  RADIOLOGY:   DIAGNOSTIC TESTING:  [ ] Echocardiogram:  < from: Transthoracic Echocardiogram (10.12.18 @ 15:06) >    Patient name: COSTEN, SANDRA  YOB: 1960   Age: 58 (F)   MR#: [id number]  Study Date: 10/12/2018  [facility]  Study quality: Technically fair  Referring Physician: Patric Patino MD  Blood Pressure: 135/82 mmHg  Height: 165 cm  Weight: 125 kg  BSA: 2.3 m2  ------------------------------------------------------------------------  PROCEDURE: Transthoracic echocardiogram with 2-D, M-Mode  and complete spectral and color flow Doppler.  INDICATION: Pericardial effusion (noninflammatory) (I31.3)  ------------------------------------------------------------------------  Dimensions:    Normal Values:  LA:     4.7    2.0 - 4.0 cm  Ao:     3.4    2.0 - 3.8 cm  SEPTUM: 1.1    0.6 - 1.2 cm  PWT:1.6    0.6 - 1.1 cm  LVIDd:  5.3    3.0 - 5.6 cm  LVIDs:  4.1    1.8 - 4.0 cm  Derived variables:  LVMI: 133 g/m2  RWT: 0.60  Fractional short: 23 %  EF (Teicholtz): 45 %  Doppler Peak Velocity (m/sec): AoV=2.0  ------------------------------------------------------------------------  Observations:  Mitral Valve: Normal mitral valve. Mild mitral  regurgitation.  Aortic Valve/Aorta: Calcified trileaflet aortic valve with  normal opening. Peak transaortic valve gradient equals 16  mm Hg, consistentwith mild aortic stenosis. Peak left  ventricular outflow tract gradient equals 5 mm Hg.  Aortic Root: 3.4 cm.  Left Atrium: Mildly dilated left atrium.  LA volume index =  36 cc/m2.  Left Ventricle: Moderate segmental left ventricular  systolic dysfunction. The inferior wall and inferoseptum  are hypokinetic. There also appears to be a septal bounce.  Concentric left ventricular hypertrophy. Severe diastolic  dysfunction  Right Heart: Normal right atrium. A device wire is noted in  the right heart. Normal right ventricular size and systolic  function. Normal tricuspid valve. Minimal tricuspid  regurgitation. Normal pulmonic valve.  Pericardium/Pleura: Moderate to large sized pericardial  effusion. Effusion is measures up to 2.1 cm posterior to  the LV. There is no obvious echocardiographic signs of  tamponade.  Hemodynamic: Estimated right atrial pressure is 8 mm Hg.  Inadequate tricuspid regurgitation Doppler envelope  precludes estimation of RVSP.  ------------------------------------------------------------------------  Conclusions:  1. Normal mitral valve. Mild mitral regurgitation.  2. Calcified trileaflet aortic valve with normal opening.  3. Mildly dilated left atrium.  LA volume index = 36 cc/m2.  4. Concentric left ventricular hypertrophy.  5. Moderate segmental left ventricular systolic  dysfunction. The inferior wall and inferoseptum are  hypokinetic. There also appears to be a septal bounce.  6. Severe diastolic dysfunction  7. A device wire is noted in the right heart. Normal right  ventricular size and systolic function.  8. Moderate to large sized pericardial effusion. Effusion  is measures up to 2.1 cm posterior to the LV. There is no  obvious echocardiographic signs of tamponade.  *** Compared with echocardiogram of 7/18/2018, no  significant changes noted.  ------------------------------------------------------------------------  Confirmed on  10/12/2018 - 17:18:22 by Marlo Owen MD  ------------------------------------------------------------------------    < end of copied text >    [ ]  Catheterization:  [ ] Stress Test:    OTHER: [Normal Jugular Venous A Waves Present] : normal jugular venous A waves present [Normal Jugular Venous V Waves Present] : normal jugular venous V waves present [No Jugular Venous Rojas A Waves] : no jugular venous rojas A waves [Respiration, Rhythm And Depth] : normal respiratory rhythm and effort [Exaggerated Use Of Accessory Muscles For Inspiration] : no accessory muscle use [Auscultation Breath Sounds / Voice Sounds] : lungs were clear to auscultation bilaterally [Heart Rate And Rhythm] : heart rate and rhythm were normal [Heart Sounds] : normal S1 and S2 [Murmurs] : no murmurs present [Edema] : no peripheral edema present [Abdomen Soft] : soft [Abnormal Walk] : normal gait [Nail Clubbing] : no clubbing of the fingernails [Cyanosis, Localized] : no localized cyanosis [Petechial Hemorrhages (___cm)] : no petechial hemorrhages [] : no ischemic changes [Skin Color & Pigmentation] : normal skin color and pigmentation [Oriented To Time, Place, And Person] : oriented to person, place, and time [Affect] : the affect was normal [No Anxiety] : not feeling anxious

## 2021-04-21 NOTE — CONSULT NOTE ADULT - SUBJECTIVE AND OBJECTIVE BOX
COVID-19 PCR test completed. Patient handout For Patients Who Have Been Tested for Covid-19 (Coronavirus) was given to the patient, which includes test result notification process.     Patient is a 57y old  Female who presented with a chief complaint of shortness of breath (11 Oct 2017 22:01)    HPI:  56 yo F w/ hx of SLE, systolic CHF (EF 32%) s/p AICD, HTN, CKD III- IV, PE / left leg DVT dx 2009, HIT, CAD (no stents), stage IV sacral ulcer, chronic fox, hx of recurrent pericardial effusion, AAA ~14cm in 12/2016 s/p repair in 2010 w/ aortoiliac stent c/b post-operatively with development of left lower extremity compartment syndrome presents with 1-wk history of progressive shortness of breath.    Patient was sleep this morning when she woke up with sudden onset of dyspnea. She had similar episode about 1-wk ago which resolved on its own. This time she felt more clammy and diaphoretic. She also had epigastric pain that was 6/10, non-radiating, described as burning quality, with no relief w/ positional change. She called her PCP who instructed her to come to ED. Patient denies any URI symptoms, fever, pleurisy, chills, coughs, nausea, vomiting. She reports a remote history of DVT/PE in 2009 and was treated initially w/ heparin and developed HIT and warfarin for anticoagulation. The warfarin was discontinued because of what patient reports was life threatening epistaxis. Unclear how long she was on anticoagulation. She also reports this morning her daughter noticed her left leg was much more swollen. Patient at baseline has chronic left leg edema in setting of her prior fasciotomy for compartment syndrome but this time it is more swollen.     Patient had hospital admission in December 2016 for pericardial effusion requiring drainage/pericardiocentesis. She had another similar admission in 1/2017 and at that time also had pericardial drain placed. During that time she was diagnosed with SLE. She been taking prednisone 5mg once daily and is not sure if this is similar to her SLE flare. She denies visual changes, aphthous ulcers, joint pains, and rashes.     Patient reports her last AICD interrogation was about 2-mo ago and reportedly normal. She denies any dysuria and urinary symptoms but has chronic fox. She normally uses wheelchair for transferring and has had no syncope or falls. She has chronic sacral ulcer stage IV but denies any drainage from that site.     Two days ago she noticed BRBPR while wiping.  None yesterday or today.  No rectal pain.    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)  Chronic Gout (ICD9 274.02)  LBBB (Left Bundle Branch Block) (ICD9 426.3)  CHF (Congestive Heart Failure) (ICD9 428.0)  Hx of aorta-iliac-femoral bypass  TOP (Termination of Pregnancy)  S/P Dilatation and Curettage  History of Tubal Ligation  s/p AAA (Abdominal Aortic Aneurysm) Repair: 2009 2010  History of Cholecystectomy  S/P Partial Hysterectomy    Allergies  heparin (Unknown)  losartan (Anaphylaxis)  nitroglycerin (Other)    MEDICATIONS  (STANDING):  amLODIPine   Tablet 10 milliGRAM(s) Oral daily  AQUAPHOR (petrolatum Ointment) 1 Application(s) Topical two times a day  argatroban Infusion 2 MICROgram(s)/kG/Min (15.192 mL/Hr) IV Continuous <Continuous>  ascorbic acid 500 milliGRAM(s) Oral daily  carvedilol 25 milliGRAM(s) Oral every 12 hours  ferrous    sulfate 325 milliGRAM(s) Oral daily  furosemide   Injectable 60 milliGRAM(s) IV Push daily  hydrALAZINE 25 milliGRAM(s) Oral every 8 hours  influenza   Vaccine 0.5 milliLiter(s) IntraMuscular once  labetalol 200 milliGRAM(s) Oral every 8 hours  Nephro-alyse 1 Tablet(s) Oral daily  pantoprazole    Tablet 40 milliGRAM(s) Oral before breakfast  predniSONE   Tablet 10 milliGRAM(s) Oral daily  sucralfate suspension 1 Gram(s) Oral three times a day  warfarin 5 milliGRAM(s) Oral once    MEDICATIONS  (PRN):    Review of Systems:  General:  No wt loss, fevers, chills, night sweats,fatigue,   CV:  No pain, palpitatioins, hypo/hypertension  Resp:  No dyspnea, cough, tachypnea, wheezing  GI:  No pain, No nausea, No vomiting, No diarrhea, + constipation, No weight loss, No fever, No pruritis, No tarry stools, No dysphagia,  :  No pain, bleeding, incontinence, nocturia  Muscle:  No pain, weakness  Neuro:  No weakness, tingling, memory problems  Psych:  No fatigue, insomnia, mood problems, depression  Endocrine:  No polyuria, polydypsia, cold/heat intolerance  Heme:  No petechiae, ecchymosis, easy bruisability  Skin:  No rash, tattoos, scars, edema    Vital Signs Last 24 Hrs  T(C): 36.6 (04 Nov 2017 04:31), Max: 36.8 (03 Nov 2017 21:25)  T(F): 97.8 (04 Nov 2017 04:31), Max: 98.2 (03 Nov 2017 21:25)  HR: 85 (04 Nov 2017 13:14) (77 - 87)  BP: 133/72 (04 Nov 2017 13:14) (133/72 - 168/88)  BP(mean): --  RR: 18 (04 Nov 2017 04:31) (18 - 18)  SpO2: 97% (04 Nov 2017 04:31) (97% - 98%)    PHYSICAL EXAM:  Constitutional: NAD, well-developed  Neck: No LAD, supple  Respiratory: CTA and P  Cardiovascular: S1 and S2, RRR, no M  Gastrointestinal: BS+, soft, NT/ND, neg HSM  Rectal: Brown stool, no palpable lesions  Extremities: No peripheral edema, neg clubing, cyanosis  Vascular: 2+ peripheral pulses  Neurological: A/O x 3, no focal deficits  Psychiatric: Normal mood, normal affect  Skin: No rashes    LABS:                      10.7   6.0   )-----------( 164      ( 04 Nov 2017 10:20 )             33.4   146<H>  |  104  |  65<H>  ----------------------------<  108<H>  3.8   |  31  |  2.59<H>    Ca    9.4      04 Nov 2017 10:20    PT/INR - ( 04 Nov 2017 10:20 )   PT: 20.7 sec;   INR: 1.87 ratio    PTT - ( 04 Nov 2017 10:20 )  PTT:70.0 sec    LIVER FUNCTIONS - ( 02 Nov 2017 06:16 )  Alb: 3.5 g/dL / Pro: 5.9 g/dL / ALK PHOS: 56 U/L / ALT: 10 U/L RC / AST: 13 U/L / GGT: x           RADIOLOGY & ADDITIONAL TESTS:

## 2021-04-21 NOTE — PHYSICAL THERAPY INITIAL EVALUATION ADULT - DISCHARGE DISPOSITION, PT EVAL
to be determined upon completion of functional assessment home w/ assist/home w/ home PT/Pt has HHA 3 hrs 5 days/wk. Daughter able to assist at other times Type Of Destruction Used: Curettage

## 2021-08-11 NOTE — H&P ADULT - NEGATIVE GENERAL GENITOURINARY SYMPTOMS
ANTICOAGULATION MANAGEMENT     Sulma KAUFMAN [place] 72 year old female is on warfarin with therapeutic INR result. (Goal INR 2.0-3.0)    Recent labs: (last 7 days)     08/11/21  1021   INR 2.10*       ASSESSMENT     Source(s): Chart Review and Patient/Caregiver Call       Warfarin doses taken: Warfarin taken as instructed    Diet: No new diet changes identified    New illness, injury, or hospitalization: Yes: Patient has been having pain under L arm for last couple of weeks.  Patient is having a lymph node biopsy today.  No warfarin hold was needed    Medication/supplement changes: None noted    Signs or symptoms of bleeding or clotting: No    Previous INR: Therapeutic last 2(+) visits    Additional findings: None     PLAN     Recommended plan for no diet, medication or health factor changes affecting INR     Dosing Instructions: Continue your current warfarin dose with next INR in 4 weeks       Summary  As of 8/11/2021    Full warfarin instructions:  10 mg every Mon, Wed, Fri; 7.5 mg all other days   Next INR check:               Telephone call with Sulma who verbalizes understanding and agrees to plan and who agrees to plan and repeated back plan correctly    Patient offered & declined to schedule next visit    Education provided: Contact [phone] with any changes, questions or concerns.     Plan made per [facility] anticoagulation protocol    Mariaelena Bloom RN  [facility]tico[facility] Clinic  8/11/2021    _______________________________________________________________________     Anticoagulation Episode Summary     Current INR goal:  2.0-3.0   TTR:  66.3 % (1 y)   Target end date:  Indefinite   Send INR reminders to:  [facility]    Indications    Personal history of DVT (deep vein thrombosis) [Z86.718]  Long term (current) use of anticoagulants [Z79.01]           Comments:  SPEAK IN TABLETS HAS 5mg TABLETS  okay to leave message at home,work  or with  Dinh Rand  Contact Ph [phone] Ok to send  [facility]         Anticoagulation Care Providers     Provider Role Specialty Phone number    Jm Albarran MD Referring Internal Medicine [phone]           no dysuria

## 2022-04-13 NOTE — PATIENT PROFILE ADULT. - VISION (WITH CORRECTIVE LENSES IF THE PATIENT USUALLY WEARS THEM):
"                                                                Physical Therapy 90 Day Recertification Note     Patient: Holland Phelps                                                                     Visit Date: 2022  :     1970    Referring practitioner:    Sharon Cheney,*  Date of Initial Visit:          Type: THERAPY  Noted: 10/22/2021    Patient seen for 28 sessions    Visit Diagnoses:    ICD-10-CM ICD-9-CM   1. Lymphedema of left leg  I89.0 457.1   2. Lymphedema of genitalia  I89.0 457.1   3. Renal cell carcinoma of right kidney (HCC)  C64.1 189.0     SUBJECTIVE     Subjective He states the swelling is doing ok today, his foot is numb. He is still wearing the compression daily, he has not been using the pump daily because he needs help with it and his wife is recovering from surgery.  He feels like the leg is holding up good. He states he hasn't felt this good in awhile.     PAIN: 0/10         OBJECTIVE     Objective    Lymphedema     Row Name 22 0800             Subjective Pain    Able to rate subjective pain? yes  -AL      Pre-Treatment Pain Level 0  -AL      Subjective Pain Comment Numbness the sole of the L foot and shin area  -AL              LLIS - Physical Concerns    The amount of pain associated with my lymphedema is: 2  -AL      The amount of limb heaviness associated with my lymphedema is: 2  -AL      The amount of skin tightness associated with my lymphedema is: 3  -AL      The size of my swollen limb(s) seems: 2  -AL      Lymphedema affects the movement of my swollen limb(s): 2  -AL      The strength in my swollen limb(s) is: 2  -AL              LLIS - Psychosocial Concerns    Lymphedema affects my body image (i.e., \"how I think I look\"). 1  -AL      Lymphedema affects my socializing with others. 2  -AL      Lymphedema affects my intimate relations with spouse or partner (rate 0 if not applicable 3  -AL      Lymphedema \"gets me down\" (i.e., depression, frustration, " Normal vision: sees adequately in most situations; can see medication labels, newsprint or anger) 1  -AL      I must rely on others for help due to my lymphedema. 1  -AL      I know what to do to manage my lymphedema 2  -AL              LLIS - Functional Concerns    Lymphedema affects my ability to perform self-care activities (i.e. eating, dressing, hygiene) 1  -AL      Lymphedema affects my ability to perform routine home or work-related activities. 2  -AL      Lymphedema affects my performance of preferred leisure activities. 2  -AL      Lymphedema affects proper fit of clothing/shoes 3  -AL      Lymphedema affects my sleep 3  -AL              Lymphedema Measurements    Measurement Type(s) Circumferential  -AL      Circumferential Areas Lower extremities  -AL              BUE Circumferential (cm)    Measurement Location 1 BOT  -AL      Left 1 22.2 cm  -AL      Measurement Location 2 +7  -AL      Left 2 24 cm  -AL      Measurement Location 3 +7  -AL      Left 3 24.2 cm  -AL      Measurement Location 4 +10  -AL      Left 4 26.2 cm  -AL      Measurement Location 5 +10  -AL      Left 5 33.7 cm  -AL      Measurement Location 6 +10  -AL      Left 6 39 cm  -AL      Measurement Location 7 +10  -AL      Left 7 36 cm  -AL      Measurement Location 8 +10  -AL      Left 8 41.8 cm  -AL      Measurement Location 9 +10  -AL      Left 9 51 cm  -AL      Measurement Location 10 +10  -AL      Left 10 58.5 cm  -AL              RUE Circumferential (cm)    Measurement Location 1 BOT  -AL      Measurement Location 2 +7  -AL      Measurement Location 3 +7  -AL      Measurement Location 4 +10  -AL      Measurement Location 5 +10  -AL      Measurement Location 6 +10  -AL      Measurement Location 7 +10  -AL      Measurement Location 8 +10  -AL      Measurement Location 9 +10  -AL      Measurement Location 10 +10  -AL              Manual Lymphatic Drainage    Manual Lymphatic Drainage initial sequence;opened regional lymph nodes;opened anastamoses;extremity treatment  -AL      Initial Sequence short neck;abdomen;diaphragmatic  breathing  -AL      Abdomen superficial;deep  -AL      Diaphragmatic Breathing x 9 with superficial abdominals  -AL      Opened Regional Lymph Nodes axillary;inguinal  -AL      Axillary right;left  -AL      Inguinal right;left  -AL      Opened Anastamoses inguino-axillary  -AL      Inguino-Axillary right;left  Supine and prone  -AL      Extremity Treatment MLD to full limb  -AL      MLD to Full Limb L LE  -AL      Manual Lymphatic Drainage Comments Extra time working on MLD to the L foot, shin, and upper leg. IASTM to the L upper leg and calf.  Half foam roll under L hip, stretched the L hamstrings and quads.  -AL      Manual Therapy 90  -AL              Compression/Skin Care    Compression/Skin Care compression garment;skin care  -AL      Skin Care lotion applied  -AL      Compression Garment Comments Wear compression on the L upper leg with the compression stocking in the evenings. Patient donned his compression on the L LE.  -AL              Lymphedema Life Impact Scale Totals    A.  Total Q1 - Q17 (Do not include Q18) 34  -AL      B.  Total number of questions answered (Q1-Q17) 17  -AL      C. Divide A by B 2  -AL      D. Multiple C by 25 50  -AL            User Key  (r) = Recorded By, (t) = Taken By, (c) = Cosigned By    Initials Name Provider Type    AL Elida Sequeira, KEILA, SAHIL Physical Therapist Assistant                Therapy Education/Self Care [id number]   Details: Work on CDT using compression on the proximal leg, use the Flexitouch pump.   Given edema management   Progress: New and Reinforced   Education provided to:  Patient   Level of understanding Verbalized   Timed Minutes          Total Timed Treatment:     90   mins  Total Time of Visit:             90   mins         ASSESSMENT/PLAN     GOALS  Goals                                          Progress Note due by 5/13/22                                                      Recert due by 7/11/2022   STG by: 3 weeks Comments Date Status   Patient will  have a good basic understanding of lymphedema and its suggested risk reduction practices  Has a good understanding of his lymphedema 12/22/21 Met   Patient will be independent with remedial HEP for lymphedema  He is working on the HEP 4/13/22 Partially Met   Patient will be independent with self MLD for lymphedema He was has not been able to use the pump as much. 4/13/22 Ongoing             LTG by: 6 weeks         Patient will have appropriate compression garments for lymphedema maintenance Patient has two new compression garments for the L LE that are a good fit. 12/15/21 Met   Patient will have no sign or symptoms of infection  No signs or symptoms of infection 12/1/21 Met   Patient will be independent with comprehensive maintenance program for lymphedema He will start using compression on the L proximal leg. 4/13/22 Progressing                                          Assessment/Plan     ASSESSMENT: Patient has had a good reduction in the L UE since his last visit, he has not washed as many cars in the past week.  He has not been able to use the pump because his wife just had surgery and she is not able to help him don/doff the pump garments.  The numbness in the L foot and lower leg was better after treatment.  Patient still has dense tissue in the L proximal leg, he will start wearing a binder at the proximal leg with his compression hose.  Since the compression hose stops before the most proximal portion of the leg, the fluid is getting stuck in this areas.  Functionally patient has fell well enough to do all the housework since his wife is not able to at this time.     PLAN: Will continue to see patient 1 x a week for CDT    SIGNATURE: Elida Sequeira PTA, [place], [place] License #: [id number]  Electronically Signed on 4/13/2022        [address] [place] [address]. [id number]  [phone]

## 2022-05-04 NOTE — PROGRESS NOTE ADULT - CARDIOVASCULAR DETAILS
PCP: Waqas Huitron MD    Last appt: 3/30/2022  Future Appointments   Date Time Provider Maximino Araiza   7/1/2022  8:50 AM Waqas Huitron MD [facility] AM[facility]       Requested Prescriptions     Pending Prescriptions Disp Refills    cycloSPORINE (Restasis) 0.05 % dpet 60 Each 0     Sig: INSTILL TWO DROP IN [facility] EYE DAILY       Prior labs and Blood pressures:  BP Readings from Last 3 Encounters:   03/30/22 (!) 166/80   02/18/22 (!) 144/74   12/20/21 (!) 166/66     Lab Results   Component Value Date/Time    Sodium 138 03/30/2022 10:37 AM    Potassium 4.2 03/30/2022 10:37 AM    Chloride 103 03/30/2022 10:37 AM    CO2 27 03/30/2022 10:37 AM    Anion gap 8 03/30/2022 10:37 AM    Glucose 112 (H) 03/30/2022 10:37 AM    BUN 23 (H) 03/30/2022 10:37 AM    Creatinine 1.08 03/30/2022 10:37 AM    BUN/Creatinine ratio 21 (H) 03/30/2022 10:37 AM    GFR est AA >60 03/30/2022 10:37 AM    GFR est non-AA >60 03/30/2022 10:37 AM    Calcium 9.9 03/30/2022 10:37 AM     Lab Results   Component Value Date/Time    Hemoglobin A1c 5.7 03/09/2009 02:25 AM     Lab Results   Component Value Date/Time    Cholesterol, total 157 03/30/2022 10:37 AM    Cholesterol (POC) 108.0 04/19/2018 09:15 AM    HDL Cholesterol 44 03/30/2022 10:37 AM    HDL Cholesterol (POC) 41.0 04/19/2018 09:15 AM    LDL Cholesterol (POC) 50.4 04/19/2018 09:15 AM    LDL, calculated 92.2 03/30/2022 10:37 AM    VLDL, calculated 20.8 03/30/2022 10:37 AM    Triglyceride 104 03/30/2022 10:37 AM    Triglycerides (POC) 83.0 04/19/2018 09:15 AM    CHOL/HDL Ratio 3.6 03/30/2022 10:37 AM     Lab Results   Component Value Date/Time    Vitamin D 25-Hydroxy 32.2 12/20/2021 09:59 AM       Lab Results   Component Value Date/Time    TSH 3.60 12/20/2021 09:59 AM    TSH, 3rd generation 2.77 12/08/2020 12:04 PM
positive S2/positive S1
positive S1/positive S2
positive S1/positive S2
positive S2/positive S1

## 2022-08-31 NOTE — ED PROVIDER NOTE - NS ED MD DISPO ADMIT [facility] UNIT
You can try Merary Pro-Gest which is available on amazon to balance hormones.     Watch videos on the 4-7-8 breath or box breathing on youtube and try these breathing techniques when symptoms occur.    MED/SURG

## 2022-10-10 NOTE — CONSULT NOTE ADULT - GENITOURINARY FEMALE
Detail Level: Zone Plan: Patient to start using protopic ointment . Dr Trevizo note that there may be slight burning due to the ointment. Hook+

## 2022-10-19 NOTE — PHARMACOTHERAPY INTERVENTION NOTE - NSPHARMCOMMPTEDU
Patient Education - Discharge Counseling
None
no abdominal pain, no diarrhea, no nausea and no vomiting.  + constipation
Normal rate, regular rhythm.  Heart sounds S1, S2.  No murmurs, rubs or gallops.

## 2022-11-23 NOTE — PROGRESS NOTE ADULT - ASSESSMENT
58F w/ SLE on prednisone, HFrEF s/p ICD, CKD III-IV, Hx PE/HIT, HTN, CAD no stents, Stage IV decub p/w decreased urine output, hematuria and concurrent UTI.    Recurrent Pericardial effusion:  S/p IR drainage. Adult

## 2022-12-04 NOTE — PROGRESS NOTE ADULT - PROBLEM SELECTOR PLAN 1
Multifactorial-restrictive disease 2nd obesity and cardiomegaly, pericardial effusion, debility and volume overloaded, with systolic HF (EF 25%)   -Would recommend Lasix as able  -Pericardial effusion is stable, no plan for pericardiocentesis at this time per cards  -Peripheral GGO in RUL new from Oct 11 2017 CT. Patient appears non-toxic. Recommend repeat CT chest in 6 weeks to further eval VTE Assessment already completed for this visit

## 2023-02-02 NOTE — DISCHARGE NOTE ADULT - DISCHARGE WEIGHT
bed Tazorac Counseling:  Patient advised that medication is irritating and drying.  Patient may need to apply sparingly and wash off after an hour before eventually leaving it on overnight.  The patient verbalized understanding of the proper use and possible adverse effects of tazorac.  All of the patient's questions and concerns were addressed.

## 2023-02-13 NOTE — PROGRESS NOTE ADULT - NSHPATTENDINGPLANDISCUSS_GEN_ALL_CORE
NP
NP, Patient
NP, Patient
Patient, PharmD
30
Isotretinoin Pregnancy And Lactation Text: This medication is Pregnancy Category X and is considered extremely dangerous during pregnancy. It is unknown if it is excreted in breast milk.

## 2023-03-15 NOTE — H&P ADULT - PROBLEM SELECTOR PROBLEM 5
Patient requests ongoing injections of Depo-Provera    Verified patient's first and last name, and . Patient stated reason for visit today is to receive a Depo Provera injection. Patient denied any concerns with previous injections.     WEIGHT: 120.6#  LAST PAP/EXAM:   Lab Results   Component Value Date    PAP NIL 2021     URINE HCG:negative    The following medication was given:   MEDICATION: Depo Provera 150mg  ROUTE: IM  SITE: LUQ - Gluteus    Depo Provera injection prepared and administered IM as ordered. Administration of medication documented in MAR (see MAR for further information regarding dose, lot #, NDC #, expiration date). Patient encouraged to wait in lobby for 15 minutes post-injection and notify staff immediately of any reaction. Next Depo Provera injection in series due NEXT INJECTION DUE: 23 - 23 . Patient provided appointment reminder card.     MARIA ESTHER KRISHNAN RN on 3/15/2023 at 1:17 PM        
ILANA (acute kidney injury)

## 2023-04-26 NOTE — H&P ADULT. - PSH
Detail Level: Detailed
Depth Of Biopsy: dermis
History of Cholecystectomy    History of Tubal Ligation    Hx of aorta-iliac-femoral bypass    s/p AAA (Abdominal Aortic Aneurysm) Repair  2009 2010  S/P Dilatation and Curettage    S/P Partial Hysterectomy    TOP (Termination of Pregnancy)
Was A Bandage Applied: Yes
Size Of Lesion In Cm: 0
Biopsy Type: H and E
Biopsy Method: Dermablade
Anesthesia Type: 1% lidocaine with epinephrine and a 1:10 solution of 8.4% sodium bicarbonate
Anesthesia Volume In Cc: 0.5
Hemostasis: Drysol
Wound Care: Vaseline
Dressing: bandage
Destruction After The Procedure: No
Type Of Destruction Used: Curettage
Curettage Text: The wound bed was treated with curettage after the biopsy was performed.
Cryotherapy Text: The wound bed was treated with cryotherapy after the biopsy was performed.
Electrodesiccation Text: The wound bed was treated with electrodesiccation after the biopsy was performed.
Electrodesiccation And Curettage Text: The wound bed was treated with electrodesiccation and curettage after the biopsy was performed.
Silver Nitrate Text: The wound bed was treated with silver nitrate after the biopsy was performed.
Lab: [id number]
Lab Facility: 
Consent: Written consent was obtained and risks were reviewed including but not limited to scarring, infection, bleeding, scabbing, incomplete removal, nerve damage and allergy to anesthesia.
Post-Care Instructions: I reviewed with the patient in detail post-care instructions. Patient is to keep the biopsy site dry overnight, and then apply bacitracin twice daily until healed. Patient may apply hydrogen peroxide soaks to remove any crusting.
Notification Instructions: Patient will be notified of biopsy results. However, patient instructed to call the office if not contacted within 2 weeks.
Billing Type: Third-Party Bill
Information: Selecting Yes will display possible errors in your note based on the variables you have selected. This validation is only offered as a suggestion for you. PLEASE NOTE THAT THE VALIDATION TEXT WILL BE REMOVED WHEN YOU FINALIZE YOUR NOTE. IF YOU WANT TO FAX A PRELIMINARY NOTE YOU WILL NEED TO TOGGLE THIS TO 'NO' IF YOU DO NOT WANT IT IN YOUR FAXED NOTE.

## 2023-07-05 NOTE — PROGRESS NOTE ADULT - SUBJECTIVE AND OBJECTIVE BOX
Follow-up Pulm Progress Note    No new respiratory events overnight.  Denies SOB/CP.   95% on RA    Medications:  MEDICATIONS  (STANDING):  amLODIPine   Tablet 10 milliGRAM(s) Oral daily  AQUAPHOR (petrolatum Ointment) 1 Application(s) Topical two times a day  ascorbic acid 500 milliGRAM(s) Oral daily  BACItracin   Ointment 1 Application(s) Topical two times a day  calcitriol   Capsule 0.25 MICROGram(s) Oral daily  carvedilol 25 milliGRAM(s) Oral every 12 hours  ferrous    sulfate 325 milliGRAM(s) Oral daily  furosemide    Tablet 60 milliGRAM(s) Oral daily  hydrALAZINE 50 milliGRAM(s) Oral every 8 hours  influenza   Vaccine 0.5 milliLiter(s) IntraMuscular once  labetalol 300 milliGRAM(s) Oral three times a day  Nephro-alyse 1 Tablet(s) Oral daily  pantoprazole    Tablet 40 milliGRAM(s) Oral before breakfast  predniSONE   Tablet 10 milliGRAM(s) Oral daily  sodium chloride 0.65% Nasal 1 Spray(s) Both Nostrils three times a day    MEDICATIONS  (PRN):          Vital Signs Last 24 Hrs  T(C): 37.1 (15 Nov 2017 12:29), Max: 37.1 (15 Nov 2017 12:29)  T(F): 98.8 (15 Nov 2017 12:29), Max: 98.8 (15 Nov 2017 12:29)  HR: 80 (15 Nov 2017 12:29) (80 - 88)  BP: 149/75 (15 Nov 2017 12:29) (136/80 - 162/87)  BP(mean): --  RR: 17 (15 Nov 2017 12:29) (17 - 18)  SpO2: 98% (15 Nov 2017 12:29) (97% - 98%) on RA          11-14 @ 07:01  -  11-15 @ 07:00  --------------------------------------------------------  IN: 800 mL / OUT: 1850 mL / NET: -1050 mL          LABS:                        10.0   5.5   )-----------( 131      ( 15 Nov 2017 06:29 )             32.0     11-15    145  |  105  |  72<H>  ----------------------------<  81  4.2   |  27  |  3.07<H>    Ca    9.1      15 Nov 2017 06:29            CAPILLARY BLOOD GLUCOSE        PT/INR - ( 15 Nov 2017 06:29 )   PT: 46.0 sec;   INR: 4.10 ratio         PTT - ( 15 Nov 2017 06:29 )  PTT:42.1 sec            Physical Examination:  PULM: Clear to auscultation bilaterally, no significant sputum production  CVS: S1, S2 heard    RADIOLOGY REVIEWED  CXR: L base obscured no

## 2023-08-14 NOTE — ED PROVIDER NOTE - CROS ED GI ALL NEG
[de-identified] : On physical examination the patient is in no acute distress and neurologically intact. The lungs are clear to auscultation and the heart has a regular rate and rhythm. Abdomen is benign. Bilateral femoral and pedal pulses are palpable. Small varicosities of right posterior calf. 
negative...

## 2023-09-29 NOTE — INPATIENT CERTIFICATION FOR MEDICARE PATIENTS - NS ICMP TWO DAYS INPATIENT
HEARING AID     Phoncookie [employer] [id number] [facility] Hearing Aids  Right s/n:  [id number]  Left s/n:  [id number]  Battery: 10  Filter: Wax trap 2  Warranty:  7/29/216 repair; 7/29/2015 L&D  Date Fit:  7/9/2014    REASON FOR VISIT:  The patient's Gogo right hearing aid purchased in [facility] was dead.    SERVICES PROVIDED:  The hearing aid was cleaned and checked.  The wax guard was full and was removed.  It was not replaced as there was not a wax guard that fit.  The dome was replaced.  The hearing aid was dried and a drying chamber.  A listening check revealed good sound quality.    RECOMMENDATIONS:  The patient was advised to seek follow-up care in [facility].  He will be leaving mid October and return mid April.    FOLLOW-UP:  Required need.     Yes

## 2023-09-29 NOTE — PATIENT PROFILE ADULT. - FUNCTIONAL LEVEL PRIOR: TRANSFERRING
Tried returning call to [facility], different name on answering machine, left voicemail for [facility] to return call to clinic. Phone number provided. Awaiting response. Wanted to collect more information or to see if changes had been made to patient's medications that had PCP name on them? Per provider note in most recent office visit, [facility] was managing patient cardiac medications.     Message routed to PCP as DIVYA   (3) assistive equipment and person

## 2023-10-09 NOTE — PATIENT PROFILE ADULT - HARM RISK FACTORS
History and Physical    Date of Service:  10/9/2023  Primary Care Provider: Luisito Carreon MD  Source of information: Patient, Family, External Medical Records    Chief Complaint: Abdominal Pain and Melena      History of Presenting Illness:   Josi Muniz is a 78 y.o. female with a past medical history of hypertension, MGUS, anemia, diverticulosis, who presented to the emergency room due to black stool. Patient states she has had some severe constipation recently, and that even though she been taking stool softeners that had not helped. She drank an entire bottle of mag citrate, and states it \"completely cleaned me out\" but that afterwards she had several very dark, jet black stools. She states that she recognizes concern for GI bleed, and also had some lightheadedness, as well as persistent nausea and diarrhea. She came to the ER for further evaluation. She denies recent bismuth/Pepto-Bismol or iron pills    In the ER patient's hemoglobin had dropped 2 points from 10.8 at the end of September to 8.9 today. Internal medicine was consulted for an acute GI bleed. Of note, her urinalysis was also positive for UTI. The patient denies any headache, blurry vision, sore throat, trouble swallowing, trouble with speech, chest pain, SOB, cough, fever, chills, urinary symptoms, constipation, recent travels, sick contacts, focal or generalized neurological symptoms, falls, injuries, rashes, contact with COVID-19 diagnosed patients, hematemesis, melena, hemoptysis, hematuria, rashes, denies starting any new medications and denies any other concerns or problems besides as mentioned above. REVIEW OF SYSTEMS:  A comprehensive review of systems was negative except for that written in the History of Present Illness. Past Medical History:   Diagnosis Date    Anemia     Autoimmune disease ([address])     MGUS    smoldering myloma    Cancer ([address]) 2015    Mass on lung. surgically removed.     Hx of yes

## 2023-12-11 NOTE — PROGRESS NOTE ADULT - PROBLEM SELECTOR PLAN 1
Protocol For Protocol For Photochemotherapy For Severe Photoresponsive Dermatoses: Bath Puva: The patient received Photochemotherapy for severe photoresponsive dermatoses: Bath PUVA requiring at least 4 to 8 hours of care under direct physician supervision. Protocol For Nb Uva: The patient received NB UVA. Skin Type: II Post-Care Instructions: I reviewed with the patient in detail post-care instructions. Patient is to wear sun protection. Patients may expect sunburn like redness, discomfort and scabbing. Treatment Number: 49 Protocol For Photochemotherapy: Tar And Broad Band Uvb (Goeckerman Treatment): The patient received Photochemotherapy: Tar and Broad Band UVB (Goeckerman treatment). Renal function stable, tolerating diuresis  Will increase lasix to 20mg IV twice a day.  Reassess dosing in AM  dose meds for eGFR ~20  Avoid NSAIDs and nephrotoxins as able Protocol For Photochemotherapy: Mineral Oil And Nbuvb: The patient received Photochemotherapy: Baby Oil and NBUVB (baby oil applied to all lesions prior to phototherapy). Protocol For Nbuvb: Hands/Feet: The patient received NBUVB. Protocol For Photochemotherapy For Severe Photoresponsive Dermatoses: Petrolatum And Broad Band Uvb: The patient received Photochemotherapyfor severe photoresponsive dermatoses: Petrolatum and Broad Band UVB requiring at least 4 to 8 hours of care under direct physician supervision. Protocol For Photochemotherapy: Petrolatum And Broad Band Uvb: The patient received Photochemotherapy: Petrolatum and Broad Band UVB. Protocol For Photochemotherapy: Tar And Nbuvb (Goeckerman Treatment): The patient received Photochemotherapy: Tar and NBUVB (Goeckerman treatment). Protocol For Puva: The patient received PUVA. Additional Shield Locations: shields face Total Body Energy: 0.407 Protocol For Photochemotherapy: Triamcinolone Ointment And Nbuvb: The patient received Photochemotherapy: Triamcinolone and NBUVB (triamcinolone ointment applied to all lesions prior to phototherapy). Consent: Written consent obtained.  The risks were reviewed with the patient including but not limited to: burn, pigmentary changes, pain, blistering, scabbing, redness, increased risk of skin cancers, and the remote possibility of scarring. Protocol For Uva: The patient received UVA. Protocol For Photochemotherapy For Severe Photoresponsive Dermatoses: Puva: The patient received Photochemotherapy for severe photoresponsive dermatoses: PUVA requiring at least 4 to 8 hours of care under direct physician supervision. Protocol For Uva1: The patient received UVA1. Protocol For Photochemotherapy For Severe Photoresponsive Dermatoses: Tar And Nbuvb (Goeckerman Treatment): The patient received Photochemotherapy for severe photoresponsive dermatoses: Tar and NBUVB (Goeckerman treatment) requiring at least 4 to 8 hours of care under direct physician supervision. Render Post-Care In The Note: no Detail Level: Generalized Total Treatment Time: 00:39 Protocol For Photochemotherapy: Mineral Oil And Broad Band Uvb: The patient received Photochemotherapy: Mineral Oil and Broad Band UVB. Protocol: NBUVB Protocol For Photochemotherapy: Petrolatum And Nbuvb: The patient received Photochemotherapy: Petrolatum and NBUVB (petrolatum applied to all lesions prior to phototherapy). Name Of Supervising Technician: anna Protocol For Photochemotherapy For Severe Photoresponsive Dermatoses: Tar And Broad Band Uvb (Goeckerman Treatment): The patient received Photochemotherapy for severe photoresponsive dermatoses: Tar and Broad Band UVB (Goeckerman treatment) requiring at least 4 to 8 hours of care under direct physician supervision. Protocol For Photochemotherapy For Severe Photoresponsive Dermatoses: Petrolatum And Nbuvb: The patient received Photochemotherapy for severe photoresponsive dermatoses: Petrolatum and NBUVB requiring at least 4 to 8 hours of care under direct physician supervision. Protocol For Bath Puva: The patient received Bath PUVA. Protocol For Broad Band Uvb: The patient received Broad Band UVB.

## 2023-12-12 NOTE — DIETITIAN INITIAL EVALUATION ADULT. - NS AS NUTRI INTERV MEALS SNACK
Medication PA Requested:   Tirzepatide (MOUNJARO) 2.5 MG/0.5ML Subcutaneous Solution Pen-injector                                                        CoverMyMeds Used:   Key: [id number]   Quantity: 2 mL   Day Supply: 30 DAYS   Sig:  Inject 2.5 mg into the skin every 7 days.   DX Code: E11.65, Z79.4                                     CPT code (if applicable):   Case Number/Pending Ref#:     CMM submitted, LOV 12/4 and A1C 12/4  Awaiting determination   Reviewed alternate menu options and menu ordering procedure. Provide food preferences within therapeutic diet when requested.

## 2024-04-08 NOTE — PROGRESS NOTE ADULT - ASSESSMENT
56 yo female with extensive Pmhx admitted with SOB in setting of acute on chronic systolic heart failure (E4) spontaneous

## 2024-05-06 NOTE — PATIENT PROFILE ADULT. - PATIENT REPRESENTATIVE PHONE
[facility]  INPATIENT WOUND & OSTOMY CARE    Today's Date: 05/06/24    Patient Name: Luciano Christiansen  MRN: [id number]  CSN: [id number]  PCP: Jossy Christiansen APRN  Attending Provider: Reyes Joshi MD  Length of Stay: 0    Rounded on patient during multidisciplinary rounds. Patient is currently resting in bed.     Harris in place with securement device.     Heels assessed. Skin intact. Silicone barrier cream applied for protection and heels offloaded with pillows.     Sacral spine skin intact. Added silicone foam border dressing added for protection. Order to be changed daily. Skin underneath dressing to be checked with each assessment.     Wedges added to patient for turns. Patient is currently on a comfort glide with an absorbant pad.    Pressure ulcer prevention measures placed per protocol. Nursing to utilize all tools to prevent skin breakdown.    Inpatient wound care will continue to follow during hospital stay.  Please contact if any issues or concerns arise.     This document has been electronically signed by Carolynn Weiss RN on 5/6/2024 10:49 CDT     
[phone]

## 2024-06-25 NOTE — PHYSICAL THERAPY INITIAL EVALUATION ADULT - LEVEL OF INDEPENDENCE: SCOOT/BRIDGE, REHAB EVAL
What Is The Reason For Today's Visit?: Full Body Skin Examination minimum assist (75% patients effort)

## 2024-09-27 NOTE — PROVIDER CONTACT NOTE (OTHER) - ACTION/TREATMENT ORDERED:
Health Maintenance       Influenza Vaccine (1)  Due since 9/1/2024           Following review of the above:  Patient is not proceeding with: Influenza    Note: Refer to final orders and clinician documentation.       NP notified. Will continue to monitor pt.

## 2024-11-22 NOTE — ED PROVIDER NOTE - CROS ED [facility] ALL NEG
Recent Visits  Date Type Provider Dept   04/12/24 Office Visit Shalini Dawkins MD [facility]   10/11/23 Office Visit Annetta Reyes APRN - CNP [facility]   Showing recent visits within past 540 days with a meds authorizing provider and meeting all other requirements  Future Appointments  No visits were found meeting these conditions.  Showing future appointments within next 150 days with a meds authorizing provider and meeting all other requirements     4/12/2024      negative...

## 2024-12-03 NOTE — ED ADULT NURSE NOTE - WEIGHT BEARING STATUS MAINTAINED
PT was in [facility]. Spouse was in room. CH checked for unmet needs and offered support. CH let Spouse know that PT and Spouse are in CH's prayers.     Rev. Katy Black M.Div.     partial weight bearing right/partial weight bearing left

## 2025-01-09 NOTE — ED ADULT NURSE REASSESSMENT NOTE - NURSING SKIN WOUND LOCATION #1
IUD Removal     Birth control method(s) used: Genesis  Consent signed.  Procedure discussed with the patient in detail including indication, risks, benefits, alternatives and complications.      Procedure:  Speculum placed in the vagina.  A Ring Forcep was used to grasp IUD strings.  Genesis IUD was removed without difficulty.  The patient tolerated the procedure well.      Visit Plan:  Discharge instructions were reviewed with the patient.  
leg

## 2025-03-25 NOTE — PROGRESS NOTE ADULT - PROBLEM SELECTOR PROBLEM 2
Chronic systolic heart failure
Urinary tract infection without hematuria, site unspecified
Urinary tract infection without hematuria, site unspecified
---
Urinary tract infection without hematuria, site unspecified
Urinary tract infection without hematuria, site unspecified